# Patient Record
Sex: FEMALE | Race: WHITE | NOT HISPANIC OR LATINO | Employment: OTHER | URBAN - METROPOLITAN AREA
[De-identification: names, ages, dates, MRNs, and addresses within clinical notes are randomized per-mention and may not be internally consistent; named-entity substitution may affect disease eponyms.]

---

## 2019-12-17 ENCOUNTER — HOSPITAL ENCOUNTER (INPATIENT)
Facility: HOSPITAL | Age: 84
LOS: 22 days | Discharge: HOME HEALTH | End: 2020-01-08
Attending: PHYSICAL MEDICINE & REHABILITATION | Admitting: PHYSICAL MEDICINE & REHABILITATION

## 2019-12-17 DIAGNOSIS — R26.81 UNSTEADINESS ON FEET: Primary | ICD-10-CM

## 2019-12-17 DIAGNOSIS — I67.9 HEMIPARESIS OF LEFT NONDOMINANT SIDE DUE TO CEREBROVASCULAR DISEASE (HCC): ICD-10-CM

## 2019-12-17 DIAGNOSIS — R26.2 DIFFICULTY WALKING: ICD-10-CM

## 2019-12-17 DIAGNOSIS — G81.94 HEMIPARESIS OF LEFT NONDOMINANT SIDE DUE TO CEREBROVASCULAR DISEASE (HCC): ICD-10-CM

## 2019-12-17 RX ORDER — PANTOPRAZOLE SODIUM 40 MG/1
40 TABLET, DELAYED RELEASE ORAL
Status: DISCONTINUED | OUTPATIENT
Start: 2019-12-17 | End: 2020-01-08 | Stop reason: HOSPADM

## 2019-12-17 RX ORDER — CLOPIDOGREL BISULFATE 75 MG/1
75 TABLET ORAL DAILY
Status: DISCONTINUED | OUTPATIENT
Start: 2019-12-17 | End: 2020-01-08 | Stop reason: HOSPADM

## 2019-12-17 RX ORDER — METOPROLOL TARTRATE 50 MG/1
50 TABLET, FILM COATED ORAL 2 TIMES DAILY
Status: DISCONTINUED | OUTPATIENT
Start: 2019-12-17 | End: 2020-01-08 | Stop reason: HOSPADM

## 2019-12-17 RX ORDER — ASPIRIN AND DIPYRIDAMOLE 25; 200 MG/1; MG/1
1 CAPSULE, EXTENDED RELEASE ORAL EVERY 12 HOURS
Status: DISCONTINUED | OUTPATIENT
Start: 2019-12-17 | End: 2019-12-27

## 2019-12-17 RX ORDER — DOCUSATE SODIUM 100 MG/1
100 CAPSULE, LIQUID FILLED ORAL 2 TIMES DAILY
Status: DISCONTINUED | OUTPATIENT
Start: 2019-12-17 | End: 2020-01-08 | Stop reason: HOSPADM

## 2019-12-17 RX ORDER — LEVOTHYROXINE SODIUM 0.12 MG/1
125 TABLET ORAL
Status: DISCONTINUED | OUTPATIENT
Start: 2019-12-18 | End: 2020-01-08 | Stop reason: HOSPADM

## 2019-12-17 RX ORDER — TORSEMIDE 10 MG/1
10 TABLET ORAL DAILY
Status: DISCONTINUED | OUTPATIENT
Start: 2019-12-17 | End: 2020-01-08 | Stop reason: HOSPADM

## 2019-12-17 RX ORDER — FLUTICASONE PROPIONATE 50 MCG
1 SPRAY, SUSPENSION (ML) NASAL DAILY
Status: DISCONTINUED | OUTPATIENT
Start: 2019-12-17 | End: 2020-01-08 | Stop reason: HOSPADM

## 2019-12-17 RX ORDER — DILTIAZEM HYDROCHLORIDE 180 MG/1
180 CAPSULE, COATED, EXTENDED RELEASE ORAL 2 TIMES DAILY
Status: DISCONTINUED | OUTPATIENT
Start: 2019-12-17 | End: 2020-01-08 | Stop reason: HOSPADM

## 2019-12-17 RX ADMIN — ASPIRIN AND DIPYRIDAMOLE 1 CAPSULE: 25; 200 CAPSULE, EXTENDED RELEASE ORAL at 20:08

## 2019-12-17 RX ADMIN — FLUTICASONE PROPIONATE 1 SPRAY: 50 SPRAY, METERED NASAL at 20:07

## 2019-12-17 RX ADMIN — METOPROLOL TARTRATE 50 MG: 50 TABLET, FILM COATED ORAL at 20:09

## 2019-12-17 RX ADMIN — DOCUSATE SODIUM 100 MG: 100 CAPSULE, LIQUID FILLED ORAL at 20:09

## 2019-12-17 RX ADMIN — TORSEMIDE 10 MG: 10 TABLET ORAL at 20:07

## 2019-12-17 RX ADMIN — DILTIAZEM HYDROCHLORIDE 180 MG: 180 CAPSULE, COATED, EXTENDED RELEASE ORAL at 20:10

## 2019-12-17 RX ADMIN — CLOPIDOGREL 75 MG: 75 TABLET, FILM COATED ORAL at 20:09

## 2019-12-18 ENCOUNTER — APPOINTMENT (OUTPATIENT)
Dept: CARDIOLOGY | Facility: HOSPITAL | Age: 84
End: 2019-12-18

## 2019-12-18 PROBLEM — I63.9 STROKE (CEREBRUM) (HCC): Status: ACTIVE | Noted: 2019-12-18

## 2019-12-18 LAB
25(OH)D3 SERPL-MCNC: 18.7 NG/ML (ref 30–100)
ANION GAP SERPL CALCULATED.3IONS-SCNC: 18.1 MMOL/L (ref 5–15)
BUN BLD-MCNC: 23 MG/DL (ref 8–23)
BUN/CREAT SERPL: 22.5 (ref 7–25)
CALCIUM SPEC-SCNC: 8.9 MG/DL (ref 8.6–10.5)
CHLORIDE SERPL-SCNC: 101 MMOL/L (ref 98–107)
CO2 SERPL-SCNC: 24.9 MMOL/L (ref 22–29)
CREAT BLD-MCNC: 1.02 MG/DL (ref 0.57–1)
GFR SERPL CREATININE-BSD FRML MDRD: 52 ML/MIN/1.73
GLUCOSE BLD-MCNC: 208 MG/DL (ref 65–99)
POTASSIUM BLD-SCNC: 3.8 MMOL/L (ref 3.5–5.2)
SODIUM BLD-SCNC: 144 MMOL/L (ref 136–145)
TROPONIN T SERPL-MCNC: <0.01 NG/ML (ref 0–0.03)
TSH SERPL DL<=0.05 MIU/L-ACNC: 3.29 UIU/ML (ref 0.27–4.2)
VIT B12 BLD-MCNC: 339 PG/ML (ref 211–946)

## 2019-12-18 PROCEDURE — 93010 ELECTROCARDIOGRAM REPORT: CPT | Performed by: INTERNAL MEDICINE

## 2019-12-18 PROCEDURE — 84484 ASSAY OF TROPONIN QUANT: CPT | Performed by: INTERNAL MEDICINE

## 2019-12-18 PROCEDURE — 82306 VITAMIN D 25 HYDROXY: CPT | Performed by: PHYSICAL MEDICINE & REHABILITATION

## 2019-12-18 PROCEDURE — 96125 COGNITIVE TEST BY HC PRO: CPT

## 2019-12-18 PROCEDURE — 0296T HC EXT ECG > 48HR TO 21 DAY RCRD W/CONECT INTL RCRD: CPT

## 2019-12-18 PROCEDURE — 97535 SELF CARE MNGMENT TRAINING: CPT

## 2019-12-18 PROCEDURE — 97166 OT EVAL MOD COMPLEX 45 MIN: CPT

## 2019-12-18 PROCEDURE — 84443 ASSAY THYROID STIM HORMONE: CPT | Performed by: PHYSICAL MEDICINE & REHABILITATION

## 2019-12-18 PROCEDURE — 84484 ASSAY OF TROPONIN QUANT: CPT | Performed by: PHYSICAL MEDICINE & REHABILITATION

## 2019-12-18 PROCEDURE — 97530 THERAPEUTIC ACTIVITIES: CPT

## 2019-12-18 PROCEDURE — 97162 PT EVAL MOD COMPLEX 30 MIN: CPT

## 2019-12-18 PROCEDURE — 82607 VITAMIN B-12: CPT | Performed by: PHYSICAL MEDICINE & REHABILITATION

## 2019-12-18 PROCEDURE — 80048 BASIC METABOLIC PNL TOTAL CA: CPT | Performed by: PHYSICAL MEDICINE & REHABILITATION

## 2019-12-18 PROCEDURE — 99223 1ST HOSP IP/OBS HIGH 75: CPT | Performed by: INTERNAL MEDICINE

## 2019-12-18 PROCEDURE — 93005 ELECTROCARDIOGRAM TRACING: CPT | Performed by: PHYSICAL MEDICINE & REHABILITATION

## 2019-12-18 RX ORDER — SPIRONOLACTONE 25 MG/1
25 TABLET ORAL DAILY
COMMUNITY
End: 2020-01-08 | Stop reason: HOSPADM

## 2019-12-18 RX ORDER — TORSEMIDE 20 MG/1
20 TABLET ORAL DAILY
Status: ON HOLD | COMMUNITY
End: 2020-01-08 | Stop reason: SDUPTHER

## 2019-12-18 RX ORDER — OMEPRAZOLE 20 MG/1
20 CAPSULE, DELAYED RELEASE ORAL 2 TIMES DAILY
COMMUNITY
End: 2020-01-08 | Stop reason: HOSPADM

## 2019-12-18 RX ORDER — DILTIAZEM HYDROCHLORIDE 180 MG/1
180 CAPSULE, COATED, EXTENDED RELEASE ORAL 2 TIMES DAILY
COMMUNITY

## 2019-12-18 RX ORDER — ISOSORBIDE MONONITRATE 60 MG/1
60 TABLET, EXTENDED RELEASE ORAL DAILY
COMMUNITY
End: 2020-01-08 | Stop reason: HOSPADM

## 2019-12-18 RX ORDER — ACETAMINOPHEN 325 MG/1
650 TABLET ORAL EVERY 4 HOURS PRN
Status: DISCONTINUED | OUTPATIENT
Start: 2019-12-18 | End: 2020-01-08 | Stop reason: HOSPADM

## 2019-12-18 RX ORDER — VITAMIN E 268 MG
400 CAPSULE ORAL DAILY
COMMUNITY
End: 2020-02-24

## 2019-12-18 RX ORDER — ASPIRIN AND DIPYRIDAMOLE 25; 200 MG/1; MG/1
1 CAPSULE, EXTENDED RELEASE ORAL 2 TIMES DAILY
COMMUNITY
End: 2020-01-08 | Stop reason: HOSPADM

## 2019-12-18 RX ORDER — LEVOTHYROXINE SODIUM 0.12 MG/1
125 TABLET ORAL DAILY
COMMUNITY

## 2019-12-18 RX ORDER — CLOPIDOGREL BISULFATE 75 MG/1
75 TABLET ORAL DAILY
COMMUNITY
End: 2020-02-28 | Stop reason: HOSPADM

## 2019-12-18 RX ORDER — LISINOPRIL 20 MG/1
20 TABLET ORAL 2 TIMES DAILY
COMMUNITY
End: 2020-01-08 | Stop reason: HOSPADM

## 2019-12-18 RX ORDER — NITROGLYCERIN 0.4 MG/1
0.4 TABLET SUBLINGUAL
Status: DISCONTINUED | OUTPATIENT
Start: 2019-12-18 | End: 2020-01-08 | Stop reason: HOSPADM

## 2019-12-18 RX ORDER — ERGOCALCIFEROL 1.25 MG/1
50000 CAPSULE ORAL
Status: DISCONTINUED | OUTPATIENT
Start: 2019-12-19 | End: 2020-01-08 | Stop reason: HOSPADM

## 2019-12-18 RX ORDER — METOPROLOL TARTRATE 100 MG/1
100 TABLET ORAL 2 TIMES DAILY
Status: ON HOLD | COMMUNITY
End: 2020-01-08 | Stop reason: SDUPTHER

## 2019-12-18 RX ORDER — POLYETHYLENE GLYCOL 3350 17 G/17G
17 POWDER, FOR SOLUTION ORAL DAILY PRN
Status: DISCONTINUED | OUTPATIENT
Start: 2019-12-18 | End: 2020-01-08

## 2019-12-18 RX ADMIN — METOPROLOL TARTRATE 50 MG: 50 TABLET, FILM COATED ORAL at 09:04

## 2019-12-18 RX ADMIN — TORSEMIDE 10 MG: 10 TABLET ORAL at 09:04

## 2019-12-18 RX ADMIN — DILTIAZEM HYDROCHLORIDE 180 MG: 180 CAPSULE, COATED, EXTENDED RELEASE ORAL at 09:04

## 2019-12-18 RX ADMIN — DILTIAZEM HYDROCHLORIDE 180 MG: 180 CAPSULE, COATED, EXTENDED RELEASE ORAL at 20:25

## 2019-12-18 RX ADMIN — ACETAMINOPHEN 650 MG: 325 TABLET, FILM COATED ORAL at 16:43

## 2019-12-18 RX ADMIN — PANTOPRAZOLE SODIUM 40 MG: 40 TABLET, DELAYED RELEASE ORAL at 16:43

## 2019-12-18 RX ADMIN — POLYETHYLENE GLYCOL 3350 17 G: 17 POWDER, FOR SOLUTION ORAL at 14:28

## 2019-12-18 RX ADMIN — ASPIRIN AND DIPYRIDAMOLE 1 CAPSULE: 25; 200 CAPSULE, EXTENDED RELEASE ORAL at 18:12

## 2019-12-18 RX ADMIN — DOCUSATE SODIUM 100 MG: 100 CAPSULE, LIQUID FILLED ORAL at 20:25

## 2019-12-18 RX ADMIN — PANTOPRAZOLE SODIUM 40 MG: 40 TABLET, DELAYED RELEASE ORAL at 05:46

## 2019-12-18 RX ADMIN — CLOPIDOGREL 75 MG: 75 TABLET, FILM COATED ORAL at 09:04

## 2019-12-18 RX ADMIN — METOPROLOL TARTRATE 50 MG: 50 TABLET, FILM COATED ORAL at 20:25

## 2019-12-18 RX ADMIN — ACETAMINOPHEN 650 MG: 325 TABLET, FILM COATED ORAL at 09:03

## 2019-12-18 RX ADMIN — DOCUSATE SODIUM 100 MG: 100 CAPSULE, LIQUID FILLED ORAL at 09:04

## 2019-12-18 RX ADMIN — LEVOTHYROXINE SODIUM 125 MCG: 125 TABLET ORAL at 05:46

## 2019-12-18 RX ADMIN — NITROGLYCERIN 0.4 MG: 0.4 TABLET SUBLINGUAL at 08:36

## 2019-12-18 RX ADMIN — FLUTICASONE PROPIONATE 1 SPRAY: 50 SPRAY, METERED NASAL at 09:05

## 2019-12-18 RX ADMIN — ASPIRIN AND DIPYRIDAMOLE 1 CAPSULE: 25; 200 CAPSULE, EXTENDED RELEASE ORAL at 05:46

## 2019-12-19 PROCEDURE — 97110 THERAPEUTIC EXERCISES: CPT

## 2019-12-19 PROCEDURE — 97112 NEUROMUSCULAR REEDUCATION: CPT

## 2019-12-19 PROCEDURE — 92507 TX SP LANG VOICE COMM INDIV: CPT

## 2019-12-19 PROCEDURE — 99232 SBSQ HOSP IP/OBS MODERATE 35: CPT | Performed by: NURSE PRACTITIONER

## 2019-12-19 PROCEDURE — 97116 GAIT TRAINING THERAPY: CPT

## 2019-12-19 PROCEDURE — 97535 SELF CARE MNGMENT TRAINING: CPT

## 2019-12-19 RX ADMIN — DOCUSATE SODIUM 100 MG: 100 CAPSULE, LIQUID FILLED ORAL at 07:28

## 2019-12-19 RX ADMIN — DILTIAZEM HYDROCHLORIDE 180 MG: 180 CAPSULE, COATED, EXTENDED RELEASE ORAL at 20:15

## 2019-12-19 RX ADMIN — ASPIRIN AND DIPYRIDAMOLE 1 CAPSULE: 25; 200 CAPSULE, EXTENDED RELEASE ORAL at 17:49

## 2019-12-19 RX ADMIN — PANTOPRAZOLE SODIUM 40 MG: 40 TABLET, DELAYED RELEASE ORAL at 17:48

## 2019-12-19 RX ADMIN — CLOPIDOGREL 75 MG: 75 TABLET, FILM COATED ORAL at 07:28

## 2019-12-19 RX ADMIN — PANTOPRAZOLE SODIUM 40 MG: 40 TABLET, DELAYED RELEASE ORAL at 05:22

## 2019-12-19 RX ADMIN — HYDROCORTISONE 2.5%: 25 CREAM TOPICAL at 20:15

## 2019-12-19 RX ADMIN — ASPIRIN AND DIPYRIDAMOLE 1 CAPSULE: 25; 200 CAPSULE, EXTENDED RELEASE ORAL at 05:22

## 2019-12-19 RX ADMIN — FLUTICASONE PROPIONATE 1 SPRAY: 50 SPRAY, METERED NASAL at 07:29

## 2019-12-19 RX ADMIN — ERGOCALCIFEROL 50000 UNITS: 1.25 CAPSULE ORAL at 07:29

## 2019-12-19 RX ADMIN — TORSEMIDE 10 MG: 10 TABLET ORAL at 07:29

## 2019-12-19 RX ADMIN — METOPROLOL TARTRATE 50 MG: 50 TABLET, FILM COATED ORAL at 20:15

## 2019-12-19 RX ADMIN — LEVOTHYROXINE SODIUM 125 MCG: 125 TABLET ORAL at 05:22

## 2019-12-19 RX ADMIN — ACETAMINOPHEN 650 MG: 325 TABLET, FILM COATED ORAL at 07:28

## 2019-12-19 RX ADMIN — DILTIAZEM HYDROCHLORIDE 180 MG: 180 CAPSULE, COATED, EXTENDED RELEASE ORAL at 07:29

## 2019-12-19 RX ADMIN — DOCUSATE SODIUM 100 MG: 100 CAPSULE, LIQUID FILLED ORAL at 20:15

## 2019-12-19 RX ADMIN — METOPROLOL TARTRATE 50 MG: 50 TABLET, FILM COATED ORAL at 07:29

## 2019-12-20 LAB
BACTERIA UR QL AUTO: ABNORMAL /HPF
BILIRUB UR QL STRIP: NEGATIVE
CLARITY UR: CLEAR
COLOR UR: YELLOW
GLUCOSE UR STRIP-MCNC: NEGATIVE MG/DL
HGB UR QL STRIP.AUTO: NEGATIVE
HYALINE CASTS UR QL AUTO: ABNORMAL /LPF
KETONES UR QL STRIP: NEGATIVE
LEUKOCYTE ESTERASE UR QL STRIP.AUTO: ABNORMAL
NITRITE UR QL STRIP: NEGATIVE
PH UR STRIP.AUTO: <=5 [PH] (ref 5–8)
PROT UR QL STRIP: NEGATIVE
RBC # UR: ABNORMAL /HPF
REF LAB TEST METHOD: ABNORMAL
SP GR UR STRIP: 1.02 (ref 1–1.03)
SQUAMOUS #/AREA URNS HPF: ABNORMAL /HPF
UROBILINOGEN UR QL STRIP: ABNORMAL
WBC UR QL AUTO: ABNORMAL /HPF

## 2019-12-20 PROCEDURE — 97112 NEUROMUSCULAR REEDUCATION: CPT

## 2019-12-20 PROCEDURE — 97116 GAIT TRAINING THERAPY: CPT

## 2019-12-20 PROCEDURE — 97110 THERAPEUTIC EXERCISES: CPT

## 2019-12-20 PROCEDURE — 97535 SELF CARE MNGMENT TRAINING: CPT

## 2019-12-20 PROCEDURE — 81001 URINALYSIS AUTO W/SCOPE: CPT | Performed by: PHYSICAL MEDICINE & REHABILITATION

## 2019-12-20 PROCEDURE — G0515 COGNITIVE SKILLS DEVELOPMENT: HCPCS | Performed by: SPEECH-LANGUAGE PATHOLOGIST

## 2019-12-20 RX ADMIN — CLOPIDOGREL 75 MG: 75 TABLET, FILM COATED ORAL at 08:06

## 2019-12-20 RX ADMIN — DOCUSATE SODIUM 100 MG: 100 CAPSULE, LIQUID FILLED ORAL at 08:06

## 2019-12-20 RX ADMIN — ASPIRIN AND DIPYRIDAMOLE 1 CAPSULE: 25; 200 CAPSULE, EXTENDED RELEASE ORAL at 19:12

## 2019-12-20 RX ADMIN — DOCUSATE SODIUM 100 MG: 100 CAPSULE, LIQUID FILLED ORAL at 20:31

## 2019-12-20 RX ADMIN — FLUTICASONE PROPIONATE 1 SPRAY: 50 SPRAY, METERED NASAL at 08:07

## 2019-12-20 RX ADMIN — ACETAMINOPHEN 650 MG: 325 TABLET, FILM COATED ORAL at 06:21

## 2019-12-20 RX ADMIN — METOPROLOL TARTRATE 50 MG: 50 TABLET, FILM COATED ORAL at 08:06

## 2019-12-20 RX ADMIN — HYDROCORTISONE 2.5%: 25 CREAM TOPICAL at 08:07

## 2019-12-20 RX ADMIN — METOPROLOL TARTRATE 50 MG: 50 TABLET, FILM COATED ORAL at 20:31

## 2019-12-20 RX ADMIN — DILTIAZEM HYDROCHLORIDE 180 MG: 180 CAPSULE, COATED, EXTENDED RELEASE ORAL at 08:06

## 2019-12-20 RX ADMIN — DILTIAZEM HYDROCHLORIDE 180 MG: 180 CAPSULE, COATED, EXTENDED RELEASE ORAL at 20:31

## 2019-12-20 RX ADMIN — HYDROCORTISONE 2.5%: 25 CREAM TOPICAL at 20:31

## 2019-12-20 RX ADMIN — ASPIRIN AND DIPYRIDAMOLE 1 CAPSULE: 25; 200 CAPSULE, EXTENDED RELEASE ORAL at 06:16

## 2019-12-20 RX ADMIN — PANTOPRAZOLE SODIUM 40 MG: 40 TABLET, DELAYED RELEASE ORAL at 17:01

## 2019-12-20 RX ADMIN — TORSEMIDE 10 MG: 10 TABLET ORAL at 08:06

## 2019-12-20 RX ADMIN — PANTOPRAZOLE SODIUM 40 MG: 40 TABLET, DELAYED RELEASE ORAL at 06:17

## 2019-12-20 RX ADMIN — LEVOTHYROXINE SODIUM 125 MCG: 125 TABLET ORAL at 06:16

## 2019-12-21 PROCEDURE — 97112 NEUROMUSCULAR REEDUCATION: CPT

## 2019-12-21 PROCEDURE — 97535 SELF CARE MNGMENT TRAINING: CPT

## 2019-12-21 PROCEDURE — 97110 THERAPEUTIC EXERCISES: CPT | Performed by: PHYSICAL THERAPIST

## 2019-12-21 PROCEDURE — G0515 COGNITIVE SKILLS DEVELOPMENT: HCPCS

## 2019-12-21 RX ADMIN — LEVOTHYROXINE SODIUM 125 MCG: 125 TABLET ORAL at 05:49

## 2019-12-21 RX ADMIN — HYDROCORTISONE 2.5%: 25 CREAM TOPICAL at 09:27

## 2019-12-21 RX ADMIN — PANTOPRAZOLE SODIUM 40 MG: 40 TABLET, DELAYED RELEASE ORAL at 05:49

## 2019-12-21 RX ADMIN — DILTIAZEM HYDROCHLORIDE 180 MG: 180 CAPSULE, COATED, EXTENDED RELEASE ORAL at 20:55

## 2019-12-21 RX ADMIN — CLOPIDOGREL 75 MG: 75 TABLET, FILM COATED ORAL at 09:22

## 2019-12-21 RX ADMIN — METOPROLOL TARTRATE 50 MG: 50 TABLET, FILM COATED ORAL at 09:22

## 2019-12-21 RX ADMIN — TORSEMIDE 10 MG: 10 TABLET ORAL at 09:22

## 2019-12-21 RX ADMIN — ASPIRIN AND DIPYRIDAMOLE 1 CAPSULE: 25; 200 CAPSULE, EXTENDED RELEASE ORAL at 18:00

## 2019-12-21 RX ADMIN — PANTOPRAZOLE SODIUM 40 MG: 40 TABLET, DELAYED RELEASE ORAL at 16:56

## 2019-12-21 RX ADMIN — ACETAMINOPHEN 650 MG: 325 TABLET, FILM COATED ORAL at 05:49

## 2019-12-21 RX ADMIN — ACETAMINOPHEN 650 MG: 325 TABLET, FILM COATED ORAL at 13:51

## 2019-12-21 RX ADMIN — METOPROLOL TARTRATE 50 MG: 50 TABLET, FILM COATED ORAL at 20:55

## 2019-12-21 RX ADMIN — DOCUSATE SODIUM 100 MG: 100 CAPSULE, LIQUID FILLED ORAL at 20:55

## 2019-12-21 RX ADMIN — FLUTICASONE PROPIONATE 1 SPRAY: 50 SPRAY, METERED NASAL at 09:22

## 2019-12-21 RX ADMIN — POLYETHYLENE GLYCOL 3350 17 G: 17 POWDER, FOR SOLUTION ORAL at 16:55

## 2019-12-21 RX ADMIN — DOCUSATE SODIUM 100 MG: 100 CAPSULE, LIQUID FILLED ORAL at 09:22

## 2019-12-21 RX ADMIN — ASPIRIN AND DIPYRIDAMOLE 1 CAPSULE: 25; 200 CAPSULE, EXTENDED RELEASE ORAL at 05:49

## 2019-12-21 RX ADMIN — HYDROCORTISONE 2.5%: 25 CREAM TOPICAL at 20:55

## 2019-12-21 RX ADMIN — DILTIAZEM HYDROCHLORIDE 180 MG: 180 CAPSULE, COATED, EXTENDED RELEASE ORAL at 09:22

## 2019-12-22 RX ORDER — ZOLPIDEM TARTRATE 5 MG/1
5 TABLET ORAL NIGHTLY PRN
Status: DISPENSED | OUTPATIENT
Start: 2019-12-22 | End: 2020-01-01

## 2019-12-22 RX ADMIN — HYDROCORTISONE 2.5%: 25 CREAM TOPICAL at 20:45

## 2019-12-22 RX ADMIN — HYDROCORTISONE 2.5%: 25 CREAM TOPICAL at 08:20

## 2019-12-22 RX ADMIN — LEVOTHYROXINE SODIUM 125 MCG: 125 TABLET ORAL at 06:00

## 2019-12-22 RX ADMIN — METOPROLOL TARTRATE 50 MG: 50 TABLET, FILM COATED ORAL at 20:46

## 2019-12-22 RX ADMIN — PANTOPRAZOLE SODIUM 40 MG: 40 TABLET, DELAYED RELEASE ORAL at 06:01

## 2019-12-22 RX ADMIN — DILTIAZEM HYDROCHLORIDE 180 MG: 180 CAPSULE, COATED, EXTENDED RELEASE ORAL at 20:45

## 2019-12-22 RX ADMIN — DILTIAZEM HYDROCHLORIDE 180 MG: 180 CAPSULE, COATED, EXTENDED RELEASE ORAL at 08:19

## 2019-12-22 RX ADMIN — DOCUSATE SODIUM 100 MG: 100 CAPSULE, LIQUID FILLED ORAL at 20:45

## 2019-12-22 RX ADMIN — ASPIRIN AND DIPYRIDAMOLE 1 CAPSULE: 25; 200 CAPSULE, EXTENDED RELEASE ORAL at 06:01

## 2019-12-22 RX ADMIN — METOPROLOL TARTRATE 50 MG: 50 TABLET, FILM COATED ORAL at 08:19

## 2019-12-22 RX ADMIN — DOCUSATE SODIUM 100 MG: 100 CAPSULE, LIQUID FILLED ORAL at 08:20

## 2019-12-22 RX ADMIN — ZOLPIDEM TARTRATE 5 MG: 5 TABLET ORAL at 21:34

## 2019-12-22 RX ADMIN — PANTOPRAZOLE SODIUM 40 MG: 40 TABLET, DELAYED RELEASE ORAL at 17:59

## 2019-12-22 RX ADMIN — TORSEMIDE 10 MG: 10 TABLET ORAL at 08:23

## 2019-12-22 RX ADMIN — CLOPIDOGREL 75 MG: 75 TABLET, FILM COATED ORAL at 08:19

## 2019-12-22 RX ADMIN — ASPIRIN AND DIPYRIDAMOLE 1 CAPSULE: 25; 200 CAPSULE, EXTENDED RELEASE ORAL at 17:59

## 2019-12-22 RX ADMIN — ACETAMINOPHEN 650 MG: 325 TABLET, FILM COATED ORAL at 11:32

## 2019-12-22 RX ADMIN — ACETAMINOPHEN 650 MG: 325 TABLET, FILM COATED ORAL at 20:45

## 2019-12-23 PROCEDURE — 97110 THERAPEUTIC EXERCISES: CPT

## 2019-12-23 PROCEDURE — 97535 SELF CARE MNGMENT TRAINING: CPT

## 2019-12-23 PROCEDURE — 97116 GAIT TRAINING THERAPY: CPT

## 2019-12-23 PROCEDURE — 97112 NEUROMUSCULAR REEDUCATION: CPT

## 2019-12-23 PROCEDURE — G0515 COGNITIVE SKILLS DEVELOPMENT: HCPCS

## 2019-12-23 RX ORDER — BUTALBITAL, ACETAMINOPHEN AND CAFFEINE 50; 325; 40 MG/1; MG/1; MG/1
1 TABLET ORAL EVERY 8 HOURS PRN
Status: DISCONTINUED | OUTPATIENT
Start: 2019-12-23 | End: 2020-01-08

## 2019-12-23 RX ORDER — SENNA AND DOCUSATE SODIUM 50; 8.6 MG/1; MG/1
1 TABLET, FILM COATED ORAL NIGHTLY
Status: DISCONTINUED | OUTPATIENT
Start: 2019-12-23 | End: 2020-01-08 | Stop reason: HOSPADM

## 2019-12-23 RX ADMIN — TORSEMIDE 10 MG: 10 TABLET ORAL at 09:01

## 2019-12-23 RX ADMIN — DILTIAZEM HYDROCHLORIDE 180 MG: 180 CAPSULE, COATED, EXTENDED RELEASE ORAL at 21:18

## 2019-12-23 RX ADMIN — ACETAMINOPHEN 650 MG: 325 TABLET, FILM COATED ORAL at 19:28

## 2019-12-23 RX ADMIN — CLOPIDOGREL 75 MG: 75 TABLET, FILM COATED ORAL at 09:01

## 2019-12-23 RX ADMIN — SENNOSIDES AND DOCUSATE SODIUM 1 TABLET: 8.6; 5 TABLET ORAL at 21:19

## 2019-12-23 RX ADMIN — ASPIRIN AND DIPYRIDAMOLE 1 CAPSULE: 25; 200 CAPSULE, EXTENDED RELEASE ORAL at 19:28

## 2019-12-23 RX ADMIN — ACETAMINOPHEN 650 MG: 325 TABLET, FILM COATED ORAL at 02:35

## 2019-12-23 RX ADMIN — POLYETHYLENE GLYCOL 3350 17 G: 17 POWDER, FOR SOLUTION ORAL at 16:31

## 2019-12-23 RX ADMIN — ZOLPIDEM TARTRATE 5 MG: 5 TABLET ORAL at 21:18

## 2019-12-23 RX ADMIN — ACETAMINOPHEN 650 MG: 325 TABLET, FILM COATED ORAL at 09:01

## 2019-12-23 RX ADMIN — PANTOPRAZOLE SODIUM 40 MG: 40 TABLET, DELAYED RELEASE ORAL at 16:31

## 2019-12-23 RX ADMIN — METOPROLOL TARTRATE 50 MG: 50 TABLET, FILM COATED ORAL at 09:01

## 2019-12-23 RX ADMIN — HYDROCORTISONE 2.5%: 25 CREAM TOPICAL at 09:01

## 2019-12-23 RX ADMIN — ASPIRIN AND DIPYRIDAMOLE 1 CAPSULE: 25; 200 CAPSULE, EXTENDED RELEASE ORAL at 05:59

## 2019-12-23 RX ADMIN — DOCUSATE SODIUM 100 MG: 100 CAPSULE, LIQUID FILLED ORAL at 21:18

## 2019-12-23 RX ADMIN — DILTIAZEM HYDROCHLORIDE 180 MG: 180 CAPSULE, COATED, EXTENDED RELEASE ORAL at 09:01

## 2019-12-23 RX ADMIN — LEVOTHYROXINE SODIUM 125 MCG: 125 TABLET ORAL at 05:59

## 2019-12-23 RX ADMIN — PANTOPRAZOLE SODIUM 40 MG: 40 TABLET, DELAYED RELEASE ORAL at 06:00

## 2019-12-23 RX ADMIN — METOPROLOL TARTRATE 50 MG: 50 TABLET, FILM COATED ORAL at 21:19

## 2019-12-23 RX ADMIN — DOCUSATE SODIUM 100 MG: 100 CAPSULE, LIQUID FILLED ORAL at 09:01

## 2019-12-24 PROCEDURE — 97110 THERAPEUTIC EXERCISES: CPT

## 2019-12-24 PROCEDURE — 97112 NEUROMUSCULAR REEDUCATION: CPT

## 2019-12-24 PROCEDURE — G0515 COGNITIVE SKILLS DEVELOPMENT: HCPCS

## 2019-12-24 PROCEDURE — 97116 GAIT TRAINING THERAPY: CPT

## 2019-12-24 PROCEDURE — 97535 SELF CARE MNGMENT TRAINING: CPT

## 2019-12-24 RX ADMIN — DOCUSATE SODIUM 100 MG: 100 CAPSULE, LIQUID FILLED ORAL at 20:01

## 2019-12-24 RX ADMIN — PHENYLEPHRINE HYDROCHLORIDE AND FAT, HARD 1 SUPPOSITORY: 5; 1.77 SUPPOSITORY RECTAL at 20:10

## 2019-12-24 RX ADMIN — ASPIRIN AND DIPYRIDAMOLE 1 CAPSULE: 25; 200 CAPSULE, EXTENDED RELEASE ORAL at 06:04

## 2019-12-24 RX ADMIN — LEVOTHYROXINE SODIUM 125 MCG: 125 TABLET ORAL at 06:05

## 2019-12-24 RX ADMIN — DILTIAZEM HYDROCHLORIDE 180 MG: 180 CAPSULE, COATED, EXTENDED RELEASE ORAL at 07:52

## 2019-12-24 RX ADMIN — DILTIAZEM HYDROCHLORIDE 180 MG: 180 CAPSULE, COATED, EXTENDED RELEASE ORAL at 20:01

## 2019-12-24 RX ADMIN — ASPIRIN AND DIPYRIDAMOLE 1 CAPSULE: 25; 200 CAPSULE, EXTENDED RELEASE ORAL at 16:41

## 2019-12-24 RX ADMIN — SENNOSIDES AND DOCUSATE SODIUM 1 TABLET: 8.6; 5 TABLET ORAL at 20:01

## 2019-12-24 RX ADMIN — ZOLPIDEM TARTRATE 5 MG: 5 TABLET ORAL at 20:01

## 2019-12-24 RX ADMIN — DOCUSATE SODIUM 100 MG: 100 CAPSULE, LIQUID FILLED ORAL at 07:52

## 2019-12-24 RX ADMIN — METOPROLOL TARTRATE 50 MG: 50 TABLET, FILM COATED ORAL at 20:01

## 2019-12-24 RX ADMIN — ACETAMINOPHEN 650 MG: 325 TABLET, FILM COATED ORAL at 06:05

## 2019-12-24 RX ADMIN — FLUTICASONE PROPIONATE 1 SPRAY: 50 SPRAY, METERED NASAL at 07:52

## 2019-12-24 RX ADMIN — TORSEMIDE 10 MG: 10 TABLET ORAL at 07:52

## 2019-12-24 RX ADMIN — PANTOPRAZOLE SODIUM 40 MG: 40 TABLET, DELAYED RELEASE ORAL at 06:05

## 2019-12-24 RX ADMIN — PANTOPRAZOLE SODIUM 40 MG: 40 TABLET, DELAYED RELEASE ORAL at 16:41

## 2019-12-24 RX ADMIN — BUTALBITAL, ACETAMINOPHEN, AND CAFFEINE 1 TABLET: 50; 325; 40 TABLET ORAL at 00:10

## 2019-12-24 RX ADMIN — CLOPIDOGREL 75 MG: 75 TABLET, FILM COATED ORAL at 07:53

## 2019-12-24 RX ADMIN — METOPROLOL TARTRATE 50 MG: 50 TABLET, FILM COATED ORAL at 07:52

## 2019-12-25 RX ADMIN — PANTOPRAZOLE SODIUM 40 MG: 40 TABLET, DELAYED RELEASE ORAL at 06:24

## 2019-12-25 RX ADMIN — DOCUSATE SODIUM 100 MG: 100 CAPSULE, LIQUID FILLED ORAL at 09:54

## 2019-12-25 RX ADMIN — ACETAMINOPHEN 650 MG: 325 TABLET, FILM COATED ORAL at 22:13

## 2019-12-25 RX ADMIN — ACETAMINOPHEN 650 MG: 325 TABLET, FILM COATED ORAL at 13:09

## 2019-12-25 RX ADMIN — CLOPIDOGREL 75 MG: 75 TABLET, FILM COATED ORAL at 09:50

## 2019-12-25 RX ADMIN — METOPROLOL TARTRATE 50 MG: 50 TABLET, FILM COATED ORAL at 22:14

## 2019-12-25 RX ADMIN — ACETAMINOPHEN 650 MG: 325 TABLET, FILM COATED ORAL at 16:59

## 2019-12-25 RX ADMIN — TORSEMIDE 10 MG: 10 TABLET ORAL at 09:49

## 2019-12-25 RX ADMIN — LEVOTHYROXINE SODIUM 125 MCG: 125 TABLET ORAL at 06:23

## 2019-12-25 RX ADMIN — ACETAMINOPHEN 650 MG: 325 TABLET, FILM COATED ORAL at 06:30

## 2019-12-25 RX ADMIN — METOPROLOL TARTRATE 50 MG: 50 TABLET, FILM COATED ORAL at 09:50

## 2019-12-25 RX ADMIN — SENNOSIDES AND DOCUSATE SODIUM 1 TABLET: 8.6; 5 TABLET ORAL at 21:30

## 2019-12-25 RX ADMIN — DILTIAZEM HYDROCHLORIDE 180 MG: 180 CAPSULE, COATED, EXTENDED RELEASE ORAL at 21:30

## 2019-12-25 RX ADMIN — ASPIRIN AND DIPYRIDAMOLE 1 CAPSULE: 25; 200 CAPSULE, EXTENDED RELEASE ORAL at 17:00

## 2019-12-25 RX ADMIN — ZOLPIDEM TARTRATE 5 MG: 5 TABLET ORAL at 22:13

## 2019-12-25 RX ADMIN — PANTOPRAZOLE SODIUM 40 MG: 40 TABLET, DELAYED RELEASE ORAL at 16:59

## 2019-12-25 RX ADMIN — DOCUSATE SODIUM 100 MG: 100 CAPSULE, LIQUID FILLED ORAL at 21:29

## 2019-12-25 RX ADMIN — DILTIAZEM HYDROCHLORIDE 180 MG: 180 CAPSULE, COATED, EXTENDED RELEASE ORAL at 09:50

## 2019-12-25 RX ADMIN — ASPIRIN AND DIPYRIDAMOLE 1 CAPSULE: 25; 200 CAPSULE, EXTENDED RELEASE ORAL at 06:23

## 2019-12-26 ENCOUNTER — HOSPITAL ENCOUNTER (OUTPATIENT)
Facility: HOSPITAL | Age: 84
Discharge: NURSING, REHAB OR LONG-TERM CARE | End: 2019-12-27
Attending: INTERNAL MEDICINE | Admitting: INTERNAL MEDICINE

## 2019-12-26 ENCOUNTER — APPOINTMENT (OUTPATIENT)
Dept: GENERAL RADIOLOGY | Facility: HOSPITAL | Age: 84
End: 2019-12-26

## 2019-12-26 ENCOUNTER — APPOINTMENT (OUTPATIENT)
Dept: CT IMAGING | Facility: HOSPITAL | Age: 84
End: 2019-12-26

## 2019-12-26 PROBLEM — R07.9 CHEST PAIN: Status: ACTIVE | Noted: 2019-12-26

## 2019-12-26 LAB
ANION GAP SERPL CALCULATED.3IONS-SCNC: 14.2 MMOL/L (ref 5–15)
BUN BLD-MCNC: 27 MG/DL (ref 8–23)
BUN/CREAT SERPL: 19.6 (ref 7–25)
CALCIUM SPEC-SCNC: 9.2 MG/DL (ref 8.6–10.5)
CHLORIDE SERPL-SCNC: 100 MMOL/L (ref 98–107)
CO2 SERPL-SCNC: 25.8 MMOL/L (ref 22–29)
CREAT BLD-MCNC: 1.38 MG/DL (ref 0.57–1)
DEPRECATED RDW RBC AUTO: 45.2 FL (ref 37–54)
ERYTHROCYTE [DISTWIDTH] IN BLOOD BY AUTOMATED COUNT: 14.4 % (ref 12.3–15.4)
GFR SERPL CREATININE-BSD FRML MDRD: 37 ML/MIN/1.73
GLUCOSE BLD-MCNC: 233 MG/DL (ref 65–99)
GLUCOSE BLDC GLUCOMTR-MCNC: 202 MG/DL (ref 70–130)
GLUCOSE BLDC GLUCOMTR-MCNC: 386 MG/DL (ref 70–130)
HCT VFR BLD AUTO: 29.2 % (ref 34–46.6)
HGB BLD-MCNC: 9.2 G/DL (ref 12–15.9)
MCH RBC QN AUTO: 27.3 PG (ref 26.6–33)
MCHC RBC AUTO-ENTMCNC: 31.5 G/DL (ref 31.5–35.7)
MCV RBC AUTO: 86.6 FL (ref 79–97)
PLATELET # BLD AUTO: 296 10*3/MM3 (ref 140–450)
PMV BLD AUTO: 10.5 FL (ref 6–12)
POTASSIUM BLD-SCNC: 4.2 MMOL/L (ref 3.5–5.2)
RBC # BLD AUTO: 3.37 10*6/MM3 (ref 3.77–5.28)
SODIUM BLD-SCNC: 140 MMOL/L (ref 136–145)
TROPONIN T SERPL-MCNC: <0.01 NG/ML (ref 0–0.03)
WBC NRBC COR # BLD: 7.6 10*3/MM3 (ref 3.4–10.8)

## 2019-12-26 PROCEDURE — 85027 COMPLETE CBC AUTOMATED: CPT | Performed by: INTERNAL MEDICINE

## 2019-12-26 PROCEDURE — 99223 1ST HOSP IP/OBS HIGH 75: CPT | Performed by: PSYCHIATRY & NEUROLOGY

## 2019-12-26 PROCEDURE — 25010000002 MIDAZOLAM PER 1 MG: Performed by: INTERNAL MEDICINE

## 2019-12-26 PROCEDURE — 99153 MOD SED SAME PHYS/QHP EA: CPT | Performed by: INTERNAL MEDICINE

## 2019-12-26 PROCEDURE — C1894 INTRO/SHEATH, NON-LASER: HCPCS | Performed by: INTERNAL MEDICINE

## 2019-12-26 PROCEDURE — 25010000002 METHYLPREDNISOLONE PER 125 MG: Performed by: INTERNAL MEDICINE

## 2019-12-26 PROCEDURE — 80048 BASIC METABOLIC PNL TOTAL CA: CPT | Performed by: INTERNAL MEDICINE

## 2019-12-26 PROCEDURE — 70450 CT HEAD/BRAIN W/O DYE: CPT

## 2019-12-26 PROCEDURE — A9270 NON-COVERED ITEM OR SERVICE: HCPCS | Performed by: INTERNAL MEDICINE

## 2019-12-26 PROCEDURE — 93459 L HRT ART/GRFT ANGIO: CPT | Performed by: INTERNAL MEDICINE

## 2019-12-26 PROCEDURE — 82962 GLUCOSE BLOOD TEST: CPT

## 2019-12-26 PROCEDURE — 93010 ELECTROCARDIOGRAM REPORT: CPT | Performed by: INTERNAL MEDICINE

## 2019-12-26 PROCEDURE — G0378 HOSPITAL OBSERVATION PER HR: HCPCS

## 2019-12-26 PROCEDURE — 0 IOPAMIDOL PER 1 ML: Performed by: INTERNAL MEDICINE

## 2019-12-26 PROCEDURE — C1769 GUIDE WIRE: HCPCS | Performed by: INTERNAL MEDICINE

## 2019-12-26 PROCEDURE — 93005 ELECTROCARDIOGRAM TRACING: CPT | Performed by: PHYSICAL MEDICINE & REHABILITATION

## 2019-12-26 PROCEDURE — 99152 MOD SED SAME PHYS/QHP 5/>YRS: CPT | Performed by: INTERNAL MEDICINE

## 2019-12-26 PROCEDURE — 63710000001 METOPROLOL TARTRATE 50 MG TABLET: Performed by: INTERNAL MEDICINE

## 2019-12-26 PROCEDURE — 25010000002 DIPHENHYDRAMINE PER 50 MG: Performed by: INTERNAL MEDICINE

## 2019-12-26 PROCEDURE — 84484 ASSAY OF TROPONIN QUANT: CPT | Performed by: INTERNAL MEDICINE

## 2019-12-26 PROCEDURE — 25010000002 FENTANYL CITRATE (PF) 100 MCG/2ML SOLUTION: Performed by: INTERNAL MEDICINE

## 2019-12-26 PROCEDURE — 63710000001 DILTIAZEM CD 180 MG CAPSULE SUSTAINED-RELEASE 24 HR: Performed by: INTERNAL MEDICINE

## 2019-12-26 PROCEDURE — 99220 PR INITIAL OBSERVATION CARE/DAY 70 MINUTES: CPT | Performed by: INTERNAL MEDICINE

## 2019-12-26 PROCEDURE — 93005 ELECTROCARDIOGRAM TRACING: CPT | Performed by: INTERNAL MEDICINE

## 2019-12-26 PROCEDURE — 71045 X-RAY EXAM CHEST 1 VIEW: CPT

## 2019-12-26 RX ORDER — SODIUM CHLORIDE 450 MG/100ML
50 INJECTION, SOLUTION INTRAVENOUS CONTINUOUS
Status: DISCONTINUED | OUTPATIENT
Start: 2019-12-26 | End: 2019-12-27

## 2019-12-26 RX ORDER — ASPIRIN 81 MG/1
81 TABLET ORAL DAILY
Status: DISCONTINUED | OUTPATIENT
Start: 2019-12-26 | End: 2019-12-26

## 2019-12-26 RX ORDER — CLOPIDOGREL BISULFATE 75 MG/1
75 TABLET ORAL DAILY
Status: DISCONTINUED | OUTPATIENT
Start: 2019-12-26 | End: 2019-12-27

## 2019-12-26 RX ORDER — SODIUM CHLORIDE 9 MG/ML
250 INJECTION, SOLUTION INTRAVENOUS ONCE AS NEEDED
Status: DISCONTINUED | OUTPATIENT
Start: 2019-12-26 | End: 2019-12-27 | Stop reason: HOSPADM

## 2019-12-26 RX ORDER — MIDAZOLAM HYDROCHLORIDE 1 MG/ML
INJECTION INTRAMUSCULAR; INTRAVENOUS AS NEEDED
Status: DISCONTINUED | OUTPATIENT
Start: 2019-12-26 | End: 2019-12-26 | Stop reason: HOSPADM

## 2019-12-26 RX ORDER — SODIUM CHLORIDE 9 MG/ML
100 INJECTION, SOLUTION INTRAVENOUS CONTINUOUS
Status: DISCONTINUED | OUTPATIENT
Start: 2019-12-26 | End: 2019-12-26

## 2019-12-26 RX ORDER — DIPHENHYDRAMINE HYDROCHLORIDE 50 MG/ML
INJECTION INTRAMUSCULAR; INTRAVENOUS AS NEEDED
Status: DISCONTINUED | OUTPATIENT
Start: 2019-12-26 | End: 2019-12-26 | Stop reason: HOSPADM

## 2019-12-26 RX ORDER — DILTIAZEM HYDROCHLORIDE 180 MG/1
180 CAPSULE, COATED, EXTENDED RELEASE ORAL 2 TIMES DAILY
Status: DISCONTINUED | OUTPATIENT
Start: 2019-12-26 | End: 2019-12-27 | Stop reason: HOSPADM

## 2019-12-26 RX ORDER — ASPIRIN AND DIPYRIDAMOLE 25; 200 MG/1; MG/1
1 CAPSULE, EXTENDED RELEASE ORAL 2 TIMES DAILY
Status: ON HOLD | COMMUNITY
Start: 2017-10-21 | End: 2019-12-26 | Stop reason: DRUGHIGH

## 2019-12-26 RX ORDER — TORSEMIDE 10 MG/1
10 TABLET ORAL DAILY
Status: ON HOLD | COMMUNITY
Start: 2019-12-18 | End: 2019-12-26 | Stop reason: DRUGHIGH

## 2019-12-26 RX ORDER — METOPROLOL TARTRATE 50 MG/1
50 TABLET, FILM COATED ORAL
Status: ON HOLD | COMMUNITY
Start: 2019-12-17 | End: 2019-12-26 | Stop reason: DRUGHIGH

## 2019-12-26 RX ORDER — ISOSORBIDE MONONITRATE 30 MG/1
60 TABLET, EXTENDED RELEASE ORAL DAILY
Status: DISCONTINUED | OUTPATIENT
Start: 2019-12-26 | End: 2019-12-27 | Stop reason: HOSPADM

## 2019-12-26 RX ORDER — METOPROLOL TARTRATE 50 MG/1
100 TABLET, FILM COATED ORAL EVERY 12 HOURS SCHEDULED
Status: DISCONTINUED | OUTPATIENT
Start: 2019-12-26 | End: 2019-12-27 | Stop reason: HOSPADM

## 2019-12-26 RX ORDER — LEVOTHYROXINE SODIUM 0.12 MG/1
125 TABLET ORAL DAILY
Status: DISCONTINUED | OUTPATIENT
Start: 2019-12-26 | End: 2019-12-27 | Stop reason: HOSPADM

## 2019-12-26 RX ORDER — DILTIAZEM HYDROCHLORIDE 180 MG/1
180 CAPSULE, COATED, EXTENDED RELEASE ORAL 2 TIMES DAILY
COMMUNITY
Start: 2019-05-06 | End: 2019-12-27 | Stop reason: HOSPADM

## 2019-12-26 RX ORDER — FENTANYL CITRATE 50 UG/ML
INJECTION, SOLUTION INTRAMUSCULAR; INTRAVENOUS AS NEEDED
Status: DISCONTINUED | OUTPATIENT
Start: 2019-12-26 | End: 2019-12-26 | Stop reason: HOSPADM

## 2019-12-26 RX ORDER — LIDOCAINE HYDROCHLORIDE 20 MG/ML
INJECTION, SOLUTION INFILTRATION; PERINEURAL AS NEEDED
Status: DISCONTINUED | OUTPATIENT
Start: 2019-12-26 | End: 2019-12-26 | Stop reason: HOSPADM

## 2019-12-26 RX ORDER — ZOLPIDEM TARTRATE 5 MG/1
5 TABLET ORAL NIGHTLY PRN
Status: DISCONTINUED | OUTPATIENT
Start: 2019-12-26 | End: 2019-12-27 | Stop reason: HOSPADM

## 2019-12-26 RX ORDER — DEXTROSE MONOHYDRATE 25 G/50ML
25 INJECTION, SOLUTION INTRAVENOUS
Status: DISCONTINUED | OUTPATIENT
Start: 2019-12-26 | End: 2019-12-27 | Stop reason: HOSPADM

## 2019-12-26 RX ORDER — TORSEMIDE 20 MG/1
40 TABLET ORAL DAILY
Status: DISCONTINUED | OUTPATIENT
Start: 2019-12-26 | End: 2019-12-27 | Stop reason: HOSPADM

## 2019-12-26 RX ORDER — NICOTINE POLACRILEX 4 MG
15 LOZENGE BUCCAL
Status: DISCONTINUED | OUTPATIENT
Start: 2019-12-26 | End: 2019-12-27 | Stop reason: HOSPADM

## 2019-12-26 RX ORDER — PANTOPRAZOLE SODIUM 40 MG/1
40 TABLET, DELAYED RELEASE ORAL EVERY MORNING
Status: DISCONTINUED | OUTPATIENT
Start: 2019-12-27 | End: 2019-12-27 | Stop reason: HOSPADM

## 2019-12-26 RX ORDER — DIPHENHYDRAMINE HYDROCHLORIDE 50 MG/ML
50 INJECTION INTRAMUSCULAR; INTRAVENOUS ONCE
Status: DISCONTINUED | OUTPATIENT
Start: 2019-12-26 | End: 2019-12-27 | Stop reason: HOSPADM

## 2019-12-26 RX ORDER — ASPIRIN 81 MG/1
81 TABLET ORAL DAILY
Status: DISCONTINUED | OUTPATIENT
Start: 2019-12-27 | End: 2019-12-27

## 2019-12-26 RX ORDER — METHYLPREDNISOLONE SODIUM SUCCINATE 125 MG/2ML
INJECTION, POWDER, LYOPHILIZED, FOR SOLUTION INTRAMUSCULAR; INTRAVENOUS AS NEEDED
Status: DISCONTINUED | OUTPATIENT
Start: 2019-12-26 | End: 2019-12-26 | Stop reason: HOSPADM

## 2019-12-26 RX ORDER — METHYLPREDNISOLONE SODIUM SUCCINATE 125 MG/2ML
125 INJECTION, POWDER, LYOPHILIZED, FOR SOLUTION INTRAMUSCULAR; INTRAVENOUS ONCE
Status: DISCONTINUED | OUTPATIENT
Start: 2019-12-26 | End: 2019-12-27 | Stop reason: HOSPADM

## 2019-12-26 RX ORDER — DOCUSATE SODIUM 100 MG/1
100 CAPSULE, LIQUID FILLED ORAL 2 TIMES DAILY
COMMUNITY
End: 2020-02-24

## 2019-12-26 RX ORDER — OMEPRAZOLE 20 MG/1
20 CAPSULE, DELAYED RELEASE ORAL 2 TIMES DAILY
Status: ON HOLD | COMMUNITY
End: 2019-12-26 | Stop reason: DRUGHIGH

## 2019-12-26 RX ORDER — LEVOTHYROXINE SODIUM 0.12 MG/1
125 TABLET ORAL DAILY
COMMUNITY
End: 2019-12-27 | Stop reason: HOSPADM

## 2019-12-26 RX ORDER — CLOPIDOGREL BISULFATE 75 MG/1
75 TABLET ORAL DAILY
COMMUNITY
Start: 2019-11-22 | End: 2020-01-08 | Stop reason: HOSPADM

## 2019-12-26 RX ORDER — SODIUM CHLORIDE 9 MG/ML
INJECTION, SOLUTION INTRAVENOUS CONTINUOUS PRN
Status: COMPLETED | OUTPATIENT
Start: 2019-12-26 | End: 2019-12-26

## 2019-12-26 RX ADMIN — CLOPIDOGREL 75 MG: 75 TABLET, FILM COATED ORAL at 09:07

## 2019-12-26 RX ADMIN — ERGOCALCIFEROL 50000 UNITS: 1.25 CAPSULE ORAL at 09:08

## 2019-12-26 RX ADMIN — DILTIAZEM HYDROCHLORIDE 180 MG: 180 CAPSULE, COATED, EXTENDED RELEASE ORAL at 09:06

## 2019-12-26 RX ADMIN — NITROGLYCERIN 0.4 MG: 0.4 TABLET SUBLINGUAL at 08:34

## 2019-12-26 RX ADMIN — ASPIRIN AND DIPYRIDAMOLE 1 CAPSULE: 25; 200 CAPSULE, EXTENDED RELEASE ORAL at 05:32

## 2019-12-26 RX ADMIN — NITROGLYCERIN 0.4 MG: 0.4 TABLET SUBLINGUAL at 08:38

## 2019-12-26 RX ADMIN — TORSEMIDE 10 MG: 10 TABLET ORAL at 09:07

## 2019-12-26 RX ADMIN — PANTOPRAZOLE SODIUM 40 MG: 40 TABLET, DELAYED RELEASE ORAL at 05:32

## 2019-12-26 RX ADMIN — DILTIAZEM HYDROCHLORIDE 180 MG: 180 CAPSULE, COATED, EXTENDED RELEASE ORAL at 22:02

## 2019-12-26 RX ADMIN — METOPROLOL TARTRATE 50 MG: 50 TABLET, FILM COATED ORAL at 09:07

## 2019-12-26 RX ADMIN — LEVOTHYROXINE SODIUM 125 MCG: 125 TABLET ORAL at 05:32

## 2019-12-26 RX ADMIN — METOPROLOL TARTRATE 100 MG: 50 TABLET, FILM COATED ORAL at 22:02

## 2019-12-26 RX ADMIN — NITROGLYCERIN 0.4 MG: 0.4 TABLET SUBLINGUAL at 08:54

## 2019-12-26 RX ADMIN — SODIUM CHLORIDE 50 ML/HR: 4.5 INJECTION, SOLUTION INTRAVENOUS at 11:29

## 2019-12-26 NOTE — PROGRESS NOTES
LOS: 0 days   Patient Care Team:  Francisco Gallagher MD as PCP - General (Family Medicine)    Chief Complaint:   Status post right CVA with TPA/petechial hemorrhage  Stroke prophylaxis-Aggrenox/Plavix  Left hemiparesis/left visual field deficit  Coronary artery disease  Hyperlipidemia - alirocumab  Hypothyroidism   Hypertension  Chronic renal insufficiency  DVT prophylaxis-SCDs    Subjective     History of Present Illness    Subjective    Patient with episode of crushing chest pain this morning into her right neck.  Relieved with nitroglycerin x3.  Transferred to monitored bed on 5 S.  Seen by cardiology with plans for cardiac cath this afternoon pending input from nephrology regarding Contrast and neurology regarding antiplatelet options.  The patient reports that presently she is not having any chest pain.  Does not notice any change in her strength on the left side.  Continues with no vision to the left.  She has had some urinary hesitancy, required intermittent straight cath once on the weekend but has been voiding on her own the past couple days    History taken from: patient    Objective     Vital Signs  Temp:  [97.9 °F (36.6 °C)-98.4 °F (36.9 °C)] 97.9 °F (36.6 °C)  Heart Rate:  [70-97] 82  Resp:  [18-20] 20  BP: (110-147)/(58-80) 125/72    Objective:  Vital signs: (most recent): Blood pressure 125/72, pulse 82, resp. rate 20, SpO2 98 %, not currently breastfeeding.            Physical Exam  MENTAL STATUS -  AWAKE / ALERT  HEENT- NCAT,  , SCLERA NON-ICTERIC, CONJUNCTIVA PINK,   NO JVD   LUNGS - CTA, NO WHEEZES, RALES OR RHONCHI  Chest-ZIO Patch  HEART- RRR, NO RUB, MURMUR, OR GALLOP  ABD - NORMOACTIVE BOWEL SOUNDS, SOFT, NT.  Bladder not readily palpable.  EXT - NO EDEMA OR CYANOSIS  NEURO -oriented x4.  Good historian.  Left visual field deficit.  No dysarthria.  Speech fluent.     MOTOR EXAM - RUE/RLE 5/5. LUE/LLE 4+/5.    Improved motor control on the left side  Results Review:     I reviewed the patient's  new clinical results.  Lab Results   Lab Value Date/Time    TROPONINT <0.010 12/18/2019 1902    TROPONINT <0.010 12/18/2019 1102    TROPONINT <0.010 12/18/2019 0853    TROPONINI 0.012 12/12/2019 0258    TROPONINI 0.012 12/11/2019 1613    TROPONINI <0.012 04/19/2019 2330    TROPONINI <0.012 04/19/2019 1844    TROPONINI <0.012 04/19/2019 1421           Invalid input(s): LABALBU, PROT  Results from last 7 days   Lab Units 12/26/19  1000   WBC 10*3/mm3 7.60   HEMOGLOBIN g/dL 9.2*   HEMATOCRIT % 29.2*   PLATELETS 10*3/mm3 296      Ref. Range 12/18/2019 08:53 12/18/2019 11:02   TSH Baseline Latest Ref Range: 0.270 - 4.200 uIU/mL 3.290     Vitamin B-12 Latest Ref Range: 211 - 946 pg/mL   339   25 Hydroxy, Vitamin D Latest Ref Range: 30.0 - 100.0 ng/ml   18.7 (L)       Medication Review: DONE  Scheduled Meds:    Continuous Infusions:  No current facility-administered medications for this encounter.   PRN Meds:.      Assessment/Plan       * No active hospital problems. *      Assessment & Plan  Status post right CVA with TPA/petechial hemorrhage  Stroke prophylaxis-Aggrenox/Plavix  Zio patch in place.     Left hemiparesis/left visual field deficit  Impaired mobility/self-care     Adjunctive medications post stroke-on levothyroxine with TSH within normal limits.  B12 within normal limits.  Vitamin D deficiency-associated with stroke/stroke recovery-  will add ergocalciferol 50,000 units weekly.     Coronary artery disease -history of CABG/stent.    December 19-troponins negative yesterday.  No chest pain today.  Tolerating therapies.  December 26-Patient with episode of crushing chest pain this morning into her right neck.  Relieved with nitroglycerin x3.  Transferred to monitored bed on 5 S.  Seen by cardiology with plans for cardiac cath this afternoon pending input from nephrology regarding Contrast and neurology regarding antiplatelet options.      Hyperlipidemia -  alirocumab     Hypothyroidism-levothyroxine     Hypertension-on Cardizem and metoprolol.  Aldactone and lisinopril held after recent acute on chronic renal insufficiency.  On lower dose torsemide.     Chronic renal insufficiency     DVT prophylaxis-SCDs    Chronic low back pain-off nonsteroidal anti-inflammatory(Voltaren) .  Will try K pad.  May add Celebrex at later point, as has been shown not to raise risk of secondary coronary/cerebrovascular event.      Now admit for comprehensive acute inpatient rehabilitation .  This would be an interdisciplinary program with physical therapy 1 hour,  occupational therapy 1 hour, and speech therapy 1 hour, 5 days a week.  Rehabilitation nursing for carryover, monitoring of cardiac and neurologic   status, bowel and bladder, and skin  Ongoing physician follow-up.  Weekly team conferences.  Goals are to achieve a level of contact-guard with  mobility and self-care and improved visual-spatial.   Rehabilitation prognosis fair.  Medical prognosis indeterminate.  Estimated length of stay is approximately 2 weeks, but is only an estimation.   The patient's functional status and clinical status is unchanged from preadmission assessment and the patient continues appropriate for acute inpatient rehabilitation.  Goal is for home with outpatient   therapies.  Barrier to discharge: Impaired strength and mobility- work on transfers ambulation and self-care to overcome.         TEAM CONF- DEC 19 - TRANSFERS MIN. BED CTG. 40 FEET MIN RW. ADLS MIN ASSIST. LEFT VISUAL FIELD DEFICIT. LEFT INATTENTION  ELOS- 2 WEEKS    TEAM CONF - DEC 26 - BED SBA. TRANSFES MIN-CTG. GAIT 80 FEET MIN-CTG RW. ADLS CTG. LEFT INATTENTION. MODERATE DEFICITS FOR REASONING AND MATH.  ELOS - 1 WEEK from a rehab standpoint.  Family conference scheduled for tomorrow and family would like to still keep that time as members from out of town are here.  Azael Abdullahi MD  12/26/19  10:37 AM    Time:

## 2019-12-26 NOTE — CONSULTS
Neurology Consult Note    Consult Date: 12/26/2019    Referring MD: Jana Sesay MD    Reason for Consult I have been asked to see the patient in neurological consultation to render advice and opinion regarding clearance for heart cath in the setting of recent stroke    Liana Carrero is a 84 y.o. female with past medical history of CHF, CAD, diabetes, hyperlipidemia, hypertension, ARAMIS, recent stroke on 12/11 that caused left-sided vision loss and left-sided weakness.  She received TPA for that at [facility].  She somewhat improved and by the time she transferred to rehab bed mild to moderate left-sided weakness but persistent left hemianopia.  She had been progressing well however over the past few days seemed a little bit weaker on the left side to her family members.  Today she complained of chest pain and was brought back to the main hospital with plans for cardiology for cardiac cath if possible.  We are consulted for clearance given her recent stroke and risk of intracerebral hemorrhage with heparin during the procedure.    Past Medical/Surgical Hx:  Past Medical History:   Diagnosis Date   • Arthritis    • CHF (congestive heart failure) (CMS/HCC)    • Diabetes mellitus (CMS/HCC)     new DX related to stroke, started on insulin at hospital   • Disease of thyroid gland    • Elevated cholesterol    • GERD (gastroesophageal reflux disease)    • Hypertension    • Sleep apnea    • Stroke (CMS/HCC)      Past Surgical History:   Procedure Laterality Date   • APPENDECTOMY     • CARDIAC CATHETERIZATION       x2   • COLON SURGERY     • COLONOSCOPY     • EYE SURGERY      lens implants and cataract surgery   • HYSTERECTOMY     • VASCULAR SURGERY      varicous veins       Medications On Admission  Medications Prior to Admission   Medication Sig Dispense Refill Last Dose   • Alirocumab 75 MG/ML solution auto-injector Inject 75 mg under the skin into the appropriate area as directed See Admin Instructions. Give  every 14 days   12/19/2019   • clopidogrel (PLAVIX) 75 MG tablet Take 75 mg by mouth Daily.   12/25/2019   • dilTIAZem CD (CARDIZEM CD) 180 MG 24 hr capsule Take 180 mg by mouth 2 (Two) Times a Day.   12/25/2019   • Alirocumab (PRALUENT SC) Inject 75 mg under the skin into the appropriate area as directed Every 14 (Fourteen) Days.      • aspirin-dipyridamole (AGGRENOX)  MG per 12 hr capsule Take 1 capsule by mouth 2 (Two) Times a Day.      • clopidogrel (PLAVIX) 75 MG tablet Take 75 mg by mouth Daily.      • DICLOFENAC PO Take 75 mg by mouth 2 (Two) Times a Day.      • dilTIAZem CD (CARDIZEM CD) 180 MG 24 hr capsule Take 180 mg by mouth 2 (Two) Times a Day.      • docusate sodium (COLACE) 100 MG capsule Take 100 mg by mouth 2 (Two) Times a Day.   12/25/2019   • isosorbide mononitrate (IMDUR) 60 MG 24 hr tablet Take 60 mg by mouth Daily.      • levothyroxine (SYNTHROID, LEVOTHROID) 125 MCG tablet Take 125 mcg by mouth Daily.      • levothyroxine (SYNTHROID, LEVOTHROID) 125 MCG tablet Take 125 mcg by mouth Daily.   12/26/2019   • lisinopril (PRINIVIL,ZESTRIL) 20 MG tablet Take 20 mg by mouth 2 (Two) Times a Day.      • metoprolol tartrate (LOPRESSOR) 100 MG tablet Take 100 mg by mouth 2 (Two) Times a Day.      • omeprazole (priLOSEC) 20 MG capsule Take 20 mg by mouth 2 (Two) Times a Day.      • spironolactone (ALDACTONE) 25 MG tablet Take 25 mg by mouth Daily.      • torsemide (DEMADEX) 20 MG tablet Take 20 mg by mouth Daily. Take 2 tablets by mouth once daily.      • vitamin E 400 UNIT capsule Take 400 Units by mouth Daily.          Allergies:  Allergies   Allergen Reactions   • Statins Other (See Comments)   • Contrast Dye Palpitations   • Iodinated Diagnostic Agents Palpitations   • Iodine Palpitations       Social Hx:  Social History     Socioeconomic History   • Marital status:      Spouse name: Not on file   • Number of children: Not on file   • Years of education: Not on file   • Highest education  level: Not on file   Tobacco Use   • Smoking status: Never Smoker   • Smokeless tobacco: Never Used   Substance and Sexual Activity   • Alcohol use: Never     Frequency: Never   • Drug use: Never       Family Hx:  Family History   Problem Relation Age of Onset   • Heart disease Mother    • Stroke Mother    • Cancer Sister    • Alzheimer's disease Brother    • Cancer Sister        Review of systems  Constitutional: [No fevers, chills]  Eye: [No recent visual problems, eye discharge]  Respiratory: [No shortness of breath, cough]  Cardiovascular: [+ Chest pain, no palpitations]  Neurologic: [+ Weakness, numbness]  Psychiatric: [No anxiety, depression]    All other systems reviewed and are negative    Exam    /72 (BP Location: Right arm, Patient Position: Lying)   Pulse 82   Resp 20   LMP  (LMP Unknown)   SpO2 98%   gen: NAD, vitals reviewed  Eyes: fundus sharp with no papilledema or retinal hemorrhages  HEENT: no nuchal rigidity  CVS: RRR, S1, S2  MS: oriented x3, recent/remote memory intact, normal attention/concentration, language intact, no neglect, normal fund of knowledge  CN: visual acuity grossly normal, left homonymous hemianopia, PERRL, EOMI, facial sensation equal, no facial droop, hearing symmetric, palate elevates symmetrically, shoulder shrug equal, tongue midline  Motor: 5/5 throughout upper and lower extremities, normal tone  Sensation: intact to vibration and temperature throughout  Reflexes: 2+ throughout upper and lower extremities, downgoing plantars  Coordination: no dysmetria with finger to nose bilaterally  Gait: deferred due to concern for ACS    DATA:    Lab Results   Component Value Date    GLUCOSE 233 (H) 12/26/2019    CALCIUM 9.2 12/26/2019     12/26/2019    K 4.2 12/26/2019    CO2 25.8 12/26/2019     12/26/2019    BUN 27 (H) 12/26/2019    CREATININE 1.38 (H) 12/26/2019    EGFRIFNONA 37 (L) 12/26/2019    BCR 19.6 12/26/2019    ANIONGAP 14.2 12/26/2019     Lab Results    Component Value Date    WBC 7.60 12/26/2019    HGB 9.2 (L) 12/26/2019    HCT 29.2 (L) 12/26/2019    MCV 86.6 12/26/2019     12/26/2019     Lab Results   Component Value Date    LDL 66 12/12/2019    LDL 51 04/20/2019    LDL 94 02/05/2018     Lab Results   Component Value Date    HGBA1C 6.1 (H) 12/12/2019     Lab Results   Component Value Date    INR 1.1 12/11/2019    PROTIME 11.9 12/11/2019       Lab review: 437, hemoglobin 9.2, platelets 296    Imaging review: I personally reviewed her repeat head CT performed here today that shows a right PCA stroke with moderate gyriform petechial hemorrhagic conversion.  Discussed with the reading neuroradiologist Dr. Castro.    Records review: I reviewed her discharge summary from [facility].  She presented there with acute left homonymous hemianopia.  CTA showed a right P1 occlusion.  She received TPA.  The neurologist there thought this was an atheroembolic occlusion.  Holter and 2D echo were negative.  She was started on aspirin and Aggrenox and sent here for rehab.    Diagnoses:  Stroke, right posterior cerebral artery territory, due to large vessel disease  Chest pain  CAD  CHF  Essential hypertension    Comment: 84-year-old female with CAD, CHF, recent right PCA stroke.  She developed chest pain rehab today and we are asked for clearance for cardiac cath.  CT scan shows moderate petechial hemorrhagic conversion of a right PCA infarct.  There is certainly moderate risk of worsening intracerebral hemorrhage in this situation however cardiac cath is certainly not absolutely contraindicated based on her CT findings. Based on my discussion with Dr. Sesay about her cardiac symptoms I think proceeding with cardiac cath is reasonable and she is cleared for cardiac cath from a neurology standpoint.    PLAN:  Cleared for heart cath from neuro standpoint  ASA/plavix x 30 days, then single antiplatelet alone. Obviously may need longer term DAPT if she gets a cardiac  stent  No statin due to statin allergy    Recommendations discussed with Dr. Sesay. Will follow

## 2019-12-26 NOTE — PLAN OF CARE
Problem: Patient Care Overview  Goal: Plan of Care Review  Outcome: Ongoing (interventions implemented as appropriate)  Flowsheets (Taken 12/26/2019 6053)  Progress: improving  Plan of Care Reviewed With: patient  Outcome Summary: Pt. arrived to the floor from rehab. Rapid response at rehab for chest pain. Pt. was in rehab due to stroke 12/13/2019. Left side weakness. When patient arrive to the floor CP resolved. Consulted Nephrology, Neurology and cardiology. Pt. had a heart cath today-it was negative for the need for intervention. VSS. No c/o any CP. Family at bedside and supportive. Pt. wants to continue with rehab once discharged from here.

## 2019-12-26 NOTE — CONSULTS
Referring Provider: Dr. Jana Sesay  Reason for Consultation: OBED; need for cardiac catheterization    Subjective     Chief complaint No chief complaint on file.      History of present illness:  83 yo WF with normal renal fxn transferred from rehab to hospital today for further evaluation of right-sided chest pain early this morning that radiated to her right jaw and right arm and was relieved by nitroglycerin.  Cardiac catheterization is being planned, and renal consultation requested given SCR 1.4; previous SCR 0.9 on 12/15/2019.  Full PMH outlined below; pertinent is recent stroke earlier this month with later transferred to rehab just 1-2 weeks ago.  She reports regular use of Voltaren twice daily; one remote kidney stone; no problems with UTIs.  · She has eaten very poorly for the last 2 weeks; has lost 4 pounds of weight  · Though she had been constipated, finally had a bowel movement yesterday and again today and believes that she will start eating again soon  · Modest hypotension with systolics 100-110 over the last few days  · No urinary complaints; IVF just started a short while ago and she believes urine volumes are already increasing  · No shortness of breath; no orthopnea; chest pain has resolved    Past Medical History:   Diagnosis Date   • Arthritis    • CHF (congestive heart failure) (CMS/HCC)    • Diabetes mellitus (CMS/HCC)     new DX related to stroke, started on insulin at hospital   • Disease of thyroid gland    • Elevated cholesterol    • GERD (gastroesophageal reflux disease)    • Hypertension    • Sleep apnea    • Stroke (CMS/HCC)      Past Surgical History:   Procedure Laterality Date   • APPENDECTOMY     • CARDIAC CATHETERIZATION       x2   • COLON SURGERY     • COLONOSCOPY     • EYE SURGERY      lens implants and cataract surgery   • HYSTERECTOMY     • VASCULAR SURGERY      varicous veins     Family History   Problem Relation Age of Onset   • Heart disease Mother    • Stroke Mother    •  Cancer Sister    • Alzheimer's disease Brother    • Cancer Sister      Social History     Tobacco Use   • Smoking status: Never Smoker   • Smokeless tobacco: Never Used   Substance Use Topics   • Alcohol use: Never     Frequency: Never   • Drug use: Never     Medications Prior to Admission   Medication Sig Dispense Refill Last Dose   • Alirocumab 75 MG/ML solution auto-injector Inject 75 mg under the skin into the appropriate area as directed See Admin Instructions. Give every 14 days   12/19/2019   • clopidogrel (PLAVIX) 75 MG tablet Take 75 mg by mouth Daily.   12/25/2019   • dilTIAZem CD (CARDIZEM CD) 180 MG 24 hr capsule Take 180 mg by mouth 2 (Two) Times a Day.   12/25/2019   • Alirocumab (PRALUENT SC) Inject 75 mg under the skin into the appropriate area as directed Every 14 (Fourteen) Days.      • aspirin-dipyridamole (AGGRENOX)  MG per 12 hr capsule Take 1 capsule by mouth 2 (Two) Times a Day.      • clopidogrel (PLAVIX) 75 MG tablet Take 75 mg by mouth Daily.      • DICLOFENAC PO Take 75 mg by mouth 2 (Two) Times a Day.      • dilTIAZem CD (CARDIZEM CD) 180 MG 24 hr capsule Take 180 mg by mouth 2 (Two) Times a Day.      • docusate sodium (COLACE) 100 MG capsule Take 100 mg by mouth 2 (Two) Times a Day.   12/25/2019   • isosorbide mononitrate (IMDUR) 60 MG 24 hr tablet Take 60 mg by mouth Daily.      • levothyroxine (SYNTHROID, LEVOTHROID) 125 MCG tablet Take 125 mcg by mouth Daily.      • levothyroxine (SYNTHROID, LEVOTHROID) 125 MCG tablet Take 125 mcg by mouth Daily.   12/26/2019   • lisinopril (PRINIVIL,ZESTRIL) 20 MG tablet Take 20 mg by mouth 2 (Two) Times a Day.      • metoprolol tartrate (LOPRESSOR) 100 MG tablet Take 100 mg by mouth 2 (Two) Times a Day.      • omeprazole (priLOSEC) 20 MG capsule Take 20 mg by mouth 2 (Two) Times a Day.      • spironolactone (ALDACTONE) 25 MG tablet Take 25 mg by mouth Daily.      • torsemide (DEMADEX) 20 MG tablet Take 20 mg by mouth Daily. Take 2 tablets by  mouth once daily.      • vitamin E 400 UNIT capsule Take 400 Units by mouth Daily.        Allergies:  Statins; Contrast dye; Iodinated diagnostic agents; and Iodine    Review of Systems  14-point ROS performed and all negative except for pertinent +/-'s detailed in HPI.     Objective     Vital Signs  Temp:  [97.9 °F (36.6 °C)-98.4 °F (36.9 °C)] 97.9 °F (36.6 °C)  Heart Rate:  [70-97] 82  Resp:  [18-20] 20  BP: (110-147)/(58-80) 125/72         I/O this shift:  In: 1220 [P.O.:220; I.V.:1000]  Out: -   I/O last 3 completed shifts:  In: 380 [P.O.:380]  Out: 450 [Urine:450]    Intake/Output Summary (Last 24 hours) at 12/26/2019 1139  Last data filed at 12/26/2019 1129  Gross per 24 hour   Intake 1220 ml   Output 450 ml   Net 770 ml       Physical Exam:  NAD; pleasant; fully oriented; looks stated age  Overweight  Dry MM; AT/NC   No eye discharge; no scleral icterus  No JVD; bilateral carotid bruits, left greater than right  CTA bilat; not labored  RRR, no rub  Soft, NT, ND, BS+  No edema  No clubbing  No asterixis  Moves all extremities   Mood and affect are normal  Speech is fluent    Results Review:  Results from last 7 days   Lab Units 12/26/19  1000   SODIUM mmol/L 140   POTASSIUM mmol/L 4.2   CHLORIDE mmol/L 100   CO2 mmol/L 25.8   BUN mg/dL 27*   CREATININE mg/dL 1.38*   CALCIUM mg/dL 9.2   GLUCOSE mg/dL 233*       Estimated Creatinine Clearance: 30.2 mL/min (A) (by C-G formula based on SCr of 1.38 mg/dL (H)).          Results from last 7 days   Lab Units 12/26/19  1000   WBC 10*3/mm3 7.60   HEMOGLOBIN g/dL 9.2*   PLATELETS 10*3/mm3 296             Active Medications       sodium chloride 50 mL/hr Last Rate: 50 mL/hr (12/26/19 1129)       Assessment/Plan   Assessment  1.  OBED, nonoliguric, likely prerenal from poor intake and modest hypotension.  Central volume probably low; stable potassium and acid-base.  Recent urine bland  2.  Chest pain worrisome for angina  3.  CAD with prior CABG  4.  Anemia  5.  Recent  stroke with left hemiparesis and vision loss  6.  Hypertension: She presently is hypotensive  7.  DM2, with hyperglycemia      Chest pain      Plan  1.  No objection to cardiac catheterization from renal view.  I think risk of OBED/NELLY is low and manageable  2.  IVF antonio-catheterization  3.  Check iron stores and stool hemoccults  4.  Surveillance labs    I discussed the patient's findings and my recommendations with patient and multiple family members in the room    Albino Doss MD  12/26/19  11:39 AM

## 2019-12-26 NOTE — H&P
[facility]  History & Physical                                                                                  12/26/2019    Patient Identification:  Liana Carrero:   84 y.o.  female  1935     Date of Admission:12/26/2019    CC:  Chest pain    HPI:  Liana Carrero is a 84 year old pt of Dr. Mack ([facility]) with a history of diastolic congestive heart failure, CD s/p CABG in 2004. NSTEMI 12/2015 with KAMERON to LAD and cath with intervention to native LAD via a free radial graft in 2017, HLD, HTN, statin intolerance, lacunar stroke and obesity.  With complaints of progressive dyspnea A stress in 4/2019 showed a small sized mild severity partially fixed perfusion defect of the apex wall  consistent with apical thinning but also a possible mild area of ischemia in the apical septum. Her LV systolic function was hyperdynamic and the post Stress EF was 81%. She was  treated medically.  In October 2019 she was seen by Dr. Mack as an outpatient with ongoing dyspnea symptoms despite use of 3 antianginals.  There was a discussion about possible cardiac catheterization if symptoms did not improve.     She was then admitted at [facility] on 12/13/19 for stroke and an ECHO showed EF of 62%, normal LV size, wall thickness, and systolic function with no wall motion abnormalities. The Right ventricle was not adequately visualized was dilated with grossly normal systolic function. There were no significant valvular abnormalities. There was no clear evidenced of a intra atrial shunt. A Holter monitor that showed herpredominant rhythm was Normal Sinus with an average rate of 70 beats per minute. There were no sustained atrial or ventricular arrhythmias, a fib, or high grade AV block.  Of note her MRI showed multiple areas of hemorrhagic infarct as well as nonhemorrhagic infarct.  She came in on Aggrenox and aspirin and was dismissed home on Aggrenox and aspirin.  Her care was further complicated by acute renal insufficiency  following contrast exposure. Creatinine on 12/17/2019 was 1.1 with a GFR 50.  She was then transferred to [facility] for rehab.  While participating in rehab she was seen in consultation on 12/18/19 by Dr. Denny after a rapid response was called for chest pain. An EKG showed SR with a RBBB. There was some T wave inversion in several leads. Troponins were negative an pt got SL nitro and pain resolved. Pt had no further chest pain and a Holter monitor was ordered. It was felt pain was anginal in nature and if had any more might consider a cath.      Earlier this morning she again developed chest pain while laying in bed it was upper chest radiated to her jaw and neck.  It was quite severe.  She states it was similar to her pain noted few days ago but more intense.  Responded to 3 nitroglycerin tablets and she is currently resting comfortably without pain.  EKG is unremarkable.  A rapid response was called for chest pain. Pt had 9/10 CP and after 3 SL nitro pain was down to 2 and pt feeling eve and pain-free currently.  Chest x-ray relatively unremarkable.. An EKG was obtained and was unchanged from previous. Troponin is pending and another EKG and troponin was ordered.     Past Medical History:  Past Medical History:   Diagnosis Date   • Arthritis    • CHF (congestive heart failure) (CMS/HCC)    • Diabetes mellitus (CMS/HCC)     new DX related to stroke, started on insulin at hospital   • Disease of thyroid gland    • Elevated cholesterol    • GERD (gastroesophageal reflux disease)    • Hypertension    • Sleep apnea    • Stroke (CMS/HCC)        Past Surgical History:  Past Surgical History:   Procedure Laterality Date   • APPENDECTOMY     • CARDIAC CATHETERIZATION       x2   • COLON SURGERY     • COLONOSCOPY     • EYE SURGERY      lens implants and cataract surgery   • HYSTERECTOMY     • VASCULAR SURGERY      varicous veins       Allergies:  Allergies   Allergen Reactions   • Statins Other (See Comments)   •  Contrast Dye Palpitations   • Iodinated Diagnostic Agents Palpitations   • Iodine Palpitations       Home Meds:  Medications Prior to Admission   Medication Sig Dispense Refill Last Dose   • Alirocumab 75 MG/ML solution auto-injector Inject 75 mg under the skin into the appropriate area as directed See Admin Instructions. Give every 14 days   12/19/2019   • clopidogrel (PLAVIX) 75 MG tablet Take 75 mg by mouth Daily.   12/25/2019   • dilTIAZem CD (CARDIZEM CD) 180 MG 24 hr capsule Take 180 mg by mouth 2 (Two) Times a Day.   12/25/2019   • Alirocumab (PRALUENT SC) Inject 75 mg under the skin into the appropriate area as directed Every 14 (Fourteen) Days.      • aspirin-dipyridamole (AGGRENOX)  MG per 12 hr capsule Take 1 capsule by mouth 2 (Two) Times a Day.      • clopidogrel (PLAVIX) 75 MG tablet Take 75 mg by mouth Daily.      • DICLOFENAC PO Take 75 mg by mouth 2 (Two) Times a Day.      • dilTIAZem CD (CARDIZEM CD) 180 MG 24 hr capsule Take 180 mg by mouth 2 (Two) Times a Day.      • docusate sodium (COLACE) 100 MG capsule Take 100 mg by mouth 2 (Two) Times a Day.   12/25/2019   • isosorbide mononitrate (IMDUR) 60 MG 24 hr tablet Take 60 mg by mouth Daily.      • levothyroxine (SYNTHROID, LEVOTHROID) 125 MCG tablet Take 125 mcg by mouth Daily.      • levothyroxine (SYNTHROID, LEVOTHROID) 125 MCG tablet Take 125 mcg by mouth Daily.   12/26/2019   • lisinopril (PRINIVIL,ZESTRIL) 20 MG tablet Take 20 mg by mouth 2 (Two) Times a Day.      • metoprolol tartrate (LOPRESSOR) 100 MG tablet Take 100 mg by mouth 2 (Two) Times a Day.      • omeprazole (priLOSEC) 20 MG capsule Take 20 mg by mouth 2 (Two) Times a Day.      • spironolactone (ALDACTONE) 25 MG tablet Take 25 mg by mouth Daily.      • torsemide (DEMADEX) 20 MG tablet Take 20 mg by mouth Daily. Take 2 tablets by mouth once daily.      • vitamin E 400 UNIT capsule Take 400 Units by mouth Daily.          Social History:   Social History     Socioeconomic  History   • Marital status:      Spouse name: Not on file   • Number of children: Not on file   • Years of education: Not on file   • Highest education level: Not on file   Tobacco Use   • Smoking status: Never Smoker   • Smokeless tobacco: Never Used   Substance and Sexual Activity   • Alcohol use: Never     Frequency: Never   • Drug use: Never       Family History:  Family History   Problem Relation Age of Onset   • Heart disease Mother    • Stroke Mother    • Cancer Sister    • Alzheimer's disease Brother    • Cancer Sister        REVIEW OF SYSTEMS:   CONSTITUTIONAL: No weight loss, fever, chills, weakness or fatigue.   HEENT: Eyes: No visual loss, blurred vision, double vision or yellow sclerae. Ears, Nose, Throat: No hearing loss, sneezing, congestion, runny nose or sore throat.   SKIN: No rash or itching.     RESPIRATORY: No shortness of breath, hemoptysis, cough or sputum.   GASTROINTESTINAL: No anorexia, nausea, vomiting or diarrhea. No abdominal pain, bright red blood per rectum or melena.  GENITOURINARY: No burning on urination, hematuria  NEUROLOGICAL: No headache, dizziness, syncope, paralysis, ataxia, numbness or tingling in the extremities. No change in bowel or bladder control.   MUSCULOSKELETAL: No muscle, back pain, joint pain or stiffness.   HEMATOLOGIC: No anemia, bleeding or bruising.   LYMPHATICS: No enlarged nodes. No history of splenectomy.   PSYCHIATRIC: No history of depression, anxiety, hallucinations.   ENDOCRINOLOGIC: No reports of sweating, cold or heat intolerance. No polyuria or polydipsia.     Physical Exam    /72 (BP Location: Right arm, Patient Position: Lying)   Pulse 82   Resp 20   LMP  (LMP Unknown)   SpO2 98%     General Appearance Well developed, cooperative and well nourished and no acute distress   Head Normocephalic, without abnormality, atraumatic   Ears Ears appear intact with no abnormalities noted   Throat No oral lesions, no thrush, oral mucosa moist    Neck No adenopathy, supple, trachea midline, no thyromegaly, no carotid bruit, no JVD   Back No skin lesions, erythema or scars, no tenderness to percussion or palpation, range of motion is normal   Lungs Clear to auscultation, respirations regular, even and unlabored   Heart Regular rhythm and normal rate, normal S1 and S2, no murmur, no gallop, no rub, no click   Chest wall No abnormalities observed   Abdomen Normal bowel sounds, no masses, no hepatomegaly,    Extremities Moves all extremities well, no edema, no cyanosis, no redness   Pulses Pulses palpable and equal bilaterally. Normal radial, carotid, femoral, dorsalis pedis and posterior tibial pulses bilaterally. Normal abdominal aorta   Skin No bleeding, bruising or rash   Psychiatric Alert and oriented x 3, normal mood and affect      Results from last 7 days   Lab Units 12/26/19  1000   SODIUM mmol/L 140   POTASSIUM mmol/L 4.2   CHLORIDE mmol/L 100   CO2 mmol/L 25.8   BUN mg/dL 27*   CREATININE mg/dL 1.38*   CALCIUM mg/dL 9.2   GLUCOSE mg/dL 233*     Results from last 7 days   Lab Units 12/26/19  1000   TROPONIN T ng/mL <0.010     Results from last 7 days   Lab Units 12/26/19  1000   WBC 10*3/mm3 7.60   HEMOGLOBIN g/dL 9.2*   HEMATOCRIT % 29.2*   PLATELETS 10*3/mm3 296     I personally viewed and interpreted the patient's EKG/Telemetry data    Assessment and Plan  1.  Chest pain. Ideally should have cath but now with recent stroke with hemorrhagic components and recent contrast related renal failure.  Await labs from today.  Ask neuro and renal to see. Start IV fluids and consder cath later today or tomorrow depending on neuro and renal recs.  Risks and benefits of cardiac catheterization discussed with patient and family present including risks of myocardial infarction, death, renal failure, stroke, bleeding trauma or injury to artery nerve and vein of the hand.  2.  CAD with history of CABG and PCI. Previously maintained on aggrenox and asa  3.  Recent  stroke.multiple hemorrhagic infarcts and other noted on recent MRI  4.  Recent contrast related acute renal failure  5.  Contrast allergy.  Will need dye prep prior to procedure 6.  Urinary retention, mild and intermittent.  7.  Dyslipidemia with intolerance to statins  8.  Hypertension, controlled,    Mini Sesay  12/26/2019  11:15 AM    80min spent in reviewing records, discussion and examination of the patient and discussion with other members of the patient's medical team.     Dictated utilizing Dragon dictation

## 2019-12-26 NOTE — DISCHARGE INSTRUCTIONS
[facility]  [address] [place] [address]      Coronary Angiogram (Femoral Approach) After Care     Refer to this sheet in the next few weeks. These instructions provide you with information on caring for yourself after your procedure. Your health care provider may also give you more specific instructions. Your treatment has been planned according to current medical practices, but problems sometimes occur. Call your health care provider if you have any problems or questions after your procedure.      What to Expect After the Procedure:  After your procedure, it is typical to have the following sensations:  · Minor discomfort or tenderness and a small bump at the catheter insertion site. The bump should usually decrease in size and tenderness within 1 to 2 weeks.  · Any bruising will usually fade within 2 to 4 weeks.  Home Care Instructions:  · Do not apply powder or lotion to the site.  · Do not take baths, swim, or use a hot tub until your health care provider approves and the site is completely healed.  · Do not bend, squat, or lift anything over 20 lb (9 kg) or as directed by your health care provider. However, we recommend lifting nothing heavier than a gallon of milk.    · You may shower 24 hours after the procedure. Remove the bandage (dressing) and gently wash the site with plain soap and water. Gently pat the site dry. You may apply a band aid daily for 2 days if desired.    · Inspect the site at least twice daily.  · Increase your fluid intake for the next 2 days.    · Limit your activity for the first 48 hours. .    · Avoid strenuous activity for 1 week or as advised by your physician.    · Follow instructions about when you can drive or return to work as directed by your physician.    · Hold direct pressure over the site when you cough, sneeze, laugh or change positions.  Do this for the next 2 days.    · Do not operate machinery or power tools for 24 hours.  · A responsible adult  should be with you for the first 24 hours after you arrive home. Do not make any important legal decisions or sign legal papers for 24 hours.  Do not drink alcohol for 24 hours.  · Metformin or any medications containing Metformin should not be taken for 48 hours after your procedure.    Call Your Doctor If:  · You have drainage (other than a small amount of blood on the dressing).  · You have chills or a fever > 101.  · You have redness, warmth, swelling(larger than a walnut), or pain at the insertion site  · .You have heavy bleeding from the site. If this happens, hold pressure on the site and call 911.  · You develop chest pain or shortness of breath, feel faint, or pass out.  · You develop pain, discoloration, coldness, numbness, tingling, or severe bruising in the leg that held the catheter.  · You develop bleeding from any other place, such as the bowels.    Make Sure You:  · Understand these instructions.  · Will watch your condition.  · Will get help right away if you are not doing well or get worse.

## 2019-12-27 VITALS
BODY MASS INDEX: 33.23 KG/M2 | HEIGHT: 62 IN | HEART RATE: 66 BPM | RESPIRATION RATE: 16 BRPM | DIASTOLIC BLOOD PRESSURE: 59 MMHG | SYSTOLIC BLOOD PRESSURE: 126 MMHG | TEMPERATURE: 98 F | WEIGHT: 180.56 LBS | OXYGEN SATURATION: 97 %

## 2019-12-27 LAB
ALBUMIN SERPL-MCNC: 3.7 G/DL (ref 3.5–5.2)
ANION GAP SERPL CALCULATED.3IONS-SCNC: 13.5 MMOL/L (ref 5–15)
BUN BLD-MCNC: 32 MG/DL (ref 8–23)
BUN/CREAT SERPL: 26.4 (ref 7–25)
CALCIUM SPEC-SCNC: 8.7 MG/DL (ref 8.6–10.5)
CHLORIDE SERPL-SCNC: 99 MMOL/L (ref 98–107)
CO2 SERPL-SCNC: 24.5 MMOL/L (ref 22–29)
CREAT BLD-MCNC: 1.21 MG/DL (ref 0.57–1)
FERRITIN SERPL-MCNC: 24.1 NG/ML (ref 13–150)
GFR SERPL CREATININE-BSD FRML MDRD: 43 ML/MIN/1.73
GLUCOSE BLD-MCNC: 209 MG/DL (ref 65–99)
GLUCOSE BLDC GLUCOMTR-MCNC: 229 MG/DL (ref 70–130)
GLUCOSE BLDC GLUCOMTR-MCNC: 239 MG/DL (ref 70–130)
GLUCOSE BLDC GLUCOMTR-MCNC: 297 MG/DL (ref 70–130)
GLUCOSE BLDC GLUCOMTR-MCNC: 316 MG/DL (ref 70–130)
IRON 24H UR-MRATE: 25 MCG/DL (ref 37–145)
IRON SATN MFR SERPL: 6 % (ref 20–50)
MAGNESIUM SERPL-MCNC: 2.7 MG/DL (ref 1.6–2.4)
PHOSPHATE SERPL-MCNC: 3.6 MG/DL (ref 2.5–4.5)
POTASSIUM BLD-SCNC: 4.1 MMOL/L (ref 3.5–5.2)
SODIUM BLD-SCNC: 137 MMOL/L (ref 136–145)
TIBC SERPL-MCNC: 398 MCG/DL (ref 298–536)
TRANSFERRIN SERPL-MCNC: 267 MG/DL (ref 200–360)

## 2019-12-27 PROCEDURE — A9270 NON-COVERED ITEM OR SERVICE: HCPCS | Performed by: INTERNAL MEDICINE

## 2019-12-27 PROCEDURE — G0378 HOSPITAL OBSERVATION PER HR: HCPCS

## 2019-12-27 PROCEDURE — 80069 RENAL FUNCTION PANEL: CPT | Performed by: INTERNAL MEDICINE

## 2019-12-27 PROCEDURE — 63710000001 LEVOTHYROXINE 125 MCG TABLET: Performed by: INTERNAL MEDICINE

## 2019-12-27 PROCEDURE — 63710000001 TORSEMIDE 20 MG TABLET: Performed by: INTERNAL MEDICINE

## 2019-12-27 PROCEDURE — 63710000001 CLOPIDOGREL 75 MG TABLET: Performed by: INTERNAL MEDICINE

## 2019-12-27 PROCEDURE — 63710000001 PANTOPRAZOLE 40 MG TABLET DELAYED-RELEASE: Performed by: INTERNAL MEDICINE

## 2019-12-27 PROCEDURE — 82962 GLUCOSE BLOOD TEST: CPT

## 2019-12-27 PROCEDURE — 63710000001 ZOLPIDEM 5 MG TABLET: Performed by: INTERNAL MEDICINE

## 2019-12-27 PROCEDURE — 63710000001 INSULIN LISPRO (HUMAN) PER 5 UNITS: Performed by: INTERNAL MEDICINE

## 2019-12-27 PROCEDURE — 93010 ELECTROCARDIOGRAM REPORT: CPT | Performed by: INTERNAL MEDICINE

## 2019-12-27 PROCEDURE — 82728 ASSAY OF FERRITIN: CPT | Performed by: INTERNAL MEDICINE

## 2019-12-27 PROCEDURE — 25010000002 CYANOCOBALAMIN PER 1000 MCG: Performed by: NURSE PRACTITIONER

## 2019-12-27 PROCEDURE — 83540 ASSAY OF IRON: CPT | Performed by: INTERNAL MEDICINE

## 2019-12-27 PROCEDURE — 84466 ASSAY OF TRANSFERRIN: CPT | Performed by: INTERNAL MEDICINE

## 2019-12-27 PROCEDURE — 83735 ASSAY OF MAGNESIUM: CPT | Performed by: INTERNAL MEDICINE

## 2019-12-27 PROCEDURE — 63710000001 ISOSORBIDE MONONITRATE 30 MG TABLET SUSTAINED-RELEASE 24 HOUR: Performed by: INTERNAL MEDICINE

## 2019-12-27 PROCEDURE — 63710000001 DILTIAZEM CD 180 MG CAPSULE SUSTAINED-RELEASE 24 HR: Performed by: INTERNAL MEDICINE

## 2019-12-27 PROCEDURE — 93005 ELECTROCARDIOGRAM TRACING: CPT | Performed by: INTERNAL MEDICINE

## 2019-12-27 PROCEDURE — 99232 SBSQ HOSP IP/OBS MODERATE 35: CPT | Performed by: NURSE PRACTITIONER

## 2019-12-27 PROCEDURE — 99217 PR OBSERVATION CARE DISCHARGE MANAGEMENT: CPT | Performed by: NURSE PRACTITIONER

## 2019-12-27 PROCEDURE — 63710000001 METOPROLOL TARTRATE 50 MG TABLET: Performed by: INTERNAL MEDICINE

## 2019-12-27 PROCEDURE — 63710000001 ASPIRIN 81 MG TABLET DELAYED-RELEASE: Performed by: INTERNAL MEDICINE

## 2019-12-27 PROCEDURE — 63710000001 INSULIN LISPRO (HUMAN) PER 5 UNITS: Performed by: PHYSICAL MEDICINE & REHABILITATION

## 2019-12-27 RX ORDER — DEXTROSE MONOHYDRATE 25 G/50ML
25 INJECTION, SOLUTION INTRAVENOUS
Status: DISCONTINUED | OUTPATIENT
Start: 2019-12-27 | End: 2020-01-08

## 2019-12-27 RX ORDER — NICOTINE POLACRILEX 4 MG
15 LOZENGE BUCCAL
Status: DISCONTINUED | OUTPATIENT
Start: 2019-12-27 | End: 2020-01-08

## 2019-12-27 RX ORDER — CHOLECALCIFEROL (VITAMIN D3) 125 MCG
1000 CAPSULE ORAL DAILY
Status: DISCONTINUED | OUTPATIENT
Start: 2019-12-28 | End: 2019-12-27 | Stop reason: HOSPADM

## 2019-12-27 RX ORDER — CYANOCOBALAMIN 1000 UG/ML
1000 INJECTION, SOLUTION INTRAMUSCULAR; SUBCUTANEOUS ONCE
Status: COMPLETED | OUTPATIENT
Start: 2019-12-27 | End: 2019-12-27

## 2019-12-27 RX ORDER — CHOLECALCIFEROL (VITAMIN D3) 125 MCG
1000 CAPSULE ORAL DAILY
Status: DISCONTINUED | OUTPATIENT
Start: 2019-12-28 | End: 2019-12-28

## 2019-12-27 RX ORDER — SODIUM CHLORIDE 450 MG/100ML
50 INJECTION, SOLUTION INTRAVENOUS CONTINUOUS
Qty: 50 ML | Refills: 0 | Status: SHIPPED | OUTPATIENT
Start: 2019-12-27 | End: 2019-12-27 | Stop reason: HOSPADM

## 2019-12-27 RX ORDER — ASPIRIN 81 MG/1
81 TABLET ORAL DAILY
Status: DISCONTINUED | OUTPATIENT
Start: 2019-12-27 | End: 2019-12-28

## 2019-12-27 RX ADMIN — INSULIN LISPRO 6 UNITS: 100 INJECTION, SOLUTION INTRAVENOUS; SUBCUTANEOUS at 00:20

## 2019-12-27 RX ADMIN — ASPIRIN 81 MG: 81 TABLET, COATED ORAL at 08:16

## 2019-12-27 RX ADMIN — SENNOSIDES AND DOCUSATE SODIUM 1 TABLET: 8.6; 5 TABLET ORAL at 21:22

## 2019-12-27 RX ADMIN — INSULIN LISPRO 3 UNITS: 100 INJECTION, SOLUTION INTRAVENOUS; SUBCUTANEOUS at 08:17

## 2019-12-27 RX ADMIN — METOPROLOL TARTRATE 100 MG: 50 TABLET, FILM COATED ORAL at 08:16

## 2019-12-27 RX ADMIN — DILTIAZEM HYDROCHLORIDE 180 MG: 180 CAPSULE, COATED, EXTENDED RELEASE ORAL at 21:22

## 2019-12-27 RX ADMIN — ZOLPIDEM TARTRATE 5 MG: 5 TABLET ORAL at 22:03

## 2019-12-27 RX ADMIN — ISOSORBIDE MONONITRATE 60 MG: 30 TABLET ORAL at 08:16

## 2019-12-27 RX ADMIN — INSULIN LISPRO 4 UNITS: 100 INJECTION, SOLUTION INTRAVENOUS; SUBCUTANEOUS at 21:22

## 2019-12-27 RX ADMIN — INSULIN LISPRO 5 UNITS: 100 INJECTION, SOLUTION INTRAVENOUS; SUBCUTANEOUS at 12:42

## 2019-12-27 RX ADMIN — CLOPIDOGREL 75 MG: 75 TABLET, FILM COATED ORAL at 08:17

## 2019-12-27 RX ADMIN — DOCUSATE SODIUM 100 MG: 100 CAPSULE, LIQUID FILLED ORAL at 22:03

## 2019-12-27 RX ADMIN — DILTIAZEM HYDROCHLORIDE 180 MG: 180 CAPSULE, COATED, EXTENDED RELEASE ORAL at 08:15

## 2019-12-27 RX ADMIN — TORSEMIDE 40 MG: 20 TABLET ORAL at 08:17

## 2019-12-27 RX ADMIN — ZOLPIDEM TARTRATE 5 MG: 5 TABLET ORAL at 00:20

## 2019-12-27 RX ADMIN — METOPROLOL TARTRATE 50 MG: 50 TABLET, FILM COATED ORAL at 21:22

## 2019-12-27 RX ADMIN — CYANOCOBALAMIN 1000 MCG: 1000 INJECTION, SOLUTION INTRAMUSCULAR; SUBCUTANEOUS at 12:42

## 2019-12-27 RX ADMIN — PANTOPRAZOLE SODIUM 40 MG: 40 TABLET, DELAYED RELEASE ORAL at 05:45

## 2019-12-27 RX ADMIN — LEVOTHYROXINE SODIUM 125 MCG: 125 TABLET ORAL at 08:15

## 2019-12-27 NOTE — DISCHARGE SUMMARY
Date of Discharge:  12/27/2019  Date of Admit: 12/26/2019    Discharge Diagnosis:    Hospital Course:     She was admitted from acute rehab on 12/26/2019 due to recurrent chest pain despite negative troponin no significant change on ECG.  Clearance was obtained from neurology as well as nephrology.  She did have Solu-Medrol per renal.  She had left heart catheterization which showed patent grafts.  She had a radial arterial graft to the mid LAD which had 50-60% stenosis with 2 stents and DUY-3 flow.  She had normal left ventricular filling pressures and there was no intervention.  Medical management was recommended.  She had some urinary retention post procedure and Mello catheter was placed.  She was also placed on fluid.  On 12/27/2019 right groin site clean dry intact no hematoma pulses intact distally.  She had no further chest pain and was discharged back to rehabilitation to recover from her stroke.       Procedures Performed  Procedure(s):  Left Heart Cath  Coronary angiography  Native mammary injection  Saphenous Vein Graft       Consults     Date and Time Order Name Status Description    12/26/2019 1038 Inpatient Nephrology Consult Completed     12/26/2019 1038 Inpatient Neurology Consult General Completed     12/18/2019 0832 Inpatient Cardiology Consult              Discharge Medications     Discharge Medications      New Medications      Instructions Start Date   sodium chloride 0.45 % solution   50 mL/hr (50 mL/hr), Intravenous, Continuous         Changes to Medications      Instructions Start Date   dilTIAZem  MG 24 hr capsule  Commonly known as:  CARDIZEM CD  What changed:  Another medication with the same name was removed. Continue taking this medication, and follow the directions you see here.   180 mg, Oral, 2 Times Daily      levothyroxine 125 MCG tablet  Commonly known as:  SYNTHROID, LEVOTHROID  What changed:  Another medication with the same name was removed. Continue taking this  medication, and follow the directions you see here.   125 mcg, Oral, Daily         Continue These Medications      Instructions Start Date   aspirin-dipyridamole  MG per 12 hr capsule  Commonly known as:  AGGRENOX   1 capsule, Oral, 2 Times Daily      clopidogrel 75 MG tablet  Commonly known as:  PLAVIX   75 mg, Oral, Daily      clopidogrel 75 MG tablet  Commonly known as:  PLAVIX   75 mg, Oral, Daily      DICLOFENAC PO   75 mg, Oral, 2 Times Daily      docusate sodium 100 MG capsule  Commonly known as:  COLACE   100 mg, Oral, 2 Times Daily      isosorbide mononitrate 60 MG 24 hr tablet  Commonly known as:  IMDUR   60 mg, Oral, Daily      lisinopril 20 MG tablet  Commonly known as:  PRINIVIL,ZESTRIL   20 mg, Oral, 2 Times Daily      metoprolol tartrate 100 MG tablet  Commonly known as:  LOPRESSOR   100 mg, Oral, 2 Times Daily      omeprazole 20 MG capsule  Commonly known as:  priLOSEC   20 mg, Oral, 2 Times Daily      PRALUENT SC   75 mg, Subcutaneous, Every 14 Days      Alirocumab 75 MG/ML solution auto-injector   75 mg, Subcutaneous, See Admin Instructions, Give every 14 days      spironolactone 25 MG tablet  Commonly known as:  ALDACTONE   25 mg, Oral, Daily      torsemide 20 MG tablet  Commonly known as:  DEMADEX   20 mg, Oral, Daily, Take 2 tablets by mouth once daily.       vitamin E 400 UNIT capsule   400 Units, Oral, Daily               Activity at Discharge:    As tolerated    Follow-up Appointments  Follow-up Information     Bentley Chapin MD Follow up in 4 week(s).    Specialty:  Cardiology  Contact information:  [address]  [address]  [place] [address] [phone]             Francisco Gallagher MD .    Specialty:  Family Medicine  Contact information:  [address] [address] [phone]                     DISCHARGE DISPOSITION:  She will return to inpatient rehabilitation here [facility].  She will follow-up in our office 4 weeks after discharge     Radha  MARIANNE Thornton  12/27/19  9:49 AM

## 2019-12-27 NOTE — PROGRESS NOTES
Continued Stay Note  [facility]     Patient Name: Liana Carrero  MRN: [id number]  Today's Date: 12/27/2019    Admit Date: 12/26/2019    Discharge Plan     Row Name 12/27/19 1206       Plan    Plan  Plan is to return to [facility].    Plan Comments  Spoke with Hubert/ [facility] who states pt can return.         Discharge Codes    No documentation.       Expected Discharge Date and Time     Expected Discharge Date Expected Discharge Time    Dec 27, 2019             Katelyn Mata RN

## 2019-12-27 NOTE — PROGRESS NOTES
NEPHROLOGY PROGRESS NOTE    PATIENT IDENTIFICATION:   Name:  Liana Carrero      MRN:  [id number]     84 y.o.  female             SUBJECTIVE:  She is feeling better today; no chest pain; card cath yesterday w/o major pathology found. Breathing is comfortable. Appetite is good. She is having discomfort from aponte catheter and requests that it be removed.       OBJECTIVE:  Vitals:    12/26/19 2228 12/27/19 0502 12/27/19 0739 12/27/19 0815   BP:   168/64    BP Location:   Right arm    Patient Position:   Lying    Pulse: 72  53 74   Resp:   16    Temp:   97.5 °F (36.4 °C)    TempSrc:   Oral    SpO2: 98%  98%    Weight:  81.9 kg (180 lb 8.9 oz)     Height:               Body mass index is 33.02 kg/m².    Intake/Output Summary (Last 24 hours) at 12/27/2019 1214  Last data filed at 12/27/2019 1111  Gross per 24 hour   Intake 360 ml   Output 2500 ml   Net -2140 ml     Wt Readings from Last 1 Encounters:   12/27/19 0502 81.9 kg (180 lb 8.9 oz)   12/26/19 2158 76.7 kg (169 lb)     Wt Readings from Last 3 Encounters:   12/27/19 81.9 kg (180 lb 8.9 oz)   12/17/19 76.9 kg (169 lb 8.5 oz)         Exam:  NAD; pleasant; fully oriented; looks stated age  Overweight  Dry MM; AT/NC   No eye discharge; no scleral icterus  No JVD; bilateral carotid bruits, left greater than right  CTA bilat; not labored  RRR, no rub  Soft, NT, ND, BS+  No edema  F/c anchored  No clubbing  No asterixis  Moves all extremities   Mood and affect are normal  Speech is fluent      Scheduled meds:    aspirin 81 mg Oral Daily   clopidogrel 75 mg Oral Daily   cyanocobalamin 1,000 mcg Intramuscular Once   dilTIAZem  mg Oral BID   diphenhydrAMINE 50 mg Intravenous Once   insulin lispro 0-7 Units Subcutaneous 4x Daily With Meals & Nightly   [START ON 12/28/2019] iron sucrose 300 mg Intravenous Once   isosorbide mononitrate 60 mg Oral Daily   levothyroxine 125 mcg Oral Daily   methylPREDNISolone sodium succinate 125 mg Intravenous Once   metoprolol tartrate  100 mg Oral Q12H   pantoprazole 40 mg Oral QAM   torsemide 40 mg Oral Daily   [START ON 12/28/2019] vitamin B-12 1,000 mcg Oral Daily     IV meds:                           Data Review:    Results from last 7 days   Lab Units 12/27/19  0753 12/26/19  1000   SODIUM mmol/L 137 140   POTASSIUM mmol/L 4.1 4.2   CHLORIDE mmol/L 99 100   CO2 mmol/L 24.5 25.8   BUN mg/dL 32* 27*   CREATININE mg/dL 1.21* 1.38*   CALCIUM mg/dL 8.7 9.2   GLUCOSE mg/dL 209* 233*     Estimated Creatinine Clearance: 35.5 mL/min (A) (by C-G formula based on SCr of 1.21 mg/dL (H)).      Results from last 7 days   Lab Units 12/27/19  0753   MAGNESIUM mg/dL 2.7*   PHOSPHORUS mg/dL 3.6       Results from last 7 days   Lab Units 12/26/19  1000   WBC 10*3/mm3 7.60   HEMOGLOBIN g/dL 9.2*   PLATELETS 10*3/mm3 296                   ASSESSMENT:     Chest pain    1.  OBED, nonoliguric, better: likely prerenal from poor intake and modest hypotension.  Central volume fine; stable potassium and acid-base.  Recent urine bland.  2.  Chest pain worrisome for angina  3.  CAD with prior CABG  4.  Iron deficiency anemia: Iron 25, Iron sat 6%, and ferritin 24  5.  Recent stroke with left hemiparesis, vision loss, and occasional urinary retention.  6.  Hypertension: She presently is hypotensive  7.  DM2, with hyperglycemia      PLAN:    1.  Renal function stable to better after IVF following cardiac catheterization.  2.  DC IVF.  3.  DC aponte catheter. In and out catheter as needed for urinary retention.  4.  IV iron x 1 tomorrow.  5.  Surveillance labs     I discussed the patient's findings and my recommendations with patient and daughter at bedside.    MARIANNE Young  12/27/2019    12:14 PM           Addendum: I have reviewed the history and plan as obtained by MARIANNE Simpson. I have personally examined the patient. I have reviewed available clinical results, imaging results, and prior records. My exam confirms her physical findings and I  agree with the plan as listed above, which I have edited.      Albino Doss MD    12/27/19  3:33 PM    Dictated portions using Dragon dictation software.

## 2019-12-27 NOTE — PROGRESS NOTES
Discharge Planning Assessment  [facility]     Patient Name: Liana Carrero  MRN: [id number]  Today's Date: 12/27/2019    Admit Date: 12/26/2019    Discharge Needs Assessment     Row Name 12/27/19 0820       Living Environment    Lives With  grandchild(liz)    Current Living Arrangements  home/apartment/condo    Primary Care Provided by  self    Quality of Family Relationships  helpful;involved;supportive       Transition Planning    Patient/Family Anticipates Transition to  inpatient rehabilitation facility    Transportation Anticipated  family or friend will provide       Discharge Needs Assessment    Concerns to be Addressed  discharge planning    Outpatient/Agency/Support Group Needs  inpatient rehabilitation facility    Discharge Facility/Level of Care Needs  rehabilitation facility    Provided post acute provider list?  -- Pt to return to [facility]        Discharge Plan     Row Name 12/27/19 0823       Plan    Plan  Pt plans to return to [facility] pending their eval.  CCP will follow.     Plan Comments  Pt transferred from [facility] with CP.  CCP spoke with [place]/ GABINO who states they will eval today to determine if she can return.  CCP spoke with pt who states her plan is to return to [facility].                   Demographic Summary     Row Name 12/27/19 0815       General Information    Arrived From  other (see comments) [facility]    Referral Source  admission list    Reason for Consult  discharge planning    Preferred Language  English        Functional Status     Row Name 12/27/19 0820       Functional Status    Usual Activity Tolerance  moderate    Current Activity Tolerance  moderate       Functional Status, IADL    Medications  independent    Meal Preparation  independent    Housekeeping  assistive person    Laundry  assistive person    Shopping  independent;assistive person       Mental Status    General Appearance WDL  WDL       Employment/     Employment Status  retired                Katelyn Mata RN

## 2019-12-27 NOTE — PROGRESS NOTES
"DOS: 2019  NAME: Liana Carrero   : 1935  PCP: Francisco Gallagher MD  Chief complaint: Vision change, stroke, chest pain    Stroke    Subjective: States she feels good, left sided weakness at baseline.  No chest pain.  No headache.  Is at the bedside.  Pt seen in follow up today, however the problem is new to the examiner.      Objective:  Vital signs: /64 (BP Location: Right arm, Patient Position: Lying)   Pulse 74   Temp 97.5 °F (36.4 °C) (Oral)   Resp 16   Ht 157.5 cm (62.01\")   Wt 81.9 kg (180 lb 8.9 oz)   LMP  (LMP Unknown)   SpO2 98%   BMI 33.02 kg/m²       General appearance: Well developed, well nourished, well groomed, alert and cooperative.   HEENT: Normocephalic.   Neck and spine: Normal range of motion. Normal alignment. No mass or tenderness.    Cardiac: Regular rate and rhythm.   Peripheral Vasculature: Radial pulses are equal and symmetric.  Chest Exam: Clear to auscultation bilaterally, no wheezes, no rhonchi.  Extremities: Normal, no edema.   Skin: No rashes or birthmarks.     Higher integrative function: Oriented to time, place, person, intact recent and remote memory, attention span, concentration and language. Spontaneous speech, fund of vocabulary are normal.   CN II: L HH  CN III IV VI: Extraocular movements are full without nystagmus. Pupils are equal, round, and reactive to light.  CN V: Normal facial sensation.  CN VII: Facial movements are symmetric, no weakness.   CN VIII: Auditory acuity is normal.   CN IX & X: Symmetric palatal movement.   CN XI: Sternocleidomastoid and trapezius are normal. No weakness.   CN XII: The tongue is midline. No atrophy or fasciculations.   Motor: Normal muscle strength, bulk, and tone in upper and lower extremities. No fasciculations, rigidity, spasticity or abnormal movements.   Sensation: Normal light touch.  Station and gait: N/A  Muscle stretch reflexes: Plantar reflexes are flexor bilaterally.   Coordination: Finger to nose test " showed no dysmetria.    Scheduled Meds:  aspirin 81 mg Oral Daily   clopidogrel 75 mg Oral Daily   dilTIAZem  mg Oral BID   diphenhydrAMINE 50 mg Intravenous Once   insulin lispro 0-7 Units Subcutaneous 4x Daily With Meals & Nightly   isosorbide mononitrate 60 mg Oral Daily   levothyroxine 125 mcg Oral Daily   methylPREDNISolone sodium succinate 125 mg Intravenous Once   metoprolol tartrate 100 mg Oral Q12H   pantoprazole 40 mg Oral QAM   torsemide 40 mg Oral Daily     Continuous Infusions:  sodium chloride 50 mL/hr Last Rate: 50 mL/hr (12/26/19 1129)     PRN Meds:.atropine  •  dextrose  •  dextrose  •  glucagon (human recombinant)  •  sodium chloride  •  zolpidem    Laboratory results:  Lab Results   Component Value Date    GLUCOSE 209 (H) 12/27/2019    CALCIUM 8.7 12/27/2019     12/27/2019    K 4.1 12/27/2019    CO2 24.5 12/27/2019    CL 99 12/27/2019    BUN 32 (H) 12/27/2019    CREATININE 1.21 (H) 12/27/2019    EGFRIFNONA 43 (L) 12/27/2019    BCR 26.4 (H) 12/27/2019    ANIONGAP 13.5 12/27/2019     Lab Results   Component Value Date    WBC 7.60 12/26/2019    HGB 9.2 (L) 12/26/2019    HCT 29.2 (L) 12/26/2019    MCV 86.6 12/26/2019     12/26/2019     No results found for: CHOL  Lab Results   Component Value Date    HDL 38 (L) 12/12/2019    HDL 47 (L) 04/20/2019    HDL 46 (L) 02/05/2018     Lab Results   Component Value Date    LDL 66 12/12/2019    LDL 51 04/20/2019    LDL 94 02/05/2018     Lab Results   Component Value Date    TRIG 197 (H) 12/12/2019    TRIG 88 04/20/2019    TRIG 130 02/05/2018       Review and interpretation of imaging: He had images viewed by me, shows right PCA stroke with hemorrhagic transformation.  CT HEAD WITHOUT CONTRAST      HISTORY: Stroke.      COMPARISON: None.     A noncontrasted CT examination of brain was performed.     FINDINGS: There is an area of decreased attenuation involving the right  PCA vascular distribution consistent with an area of infarction  measuring  approximately 8 cm in AP dimension. Gyriform areas of  increased attenuation are present suggesting moderate petechial  hemorrhage. Small focal areas of higher attenuation are noted suggesting  areas of hemorrhagic transformation. There is no evidence of  subarachnoid hemorrhage. There is moderate small vessel ischemic  disease. Moderate vascular calcification is noted.     The above information was immediately called to and discussed with Dr. Ellison.           Radiation dose reduction techniques were utilized, including automated  exposure control and exposure modulation based on body size.     This report was finalized on 12/26/2019 3:40 PM by Dr. Marcos Castro M.D.       Impression:  Patient is a 84-year-old female with HTN, HLP with statin allergy, DM, CKD, hypothyroidism, ARAMIS, CAD, CHF, left thalamic stroke December 2014 without residual, and recent stroke on 12/11 that caused left-sided vision loss and left-sided weakness.  She received T[facility] for that at [facility].  Her stroke showed petechial hemorrhage.  Her stroke was felt atheroembolic per [facility] and she was continued on Plavix and Aggrenox.  She was somewhat improved by the time she was transferred to rehab.  She had been progressing well over the past few days but seemed a little bit weaker on the left side to her family members.  On 12/26 she complained of chest pain and was brought to the main hospital with plans for cardiac cath.  Neurology was asked to evaluate patient for clearance for cardiac cath given her recent stroke and risk of intracerebral hemorrhage with heparin during the procedure.  Left heart cath showed patent grafts with a radial arterial graft to the mid LAD showing 50 to 60% stenosis.  Medical management was recommended.    Diagnoses:  Right PCA stroke due to large vessel disease  CAD    Work-up:  CT head 12/26: Right PCA stroke with moderate petechial hemorrhage, moderate small vessel disease  noted.  Hemoglobin A1c 6.1%, TSH 3.2, LDL 66, B12 339, vitamin D 18.7 creatinine 1.21  Plan:  Continue ASA 81 mg and Plavix 75 mg daily  Borderline low b12, recommend replacement 1000 mcg daily, IM x 1, then PO.  No statin due to documented allergy.  Neurochecks  BP control- goal < 130/80  Stroke Education  OK for transfer back to rehab  D/W Dr Ellison today. Will sign off, please call if further questions/concerns.

## 2019-12-27 NOTE — PLAN OF CARE
Problem: Patient Care Overview  Goal: Plan of Care Review  Flowsheets (Taken 12/27/2019 2235)  Progress: improving  Plan of Care Reviewed With: patient  Outcome Summary: VSS, A&Ox4, no complaints of any chest pain through the night, aponte to BSD for urinary retention per nephrology, right groin site is cdi and soft, dc back to rehab soon, will continue to monitor.

## 2019-12-28 LAB
ALBUMIN SERPL-MCNC: 3.6 G/DL (ref 3.5–5.2)
ANION GAP SERPL CALCULATED.3IONS-SCNC: 16.5 MMOL/L (ref 5–15)
BUN BLD-MCNC: 44 MG/DL (ref 8–23)
BUN/CREAT SERPL: 34.4 (ref 7–25)
CALCIUM SPEC-SCNC: 8.7 MG/DL (ref 8.6–10.5)
CHLORIDE SERPL-SCNC: 100 MMOL/L (ref 98–107)
CO2 SERPL-SCNC: 25.5 MMOL/L (ref 22–29)
CREAT BLD-MCNC: 1.28 MG/DL (ref 0.57–1)
GFR SERPL CREATININE-BSD FRML MDRD: 40 ML/MIN/1.73
GLUCOSE BLD-MCNC: 160 MG/DL (ref 65–99)
GLUCOSE BLDC GLUCOMTR-MCNC: 141 MG/DL (ref 70–130)
GLUCOSE BLDC GLUCOMTR-MCNC: 157 MG/DL (ref 70–130)
GLUCOSE BLDC GLUCOMTR-MCNC: 187 MG/DL (ref 70–130)
GLUCOSE BLDC GLUCOMTR-MCNC: 227 MG/DL (ref 70–130)
PHOSPHATE SERPL-MCNC: 3.5 MG/DL (ref 2.5–4.5)
POTASSIUM BLD-SCNC: 3.5 MMOL/L (ref 3.5–5.2)
SODIUM BLD-SCNC: 142 MMOL/L (ref 136–145)

## 2019-12-28 PROCEDURE — 25010000002 IRON SUCROSE PER 1 MG: Performed by: PHYSICAL MEDICINE & REHABILITATION

## 2019-12-28 PROCEDURE — 97112 NEUROMUSCULAR REEDUCATION: CPT | Performed by: OCCUPATIONAL THERAPIST

## 2019-12-28 PROCEDURE — 97110 THERAPEUTIC EXERCISES: CPT

## 2019-12-28 PROCEDURE — 82962 GLUCOSE BLOOD TEST: CPT

## 2019-12-28 PROCEDURE — G0515 COGNITIVE SKILLS DEVELOPMENT: HCPCS

## 2019-12-28 PROCEDURE — 80069 RENAL FUNCTION PANEL: CPT | Performed by: PHYSICAL MEDICINE & REHABILITATION

## 2019-12-28 PROCEDURE — 63710000001 INSULIN LISPRO (HUMAN) PER 5 UNITS: Performed by: PHYSICAL MEDICINE & REHABILITATION

## 2019-12-28 PROCEDURE — 97535 SELF CARE MNGMENT TRAINING: CPT | Performed by: OCCUPATIONAL THERAPIST

## 2019-12-28 RX ORDER — CHOLECALCIFEROL (VITAMIN D3) 125 MCG
1000 CAPSULE ORAL DAILY
Status: DISCONTINUED | OUTPATIENT
Start: 2019-12-28 | End: 2020-01-08 | Stop reason: HOSPADM

## 2019-12-28 RX ORDER — ASPIRIN 81 MG/1
81 TABLET ORAL DAILY
Status: DISCONTINUED | OUTPATIENT
Start: 2019-12-28 | End: 2020-01-08 | Stop reason: HOSPADM

## 2019-12-28 RX ADMIN — TORSEMIDE 10 MG: 10 TABLET ORAL at 08:55

## 2019-12-28 RX ADMIN — INSULIN LISPRO 2 UNITS: 100 INJECTION, SOLUTION INTRAVENOUS; SUBCUTANEOUS at 08:56

## 2019-12-28 RX ADMIN — PANTOPRAZOLE SODIUM 40 MG: 40 TABLET, DELAYED RELEASE ORAL at 06:16

## 2019-12-28 RX ADMIN — SENNOSIDES AND DOCUSATE SODIUM 1 TABLET: 8.6; 5 TABLET ORAL at 21:35

## 2019-12-28 RX ADMIN — INSULIN LISPRO 2 UNITS: 100 INJECTION, SOLUTION INTRAVENOUS; SUBCUTANEOUS at 17:07

## 2019-12-28 RX ADMIN — DILTIAZEM HYDROCHLORIDE 180 MG: 180 CAPSULE, COATED, EXTENDED RELEASE ORAL at 21:35

## 2019-12-28 RX ADMIN — IRON SUCROSE 300 MG: 20 INJECTION, SOLUTION INTRAVENOUS at 13:03

## 2019-12-28 RX ADMIN — PANTOPRAZOLE SODIUM 40 MG: 40 TABLET, DELAYED RELEASE ORAL at 17:07

## 2019-12-28 RX ADMIN — CLOPIDOGREL 75 MG: 75 TABLET, FILM COATED ORAL at 08:55

## 2019-12-28 RX ADMIN — INSULIN LISPRO 3 UNITS: 100 INJECTION, SOLUTION INTRAVENOUS; SUBCUTANEOUS at 21:36

## 2019-12-28 RX ADMIN — DOCUSATE SODIUM 100 MG: 100 CAPSULE, LIQUID FILLED ORAL at 08:55

## 2019-12-28 RX ADMIN — DILTIAZEM HYDROCHLORIDE 180 MG: 180 CAPSULE, COATED, EXTENDED RELEASE ORAL at 08:55

## 2019-12-28 RX ADMIN — METOPROLOL TARTRATE 50 MG: 50 TABLET, FILM COATED ORAL at 21:35

## 2019-12-28 RX ADMIN — METOPROLOL TARTRATE 50 MG: 50 TABLET, FILM COATED ORAL at 09:02

## 2019-12-28 RX ADMIN — ZOLPIDEM TARTRATE 5 MG: 5 TABLET ORAL at 22:39

## 2019-12-28 RX ADMIN — DOCUSATE SODIUM 100 MG: 100 CAPSULE, LIQUID FILLED ORAL at 21:36

## 2019-12-28 RX ADMIN — ASPIRIN 81 MG: 81 TABLET, COATED ORAL at 08:55

## 2019-12-28 RX ADMIN — Medication 1000 MCG: at 08:55

## 2019-12-28 RX ADMIN — LEVOTHYROXINE SODIUM 125 MCG: 125 TABLET ORAL at 06:16

## 2019-12-28 RX ADMIN — ACETAMINOPHEN 650 MG: 325 TABLET, FILM COATED ORAL at 22:41

## 2019-12-29 LAB
ALBUMIN SERPL-MCNC: 3.6 G/DL (ref 3.5–5.2)
ANION GAP SERPL CALCULATED.3IONS-SCNC: 13.8 MMOL/L (ref 5–15)
BUN BLD-MCNC: 32 MG/DL (ref 8–23)
BUN/CREAT SERPL: 30.2 (ref 7–25)
CALCIUM SPEC-SCNC: 8.8 MG/DL (ref 8.6–10.5)
CHLORIDE SERPL-SCNC: 102 MMOL/L (ref 98–107)
CO2 SERPL-SCNC: 26.2 MMOL/L (ref 22–29)
CREAT BLD-MCNC: 1.06 MG/DL (ref 0.57–1)
GFR SERPL CREATININE-BSD FRML MDRD: 50 ML/MIN/1.73
GLUCOSE BLD-MCNC: 146 MG/DL (ref 65–99)
GLUCOSE BLDC GLUCOMTR-MCNC: 149 MG/DL (ref 70–130)
GLUCOSE BLDC GLUCOMTR-MCNC: 153 MG/DL (ref 70–130)
GLUCOSE BLDC GLUCOMTR-MCNC: 159 MG/DL (ref 70–130)
GLUCOSE BLDC GLUCOMTR-MCNC: 207 MG/DL (ref 70–130)
PHOSPHATE SERPL-MCNC: 2.8 MG/DL (ref 2.5–4.5)
POTASSIUM BLD-SCNC: 3 MMOL/L (ref 3.5–5.2)
POTASSIUM BLD-SCNC: 4.9 MMOL/L (ref 3.5–5.2)
SODIUM BLD-SCNC: 142 MMOL/L (ref 136–145)

## 2019-12-29 PROCEDURE — 84132 ASSAY OF SERUM POTASSIUM: CPT | Performed by: PHYSICAL MEDICINE & REHABILITATION

## 2019-12-29 PROCEDURE — 63710000001 INSULIN LISPRO (HUMAN) PER 5 UNITS: Performed by: PHYSICAL MEDICINE & REHABILITATION

## 2019-12-29 PROCEDURE — 80069 RENAL FUNCTION PANEL: CPT | Performed by: PHYSICAL MEDICINE & REHABILITATION

## 2019-12-29 PROCEDURE — 82962 GLUCOSE BLOOD TEST: CPT

## 2019-12-29 RX ORDER — POTASSIUM CHLORIDE 750 MG/1
40 CAPSULE, EXTENDED RELEASE ORAL AS NEEDED
Status: DISCONTINUED | OUTPATIENT
Start: 2019-12-29 | End: 2020-01-08

## 2019-12-29 RX ORDER — POTASSIUM CHLORIDE 1.5 G/1.77G
40 POWDER, FOR SOLUTION ORAL AS NEEDED
Status: DISCONTINUED | OUTPATIENT
Start: 2019-12-29 | End: 2020-01-08

## 2019-12-29 RX ADMIN — PANTOPRAZOLE SODIUM 40 MG: 40 TABLET, DELAYED RELEASE ORAL at 05:59

## 2019-12-29 RX ADMIN — CLOPIDOGREL 75 MG: 75 TABLET, FILM COATED ORAL at 09:03

## 2019-12-29 RX ADMIN — DILTIAZEM HYDROCHLORIDE 180 MG: 180 CAPSULE, COATED, EXTENDED RELEASE ORAL at 09:04

## 2019-12-29 RX ADMIN — PANTOPRAZOLE SODIUM 40 MG: 40 TABLET, DELAYED RELEASE ORAL at 16:45

## 2019-12-29 RX ADMIN — LEVOTHYROXINE SODIUM 125 MCG: 125 TABLET ORAL at 05:59

## 2019-12-29 RX ADMIN — ZOLPIDEM TARTRATE 5 MG: 5 TABLET ORAL at 21:05

## 2019-12-29 RX ADMIN — DILTIAZEM HYDROCHLORIDE 180 MG: 180 CAPSULE, COATED, EXTENDED RELEASE ORAL at 21:05

## 2019-12-29 RX ADMIN — METOPROLOL TARTRATE 50 MG: 50 TABLET, FILM COATED ORAL at 21:05

## 2019-12-29 RX ADMIN — ACETAMINOPHEN 650 MG: 325 TABLET, FILM COATED ORAL at 21:05

## 2019-12-29 RX ADMIN — ACETAMINOPHEN 650 MG: 325 TABLET, FILM COATED ORAL at 09:11

## 2019-12-29 RX ADMIN — METOPROLOL TARTRATE 50 MG: 50 TABLET, FILM COATED ORAL at 09:03

## 2019-12-29 RX ADMIN — TORSEMIDE 10 MG: 10 TABLET ORAL at 09:03

## 2019-12-29 RX ADMIN — INSULIN LISPRO 2 UNITS: 100 INJECTION, SOLUTION INTRAVENOUS; SUBCUTANEOUS at 21:07

## 2019-12-29 RX ADMIN — DOCUSATE SODIUM 100 MG: 100 CAPSULE, LIQUID FILLED ORAL at 09:12

## 2019-12-29 RX ADMIN — INSULIN LISPRO 2 UNITS: 100 INJECTION, SOLUTION INTRAVENOUS; SUBCUTANEOUS at 09:11

## 2019-12-29 RX ADMIN — POTASSIUM CHLORIDE 40 MEQ: 750 CAPSULE, EXTENDED RELEASE ORAL at 10:29

## 2019-12-29 RX ADMIN — INSULIN LISPRO 3 UNITS: 100 INJECTION, SOLUTION INTRAVENOUS; SUBCUTANEOUS at 12:01

## 2019-12-29 RX ADMIN — Medication 1000 MCG: at 09:03

## 2019-12-29 RX ADMIN — POTASSIUM CHLORIDE 40 MEQ: 750 CAPSULE, EXTENDED RELEASE ORAL at 14:16

## 2019-12-29 RX ADMIN — ASPIRIN 81 MG: 81 TABLET, COATED ORAL at 09:03

## 2019-12-29 RX ADMIN — POTASSIUM CHLORIDE 40 MEQ: 750 CAPSULE, EXTENDED RELEASE ORAL at 18:10

## 2019-12-30 LAB
ALBUMIN SERPL-MCNC: 3.9 G/DL (ref 3.5–5.2)
ANION GAP SERPL CALCULATED.3IONS-SCNC: 10.5 MMOL/L (ref 5–15)
BASOPHILS # BLD AUTO: 0.05 10*3/MM3 (ref 0–0.2)
BASOPHILS NFR BLD AUTO: 0.7 % (ref 0–1.5)
BUN BLD-MCNC: 25 MG/DL (ref 8–23)
BUN/CREAT SERPL: 25 (ref 7–25)
CALCIUM SPEC-SCNC: 8.8 MG/DL (ref 8.6–10.5)
CHLORIDE SERPL-SCNC: 104 MMOL/L (ref 98–107)
CO2 SERPL-SCNC: 25.5 MMOL/L (ref 22–29)
CREAT BLD-MCNC: 1 MG/DL (ref 0.57–1)
DEPRECATED RDW RBC AUTO: 42.9 FL (ref 37–54)
EOSINOPHIL # BLD AUTO: 0.14 10*3/MM3 (ref 0–0.4)
EOSINOPHIL NFR BLD AUTO: 2 % (ref 0.3–6.2)
ERYTHROCYTE [DISTWIDTH] IN BLOOD BY AUTOMATED COUNT: 14.4 % (ref 12.3–15.4)
GFR SERPL CREATININE-BSD FRML MDRD: 53 ML/MIN/1.73
GLUCOSE BLD-MCNC: 146 MG/DL (ref 65–99)
GLUCOSE BLDC GLUCOMTR-MCNC: 135 MG/DL (ref 70–130)
GLUCOSE BLDC GLUCOMTR-MCNC: 139 MG/DL (ref 70–130)
GLUCOSE BLDC GLUCOMTR-MCNC: 155 MG/DL (ref 70–130)
GLUCOSE BLDC GLUCOMTR-MCNC: 235 MG/DL (ref 70–130)
HCT VFR BLD AUTO: 29.7 % (ref 34–46.6)
HGB BLD-MCNC: 9.6 G/DL (ref 12–15.9)
IMM GRANULOCYTES # BLD AUTO: 0.03 10*3/MM3 (ref 0–0.05)
IMM GRANULOCYTES NFR BLD AUTO: 0.4 % (ref 0–0.5)
LYMPHOCYTES # BLD AUTO: 2.17 10*3/MM3 (ref 0.7–3.1)
LYMPHOCYTES NFR BLD AUTO: 30.4 % (ref 19.6–45.3)
MAGNESIUM SERPL-MCNC: 2.5 MG/DL (ref 1.6–2.4)
MCH RBC QN AUTO: 26.7 PG (ref 26.6–33)
MCHC RBC AUTO-ENTMCNC: 32.3 G/DL (ref 31.5–35.7)
MCV RBC AUTO: 82.5 FL (ref 79–97)
MONOCYTES # BLD AUTO: 0.66 10*3/MM3 (ref 0.1–0.9)
MONOCYTES NFR BLD AUTO: 9.2 % (ref 5–12)
NEUTROPHILS # BLD AUTO: 4.09 10*3/MM3 (ref 1.7–7)
NEUTROPHILS NFR BLD AUTO: 57.3 % (ref 42.7–76)
NRBC BLD AUTO-RTO: 0.3 /100 WBC (ref 0–0.2)
PHOSPHATE SERPL-MCNC: 2.1 MG/DL (ref 2.5–4.5)
PLATELET # BLD AUTO: 285 10*3/MM3 (ref 140–450)
PMV BLD AUTO: 10.1 FL (ref 6–12)
POTASSIUM BLD-SCNC: 4.5 MMOL/L (ref 3.5–5.2)
RBC # BLD AUTO: 3.6 10*6/MM3 (ref 3.77–5.28)
SODIUM BLD-SCNC: 140 MMOL/L (ref 136–145)
WBC NRBC COR # BLD: 7.14 10*3/MM3 (ref 3.4–10.8)

## 2019-12-30 PROCEDURE — 80069 RENAL FUNCTION PANEL: CPT | Performed by: PHYSICAL MEDICINE & REHABILITATION

## 2019-12-30 PROCEDURE — 83735 ASSAY OF MAGNESIUM: CPT | Performed by: INTERNAL MEDICINE

## 2019-12-30 PROCEDURE — 97112 NEUROMUSCULAR REEDUCATION: CPT

## 2019-12-30 PROCEDURE — 97112 NEUROMUSCULAR REEDUCATION: CPT | Performed by: OCCUPATIONAL THERAPIST

## 2019-12-30 PROCEDURE — 97110 THERAPEUTIC EXERCISES: CPT | Performed by: OCCUPATIONAL THERAPIST

## 2019-12-30 PROCEDURE — 82962 GLUCOSE BLOOD TEST: CPT

## 2019-12-30 PROCEDURE — 85025 COMPLETE CBC W/AUTO DIFF WBC: CPT | Performed by: PHYSICAL MEDICINE & REHABILITATION

## 2019-12-30 PROCEDURE — 97535 SELF CARE MNGMENT TRAINING: CPT | Performed by: OCCUPATIONAL THERAPIST

## 2019-12-30 PROCEDURE — 97116 GAIT TRAINING THERAPY: CPT

## 2019-12-30 PROCEDURE — G0515 COGNITIVE SKILLS DEVELOPMENT: HCPCS

## 2019-12-30 PROCEDURE — 97110 THERAPEUTIC EXERCISES: CPT

## 2019-12-30 PROCEDURE — 99232 SBSQ HOSP IP/OBS MODERATE 35: CPT | Performed by: INTERNAL MEDICINE

## 2019-12-30 PROCEDURE — 63710000001 INSULIN LISPRO (HUMAN) PER 5 UNITS: Performed by: PHYSICAL MEDICINE & REHABILITATION

## 2019-12-30 RX ADMIN — TORSEMIDE 10 MG: 10 TABLET ORAL at 07:57

## 2019-12-30 RX ADMIN — INSULIN LISPRO 3 UNITS: 100 INJECTION, SOLUTION INTRAVENOUS; SUBCUTANEOUS at 17:13

## 2019-12-30 RX ADMIN — ACETAMINOPHEN 650 MG: 325 TABLET, FILM COATED ORAL at 10:04

## 2019-12-30 RX ADMIN — DOCUSATE SODIUM 100 MG: 100 CAPSULE, LIQUID FILLED ORAL at 20:49

## 2019-12-30 RX ADMIN — DILTIAZEM HYDROCHLORIDE 180 MG: 180 CAPSULE, COATED, EXTENDED RELEASE ORAL at 20:49

## 2019-12-30 RX ADMIN — LEVOTHYROXINE SODIUM 125 MCG: 125 TABLET ORAL at 05:35

## 2019-12-30 RX ADMIN — INSULIN LISPRO 2 UNITS: 100 INJECTION, SOLUTION INTRAVENOUS; SUBCUTANEOUS at 11:39

## 2019-12-30 RX ADMIN — METOPROLOL TARTRATE 50 MG: 50 TABLET, FILM COATED ORAL at 07:58

## 2019-12-30 RX ADMIN — ZOLPIDEM TARTRATE 5 MG: 5 TABLET ORAL at 20:49

## 2019-12-30 RX ADMIN — PANTOPRAZOLE SODIUM 40 MG: 40 TABLET, DELAYED RELEASE ORAL at 17:13

## 2019-12-30 RX ADMIN — PANTOPRAZOLE SODIUM 40 MG: 40 TABLET, DELAYED RELEASE ORAL at 05:35

## 2019-12-30 RX ADMIN — METOPROLOL TARTRATE 50 MG: 50 TABLET, FILM COATED ORAL at 20:49

## 2019-12-30 RX ADMIN — SENNOSIDES AND DOCUSATE SODIUM 1 TABLET: 8.6; 5 TABLET ORAL at 07:57

## 2019-12-30 RX ADMIN — Medication 1000 MCG: at 07:58

## 2019-12-30 RX ADMIN — ACETAMINOPHEN 650 MG: 325 TABLET, FILM COATED ORAL at 20:49

## 2019-12-30 RX ADMIN — ASPIRIN 81 MG: 81 TABLET, COATED ORAL at 07:59

## 2019-12-30 RX ADMIN — CLOPIDOGREL 75 MG: 75 TABLET, FILM COATED ORAL at 07:58

## 2019-12-30 RX ADMIN — DILTIAZEM HYDROCHLORIDE 180 MG: 180 CAPSULE, COATED, EXTENDED RELEASE ORAL at 07:58

## 2019-12-30 RX ADMIN — SENNOSIDES AND DOCUSATE SODIUM 1 TABLET: 8.6; 5 TABLET ORAL at 20:49

## 2019-12-30 RX ADMIN — DOCUSATE SODIUM 100 MG: 100 CAPSULE, LIQUID FILLED ORAL at 09:57

## 2019-12-31 LAB
GLUCOSE BLDC GLUCOMTR-MCNC: 159 MG/DL (ref 70–130)
GLUCOSE BLDC GLUCOMTR-MCNC: 161 MG/DL (ref 70–130)
GLUCOSE BLDC GLUCOMTR-MCNC: 162 MG/DL (ref 70–130)
GLUCOSE BLDC GLUCOMTR-MCNC: 183 MG/DL (ref 70–130)

## 2019-12-31 PROCEDURE — 82962 GLUCOSE BLOOD TEST: CPT

## 2019-12-31 PROCEDURE — 97110 THERAPEUTIC EXERCISES: CPT

## 2019-12-31 PROCEDURE — 97112 NEUROMUSCULAR REEDUCATION: CPT

## 2019-12-31 PROCEDURE — 97535 SELF CARE MNGMENT TRAINING: CPT | Performed by: OCCUPATIONAL THERAPIST

## 2019-12-31 PROCEDURE — 97116 GAIT TRAINING THERAPY: CPT

## 2019-12-31 PROCEDURE — G0515 COGNITIVE SKILLS DEVELOPMENT: HCPCS

## 2019-12-31 PROCEDURE — 63710000001 INSULIN LISPRO (HUMAN) PER 5 UNITS: Performed by: PHYSICAL MEDICINE & REHABILITATION

## 2019-12-31 PROCEDURE — 97112 NEUROMUSCULAR REEDUCATION: CPT | Performed by: OCCUPATIONAL THERAPIST

## 2019-12-31 RX ADMIN — INSULIN LISPRO 2 UNITS: 100 INJECTION, SOLUTION INTRAVENOUS; SUBCUTANEOUS at 12:31

## 2019-12-31 RX ADMIN — PANTOPRAZOLE SODIUM 40 MG: 40 TABLET, DELAYED RELEASE ORAL at 05:48

## 2019-12-31 RX ADMIN — INSULIN LISPRO 2 UNITS: 100 INJECTION, SOLUTION INTRAVENOUS; SUBCUTANEOUS at 07:59

## 2019-12-31 RX ADMIN — CLOPIDOGREL 75 MG: 75 TABLET, FILM COATED ORAL at 09:33

## 2019-12-31 RX ADMIN — ZOLPIDEM TARTRATE 5 MG: 5 TABLET ORAL at 21:14

## 2019-12-31 RX ADMIN — DILTIAZEM HYDROCHLORIDE 180 MG: 180 CAPSULE, COATED, EXTENDED RELEASE ORAL at 09:33

## 2019-12-31 RX ADMIN — METOPROLOL TARTRATE 50 MG: 50 TABLET, FILM COATED ORAL at 09:34

## 2019-12-31 RX ADMIN — INSULIN LISPRO 2 UNITS: 100 INJECTION, SOLUTION INTRAVENOUS; SUBCUTANEOUS at 17:49

## 2019-12-31 RX ADMIN — ACETAMINOPHEN 650 MG: 325 TABLET, FILM COATED ORAL at 18:50

## 2019-12-31 RX ADMIN — INSULIN LISPRO 2 UNITS: 100 INJECTION, SOLUTION INTRAVENOUS; SUBCUTANEOUS at 21:14

## 2019-12-31 RX ADMIN — TORSEMIDE 10 MG: 10 TABLET ORAL at 09:32

## 2019-12-31 RX ADMIN — PANTOPRAZOLE SODIUM 40 MG: 40 TABLET, DELAYED RELEASE ORAL at 17:04

## 2019-12-31 RX ADMIN — Medication 1000 MCG: at 09:33

## 2019-12-31 RX ADMIN — ASPIRIN 81 MG: 81 TABLET, COATED ORAL at 09:33

## 2019-12-31 RX ADMIN — METOPROLOL TARTRATE 50 MG: 50 TABLET, FILM COATED ORAL at 21:14

## 2019-12-31 RX ADMIN — SENNOSIDES AND DOCUSATE SODIUM 1 TABLET: 8.6; 5 TABLET ORAL at 21:14

## 2019-12-31 RX ADMIN — DOCUSATE SODIUM 100 MG: 100 CAPSULE, LIQUID FILLED ORAL at 21:14

## 2019-12-31 RX ADMIN — LEVOTHYROXINE SODIUM 125 MCG: 125 TABLET ORAL at 05:48

## 2019-12-31 RX ADMIN — DOCUSATE SODIUM 100 MG: 100 CAPSULE, LIQUID FILLED ORAL at 09:37

## 2019-12-31 RX ADMIN — DILTIAZEM HYDROCHLORIDE 180 MG: 180 CAPSULE, COATED, EXTENDED RELEASE ORAL at 21:14

## 2020-01-01 LAB
GLUCOSE BLDC GLUCOMTR-MCNC: 130 MG/DL (ref 70–130)
GLUCOSE BLDC GLUCOMTR-MCNC: 148 MG/DL (ref 70–130)
GLUCOSE BLDC GLUCOMTR-MCNC: 154 MG/DL (ref 70–130)
GLUCOSE BLDC GLUCOMTR-MCNC: 221 MG/DL (ref 70–130)

## 2020-01-01 PROCEDURE — 97129 THER IVNTJ 1ST 15 MIN: CPT | Performed by: SPEECH-LANGUAGE PATHOLOGIST

## 2020-01-01 PROCEDURE — 82962 GLUCOSE BLOOD TEST: CPT

## 2020-01-01 PROCEDURE — 63710000001 INSULIN LISPRO (HUMAN) PER 5 UNITS: Performed by: PHYSICAL MEDICINE & REHABILITATION

## 2020-01-01 PROCEDURE — 97110 THERAPEUTIC EXERCISES: CPT

## 2020-01-01 PROCEDURE — 97130 THER IVNTJ EA ADDL 15 MIN: CPT | Performed by: SPEECH-LANGUAGE PATHOLOGIST

## 2020-01-01 PROCEDURE — 97535 SELF CARE MNGMENT TRAINING: CPT

## 2020-01-01 RX ADMIN — METOPROLOL TARTRATE 50 MG: 50 TABLET, FILM COATED ORAL at 08:14

## 2020-01-01 RX ADMIN — DOCUSATE SODIUM 100 MG: 100 CAPSULE, LIQUID FILLED ORAL at 08:14

## 2020-01-01 RX ADMIN — INSULIN LISPRO 2 UNITS: 100 INJECTION, SOLUTION INTRAVENOUS; SUBCUTANEOUS at 08:15

## 2020-01-01 RX ADMIN — PANTOPRAZOLE SODIUM 40 MG: 40 TABLET, DELAYED RELEASE ORAL at 17:06

## 2020-01-01 RX ADMIN — TORSEMIDE 10 MG: 10 TABLET ORAL at 08:14

## 2020-01-01 RX ADMIN — ZOLPIDEM TARTRATE 5 MG: 5 TABLET ORAL at 20:22

## 2020-01-01 RX ADMIN — CLOPIDOGREL 75 MG: 75 TABLET, FILM COATED ORAL at 08:15

## 2020-01-01 RX ADMIN — DILTIAZEM HYDROCHLORIDE 180 MG: 180 CAPSULE, COATED, EXTENDED RELEASE ORAL at 20:22

## 2020-01-01 RX ADMIN — DOCUSATE SODIUM 100 MG: 100 CAPSULE, LIQUID FILLED ORAL at 20:22

## 2020-01-01 RX ADMIN — ASPIRIN 81 MG: 81 TABLET, COATED ORAL at 08:15

## 2020-01-01 RX ADMIN — METOPROLOL TARTRATE 50 MG: 50 TABLET, FILM COATED ORAL at 20:23

## 2020-01-01 RX ADMIN — DILTIAZEM HYDROCHLORIDE 180 MG: 180 CAPSULE, COATED, EXTENDED RELEASE ORAL at 08:15

## 2020-01-01 RX ADMIN — LEVOTHYROXINE SODIUM 125 MCG: 125 TABLET ORAL at 05:44

## 2020-01-01 RX ADMIN — Medication 1000 MCG: at 08:14

## 2020-01-01 RX ADMIN — SENNOSIDES AND DOCUSATE SODIUM 1 TABLET: 8.6; 5 TABLET ORAL at 20:23

## 2020-01-01 RX ADMIN — INSULIN LISPRO 3 UNITS: 100 INJECTION, SOLUTION INTRAVENOUS; SUBCUTANEOUS at 17:06

## 2020-01-01 RX ADMIN — ACETAMINOPHEN 650 MG: 325 TABLET, FILM COATED ORAL at 20:22

## 2020-01-01 RX ADMIN — PANTOPRAZOLE SODIUM 40 MG: 40 TABLET, DELAYED RELEASE ORAL at 05:44

## 2020-01-01 NOTE — THERAPY TREATMENT NOTE
Inpatient Rehabilitation - Occupational Therapy Treatment Note    [facility]     Patient Name: Liana Carrero  : 1935  MRN: [id number]    Today's Date: 2020                 Admit Date: 2019      Visit Dx:    ICD-10-CM ICD-9-CM   1. Unsteadiness on feet R26.81 781.2   2. Difficulty walking R26.2 719.7   3. 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 (CMS/HCC) I67.9 438.22    G81.94        Patient Active Problem List   Diagnosis   • Stroke (cerebrum) (CMS/[facility])   • Chest pain         Therapy Treatment    IRF Treatment Summary     Row Name 20 1007             Evaluation/Treatment Time and Intent    Subjective Information  no complaints  -CC      Existing Precautions/Restrictions  fall  -CC      Document Type  therapy note (daily note)  -CC      Mode of Treatment  occupational therapy  -CC      Patient/Family Observations  in bed  -CC      Recorded by [CC] Keya Miranda OTR      Row Name 20 1007             Cognition/Psychosocial- PT/OT    Affect/Mental Status (Cognitive)  WFL  -CC      Follows Commands (Cognition)  follows one step commands;over 90% accuracy;increased processing time needed;repetition of directions required;verbal cues/prompting required  -CC      Personal Safety Interventions  fall prevention program maintained;gait belt;nonskid shoes/slippers when out of bed  -CC      Recorded by [CC] Keya Miranda OTR      Row Name 20 1007             Bed Mobility Assessment/Treatment    Rolling Right [place] (Bed Mobility)  supervision;verbal cues  -CC      Supine-Sit [place] (Bed Mobility)  supervision  -CC      Recorded by [CC] Keya Miranda OTR      Row Name 20 1007             Functional Mobility    Functional Mobility- Ind. Level  contact guard assist  -CC      Functional Mobility- Device  rolling walker  -CC      Functional Mobility-Distance (Feet)  -- to BR  -CC      Recorded by [CC] Keya Miranda OTR      Row Name  01/01/20 1007             Stand-Sit Transfer    Stand-Sit [place] (Transfers)  contact guard;verbal cues  -CC      Assistive Device (Stand-Sit Transfers)  walker, front-wheeled  -CC      Recorded by [CC] Keya Miranda OTR      Row Name 01/01/20 1007             Stand Pivot/Stand Step Transfer    Stand Pivot/Stand Step [place]  contact guard  -CC      Assistive Device (Stand Pivot Stand Step Transfer)  walker, front-wheeled;wheelchair  -CC      Recorded by [CC] Keya Miranda OTR      Row Name 01/01/20 1007             Toilet Transfer    Type (Toilet Transfer)  stand pivot/stand step  -CC      [place] Level (Toilet Transfer)  contact guard;minimum assist (75% patient effort)  -CC      Assistive Device (Toilet Transfer)  walker, front-wheeled  -CC      Recorded by [CC] Keya Miranda, VICKIR      Row Name 01/01/20 1007             Bathing Assessment/Treatment    Bathing [place] Level  bathing skills;contact guard assist;verbal cues  -CC      Bathing Position  supported sitting;supported standing  -CC      Bathing Setup Assistance  adjust water temperature;obtain supplies  -CC      Recorded by [CC] Keya Miranda OTR      Row Name 01/01/20 1007             Upper Body Dressing Assessment/Treatment    Upper Body Dressing Task  upper body dressing skills;doff;don;bra/undergarment;pull over garment;minimum assist (75% or more patient effort)  -CC      Upper Body Dressing Position  supported sitting  -CC      Set-up Assistance (Upper Body Dressing)  obtain clothing  -CC      Recorded by [CC] Keya Miranda OTR      Row Name 01/01/20 1007             Lower Body Dressing Assessment/Treatment    Lower Body Dressing [place] Level  doff;don;pants/bottoms;shoes/slippers;socks;clothes fastener management;verbal cues;minimum assist (75% patient effort)  -CC      Lower Body Dressing Position  supported sitting;supported standing  -CC      Lower Body Dressing Setup Assistance  obtain clothing  -CC       Comment (Lower Body Dressing)  assist w shoes  -CC      Recorded by [CC] Keya Miranda OTR      Row Name 01/01/20 1007             Grooming Assessment/Treatment    Grooming [place] Level  grooming skills;hair care, combing/brushing;oral care regimen;wash face, hands;supervision;verbal cues  -CC      Grooming Position  supported sitting  -CC      Grooming Setup Assistance  obtain supplies  -CC      Recorded by [CC] Keya Miranda OTR      Row Name 01/01/20 1007             Toileting Assessment/Treatment    Toileting [place] Level  toileting skills;adjust/manage clothing;perform perineal hygiene;verbal cues;contact guard assist  -CC      Assistive Device Use (Toileting)  grab bar/safety frame  -CC      Toileting Position  supported sitting;supported standing  -CC      Recorded by [CC] Keya Miranda OTR      Row Name 01/01/20 1007             Pain Scale: Numbers Pre/Post-Treatment    Pain Scale: Numbers, Pretreatment  0/10 - no pain  -CC      Pain Scale: Numbers, Post-Treatment  0/10 - no pain  -CC      Recorded by [CC] Keya Miranda OTR      Row Name 01/01/20 1007             Positioning and Restraints    Pre-Treatment Position  in bed  -CC      Post Treatment Position  wheelchair  -CC      In Wheelchair  sitting;with nsg  -CC      Recorded by [CC] Keya Miranda OTR        User Key  (r) = Recorded By, (t) = Taken By, (c) = Cosigned By    Initials Name Effective Dates    CC Keya Miranda OTR 06/08/18 -           Wound 12/26/19 1639 Right groin Incision (Active)   Dressing Appearance dry;intact 1/1/2020  8:14 AM   Closure ESTEFANIA 1/1/2020  8:14 AM   Base dressing in place, unable to visualize 1/1/2020  8:14 AM         OT Recommendation and Plan    Anticipated Discharge Disposition (OT): home with assist            OT IRF GOALS     Row Name 01/01/20 1000 12/23/19 1500          Bathing Goal 1 (OT-IRF)    Progress/Outcomes (Bathing Goal 1, OT-IRF)  --  goal met  -CC        LB Dressing Goal  1 (OT-IRF)    Activity/Device (LB Dressing Goal 1, OT-IRF)  lower body dressing  -CC  --     [place] (LB Dressing Goal 1, OT-IRF)  supervision required  -CC  --     Time Frame (LB Dressing Goal 1, OT-IRF)  long term goal (LTG);by discharge  -CC  --     Progress/Outcomes (LB Dressing Goal 1, OT-IRF)  continuing progress toward goal  -CC  continuing progress toward goal  -CC        LB Dressing Goal 2 (OT-IRF)    Progress/Outcomes (LB Dressing Goal 2, OT-IRF)  --  continuing progress toward goal  -CC        Toileting Goal 1 (OT-IRF)    Progress/Outcomes (Toileting Goal 1, OT-IRF)  --  goal met  -CC        Toileting Goal 2 (OT-IRF)    Activity/Device (Toileting Goal 2, OT-IRF)  toileting skills, all  -CC  --     [place] Level (Toileting Goal 2, OT-IRF)  supervision required  -CC  --     Time Frame (Toileting Goal 2, OT-IRF)  long term goal (LTG);by discharge  -CC  --     Progress/Outcomes (Toileting Goal 2, OT-IRF)  goal partially met  -CC  continuing progress toward goal  -CC        Strength Goal 1 (OT-IRF)    Strength Goal 1 (OT-IRF)  [place] LUE strength  -CC  --     Time Frame (Strength Goal 1, OT-IRF)  long term goal (LTG);by discharge  -CC  --     Progress/Outcomes (Strength Goal 1, OT-IRF)  continuing progress toward goal  -CC  continuing progress toward goal  -CC        Caregiver Training Goal 1 (OT-IRF)    Caregiver Training Goal 1 (OT-IRF)  pt and family I with HEP  -CC  --     Time Frame (Caregiver Training Goal 1, OT-IRF)  long term goal (LTG);by discharge  -CC  --     Progress/Outcomes (Caregiver Training Goal 1, OT-IRF)  continuing progress toward goal  -CC  continuing progress toward goal  -CC       User Key  (r) = Recorded By, (t) = Taken By, (c) = Cosigned By    Initials Name Provider Type    Keya Zuñiga OTR Occupational Therapist                     Time Calculation:     Time Calculation- OT     Row Name 01/01/20 0800             Time Calculation- OT    OT Start Time  0800  -CC       OT Stop Time  0830  -CC      OT Time Calculation (min)  30 min  -CC        User Key  (r) = Recorded By, (t) = Taken By, (c) = Cosigned By    Initials Name Provider Type    CC Keya Miranda OTR Occupational Therapist          Therapy Charges for Today     Code Description Service Date Service Provider Modifiers Qty    [id number] HC OT SELF CARE/MGMT/TRAIN EA 15 MIN 1/1/2020 Keya Miranda OTR GO 2                   GIAN Blackwell  1/1/2020

## 2020-01-01 NOTE — PLAN OF CARE
Problem: Patient Care Overview  Goal: Plan of Care Review  Outcome: Ongoing (interventions implemented as appropriate)  Flowsheets (Taken 1/1/2020 3362)  Progress: improving  Plan of Care Reviewed With: patient  Patient Agreement with Plan of Care: agrees  Outcome Summary: Pt rested well this shift.

## 2020-01-01 NOTE — PLAN OF CARE
Problem: Patient Care Overview  Goal: Plan of Care Review  1/1/2020 0436 by Afshin Vargas, RN  Flowsheets (Taken 1/1/2020 0435)  Outcome Summary: pt rested well this shift.  uses calllight for br assistance.  1/1/2020 0435 by Afshin Vargas, RN  Outcome: Ongoing (interventions implemented as appropriate)  Flowsheets (Taken 1/1/2020 0435)  Outcome Summary: pt rested well this shift.  uses calllight for br assistance.  Progress, Functional Goals: demonstrating adequate progress  Plan of Care Reviewed With: patient  IRF Plan of Care Review: progress ongoing, continue  1/1/2020 0435 by Afshin Vargas, RN  Reactivated

## 2020-01-01 NOTE — PROGRESS NOTES
Inpatient Rehabilitation Functional Measures Assessment and Plan of Care    Plan of Care  Updated Problems/Interventions  Field    Functional Measures  JALEEL Eating:  [facility] Grooming: [facility] Bathing:  [facility] Upper Body Dressing:  [facility] Lower Body Dressing:  [facility] Toileting:  [facility] Bladder Management  Level of Assistance:  [place]  Frequency/Number of Accidents this Shift:  [facility] Bowel Management  Level of Assistance: [place]  Frequency/Number of Accidents this Shift: [facility] Bed/Chair/Wheelchair Transfer:  [facility] Toilet Transfer:  [facility] Tub/Shower Transfer:  [place]    Previously Documented Mode of Locomotion at Discharge: Field  JALEEL Expected Mode of Locomotion at Discharge: [facility] Walk/Wheelchair:  [facility] Stairs:  [facility] Comprehension:  [facility] Expression:  [facility] Social Interaction:  [facility] Problem Solving:  [facility] Memory:  [place]    Therapy Mode Minutes  Occupational Therapy: Individual: 30 minutes.  Physical Therapy: [place]  Speech Language Pathology:  [place]    Signed by: GIAN Blackwell/TALIB

## 2020-01-01 NOTE — PROGRESS NOTES
LOS: 15 days   Patient Care Team:  Francisco Gallagher MD as PCP - General (Family Medicine)    Chief Complaint: same    Subjective  Pt ius awake and alert. Pt is generally pleased with her progress    History of Present Illness    Subjective    History taken from: patient    Objective     Vital Signs  Temp:  [97.3 °F (36.3 °C)-97.7 °F (36.5 °C)] 97.3 °F (36.3 °C)  Heart Rate:  [72-82] 72  Resp:  [16] 16  BP: (117-141)/(73-76) 141/76    Objective  Exam unchanged calves soft and NT resp unlabored and regular  Results Review:     I reviewed the patient's new clinical results.    Medication Review:     Assessment/Plan       Stroke (cerebrum) (CMS/[facility])      Assessment & Plan Continue to prepare for dc    Yaron Sanders MD  01/01/20  10:43 AM    Time:

## 2020-01-01 NOTE — PROGRESS NOTES
Occupational Therapy: [place]    Physical Therapy: [place]    Speech Language Pathology:  Individual: 30 minutes.    Signed by: Andra Davis MS, CCC-SLP

## 2020-01-01 NOTE — THERAPY TREATMENT NOTE
Inpatient Rehabilitation - Physical Therapy Treatment Note  [facility]     Patient Name: Liana Carrero  : 1935  MRN: [id number]    Today's Date: 2020                 Admit Date: 2019      Visit Dx:      ICD-10-CM ICD-9-CM   1. Unsteadiness on feet R26.81 781.2   2. Difficulty walking R26.2 719.7   3. 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 (CMS/HCC) I67.9 438.22    G81.94        Patient Active Problem List   Diagnosis   • Stroke (cerebrum) (CMS/[facility])   • Chest pain       Therapy Treatment    IRF Treatment Summary     Row Name 20 1155 20 1007          Evaluation/Treatment Time and Intent    Subjective Information  complains of;fatigue  -EF  no complaints  -CC     Existing Precautions/Restrictions  fall  -EF  fall  -CC     Document Type  therapy note (daily note)  -EF  therapy note (daily note)  -CC     Mode of Treatment  physical therapy  -EF  occupational therapy  -CC     Patient/Family Observations  supine in bed  -EF  in bed  -CC     Recorded by [EF] Andra Chin, PT [CC] Keya Miranda OTR     Row Name 20 1007             Cognition/Psychosocial- PT/OT    Affect/Mental Status (Cognitive)  WFL  -CC      Follows Commands (Cognition)  follows one step commands;over 90% accuracy;increased processing time needed;repetition of directions required;verbal cues/prompting required  -CC      Personal Safety Interventions  fall prevention program maintained;gait belt;nonskid shoes/slippers when out of bed  -CC      Recorded by [CC] Keya Miranda OTR      Row Name 20 1155 20 1007          Bed Mobility Assessment/Treatment    Rolling Right [place] (Bed Mobility)  --  supervision;verbal cues  -CC     Supine-Sit [place] (Bed Mobility)  supervision  -EF  supervision  -CC     Recorded by [EF] Andra Chin, PT [CC] Keya Miranda OTR     Row Name 20 1007             Functional Mobility    Functional Mobility- Ind. Level   contact guard assist  -CC      Functional Mobility- Device  rolling walker  -CC      Functional Mobility-Distance (Feet)  -- to BR  -CC      Recorded by [CC] Keya Miranda OTR      Row Name 01/01/20 1155             Sit-Stand Transfer    Sit-Stand [place] (Transfers)  contact guard;verbal cues  -EF      Assistive Device (Sit-Stand Transfers)  walker, front-wheeled  -EF      Recorded by [EF] Andra Chin, PT      Row Name 01/01/20 1155 01/01/20 1007          Stand-Sit Transfer    Stand-Sit [place] (Transfers)  contact guard;verbal cues  -EF  contact guard;verbal cues  -CC     Assistive Device (Stand-Sit Transfers)  walker, front-wheeled  -EF  walker, front-wheeled  -CC     Recorded by [EF] Andra Chin, PT [CC] Keya Miranda OTR     Row Name 01/01/20 1007             Stand Pivot/Stand Step Transfer    Stand Pivot/Stand Step [place]  contact guard  -CC      Assistive Device (Stand Pivot Stand Step Transfer)  walker, front-wheeled;wheelchair  -CC      Recorded by [CC] Keya Miranda OTR      Row Name 01/01/20 1155 01/01/20 1007          Toilet Transfer    Type (Toilet Transfer)  stand pivot/stand step  -EF  stand pivot/stand step  -CC     [place] Level (Toilet Transfer)  contact guard;minimum assist (75% patient effort)  -EF  contact guard;minimum assist (75% patient effort)  -CC     Assistive Device (Toilet Transfer)  walker, front-wheeled  -EF  walker, front-wheeled  -CC     Recorded by [EF] Andra Chin, PT [CC] Keya Miranda OTR     Row Name 01/01/20 1155             Gait/Stairs Assessment/Training    [place] Level (Gait)  minimum assist (75% patient effort);contact guard;verbal cues  -EF      Assistive Device (Gait)  walker, front-wheeled  -EF      Distance in Feet (Gait)  80 x 2  -EF      Deviations/Abnormal Patterns (Gait)  stride length decreased;radha decreased  -EF      Bilateral Gait Deviations  forward flexed posture;heel strike decreased  -EF       Left Sided Gait Deviations  leans left  -EF      Right Sided Gait Deviations  weight shift ability decreased  -EF      Recorded by [EF] Andra Chin PT      Row Name 01/01/20 1007             Bathing Assessment/Treatment    Bathing [place] Level  bathing skills;contact guard assist;verbal cues  -CC      Bathing Position  supported sitting;supported standing  -CC      Bathing Setup Assistance  adjust water temperature;obtain supplies  -CC      Recorded by [CC] Keya Miranda OTR      Row Name 01/01/20 1007             Upper Body Dressing Assessment/Treatment    Upper Body Dressing Task  upper body dressing skills;doff;don;bra/undergarment;pull over garment;minimum assist (75% or more patient effort)  -CC      Upper Body Dressing Position  supported sitting  -CC      Set-up Assistance (Upper Body Dressing)  obtain clothing  -CC      Recorded by [CC] Keya Miranda OTR      Row Name 01/01/20 1007             Lower Body Dressing Assessment/Treatment    Lower Body Dressing [place] Level  doff;don;pants/bottoms;shoes/slippers;socks;clothes fastener management;verbal cues;minimum assist (75% patient effort)  -CC      Lower Body Dressing Position  supported sitting;supported standing  -CC      Lower Body Dressing Setup Assistance  obtain clothing  -CC      Comment (Lower Body Dressing)  assist w shoes  -CC      Recorded by [CC] Keya Miranda OTR      Row Name 01/01/20 1007             Grooming Assessment/Treatment    Grooming [place] Level  grooming skills;hair care, combing/brushing;oral care regimen;wash face, hands;supervision;verbal cues  -CC      Grooming Position  supported sitting  -CC      Grooming Setup Assistance  obtain supplies  -CC      Recorded by [CC] Keya Miranda OTR      Row Name 01/01/20 1007             Toileting Assessment/Treatment    Toileting [place] Level  toileting skills;adjust/manage clothing;perform perineal hygiene;verbal cues;contact guard assist  -CC       Assistive Device Use (Toileting)  grab bar/safety frame  -CC      Toileting Position  supported sitting;supported standing  -CC      Recorded by [CC] Keya Miranda OTR      Row Name 01/01/20 1155 01/01/20 1007          Pain Scale: Numbers Pre/Post-Treatment    Pain Scale: Numbers, Pretreatment  0/10 - no pain  -EF  0/10 - no pain  -CC     Pain Scale: Numbers, Post-Treatment  0/10 - no pain  -EF  0/10 - no pain  -CC     Recorded by [EF] Andra Chin, PT [CC] Keya Miranda OTR     Row Name 01/01/20 1155             Lower Extremity Seated Therapeutic Exercise    Performed, Seated Lower Extremity (Therapeutic Exercise)  hip flexion/extension;LAQ (long arc quad), knee extension;ankle dorsiflexion/plantarflexion  -EF      Exercise Type, Seated Lower Extremity (Therapeutic Exercise)  AROM (active range of motion)  -EF      Sets/Reps Detail, Seated Lower Extremity (Therapeutic Exercise)  1/10  -EF      Recorded by [EF] Andra Chin PT      Row Name 01/01/20 1155 01/01/20 1007          Positioning and Restraints    Pre-Treatment Position  in bed  -EF  in bed  -CC     Post Treatment Position  wheelchair  -EF  wheelchair  -CC     In Wheelchair  call light within reach;encouraged to call for assist;exit alarm on set up with tray table for lunch  -EF  sitting;with nsg  -CC     Recorded by [EF] Andra Chin, PT [CC] Keya Miranda OTR       User Key  (r) = Recorded By, (t) = Taken By, (c) = Cosigned By    Initials Name Effective Dates    CC Keya Miranda OTR 06/08/18 -     EF Andra Chin, PT 06/08/18 -         Wound 12/26/19 1639 Right groin Incision (Active)   Dressing Appearance dry;intact 1/1/2020  8:14 AM   Closure ESTEFANIA 1/1/2020  8:14 AM   Base dressing in place, unable to visualize 1/1/2020  8:14 AM           PT Recommendation and Plan                        Time Calculation:     PT Charges     Row Name 01/01/20 1201             Time Calculation    Start Time  1030  -EF      Stop  Time  1100  -EF      Time Calculation (min)  30 min  -EF      PT Received On  01/01/20  -EF      PT - Next Appointment  01/02/20  -EF         Time Calculation- PT    Total Timed Code Minutes- PT  30 minute(s)  -EF        User Key  (r) = Recorded By, (t) = Taken By, (c) = Cosigned By    Initials Name Provider Type    EF Andra Chin, PT Physical Therapist          Therapy Charges for Today     Code Description Service Date Service Provider Modifiers Qty    [id number]  PT THER PROC EA 15 MIN 1/1/2020 Andra Chin PT GP 2                   Andra Chin, PT  1/1/2020

## 2020-01-01 NOTE — PLAN OF CARE
Problem: Patient Care Overview  Goal: Plan of Care Review  Outcome: Ongoing (interventions implemented as appropriate)  Flowsheets  Taken 1/1/2020 1634 by Concha Moyer, RN  Outcome Summary: Family at bedside with patient. family brought patient food, blood glucose elevated this evening, no complaints of pain or distress.  Progress, Functional Goals: demonstrating adequate progress  Taken 1/1/2020 0435 by Afshin Vargas, RN  Plan of Care Reviewed With: patient  IRF Plan of Care Review: progress ongoing, continue

## 2020-01-01 NOTE — PROGRESS NOTES
Inpatient Rehabilitation Functional Measures Assessment and Plan of Care    Plan of Care  Updated Problems/Interventions  Mobility    [OT] Toilet Transfers(Active)  Current Status(01/01/2020): SBA/CGA  Weekly Goal(01/09/2020): SBA  Discharge Goal: SBA    [OT] Tub/Shower Transfers(Active)  Current Status(01/01/2020): CGA  Weekly Goal(01/08/2020): SBA  Discharge Goal: SBA        Self Care    [OT] Bathing(Active)  Current Status(01/01/2020): SBA  Weekly Goal(01/01/2020): SBA  Discharge Goal: SBA    [OT] Dressing (Lower)(Active)  Current Status(01/01/2020): Min  Weekly Goal(01/09/2020): SBA  Discharge Goal: SBA    [OT] Dressing (Upper)(Active)  Current Status(01/01/2020): Min  Weekly Goal(01/09/2020): SBA  Discharge Goal: Set up    [OT] Grooming(Active)  Current Status(01/01/2020): set up  Weekly Goal(01/08/2020): set up  Discharge Goal: set up/Mod I    [OT] Toileting(Active)  Current Status(01/01/2020): CGA  Weekly Goal(01/09/2020): SBA  Discharge Goal: SBA    Functional Measures  JALEEL Eating:  Branch  JALEEL Grooming: Branch  JALEEL Bathing:  Branch  JALEEL Upper Body Dressing:  Branch  JALEEL Lower Body Dressing:  Branch  JALEEL Toileting:  Branch    JALEEL Bladder Management  Level of Assistance:  Branch  Frequency/Number of Accidents this Shift:  Branch    JALEEL Bowel Management  Level of Assistance: Branch  Frequency/Number of Accidents this Shift: Branch    JALEEL Bed/Chair/Wheelchair Transfer:  Branch  JALEEL Toilet Transfer:  Branch  JALEEL Tub/Shower Transfer:  Branch    Previously Documented Mode of Locomotion at Discharge: Field  JALEEL Expected Mode of Locomotion at Discharge: Branch  JALEEL Walk/Wheelchair:  Branch  JALEEL Stairs:  Branch    JALEEL Comprehension:  Branch  JALEEL Expression:  Branch  JALEEL Social Interaction:  Branch  JALEEL Problem Solving:  Branch  JALEEL Memory:  Branch    Therapy Mode Minutes  Occupational Therapy: Branch  Physical Therapy: Branch  Speech Language Pathology:  Branch    Signed by: Keya Miranda OTR/L

## 2020-01-01 NOTE — PLAN OF CARE
Pt rested well this shift.  Concern of possible UTI- stating burning when urination.  No other symptoms.  Will notify dr in am.  Meds whole with water.  Zio patch in place.  R groin incision clean, dry, intact.

## 2020-01-01 NOTE — THERAPY TREATMENT NOTE
Inpatient Rehabilitation - Speech Language Pathology Treatment Note    [facility]       Patient Name: Liana Carrero  : 1935  MRN: [id number]    Today's Date: 2020           Admit Date: 2019      Visit Dx:        ICD-10-CM ICD-9-CM   1. Unsteadiness on feet R26.81 781.2   2. Difficulty walking R26.2 719.7   3. 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 (CMS/[facility]) I67.9 438.22    G81.94        Patient Active Problem List   Diagnosis   • Stroke (cerebrum) (CMS/[facility])   • Chest pain          Therapy Treatment    Evaluation/Coping         Vitals/Pain/Safety         Cognition/Communication         Oral Motor/Eating         Mobility/Basic Activities/Instrumental Activities/Motor/Modality                   ROM/MMT                   Sensory/Myotome/Dermatome/Edema               Posture/Balance/Special Tests/Exercise/Transportation/Sexual Function                   Orthotics/Residual Limb/Prosthetic Management              Outcome Summary         EDUCATION    The patient has been educated in the following areas:     Cognitive Impairment.    SLP Recommendation and Plan                                                          SLP GOALS     Row Name 20 0900 19 1400 19 1415       Memory Skills Goal 1 (SLP)    Progress (Memory Skills Goal 1, SLP)  --  70%;80%;independently (over 90% accuracy) 4 and 5 item category exclusion task  -SL  --    Progress/Outcomes (Memory Skills Goal 1, SLP)  goal ongoing  -KA  goal ongoing  -SL  --    Comment (Memory Skills Goal 1, SLP)  word list retention task with recalling four words and answer question in immediate recall task 100% without repetition with four words and 60% with 5 words without repetition and 90% with repetition   -KA  --  --       Organizational Skills Goal 1 (SLP)    Progress (Thought Organization Skills Goal 1, SLP)  --  --  0%;other (comment) sequencing 5 steps  -OC    Progress/Outcomes (Thought Organization Skills Goal  1, SLP)  --  --  good progress toward goal  -OC    Comment (Thought Organization Skills Goal 1, SLP)  --  --  able to accurately fill out a check  -OC       Reasoning Goal 1 (SLP)    Progress (Reasoning Goal 1, SLP)  --  40%;independently (over 90% accuracy) making inferences from short sotires  -SL  80%;with moderate cues (50-74%);other (comment) following written directions  -OC    Progress/Outcomes (Reasoning Goal 1, SLP)  --  goal ongoing  -SL  good progress toward goal  -OC       Functional Problem Solving Skills Goal 1 (SLP)    Improve Problem Solving Through Goal 1 (SLP)  --  sequence steps in a task;complete organization/home management task;use organization aids;90%;independently (over 90% accuracy)  -SL  --    Time Frame (Problem Solving Goal 1, SLP)  --  by discharge  -SL  --    Barriers (Problem Solving Goal 1, SLP)  --  73% for following simple 3 step written directives and marking info in 1 of 3 boxes appropriately  -SL  --    Progress (Problem Solving Goal 1, SLP)  --  40%;50%;with moderate cues (50-74%);with minimal cues (75-90%) sequencing of senetes/ phrases  -SL  --    Progress/Outcomes (Problem Solving Goal 1, SLP)  --  goal ongoing  -SL  --    Comment (Problem Solving Goal 1, SLP)  --  decreased working memory, attn to left reduced accuracy- mod cues required  -SL  --       Functional Math Skills Goal 1 (SLP)    Progress/Outcomes (Functional Math Skills Goal 1, SLP)  goal ongoing  -KA  --  --    Comment (Functional Math Skills Goal 1, SLP)  Counting change, pt impulsive during task not taking the time to attend to the left or identify the correct coin frequently guessing 10% without cues and cueing to take time slow down and look at each individual coin 80%  -KA  --  --       Right Hemisphere Function Goal 1 (SLP)    Barriers (Right Hemisphere Function Goal 1, SLP)  --  L/R chart- placing information in either left or right columns based on directivves- 67%  -SL  --    Progress (Right Hemisphere  Function Goal 1, SLP)  --  90%;with minimal cues (75-90%) scanning for targets in 4 rows  -SL  90%;50%;other (comment) 90% connecting/tracking numbers 50% with scanning to read  -OC    Progress/Outcomes (Right Hemisphere Function Goal 1, SLP)  goal ongoing  -KA  goal ongoing  -SL  goal ongoing  -OC    Comment (Right Hemisphere Function Goal 1, SLP)  Reading comprehension of short paragraph with scanning to the left 72% without cues and 100% with min cues  -KA  70% for scanning to left for reading of sentences and phrases in pm- visual cues and mod verbal cues required  -SL  90% scanning for letters, 65% scanning for letters scanning through a maze, required cues  -OC    Row Name 12/30/19 0830             Organizational Skills Goal 1 (SLP)    Progress (Thought Organization Skills Goal 1, SLP)  0%;independently (over 90% accuracy);other (comment);20%;with moderate cues (50-74%) following written directions  -OC      Progress/Outcomes (Thought Organization Skills Goal 1, SLP)  good progress toward goal  -OC         Reasoning Goal 1 (SLP)    Progress (Reasoning Goal 1, SLP)  80%;independently (over 90% accuracy);90%;with minimal cues (75-90%);other (comment) odd man out tasks (4 objects) cues to scan L for completion  -OC      Progress/Outcomes (Reasoning Goal 1, SLP)  good progress toward goal  -OC         Right Hemisphere Function Goal 1 (SLP)    Progress (Right Hemisphere Function Goal 1, SLP)  90%;independently (over 90% accuracy);other (comment) trail making A-Z  -OC      Progress/Outcomes (Right Hemisphere Function Goal 1, SLP)  goal ongoing  -OC        User Key  (r) = Recorded By, (t) = Taken By, (c) = Cosigned By    Initials Name Provider Type    OC Ce, NACHO Pederson,[facility]SLP Speech and Language Pathologist    Katelyn Fan, MS CCC-SLP Speech and Language Pathologist    Monico Riggs MA,[facility]SLP Speech and Language Pathologist                  Time Calculation:       Time Calculation- SLP     Row Name 01/01/20  0942             Time Calculation- SLP    SLP Start Time  0830  -      SLP Stop Time  0900  -      SLP Time Calculation (min)  30 min  -KA        User Key  (r) = Recorded By, (t) = Taken By, (c) = Cosigned By    Initials Name Provider Type    Monico Riggs MA,CCC-SLP Speech and Language Pathologist            Therapy Charges for Today     Code Description Service Date Service Provider Modifiers Qty    [id number]  ST DEV OF COGN SKILLS EACH 15 MIN 1/1/2020 Monico Davis MA,CCC-SLP  2                           Monico Davis MA,CCC-SLP  1/1/2020

## 2020-01-01 NOTE — PROGRESS NOTES
Inpatient Rehabilitation Functional Measures Assessment    Functional Measures  JALEEL Eating:  Branch  [facility] Grooming: [facility] Bathing:  [facility] Upper Body Dressing:  [facility] Lower Body Dressing:  [facility] Toileting:  [facility] Bladder Management  Level of Assistance:  [place]  Frequency/Number of Accidents this Shift:  [facility] Bowel Management  Level of Assistance: [place]  Frequency/Number of Accidents this Shift: [facility] Bed/Chair/Wheelchair Transfer:  [facility] Toilet Transfer:  [facility] Tub/Shower Transfer:  [place]    Previously Documented Mode of Locomotion at Discharge: Field  JALEEL Expected Mode of Locomotion at Discharge: [facility] Walk/Wheelchair:  [facility] Stairs:  [facility] Comprehension:  [facility] Expression:  [facility] Social Interaction:  [facility] Problem Solving:  [facility] Memory:  [place]    Therapy Mode Minutes  Occupational Therapy: [place]  Physical Therapy: Individual: 30 minutes.  Speech Language Pathology:  [place]    Signed by: Aleida Chin PT

## 2020-01-01 NOTE — PROGRESS NOTES
Inpatient Rehabilitation Plan of Care Note    Plan of Care  Care Plan Reviewed - Updates as Follows    Pain    [RN] Pain Management(Active)  Current Status(01/01/2020): Patient c/o intermittent back pain, and has been c/o  hemorrhoid pain freq  Weekly Goal(01/08/2020): pain managed  Discharge Goal: Pain better managed or free of pain    Performed Intervention(s)  pain meds/creams as needed  assess pain during rounding  Distraction/Relaxation      Psychosocial    [RN] Coping/Adjustment(Active)  Current Status(01/01/2020): Alert, oriented pleasant. Good family support.  Weekly Goal(01/08/2020): Will verbalize concerns regarding status & identify  progress.  Discharge Goal: Adequate coping regarding her life changes with ongoing support.    Performed Intervention(s)  calm enviroment  verbalizes needs/concerns  Support/Peer groups      Safety    [RN] Potential for Injury(Active)  Current Status(01/01/2020): Alert, oriented on admission. Reported by prior  hospital pt leans to left with ambulation.  Weekly Goal(01/08/2020): No unsafe behavior.  Discharge Goal: Pt/family aware of fall/safety in the home setting.    Performed Intervention(s)  Falls precautions/hourly rounds, & Items within reach  Bed/chair alarms, call bell within reach.  Environmental set-up to reduce risk      Sphincter Control    [RN] Bladder and bowel(Active)  Current Status(01/01/2020): Continent of bladder/bowel. does have frequency with  voiding.  Weekly Goal(01/08/2020): Continent 100% with adequate output.  Discharge Goal: Same as weekly    Performed Intervention(s)  monitor intake and output  medication    Signed by: Ирина Rose RN

## 2020-01-02 LAB
GLUCOSE BLDC GLUCOMTR-MCNC: 134 MG/DL (ref 70–130)
GLUCOSE BLDC GLUCOMTR-MCNC: 151 MG/DL (ref 70–130)
GLUCOSE BLDC GLUCOMTR-MCNC: 152 MG/DL (ref 70–130)
GLUCOSE BLDC GLUCOMTR-MCNC: 162 MG/DL (ref 70–130)

## 2020-01-02 PROCEDURE — 97535 SELF CARE MNGMENT TRAINING: CPT

## 2020-01-02 PROCEDURE — 97129 THER IVNTJ 1ST 15 MIN: CPT

## 2020-01-02 PROCEDURE — 97116 GAIT TRAINING THERAPY: CPT

## 2020-01-02 PROCEDURE — 97110 THERAPEUTIC EXERCISES: CPT

## 2020-01-02 PROCEDURE — 82962 GLUCOSE BLOOD TEST: CPT

## 2020-01-02 PROCEDURE — 97530 THERAPEUTIC ACTIVITIES: CPT

## 2020-01-02 PROCEDURE — 97112 NEUROMUSCULAR REEDUCATION: CPT

## 2020-01-02 PROCEDURE — 97130 THER IVNTJ EA ADDL 15 MIN: CPT

## 2020-01-02 PROCEDURE — 63710000001 INSULIN LISPRO (HUMAN) PER 5 UNITS: Performed by: PHYSICAL MEDICINE & REHABILITATION

## 2020-01-02 RX ADMIN — INSULIN LISPRO 2 UNITS: 100 INJECTION, SOLUTION INTRAVENOUS; SUBCUTANEOUS at 08:35

## 2020-01-02 RX ADMIN — INSULIN LISPRO 2 UNITS: 100 INJECTION, SOLUTION INTRAVENOUS; SUBCUTANEOUS at 21:17

## 2020-01-02 RX ADMIN — CLOPIDOGREL 75 MG: 75 TABLET, FILM COATED ORAL at 08:28

## 2020-01-02 RX ADMIN — DOCUSATE SODIUM 100 MG: 100 CAPSULE, LIQUID FILLED ORAL at 08:28

## 2020-01-02 RX ADMIN — LEVOTHYROXINE SODIUM 125 MCG: 125 TABLET ORAL at 05:36

## 2020-01-02 RX ADMIN — ASPIRIN 81 MG: 81 TABLET, COATED ORAL at 08:28

## 2020-01-02 RX ADMIN — ACETAMINOPHEN 650 MG: 325 TABLET, FILM COATED ORAL at 16:14

## 2020-01-02 RX ADMIN — DILTIAZEM HYDROCHLORIDE 180 MG: 180 CAPSULE, COATED, EXTENDED RELEASE ORAL at 08:28

## 2020-01-02 RX ADMIN — METOPROLOL TARTRATE 50 MG: 50 TABLET, FILM COATED ORAL at 21:17

## 2020-01-02 RX ADMIN — METOPROLOL TARTRATE 50 MG: 50 TABLET, FILM COATED ORAL at 08:29

## 2020-01-02 RX ADMIN — PANTOPRAZOLE SODIUM 40 MG: 40 TABLET, DELAYED RELEASE ORAL at 16:14

## 2020-01-02 RX ADMIN — POLYETHYLENE GLYCOL 3350 17 G: 17 POWDER, FOR SOLUTION ORAL at 14:27

## 2020-01-02 RX ADMIN — ERGOCALCIFEROL 50000 UNITS: 1.25 CAPSULE ORAL at 08:28

## 2020-01-02 RX ADMIN — INSULIN LISPRO 2 UNITS: 100 INJECTION, SOLUTION INTRAVENOUS; SUBCUTANEOUS at 11:52

## 2020-01-02 RX ADMIN — Medication 1000 MCG: at 08:28

## 2020-01-02 RX ADMIN — PANTOPRAZOLE SODIUM 40 MG: 40 TABLET, DELAYED RELEASE ORAL at 05:36

## 2020-01-02 RX ADMIN — TORSEMIDE 10 MG: 10 TABLET ORAL at 08:28

## 2020-01-02 RX ADMIN — DILTIAZEM HYDROCHLORIDE 180 MG: 180 CAPSULE, COATED, EXTENDED RELEASE ORAL at 21:17

## 2020-01-02 RX ADMIN — DOCUSATE SODIUM 100 MG: 100 CAPSULE, LIQUID FILLED ORAL at 21:17

## 2020-01-02 RX ADMIN — SENNOSIDES AND DOCUSATE SODIUM 1 TABLET: 8.6; 5 TABLET ORAL at 21:17

## 2020-01-02 NOTE — THERAPY TREATMENT NOTE
Inpatient Rehabilitation - Speech Language Pathology Treatment Note    [facility]       Patient Name: Liana Carrero  : 1935  MRN: [id number]    Today's Date: 2020           Admit Date: 2019      Visit Dx:        ICD-10-CM ICD-9-CM   1. Unsteadiness on feet R26.81 781.2   2. Difficulty walking R26.2 719.7   3. 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 (CMS/[facility]) I67.9 438.22    G81.94        Patient Active Problem List   Diagnosis   • Stroke (cerebrum) (CMS/[facility])   • Chest pain          Therapy Treatment    Evaluation/Coping    Evaluation/Treatment Time and Intent  Subjective Information: complains of, fatigue (20 1403 : Katelyn James, MS CCC-SLP)  Existing Precautions/Restrictions: fall (20 1403 : Katelyn James, MS CCC-SLP)  Document Type: therapy note (daily note) (20 1403 : Katelyn James, MS CCC-SLP)  Mode of Treatment: individual therapy, speech-language pathology (20 1403 : Katelyn James, MS CCC-SLP)  Patient/Family Observations: cooperative (20 1403 : Katelyn James, MS CCC-SLP)    Vitals/Pain/Safety         Cognition/Communication         Oral Motor/Eating         Mobility/Basic Activities/Instrumental Activities/Motor/Modality                   ROM/MMT                   Sensory/Myotome/Dermatome/Edema               Posture/Balance/Special Tests/Exercise/Transportation/Sexual Function                   Orthotics/Residual Limb/Prosthetic Management              Outcome Summary         EDUCATION    The patient has been educated in the following areas:     Cognitive Impairment.    SLP Recommendation and Plan                                                          SLP GOALS     Row Name 20 1400 20 0900 20 0900       Memory Skills Goal 1 (SLP)    Progress (Memory Skills Goal 1, SLP)  60%;independently (over 90% accuracy) recall of shrot stories presented auditorilly  -SL  --  --    Progress/Outcomes (Memory Skills Goal 1, SLP)  goal ongoing   -SL  --  goal ongoing  -KA    Comment (Memory Skills Goal 1, SLP)  --  --  word list retention task with recalling four words and answer question in immediate recall task 100% without repetition with four words and 60% with 5 words without repetition and 90% with repetition   -KA       Reasoning Goal 1 (SLP)    Progress (Reasoning Goal 1, SLP)  60%;independently (over 90% accuracy);with minimal cues (75-90%) fill in blank given definition and length of word  -SL  --  --    Progress/Outcomes (Reasoning Goal 1, SLP)  goal ongoing  -SL  --  --       Functional Problem Solving Skills Goal 1 (SLP)    Progress (Problem Solving Goal 1, SLP)  20%;independently (over 90% accuracy) answering ?'s re: bank statements  -SL  50%;independently (over 90% accuracy) answering ?'s re: calendar  -SL  --    Progress/Outcomes (Problem Solving Goal 1, SLP)  goal ongoing  -SL  goal ongoing  -SL  --    Comment (Problem Solving Goal 1, SLP)  poor attn to left side despite visual cues- mod cues needed to locate necessary information  -SL  impaired visual spatial/perceptual skills, attn to left caused errors in responses  -SL  --       Functional Math Skills Goal 1 (SLP)    Progress (Functional Math Skills Goal 1, SLP)  --  60%;with moderate cues (50-74%);with minimal cues (75-90%)  -SL  --    Progress/Outcomes (Functional Math Skills Goal 1, SLP)  --  goal ongoing  -SL  goal ongoing  -KA    Comment (Functional Math Skills Goal 1, SLP)  --  --  Counting change, pt impulsive during task not taking the time to attend to the left or identify the correct coin frequently guessing 10% without cues and cueing to take time slow down and look at each individual coin 80%  -KA       Right Hemisphere Function Goal 1 (SLP)    Progress (Right Hemisphere Function Goal 1, SLP)  50%;independently (over 90% accuracy) reading of phrases w/visual cue on left side  -SL  70%;with moderate cues (50-74%) attn to left for reading of short sent w/visual /verbal cues   -SL  --    Progress/Outcomes (Right Hemisphere Function Goal 1, SLP)  goal ongoing  -SL  goal ongoing  -SL  goal ongoing  -KA    Comment (Right Hemisphere Function Goal 1, SLP)  max cues needed for scanning and attn to left and visual perceptual skills during more complex task of reading page of info ( bank stmt) and answering questions- missed info on left, despite visual cues, and intermittently misread numbers  -SL  attn to left for check writing task-33% with visual cues on left side  -SL  Reading comprehension of short paragraph with scanning to the left 72% without cues and 100% with min cues  -KA    Row Name 12/31/19 1400 12/30/19 1415          Memory Skills Goal 1 (SLP)    Progress (Memory Skills Goal 1, SLP)  70%;80%;independently (over 90% accuracy) 4 and 5 item category exclusion task  -SL  --     Progress/Outcomes (Memory Skills Goal 1, SLP)  goal ongoing  -SL  --        Organizational Skills Goal 1 (SLP)    Progress (Thought Organization Skills Goal 1, SLP)  --  0%;other (comment) sequencing 5 steps  -OC     Progress/Outcomes (Thought Organization Skills Goal 1, SLP)  --  good progress toward goal  -OC     Comment (Thought Organization Skills Goal 1, SLP)  --  able to accurately fill out a check  -OC        Reasoning Goal 1 (SLP)    Progress (Reasoning Goal 1, SLP)  40%;independently (over 90% accuracy) making inferences from short sotires  -SL  80%;with moderate cues (50-74%);other (comment) following written directions  -OC     Progress/Outcomes (Reasoning Goal 1, SLP)  goal ongoing  -SL  good progress toward goal  -OC        Functional Problem Solving Skills Goal 1 (SLP)    Improve Problem Solving Through Goal 1 (SLP)  sequence steps in a task;complete organization/home management task;use organization aids;90%;independently (over 90% accuracy)  -SL  --     Time Frame (Problem Solving Goal 1, SLP)  by discharge  -SL  --     Barriers (Problem Solving Goal 1, SLP)  73% for following simple 3 step  written directives and marking info in 1 of 3 boxes appropriately  -SL  --     Progress (Problem Solving Goal 1, SLP)  40%;50%;with moderate cues (50-74%);with minimal cues (75-90%) sequencing of senetes/ phrases  -SL  --     Progress/Outcomes (Problem Solving Goal 1, SLP)  goal ongoing  -SL  --     Comment (Problem Solving Goal 1, SLP)  decreased working memory, attn to left reduced accuracy- mod cues required  -SL  --        Right Hemisphere Function Goal 1 (SLP)    Barriers (Right Hemisphere Function Goal 1, SLP)  L/R chart- placing information in either left or right columns based on directivves- 67%  -SL  --     Progress (Right Hemisphere Function Goal 1, SLP)  90%;with minimal cues (75-90%) scanning for targets in 4 rows  -SL  90%;50%;other (comment) 90% connecting/tracking numbers 50% with scanning to read  -OC     Progress/Outcomes (Right Hemisphere Function Goal 1, SLP)  goal ongoing  -SL  goal ongoing  -OC     Comment (Right Hemisphere Function Goal 1, SLP)  70% for scanning to left for reading of sentences and phrases in pm- visual cues and mod verbal cues required  -SL  90% scanning for letters, 65% scanning for letters scanning through a maze, required cues  -OC       User Key  (r) = Recorded By, (t) = Taken By, (c) = Cosigned By    Initials Name Provider Type    Dacia Clark MA,[facility]SLP Speech and Language Pathologist    Katelyn Fan MS CCC-SLP Speech and Language Pathologist    Monico Riggs MA,[facility]SLP Speech and Language Pathologist                  Time Calculation:       Time Calculation- SLP     Row Name 01/02/20 1413 01/02/20 1001 01/02/20 0845       Time Calculation- SLP    SLP Start Time  1330  -SL  0930  -SL  --    SLP Stop Time  1400  -SL  1000  -SL  --    SLP Time Calculation (min)  30 min  -SL  30 min  -SL  --    SLP Non-Billable Time (min)  --  --  15 min rounds  -      User Key  (r) = Recorded By, (t) = Taken By, (c) = Cosigned By    Initials Name Provider Type    RL James  MS Katelyn CCC-SLP Speech and Language Pathologist            Therapy Charges for Today     Code Description Service Date Service Provider Modifiers Qty    72260012229 HC ST DEV OF COGN SKILLS EACH 15 MIN 1/2/2020 Katelyn James MS CCC-SLP  2    05626974436 HC ST DEV OF COGN SKILLS EACH 15 MIN 1/2/2020 Katelyn James MS CCC-SLP  2                           Katelyn James MS CCC-SLP  1/2/2020

## 2020-01-02 NOTE — PROGRESS NOTES
Case Management  Inpatient Rehabilitation Team Conference    Conference Date/Time: 1/2/2020 7:36:38 AM    Team Conference Attendees:  Nevaeh Zavala, Pharmacist  ALDO Tripp, PT  Keya Miranda, VICKI James, JOSH Solano, CTRS  Angela Gonzalez RD, LD  Loco Cortes, RN  Concha Moyer, MEHRDAD    Demographics            Age: 84Y            Gender: Female    Admission Date: 12/17/2019 5:01:00 PM  Rehabilitation Diagnosis:  CVA left jon  Past Medical History: Past Medical History:  DiagnosisDate  ?Arthritis?  ?CHF (congestive heart failure) (CMS/HCC)?  ?Diabetes mellitus (CMS/HCC)?  ?new DX related to stroke, started on insulin at hospital  ?Disease of thyroid gland?  ?Elevated cholesterol?  ?GERD (gastroesophageal reflux disease)?  ?Hypertension?  ?Sleep apnea?  ?Stroke (CMS/HCC)?    Coronary disease.  Myocardial infarction.  CABG 2000.  Stent 2015.  Stroke 2014  without residual deficit.  Thyroidectomy.  Parathyroid gland surgery.  Hypothyroidism.  Renal insufficiency.  Surgical?History  Past Surgical History:  ProcedureLateralityDate  ?APPENDECTOMY??  ?CARDIAC CATHETERIZATION??  ? x2  ?COLON SURGERY??  ?COLONOSCOPY??  ?EYE SURGERY??  ?lens implants and cataract surgery  ?HYSTERECTOMY??  ?VASCULAR SURGERY??  ?varicous veins    ?      Plan of Care  Anticipated Discharge Date/Estimated Length of Stay: ELOS: DC 1/2  Anticipated Discharge Destination: Community discharge with assistance  Discharge Plan : Patient lives in one story home with small concrete ramp to  enter. Grandson stays some with patient.  Family conference held with patient's son and daughter on 12/27. Patient was on  LISA from rehab.  D/C plan is home with daughterBeatriz. Patient will be on first floor of home.  Daughter plans to take FMLA. Adult grandchildren will assist as needed.  Medical Necessity Expected Level Rationale: CGA with outpatient therapies  Rehab prognosis: fair  Medical  prognosis: indeterminate  Intensity and Duration: an average of 3 hours/5 days per week  Medical Supervision and 24 Hour Rehab Nursing: x  Physical Therapy: x  PT Intensity/Duration: 60 minutes/day, 5 days/week for approximately 15 - 20  days  Occupational Therapy: x  OT Intensity/Duration: 60 minutes/day, 5 days/week for approximately 15 - 20  days  Speech and Language Therapy: x  SLP Intensity/Duration: 60 minutes/day, 5 days/week for approximately 15 - 20  days  Social Work: x  Therapeutic Recreation: x  Psychology: x  Updated (if changes indicated)    Anticipated Discharge Date/Estimated Length of Stay:   ELOS: DC 1/8    Based on the patient's medical and functional status, their prognosis and  expected level of functional improvement is: CGA/standby with outpatient  therapies  Rehab prognosis: fair  Medical prognosis: indeterminate      Interdisciplinary Problem/Goals/Status    All Rehab Problems:  Cognition    [ST] Executive Functions(Active)  Current Status(12/31/2019): moderate deficits with attention, L neglect, memory,  visual spatial skills, and executive functioning.  Weekly Goal(01/08/2020): attend to L during ADL tasks  Discharge Goal: Pt will exhibit functional cognition for  home with  assist.        Mobility    [PT] Stairs(Active)  Current Status(01/01/2020): 4, Min/CGA, handrails  Weekly Goal(01/08/2020): PT only  Discharge Goal: 4, CGA, handrails    [PT] Walk(Active)  Current Status(12/31/2019): 80'-160' Min A, RWX  Weekly Goal(01/08/2020): CGA to BR, RWX  Discharge Goal: 160' CGA, RWX    [PT] Bed/Chair/Wheelchair(Active)  Current Status(01/01/2020): CGA  Weekly Goal(01/08/2020): SBA  Discharge Goal: SBA    [PT] Bed Mobility(Active)  Current Status(01/01/2020): Supervision  Weekly Goal(01/08/2020): Indep.  Discharge Goal: Indep.    [OT] Toilet Transfers(Active)  Current Status(01/01/2020): SBA/CGA  Weekly Goal(01/09/2020): SBA  Discharge Goal: SBA    [OT] Tub/Shower Transfers(Active)  Current  Status(01/01/2020): CGA  Weekly Goal(01/08/2020): SBA  Discharge Goal: SBA        Pain    [RN] Pain Management(Active)  Current Status(01/01/2020): Patient c/o intermittent back pain, and has been c/o  hemorrhoid pain freq  Weekly Goal(01/08/2020): pain managed  Discharge Goal: Pain better managed or free of pain        Psychosocial    [RN] Coping/Adjustment(Active)  Current Status(01/01/2020): Alert, oriented pleasant. Good family support.  Weekly Goal(01/08/2020): Will verbalize concerns regarding status & identify  progress.  Discharge Goal: Adequate coping regarding her life changes with ongoing support.        Safety    [RN] Potential for Injury(Active)  Current Status(01/01/2020): Alert, oriented on admission. Reported by prior  hospital pt leans to left with ambulation.  Weekly Goal(01/08/2020): No unsafe behavior.  Discharge Goal: Pt/family aware of fall/safety in the home setting.        Self Care    [OT] Bathing(Active)  Current Status(01/01/2020): SBA  Weekly Goal(01/01/2020): SBA  Discharge Goal: SBA    [OT] Dressing (Lower)(Active)  Current Status(01/01/2020): Min  Weekly Goal(01/09/2020): SBA  Discharge Goal: SBA    [OT] Dressing (Upper)(Active)  Current Status(01/01/2020): Min  Weekly Goal(01/09/2020): SBA  Discharge Goal: Set up    [OT] Grooming(Active)  Current Status(01/01/2020): set up  Weekly Goal(01/08/2020): set up  Discharge Goal: set up/Mod I    [OT] Toileting(Active)  Current Status(01/01/2020): CGA  Weekly Goal(01/09/2020): SBA  Discharge Goal: SBA        Sphincter Control    [RN] Bladder and bowel(Active)  Current Status(01/01/2020): Continent of bladder/bowel. does have frequency with  voiding.  Weekly Goal(01/08/2020): Continent 100% with adequate output.  Discharge Goal: Same as weekly        Comments: 12/19: rapid response yesterday for angina, troponin SNL, cardiology  assessed - likely to need cath eventually; good movement LLE; significant left  visual field cut/neglect noted by  staff;    1/2: transferred back to acute care for  cardiac issues on 12/26, returned to  rehab 12/27; left inattention/neglect;    Signed by: Loco Cortes RN    Physician CoSigned By: Azael Abdullahi 01/02/2020 08:50:40

## 2020-01-02 NOTE — PROGRESS NOTES
Inpatient Rehabilitation Plan of Care Note    Plan of Care  Care Plan Reviewed - No updates at this time.    Psychosocial    Performed Intervention(s)  calm enviroment  verbalizes needs/concerns  Support/Peer groups  Therapeutic environmental set-up      Safety    Performed Intervention(s)  Falls precautions/hourly rounds, & Items within reach  Bed/chair alarms, call bell within reach.  Environmental set-up to reduce risk      Sphincter Control    Performed Intervention(s)  monitor intake and output  medication      Pain    Performed Intervention(s)  pain meds/creams as needed  assess pain during rounding  Distraction/Relaxation    Signed by: Concha Moyer RN

## 2020-01-02 NOTE — PROGRESS NOTES
Occupational Therapy: [place]    Physical Therapy: Individual: 60 minutes.    Speech Language Pathology:  [place]    Signed by: Kayla Uribe DPT

## 2020-01-02 NOTE — THERAPY TREATMENT NOTE
Inpatient Rehabilitation - Occupational Therapy Treatment Note    Psychiatric     Patient Name: Liana Carrero  : 1935  MRN: [id number]    Today's Date: 2020                 Admit Date: 2019      Visit Dx:    ICD-10-CM ICD-9-CM   1. Unsteadiness on feet R26.81 781.2   2. Difficulty walking R26.2 719.7   3. 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 (CMS/HCC) I67.9 438.22    G81.94        Patient Active Problem List   Diagnosis   • Stroke (cerebrum) (CMS/[facility])   • Chest pain         Therapy Treatment    IRF Treatment Summary     Row Name 20 1500 20 1403 20 1015       Evaluation/Treatment Time and Intent    Subjective Information  complains of;fatigue  -CC  complains of;fatigue  -SL  no complaints  -EE    Existing Precautions/Restrictions  fall  -CC  fall  -SL  fall  -EE    Document Type  therapy note (daily note)  -CC  therapy note (daily note)  -SL  therapy note (daily note)  -EE    Mode of Treatment  occupational therapy  -CC  individual therapy;speech-language pathology  -SL  physical therapy  -EE    Patient/Family Observations  --  cooperative  -SL  Pt sitting up in WC in am, supine in bed in pm in no acute distress.   -EE    Recorded by [CC] Keya Miranda, VICKIR [SL] Katelyn James MS CCC-SLP [EE] Kayla Uribe, PT    Row Name 20 0941             Evaluation/Treatment Time and Intent    Subjective Information  no complaints  -SL      Existing Precautions/Restrictions  fall  -SL      Document Type  therapy note (daily note)  -SL      Mode of Treatment  individual therapy;speech-language pathology  -SL      Patient/Family Observations  pt was in bed when slp arrived- encouraged to get up and participate in tx; upset re: extension of rehab stay  -SL      Recorded by [SL] Katelyn James MS CCC-SLP      Row Name 20 1500 20 1015          Cognition/Psychosocial- PT/OT    Affect/Mental Status (Cognitive)  WFL  -CC  --     Orientation Status (Cognition)   oriented x 4  -CC  oriented x 4  -EE     Follows Commands (Cognition)  --  follows one step commands;over 90% accuracy;repetition of directions required;verbal cues/prompting required;increased processing time needed  -EE     Personal Safety Interventions  fall prevention program maintained;gait belt;nonskid shoes/slippers when out of bed  -CC  fall prevention program maintained;gait belt;muscle strengthening facilitated;nonskid shoes/slippers when out of bed;supervised activity  -EE     Cognitive Function (Cognitive)  --  safety deficit  -EE     Safety Deficit (Cognitive)  --  awareness of need for assistance;insight into deficits/self awareness  -EE     Recorded by [CC] Keya Miranda OTR [EE] Kayla Uribe, PT     Row Name 01/02/20 1500 01/02/20 1015          Bed Mobility Assessment/Treatment    Supine-Sit [place] (Bed Mobility)  conditional independence  -CC  conditional independence  -EE     Assistive Device (Bed Mobility)  --  bed rails  -EE     Recorded by [CC] Keya Miranda OTR [EE] Kayla Uribe, PT     Row Name 01/02/20 1500             Functional Mobility    Functional Mobility- Ind. Level  contact guard assist  -CC      Functional Mobility- Device  rolling walker  -CC      Functional Mobility-Distance (Feet)  -- to BR  -CC      Recorded by [CC] Keya Miranda OTR      Row Name 01/02/20 1015             Bed-Chair Transfer    Bed-Chair [place] (Transfers)  contact guard;stand by assist;verbal cues  -EE      Assistive Device (Bed-Chair Transfers)  wheelchair  -EE      Recorded by [EE] Kayla Uribe, PT      Row Name 01/02/20 1500 01/02/20 1015          Sit-Stand Transfer    Sit-Stand [place] (Transfers)  contact guard;stand by assist;verbal cues  -CC  contact guard;stand by assist;verbal cues  -EE     Assistive Device (Sit-Stand Transfers)  walker, front-wheeled  -CC  walker, front-wheeled  -EE     Recorded by [CC] Keya Miranda OTR [EE] Kayla Uribe, PT     Row Name 01/02/20 1500  01/02/20 1015          Stand-Sit Transfer    Stand-Sit [place] (Transfers)  contact guard;verbal cues  -CC  contact guard;verbal cues  -EE     Assistive Device (Stand-Sit Transfers)  walker, front-wheeled  -CC  walker, front-wheeled  -EE     Recorded by [CC] Keya Miranda OTR [EE] Kayla Uribe, PT     Row Name 01/02/20 1500             Toilet Transfer    Type (Toilet Transfer)  stand pivot/stand step  -CC      [place] Level (Toilet Transfer)  contact guard;minimum assist (75% patient effort)  -CC      Assistive Device (Toilet Transfer)  walker, front-wheeled  -CC      Recorded by [CC] Keya Miranda OTR      Row Name 01/02/20 1015             Gait/Stairs Assessment/Training    [place] Level (Gait)  contact guard;minimum assist (75% patient effort);verbal cues  -EE      Assistive Device (Gait)  walker, front-wheeled  -EE      Distance in Feet (Gait)  160', 80' x 3  -EE      Pattern (Gait)  step-through  -EE      Deviations/Abnormal Patterns (Gait)  stride length decreased;radha decreased  -EE      Bilateral Gait Deviations  forward flexed posture;heel strike decreased  -EE      Left Sided Gait Deviations  leans left  -EE      Right Sided Gait Deviations  weight shift ability decreased  -EE      [place] Level (Stairs)  contact guard;minimum assist (75% patient effort);verbal cues  -EE      Handrail Location (Stairs)  both sides  -EE      Number of Steps (Stairs)  8  -EE      Ascending Technique (Stairs)  step-over-step  -EE      Descending Technique (Stairs)  step-to-step  -EE      Stairs, Safety Issues  balance decreased during turns;weight-shifting ability decreased  -EE      Comment (Gait/Stairs)  L lean worsened with fatigue. Verbal cues to stay close to walker for safety. Performed visual scanning activity to locate objects while ambulating through gym.   -EE      Recorded by [EE] Kayla Uribe, PT      Row Name 01/02/20 1015             Curb Negotiation (Mobility)    [place],  Curb Negotiation  minimal assist, 75% or more patient effort;contact guard;verbal cues  -EE      Comment, [place] Negotiation (Mobility)  with rwx; verbal cues for sequencing  -EE      Recorded by [EE] Kayla Uribe PT      Row Name 01/02/20 1500             Bathing Assessment/Treatment    Bathing [place] Level  bathing skills;supervision;verbal cues  -CC      Bathing Position  supported sitting;supported standing  -CC      Bathing Setup Assistance  adjust water temperature;obtain supplies  -CC      Recorded by [CC] Keya Miranda OTR      Row Name 01/02/20 1500             Upper Body Dressing Assessment/Treatment    Upper Body Dressing Task  upper body dressing skills;doff;don;bra/undergarment;pull over garment;clothes fastener management;minimum assist (75% or more patient effort)  -CC      Upper Body Dressing Position  supported sitting  -CC      Set-up Assistance (Upper Body Dressing)  obtain clothing  -CC      Comment (Upper Body Dressing)  asssit w hooking bra  -CC      Recorded by [CC] Keya Miranda OTR      Row Name 01/02/20 1500             Lower Body Dressing Assessment/Treatment    Lower Body Dressing [place] Level  doff;don;pants/bottoms;socks;clothes fastener management;supervision;verbal cues  -CC      Lower Body Dressing Position  supported sitting;supported standing  -CC      Lower Body Dressing Setup Assistance  obtain clothing  -CC      Recorded by [CC] Keya Miranda OTR      Row Name 01/02/20 1500             Grooming Assessment/Treatment    Grooming [place] Level  grooming skills;hair care, combing/brushing;oral care regimen;wash face, hands;supervision;verbal cues  -CC      Grooming Position  supported sitting  -CC      Grooming Setup Assistance  obtain supplies  -CC      Recorded by [CC] Keya Miranda OTR      Row Name 01/02/20 1500             Toileting Assessment/Treatment    Toileting [place] Level  toileting skills;adjust/manage clothing;perform perineal  hygiene;supervision;verbal cues  -CC      Toileting Position  supported sitting;supported standing  -CC      Recorded by [CC] Keya Miranda OTR      Row Name 01/02/20 1500 01/02/20 1015          Pain Scale: Numbers Pre/Post-Treatment    Pain Scale: Numbers, Pretreatment  3/10  -CC  3/10  -EE     Pain Scale: Numbers, Post-Treatment  3/10  -CC  3/10  -EE     Pain Location - Side  Bilateral  -CC  Bilateral  -EE     Pain Location - Orientation  lower  -CC  lower  -EE     Pain Location  back  -CC  back  -EE     Pain Intervention(s)  Heat applied;Repositioned  -CC  Repositioned;Medication (See MAR);Ambulation/increased activity  -EE     Recorded by [CC] Keya Miranda OTR [EE] Kayla Uribe PT     Row Name 01/02/20 1500             Static Standing Balance    Level of [place] (Supported Standing, Static Balance)  supervision;contact guard assist  -CC      Time Able to Maintain Position (Supported Standing, Static Balance)  more than 5 minutes  -CC      Assistive Device Utilized (Supported Standing, Static Balance)  -- at counter while engaging in visual and FMC task  -CC      Recorded by [CC] Keya Miranda OTR      Row Name 01/02/20 1015             Lower Extremity Seated Therapeutic Exercise    Performed, Seated Lower Extremity (Therapeutic Exercise)  hip abduction/adduction;knee flexion/extension;LAQ (long arc quad), knee extension;ankle dorsiflexion/plantarflexion  -EE      Device, Seated Lower Extremity (Therapeutic Exercise)  elastic bands/tubing;free weights, cuff 1# weight, yellow theraband  -EE      Exercise Type, Seated Lower Extremity (Therapeutic Exercise)  AROM (active range of motion);resistive exercise  -EE      Expected Outcomes, Seated Lower Extremity (Therapeutic Exercise)  improve functional stability;improve performance, gait skills;improve performance, transfer skills  -EE      Sets/Reps Detail, Seated Lower Extremity (Therapeutic Exercise)  1/15  -EE      Recorded by [EE] Kayla Uribe,  PT      Row Name 01/02/20 1500 01/02/20 1015          Positioning and Restraints    Pre-Treatment Position  sitting in chair/recliner  -CC  sitting in chair/recliner  -EE     Post Treatment Position  wheelchair  -CC  wheelchair  -EE     In Wheelchair  sitting;call light within reach;with SLP  -CC  sitting;exit alarm on;with OT;with family/caregiver  -EE     Recorded by [CC] Keya Miranda OTR [EE] Kayla Uribe, RADHA     Row Name 01/02/20 1500             Daily Summary of Progress (OT)    Functional Goal Overall Progress: Occupational Therapy  progressing toward functional goals as expected  -CC      Recorded by [CC] Keya Miranda OTR        User Key  (r) = Recorded By, (t) = Taken By, (c) = Cosigned By    Initials Name Effective Dates    CC Keya Miranda OTR 06/08/18 -     SL Katelyn James, MS CCC-SLP 06/08/18 -     EE Kayla Uribe, PT 04/03/18 -                  OT Recommendation and Plan    Anticipated Discharge Disposition (OT): home with assist       Daily Summary of Progress (OT)  Functional Goal Overall Progress: Occupational Therapy: progressing toward functional goals as expected    OT IRF GOALS     Row Name 01/01/20 1000 12/23/19 1500          Bathing Goal 1 (OT-IRF)    Progress/Outcomes (Bathing Goal 1, OT-IRF)  --  goal met  -CC        LB Dressing Goal 1 (OT-IRF)    Activity/Device (LB Dressing Goal 1, OT-IRF)  lower body dressing  -CC  --     [place] (LB Dressing Goal 1, OT-IRF)  supervision required  -CC  --     Time Frame (LB Dressing Goal 1, OT-IRF)  long term goal (LTG);by discharge  -CC  --     Progress/Outcomes (LB Dressing Goal 1, OT-IRF)  continuing progress toward goal  -CC  continuing progress toward goal  -CC        LB Dressing Goal 2 (OT-IRF)    Progress/Outcomes (LB Dressing Goal 2, OT-IRF)  --  continuing progress toward goal  -CC        Toileting Goal 1 (OT-IRF)    Progress/Outcomes (Toileting Goal 1, OT-IRF)  --  goal met  -CC        Toileting Goal 2 (OT-IRF)    Activity/Device  (Toileting Goal 2, OT-IRF)  toileting skills, all  -CC  --     [place] Level (Toileting Goal 2, OT-IRF)  supervision required  -CC  --     Time Frame (Toileting Goal 2, OT-IRF)  long term goal (LTG);by discharge  -CC  --     Progress/Outcomes (Toileting Goal 2, OT-IRF)  goal partially met  -CC  continuing progress toward goal  -CC        Strength Goal 1 (OT-IRF)    Strength Goal 1 (OT-IRF)  incare[place] LUE strength  -CC  --     Time Frame (Strength Goal 1, OT-IRF)  long term goal (LTG);by discharge  -CC  --     Progress/Outcomes (Strength Goal 1, OT-IRF)  continuing progress toward goal  -CC  continuing progress toward goal  -CC        Caregiver Training Goal 1 (OT-IRF)    Caregiver Training Goal 1 (OT-IRF)  pt and family I with HEP  -CC  --     Time Frame (Caregiver Training Goal 1, OT-IRF)  long term goal (LTG);by discharge  -CC  --     Progress/Outcomes (Caregiver Training Goal 1, OT-IRF)  continuing progress toward goal  -CC  continuing progress toward goal  -CC       User Key  (r) = Recorded By, (t) = Taken By, (c) = Cosigned By    Initials Name Provider Type    Keya Zuñiga OTR Occupational Therapist                     Time Calculation:     Time Calculation- OT     Row Name 01/02/20 1300 01/02/20 1100          Time Calculation- OT    OT Start Time  1300  -CC  1100  -CC     OT Stop Time  1330  -CC  1145  -CC     OT Time Calculation (min)  30 min  -CC  45 min  -CC       User Key  (r) = Recorded By, (t) = Taken By, (c) = Cosigned By    Initials Name Provider Type    Keya Zuñiga OTR Occupational Therapist          Therapy Charges for Today     Code Description Service Date Service Provider Modifiers Qty    10288402435 HC OT SELF CARE/MGMT/TRAIN EA 15 MIN 1/1/2020 Keya Miranda OTR GO 2    94197110993 HC OT SELF CARE/MGMT/TRAIN EA 15 MIN 1/2/2020 Keya Miranda OTR GO 3    46175287020 HC OT NEUROMUSC RE EDUCATION EA 15 MIN 1/2/2020 Keya Miranda OTR GO 1    07815077048 HC OT  THERAPEUTIC ACT EA 15 MIN 1/2/2020 Keya Miranda, OTR GO 1                   GIAN Blackwell  1/2/2020

## 2020-01-02 NOTE — PROGRESS NOTES
Inpatient Rehabilitation Functional Measures Assessment and Plan of Care    Plan of Care  Updated Problems/Interventions  Field    Functional Measures  JALEEL Eating:  [facility] Grooming: [facility] Bathing:  [facility] Upper Body Dressing:  [facility] Lower Body Dressing:  [facility] Toileting:  [facility] Bladder Management  Level of Assistance:  [place]  Frequency/Number of Accidents this Shift:  [facility] Bowel Management  Level of Assistance: [place]  Frequency/Number of Accidents this Shift: [facility] Bed/Chair/Wheelchair Transfer:  [facility] Toilet Transfer:  [facility] Tub/Shower Transfer:  [place]    Previously Documented Mode of Locomotion at Discharge: Field  JALEEL Expected Mode of Locomotion at Discharge: [facility] Walk/Wheelchair:  [facility] Stairs:  [facility] Comprehension:  [facility] Expression:  [facility] Social Interaction:  [facility] Problem Solving:  [facility] Memory:  [place]    Therapy Mode Minutes  Occupational Therapy: Individual: 75 minutes.  Physical Therapy: [place]  Speech Language Pathology:  [place]    Signed by: GIAN Blackwell/TALIB

## 2020-01-02 NOTE — PROGRESS NOTES
"   LOS: 16 days   Patient Care Team:  Fracnisco Gallagher MD as PCP - General (Family Medicine)    Chief Complaint:   Status post right CVA with TPA/petechial hemorrhage  Stroke prophylaxis-Aggrenox/Plavix  Left hemiparesis/left visual field deficit  Coronary artery disease  Hyperlipidemia - alirocumab  Hypothyroidism   Hypertension  Chronic renal insufficiency  DVT prophylaxis-SCDs    Subjective     History of Present Illness    Subjective  Patient continues with left visual field cut.  Strength and coordination improved on the left side.  Still needs cues to attend to the left side.  No chest pain or shortness of air.  Tolerating therapies.    History taken from: patient    Objective     Vital Signs  Temp:  [97.3 °F (36.3 °C)-98.6 °F (37 °C)] 97.3 °F (36.3 °C)  Heart Rate:  [70-81] 77  Resp:  [18] 18  BP: (112-130)/(68-81) 115/70    Objective:  Vital signs: (most recent): Blood pressure 115/70, pulse 77, temperature 97.3 °F (36.3 °C), temperature source Oral, resp. rate 18, height 157.5 cm (62\"), weight 76.9 kg (169 lb 8.5 oz), SpO2 98 %, not currently breastfeeding.            Physical Exam  MENTAL STATUS -  AWAKE / ALERT  HEENT- NCAT,  , SCLERA NON-ICTERIC, CONJUNCTIVA PINK,   NO JVD   LUNGS - CTA, NO WHEEZES, RALES OR RHONCHI  Chest-ZIO Patch  HEART- RRR, NO RUB, MURMUR, OR GALLOP  ABD - NORMOACTIVE BOWEL SOUNDS, SOFT, NT.  Bladder not readily palpable.  EXT - NO EDEMA OR CYANOSIS  NEURO -oriented x4.  Good historian.  Left visual field deficit.  No dysarthria.  Speech fluent.     MOTOR EXAM - RUE/RLE 5/5. LUE/LLE 5-/5.    Improved motor control on the left side  Results Review:     I reviewed the patient's new clinical results.  Lab Results   Lab Value Date/Time    TROPONINT <0.010 12/26/2019 1000    TROPONINT <0.010 12/18/2019 1902    TROPONINT <0.010 12/18/2019 1102    TROPONINT <0.010 12/18/2019 0853    TROPONINI 0.012 12/12/2019 0258    TROPONINI 0.012 12/11/2019 1613    TROPONINI <0.012 04/19/2019 2330    " TROPONINI <0.012 04/19/2019 1844    TROPONINI <0.012 04/19/2019 1421     Results from last 7 days   Lab Units 12/30/19  0527 12/29/19  2230 12/29/19  0652 12/28/19  0602   SODIUM mmol/L 140  --  142 142   POTASSIUM mmol/L 4.5 4.9 3.0* 3.5   CHLORIDE mmol/L 104  --  102 100   CO2 mmol/L 25.5  --  26.2 25.5   BUN mg/dL 25*  --  32* 44*   CREATININE mg/dL 1.00  --  1.06* 1.28*   CALCIUM mg/dL 8.8  --  8.8 8.7   GLUCOSE mg/dL 146*  --  146* 160*     Results from last 7 days   Lab Units 12/30/19 0527 12/26/19  1000   WBC 10*3/mm3 7.14 7.60   HEMOGLOBIN g/dL 9.6* 9.2*   HEMATOCRIT % 29.7* 29.2*   PLATELETS 10*3/mm3 285 296      Ref. Range 12/18/2019 08:53 12/18/2019 11:02   TSH Baseline Latest Ref Range: 0.270 - 4.200 uIU/mL 3.290     Vitamin B-12 Latest Ref Range: 211 - 946 pg/mL   339   25 Hydroxy, Vitamin D Latest Ref Range: 30.0 - 100.0 ng/ml   18.7 (L)       Medication Review: DONE  Scheduled Meds:    Alirocumab 75 mg Subcutaneous Q14 Days   aspirin 81 mg Oral Daily   clopidogrel 75 mg Oral Daily   dilTIAZem  mg Oral BID   docusate sodium 100 mg Oral BID   fluticasone 1 spray Each Nare Daily   insulin lispro 0-7 Units Subcutaneous 4x Daily With Meals & Nightly   levothyroxine 125 mcg Oral Q AM   metoprolol tartrate 50 mg Oral BID   pantoprazole 40 mg Oral BID AC   senna-docusate sodium 1 tablet Oral Nightly   torsemide 10 mg Oral Daily   vitamin B-12 1,000 mcg Oral Daily   vitamin D 50,000 Units Oral Q7 Days     Continuous Infusions:   PRN Meds:.•  acetaminophen  •  butalbital-acetaminophen-caffeine  •  dextrose  •  dextrose  •  glucagon (human recombinant)  •  nitroglycerin  •  phenylephrine  •  polyethylene glycol  •  potassium chloride  •  potassium chloride        Assessment/Plan       Stroke (cerebrum) (CMS/HCC)      Assessment & Plan  Status post right CVA with TPA/petechial hemorrhage  Stroke prophylaxis-Aggrenox/Plavix changed to aspirin/Plavix after the cardiac cath on December 26  [place] diallo  place.     Left hemiparesis/left visual field deficit  Impaired mobility/self-care     Adjunctive medications post stroke-on levothyroxine with TSH within normal limits.  B12 within normal limits.  Vitamin D deficiency-associated with stroke/stroke recovery-  will add ergocalciferol 50,000 units weekly.     Coronary artery disease -history of CABG/stent.    December 19-troponins negative yesterday.  No chest pain today.  Tolerating therapies.  December 26-Patient with episode of crushing chest pain this morning into her right neck.  Relieved with nitroglycerin x3.  Transferred to monitored bed on 5 S.  Seen by cardiology with plans for cardiac cath this afternoon pending input from nephrology regarding Contrast and neurology regarding antiplatelet options.   December 27-She did have Solu-Medrol per renal with hyperglycemia.  She had left heart catheterization which showed patent grafts.  She had a radial arterial graft to the mid LAD which had 50-60% stenosis with 2 stents and DUY-3 flow.  She had normal left ventricular filling pressures and there was no intervention.  Medical management was recommended.  Change to aspirin/Plavix    Hyperlipidemia - alirocumab     Hypothyroidism-levothyroxine     Hypertension-on Cardizem and metoprolol.  Aldactone and lisinopril held after recent acute on chronic renal insufficiency.  On lower dose torsemide.     Chronic renal insufficiency     DVT prophylaxis-SCDs    Chronic low back pain-off nonsteroidal anti-inflammatory(Voltaren) .  Will try K pad.  May add Celebrex at later point, as has been shown not to raise risk of secondary coronary/cerebrovascular event.     Glucose intolerance-December 31-her blood sugar still tend to run higher after pre-IV steroids for contrast December 26.  On December 12 hemoglobin A1c equals 6.1.  She received 5 units of insulin on sliding scale yesterday.  Will add consistent carbohydrate to diet restriction.        Now admit for comprehensive  acute inpatient rehabilitation .  This would be an interdisciplinary program with physical therapy 1 hour,  occupational therapy 1 hour, and speech therapy 1 hour, 5 days a week.  Rehabilitation nursing for carryover, monitoring of cardiac and neurologic   status, bowel and bladder, and skin  Ongoing physician follow-up.  Weekly team conferences.  Goals are to achieve a level of contact-guard with  mobility and self-care and improved visual-spatial.   Rehabilitation prognosis fair.  Medical prognosis indeterminate.  Estimated length of stay is approximately 2 weeks, but is only an estimation.   The patient's functional status and clinical status is unchanged from preadmission assessment and the patient continues appropriate for acute inpatient rehabilitation.  Goal is for home with outpatient   therapies.  Barrier to discharge: Impaired strength and mobility- work on transfers ambulation and self-care to overcome.         TEAM CONF- DEC 19 - TRANSFERS MIN. BED CTG. 40 FEET MIN RW. ADLS MIN ASSIST. LEFT VISUAL FIELD DEFICIT. LEFT INATTENTION  ELOS- 2 WEEKS    TEAM CONF - DEC 26 - BED SBA. TRANSFES MIN-CTG. GAIT 80 FEET MIN-CTG RW. ADLS CTG. LEFT INATTENTION. MODERATE DEFICITS FOR REASONING AND MATH.  ELOS - 1 WEEK from a rehab standpoint.  Family conference scheduled for tomorrow and family would like to still keep that time as members from out of town are here.    December 27-transfer back to the rehab unit after cardiac cath yesterday.    TEAM CONF - JAN 2 - BED SUP. 4 STAIRS CTG. GAIT 160 FEET MIN RW , IMPACTED BY VF CUT. BATH SBA. UBD MIN. LBD MIN. CUES TO ATTEND TO LEFT. COORDINAITON IMPROVING. MOD CUES WITH SLP TO ATTEND TO LEFT SIDE. MOD CUES FOR WORKING MEMORY, REASONING, MATH. BNE PENDING. CONTINENT BOWEL AND BLADDER. LEFT GROIN SITE HEALING WITH DRESSING. ZIO PATCH.  HYPERGLYCEMIA - ON CONSISTENT CARB DIET.  ELOS - WED JAN 8.    Azael Abdullahi MD  01/02/20  7:37 AM    Time:

## 2020-01-02 NOTE — PROGRESS NOTES
Discussed progress/current status, d/c goals, and d/c date for Wednesday, 1/8 with patient and granddaughter. Patient was hoping to be d/c sooner but is agreeable to plan. Discussed possible home pass on Sunday as did OT. Patient would like to do pass and see how she does at home. Discussed family teaching with family prior to pass. Scheduled family conference for Monday, 1/6 at 1:00 p.m. Will assist with d/c plans.

## 2020-01-02 NOTE — PLAN OF CARE
Problem: Patient Care Overview  Goal: Individualization and Mutuality  Flowsheets (Taken 1/2/2020 5992)  Patient Specific Preferences: Pt. is ready to go home ASAP. Is looking forward to future D/C. Thought she was going home today. Complained of back and rectal pain. Tylenol given with little effect. Continent of B&B. A&OX4. Pt. participated in therapy. BS was better controlled today. Meds whole with water. Did not eat much at meals today. Groin dressing D/C due to being healed. Last BM 2 days ago even with PRN miralax given.

## 2020-01-02 NOTE — PROGRESS NOTES
Inpatient Rehabilitation Plan of Care Note    Plan of Care  Care Plan Reviewed - Updates as Follows    Psychosocial    Performed Intervention(s)  calm enviroment  verbalizes needs/concerns  Support/Peer groups  Therapeutic environmental set-up      Safety    Performed Intervention(s)  Falls precautions/hourly rounds, & Items within reach  Bed/chair alarms, call bell within reach.  Environmental set-up to reduce risk      Sphincter Control    Performed Intervention(s)  monitor intake and output  medication      Pain    Performed Intervention(s)  pain meds/creams as needed  assess pain during rounding  Distraction/Relaxation    Signed by: Favian Arvizu, RN

## 2020-01-02 NOTE — PLAN OF CARE
Problem: Patient Care Overview  Goal: Plan of Care Review  Outcome: Ongoing (interventions implemented as appropriate)  Flowsheets (Taken 1/2/2020 0117)  Outcome Summary: Pt. is calm, pleasant, and cooperative. A&OX4. Takes Meds whole with water. Complained of any pain to back. Tylenol 650 mg PO PRN given at 2022, and then relieved well. Ambien 5 mg PO PRN given for sleeping per pt. required. Continent of B&B. ZIO patch to left chest and tolerated well. AC&HS. BG is 130. HumaLOG no given at night. Red armband on. Left side vision field cut to bilateral eyes. Discharge scheduled in the morning. No unsafe behavior to note at this time.  Progress, Functional Goals: demonstrating adequate progress  Plan of Care Reviewed With: patient  IRF Plan of Care Review: progress ongoing, continue

## 2020-01-02 NOTE — PROGRESS NOTES
Occupational Therapy: Branch    Physical Therapy: Branch    Speech Language Pathology:  Individual: 60 minutes.    Signed by: Katelyn James SLP

## 2020-01-03 LAB
GLUCOSE BLDC GLUCOMTR-MCNC: 138 MG/DL (ref 70–130)
GLUCOSE BLDC GLUCOMTR-MCNC: 154 MG/DL (ref 70–130)
GLUCOSE BLDC GLUCOMTR-MCNC: 178 MG/DL (ref 70–130)
GLUCOSE BLDC GLUCOMTR-MCNC: 206 MG/DL (ref 70–130)

## 2020-01-03 PROCEDURE — 97535 SELF CARE MNGMENT TRAINING: CPT

## 2020-01-03 PROCEDURE — 97130 THER IVNTJ EA ADDL 15 MIN: CPT

## 2020-01-03 PROCEDURE — 97112 NEUROMUSCULAR REEDUCATION: CPT

## 2020-01-03 PROCEDURE — 63710000001 INSULIN LISPRO (HUMAN) PER 5 UNITS: Performed by: PHYSICAL MEDICINE & REHABILITATION

## 2020-01-03 PROCEDURE — 97129 THER IVNTJ 1ST 15 MIN: CPT

## 2020-01-03 PROCEDURE — 82962 GLUCOSE BLOOD TEST: CPT

## 2020-01-03 PROCEDURE — 97116 GAIT TRAINING THERAPY: CPT

## 2020-01-03 PROCEDURE — 97110 THERAPEUTIC EXERCISES: CPT

## 2020-01-03 RX ORDER — ZOLPIDEM TARTRATE 5 MG/1
5 TABLET ORAL NIGHTLY PRN
Status: DISCONTINUED | OUTPATIENT
Start: 2020-01-03 | End: 2020-01-08 | Stop reason: HOSPADM

## 2020-01-03 RX ADMIN — INSULIN LISPRO 2 UNITS: 100 INJECTION, SOLUTION INTRAVENOUS; SUBCUTANEOUS at 08:00

## 2020-01-03 RX ADMIN — CLOPIDOGREL 75 MG: 75 TABLET, FILM COATED ORAL at 08:00

## 2020-01-03 RX ADMIN — SENNOSIDES AND DOCUSATE SODIUM 1 TABLET: 8.6; 5 TABLET ORAL at 21:31

## 2020-01-03 RX ADMIN — ACETAMINOPHEN 650 MG: 325 TABLET, FILM COATED ORAL at 21:31

## 2020-01-03 RX ADMIN — METOPROLOL TARTRATE 50 MG: 50 TABLET, FILM COATED ORAL at 21:31

## 2020-01-03 RX ADMIN — INSULIN LISPRO 2 UNITS: 100 INJECTION, SOLUTION INTRAVENOUS; SUBCUTANEOUS at 12:02

## 2020-01-03 RX ADMIN — PANTOPRAZOLE SODIUM 40 MG: 40 TABLET, DELAYED RELEASE ORAL at 05:36

## 2020-01-03 RX ADMIN — DOCUSATE SODIUM 100 MG: 100 CAPSULE, LIQUID FILLED ORAL at 21:31

## 2020-01-03 RX ADMIN — DILTIAZEM HYDROCHLORIDE 180 MG: 180 CAPSULE, COATED, EXTENDED RELEASE ORAL at 08:00

## 2020-01-03 RX ADMIN — PANTOPRAZOLE SODIUM 40 MG: 40 TABLET, DELAYED RELEASE ORAL at 17:24

## 2020-01-03 RX ADMIN — DILTIAZEM HYDROCHLORIDE 180 MG: 180 CAPSULE, COATED, EXTENDED RELEASE ORAL at 21:31

## 2020-01-03 RX ADMIN — LEVOTHYROXINE SODIUM 125 MCG: 125 TABLET ORAL at 05:36

## 2020-01-03 RX ADMIN — ZOLPIDEM TARTRATE 5 MG: 5 TABLET ORAL at 22:18

## 2020-01-03 RX ADMIN — METOPROLOL TARTRATE 50 MG: 50 TABLET, FILM COATED ORAL at 08:01

## 2020-01-03 RX ADMIN — INSULIN LISPRO 3 UNITS: 100 INJECTION, SOLUTION INTRAVENOUS; SUBCUTANEOUS at 21:31

## 2020-01-03 RX ADMIN — Medication 1000 MCG: at 08:01

## 2020-01-03 RX ADMIN — POLYETHYLENE GLYCOL 3350 17 G: 17 POWDER, FOR SOLUTION ORAL at 08:10

## 2020-01-03 RX ADMIN — ASPIRIN 81 MG: 81 TABLET, COATED ORAL at 08:00

## 2020-01-03 RX ADMIN — DOCUSATE SODIUM 100 MG: 100 CAPSULE, LIQUID FILLED ORAL at 08:01

## 2020-01-03 RX ADMIN — TORSEMIDE 10 MG: 10 TABLET ORAL at 08:01

## 2020-01-03 NOTE — THERAPY TREATMENT NOTE
Inpatient Rehabilitation - Occupational Therapy Treatment Note    [facility]     Patient Name: Liana Carrero  : 1935  MRN: [id number]    Today's Date: 1/3/2020                 Admit Date: 2019      Visit Dx:    ICD-10-CM ICD-9-CM   1. Unsteadiness on feet R26.81 781.2   2. Difficulty walking R26.2 719.7   3. 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 (CMS/HCC) I67.9 438.22    G81.94        Patient Active Problem List   Diagnosis   • Stroke (cerebrum) (CMS/[facility])   • Chest pain         Therapy Treatment    IRF Treatment Summary     Row Name 20 1202 20 1000          Evaluation/Treatment Time and Intent    Subjective Information  no complaints  -CC  no complaints  -AW     Existing Precautions/Restrictions  fall  -CC  fall  -AW     Document Type  therapy note (daily note)  -CC  therapy note (daily note)  -AW     Mode of Treatment  occupational therapy  -CC  speech-language pathology  -AW     Patient/Family Observations  supine in bed  -CC  Pt resting, got up in w/c for therapy.  -AW     Recorded by [CC] Keya Miranda OTR [AW] Brynn Griffith, MS CCC-SLP     Row Name 20 1202             Cognition/Psychosocial- PT/OT    Affect/Mental Status (Cognitive)  WFL  -CC      Orientation Status (Cognition)  oriented x 4  -CC      Personal Safety Interventions  fall prevention program maintained;gait belt;nonskid shoes/slippers when out of bed  -CC      Recorded by [CC] Keya Miranda OTR      Row Name 20 1202             Sit-Stand Transfer    Sit-Stand [place] (Transfers)  contact guard;stand by assist;verbal cues  -CC      Assistive Device (Sit-Stand Transfers)  walker, front-wheeled  -CC      Recorded by [CC] Keya Miranda OTR      Row Name 20 1202             Stand-Sit Transfer    Stand-Sit [place] (Transfers)  contact guard;verbal cues  -CC      Assistive Device (Stand-Sit Transfers)  walker, front-wheeled  -CC      Recorded by [CC] Ruben  GIAN Laura      Row Name 01/03/20 1202             Shower Transfer    Type (Shower Transfer)  stand pivot/stand step  -CC      [place] Level (Shower Transfer)  contact guard  -CC      Assistive Device (Shower Transfer)  shower chair;grab bars/tub rail  -CC      Recorded by [CC] Keya Miranda OTR      Row Name 01/03/20 1202             Bathing Assessment/Treatment    Bathing [place] Level  bathing skills;supervision  -CC      Assistive Device (Bathing)  grab bar/tub rail;hand held shower spray hose;shower chair  -CC      Bathing Position  supported sitting;supported standing  -CC      Bathing Setup Assistance  adjust water temperature;obtain supplies  -CC      Recorded by [CC] Keya Miranda OTR      Row Name 01/03/20 1202             Upper Body Dressing Assessment/Treatment    Upper Body Dressing Task  upper body dressing skills;doff;don;bra/undergarment;pull over garment;supervision;minimum assist (75% or more patient effort)  -CC      Upper Body Dressing Position  supported sitting  -CC      Set-up Assistance (Upper Body Dressing)  obtain clothing  -CC      Recorded by [CC] Keya Miranda OTR      Row Name 01/03/20 1202             Lower Body Dressing Assessment/Treatment    Lower Body Dressing [place]  doff;don;pants/bottoms;socks;clothes fastener management;supervision;verbal cues  -CC      Lower Body Dressing Position  supported sitting;supported standing  -CC      Lower Body Dressing Setup Assistance  obtain clothing  -CC      Recorded by [CC] Keya Miranda OTR      Row Name 01/03/20 1202             Grooming Assessment/Treatment    Grooming [place]  grooming skills;hair care, combing/brushing;oral care regimen;wash face, hands;supervision;verbal cues  -CC      Grooming Position  supported sitting  -CC      Grooming Setup Assistance  obtain supplies  -CC      Recorded by [CC] Keya Miranda OTR      Row Name 01/03/20 1202             Fine Motor Testing &  Training    Comment, Fine Motor Coordination  manipulation of small pegs w/o difficulty. Now able to do in hand manipulation w/o compensation  -CC      Recorded by [CC] Keya Miranda OTR      Row Name 01/03/20 1202             Vision Assessment/Intervention    Vision Assessment Comment  mod vc to attend to left with visual puzzle task. Attempted following peg design card for small peg design but unable  -CC      Recorded by [CC] Keya Miranda OTR      Row Name 01/03/20 1202             Pain Scale: Numbers Pre/Post-Treatment    Pain Scale: Numbers, Pretreatment  0/10 - no pain  -CC      Pain Scale: Numbers, Post-Treatment  0/10 - no pain  -CC      Recorded by [CC] Keya Miranda OTR      Row Name 01/03/20 1202             Positioning and Restraints    Pre-Treatment Position  sitting in chair/recliner  -CC      Post Treatment Position  wheelchair  -CC      In Wheelchair  sitting;with SLP  -CC      Recorded by [CC] Keya Miranda OTR      Row Name 01/03/20 1202             Weekly Summary of Progress (OT)    Weekly Outcome Summary: Occupational Therapy  -- progressing toward all goals  -CC      Recorded by [CC] Keya Miranda OTR        User Key  (r) = Recorded By, (t) = Taken By, (c) = Cosigned By    Initials Name Effective Dates    CC Keya Miranda OTR 06/08/18 -     Brynn Reynaga MS CCC-SLP 06/08/18 -                  OT Recommendation and Plan    Anticipated Discharge Disposition (OT): home with assist       Daily Summary of Progress (OT)  Functional Goal Overall Progress: Occupational Therapy: progressing toward functional goals as expected    OT IRF GOALS     Row Name 01/03/20 1200 01/01/20 1000 12/23/19 1500       Bathing Goal 1 (OT-IRF)    Progress/Outcomes (Bathing Goal 1, OT-IRF)  --  --  goal met  -CC       LB Dressing Goal 1 (OT-IRF)    Activity/Device (LB Dressing Goal 1, OT-IRF)  --  lower body dressing  -CC  --    [place] (LB Dressing Goal 1, OT-IRF)  --  supervision required   -CC  --    Time Frame (LB Dressing Goal 1, OT-IRF)  --  long term goal (LTG);by discharge  -CC  --    Progress/Outcomes (LB Dressing Goal 1, OT-IRF)  continuing progress toward goal  -CC  continuing progress toward goal  -CC  continuing progress toward goal  -CC       LB Dressing Goal 2 (OT-IRF)    Progress/Outcomes (LB Dressing Goal 2, OT-IRF)  continuing progress toward goal  -CC  --  continuing progress toward goal  -CC       Toileting Goal 1 (OT-IRF)    Progress/Outcomes (Toileting Goal 1, OT-IRF)  --  --  goal met  -CC       Toileting Goal 2 (OT-IRF)    Activity/Device (Toileting Goal 2, OT-IRF)  --  toileting skills, all  -CC  --    [place] Level (Toileting Goal 2, OT-IRF)  --  supervision required  -CC  --    Time Frame (Toileting Goal 2, OT-IRF)  --  long term goal (LTG);by discharge  -CC  --    Progress/Outcomes (Toileting Goal 2, OT-IRF)  goal partially met  -CC  goal partially met  -CC  continuing progress toward goal  -CC       Strength Goal 1 (OT-IRF)    Strength Goal 1 (OT-IRF)  --  [place] LUE strength  -CC  --    Time Frame (Strength Goal 1, OT-IRF)  --  long term goal (LTG);by discharge  -CC  --    Progress/Outcomes (Strength Goal 1, OT-IRF)  continuing progress toward goal  -CC  continuing progress toward goal  -CC  continuing progress toward goal  -CC       Caregiver Training Goal 1 (OT-IRF)    Caregiver Training Goal 1 (OT-IRF)  --  pt and family I with HEP  -CC  --    Time Frame (Caregiver Training Goal 1, OT-IRF)  --  long term goal (LTG);by discharge  -CC  --    Progress/Outcomes (Caregiver Training Goal 1, OT-IRF)  continuing progress toward goal  -CC  continuing progress toward goal  -CC  continuing progress toward goal  -CC      User Key  (r) = Recorded By, (t) = Taken By, (c) = Cosigned By    Initials Name Provider Type    CC Keya Miranda OTR Occupational Therapist                     Time Calculation:     Time Calculation- OT     Row Name 01/03/20 1000 01/03/20 0800           Time Calculation- OT    OT Start Time  1000  -CC  0800  -CC     OT Stop Time  1030  -CC  0830  -CC     OT Time Calculation (min)  30 min  -CC  30 min  -CC       User Key  (r) = Recorded By, (t) = Taken By, (c) = Cosigned By    Initials Name Provider Type    CC Keya Miranda OTR Occupational Therapist          Therapy Charges for Today     Code Description Service Date Service Provider Modifiers Qty    77430880429 HC OT SELF CARE/MGMT/TRAIN EA 15 MIN 1/2/2020 Keya Miranda OTR GO 3    96449937618 HC OT NEUROMUSC RE EDUCATION EA 15 MIN 1/2/2020 Keya Miranda OTR GO 1    78402460761 HC OT THERAPEUTIC ACT EA 15 MIN 1/2/2020 Keya Miranda OTR GO 1    05009750572 HC OT SELF CARE/MGMT/TRAIN EA 15 MIN 1/3/2020 Keya Miranda OTR GO 2    25570330508 HC OT NEUROMUSC RE EDUCATION EA 15 MIN 1/3/2020 Keya Miranda OTR GO 2                   GIAN Blackwell  1/3/2020

## 2020-01-03 NOTE — PLAN OF CARE
Problem: Patient Care Overview  Goal: Plan of Care Review  Outcome: Ongoing (interventions implemented as appropriate)  Flowsheets (Taken 1/3/2020 6781)  Outcome Summary: Patient very pleasant and cooperative. Zio patch to L chest. C/O back pain so far hasn't asked for any pain medicine. C/O burning in urination as well, she thought they did a UA yesterday but did not see any result pending. Left note for Dr. Abdullahi. Ambulatory with RW. Denies any SOA or chest pain.  Progress, Functional Goals: demonstrating adequate progress  Plan of Care Reviewed With: patient  IRF Plan of Care Review: progress ongoing, continue

## 2020-01-03 NOTE — PROGRESS NOTES
Inpatient Rehabilitation Functional Measures Assessment and Plan of Care    Plan of Care  Updated Problems/Interventions  Field    Functional Measures  JALEEL Eating:  [facility] Grooming: [facility] Bathing:  [facility] Upper Body Dressing:  [facility] Lower Body Dressing:  [facility] Toileting:  [facility] Bladder Management  Level of Assistance:  [place]  Frequency/Number of Accidents this Shift:  [facility] Bowel Management  Level of Assistance: [place]  Frequency/Number of Accidents this Shift: [facility] Bed/Chair/Wheelchair Transfer:  [facility] Toilet Transfer:  [facility] Tub/Shower Transfer:  [place]    Previously Documented Mode of Locomotion at Discharge: Field  JALEEL Expected Mode of Locomotion at Discharge: [facility] Walk/Wheelchair:  [facility] Stairs:  [facility] Comprehension:  [facility] Expression:  [facility] Social Interaction:  [facility] Problem Solving:  [facility] Memory:  [place]    Therapy Mode Minutes  Occupational Therapy: Individual: 60 minutes.  Physical Therapy: [place]  Speech Language Pathology:  [place]    Signed by: GIAN Blackwell/TALIB

## 2020-01-03 NOTE — PROGRESS NOTES
Patient's daughter and granddaughter here today for family teaching for home pass on Sunday. Family Conference on Monday to discuss how pass went and d/c recommendations.

## 2020-01-03 NOTE — THERAPY TREATMENT NOTE
Inpatient Rehabilitation - Physical Therapy Progress Note  [facility]     Patient Name: Liana Carrero  : 1935  MRN: [id number]    Today's Date: 1/3/2020                 Admit Date: 2019      Visit Dx:      ICD-10-CM ICD-9-CM   1. Unsteadiness on feet R26.81 781.2   2. Difficulty walking R26.2 719.7   3. 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 (CMS/HCC) I67.9 438.22    G81.94        Patient Active Problem List   Diagnosis   • Stroke (cerebrum) (CMS/[facility])   • Chest pain       Therapy Treatment    IRF Treatment Summary     Row Name 20 1202 20 1115 20 1000       Evaluation/Treatment Time and Intent    Subjective Information  no complaints  -CC  complains of;fatigue  -EE  no complaints  -AW    Existing Precautions/Restrictions  fall  -CC  fall  -EE  fall  -AW    Document Type  therapy note (daily note)  -CC  therapy note (daily note)  -EE  therapy note (daily note)  -AW    Mode of Treatment  occupational therapy  -CC  physical therapy  -EE  speech-language pathology  -AW    Patient/Family Observations  supine in bed  -CC  Pt sitting up in WC in am, supine in bed in pm, no acute distress. Daughter and granddaughter present in pm for teaching prior to home pass.   -EE  Pt resting, got up in w/c for therapy.  -AW    Recorded by [CC] Keya Miranda, OTR [EE] Kayla Uribe, PT [AW] Brynn Grfifith, MS CCC-SLP    Row Name 20 1202 20 1115          Cognition/Psychosocial- PT/OT    Affect/Mental Status (Cognitive)  WFL  -  --     Orientation Status (Cognition)  oriented x 4  -CC  oriented x 4  -EE     Follows Commands (Cognition)  --  follows one step commands;over 90% accuracy;repetition of directions required;verbal cues/prompting required  -EE     Personal Safety Interventions  fall prevention program maintained;gait belt;nonskid shoes/slippers when out of bed  -CC  fall prevention program maintained;gait belt;muscle strengthening facilitated;nonskid  shoes/slippers when out of bed;supervised activity  -EE     Cognitive Function (Cognitive)  --  safety deficit  -EE     Safety Deficit (Cognitive)  --  awareness of need for assistance;insight into deficits/self awareness  -EE     Recorded by [CC] Keya Miranda, VICKIR [EE] Kayla Uribe, PT     Row Name 01/03/20 1115             Bed Mobility Assessment/Treatment    Supine-Sit [place] (Bed Mobility)  conditional independence  -EE      Sit-Supine [place] (Bed Mobility)  conditional independence  -EE      Recorded by [EE] Kayla Uribe, PT      Row Name 01/03/20 1115             Transfer Assessment/Treatment    Comment (Transfers)  Daughter present for teaching with WC transfers, car transfer, ambulation, and curb negotiation. Daughter demonstrates good understanding of use of gait belt and safety precautions. Daughter able to provide appropriate cues during mobility to help pt compensate for L visual field cut. She assisted pt with multiple WC transfers as well as car transfer.   -EE      Recorded by [EE] Kayla Uribe, PT      Row Name 01/03/20 1115             Bed-Chair Transfer    Bed-Chair [place] (Transfers)  contact guard;stand by assist;verbal cues  -EE      Assistive Device (Bed-Chair Transfers)  wheelchair  -EE      Recorded by [EE] Kayla Uribe, PT      Row Name 01/03/20 1115             Chair-Bed Transfer    Chair-Bed [place] (Transfers)  contact guard;stand by assist;verbal cues  -EE      Assistive Device (Chair-Bed Transfers)  wheelchair  -EE      Recorded by [EE] Kayla Uribe, PT      Row Name 01/03/20 1202 01/03/20 1115          Sit-Stand Transfer    Sit-Stand [place] (Transfers)  contact guard;stand by assist;verbal cues  -CC  contact guard;stand by assist;verbal cues  -EE     Assistive Device (Sit-Stand Transfers)  walker, front-wheeled  -CC  walker, front-wheeled  -EE     Recorded by [CC] Keya Miranda, VICKIR [EE] Kayla Uribe, PT     Row Name 01/03/20 1202 01/03/20 1115           Stand-Sit Transfer    Stand-Sit [place] (Transfers)  contact guard;verbal cues  -CC  contact guard;verbal cues  -EE     Assistive Device (Stand-Sit Transfers)  walker, front-wheeled  -CC  walker, front-wheeled;wheelchair  -EE     Recorded by [CC] Keya Miranda OTR [EE] Kayla Uribe, PT     Row Name 01/03/20 1115             Toilet Transfer    Type (Toilet Transfer)  stand pivot/stand step  -EE      [place] Level (Toilet Transfer)  contact guard;verbal cues  -EE      Assistive Device (Toilet Transfer)  wheelchair  -EE      Recorded by [EE] Kayla Uribe, PT      Row Name 01/03/20 1202             Shower Transfer    Type (Shower Transfer)  stand pivot/stand step  -CC      [place] Level (Shower Transfer)  contact guard  -CC      Assistive Device (Shower Transfer)  shower chair;grab bars/tub rail  -CC      Recorded by [CC] Keya Miranda OTR      Row Name 01/03/20 1115             Car Transfer    Type (Car Transfer)  stand pivot/stand step  -EE      [place] Level (Car Transfer)  contact guard;minimum assist (75% patient effort);verbal cues  -EE      Assistive Device (Car Transfer)  wheelchair  -EE      Recorded by [EE] Kayla Uribe, PT      Row Name 01/03/20 1115             Gait/Stairs Assessment/Training    [place] Level (Gait)  contact guard;minimum assist (75% patient effort);verbal cues  -EE      Assistive Device (Gait)  walker, front-wheeled  -EE      Distance in Feet (Gait)  160', 80' x 3  -EE      Pattern (Gait)  step-through  -EE      Deviations/Abnormal Patterns (Gait)  stride length decreased;radha decreased  -EE      Bilateral Gait Deviations  forward flexed posture;heel strike decreased  -EE      Left Sided Gait Deviations  leans left  -EE      Right Sided Gait Deviations  weight shift ability decreased  -EE      Comment (Gait/Stairs)  Daughter assisted pt in ambulating 80' x 2 with rwx. Demonstrates appropriate use of gait belt and provided cues to aid pt in negotiating  obstacles/turns in therapy gym.   -EE      Recorded by [EE] Kayla Uribe, PT      Row Name 01/03/20 1115             Curb Negotiation (Mobility)    [place], Curb Negotiation  minimal assist, 75% or more patient effort;contact guard;verbal cues performed x1 with therapist, x1 with daughter  -EE      Comment, Curb Negotiation (Mobility)  with rwx  -EE      Recorded by [EE] Kayla Uribe, PT      Row Name 01/03/20 1202             Bathing Assessment/Treatment    Bathing [place] Level  bathing skills;supervision  -CC      Assistive Device (Bathing)  grab bar/tub rail;hand held shower spray hose;shower chair  -CC      Bathing Position  supported sitting;supported standing  -CC      Bathing Setup Assistance  adjust water temperature;obtain supplies  -CC      Recorded by [CC] Keya Miranda OTR      Row Name 01/03/20 1202             Upper Body Dressing Assessment/Treatment    Upper Body Dressing Task  upper body dressing skills;doff;don;bra/undergarment;pull over garment;supervision;minimum assist (75% or more patient effort)  -CC      Upper Body Dressing Position  supported sitting  -CC      Set-up Assistance (Upper Body Dressing)  obtain clothing  -CC      Recorded by [CC] Keya Miranda OTR      Row Name 01/03/20 1202             Lower Body Dressing Assessment/Treatment    Lower Body Dressing [place] Level  doff;don;pants/bottoms;socks;clothes fastener management;supervision;verbal cues  -CC      Lower Body Dressing Position  supported sitting;supported standing  -CC      Lower Body Dressing Setup Assistance  obtain clothing  -CC      Recorded by [CC] Keya Miranda OTR      Row Name 01/03/20 1202             Grooming Assessment/Treatment    Grooming [place] Level  grooming skills;hair care, combing/brushing;oral care regimen;wash face, hands;supervision;verbal cues  -CC      Grooming Position  supported sitting  -CC      Grooming Setup Assistance  obtain supplies  -CC      Recorded by [CC]  Keya Miranda OTR      Row Name 01/03/20 1202             Fine Motor Testing & Training    Comment, Fine Motor Coordination  manipulation of small pegs w/o difficulty. Now able to do in hand manipulation w/o compensation  -CC      Recorded by [CC] Keya Miranda OTR      Row Name 01/03/20 1202             Vision Assessment/Intervention    Vision Assessment Comment  mod vc to attend to left with visual puzzle task. Attempted following peg design card for small peg design but unable  -CC      Recorded by [CC] Keya Miranda OTR      Row Name 01/03/20 1202 01/03/20 1115          Pain Scale: Numbers Pre/Post-Treatment    Pain Scale: Numbers, Pretreatment  0/10 - no pain  -CC  0/10 - no pain  -EE     Pain Scale: Numbers, Post-Treatment  0/10 - no pain  -CC  0/10 - no pain  -EE     Recorded by [CC] Keya Miranda OTR [EE] Kayla Uribe, PT     Row Name 01/03/20 1115             Lower Extremity Seated Therapeutic Exercise    Performed, Seated Lower Extremity (Therapeutic Exercise)  hip abduction/adduction;LAQ (long arc quad), knee extension;knee flexion/extension;ankle dorsiflexion/plantarflexion  -EE      Device, Seated Lower Extremity (Therapeutic Exercise)  elastic bands/tubing;small ball yellow theraband  -EE      Exercise Type, Seated Lower Extremity (Therapeutic Exercise)  AROM (active range of motion)  -EE      Sets/Reps Detail, Seated Lower Extremity (Therapeutic Exercise)  1/15  -EE      Recorded by [EE] Kayla Uribe, PT      Row Name 01/03/20 1202 01/03/20 1115          Positioning and Restraints    Pre-Treatment Position  sitting in chair/recliner  -CC  sitting in chair/recliner  -EE     Post Treatment Position  wheelchair  -CC  bed  -EE     In Bed  --  fowlers;call light within reach;encouraged to call for assist;exit alarm on;with family/caregiver  -EE     In Wheelchair  sitting;with SLP  -CC  --     Recorded by [CC] Keya Miranda OTR [EE] Kayla Uribe, PT     Row Name 01/03/20 1115              Weekly Summary of Progress (PT)    Weekly Outcome Summary: Physical Therapy  Pt demonstrates good progress with functional mobility this week. Demonstrates improved functional strength and improved balance, as evidenced by increased independence with bed mobility, transfers, and ambulation. Continues to demonstrate weakness on L side as well as L visual field cut; however, demonstrates less leaning to the L during functional mobility. Daughter participated in mobility training in anticipation of pt going home on day pass this weekend. Pt still has all long term goals ongoing at this time and would continue to benefit from PT to address strength, balance, and functional endurance.   -EE      Recorded by [EE] Kayla Uribe, PT      Row Name 01/03/20 1202             Weekly Summary of Progress (OT)    Weekly Outcome Summary: Occupational Therapy  -- progressing toward all goals  -CC      Recorded by [CC] Keya Miranda OTR        User Key  (r) = Recorded By, (t) = Taken By, (c) = Cosigned By    Initials Name Effective Dates    CC Keya Miranda, OTR 06/08/18 -     Brynn Reynaga, MS CCC-SLP 06/08/18 -     Kayla Carballo, PT 04/03/18 -                  PT Recommendation and Plan  Anticipated Equipment Needs at Discharge (PT Eval): (TBD pending progress)  Planned Therapy Interventions (PT Eval): balance training, home exercise program, bed mobility training, gait training, motor coordination training, neuromuscular re-education, patient/family education, postural re-education, ROM (range of motion), stair training, strengthening, stretching, transfer training  Frequency of Treatment (PT Eval): 60 minutes per session     Daily Summary of Progress (PT)  Functional Goal Overall Progress: Physical Therapy: progressing toward functional goals as expected  Impairments Continuing to Limit Function: Physical Therapy: impaired balance, impaired vision, pain, postural control impaired, strength decreased      PT IRF GOALS      Row Name 01/03/20 1400             Bed Mobility Goal 2 (PT-IRF)    Time Frame (Bed Mobility Goal 2, PT-IRF)  1 week  -EE      Progress/Outcomes (Bed Mobility Goal 2, PT-IRF)  goal ongoing  -EE         Transfer Goal 2 (PT-IRF)    Time Frame (Transfer Goal 2, PT-IRF)  1 week  -EE      Progress/Outcomes (Transfer Goal 2, PT-IRF)  goal ongoing  -EE         Transfer Goal 3 (PT-IRF)    Time Frame (Transfer Goal 3, PT-IRF)  1 week  -EE      Progress/Outcomes (Transfer Goal 3, PT-IRF)  goal ongoing  -EE         Gait/Walking Locomotion Goal 2 (PT-IRF)    Time Frame (Gait/Walking Locomotion Goal 2, PT-IRF)  1 week  -EE      Progress/Outcomes (Gait/Walking Locomotion Goal 2, PT-IRF)  goal ongoing  -EE         Stairs Goal 1 (PT-IRF)    Time Frame (Stairs Goal 1, PT-IRF)  1 week  -EE      Progress/Outcomes (Stairs Goal 1, PT-IRF)  goal ongoing  -EE         Stairs Goal 2 (PT-IRF)    Time Frame (Stairs Goal 2, PT-IRF)  1 week  -EE      Progress/Outcomes (Stairs Goal 2, PT-IRF)  goal ongoing  -EE        User Key  (r) = Recorded By, (t) = Taken By, (c) = Cosigned By    Initials Name Provider Type    Kayla Carballo PT Physical Therapist               Time Calculation:     PT Charges     Row Name 01/03/20 1420 01/03/20 1249          Time Calculation    Start Time  1300  -EE  1100  -EE     Stop Time  1330  -EE  1130  -EE     Time Calculation (min)  30 min  -EE  30 min  -EE     PT Received On  01/03/20  -EE  01/03/20  -EE     PT - Next Appointment  01/04/20  -EE  01/03/20  -EE     PT Goal Re-Cert Due Date  01/10/20  -EE  --        Time Calculation- PT    Total Timed Code Minutes- PT  30 minute(s)  -EE  30 minute(s)  -EE       User Key  (r) = Recorded By, (t) = Taken By, (c) = Cosigned By    Initials Name Provider Type    Kayla Carballo PT Physical Therapist          Therapy Charges for Today     Code Description Service Date Service Provider Modifiers Qty    44346445573 HC GAIT TRAINING EA 15 MIN 1/2/2020 Kayla Uribe, PT GP 2     60011819191 HC PT THER PROC EA 15 MIN 1/2/2020 Kayla Uribe, PT GP 1    93635340750 HC PT NEUROMUSC RE EDUCATION EA 15 MIN 1/2/2020 Kayla Uribe, PT GP 1    42167421101 HC GAIT TRAINING EA 15 MIN 1/3/2020 Kayla Uribe, PT GP 2    40569119822 HC PT THER PROC EA 15 MIN 1/3/2020 Kayla Uribe, PT GP 1    09348164295 HC PT NEUROMUSC RE EDUCATION EA 15 MIN 1/3/2020 Kayla Uribe, PT GP 1                   Kayla Uribe, PT  1/3/2020

## 2020-01-03 NOTE — PLAN OF CARE
Problem: Patient Care Overview  Goal: Plan of Care Review  1/3/2020 1444 by Ирина Rose, RN  Outcome: Ongoing (interventions implemented as appropriate)  Flowsheets  Taken 1/3/2020 1439  Outcome Summary: Attended therapies and cooperative with staff.  Ambulates with walker. Takes medications whole with water.  Cont B&B.  Pt. A&Ox4.  Uses call light for assistance.  No safety issues noted.  Taken 1/3/2020 1444  Progress, Functional Goals: demonstrating adequate progress  Plan of Care Reviewed With: patient  IRF Plan of Care Review: progress ongoing, continue     Problem: Patient Care Overview  Goal: Individualization and Mutuality  1/3/2020 1444 by Ирина Rose, RN  Outcome: Ongoing (interventions implemented as appropriate)  Flowsheets (Taken 1/3/2020 1444)  Patient Specific Goals (Include Timeframe): To go home soon

## 2020-01-03 NOTE — PROGRESS NOTES
Inpatient Rehabilitation Plan of Care Note    Plan of Care  Care Plan Reviewed - No updates at this time.    Psychosocial    Performed Intervention(s)  calm enviroment  verbalizes needs/concerns  Support/Peer groups  Therapeutic environmental set-up      Safety    Performed Intervention(s)  Falls precautions/hourly rounds, & Items within reach  Bed/chair alarms, call bell within reach.  Environmental set-up to reduce risk      Sphincter Control    Performed Intervention(s)  monitor intake and output  medication      Pain    Performed Intervention(s)  pain meds/creams as needed  assess pain during rounding  Distraction/Relaxation    Signed by: Catherine Berg RN

## 2020-01-03 NOTE — PROGRESS NOTES
Inpatient Rehabilitation Plan of Care Note    Plan of Care  Care Plan Reviewed - No updates at this time.    Psychosocial    Performed Intervention(s)  calm enviroment  verbalizes needs/concerns  Support/Peer groups  Therapeutic environmental set-up      Safety    Performed Intervention(s)  Falls precautions/hourly rounds, & Items within reach  Bed/chair alarms, call bell within reach.  Environmental set-up to reduce risk      Sphincter Control    Performed Intervention(s)  monitor intake and output  medication      Pain    Performed Intervention(s)  pain meds/creams as needed  assess pain during rounding  Distraction/Relaxation    Signed by: Ирина Rose RN

## 2020-01-03 NOTE — THERAPY TREATMENT NOTE
Inpatient Rehabilitation - Speech Language Pathology Treatment Note    [facility]       Patient Name: Liana Carrero  : 1935  MRN: [id number]    Today's Date: 1/3/2020           Admit Date: 2019      Visit Dx:        ICD-10-CM ICD-9-CM   1. Unsteadiness on feet R26.81 781.2   2. Difficulty walking R26.2 719.7   3. 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 (CMS/[facility]) I67.9 438.22    G81.94        Patient Active Problem List   Diagnosis   • Stroke (cerebrum) (CMS/[facility])   • Chest pain          Therapy Treatment    Evaluation/Coping    Evaluation/Treatment Time and Intent  Subjective Information: no complaints (20 1000 : Brynn Griffith, MS CCC-SLP)  Existing Precautions/Restrictions: fall (20 1000 : Brynn Griffith, MS CCC-SLP)  Document Type: therapy note (daily note) (20 1000 : Brynn Griffith, MS CCC-SLP)  Mode of Treatment: speech-language pathology (20 1000 : Brynn Griffith, MS CCC-SLP)  Patient/Family Observations: Pt resting, got up in w/c for therapy. (20 1000 : Brynn Griffith, MS CCC-SLP)    Vitals/Pain/Safety         Cognition/Communication         Oral Motor/Eating         Mobility/Basic Activities/Instrumental Activities/Motor/Modality                   ROM/MMT                   Sensory/Myotome/Dermatome/Edema               Posture/Balance/Special Tests/Exercise/Transportation/Sexual Function                   Orthotics/Residual Limb/Prosthetic Management              Outcome Summary         EDUCATION    The patient has been educated in the following areas:     Cognitive Impairment Communication Impairment.    SLP Recommendation and Plan    SLP Diagnosis: Mild cognitive impairment    SLP Diagnosis: Mild cognitive impairment    Rehab Potential/Prognosis: good         Anticipated Dischage Disposition: home with assist              Predicted Duration Therapy Intervention (Days): until discharge           Plan of Care Reviewed With: patient      SLP GOALS     Row  Name 01/03/20 1100 01/03/20 1000 01/02/20 1400       Memory Skills Goal 1 (SLP)    Improve Memory Skills Through Goal 1 (SLP)  recall details from a word list  -AW  recalling unrelated word lists with an imposed delay  -AW  --    Time Frame (Memory Skills Goal 1, SLP)  by discharge  -AW  by discharge  -AW  --    Progress (Memory Skills Goal 1, SLP)  90%;independently (over 90% accuracy)  -AW  50%;independently (over 90% accuracy);70%;with moderate cues (50-74%)  -AW  60%;independently (over 90% accuracy) recall of shrot stories presented auditorilly  -SL    Progress/Outcomes (Memory Skills Goal 1, SLP)  goal ongoing  -AW  goal ongoing  -AW  goal ongoing  -SL    Comment (Memory Skills Goal 1, SLP)  recalling words by attribute from group of 4-5 words  -AW  Pt recalled 2/4 words after a 5 min delay using repetition and association as strategies; 3/4 with cues.  -AW  --       Organizational Skills Goal 1 (SLP)    Improve Thought Organization Through Goal 1 (SLP)  completing a verbal sequencing task  -AW  completing a verbal sequencing task  -AW  --    Time Frame (Thought Organization Skills Goal 1, SLP)  by discharge  -AW  by discharge  -AW  --    Progress (Thought Organization Skills Goal 1, SLP)  80%;independently (over 90% accuracy);100%;with minimal cues (75-90%)  -AW  70%;independently (over 90% accuracy);100%;with moderate cues (50-74%)  -AW  --    Progress/Outcomes (Thought Organization Skills Goal 1, SLP)  goal ongoing  -AW  goal ongoing  -AW  --    Comment (Thought Organization Skills Goal 1, SLP)  6 step sequencing  -AW  sequencing 6 step tasks; cues mainly for sequencing  -AW  --       Reasoning Goal 1 (SLP)    Progress (Reasoning Goal 1, SLP)  --  --  60%;independently (over 90% accuracy);with minimal cues (75-90%) fill in blank given definition and length of word  -SL    Progress/Outcomes (Reasoning Goal 1, SLP)  --  --  goal ongoing  -SL       Functional Problem Solving Skills Goal 1 (SLP)    Progress  (Problem Solving Goal 1, SLP)  --  --  20%;independently (over 90% accuracy) answering ?'s re: bank statements  -    Progress/Outcomes (Problem Solving Goal 1, SLP)  --  --  goal ongoing  -SL    Comment (Problem Solving Goal 1, SLP)  --  --  poor attn to left side despite visual cues- mod cues needed to locate necessary information  -SL       Right Hemisphere Function Goal 1 (SLP)    Improve Right Hemisphere Function Through Goal 1 (SLP)  complete visuo-perceptual activities (L/R discrimination, spatial concepts)  -AW  complete visuo-spatial activities (visual closure, trail making, mazes  -AW  --    Time Frame (Right Hemisphere Function Goal 1, SLP)  by discharge  -AW  by discharge  -AW  --    Progress (Right Hemisphere Function Goal 1, SLP)  80%;independently (over 90% accuracy);100%;with minimal cues (75-90%)  -AW  60%;independently (over 90% accuracy);100%;with moderate cues (50-74%)  -AW  50%;independently (over 90% accuracy) reading of phrases w/visual cue on left side  -SL    Progress/Outcomes (Right Hemisphere Function Goal 1, SLP)  goal ongoing  -AW  goal ongoing  -AW  goal ongoing  -SL    Comment (Right Hemisphere Function Goal 1, SLP)  L-R scanning task for 2 target letters with yellow line on Left; min cues for reading multiparagraph using a paper as a guide with yellow line on left of text  -AW  answering questions related to letters in a chart  -AW  max cues needed for scanning and attn to left and visual perceptual skills during more complex task of reading page of info ( bank stmt) and answering questions- missed info on left, despite visual cues, and intermittently misread numbers  -    Row Name 01/02/20 0900 01/01/20 0900 12/31/19 1400       Memory Skills Goal 1 (SLP)    Progress (Memory Skills Goal 1, SLP)  --  --  70%;80%;independently (over 90% accuracy) 4 and 5 item category exclusion task  -    Progress/Outcomes (Memory Skills Goal 1, SLP)  --  goal ongoing  -KA  goal ongoing  -SL     Comment (Memory Skills Goal 1, SLP)  --  word list retention task with recalling four words and answer question in immediate recall task 100% without repetition with four words and 60% with 5 words without repetition and 90% with repetition   -KA  --       Reasoning Goal 1 (SLP)    Progress (Reasoning Goal 1, SLP)  --  --  40%;independently (over 90% accuracy) making inferences from short sotires  -    Progress/Outcomes (Reasoning Goal 1, SLP)  --  --  goal ongoing  -       Functional Problem Solving Skills Goal 1 (SLP)    Improve Problem Solving Through Goal 1 (SLP)  --  --  sequence steps in a task;complete organization/home management task;use organization aids;90%;independently (over 90% accuracy)  -    Time Frame (Problem Solving Goal 1, SLP)  --  --  by discharge  -    Barriers (Problem Solving Goal 1, SLP)  --  --  73% for following simple 3 step written directives and marking info in 1 of 3 boxes appropriately  -    Progress (Problem Solving Goal 1, SLP)  50%;independently (over 90% accuracy) answering ?'s re: calendar  -SL  --  40%;50%;with moderate cues (50-74%);with minimal cues (75-90%) sequencing of senetes/ phrases  -    Progress/Outcomes (Problem Solving Goal 1, SLP)  goal ongoing  -SL  --  goal ongoing  -    Comment (Problem Solving Goal 1, SLP)  impaired visual spatial/perceptual skills, attn to left caused errors in responses  -SL  --  decreased working memory, attn to left reduced accuracy- mod cues required  -       Functional Math Skills Goal 1 (SLP)    Progress (Functional Math Skills Goal 1, SLP)  60%;with moderate cues (50-74%);with minimal cues (75-90%)  -SL  --  --    Progress/Outcomes (Functional Math Skills Goal 1, SLP)  goal ongoing  -SL  goal ongoing  -KA  --    Comment (Functional Math Skills Goal 1, SLP)  --  Counting change, pt impulsive during task not taking the time to attend to the left or identify the correct coin frequently guessing 10% without cues and cueing  to take time slow down and look at each individual coin 80%  -KA  --       Right Hemisphere Function Goal 1 (SLP)    Barriers (Right Hemisphere Function Goal 1, SLP)  --  --  L/R chart- placing information in either left or right columns based on directivves- 67%  -SL    Progress (Right Hemisphere Function Goal 1, SLP)  70%;with moderate cues (50-74%) attn to left for reading of short sent w/visual /verbal cues  -SL  --  90%;with minimal cues (75-90%) scanning for targets in 4 rows  -SL    Progress/Outcomes (Right Hemisphere Function Goal 1, SLP)  goal ongoing  -SL  goal ongoing  -KA  goal ongoing  -SL    Comment (Right Hemisphere Function Goal 1, SLP)  attn to left for check writing task-33% with visual cues on left side  -SL  Reading comprehension of short paragraph with scanning to the left 72% without cues and 100% with min cues  -KA  70% for scanning to left for reading of sentences and phrases in pm- visual cues and mod verbal cues required  -SL      User Key  (r) = Recorded By, (t) = Taken By, (c) = Cosigned By    Initials Name Provider Type     Katelyn James MS CCC-SLP Speech and Language Pathologist    Monico Riggs MA,CCC-SLP Speech and Language Pathologist    Brynn Reynaga MS CCC-SLP Speech and Language Pathologist                  Time Calculation:       Time Calculation- SLP     Row Name 01/03/20 1203 01/03/20 1028          Time Calculation- SLP    SLP Start Time  1030  -AW  1000  -AW     SLP Stop Time  1100  -AW  1030  -AW     SLP Time Calculation (min)  30 min  -AW  30 min  -AW       User Key  (r) = Recorded By, (t) = Taken By, (c) = Cosigned By    Initials Name Provider Type    Brynn Reynaga, MS CCC-SLP Speech and Language Pathologist            Therapy Charges for Today     Code Description Service Date Service Provider Modifiers Qty    [id number]  ST DEV OF COGN SKILLS EACH 15 MIN 1/3/2020 Brynn Griffith MS CCC-SLP  4                           Brynn Griffith MS CCC-JOSH  1/3/2020

## 2020-01-03 NOTE — PROGRESS NOTES
"   LOS: 17 days   Patient Care Team:  Francisco Gallagher MD as PCP - General (Family Medicine)    Chief Complaint:   Status post right CVA with TPA/petechial hemorrhage  Stroke prophylaxis-Aggrenox/Plavix  Left hemiparesis/left visual field deficit  Coronary artery disease  Hyperlipidemia - alirocumab  Hypothyroidism   Hypertension  Chronic renal insufficiency  DVT prophylaxis-SCDs    Subjective     History of Present Illness    Subjective  Patient without any chest pain or shortness of air.  Tolerating therapies.  Continues with improved strength on the left side.  Continues with a left visual field cut.  History taken from: patient    Objective     Vital Signs  Temp:  [97.4 °F (36.3 °C)-98.6 °F (37 °C)] 98.1 °F (36.7 °C)  Heart Rate:  [64-73] 72  Resp:  [18-20] 18  BP: (104-119)/(68-70) 104/68    Objective:  Vital signs: (most recent): Blood pressure 104/68, pulse 72, temperature 98.1 °F (36.7 °C), temperature source Oral, resp. rate 18, height 157.5 cm (62\"), weight 76.9 kg (169 lb 8.5 oz), SpO2 96 %, not currently breastfeeding.            Physical Exam  MENTAL STATUS -  AWAKE / ALERT  HEENT- NCAT,  , SCLERA NON-ICTERIC, CONJUNCTIVA PINK,   NO JVD   LUNGS - CTA, NO WHEEZES, RALES OR RHONCHI  Chest-ZIO Patch  HEART- RRR, NO RUB, MURMUR, OR GALLOP  ABD - NORMOACTIVE BOWEL SOUNDS, SOFT, NT.     EXT - NO EDEMA OR CYANOSIS  NEURO -oriented x4.  Good historian.  Left visual field deficit.  No dysarthria.  Speech fluent.     MOTOR EXAM - RUE/RLE 5/5. LUE/LLE 5-/5.    Improved motor control on the left side  Results Review:     I reviewed the patient's new clinical results.    Results from last 7 days   Lab Units 12/30/19  0527 12/29/19  2230 12/29/19  0652 12/28/19  0602   SODIUM mmol/L 140  --  142 142   POTASSIUM mmol/L 4.5 4.9 3.0* 3.5   CHLORIDE mmol/L 104  --  102 100   CO2 mmol/L 25.5  --  26.2 25.5   BUN mg/dL 25*  --  32* 44*   CREATININE mg/dL 1.00  --  1.06* 1.28*   CALCIUM mg/dL 8.8  --  8.8 8.7   GLUCOSE mg/dL " 146*  --  146* 160*     Results from last 7 days   Lab Units 12/30/19  0527   WBC 10*3/mm3 7.14   HEMOGLOBIN g/dL 9.6*   HEMATOCRIT % 29.7*   PLATELETS 10*3/mm3 285      Ref. Range 12/18/2019 08:53 12/18/2019 11:02   TSH Baseline Latest Ref Range: 0.270 - 4.200 uIU/mL 3.290     Vitamin B-12 Latest Ref Range: 211 - 946 pg/mL   339   25 Hydroxy, Vitamin D Latest Ref Range: 30.0 - 100.0 ng/ml   18.7 (L)       Medication Review: DONE  Scheduled Meds:    Alirocumab 75 mg Subcutaneous Q14 Days   aspirin 81 mg Oral Daily   clopidogrel 75 mg Oral Daily   dilTIAZem  mg Oral BID   docusate sodium 100 mg Oral BID   fluticasone 1 spray Each Nare Daily   insulin lispro 0-7 Units Subcutaneous 4x Daily With Meals & Nightly   levothyroxine 125 mcg Oral Q AM   metoprolol tartrate 50 mg Oral BID   pantoprazole 40 mg Oral BID AC   senna-docusate sodium 1 tablet Oral Nightly   torsemide 10 mg Oral Daily   vitamin B-12 1,000 mcg Oral Daily   vitamin D 50,000 Units Oral Q7 Days     Continuous Infusions:   PRN Meds:.•  acetaminophen  •  butalbital-acetaminophen-caffeine  •  dextrose  •  dextrose  •  glucagon (human recombinant)  •  nitroglycerin  •  phenylephrine  •  polyethylene glycol  •  potassium chloride  •  potassium chloride        Assessment/Plan       Stroke (cerebrum) (CMS/[facility])      Assessment & Plan  Status post right CVA with TPA/petechial hemorrhage  Stroke prophylaxis-Aggrenox/Plavix changed to aspirin/Plavix after the cardiac cath on December 26  Zio patch in place.     Left hemiparesis/left visual field deficit  Impaired mobility/self-care     Adjunctive medications post stroke-on levothyroxine with TSH within normal limits.  B12 within normal limits.  Vitamin D deficiency-associated with stroke/stroke recovery-  will add ergocalciferol 50,000 units weekly.     Coronary artery disease -history of CABG/stent.    December 19-troponins negative yesterday.  No chest pain today.  Tolerating therapies.  December 26-Patient  with episode of crushing chest pain this morning into her right neck.  Relieved with nitroglycerin x3.  Transferred to monitored bed on 5 S.  Seen by cardiology with plans for cardiac cath this afternoon pending input from nephrology regarding Contrast and neurology regarding antiplatelet options.   December 27-She did have Solu-Medrol per renal with hyperglycemia.  She had left heart catheterization which showed patent grafts.  She had a radial arterial graft to the mid LAD which had 50-60% stenosis with 2 stents and DUY-3 flow.  She had normal left ventricular filling pressures and there was no intervention.  Medical management was recommended.  Change to aspirin/Plavix    Hyperlipidemia - alirocumab     Hypothyroidism-levothyroxine     Hypertension-on Cardizem and metoprolol.  Aldactone and lisinopril held after recent acute on chronic renal insufficiency.  On lower dose torsemide.     Chronic renal insufficiency     DVT prophylaxis-SCDs    Chronic low back pain-off nonsteroidal anti-inflammatory(Voltaren) .  Will try K pad.  May add Celebrex at later point, as has been shown not to raise risk of secondary coronary/cerebrovascular event.     Glucose intolerance-December 31-her blood sugar still tend to run higher after pre-IV steroids for contrast December 26.  On December 12 hemoglobin A1c equals 6.1.  She received 5 units of insulin on sliding scale yesterday.  Will add consistent carbohydrate to diet restriction.   January 2-still has hyperglycemia.  In the past she was told she was borderline diabetic. Presently sliding scale insulin. If hyperglycemia persists, will ask endocrinology to see regarding initiation of oral hypoglycemic agent.      Now admit for comprehensive acute inpatient rehabilitation .  This would be an interdisciplinary program with physical therapy 1 hour,  occupational therapy 1 hour, and speech therapy 1 hour, 5 days a week.  Rehabilitation nursing for carryover, monitoring of cardiac  and neurologic   status, bowel and bladder, and skin  Ongoing physician follow-up.  Weekly team conferences.  Goals are to achieve a level of contact-guard with  mobility and self-care and improved visual-spatial.   Rehabilitation prognosis fair.  Medical prognosis indeterminate.  Estimated length of stay is approximately 2 weeks, but is only an estimation.   The patient's functional status and clinical status is unchanged from preadmission assessment and the patient continues appropriate for acute inpatient rehabilitation.  Goal is for home with outpatient   therapies.  Barrier to discharge: Impaired strength and mobility- work on transfers ambulation and self-care to overcome.         TEAM CONF- DEC 19 - TRANSFERS MIN. BED CTG. 40 FEET MIN RW. ADLS MIN ASSIST. LEFT VISUAL FIELD DEFICIT. LEFT INATTENTION  [facility]OS- 2 WEEKS    TEAM CONF - DEC 26 - BED SBA. TRANSFES MIN-CTG. GAIT 80 FEET MIN-CTG RW. ADLS CTG. LEFT INATTENTION. MODERATE DEFICITS FOR REASONING AND MATH.  ELOS - 1 WEEK from a rehab standpoint.  Family conference scheduled for tomorrow and family would like to still keep that time as members from out of town are here.    December 27-transfer back to the rehab unit after cardiac cath yesterday.    TEAM CONF - JAN 2 - BED SUP. 4 STAIRS CTG. GAIT 160 FEET MIN RW , IMPACTED BY VF CUT. BATH SBA. UBD MIN. LBD MIN. CUES TO ATTEND TO LEFT. COORDINAITON IMPROVING. MOD CUES WITH SLP TO ATTEND TO LEFT SIDE. MOD CUES FOR WORKING MEMORY, REASONING, MATH. BNE PENDING. CONTINENT BOWEL AND BLADDER. LEFT GROIN SITE HEALING WITH DRESSING. ZIO PATCH.  HYPERGLYCEMIA - ON CONSISTENT CARB DIET.  [facility] - WED JAN 8.    Azael Abdullahi MD  01/03/20  11:27 AM    Time:

## 2020-01-04 LAB
BACTERIA UR QL AUTO: ABNORMAL /HPF
BILIRUB UR QL STRIP: NEGATIVE
CLARITY UR: CLEAR
COLOR UR: YELLOW
GLUCOSE BLDC GLUCOMTR-MCNC: 152 MG/DL (ref 70–130)
GLUCOSE BLDC GLUCOMTR-MCNC: 154 MG/DL (ref 70–130)
GLUCOSE BLDC GLUCOMTR-MCNC: 173 MG/DL (ref 70–130)
GLUCOSE BLDC GLUCOMTR-MCNC: 185 MG/DL (ref 70–130)
GLUCOSE UR STRIP-MCNC: NEGATIVE MG/DL
HGB UR QL STRIP.AUTO: NEGATIVE
HYALINE CASTS UR QL AUTO: ABNORMAL /LPF
KETONES UR QL STRIP: NEGATIVE
LEUKOCYTE ESTERASE UR QL STRIP.AUTO: ABNORMAL
NITRITE UR QL STRIP: NEGATIVE
PH UR STRIP.AUTO: <=5 [PH] (ref 5–8)
PROT UR QL STRIP: NEGATIVE
RBC # UR: ABNORMAL /HPF
REF LAB TEST METHOD: ABNORMAL
SP GR UR STRIP: 1.01 (ref 1–1.03)
SQUAMOUS #/AREA URNS HPF: ABNORMAL /HPF
UROBILINOGEN UR QL STRIP: ABNORMAL
WBC UR QL AUTO: ABNORMAL /HPF

## 2020-01-04 PROCEDURE — 63710000001 INSULIN LISPRO (HUMAN) PER 5 UNITS: Performed by: PHYSICAL MEDICINE & REHABILITATION

## 2020-01-04 PROCEDURE — 87088 URINE BACTERIA CULTURE: CPT | Performed by: PHYSICAL MEDICINE & REHABILITATION

## 2020-01-04 PROCEDURE — 97110 THERAPEUTIC EXERCISES: CPT

## 2020-01-04 PROCEDURE — 87086 URINE CULTURE/COLONY COUNT: CPT | Performed by: PHYSICAL MEDICINE & REHABILITATION

## 2020-01-04 PROCEDURE — 87186 SC STD MICRODIL/AGAR DIL: CPT | Performed by: PHYSICAL MEDICINE & REHABILITATION

## 2020-01-04 PROCEDURE — 82962 GLUCOSE BLOOD TEST: CPT

## 2020-01-04 PROCEDURE — 81001 URINALYSIS AUTO W/SCOPE: CPT | Performed by: PHYSICAL MEDICINE & REHABILITATION

## 2020-01-04 RX ORDER — CEFDINIR 300 MG/1
300 CAPSULE ORAL EVERY 12 HOURS SCHEDULED
Status: DISCONTINUED | OUTPATIENT
Start: 2020-01-04 | End: 2020-01-08 | Stop reason: HOSPADM

## 2020-01-04 RX ADMIN — DOCUSATE SODIUM 100 MG: 100 CAPSULE, LIQUID FILLED ORAL at 09:47

## 2020-01-04 RX ADMIN — INSULIN LISPRO 2 UNITS: 100 INJECTION, SOLUTION INTRAVENOUS; SUBCUTANEOUS at 16:29

## 2020-01-04 RX ADMIN — PANTOPRAZOLE SODIUM 40 MG: 40 TABLET, DELAYED RELEASE ORAL at 06:17

## 2020-01-04 RX ADMIN — CEFDINIR 300 MG: 300 CAPSULE ORAL at 16:13

## 2020-01-04 RX ADMIN — INSULIN LISPRO 2 UNITS: 100 INJECTION, SOLUTION INTRAVENOUS; SUBCUTANEOUS at 20:36

## 2020-01-04 RX ADMIN — ZOLPIDEM TARTRATE 5 MG: 5 TABLET ORAL at 20:09

## 2020-01-04 RX ADMIN — ACETAMINOPHEN 650 MG: 325 TABLET, FILM COATED ORAL at 20:09

## 2020-01-04 RX ADMIN — Medication 1000 MCG: at 09:43

## 2020-01-04 RX ADMIN — DILTIAZEM HYDROCHLORIDE 180 MG: 180 CAPSULE, COATED, EXTENDED RELEASE ORAL at 09:43

## 2020-01-04 RX ADMIN — SENNOSIDES AND DOCUSATE SODIUM 1 TABLET: 8.6; 5 TABLET ORAL at 20:09

## 2020-01-04 RX ADMIN — INSULIN LISPRO 2 UNITS: 100 INJECTION, SOLUTION INTRAVENOUS; SUBCUTANEOUS at 09:42

## 2020-01-04 RX ADMIN — ASPIRIN 81 MG: 81 TABLET, COATED ORAL at 09:43

## 2020-01-04 RX ADMIN — TORSEMIDE 10 MG: 10 TABLET ORAL at 09:44

## 2020-01-04 RX ADMIN — PANTOPRAZOLE SODIUM 40 MG: 40 TABLET, DELAYED RELEASE ORAL at 16:30

## 2020-01-04 RX ADMIN — CEFDINIR 300 MG: 300 CAPSULE ORAL at 20:09

## 2020-01-04 RX ADMIN — LEVOTHYROXINE SODIUM 125 MCG: 125 TABLET ORAL at 06:17

## 2020-01-04 RX ADMIN — CLOPIDOGREL 75 MG: 75 TABLET, FILM COATED ORAL at 09:44

## 2020-01-04 RX ADMIN — DOCUSATE SODIUM 100 MG: 100 CAPSULE, LIQUID FILLED ORAL at 20:09

## 2020-01-04 RX ADMIN — METOPROLOL TARTRATE 50 MG: 50 TABLET, FILM COATED ORAL at 20:09

## 2020-01-04 RX ADMIN — INSULIN LISPRO 2 UNITS: 100 INJECTION, SOLUTION INTRAVENOUS; SUBCUTANEOUS at 11:53

## 2020-01-04 RX ADMIN — DILTIAZEM HYDROCHLORIDE 180 MG: 180 CAPSULE, COATED, EXTENDED RELEASE ORAL at 20:09

## 2020-01-04 RX ADMIN — METOPROLOL TARTRATE 50 MG: 50 TABLET, FILM COATED ORAL at 09:44

## 2020-01-04 NOTE — PROGRESS NOTES
Inpatient Rehabilitation Plan of Care Note    Plan of Care  Care Plan Reviewed - Updates as Follows    Psychosocial    Performed Intervention(s)  calm enviroment  verbalizes needs/concerns  Support/Peer groups  Therapeutic environmental set-up      Safety    Performed Intervention(s)  Falls precautions/hourly rounds, & Items within reach  Bed/chair alarms, call bell within reach.  Environmental set-up to reduce risk      Sphincter Control    Performed Intervention(s)  monitor intake and output  medication      Pain    Performed Intervention(s)  pain meds/creams as needed  assess pain during rounding  Distraction/Relaxation    Signed by: Oleg Vega RN

## 2020-01-04 NOTE — PLAN OF CARE
Problem: Patient Care Overview  Goal: Plan of Care Review  Outcome: Ongoing (interventions implemented as appropriate)  Flowsheets (Taken 1/4/2020 2727)  Outcome Summary: Pt rested well this shift.  C/o pain in back.  received tylenol.  Meds whole with water.  Cont b&b.  No c/o burning w/urination.  Anticipates pass home on Sunday.  Progress, Functional Goals: demonstrating adequate progress  Plan of Care Reviewed With: patient  IRF Plan of Care Review: progress ongoing, continue

## 2020-01-04 NOTE — PROGRESS NOTES
"   LOS: 18 days   Patient Care Team:  Francisco Gallagher MD as PCP - General (Family Medicine)    Chief Complaint:   Status post right CVA with TPA/petechial hemorrhage  Stroke prophylaxis-Aggrenox/Plavix  Left hemiparesis/left visual field deficit  Coronary artery disease  Hyperlipidemia - alirocumab  Hypothyroidism   Hypertension  Chronic renal insufficiency  DVT prophylaxis-SCDs    Subjective     History of Present Illness    Subjective  Seen and examined, no acute events overnight. Feels OK, denies chest pain, shortness of breath, f/c. Slept well. Continue with left sided weakness. Feels strength is improving. Reporting some back pain. Continues with dysuria.     History taken from: patient    Objective     Vital Signs  Temp:  [97.1 °F (36.2 °C)-97.9 °F (36.6 °C)] 97.9 °F (36.6 °C)  Heart Rate:  [66-70] 67  Resp:  [18] 18  BP: (117-136)/(57-70) 118/57    Objective:  Vital signs: (most recent): Blood pressure 118/57, pulse 67, temperature 97.9 °F (36.6 °C), temperature source Oral, resp. rate 18, height 157.5 cm (62\"), weight 76.9 kg (169 lb 8.5 oz), SpO2 98 %, not currently breastfeeding.            Physical Exam  MENTAL STATUS -  AWAKE / ALERT  HEENT- NCAT,  , SCLERA NON-ICTERIC, CONJUNCTIVA PINK,   NO JVD   LUNGS - CTA, NO WHEEZES, RALES OR RHONCHI  Chest-ZIO Patch  HEART- RRR, NO RUB, MURMUR, OR GALLOP  ABD - NORMOACTIVE BOWEL SOUNDS, SOFT, NT.     EXT - NO EDEMA OR CYANOSIS  NEURO -oriented x4.  Good historian.  Left visual field deficit.  No dysarthria.  Speech fluent.     MOTOR EXAM - RUE/RLE 5/5. LUE/LLE 5-/5.    Improved motor control on the left side  Results Review:     I reviewed the patient's new clinical results.    Results from last 7 days   Lab Units 12/30/19  0527 12/29/19  2230 12/29/19  0652   SODIUM mmol/L 140  --  142   POTASSIUM mmol/L 4.5 4.9 3.0*   CHLORIDE mmol/L 104  --  102   CO2 mmol/L 25.5  --  26.2   BUN mg/dL 25*  --  32*   CREATININE mg/dL 1.00  --  1.06*   CALCIUM mg/dL 8.8  --  8.8 "   GLUCOSE mg/dL 146*  --  146*     Results from last 7 days   Lab Units 12/30/19  0527   WBC 10*3/mm3 7.14   HEMOGLOBIN g/dL 9.6*   HEMATOCRIT % 29.7*   PLATELETS 10*3/mm3 285      Ref. Range 12/18/2019 08:53 12/18/2019 11:02   TSH Baseline Latest Ref Range: 0.270 - 4.200 uIU/mL 3.290     Vitamin B-12 Latest Ref Range: 211 - 946 pg/mL   339   25 Hydroxy, Vitamin D Latest Ref Range: 30.0 - 100.0 ng/ml   18.7 (L)       Medication Review: DONE  Scheduled Meds:    Alirocumab 75 mg Subcutaneous Q14 Days   aspirin 81 mg Oral Daily   clopidogrel 75 mg Oral Daily   dilTIAZem  mg Oral BID   docusate sodium 100 mg Oral BID   fluticasone 1 spray Each Nare Daily   insulin lispro 0-7 Units Subcutaneous 4x Daily With Meals & Nightly   levothyroxine 125 mcg Oral Q AM   metoprolol tartrate 50 mg Oral BID   pantoprazole 40 mg Oral BID AC   senna-docusate sodium 1 tablet Oral Nightly   torsemide 10 mg Oral Daily   vitamin B-12 1,000 mcg Oral Daily   vitamin D 50,000 Units Oral Q7 Days     Continuous Infusions:   PRN Meds:.•  acetaminophen  •  butalbital-acetaminophen-caffeine  •  dextrose  •  dextrose  •  glucagon (human recombinant)  •  nitroglycerin  •  phenylephrine  •  polyethylene glycol  •  potassium chloride  •  potassium chloride  •  zolpidem        Assessment/Plan       Stroke (cerebrum) (CMS/[facility])      Assessment & Plan  Status post right CVA with TPA/petechial hemorrhage  Stroke prophylaxis-Aggrenox/Plavix changed to aspirin/Plavix after the cardiac cath on December 26  Zio patch in place.     Left hemiparesis/left visual field deficit  Impaired mobility/self-care     Adjunctive medications post stroke-on levothyroxine with TSH within normal limits.  B12 within normal limits.  Vitamin D deficiency-associated with stroke/stroke recovery-  will add ergocalciferol 50,000 units weekly.     Coronary artery disease -history of CABG/stent.    December 19-troponins negative yesterday.  No chest pain today.  Tolerating  therapies.  December 26-Patient with episode of crushing chest pain this morning into her right neck.  Relieved with nitroglycerin x3.  Transferred to monitored bed on 5 S.  Seen by cardiology with plans for cardiac cath this afternoon pending input from nephrology regarding Contrast and neurology regarding antiplatelet options.   December 27-She did have Solu-Medrol per renal with hyperglycemia.  She had left heart catheterization which showed patent grafts.  She had a radial arterial graft to the mid LAD which had 50-60% stenosis with 2 stents and DUY-3 flow.  She had normal left ventricular filling pressures and there was no intervention.  Medical management was recommended.  Change to aspirin/Plavix    Hyperlipidemia - alirocumab     Hypothyroidism-levothyroxine     Hypertension-on Cardizem and metoprolol.  Aldactone and lisinopril held after recent acute on chronic renal insufficiency.  On lower dose torsemide.     Chronic renal insufficiency     DVT prophylaxis-SCDs    Chronic low back pain-off nonsteroidal anti-inflammatory(Voltaren) .  Will try K pad.  May add Celebrex at later point, as has been shown not to raise risk of secondary coronary/cerebrovascular event.     Glucose intolerance-December 31-her blood sugar still tend to run higher after pre-IV steroids for contrast December 26.  On December 12 hemoglobin A1c equals 6.1.  She received 5 units of insulin on sliding scale yesterday.  Will add consistent carbohydrate to diet restriction.   January 2-still has hyperglycemia.  In the past she was told she was borderline diabetic. Presently sliding scale insulin. If hyperglycemia persists, will ask endocrinology to see regarding initiation of oral hypoglycemic agent.      Now admit for comprehensive acute inpatient rehabilitation .  This would be an interdisciplinary program with physical therapy 1 hour,  occupational therapy 1 hour, and speech therapy 1 hour, 5 days a week.  Rehabilitation nursing for  carryover, monitoring of cardiac and neurologic   status, bowel and bladder, and skin  Ongoing physician follow-up.  Weekly team conferences.  Goals are to achieve a level of contact-guard with  mobility and self-care and improved visual-spatial.   Rehabilitation prognosis fair.  Medical prognosis indeterminate.  Estimated length of stay is approximately 2 weeks, but is only an estimation.   The patient's functional status and clinical status is unchanged from preadmission assessment and the patient continues appropriate for acute inpatient rehabilitation.  Goal is for home with outpatient   therapies.  Barrier to discharge: Impaired strength and mobility- work on transfers ambulation and self-care to overcome.         TEAM CONF- DEC 19 - TRANSFERS MIN. BED CTG. 40 FEET MIN RW. ADLS MIN ASSIST. LEFT VISUAL FIELD DEFICIT. LEFT INATTENTION  ELOS- 2 WEEKS    TEAM CONF - DEC 26 - BED SBA. TRANSFES MIN-CTG. GAIT 80 FEET MIN-CTG RW. ADLS CTG. LEFT INATTENTION. MODERATE DEFICITS FOR REASONING AND MATH.  ELOS - 1 WEEK from a rehab standpoint.  Family conference scheduled for tomorrow and family would like to still keep that time as members from out of town are here.    December 27-transfer back to the rehab unit after cardiac cath yesterday.    TEAM CONF - JAN 2 - BED SUP. 4 STAIRS CTG. GAIT 160 FEET MIN RW , IMPACTED BY VF CUT. BATH SBA. UBD MIN. LBD MIN. CUES TO ATTEND TO LEFT. COORDINAITON IMPROVING. MOD CUES WITH SLP TO ATTEND TO LEFT SIDE. MOD CUES FOR WORKING MEMORY, REASONING, MATH. BNE PENDING. CONTINENT BOWEL AND BLADDER. LEFT GROIN SITE HEALING WITH DRESSING. ZIO PATCH.  HYPERGLYCEMIA - ON CONSISTENT CARB DIET.  ELOS - WED JAN 8.    1/4  -Chart reviewed, continue with current rehabilitation plan  - U/A checked for dysuria, appears consistent with UTI, will start cefdinir and follow for culture result    Mark Barahona MD  01/04/20  1:46 PM    Time:

## 2020-01-04 NOTE — THERAPY TREATMENT NOTE
Inpatient Rehabilitation - Physical Therapy Treatment Note  [facility]     Patient Name: Liana Carrero  : 1935  MRN: [id number]    Today's Date: 2020                 Admit Date: 2019      Visit Dx:      ICD-10-CM ICD-9-CM   1. Unsteadiness on feet R26.81 781.2   2. Difficulty walking R26.2 719.7   3. 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 (CMS/[facility]) I67.9 438.22    G81.94        Patient Active Problem List   Diagnosis   • Stroke (cerebrum) (CMS/[facility])   • Chest pain       Therapy Treatment    IRF Treatment Summary     Row Name 20 0959             Evaluation/Treatment Time and Intent    Subjective Information  complains of;pain  -LB      Existing Precautions/Restrictions  fall  -LB      Document Type  therapy note (daily note)  -LB      Mode of Treatment  physical therapy  -LB      Patient/Family Observations  Pleasant and cooperative  -LB      Recorded by [LB] Elba Grigsby PTA      Row Name 20 0959             Sit-Stand Transfer    Sit-Stand Gila (Transfers)  contact guard  -LB      Assistive Device (Sit-Stand Transfers)  walker, front-wheeled  -LB      Recorded by [LB] Elba Grigsby PTA      Row Name 20 0959             Stand-Sit Transfer    Stand-Sit Gila (Transfers)  contact guard  -LB      Assistive Device (Stand-Sit Transfers)  walker, front-wheeled  -LB      Recorded by [LB] Elba Grigsby PTA      Row Name 20 0959             Gait/Stairs Assessment/Training    Gila Level (Gait)  contact guard;verbal cues vc's for upright posture, and to amb closer to rwx  -LB      Assistive Device (Gait)  walker, front-wheeled  -LB      Distance in Feet (Gait)  160 x 3, 80 x 1  -LB      Pattern (Gait)  step-through  -LB      Deviations/Abnormal Patterns (Gait)  radha decreased;stride length decreased  -LB      Bilateral Gait Deviations  forward flexed posture;heel strike decreased  -LB      Left Sided Gait  Deviations  leans left  -LB      Right Sided Gait Deviations  weight shift ability decreased  -LB      [place] Level (Stairs)  contact guard  -LB      Handrail Location (Stairs)  both sides  -LB      Number of Steps (Stairs)  4  -LB      Ascending Technique (Stairs)  step-to-step  -LB      Descending Technique (Stairs)  step-to-step  -LB      Recorded by [GISELLA] Elba Grigsby PTA      Row Name 01/04/20 0959             Pain Scale: Numbers Pre/Post-Treatment    Pain Scale: Numbers, Pretreatment  6/10  -LB      Pain Location  back  -LB      Pain Intervention(s)  Medication (See MAR);Ambulation/increased activity  -LB      Recorded by [GISELLA] Elba Grigsby PTA      Row Name 01/04/20 0959             Positioning and Restraints    Post Treatment Position  bed  -LB      In Bed  supine;call light within reach;exit alarm on  -LB      Recorded by [GISELLA] Elba Grigsby PTA        User Key  (r) = Recorded By, (t) = Taken By, (c) = Cosigned By    Initials Name Effective Dates    Elba Stauffer PTA 03/07/18 -                  PT Recommendation and Plan                        Time Calculation:     PT Charges     Row Name 01/04/20 1412             Time Calculation    Start Time  0900  -LB      Stop Time  0930  -LB      Time Calculation (min)  30 min  -LB        User Key  (r) = Recorded By, (t) = Taken By, (c) = Cosigned By    Initials Name Provider Type    Elba Stauffer PTA Physical Therapy Assistant          Therapy Charges for Today     Code Description Service Date Service Provider Modifiers Qty    [id number]  PT THER PROC EA 15 MIN 1/4/2020 Elba Grigsby PTA GP 2                   Elba Grigsby PTA  1/4/2020

## 2020-01-04 NOTE — PROGRESS NOTES
Inpatient Rehabilitation Plan of Care Note    Plan of Care  Care Plan Reviewed - No updates at this time.    Psychosocial    Performed Intervention(s)  calm enviroment  verbalizes needs/concerns  Support/Peer groups  Therapeutic environmental set-up      Safety    Performed Intervention(s)  Falls precautions/hourly rounds, & Items within reach  Bed/chair alarms, call bell within reach.  Environmental set-up to reduce risk      Sphincter Control    Performed Intervention(s)  monitor intake and output  medication      Pain    Performed Intervention(s)  pain meds/creams as needed  assess pain during rounding  Distraction/Relaxation    Signed by: Afshin Vargas RN

## 2020-01-04 NOTE — PROGRESS NOTES
Occupational Therapy: Branch    Physical Therapy: Individual: 30 minutes.    Speech Language Pathology:  Branch    Signed by: Elba Grigsby PTA

## 2020-01-05 LAB
GLUCOSE BLDC GLUCOMTR-MCNC: 144 MG/DL (ref 70–130)
GLUCOSE BLDC GLUCOMTR-MCNC: 256 MG/DL (ref 70–130)

## 2020-01-05 PROCEDURE — 82962 GLUCOSE BLOOD TEST: CPT

## 2020-01-05 PROCEDURE — 63710000001 INSULIN LISPRO (HUMAN) PER 5 UNITS: Performed by: PHYSICAL MEDICINE & REHABILITATION

## 2020-01-05 RX ADMIN — CLOPIDOGREL 75 MG: 75 TABLET, FILM COATED ORAL at 08:49

## 2020-01-05 RX ADMIN — CEFDINIR 300 MG: 300 CAPSULE ORAL at 08:49

## 2020-01-05 RX ADMIN — DOCUSATE SODIUM 100 MG: 100 CAPSULE, LIQUID FILLED ORAL at 20:08

## 2020-01-05 RX ADMIN — DILTIAZEM HYDROCHLORIDE 180 MG: 180 CAPSULE, COATED, EXTENDED RELEASE ORAL at 08:49

## 2020-01-05 RX ADMIN — LEVOTHYROXINE SODIUM 125 MCG: 125 TABLET ORAL at 05:43

## 2020-01-05 RX ADMIN — METOPROLOL TARTRATE 50 MG: 50 TABLET, FILM COATED ORAL at 20:08

## 2020-01-05 RX ADMIN — PANTOPRAZOLE SODIUM 40 MG: 40 TABLET, DELAYED RELEASE ORAL at 05:43

## 2020-01-05 RX ADMIN — Medication 1000 MCG: at 08:49

## 2020-01-05 RX ADMIN — TORSEMIDE 10 MG: 10 TABLET ORAL at 08:50

## 2020-01-05 RX ADMIN — METOPROLOL TARTRATE 50 MG: 50 TABLET, FILM COATED ORAL at 08:49

## 2020-01-05 RX ADMIN — CEFDINIR 300 MG: 300 CAPSULE ORAL at 20:09

## 2020-01-05 RX ADMIN — DOCUSATE SODIUM 100 MG: 100 CAPSULE, LIQUID FILLED ORAL at 08:50

## 2020-01-05 RX ADMIN — DILTIAZEM HYDROCHLORIDE 180 MG: 180 CAPSULE, COATED, EXTENDED RELEASE ORAL at 20:09

## 2020-01-05 RX ADMIN — PANTOPRAZOLE SODIUM 40 MG: 40 TABLET, DELAYED RELEASE ORAL at 16:53

## 2020-01-05 RX ADMIN — ZOLPIDEM TARTRATE 5 MG: 5 TABLET ORAL at 20:09

## 2020-01-05 RX ADMIN — INSULIN LISPRO 4 UNITS: 100 INJECTION, SOLUTION INTRAVENOUS; SUBCUTANEOUS at 20:13

## 2020-01-05 RX ADMIN — ACETAMINOPHEN 650 MG: 325 TABLET, FILM COATED ORAL at 19:14

## 2020-01-05 RX ADMIN — SENNOSIDES AND DOCUSATE SODIUM 1 TABLET: 8.6; 5 TABLET ORAL at 20:09

## 2020-01-05 RX ADMIN — ASPIRIN 81 MG: 81 TABLET, COATED ORAL at 08:49

## 2020-01-05 NOTE — PLAN OF CARE
Problem: Patient Care Overview  Goal: Plan of Care Review  Outcome: Ongoing (interventions implemented as appropriate)  Flowsheets (Taken 1/5/2020 5888)  Outcome Summary: Pt rested well this shift.  C/o back pain, with tylenol.  Pt looking forward to home pass today.  Meds whole with water.  Uses ambien at hs.  Pleasant and cooperative.  Left neglect.  Uses call light appropriately.  Progress, Functional Goals: demonstrating adequate progress  Plan of Care Reviewed With: patient  IRF Plan of Care Review: progress ongoing, continue

## 2020-01-05 NOTE — PLAN OF CARE
Problem: Patient Care Overview  Goal: Plan of Care Review  Outcome: Ongoing (interventions implemented as appropriate)  Flowsheets  Taken 1/5/2020 1824 by Ирина Hair, RN  Outcome Summary: Pt. looking forward to pass and going home to spend time with family and friends. A&Ox4. Continent of B&B. Meds whole with water. Calm, cooperative and pleasant. Went on day pass shortly after 9:00 AM. Will return at 8PM.  Taken 1/5/2020 0522 by Afshin Vargas RN  Progress, Functional Goals: demonstrating adequate progress  IRF Plan of Care Review: progress ongoing, continue  Taken 1/5/2020 0800 by Ирина Hair, RN  Plan of Care Reviewed With: patient;daughter

## 2020-01-05 NOTE — PROGRESS NOTES
"   LOS: 19 days   Patient Care Team:  Francisco Gallagher MD as PCP - General (Family Medicine)    Chief Complaint:   Status post right CVA with TPA/petechial hemorrhage  Stroke prophylaxis-Aggrenox/Plavix  Left hemiparesis/left visual field deficit  Coronary artery disease  Hyperlipidemia - alirocumab  Hypothyroidism   Hypertension  Chronic renal insufficiency  DVT prophylaxis-SCDs    Subjective     History of Present Illness    Subjective  Not seen today, on pass    Objective     Vital Signs  Temp:  [97.6 °F (36.4 °C)-98.2 °F (36.8 °C)] 97.6 °F (36.4 °C)  Heart Rate:  [67-83] 72  Resp:  [16-18] 16  BP: (118-131)/(57-62) 131/62    Objective:  Vital signs: (most recent): Blood pressure 131/62, pulse 72, temperature 97.6 °F (36.4 °C), temperature source Oral, resp. rate 16, height 157.5 cm (62\"), weight 76.9 kg (169 lb 8.5 oz), SpO2 97 %, not currently breastfeeding.            Physical Exam  MENTAL STATUS -  AWAKE / ALERT  HEENT- NCAT,  , SCLERA NON-ICTERIC, CONJUNCTIVA PINK,   NO JVD   LUNGS - CTA, NO WHEEZES, RALES OR RHONCHI  Chest-ZIO Patch  HEART- RRR, NO RUB, MURMUR, OR GALLOP  ABD - NORMOACTIVE BOWEL SOUNDS, SOFT, NT.     EXT - NO EDEMA OR CYANOSIS  NEURO -oriented x4.  Good historian.  Left visual field deficit.  No dysarthria.  Speech fluent.     MOTOR EXAM - RUE/RLE 5/5. LUE/LLE 5-/5.    Improved motor control on the left side  Results Review:     I reviewed the patient's new clinical results.    Results from last 7 days   Lab Units 12/30/19 0527 12/29/19  2230   SODIUM mmol/L 140  --    POTASSIUM mmol/L 4.5 4.9   CHLORIDE mmol/L 104  --    CO2 mmol/L 25.5  --    BUN mg/dL 25*  --    CREATININE mg/dL 1.00  --    CALCIUM mg/dL 8.8  --    GLUCOSE mg/dL 146*  --      Results from last 7 days   Lab Units 12/30/19 0527   WBC 10*3/mm3 7.14   HEMOGLOBIN g/dL 9.6*   HEMATOCRIT % 29.7*   PLATELETS 10*3/mm3 285      Ref. Range 12/18/2019 08:53 12/18/2019 11:02   TSH Baseline Latest Ref Range: 0.270 - 4.200 uIU/mL " 3.290     Vitamin B-12 Latest Ref Range: 211 - 946 pg/mL   339   25 Hydroxy, Vitamin D Latest Ref Range: 30.0 - 100.0 ng/ml   18.7 (L)       Medication Review: DONE  Scheduled Meds:    Alirocumab 75 mg Subcutaneous Q14 Days   aspirin 81 mg Oral Daily   cefdinir 300 mg Oral Q12H   clopidogrel 75 mg Oral Daily   dilTIAZem  mg Oral BID   docusate sodium 100 mg Oral BID   fluticasone 1 spray Each Nare Daily   insulin lispro 0-7 Units Subcutaneous 4x Daily With Meals & Nightly   levothyroxine 125 mcg Oral Q AM   metoprolol tartrate 50 mg Oral BID   pantoprazole 40 mg Oral BID AC   senna-docusate sodium 1 tablet Oral Nightly   torsemide 10 mg Oral Daily   vitamin B-12 1,000 mcg Oral Daily   vitamin D 50,000 Units Oral Q7 Days     Continuous Infusions:   PRN Meds:.•  acetaminophen  •  butalbital-acetaminophen-caffeine  •  dextrose  •  dextrose  •  glucagon (human recombinant)  •  nitroglycerin  •  phenylephrine  •  polyethylene glycol  •  potassium chloride  •  potassium chloride  •  zolpidem        Assessment/Plan       Stroke (cerebrum) (CMS/[facility])      Assessment & Plan  Status post right CVA with TPA/petechial hemorrhage  Stroke prophylaxis-Aggrenox/Plavix changed to aspirin/Plavix after the cardiac cath on December 26  Zio patch in place.     Left hemiparesis/left visual field deficit  Impaired mobility/self-care     Adjunctive medications post stroke-on levothyroxine with TSH within normal limits.  B12 within normal limits.  Vitamin D deficiency-associated with stroke/stroke recovery-  will add ergocalciferol 50,000 units weekly.     Coronary artery disease -history of CABG/stent.    December 19-troponins negative yesterday.  No chest pain today.  Tolerating therapies.  December 26-Patient with episode of crushing chest pain this morning into her right neck.  Relieved with nitroglycerin x3.  Transferred to monitored bed on 5 S.  Seen by cardiology with plans for cardiac cath this afternoon pending input from  nephrology regarding Contrast and neurology regarding antiplatelet options.   December 27-She did have Solu-Medrol per renal with hyperglycemia.  She had left heart catheterization which showed patent grafts.  She had a radial arterial graft to the mid LAD which had 50-60% stenosis with 2 stents and DUY-3 flow.  She had normal left ventricular filling pressures and there was no intervention.  Medical management was recommended.  Change to aspirin/Plavix    Hyperlipidemia - alirocumab     Hypothyroidism-levothyroxine     Hypertension-on Cardizem and metoprolol.  Aldactone and lisinopril held after recent acute on chronic renal insufficiency.  On lower dose torsemide.     Chronic renal insufficiency     DVT prophylaxis-SCDs    Chronic low back pain-off nonsteroidal anti-inflammatory(Voltaren) .  Will try K pad.  May add Celebrex at later point, as has been shown not to raise risk of secondary coronary/cerebrovascular event.     Glucose intolerance-December 31-her blood sugar still tend to run higher after pre-IV steroids for contrast December 26.  On December 12 hemoglobin A1c equals 6.1.  She received 5 units of insulin on sliding scale yesterday.  Will add consistent carbohydrate to diet restriction.   January 2-still has hyperglycemia.  In the past she was told she was borderline diabetic. Presently sliding scale insulin. If hyperglycemia persists, will ask endocrinology to see regarding initiation of oral hypoglycemic agent.      Now admit for comprehensive acute inpatient rehabilitation .  This would be an interdisciplinary program with physical therapy 1 hour,  occupational therapy 1 hour, and speech therapy 1 hour, 5 days a week.  Rehabilitation nursing for carryover, monitoring of cardiac and neurologic   status, bowel and bladder, and skin  Ongoing physician follow-up.  Weekly team conferences.  Goals are to achieve a level of contact-guard with  mobility and self-care and improved visual-spatial.    Rehabilitation prognosis fair.  Medical prognosis indeterminate.  Estimated length of stay is approximately 2 weeks, but is only an estimation.   The patient's functional status and clinical status is unchanged from preadmission assessment and the patient continues appropriate for acute inpatient rehabilitation.  Goal is for home with outpatient   therapies.  Barrier to discharge: Impaired strength and mobility- work on transfers ambulation and self-care to overcome.         TEAM CONF- DEC 19 - TRANSFERS MIN. BED CTG. 40 FEET MIN RW. ADLS MIN ASSIST. LEFT VISUAL FIELD DEFICIT. LEFT INATTENTION  ELOS- 2 WEEKS    TEAM [facility] - DEC 26 - BED SBA. TRANSFES MIN-CTG. GAIT 80 FEET MIN-CTG RW. ADLS CTG. LEFT INATTENTION. MODERATE DEFICITS FOR REASONING AND MATH.  ELOS - 1 WEEK from a rehab standpoint.  Family conference scheduled for tomorrow and family would like to still keep that time as members from out of town are here.    December 27-transfer back to the rehab unit after cardiac cath yesterday.    TEAM [facility] - JAN 2 - BED SUP. 4 STAIRS CTG. GAIT 160 FEET MIN RW , IMPACTED BY VF CUT. BATH SBA. UBD MIN. LBD MIN. CUES TO ATTEND TO LEFT. COORDINAITON IMPROVING. MOD CUES WITH SLP TO ATTEND TO LEFT SIDE. MOD CUES FOR WORKING MEMORY, REASONING, MATH. BNE PENDING. CONTINENT BOWEL AND BLADDER. LEFT GROIN SITE HEALING WITH DRESSING. ZIO PATCH.  HYPERGLYCEMIA - ON CONSISTENT CARB DIET.  ELOS - WED JAN 8.    1/4  -Chart reviewed, continue with current rehabilitation plan  - U/A checked for dysuria, appears consistent with UTI, will start cefdinir and follow for culture result    1/5  -Chart reviewed, continue with current rehabilitation plan  - Pass today  - Urine culture with E coli, sensitivities pending, continue cefdinir    Mark Barahona MD  01/05/20  12:03 PM    Time:

## 2020-01-05 NOTE — PROGRESS NOTES
Inpatient Rehabilitation Plan of Care Note    Plan of Care  Care Plan Reviewed - No updates at this time.    Signed by: Concha Moyer RN

## 2020-01-06 LAB
BACTERIA SPEC AEROBE CULT: ABNORMAL
BASOPHILS # BLD AUTO: 0.05 10*3/MM3 (ref 0–0.2)
BASOPHILS NFR BLD AUTO: 0.7 % (ref 0–1.5)
DEPRECATED RDW RBC AUTO: 51.2 FL (ref 37–54)
EOSINOPHIL # BLD AUTO: 0.11 10*3/MM3 (ref 0–0.4)
EOSINOPHIL NFR BLD AUTO: 1.6 % (ref 0.3–6.2)
ERYTHROCYTE [DISTWIDTH] IN BLOOD BY AUTOMATED COUNT: 17.2 % (ref 12.3–15.4)
GLUCOSE BLDC GLUCOMTR-MCNC: 140 MG/DL (ref 70–130)
GLUCOSE BLDC GLUCOMTR-MCNC: 147 MG/DL (ref 70–130)
GLUCOSE BLDC GLUCOMTR-MCNC: 154 MG/DL (ref 70–130)
GLUCOSE BLDC GLUCOMTR-MCNC: 216 MG/DL (ref 70–130)
HCT VFR BLD AUTO: 31.4 % (ref 34–46.6)
HGB BLD-MCNC: 10.5 G/DL (ref 12–15.9)
IMM GRANULOCYTES # BLD AUTO: 0.03 10*3/MM3 (ref 0–0.05)
IMM GRANULOCYTES NFR BLD AUTO: 0.4 % (ref 0–0.5)
LYMPHOCYTES # BLD AUTO: 2.2 10*3/MM3 (ref 0.7–3.1)
LYMPHOCYTES NFR BLD AUTO: 32.5 % (ref 19.6–45.3)
MCH RBC QN AUTO: 28.7 PG (ref 26.6–33)
MCHC RBC AUTO-ENTMCNC: 33.4 G/DL (ref 31.5–35.7)
MCV RBC AUTO: 85.8 FL (ref 79–97)
MONOCYTES # BLD AUTO: 0.53 10*3/MM3 (ref 0.1–0.9)
MONOCYTES NFR BLD AUTO: 7.8 % (ref 5–12)
NEUTROPHILS # BLD AUTO: 3.85 10*3/MM3 (ref 1.7–7)
NEUTROPHILS NFR BLD AUTO: 57 % (ref 42.7–76)
NRBC BLD AUTO-RTO: 0 /100 WBC (ref 0–0.2)
PLATELET # BLD AUTO: 214 10*3/MM3 (ref 140–450)
PMV BLD AUTO: 10.9 FL (ref 6–12)
RBC # BLD AUTO: 3.66 10*6/MM3 (ref 3.77–5.28)
WBC NRBC COR # BLD: 6.77 10*3/MM3 (ref 3.4–10.8)

## 2020-01-06 PROCEDURE — 63710000001 INSULIN LISPRO (HUMAN) PER 5 UNITS: Performed by: PHYSICAL MEDICINE & REHABILITATION

## 2020-01-06 PROCEDURE — 97129 THER IVNTJ 1ST 15 MIN: CPT

## 2020-01-06 PROCEDURE — 99222 1ST HOSP IP/OBS MODERATE 55: CPT | Performed by: INTERNAL MEDICINE

## 2020-01-06 PROCEDURE — 82962 GLUCOSE BLOOD TEST: CPT

## 2020-01-06 PROCEDURE — 97110 THERAPEUTIC EXERCISES: CPT

## 2020-01-06 PROCEDURE — 97535 SELF CARE MNGMENT TRAINING: CPT

## 2020-01-06 PROCEDURE — 97112 NEUROMUSCULAR REEDUCATION: CPT

## 2020-01-06 PROCEDURE — 85025 COMPLETE CBC W/AUTO DIFF WBC: CPT | Performed by: PHYSICAL MEDICINE & REHABILITATION

## 2020-01-06 PROCEDURE — 97116 GAIT TRAINING THERAPY: CPT

## 2020-01-06 PROCEDURE — 97130 THER IVNTJ EA ADDL 15 MIN: CPT

## 2020-01-06 RX ORDER — INSULIN GLARGINE 100 [IU]/ML
10 INJECTION, SOLUTION SUBCUTANEOUS EVERY MORNING
Status: DISCONTINUED | OUTPATIENT
Start: 2020-01-07 | End: 2020-01-08

## 2020-01-06 RX ADMIN — DILTIAZEM HYDROCHLORIDE 180 MG: 180 CAPSULE, COATED, EXTENDED RELEASE ORAL at 20:13

## 2020-01-06 RX ADMIN — PANTOPRAZOLE SODIUM 40 MG: 40 TABLET, DELAYED RELEASE ORAL at 05:28

## 2020-01-06 RX ADMIN — CEFDINIR 300 MG: 300 CAPSULE ORAL at 08:01

## 2020-01-06 RX ADMIN — LEVOTHYROXINE SODIUM 125 MCG: 125 TABLET ORAL at 05:28

## 2020-01-06 RX ADMIN — METOPROLOL TARTRATE 50 MG: 50 TABLET, FILM COATED ORAL at 08:01

## 2020-01-06 RX ADMIN — Medication 1000 MCG: at 08:01

## 2020-01-06 RX ADMIN — INSULIN LISPRO 2 UNITS: 100 INJECTION, SOLUTION INTRAVENOUS; SUBCUTANEOUS at 08:01

## 2020-01-06 RX ADMIN — TORSEMIDE 10 MG: 10 TABLET ORAL at 08:01

## 2020-01-06 RX ADMIN — INSULIN LISPRO 3 UNITS: 100 INJECTION, SOLUTION INTRAVENOUS; SUBCUTANEOUS at 20:13

## 2020-01-06 RX ADMIN — PANTOPRAZOLE SODIUM 40 MG: 40 TABLET, DELAYED RELEASE ORAL at 17:03

## 2020-01-06 RX ADMIN — SENNOSIDES AND DOCUSATE SODIUM 1 TABLET: 8.6; 5 TABLET ORAL at 20:13

## 2020-01-06 RX ADMIN — ASPIRIN 81 MG: 81 TABLET, COATED ORAL at 08:00

## 2020-01-06 RX ADMIN — CLOPIDOGREL 75 MG: 75 TABLET, FILM COATED ORAL at 08:01

## 2020-01-06 RX ADMIN — CEFDINIR 300 MG: 300 CAPSULE ORAL at 20:13

## 2020-01-06 RX ADMIN — ACETAMINOPHEN 650 MG: 325 TABLET, FILM COATED ORAL at 20:13

## 2020-01-06 RX ADMIN — DOCUSATE SODIUM 100 MG: 100 CAPSULE, LIQUID FILLED ORAL at 20:13

## 2020-01-06 RX ADMIN — DILTIAZEM HYDROCHLORIDE 180 MG: 180 CAPSULE, COATED, EXTENDED RELEASE ORAL at 08:01

## 2020-01-06 RX ADMIN — METOPROLOL TARTRATE 50 MG: 50 TABLET, FILM COATED ORAL at 20:13

## 2020-01-06 NOTE — PROGRESS NOTES
Family conference held today with patient, 2 daughters, daughter-in-law, and son, by phone. PT, OT, NSG, and SW present. ST sent written report. Discussed progress and d/c recommendations.  PT reported patient has improved with endurance and balance, but still gets fatigued. PT reported patient transfers sit-stand with CG/SBA. Patient walking with rolling walker CGA. Patient still needs cues to scan for items on left side of environment. PT also stated patient still occasionally leans to left, especially when fatigued. Car transfers require CGA. Up/down 8 steps with rail and CGA or up/down curb with CG/Min assist. PT recommended patient use rolling walker and have someone up with her when walking due to left neglect.   OT reported patient requires CG/SBA for commode transfers but can do clothing management and hygiene with SB/CGA. Patient requires CGA for shower transfers. Patient able to do bathing seated with SBA. Patient requires assistance fastening bra for UE dressing and some assistance with tennis shoes with LE dressing. OT has also been working on visual perceptual puzzles and reported patient is attending better to left side. However still has some difficulty with motor planning of puzzle pieces. OT discussed using puzzles, coloring sheets, etc to work on visual scanning tasks at home. Will also give daughter some apps for IPad. Recommended patient have someone with her at home during ADL's due to left neglect.   ST report given by MARIA. ST focusing on visual spatial skills, looking to left, and higher level cognitive skills such as organization, planning, reasoning, and math. Visual skills impact ability to complete home management tasks. Patient requires Min-Mod cues for cognitive tasks. ST reported patient is more aware of deficits. Memory is mildly impaired for recall of new information. ST targeting strategies for memory such as repetition, association, etc). ST recommended patient have 24 hour  supervision at home and assistance with medicines/pill organizer and finances.   NSG reviewed medications and gave list to daughter. Patient has been getting sliding scale insulin as needed but endocrinologist saw patient today to discuss medication for home to manage diabetes. NSG checking blood sugars. Will consult diabetic nurse and dietitian to see patient and daughter before d/c for teaching. Patient on antibiotic for UTI. Discussed follow ups with PCP, cardiology, neurology, and Dr. Abdullahi. SW discussed possible follow up with neuro optometrist.   Patient went on home pass yesterday to daughter's home. They report pass went well, but patient did get very fatigued by the time she left to come back to hospital.   Discussed recommendation for continued PT, OT, ST after d/c. Patient would like to start out wit home therapy and then progress to outpatient due to her fatigue. Team in agreement with this plan. They have family member that works for [employer] so prefer to use this agency. Referral made to [facility] for PT, OT, ST and possibly NSG since new diabetic.  Discussed equipment for home. Will order rolling walker and BSC. Daughter has shower chair.   Patient will d/c to daughter's home on Wednesday, 1/8. Daughter and family to provide 24 hour care. Will assist with plans.

## 2020-01-06 NOTE — THERAPY TREATMENT NOTE
Inpatient Rehabilitation - Physical Therapy Treatment Note  [facility]     Patient Name: Liana Carrero  : 1935  MRN: [id number]    Today's Date: 2020                 Admit Date: 2019      Visit Dx:      ICD-10-CM ICD-9-CM   1. Unsteadiness on feet R26.81 781.2   2. Difficulty walking R26.2 719.7   3. 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 (CMS/HCC) I67.9 438.22    G81.94        Patient Active Problem List   Diagnosis   • Stroke (cerebrum) (CMS/[facility])   • Chest pain       Therapy Treatment    IRF Treatment Summary     Row Name 20 1000 20          Evaluation/Treatment Time and Intent    Subjective Information  no complaints  -AW  complains of;fatigue tired from home pass yesterday  -EE     Existing Precautions/Restrictions  fall  -AW  fall  -EE     Document Type  therapy note (daily note)  -AW  therapy note (daily note)  -EE     Mode of Treatment  speech-language pathology  -AW  physical therapy  -EE     Patient/Family Observations  Pt up in w/c, ready for therapy. Pt reported no problems with home pass yesterday.  -AW  Pt sitting up in WC in am, supine in bed in pm, in no acute distress. Pt and daughter both report home pass yesterday went well.   -EE     Recorded by [AW] Brynn Griffith, MS CCC-SLP [EE] Kayla Uribe, PT     Row Name 20             Cognition/Psychosocial- PT/OT    Orientation Status (Cognition)  oriented x 4  -EE      Follows Commands (Cognition)  follows one step commands  -EE      Personal Safety Interventions  fall prevention program maintained;gait belt;muscle strengthening facilitated;nonskid shoes/slippers when out of bed;supervised activity  -EE      Cognitive Function (Cognitive)  safety deficit  -EE      Safety Deficit (Cognitive)  mild deficit  -EE      Recorded by [EE] Kayla Uribe, PT      Row Name 20             Bed Mobility Assessment/Treatment    Supine-Sit [place] (Bed Mobility)  conditional  independence  -EE      Assistive Device (Bed Mobility)  bed rails  -EE      Recorded by [EE] Kayla Uribe, PT      Row Name 01/06/20 0914             Bed-Chair Transfer    Bed-Chair [place] (Transfers)  contact guard;stand by assist;verbal cues  -EE      Assistive Device (Bed-Chair Transfers)  wheelchair  -EE      Recorded by [EE] Kayla Uribe, PT      Row Name 01/06/20 0914             Sit-Stand Transfer    Sit-Stand [place] (Transfers)  contact guard;stand by assist;verbal cues  -EE      Assistive Device (Sit-Stand Transfers)  walker, front-wheeled  -EE      Recorded by [EE] Kayla Uribe, PT      Row Name 01/06/20 0914             Stand-Sit Transfer    Stand-Sit [place] (Transfers)  contact guard;verbal cues  -EE      Assistive Device (Stand-Sit Transfers)  walker, front-wheeled  -EE      Recorded by [EE] Kayla Uribe, PT      Row Name 01/06/20 0914             Gait/Stairs Assessment/Training    [place] Level (Gait)  contact guard;verbal cues  -EE      Assistive Device (Gait)  walker, front-wheeled  -EE      Distance in Feet (Gait)  80' x 3  -EE      Pattern (Gait)  step-through  -EE      Deviations/Abnormal Patterns (Gait)  radha decreased;stride length decreased  -EE      Bilateral Gait Deviations  forward flexed posture;heel strike decreased  -EE      Left Sided Gait Deviations  leans left  -EE      Right Sided Gait Deviations  weight shift ability decreased  -EE      [place] Level (Stairs)  contact guard;verbal cues  -EE      Handrail Location (Stairs)  both sides  -EE      Number of Steps (Stairs)  4  -EE      Ascending Technique (Stairs)  step-to-step  -EE      Descending Technique (Stairs)  step-to-step  -EE      Stairs, Safety Issues  balance decreased during turns  -EE      Comment (Gait/Stairs)  Verbal cues for upright posture and to stay close to walker during ambulation. Occasional cues to compensate for L visual field cut and avoid obstacles in gym/hallways.   -EE       Recorded by [EE] Kayla Uribe, RADHA      Row Name 01/06/20 0914             Safety Issues, Functional Mobility    Impairments Affecting Function (Mobility)  balance;endurance/activity tolerance;visual/perceptual;strength  -EE      Recorded by [EE] Kayla Uribe, RADHA      Row Name 01/06/20 0914             Pain Scale: Numbers Pre/Post-Treatment    Pain Scale: Numbers, Pretreatment  3/10  -EE      Pain Scale: Numbers, Post-Treatment  3/10  -EE      Pain Location - Side  Bilateral  -EE      Pain Location - Orientation  lower  -EE      Pain Location  back  -EE      Pain Intervention(s)  Repositioned;Rest  -EE      Recorded by [EE] Kayla Uribe, RADHA      Row Name 01/06/20 0914             Lower Extremity Standing Therapeutic Exercise    Performed, Standing Lower Extremity (Therapeutic Exercise)  hip flexion/extension;hip abduction/adduction;knee flexion/extension;heel raises  -EE      Device, Standing Lower Extremity (Therapeutic Exercise)  jon bars  -EE      Exercise Type, Standing Lower Extremity (Therapeutic Exercise)  AROM (active range of motion)  -EE      Sets/Reps Detail, Standing Lower Extremity (Therapeutic Exercise)  1/10  -EE      Comment, Standing Lower Extremity (Therapeutic Exercise)  provided handout for HEP; pt verbalizes understanding and demonstrates all exercises correctly  -EE      Recorded by [EE] Kayla Uribe, RADHA      Row Name 01/06/20 0914             Lower Extremity Seated Therapeutic Exercise    Performed, Seated Lower Extremity (Therapeutic Exercise)  hip flexion/extension;hip abduction/adduction;ankle dorsiflexion/plantarflexion;LAQ (long arc quad), knee extension  -EE      Device, Seated Lower Extremity (Therapeutic Exercise)  elastic bands/tubing yellow tband  -EE      Exercise Type, Seated Lower Extremity (Therapeutic Exercise)  AROM (active range of motion);isometric contraction, static;resistive exercise  -EE      Expected Outcomes, Seated Lower Extremity (Therapeutic Exercise)  improve  performance, gait skills;improve performance, transfer skills  -EE      Sets/Reps Detail, Seated Lower Extremity (Therapeutic Exercise)  1/10  -EE      Comment, Seated Lower Extremity (Therapeutic Exercise)  provided handout for HEP; verbalizes understanding and demonstrates all exercises correctly  -EE      Recorded by [EE] Kayla Uribe, PT      Row Name 01/06/20 0914             Positioning and Restraints    Pre-Treatment Position  sitting in chair/recliner  -EE      Post Treatment Position  wheelchair  -EE      In Wheelchair  sitting;exit alarm on;with family/caregiver return to room with family @ WC level  -EE      Recorded by [EE] Kayla Uribe, PT        User Key  (r) = Recorded By, (t) = Taken By, (c) = Cosigned By    Initials Name Effective Dates    AW Brynn Griffith, MS CCC-SLP 06/08/18 -     EE Kayla Uribe, PT 04/03/18 -                  PT Recommendation and Plan  Anticipated Equipment Needs at Discharge (PT Eval): (TBD pending progress)  Planned Therapy Interventions (PT Eval): balance training, home exercise program, bed mobility training, gait training, motor coordination training, neuromuscular re-education, patient/family education, postural re-education, ROM (range of motion), stair training, strengthening, stretching, transfer training  Frequency of Treatment (PT Eval): 60 minutes per session     Daily Summary of Progress (PT)  Functional Goal Overall Progress: Physical Therapy: progressing toward functional goals as expected  Impairments Continuing to Limit Function: Physical Therapy: impaired balance, impaired vision, pain, postural control impaired, strength decreased               Time Calculation:     PT Charges     Row Name 01/06/20 1516 01/06/20 0930          Time Calculation    Start Time  1400  -EE  0900  -EE     Stop Time  1430  -EE  0930  -EE     Time Calculation (min)  30 min  -EE  30 min  -EE     PT Received On  01/06/20  -EE  01/06/20  -EE     PT - Next Appointment  01/07/20  -EE   01/06/20  -EE        Time Calculation- PT    Total Timed Code Minutes- PT  30 minute(s)  -EE  30 minute(s)  -EE       User Key  (r) = Recorded By, (t) = Taken By, (c) = Cosigned By    Initials Name Provider Type    EE Kayla Uribe, PT Physical Therapist          Therapy Charges for Today     Code Description Service Date Service Provider Modifiers Qty    08470473980 HC GAIT TRAINING EA 15 MIN 1/6/2020 Kayla Uribe, PT GP 2    [id number] HC PT THER PROC EA 15 MIN 1/6/2020 Kayla Uribe, PT GP 1    [id number]  PT NEUROMUSC RE EDUCATION EA 15 MIN 1/6/2020 Kayla Uribe, PT GP 1                   Kayla Uribe, PT  1/6/2020

## 2020-01-06 NOTE — PROGRESS NOTES
Inpatient Rehabilitation Functional Measures Assessment and Plan of Care    Plan of Care  Updated Problems/Interventions  Field    Functional Measures  JALEEL Eating:  [facility] Grooming: [facility] Bathing:  [facility] Upper Body Dressing:  [facility] Lower Body Dressing:  [facility] Toileting:  [facility] Bladder Management  Level of Assistance:  [place]  Frequency/Number of Accidents this Shift:  [facility] Bowel Management  Level of Assistance: [place]  Frequency/Number of Accidents this Shift: [facility] Bed/Chair/Wheelchair Transfer:  [facility] Toilet Transfer:  [facility] Tub/Shower Transfer:  [place]    Previously Documented Mode of Locomotion at Discharge: Field  JALEEL Expected Mode of Locomotion at Discharge: [facility] Walk/Wheelchair:  [facility] Stairs:  [facility] Comprehension:  [facility] Expression:  [facility] Social Interaction:  [facility] Problem Solving:  [facility] Memory:  [place]    Therapy Mode Minutes  Occupational Therapy: Individual: 60 minutes.  Physical Therapy: [place]  Speech Language Pathology:  [place]    Signed by: GIAN Blackwell/TALIB

## 2020-01-06 NOTE — CONSULTS
84 y.o.  Patient Care Team:  Francisco Gallagher MD as PCP - General (Family Medicine)    Chief complaint-CVA    Reason for consultation-elevated blood sugars    HPI  84-year-old with past medical history as mentioned below presents to the hospital on December 11, 2019 with left-sided weakness and visual changes.  CT angiogram showed right PCA stenosis.  She was treated with IV TPA.  Currently hospitalized at [facility] participating in physical therapy and Occupational Therapy.  She continues to have left visual field deficit and left-sided weakness.    Endocrinology has been consulted for elevated blood sugars.    Diabetes mellitus-patient has been borderline diabetic for the last 5-6 years.  At home she is not on any oral hypoglycemic agents.  She does have a glucometer in which she was supposed to check her blood sugars at least 1-2 times a month.  But however since her blood sugars have been always in great control she never started checking her blood sugars.  This is her second stroke in the last 2 years.  History of coronary artery disease.  No known history of CKD.      Past Medical History:   Diagnosis Date   • Arthritis    • CHF (congestive heart failure) (CMS/HCC)    • Diabetes mellitus (CMS/HCC)     new DX related to stroke, started on insulin at hospital   • Disease of thyroid gland    • Elevated cholesterol    • GERD (gastroesophageal reflux disease)    • Hypertension    • Sleep apnea    • Stroke (CMS/HCC)        Family History   Problem Relation Age of Onset   • Heart disease Mother    • Stroke Mother    • Cancer Sister    • Alzheimer's disease Brother    • Cancer Sister        Social History     Socioeconomic History   • Marital status:      Spouse name: Not on file   • Number of children: Not on file   • Years of education: Not on file   • Highest education level: Not on file   Tobacco Use   • Smoking status: Never Smoker   • Smokeless tobacco: Never Used   Substance and Sexual Activity   •  Alcohol use: Never     Frequency: Never   • Drug use: Never       Allergies   Allergen Reactions   • Statins Other (See Comments)   • Contrast Dye Palpitations   • Iodinated Diagnostic Agents Palpitations   • Iodine Palpitations         Current Facility-Administered Medications:   •  acetaminophen (TYLENOL) tablet 650 mg, 650 mg, Oral, Q4H PRN, Yaron Sanders MD, 650 mg at 01/05/20 1914  •  Alirocumab solution auto-injector 75 mg, 75 mg, Subcutaneous, Q14 Days, Yaron Sanders MD, 75 mg at 12/19/19 1618  •  aspirin EC tablet 81 mg, 81 mg, Oral, Daily, Azael Abdullahi MD, 81 mg at 01/06/20 0800  •  butalbital-acetaminophen-caffeine (FIORICET, ESGIC) -40 MG per tablet 1 tablet, 1 tablet, Oral, Q8H PRN, Yaron Sanders MD, 1 tablet at 12/24/19 0010  •  cefdinir (OMNICEF) capsule 300 mg, 300 mg, Oral, Q12H, Mark Vásquez MD, 300 mg at 01/06/20 0801  •  clopidogrel (PLAVIX) tablet 75 mg, 75 mg, Oral, Daily, Yaron Sanders MD, 75 mg at 01/06/20 0801  •  dextrose (D50W) 25 g/ 50mL Intravenous Solution 25 g, 25 g, Intravenous, Q15 Min PRN, Azael Abdullahi MD  •  dextrose (GLUTOSE) oral gel 15 g, 15 g, Oral, Q15 Min PRN, Azael Abdullahi MD  •  dilTIAZem CD (CARDIZEM CD) 24 hr capsule 180 mg, 180 mg, Oral, BID, Yaron Sanders MD, 180 mg at 01/06/20 0801  •  docusate sodium (COLACE) capsule 100 mg, 100 mg, Oral, BID, Yaron Sanders MD, 100 mg at 01/05/20 2008  •  fluticasone (FLONASE) 50 MCG/ACT nasal spray 1 spray, 1 spray, Each Nare, Daily, Yaron Sanders MD, 1 spray at 12/24/19 0752  •  glucagon (human recombinant) (GLUCAGEN DIAGNOSTIC) injection 1 mg, 1 mg, Subcutaneous, Q15 Min PRN, Azael Abdullahi MD  •  insulin lispro (humaLOG) injection 0-7 Units, 0-7 Units, Subcutaneous, 4x Daily With Meals & Nightly, Azael Abdullahi MD, 2 Units at 01/06/20 0801  •  levothyroxine (SYNTHROID, LEVOTHROID) tablet 125 mcg, 125 mcg, Oral, Q  AM, Yaron Sanders MD, 125 mcg at 01/06/20 0528  •  metoprolol tartrate (LOPRESSOR) tablet 50 mg, 50 mg, Oral, BID, Yaron Sanders MD, 50 mg at 01/06/20 0801  •  nitroglycerin (NITROSTAT) SL tablet 0.4 mg, 0.4 mg, Sublingual, Q5 Min PRN, Yaron Sanders MD, 0.4 mg at 12/26/19 0854  •  pantoprazole (PROTONIX) EC tablet 40 mg, 40 mg, Oral, BID AC, Yaron Sanders MD, 40 mg at 01/06/20 0528  •  phenylephrine 0.25 % suppository 1 suppository, 1 suppository, Rectal, BID PRN, Yaron Sanders MD, 1 suppository at 12/24/19 2010  •  polyethylene glycol (MIRALAX) powder 17 g, 17 g, Oral, Daily PRN, Yaron Sanders MD, 17 g at 01/03/20 0810  •  potassium chloride (KLOR-CON) packet 40 mEq, 40 mEq, Oral, PRN, Albino Doss MD  •  potassium chloride (MICRO-K) CR capsule 40 mEq, 40 mEq, Oral, PRN, Albino Doss MD, 40 mEq at 12/29/19 1810  •  senna-docusate sodium (SENOKOT-S) 8.6-50 MG tablet 1 tablet, 1 tablet, Oral, Nightly, Yaron Sanders MD, 1 tablet at 01/05/20 2009  •  torsemide (DEMADEX) tablet 10 mg, 10 mg, Oral, Daily, Yaron Sanders MD, 10 mg at 01/06/20 0801  •  vitamin B-12 (CYANOCOBALAMIN) tablet 1,000 mcg, 1,000 mcg, Oral, Daily, Azael Abdullahi MD, 1,000 mcg at 01/06/20 0801  •  vitamin D (ERGOCALCIFEROL) capsule 50,000 Units, 50,000 Units, Oral, Q7 Days, Yaron Sanders MD, 50,000 Units at 01/02/20 0828  •  zolpidem (AMBIEN) tablet 5 mg, 5 mg, Oral, Nightly PRN, Mark Vásquez MD, 5 mg at 01/05/20 2009         Review of Systems   Constitutional: Positive for appetite change and fatigue.   Eyes: Negative for visual disturbance.   Respiratory: no shortness of breath.    Cardiovascular: no leg swelling.   Gastrointestinal: Negative for abdominal pain.   Endocrine: Negative for polydipsia and polyuria.   Musculoskeletal: Positive for arthralgias and myalgias.   Skin: Negative for pallor.   Neurological: Positive for  weakness and numbness.   Psychiatric/Behavioral: Positive for sleep disturbance.  Objective     Vital Signs  Temp:  [97.6 °F (36.4 °C)-98.2 °F (36.8 °C)] 98.2 °F (36.8 °C)  Heart Rate:  [69-77] 69  Resp:  [18] 18  BP: (120-125)/(58-60) 120/58    Physical Exam  General exam-no distress, appears stated age.  Eyes-PERRL, anicteric sclera  ENT-external ears/nose normal.  Neck-no adenopathy, midline trachea.  Lungs-normal chest exam on inspection, diminished bilateral breath sounds on auscultation.  Cardiovascular -normal S1-S2, positive murmur.  ABD-nontender, nondistended, bowel sounds heard.  Extremities-no edema, cyanosis.  Hammertoes noted.  Left-sided weakness          Physical Exam    Results Review:    I reviewed the patient's new clinical results and summarized them in the HPI and in plan.  Glucose   Date/Time Value Ref Range Status   01/06/2020 0730 154 (H) 70 - 130 mg/dL Final   01/05/2020 2007 256 (H) 70 - 130 mg/dL Final   01/05/2020 0726 144 (H) 70 - 130 mg/dL Final   01/04/2020 2014 173 (H) 70 - 130 mg/dL Final   01/04/2020 1621 185 (H) 70 - 130 mg/dL Final   01/04/2020 1103 152 (H) 70 - 130 mg/dL Final   01/04/2020 0725 154 (H) 70 - 130 mg/dL Final   01/03/2020 1957 206 (H) 70 - 130 mg/dL Final   01/03/2020 1632 138 (H) 70 - 130 mg/dL Final   01/03/2020 1142 178 (H) 70 - 130 mg/dL Final     Lab Results (last 24 hours)     Procedure Component Value Units Date/Time    POC Glucose Once [[id number]]  (Abnormal) Collected:  01/06/20 0730    Specimen:  Blood Updated:  01/06/20 0731     Glucose 154 mg/dL     CBC & Differential [[id number]] Collected:  01/06/20 0516    Specimen:  Blood Updated:  01/06/20 0730    Narrative:       The following orders were created for panel order CBC & Differential.  Procedure                               Abnormality         Status                     ---------                               -----------         ------                     CBC Auto Differential[[id number]]         Abnormal            Final result                 Please view results for these tests on the individual orders.    CBC Auto Differential [[id number]]  (Abnormal) Collected:  01/06/20 0516    Specimen:  Blood Updated:  01/06/20 0730     WBC 6.77 10*3/mm3      RBC 3.66 10*6/mm3      Hemoglobin 10.5 g/dL      Hematocrit 31.4 %      MCV 85.8 fL      MCH 28.7 pg      MCHC 33.4 g/dL      RDW 17.2 %      RDW-SD 51.2 fl      MPV 10.9 fL      Platelets 214 10*3/mm3      Neutrophil % 57.0 %      Lymphocyte % 32.5 %      Monocyte % 7.8 %      Eosinophil % 1.6 %      Basophil % 0.7 %      Immature Grans % 0.4 %      Neutrophils, Absolute 3.85 10*3/mm3      Lymphocytes, Absolute 2.20 10*3/mm3      Monocytes, Absolute 0.53 10*3/mm3      Eosinophils, Absolute 0.11 10*3/mm3      Basophils, Absolute 0.05 10*3/mm3      Immature Grans, Absolute 0.03 10*3/mm3      nRBC 0.0 /100 WBC     Urine Culture - Urine, Urine, Random Void [[id number]]  (Abnormal)  (Susceptibility) Collected:  01/04/20 1059    Specimen:  Urine, Random Void Updated:  01/06/20 0259     Urine Culture >100,000 CFU/mL Escherichia coli    Susceptibility      Escherichia coli     DONA     Ampicillin Resistant     Ampicillin + Sulbactam Susceptible     Cefazolin Susceptible     Cefepime Susceptible     Ceftazidime Susceptible     Ceftriaxone Susceptible     Gentamicin Susceptible     Levofloxacin Susceptible     Nitrofurantoin Susceptible     Piperacillin + Tazobactam Susceptible     Tetracycline Susceptible     Trimethoprim + Sulfamethoxazole Resistant                    POC Glucose Once [[id number]]  (Abnormal) Collected:  01/05/20 2007    Specimen:  Blood Updated:  01/05/20 2009     Glucose 256 mg/dL           Imaging Results (Last 24 Hours)     ** No results found for the last 24 hours. **          Assessment/Plan     Active Hospital Problems    Diagnosis  POA   • Stroke (cerebrum) (CMS/[facility]) [I63.9]  Yes      Resolved Hospital Problems   No resolved problems to display.  "    Type 2 diabetes mellitus-uncontrolled  Noted that patient has been having elevated blood sugars during the hospitalization.  Start Lantus 10 units in the morning    Hypothyroidism  Continue levothyroxine 125 mcg oral daily.    Hyperlipidemia  Check lipid panel.  On Praluent 75 mg subcutaneous every 2 weeks.  She is statin intolerant    Discussed plan with nurse.     Thank you for your consultation.  Will follow the pt while in hospital.     Eveline Toledo MD.  01/06/20  10:46 AM      EMR Dragon / transcription disclaimer:    \"Dictated utilizing Dragon dictation\".   "

## 2020-01-06 NOTE — PROGRESS NOTES
Inpatient Rehabilitation Plan of Care Note    Plan of Care  Care Plan Reviewed - No updates at this time.    Psychosocial    Performed Intervention(s)  calm enviroment  verbalizes needs/concerns  Support/Peer groups  Therapeutic environmental set-up      Safety    Performed Intervention(s)  Falls precautions/hourly rounds, & Items within reach  Bed/chair alarms, call bell within reach.  Environmental set-up to reduce risk      Sphincter Control    Performed Intervention(s)  monitor intake and output  medication      Pain    Performed Intervention(s)  pain meds/creams as needed  assess pain during rounding  Distraction/Relaxation    Signed by: Amalia Fuller RN

## 2020-01-06 NOTE — PROGRESS NOTES
Inpatient Rehabilitation Functional Measures Assessment and Plan of Care    Plan of Care  Updated Problems/Interventions  Mobility    [OT] Toilet Transfers(Active)  Current Status(01/06/2020): SBA  Weekly Goal(01/08/2020): SBA  Discharge Goal: SBA    [OT] Tub/Shower Transfers(Active)  Current Status(01/06/2020): CGA/SBA  Weekly Goal(01/08/2020): SBA/CGA  Discharge Goal: SBA/CGA        Self Care    [OT] Bathing(Active)  Current Status(01/06/2020): SBA  Weekly Goal(01/06/2020): SBA  Discharge Goal: SBA    [OT] Dressing (Lower)(Active)  Current Status(01/06/2020): SBA  Weekly Goal(01/08/2020): SBA  Discharge Goal: SBA    [OT] Dressing (Upper)(Active)  Current Status(01/06/2020): SBA  Weekly Goal(01/07/2020): SBA  Discharge Goal: Set up    [OT] Grooming(Active)  Current Status(01/06/2020): set up/Mod I  Weekly Goal(01/08/2020): set up/Mod I  Discharge Goal: set up/Mod I    [OT] Toileting(Active)  Current Status(01/06/2020): SBA  Weekly Goal(01/07/2020): SBA  Discharge Goal: SBA    Functional Measures  JALEEL Eating:  Branch  JALEEL Grooming: Branch  JALEEL Bathing:  Branch  JALEEL Upper Body Dressing:  Branch  JALEEL Lower Body Dressing:  Branch  JALEEL Toileting:  Branch    JALEEL Bladder Management  Level of Assistance:  Branch  Frequency/Number of Accidents this Shift:  Branch    JALEEL Bowel Management  Level of Assistance: Branch  Frequency/Number of Accidents this Shift: Branch    JALEEL Bed/Chair/Wheelchair Transfer:  Branch  JALEEL Toilet Transfer:  Branch  JALEEL Tub/Shower Transfer:  Branch    Previously Documented Mode of Locomotion at Discharge: Field  JALEEL Expected Mode of Locomotion at Discharge: Branch  JALEEL Walk/Wheelchair:  Branch  JALEEL Stairs:  Branch    [facility] Comprehension:  Branch  JALEEL Expression:  Branch  [facility] Social Interaction:  Branch  [facility] Problem Solving:  Branch  JALEEL Memory:  Branch    Therapy Mode Minutes  Occupational Therapy: Branch  Physical Therapy: Branch  Speech Language Pathology:  Branch    Signed by: Keya Miranda  OTR/L

## 2020-01-06 NOTE — PROGRESS NOTES
Inpatient Rehabilitation Plan of Care Note    Plan of Care  Care Plan Reviewed - Updates as Follows    Pain    [RN] Pain Management(Active)  Current Status(01/06/2020): Patient c/o intermittent back pain, and has been c/o  hemorrhoid pain freq  Weekly Goal(01/13/2020): pain managed  Discharge Goal: Pain better managed or free of pain    Performed Intervention(s)  pain meds/creams as needed  assess pain during rounding  Distraction/Relaxation      Psychosocial    [RN] Coping/Adjustment(Active)  Current Status(01/06/2020): Alert, oriented pleasant. Good family support.  Weekly Goal(01/13/2020): Will verbalize concerns regarding status & identify  progress.  Discharge Goal: Adequate coping regarding her life changes with ongoing support.    Performed Intervention(s)  calm enviroment  verbalizes needs/concerns  Support/Peer groups  Therapeutic environmental set-up      Safety    [RN] Potential for Injury(Active)  Current Status(01/06/2020): Alert, oriented on admission. Reported by prior  hospital pt leans to left with ambulation.  Weekly Goal(01/13/2020): No unsafe behavior.  Discharge Goal: Pt/family aware of fall/safety in the home setting.    Performed Intervention(s)  Falls precautions/hourly rounds, & Items within reach  Bed/chair alarms, call bell within reach.  Environmental set-up to reduce risk      Sphincter Control    [RN] Bladder and bowel(Active)  Current Status(01/06/2020): Continent of bladder/bowel. does have frequency with  voiding.  Weekly Goal(01/13/2020): Continent 100% with adequate output.  Discharge Goal: Same as weekly    Performed Intervention(s)  monitor intake and output  medication    Signed by: Ирина Rose RN

## 2020-01-06 NOTE — PLAN OF CARE
Patient alert, oriented. Cooperative. Daughter at bedside. No complaints of pain this shift. Continues to be continent of bowel and bladder.

## 2020-01-06 NOTE — DISCHARGE PLACEMENT REQUEST
"ShitalKathe wilkes (84 y.o. Female)     Date of Birth Social Security Number Address Home Phone MRN    [id number]  [id number]  [place] [address] [phone] [id number]    Scientologist Marital Status          Unknown        Admission Date Admission Type Admitting Provider Attending Provider Department, Room/Bed    12/17/19 Elective Azael Abdullahi MD Gormley, John Michael, MD [facility], 4402/1    Discharge Date Discharge Disposition Discharge Destination                       Attending Provider:  Azael Abdullahi MD    Allergies:  Statins, Contrast Dye, Iodinated Diagnostic Agents, Iodine    Isolation:  None   Infection:  None   Code Status:  Prior    Ht:  157.5 cm (62\")   Wt:  76.9 kg (169 lb 8.5 oz)    Admission Cmt:  None   Principal Problem:  None                Active Insurance as of 12/17/2019     Primary Coverage     Payor Plan Insurance Group Employer/Plan Group    MEDICARE MEDICARE A & B      Payor Plan Address Payor Plan Phone Number Payor Plan Fax Number Effective Dates    [address] [phone]  1/1/2002 - None Entered    [facility] [id number]       Subscriber Name Subscriber Birth Date Member ID       KATHE JETT 1935 [id number]           Secondary Coverage     Payor Plan Insurance Group Employer/Plan Group    [employer] SUP AAR HEALTH CARE OPTIONS      Payor Plan Address Payor Plan Phone Number Payor Plan Fax Number Effective Dates    [facility] [phone]  1/1/2019 - None Entered    [address]       [facility] [address]       Subscriber Name Subscriber Birth Date Member ID       KATHE JETT 1935 [id number]                 Emergency Contacts      (Rel.) Home Phone Work Phone Mobile Phone    RALPH JACOBSEN (Daughter) [phone] -- --    MARCELA JETT (Daughter) -- -- [phone]    BOSTON ADAIR (Other) [phone] -- --              "

## 2020-01-06 NOTE — PLAN OF CARE
Problem: Patient Care Overview  Goal: Plan of Care Review  Outcome: Ongoing (interventions implemented as appropriate)  Flowsheets (Taken 1/6/2020 1521)  Outcome Summary: Attended therapies and cooperative with staff.  Had family conference today.  Plan is to discharge to daughter's home on Wed. 1/8. Dr. [place] consulted today.  Diabetes RN consulted today.  Daughter at bedside.  Ambulates with walker.  No safety issues noted.  Progress, Functional Goals: demonstrating adequate progress  Plan of Care Reviewed With: patient; daughter  IRF Plan of Care Review: progress ongoing, continue     Problem: Patient Care Overview  Goal: Individualization and Mutuality  Outcome: Ongoing (interventions implemented as appropriate)  Flowsheets (Taken 1/6/2020 1521)  Patient Specific Goals (Include Timeframe): To discharge to daughter's home on Wed. 1/8.

## 2020-01-06 NOTE — THERAPY TREATMENT NOTE
Inpatient Rehabilitation - Occupational Therapy Treatment Note    [facility]     Patient Name: Liana Carrero  : 1935  MRN: [id number]    Today's Date: 2020                 Admit Date: 2019      Visit Dx:    ICD-10-CM ICD-9-CM   1. Unsteadiness on feet R26.81 781.2   2. Difficulty walking R26.2 719.7   3. 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 (CMS/[facility]) I67.9 438.22    G81.94        Patient Active Problem List   Diagnosis   • Stroke (cerebrum) (CMS/[facility])   • Chest pain         Therapy Treatment    IRF Treatment Summary     Row Name 20 1500 20 1000 20 0914       Evaluation/Treatment Time and Intent    Subjective Information  no complaints  -CC  no complaints  -AW  complains of;fatigue tired from home pass yesterday  -EE    Existing Precautions/Restrictions  fall  -CC  fall  -AW  fall  -EE    Document Type  therapy note (daily note)  -CC  therapy note (daily note)  -AW  therapy note (daily note)  -EE    Mode of Treatment  occupational therapy  -CC  speech-language pathology  -AW  physical therapy  -EE    Patient/Family Observations  pt supine in am; w/c pm  -CC  Pt up in w/c, ready for therapy. Pt reported no problems with home pass yesterday.  -AW  Pt sitting up in WC in am, supine in bed in pm, in no acute distress. Pt and daughter both report home pass yesterday went well.   -EE    Recorded by [CC] Keya Miranda, OTR [AW] Brynn Griffith, MS CCC-SLP [EE] Kayla Uribe, PT    Row Name 20 1500 20 0914          Cognition/Psychosocial- PT/OT    Affect/Mental Status (Cognitive)  WFL  -CC  --     Orientation Status (Cognition)  oriented x 4  -CC  oriented x 4  -EE     Follows Commands (Cognition)  --  follows one step commands  -EE     Personal Safety Interventions  fall prevention program maintained;gait belt;nonskid shoes/slippers when out of bed  -CC  fall prevention program maintained;gait belt;muscle strengthening facilitated;nonskid  shoes/slippers when out of bed;supervised activity  -EE     Cognitive Function (Cognitive)  --  safety deficit  -EE     Safety Deficit (Cognitive)  --  mild deficit  -EE     Recorded by [CC] Keya Miranda OTR [EE] Kayla Uribe, PT     Row Name 01/06/20 1500 01/06/20 0914          Bed Mobility Assessment/Treatment    Rolling Right [place] (Bed Mobility)  supervision;verbal cues  -CC  --     Supine-Sit [place] (Bed Mobility)  conditional independence  -CC  conditional independence  -EE     Assistive Device (Bed Mobility)  --  bed rails  -EE     Recorded by [CC] Keya Miranda OTR [EE] Kayla Uribe, PT     Row Name 01/06/20 1500             Functional Mobility    Functional Mobility- Ind. Level  supervision required;contact guard assist  -CC      Functional Mobility- Device  rolling walker  -CC      Functional Mobility-Distance (Feet)  -- to BR  -CC      Recorded by [CC] Keya Miranda OTR      Row Name 01/06/20 0914             Bed-Chair Transfer    Bed-Chair [place] (Transfers)  contact guard;stand by assist;verbal cues  -EE      Assistive Device (Bed-Chair Transfers)  wheelchair  -EE      Recorded by [EE] Kayla Uribe, PT      Row Name 01/06/20 1500 01/06/20 0914          Sit-Stand Transfer    Sit-Stand [place] (Transfers)  supervision;verbal cues;contact guard  -CC  contact guard;stand by assist;verbal cues  -EE     Assistive Device (Sit-Stand Transfers)  walker, front-wheeled  -CC  walker, front-wheeled  -EE     Recorded by [CC] Keya Miranda OTR [EE] Kayla Uribe, PT     Row Name 01/06/20 1500 01/06/20 0914          Stand-Sit Transfer    Stand-Sit [place] (Transfers)  supervision;verbal cues;contact guard  -CC  contact guard;verbal cues  -EE     Assistive Device (Stand-Sit Transfers)  walker, front-wheeled  -CC  walker, front-wheeled  -EE     Recorded by [CC] Keya Miranda OTR [EE] Kayla Uribe, PT     Row Name 01/06/20 1500             Toilet Transfer    Type (Toilet  Transfer)  stand pivot/stand step  -CC      [place] Level (Toilet Transfer)  contact guard;verbal cues  -CC      Assistive Device (Toilet Transfer)  commode, 3-in-1;walker, front-wheeled  -CC      Recorded by [CC] Keya Miranda OTR      Row Name 01/06/20 1500             Shower Transfer    Type (Shower Transfer)  stand pivot/stand step  -CC      [place] Level (Shower Transfer)  contact guard  -CC      Assistive Device (Shower Transfer)  shower chair;grab bars/tub rail  -CC      Recorded by [CC] Keya Miranda OTR      Row Name 01/06/20 0914             Gait/Stairs Assessment/Training    [place] Level (Gait)  contact guard;verbal cues  -EE      Assistive Device (Gait)  walker, front-wheeled  -EE      Distance in Feet (Gait)  80' x 3  -EE      Pattern (Gait)  step-through  -EE      Deviations/Abnormal Patterns (Gait)  radha decreased;stride length decreased  -EE      Bilateral Gait Deviations  forward flexed posture;heel strike decreased  -EE      Left Sided Gait Deviations  leans left  -EE      Right Sided Gait Deviations  weight shift ability decreased  -EE      [place] Level (Stairs)  contact guard;verbal cues  -EE      Handrail Location (Stairs)  both sides  -EE      Number of Steps (Stairs)  4  -EE      Ascending Technique (Stairs)  step-to-step  -EE      Descending Technique (Stairs)  step-to-step  -EE      Stairs, Safety Issues  balance decreased during turns  -EE      Comment (Gait/Stairs)  Verbal cues for upright posture and to stay close to walker during ambulation. Occasional cues to compensate for L visual field cut and avoid obstacles in gym/hallways.   -EE      Recorded by [EE] Kayla Uribe PT      Row Name 01/06/20 0914             Safety Issues, Functional Mobility    Impairments Affecting Function (Mobility)  balance;endurance/activity tolerance;visual/perceptual;strength  -EE      Recorded by [EE] Kayla Uribe PT      Row Name 01/06/20 1500             Bathing  Assessment/Treatment    Bathing [place] Level  bathing skills;supervision  -CC      Assistive Device (Bathing)  grab bar/tub rail;hand held shower spray hose;shower chair  -CC      Bathing Position  supported sitting;supported standing  -CC      Bathing Setup Assistance  adjust water temperature;obtain supplies  -CC      Recorded by [CC] Keya Miranda OTR      Row Name 01/06/20 1500             Upper Body Dressing Assessment/Treatment    Upper Body Dressing Task  upper body dressing skills;doff;don;bra/undergarment;front opening garment;minimum assist (75% or more patient effort)  -CC      Upper Body Dressing Position  supported sitting  -CC      Set-up Assistance (Upper Body Dressing)  obtain clothing  -CC      Recorded by [CC] Keya Miranda OTR      Row Name 01/06/20 1500             Lower Body Dressing Assessment/Treatment    Lower Body Dressing [place] Level  doff;don;pants/bottoms;socks;clothes fastener management;supervision;verbal cues  -CC      Lower Body Dressing Position  supported sitting;supported standing  -CC      Lower Body Dressing Setup Assistance  obtain clothing  -CC      Recorded by [CC] Keya Miranda OTR      Row Name 01/06/20 1500             Grooming Assessment/Treatment    Grooming [place] Level  grooming skills;hair care, combing/brushing;oral care regimen;wash face, hands;supervision;verbal cues  -CC      Grooming Position  supported sitting  -CC      Grooming Setup Assistance  adjust water temperature/flow;obtain supplies  -CC      Recorded by [CC] Keya Miranda OTR      Row Name 01/06/20 1500             Toileting Assessment/Treatment    Toileting [place] Level  toileting skills;adjust/manage clothing;perform perineal hygiene;supervision;verbal cues  -CC      Assistive Device Use (Toileting)  grab bar/safety frame  -CC      Toileting Position  supported sitting;supported standing  -CC      Toileting Setup Assistance  obtain supplies  -CC      Recorded  by [CC] Keya Miranda OTR      Row Name 01/06/20 1500             Vision Assessment/Intervention    Vision Assessment Comment  mod vc to scan left for letter search on worksheet w line border. Min vc to duplicate wooden pegs design from card. Missed far column on left  -CC      Recorded by [CC] Keya Miranda OTR      Row Name 01/06/20 1500 01/06/20 0914          Pain Scale: Numbers Pre/Post-Treatment    Pain Scale: Numbers, Pretreatment  0/10 - no pain  -CC  3/10  -EE     Pain Scale: Numbers, Post-Treatment  0/10 - no pain  -CC  3/10  -EE     Pain Location - Side  --  Bilateral  -EE     Pain Location - Orientation  --  lower  -EE     Pain Location  --  back  -EE     Pain Intervention(s)  --  Repositioned;Rest  -EE     Recorded by [CC] Keya Miranda OTR [EE] Kayla Uribe, PT     Row Name 01/06/20 0914             Lower Extremity Standing Therapeutic Exercise    Performed, Standing Lower Extremity (Therapeutic Exercise)  hip flexion/extension;hip abduction/adduction;knee flexion/extension;heel raises  -EE      Device, Standing Lower Extremity (Therapeutic Exercise)  jon bars  -EE      Exercise Type, Standing Lower Extremity (Therapeutic Exercise)  AROM (active range of motion)  -EE      Sets/Reps Detail, Standing Lower Extremity (Therapeutic Exercise)  1/10  -EE      Comment, Standing Lower Extremity (Therapeutic Exercise)  provided handout for HEP; pt verbalizes understanding and demonstrates all exercises correctly  -EE      Recorded by [EE] Kayla Uribe, PT      Row Name 01/06/20 0914             Lower Extremity Seated Therapeutic Exercise    Performed, Seated Lower Extremity (Therapeutic Exercise)  hip flexion/extension;hip abduction/adduction;ankle dorsiflexion/plantarflexion;LAQ (long arc quad), knee extension  -EE      Device, Seated Lower Extremity (Therapeutic Exercise)  elastic bands/tubing yellow tband  -EE      Exercise Type, Seated Lower Extremity (Therapeutic Exercise)  AROM (active range of  motion);isometric contraction, static;resistive exercise  -EE      Expected Outcomes, Seated Lower Extremity (Therapeutic Exercise)  improve performance, gait skills;improve performance, transfer skills  -EE      Sets/Reps Detail, Seated Lower Extremity (Therapeutic Exercise)  1/10  -EE      Comment, Seated Lower Extremity (Therapeutic Exercise)  provided handout for HEP; verbalizes understanding and demonstrates all exercises correctly  -EE      Recorded by [EE] Kayla Uribe, PT      Row Name 01/06/20 1500 01/06/20 0914          Positioning and Restraints    Pre-Treatment Position  in bed  -CC  sitting in chair/recliner  -EE     Post Treatment Position  wheelchair  -CC  wheelchair  -EE     In Wheelchair  sitting;with family/caregiver  -CC  sitting;exit alarm on;with family/caregiver return to room with family @ WC level  -EE     Recorded by [CC] Keya Miranda OTR [EE] Kayla Uribe, RADHA       User Key  (r) = Recorded By, (t) = Taken By, (c) = Cosigned By    Initials Name Effective Dates    CC Keya Miranda OTR 06/08/18 -     Brynn Reynaga MS CCC-SLP 06/08/18 -     EE Kayla Uribe, PT 04/03/18 -                  OT Recommendation and Plan    Anticipated Discharge Disposition (OT): home with assist       Daily Summary of Progress (OT)  Functional Goal Overall Progress: Occupational Therapy: progressing toward functional goals as expected    OT IRF GOALS     Row Name 01/03/20 1200 01/01/20 1000          LB Dressing Goal 1 (OT-IRF)    Activity/Device (LB Dressing Goal 1, OT-IRF)  --  lower body dressing  -CC     [place] (LB Dressing Goal 1, OT-IRF)  --  supervision required  -CC     Time Frame (LB Dressing Goal 1, OT-IRF)  --  long term goal (LTG);by discharge  -CC     Progress/Outcomes (LB Dressing Goal 1, OT-IRF)  continuing progress toward goal  -CC  continuing progress toward goal  -CC        LB Dressing Goal 2 (OT-IRF)    Progress/Outcomes (LB Dressing Goal 2, OT-IRF)  continuing progress toward goal   -CC  --        Toileting Goal 2 (OT-IRF)    Activity/Device (Toileting Goal 2, OT-IRF)  --  toileting skills, all  -CC     [place] Level (Toileting Goal 2, OT-IRF)  --  supervision required  -CC     Time Frame (Toileting Goal 2, OT-IRF)  --  long term goal (LTG);by discharge  -CC     Progress/Outcomes (Toileting Goal 2, OT-IRF)  goal partially met  -CC  goal partially met  -CC        Strength Goal 1 (OT-IRF)    Strength Goal 1 (OT-IRF)  --  [place] LUE strength  -CC     Time Frame (Strength Goal 1, OT-IRF)  --  long term goal (LTG);by discharge  -CC     Progress/Outcomes (Strength Goal 1, OT-IRF)  continuing progress toward goal  -CC  continuing progress toward goal  -CC        Caregiver Training Goal 1 (OT-IRF)    Caregiver Training Goal 1 (OT-IRF)  --  pt and family I with HEP  -CC     Time Frame (Caregiver Training Goal 1, OT-IRF)  --  long term goal (LTG);by discharge  -CC     Progress/Outcomes (Caregiver Training Goal 1, OT-IRF)  continuing progress toward goal  -CC  continuing progress toward goal  -CC       User Key  (r) = Recorded By, (t) = Taken By, (c) = Cosigned By    Initials Name Provider Type    Keya Zuñiga OTR Occupational Therapist                     Time Calculation:     Time Calculation- OT     Row Name 01/06/20 1230 01/06/20 0800          Time Calculation- OT    OT Start Time  1230  -CC  0800  -CC     OT Stop Time  1300  -CC  0830  -CC     OT Time Calculation (min)  30 min  -CC  30 min  -CC       User Key  (r) = Recorded By, (t) = Taken By, (c) = Cosigned By    Initials Name Provider Type    Keya Zuñiga OTR Occupational Therapist          Therapy Charges for Today     Code Description Service Date Service Provider Modifiers Qty    69509142866 HC OT SELF CARE/MGMT/TRAIN EA 15 MIN 1/6/2020 Keya Miranda OTR GO 2    79647642958 HC OT NEUROMUSC RE EDUCATION EA 15 MIN 1/6/2020 Keya Miranda OTR GO 2                   GIAN Blackwell  1/6/2020

## 2020-01-06 NOTE — THERAPY TREATMENT NOTE
Inpatient Rehabilitation - Speech Language Pathology Treatment Note    [facility]       Patient Name: Liana Carrero  : 1935  MRN: [id number]    Today's Date: 2020           Admit Date: 2019      Visit Dx:        ICD-10-CM ICD-9-CM   1. Unsteadiness on feet R26.81 781.2   2. Difficulty walking R26.2 719.7   3. 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 (CMS/[facility]) I67.9 438.22    G81.94        Patient Active Problem List   Diagnosis   • Stroke (cerebrum) (CMS/[facility])   • Chest pain          Therapy Treatment    Evaluation/Coping    Evaluation/Treatment Time and Intent  Subjective Information: no complaints (20 1000 : Brynn Griffith, MS CCC-SLP)  Existing Precautions/Restrictions: fall (20 1000 : Brynn Griffith, MS CCC-SLP)  Document Type: therapy note (daily note) (20 1000 : Brynn Griffith, MS CCC-SLP)  Mode of Treatment: speech-language pathology (20 1000 : Brynn Griffith, MS CCC-SLP)  Patient/Family Observations: Pt up in w/c, ready for therapy. Pt reported no problems with home pass yesterday. (20 1000 : Brynn Griffith, MS CCC-SLP)    Vitals/Pain/Safety         Cognition/Communication         Oral Motor/Eating         Mobility/Basic Activities/Instrumental Activities/Motor/Modality                   ROM/MMT                   Sensory/Myotome/Dermatome/Edema               Posture/Balance/Special Tests/Exercise/Transportation/Sexual Function                   Orthotics/Residual Limb/Prosthetic Management              Outcome Summary         EDUCATION    The patient has been educated in the following areas:     Cognitive Impairment Communication Impairment.  Notes sent for family conference. Discussed with pt and daughter after am session. Recommended supervision at home as well as with meds and finances. Additional ST at discharge was also recommended.    SLP Recommendation and Plan    SLP Diagnosis: Mild cognitive impairment    SLP Diagnosis: Mild cognitive  impairment    Rehab Potential/Prognosis: good         Anticipated Dischage Disposition: home with assist              Predicted Duration Therapy Intervention (Days): until discharge           Plan of Care Reviewed With: patient      SLP GOALS     Row Name 01/06/20 1100 01/06/20 1000          Memory Skills Goal 1 (SLP)    Improve Memory Skills Through Goal 1 (SLP)  visual memory task  -AW  visual memory task  -AW     Time Frame (Memory Skills Goal 1, SLP)  by discharge  -AW  by discharge  -AW     Progress (Memory Skills Goal 1, SLP)  100%;independently (over 90% accuracy)  -AW  100%;independently (over 90% accuracy)  -AW     Progress/Outcomes (Memory Skills Goal 1, SLP)  goal ongoing  -AW  goal ongoing  -AW     Comment (Memory Skills Goal 1, SLP)  Pt recalled the 4 pictures from earlier session (45 minutes)  -AW  Pt recalled 4/4 pictures after a 10 minute delay using association as a strategy.  -AW        Functional Problem Solving Skills Goal 1 (SLP)    Improve Problem Solving Through Goal 1 (SLP)  complete organization/home management task  -AW  complete organization/home management task  -AW     Time Frame (Problem Solving Goal 1, SLP)  by discharge  -AW  by discharge  -AW     Progress (Problem Solving Goal 1, SLP)  80%;independently (over 90% accuracy);100%;with minimal cues (75-90%)  -AW  70%;independently (over 90% accuracy);100%;with minimal cues (75-90%)  -AW     Progress/Outcomes (Problem Solving Goal 1, SLP)  goal ongoing  -AW  goal ongoing  -AW     Comment (Problem Solving Goal 1, SLP)  kitchen shelf organization task  -AW  min cues with obtaining information from a catalog for an order  -AW        Right Hemisphere Function Goal 1 (SLP)    Improve Right Hemisphere Function Through Goal 1 (SLP)  complete visuo-perceptual activities (L/R discrimination, spatial concepts)  -AW  complete visuo-perceptual activities (L/R discrimination, spatial concepts)  -AW     Time Frame (Right Hemisphere Function Goal 1,  SLP)  by discharge  -AW  by discharge  -AW     Progress (Right Hemisphere Function Goal 1, SLP)  90%;independently (over 90% accuracy);100%;with minimal cues (75-90%)  -AW  90%;independently (over 90% accuracy)  -AW     Progress/Outcomes (Right Hemisphere Function Goal 1, SLP)  goal ongoing  -AW  goal ongoing  -AW     Comment (Right Hemisphere Function Goal 1, SLP)   rows of letters into specific items in a category; min cues for L side.  -AW  finding target word in row of letters; line used on L   -AW       User Key  (r) = Recorded By, (t) = Taken By, (c) = Cosigned By    Initials Name Provider Type    Brynn Reynaga MS CCC-SLP Speech and Language Pathologist                  Time Calculation:       Time Calculation- SLP     Row Name 01/06/20 1418 01/06/20 1414          Time Calculation- SLP    SLP Start Time  1030  -AW  0930  -AW     SLP Stop Time  1100  -AW  1000  -AW     SLP Time Calculation (min)  30 min  -AW  30 min  -AW       User Key  (r) = Recorded By, (t) = Taken By, (c) = Cosigned By    Initials Name Provider Type    Brynn Reynaga MS CCC-SLP Speech and Language Pathologist            Therapy Charges for Today     Code Description Service Date Service Provider Modifiers Qty    45107739016 HC ST DEV OF COGN SKILLS INITIAL 15 MIN 1/6/2020 Brynn Griffith MS CCC-SLP  1    99187576111 HC ST DEV OF COGN SKILLS EACH ADDT'L 15 MIN 1/6/2020 Brynn Griffith MS CCC-SLP  3                           Brynn Griffith MS CCC-JOSH  1/6/2020

## 2020-01-06 NOTE — PROGRESS NOTES
Occupational Therapy: Branch    Physical Therapy: Branch    Speech Language Pathology:  Individual: 60 minutes.    Signed by: Brynn Griffith, SLP

## 2020-01-07 LAB
GLUCOSE BLDC GLUCOMTR-MCNC: 150 MG/DL (ref 70–130)
GLUCOSE BLDC GLUCOMTR-MCNC: 152 MG/DL (ref 70–130)
GLUCOSE BLDC GLUCOMTR-MCNC: 164 MG/DL (ref 70–130)
GLUCOSE BLDC GLUCOMTR-MCNC: 178 MG/DL (ref 70–130)

## 2020-01-07 PROCEDURE — 97530 THERAPEUTIC ACTIVITIES: CPT

## 2020-01-07 PROCEDURE — 82962 GLUCOSE BLOOD TEST: CPT

## 2020-01-07 PROCEDURE — 99232 SBSQ HOSP IP/OBS MODERATE 35: CPT | Performed by: INTERNAL MEDICINE

## 2020-01-07 PROCEDURE — 97112 NEUROMUSCULAR REEDUCATION: CPT

## 2020-01-07 PROCEDURE — 97130 THER IVNTJ EA ADDL 15 MIN: CPT

## 2020-01-07 PROCEDURE — 63710000001 INSULIN GLARGINE PER 5 UNITS: Performed by: INTERNAL MEDICINE

## 2020-01-07 PROCEDURE — 63710000001 INSULIN LISPRO (HUMAN) PER 5 UNITS: Performed by: PHYSICAL MEDICINE & REHABILITATION

## 2020-01-07 PROCEDURE — 97129 THER IVNTJ 1ST 15 MIN: CPT

## 2020-01-07 PROCEDURE — 97116 GAIT TRAINING THERAPY: CPT

## 2020-01-07 PROCEDURE — 97535 SELF CARE MNGMENT TRAINING: CPT

## 2020-01-07 RX ORDER — CELECOXIB 100 MG/1
100 CAPSULE ORAL EVERY 12 HOURS SCHEDULED
Status: DISCONTINUED | OUTPATIENT
Start: 2020-01-07 | End: 2020-01-08 | Stop reason: HOSPADM

## 2020-01-07 RX ADMIN — PANTOPRAZOLE SODIUM 40 MG: 40 TABLET, DELAYED RELEASE ORAL at 05:26

## 2020-01-07 RX ADMIN — CEFDINIR 300 MG: 300 CAPSULE ORAL at 20:21

## 2020-01-07 RX ADMIN — INSULIN LISPRO 2 UNITS: 100 INJECTION, SOLUTION INTRAVENOUS; SUBCUTANEOUS at 16:32

## 2020-01-07 RX ADMIN — Medication 1000 MCG: at 07:37

## 2020-01-07 RX ADMIN — SENNOSIDES AND DOCUSATE SODIUM 1 TABLET: 8.6; 5 TABLET ORAL at 20:21

## 2020-01-07 RX ADMIN — CEFDINIR 300 MG: 300 CAPSULE ORAL at 07:36

## 2020-01-07 RX ADMIN — DOCUSATE SODIUM 100 MG: 100 CAPSULE, LIQUID FILLED ORAL at 07:37

## 2020-01-07 RX ADMIN — ZOLPIDEM TARTRATE 5 MG: 5 TABLET ORAL at 20:21

## 2020-01-07 RX ADMIN — DOCUSATE SODIUM 100 MG: 100 CAPSULE, LIQUID FILLED ORAL at 20:21

## 2020-01-07 RX ADMIN — METOPROLOL TARTRATE 50 MG: 50 TABLET, FILM COATED ORAL at 07:35

## 2020-01-07 RX ADMIN — TORSEMIDE 10 MG: 10 TABLET ORAL at 07:36

## 2020-01-07 RX ADMIN — INSULIN LISPRO 2 UNITS: 100 INJECTION, SOLUTION INTRAVENOUS; SUBCUTANEOUS at 08:02

## 2020-01-07 RX ADMIN — METOPROLOL TARTRATE 50 MG: 50 TABLET, FILM COATED ORAL at 20:21

## 2020-01-07 RX ADMIN — LEVOTHYROXINE SODIUM 125 MCG: 125 TABLET ORAL at 05:26

## 2020-01-07 RX ADMIN — INSULIN LISPRO 2 UNITS: 100 INJECTION, SOLUTION INTRAVENOUS; SUBCUTANEOUS at 11:40

## 2020-01-07 RX ADMIN — CLOPIDOGREL 75 MG: 75 TABLET, FILM COATED ORAL at 07:36

## 2020-01-07 RX ADMIN — DILTIAZEM HYDROCHLORIDE 180 MG: 180 CAPSULE, COATED, EXTENDED RELEASE ORAL at 07:36

## 2020-01-07 RX ADMIN — INSULIN LISPRO 2 UNITS: 100 INJECTION, SOLUTION INTRAVENOUS; SUBCUTANEOUS at 22:10

## 2020-01-07 RX ADMIN — PANTOPRAZOLE SODIUM 40 MG: 40 TABLET, DELAYED RELEASE ORAL at 16:32

## 2020-01-07 RX ADMIN — ASPIRIN 81 MG: 81 TABLET, COATED ORAL at 07:37

## 2020-01-07 RX ADMIN — INSULIN GLARGINE 10 UNITS: 100 INJECTION, SOLUTION SUBCUTANEOUS at 07:38

## 2020-01-07 RX ADMIN — ACETAMINOPHEN 650 MG: 325 TABLET, FILM COATED ORAL at 07:37

## 2020-01-07 RX ADMIN — DILTIAZEM HYDROCHLORIDE 180 MG: 180 CAPSULE, COATED, EXTENDED RELEASE ORAL at 20:21

## 2020-01-07 RX ADMIN — CELECOXIB 100 MG: 100 CAPSULE ORAL at 20:21

## 2020-01-07 NOTE — PROGRESS NOTES
SECTION GG      Mobility Performance Discharge:     Roll Left and Right: Patient completed the activities by him/herself with no  assistance from a helper.   Sit to Lying: Patient completed the activities by him/herself with no  assistance from a helper.   Lying to Sitting on Side of Bed: Patient completed the activities by  him/herself with no assistance from a helper.   Sit to Stand: [place] provides verbal cues and/or touching/steadying and/or  contact guard assistance as patient completes activity. Assistance may be  provided throughout the activity or intermittently.   Chair/Bed to Chair Transfer: [place] provides verbal cues and/or  touching/steadying and/or contact guard assistance as patient completes  activity. Assistance may be provided throughout the activity or intermittently.   Car Transfer: [place] provides verbal cues and/or touching/steadying and/or  contact guard assistance as patient completes activity. Assistance may be  provided throughout the activity or intermittently.   Walk 10 Feet:   [place] provides verbal cues and/or touching/steadying and/or  contact guard assistance as patient completes activity. Assistance may be  provided throughout the activity or intermittently.  Walk 50 Feet with 2 Turns:   [place] provides verbal cues and/or  touching/steadying and/or contact guard assistance as patient completes  activity. Assistance may be provided throughout the activity or intermittently.  Walk 150 Feet:   [place] provides verbal cues and/or touching/steadying and/or  contact guard assistance as patient completes activity. Assistance may be  provided throughout the activity or intermittently.  Walking 10 Feet on Uneven Surfaces:   [place] provides verbal cues and/or  touching/steadying and/or contact guard assistance as patient completes  activity. Assistance may be provided throughout the activity or intermittently.  1 Step Over Curb or Up/Down Stair:   [place] provides verbal cues  and/or  touching/steadying and/or contact guard assistance as patient completes  activity. Assistance may be provided throughout the activity or intermittently.  4 Steps Up and Down, With/Without Rail:   [place] provides verbal cues and/or  touching/steadying and/or contact guard assistance as patient completes  activity. Assistance may be provided throughout the activity or intermittently.  12 Steps Up and Down, With/Without Rail:   Not attempted due to medical or  safety concerns.  Picking up an Object:   Not attempted due to medical or safety concerns. Uses  Wheelchair and/or Scooter: No    Signed by: Kayla Uribe DPT

## 2020-01-07 NOTE — PROGRESS NOTES
Inpatient Rehabilitation Plan of Care Note    Plan of Care  Care Plan Reviewed - No updates at this time.    Psychosocial    Performed Intervention(s)  calm enviroment  verbalizes needs/concerns  Support/Peer groups  Therapeutic environmental set-up      Sphincter Control    Performed Intervention(s)  monitor intake and output  medication      Pain    Performed Intervention(s)  pain meds/creams as needed  assess pain during rounding  Distraction/Relaxation    Signed by: Concha Moyer RN

## 2020-01-07 NOTE — PLAN OF CARE
Problem: Patient Care Overview  Goal: Plan of Care Review  Flowsheets (Taken 1/7/2020 0142)  Outcome Summary: Patient sleeping well tonight. Took tylenol at HS for shoulder pain. Pleasant and cooperative. Monitoring Blood glucose. No unsafe behavior.  Progress, Functional Goals: demonstrating adequate progress  Plan of Care Reviewed With: patient  IRF Plan of Care Review: progress ongoing, continue

## 2020-01-07 NOTE — PROGRESS NOTES
Inpatient Rehabilitation Plan of Care Note    Plan of Care  Care Plan Reviewed - No updates at this time.    Pain    [RN] Pain Management(Active)  Current Status(01/07/2020): Patient c/o intermittent back pain, and has been c/o  hemorrhoid pain freq  Weekly Goal(01/13/2020): pain managed  Discharge Goal: Pain better managed or free of pain    Performed Intervention(s)  pain meds/creams as needed  assess pain during rounding  Distraction/Relaxation      Psychosocial    [RN] Coping/Adjustment(Active)  Current Status(01/07/2020): Alert, oriented pleasant. Good family support.  Weekly Goal(01/13/2020): Will verbalize concerns regarding status & identify  progress.  Discharge Goal: Adequate coping regarding her life changes with ongoing support.    Performed Intervention(s)  calm enviroment  verbalizes needs/concerns  Support/Peer groups  Therapeutic environmental set-up      Safety    [RN] Potential for Injury(Active)  Current Status(01/07/2020): Alert, oriented on admission. Reported by prior  hospital pt leans to left with ambulation.  Weekly Goal(01/13/2020): No unsafe behavior.  Discharge Goal: Pt/family aware of fall/safety in the home setting.    Performed Intervention(s)  Falls precautions/hourly rounds, & Items within reach  Bed/chair alarms, call bell within reach.  Environmental set-up to reduce risk      Sphincter Control    [RN] Bladder and bowel(Active)  Current Status(01/07/2020): Continent of bladder/bowel. does have frequency with  voiding.  Weekly Goal(01/13/2020): Continent 100% with adequate output.  Discharge Goal: Same as weekly    Performed Intervention(s)  monitor intake and output  medication    Signed by: Catherine Berg RN

## 2020-01-07 NOTE — PROGRESS NOTES
84 y.o.   LOS: 21 days   Patient Care Team:  Francisco Gallagher MD as PCP - General (Family Medicine)    Chief Complaint: Elevated blood sugars    No chief complaint on file.      Subjective   Patient is doing well.  No other major overnight events.  Most probably might be discharged tomorrow    Interval History:    Review of Systems:   Constitutional: Positive for appetite change and fatigue.   Eyes: Negative for visual disturbance.   Respiratory: no shortness of breath.    Cardiovascular: no leg swelling.   Gastrointestinal: Negative for abdominal pain.   Endocrine: Negative for polydipsia and polyuria.   Musculoskeletal: Positive for arthralgias and myalgias.   Skin: Negative for pallor.   Neurological: Positive for weakness and numbness.   Psychiatric/Behavioral: Positive for sleep disturbance.  Review of Systems  Objective     Vital Signs   Temp:  [97.6 °F (36.4 °C)-98.3 °F (36.8 °C)] 98 °F (36.7 °C)  Heart Rate:  [63-71] 65  Resp:  [18-20] 20  BP: (119-144)/(57-68) 121/57    Physical Exam:  General exam-no distress, appears stated age.  Eyes-PERRL, anicteric sclera  ENT-external ears/nose normal.  Neck-no adenopathy, midline trachea.  Lungs-normal chest exam on inspection, diminished bilateral breath sounds on auscultation.  Cardiovascular -normal S1-S2, positive murmur.  ABD-nontender, nondistended, bowel sounds heard.  Extremities-no edema, cyanosis.  Hammertoes noted.  Left-sided weakness    Physical ExamResults Review:     I reviewed the patient's new clinical results and summarized them in subjective and in plan.      No results found for: GLUCOSE  Lab Results (last 24 hours)     Procedure Component Value Units Date/Time    POC Glucose Once [[id number]]  (Abnormal) Collected:  01/07/20 1115    Specimen:  Blood Updated:  01/07/20 1120     Glucose 178 mg/dL     POC Glucose Once [[id number]]  (Abnormal) Collected:  01/07/20 0715    Specimen:  Blood Updated:  01/07/20 0716     Glucose 152 mg/dL     POC Glucose  Once [[id number]]  (Abnormal) Collected:  01/06/20 2008    Specimen:  Blood Updated:  01/06/20 2009     Glucose 216 mg/dL     POC Glucose Once [[id number]]  (Abnormal) Collected:  01/06/20 1645    Specimen:  Blood Updated:  01/06/20 1653     Glucose 147 mg/dL         Imaging Results (Last 24 Hours)     ** No results found for the last 24 hours. **          Medication Review: done      Current Facility-Administered Medications:   •  acetaminophen (TYLENOL) tablet 650 mg, 650 mg, Oral, Q4H PRN, Yaron Sanders MD, 650 mg at 01/07/20 0737  •  Alirocumab solution auto-injector 75 mg, 75 mg, Subcutaneous, Q14 Days, Yaron Sanders MD, 75 mg at 12/19/19 1618  •  aspirin EC tablet 81 mg, 81 mg, Oral, Daily, Azael Abdullahi MD, 81 mg at 01/07/20 0737  •  butalbital-acetaminophen-caffeine (FIORICET, ESGIC) -40 MG per tablet 1 tablet, 1 tablet, Oral, Q8H PRN, Yaron Sanders MD, 1 tablet at 12/24/19 0010  •  cefdinir (OMNICEF) capsule 300 mg, 300 mg, Oral, Q12H, Mark Vásquez MD, 300 mg at 01/07/20 0736  •  clopidogrel (PLAVIX) tablet 75 mg, 75 mg, Oral, Daily, Yaron Sanders MD, 75 mg at 01/07/20 0736  •  dextrose (D50W) 25 g/ 50mL Intravenous Solution 25 g, 25 g, Intravenous, Q15 Min PRN, Azael Abdullahi MD  •  dextrose (GLUTOSE) oral gel 15 g, 15 g, Oral, Q15 Min PRN, Azael Abdullahi MD  •  dilTIAZem CD (CARDIZEM CD) 24 hr capsule 180 mg, 180 mg, Oral, BID, Yaron Sanders MD, 180 mg at 01/07/20 0736  •  docusate sodium (COLACE) capsule 100 mg, 100 mg, Oral, BID, Yaron Sanders MD, 100 mg at 01/07/20 0737  •  fluticasone (FLONASE) 50 MCG/ACT nasal spray 1 spray, 1 spray, Each Nare, Daily, Yaron Sanders MD, 1 spray at 12/24/19 0752  •  glucagon (human recombinant) (GLUCAGEN DIAGNOSTIC) injection 1 mg, 1 mg, Subcutaneous, Q15 Min PRN, Azael Abdullahi MD  •  insulin glargine (LANTUS) injection 10 Units, 10 Units, Subcutaneous, QAM,  Eveline Toledo MD, 10 Units at 01/07/20 0738  •  insulin lispro (humaLOG) injection 0-7 Units, 0-7 Units, Subcutaneous, 4x Daily With Meals & Nightly, Azael Abdullahi MD, 2 Units at 01/07/20 1140  •  levothyroxine (SYNTHROID, LEVOTHROID) tablet 125 mcg, 125 mcg, Oral, Q AM, Yaron Sanders MD, 125 mcg at 01/07/20 0526  •  metoprolol tartrate (LOPRESSOR) tablet 50 mg, 50 mg, Oral, BID, Yaron Sanders MD, 50 mg at 01/07/20 0735  •  nitroglycerin (NITROSTAT) SL tablet 0.4 mg, 0.4 mg, Sublingual, Q5 Min PRN, Yaron Sanders MD, 0.4 mg at 12/26/19 0854  •  pantoprazole (PROTONIX) EC tablet 40 mg, 40 mg, Oral, BID AC, Yaron Sanders MD, 40 mg at 01/07/20 0526  •  phenylephrine 0.25 % suppository 1 suppository, 1 suppository, Rectal, BID PRN, Yaron Sanders MD, 1 suppository at 12/24/19 2010  •  polyethylene glycol (MIRALAX) powder 17 g, 17 g, Oral, Daily PRN, Yaron Sanders MD, 17 g at 01/03/20 0810  •  potassium chloride (KLOR-CON) packet 40 mEq, 40 mEq, Oral, PRN, Albino Doss MD  •  potassium chloride (MICRO-K) CR capsule 40 mEq, 40 mEq, Oral, PRN, Albino Doss MD, 40 mEq at 12/29/19 1810  •  senna-docusate sodium (SENOKOT-S) 8.6-50 MG tablet 1 tablet, 1 tablet, Oral, Nightly, Yaron Sanders MD, 1 tablet at 01/06/20 2013  •  torsemide (DEMADEX) tablet 10 mg, 10 mg, Oral, Daily, Yaron Sanders MD, 10 mg at 01/07/20 0736  •  vitamin B-12 (CYANOCOBALAMIN) tablet 1,000 mcg, 1,000 mcg, Oral, Daily, Azael Abdullahi MD, 1,000 mcg at 01/07/20 0737  •  vitamin D (ERGOCALCIFEROL) capsule 50,000 Units, 50,000 Units, Oral, Q7 Days, Yaron Sanders MD, 50,000 Units at 01/02/20 0828  •  zolpidem (AMBIEN) tablet 5 mg, 5 mg, Oral, Nightly PRN, Mark Vásquez MD, 5 mg at 01/05/20 2009    Assessment/Plan     Active Hospital Problems    Diagnosis  POA   • Stroke (cerebrum) (CMS/[facility]) [I63.9]  Yes      Resolved Hospital Problems  "  No resolved problems to display.     Type 2 diabetes mellitus-uncontrolled  Continue Lantus 10 units in the morning  Continue Humalog sliding scale 3 times daily before meals and at bedtime.    Discharge instructions from endocrine  Given patient's elevated creatinine levels holding off on metformin at this time.  Consider Tradjenta 5 mg oral daily at the time of the discharge.    Follow-up with primary care physician with blood sugar trends to see if other oral hypoglycemic agents needs to be added.    Follow-up with primary in 2 to 4 weeks from discharge.    Discussed plan with Nurse.     Eveline Toledo MD.  01/07/20  12:36 PM      EMR Dragon / transcription disclaimer:    \"Dictated utilizing Dragon dictation\".   "

## 2020-01-07 NOTE — PLAN OF CARE
Problem: Patient Care Overview  Goal: Individualization and Mutuality  Flowsheets (Taken 1/7/2020 1700)  Patient Specific Preferences: Pt. has a large social [place] of family and friends and is looking forward to discharge tomorrow.

## 2020-01-07 NOTE — PLAN OF CARE
Problem: Patient Care Overview  Goal: Individualization and Mutuality  Flowsheets (Taken 1/7/2020 1700)  Patient Specific Preferences: Pt. has a large social [place] of family and friends and is looking forward to discharge tomorrow.     Problem: Patient Care Overview  Goal: Plan of Care Review  Outcome: Ongoing (interventions implemented as appropriate)  Flowsheets  Taken 1/7/2020 1703 by Ирина Hair, RN  Outcome Summary: Pt. A&OX4. Continent of B&B. Had some back pain today, given tylenol with little relief. Still needed sliding scale insulin with lantus today. Decreased urinary frequency and urgency with antibiotics. Takes meds whole with water. Calm, cooperative, and pleasant. Visited with family.  Taken 1/7/2020 0142 by Catherine Berg RN  Progress, Functional Goals: demonstrating adequate progress  IRF Plan of Care Review: progress ongoing, continue  Taken 1/7/2020 0736 by Ирина Hair, RN  Plan of Care Reviewed With: patient

## 2020-01-07 NOTE — THERAPY DISCHARGE NOTE
Inpatient Rehabilitation - Speech Language Pathology Discharge Summary  [facility]       Patient Name: Liana Carrero  : 1935  MRN: [id number]    Today's Date: 2020                   Admit Date: 2019      SLP Recommendation and Plan    Visit Dx:    ICD-10-CM ICD-9-CM   1. Unsteadiness on feet R26.81 781.2   2. Difficulty walking R26.2 719.7   3. 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 (CMS/HCC) I67.9 438.22    G81.94          Time Calculation- SLP     Row Name 20 1205 20 1000          Time Calculation- SLP    SLP Start Time  1030  -AW  0930  -AW     SLP Stop Time  1100  -AW  1000  -AW     SLP Time Calculation (min)  30 min  -AW  30 min  -AW       User Key  (r) = Recorded By, (t) = Taken By, (c) = Cosigned By    Initials Name Provider Type    Brynn Reynaga MS CCC-SLP Speech and Language Pathologist            SLP GOALS     Row Name 20 1100 20 1000 20 1100       Attention Goal 1 (SLP)    Improve Attention by Goal 1 (SLP)  --  complete divided attention task  -AW  --    Time Frame (Attention Goal 1, SLP)  --  by discharge  -AW  --    Progress (Attention Goal 1, SLP)  --  80%;independently (over 90% accuracy);90%;with minimal cues (75-90%)  -AW  --    Progress/Outcomes (Attention Goal 1, SLP)  --  goal ongoing  -AW  --    Comment (Attention Goal 1, SLP)  --  following directions task; extra time needed  -AW  --       Memory Skills Goal 1 (SLP)    Improve Memory Skills Through Goal 1 (SLP)  recall details from a word list  -AW  --  visual memory task  -AW    Time Frame (Memory Skills Goal 1, SLP)  by discharge  -AW  --  by discharge  -AW    Progress (Memory Skills Goal 1, SLP)  90%;independently (over 90% accuracy)  -AW  --  100%;independently (over 90% accuracy)  -AW    Progress/Outcomes (Memory Skills Goal 1, SLP)  goal ongoing  -AW  --  goal ongoing  -AW    Comment (Memory Skills Goal 1, SLP)  listening to 4 words x2 and recalling by  placement  -AW  --  Pt recalled the 4 pictures from earlier session (45 minutes)  -AW       Organizational Skills Goal 1 (SLP)    Improve Thought Organization Through Goal 1 (SLP)  naming by category exclusion  -AW  --  --    Time Frame (Thought Organization Skills Goal 1, SLP)  by discharge  -AW  --  --    Progress (Thought Organization Skills Goal 1, SLP)  90%;independently (over 90% accuracy)  -AW  --  --    Progress/Outcomes (Thought Organization Skills Goal 1, SLP)  goal ongoing  -AW  --  --    Comment (Thought Organization Skills Goal 1, SLP)  5 items  -AW  --  --       Functional Problem Solving Skills Goal 1 (SLP)    Improve Problem Solving Through Goal 1 (SLP)  --  --  complete organization/home management task  -AW    Time Frame (Problem Solving Goal 1, SLP)  --  --  by discharge  -AW    Progress (Problem Solving Goal 1, SLP)  --  --  80%;independently (over 90% accuracy);100%;with minimal cues (75-90%)  -AW    Progress/Outcomes (Problem Solving Goal 1, SLP)  --  --  goal ongoing  -AW    Comment (Problem Solving Goal 1, SLP)  --  --  kitchen shelf organization task  -AW       Functional Math Skills Goal 1 (SLP)    Improve Functional Math Skills Through Goal 1 (SLP)  --  complete word problems involving time;complete word problems involving money  -AW  --    Time Frame (Functional Math Skills Goal 1, SLP)  --  by discharge  -AW  --    Progress (Functional Math Skills Goal 1, SLP)  --  60%;independently (over 90% accuracy);100%;with moderate cues (50-74%)  -AW  --    Progress/Outcomes (Functional Math Skills Goal 1, SLP)  --  goal ongoing  -AW  --    Comment (Functional Math Skills Goal 1, SLP)  --  word problems  -AW  --       Right Hemisphere Function Goal 1 (SLP)    Improve Right Hemisphere Function Through Goal 1 (SLP)  complete visuo-spatial activities (visual closure, trail making, mazes  -AW  complete visuo-perceptual activities (L/R discrimination, spatial concepts)  -AW  complete visuo-perceptual  activities (L/R discrimination, spatial concepts)  -AW    Time Frame (Right Hemisphere Function Goal 1, SLP)  by discharge  -AW  by discharge  -AW  by discharge  -AW    Progress (Right Hemisphere Function Goal 1, SLP)  90%;independently (over 90% accuracy)  -AW  80%;independently (over 90% accuracy);100%;with minimal cues (75-90%)  -AW  90%;independently (over 90% accuracy);100%;with minimal cues (75-90%)  -AW    Progress/Outcomes (Right Hemisphere Function Goal 1, SLP)  goal ongoing  -AW  goal ongoing  -AW  goal ongoing  -AW    Comment (Right Hemisphere Function Goal 1, SLP)  block design task on iPad  -AW  extra time needed for word wheel with one missing letter  -AW   rows of letters into specific items in a category; min cues for L side.  -AW    Row Name 01/06/20 1000             Memory Skills Goal 1 (SLP)    Improve Memory Skills Through Goal 1 (SLP)  visual memory task  -AW      Time Frame (Memory Skills Goal 1, SLP)  by discharge  -AW      Progress (Memory Skills Goal 1, SLP)  100%;independently (over 90% accuracy)  -AW      Progress/Outcomes (Memory Skills Goal 1, SLP)  goal ongoing  -AW      Comment (Memory Skills Goal 1, SLP)  Pt recalled 4/4 pictures after a 10 minute delay using association as a strategy.  -AW         Functional Problem Solving Skills Goal 1 (SLP)    Improve Problem Solving Through Goal 1 (SLP)  complete organization/home management task  -AW      Time Frame (Problem Solving Goal 1, SLP)  by discharge  -AW      Progress (Problem Solving Goal 1, SLP)  70%;independently (over 90% accuracy);100%;with minimal cues (75-90%)  -AW      Progress/Outcomes (Problem Solving Goal 1, SLP)  goal ongoing  -AW      Comment (Problem Solving Goal 1, SLP)  min cues with obtaining information from a catalog for an order  -AW         Right Hemisphere Function Goal 1 (SLP)    Improve Right Hemisphere Function Through Goal 1 (SLP)  complete visuo-perceptual activities (L/R discrimination, spatial  concepts)  -AW      Time Frame (Right Hemisphere Function Goal 1, SLP)  by discharge  -AW      Progress (Right Hemisphere Function Goal 1, SLP)  90%;independently (over 90% accuracy)  -AW      Progress/Outcomes (Right Hemisphere Function Goal 1, SLP)  goal ongoing  -AW      Comment (Right Hemisphere Function Goal 1, SLP)  finding target word in row of letters; line used on L   -AW        User Key  (r) = Recorded By, (t) = Taken By, (c) = Cosigned By    Initials Name Provider Type    Brynn Reynaga MS CCC-SLP Speech and Language Pathologist            Therapy Charges for Today     Code Description Service Date Service Provider Modifiers Qty    63231723307 HC ST DEV OF COGN SKILLS INITIAL 15 MIN 1/6/2020 Brynn Griffith MS CCC-SLP  1    93656194213 HC ST DEV OF COGN SKILLS EACH ADDT'L 15 MIN 1/6/2020 Brynn Griffith MS CCC-SLP  3    14712492410 HC ST DEV OF COGN SKILLS INITIAL 15 MIN 1/7/2020 Brynn Griffith MS CCC-SLP  1    80860333905 HC ST DEV OF COGN SKILLS EACH ADDT'L 15 MIN 1/7/2020 Brynn Griffith MS CCC-SLP  3            SLP Discharge Summary  Anticipated Dischage Disposition: home with home health, home with 24/7 care  Reason for Discharge: identified goals partially met  Progress Toward Achieving Short/long Term Goals: goals partially met within established timelines  Discharge Destination: home w/ 24/7 care, home w/ home health      Brynn Griffith MS CCC-SLP  1/7/2020

## 2020-01-07 NOTE — THERAPY TREATMENT NOTE
Inpatient Rehabilitation - Speech Language Pathology Treatment Note    [facility]       Patient Name: Liana Carrero  : 1935  MRN: [id number]    Today's Date: 2020           Admit Date: 2019      Visit Dx:        ICD-10-CM ICD-9-CM   1. Unsteadiness on feet R26.81 781.2   2. Difficulty walking R26.2 719.7   3. 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 (CMS/[facility]) I67.9 438.22    G81.94        Patient Active Problem List   Diagnosis   • Stroke (cerebrum) (CMS/[facility])   • Chest pain          Therapy Treatment    Evaluation/Coping    Evaluation/Treatment Time and Intent  Subjective Information: no complaints (20 1000 : Brynn Griffith, MS CCC-SLP)  Existing Precautions/Restrictions: fall (20 1000 : Brynn Griffith, MS CCC-SLP)  Document Type: therapy note (daily note) (20 1000 : Brynn Griffith, MS CCC-SLP)  Mode of Treatment: speech-language pathology (20 1000 : Brynn Griffith, MS CCC-SLP)  Patient/Family Observations: Pt up in w/c for therapy. (20 1000 : Brynn Griffith, MS CCC-SLP)    Vitals/Pain/Safety         Cognition/Communication         Oral Motor/Eating         Mobility/Basic Activities/Instrumental Activities/Motor/Modality                   ROM/MMT                   Sensory/Myotome/Dermatome/Edema               Posture/Balance/Special Tests/Exercise/Transportation/Sexual Function                   Orthotics/Residual Limb/Prosthetic Management              Outcome Summary         EDUCATION    The patient has been educated in the following areas:     Cognitive Impairment Communication Impairment.    SLP Recommendation and Plan    SLP Diagnosis: Mild cognitive impairment    SLP Diagnosis: Mild cognitive impairment    Rehab Potential/Prognosis: good         Anticipated Dischage Disposition: home with assist              Predicted Duration Therapy Intervention (Days): until discharge           Plan of Care Reviewed With: patient      SLP GOALS     Row Name  01/07/20 1100 01/07/20 1000 01/06/20 1100       Attention Goal 1 (SLP)    Improve Attention by Goal 1 (SLP)  --  complete divided attention task  -AW  --    Time Frame (Attention Goal 1, SLP)  --  by discharge  -AW  --    Progress (Attention Goal 1, SLP)  --  80%;independently (over 90% accuracy);90%;with minimal cues (75-90%)  -AW  --    Progress/Outcomes (Attention Goal 1, SLP)  --  goal ongoing  -AW  --    Comment (Attention Goal 1, SLP)  --  following directions task; extra time needed  -AW  --       Memory Skills Goal 1 (SLP)    Improve Memory Skills Through Goal 1 (SLP)  recall details from a word list  -AW  --  visual memory task  -AW    Time Frame (Memory Skills Goal 1, SLP)  by discharge  -AW  --  by discharge  -AW    Progress (Memory Skills Goal 1, SLP)  90%;independently (over 90% accuracy)  -AW  --  100%;independently (over 90% accuracy)  -AW    Progress/Outcomes (Memory Skills Goal 1, SLP)  goal ongoing  -AW  --  goal ongoing  -AW    Comment (Memory Skills Goal 1, SLP)  listening to 4 words x2 and recalling by placement  -AW  --  Pt recalled the 4 pictures from earlier session (45 minutes)  -AW       Organizational Skills Goal 1 (SLP)    Improve Thought Organization Through Goal 1 (SLP)  naming by category exclusion  -AW  --  --    Time Frame (Thought Organization Skills Goal 1, SLP)  by discharge  -AW  --  --    Progress (Thought Organization Skills Goal 1, SLP)  90%;independently (over 90% accuracy)  -AW  --  --    Progress/Outcomes (Thought Organization Skills Goal 1, SLP)  goal ongoing  -AW  --  --    Comment (Thought Organization Skills Goal 1, SLP)  5 items  -AW  --  --       Functional Problem Solving Skills Goal 1 (SLP)    Improve Problem Solving Through Goal 1 (SLP)  --  --  complete organization/home management task  -AW    Time Frame (Problem Solving Goal 1, SLP)  --  --  by discharge  -AW    Progress (Problem Solving Goal 1, SLP)  --  --  80%;independently (over 90% accuracy);100%;with  minimal cues (75-90%)  -AW    Progress/Outcomes (Problem Solving Goal 1, SLP)  --  --  goal ongoing  -AW    Comment (Problem Solving Goal 1, SLP)  --  --  kitchen shelf organization task  -AW       Functional Math Skills Goal 1 (SLP)    Improve Functional Math Skills Through Goal 1 (SLP)  --  complete word problems involving time;complete word problems involving money  -AW  --    Time Frame (Functional Math Skills Goal 1, SLP)  --  by discharge  -AW  --    Progress (Functional Math Skills Goal 1, SLP)  --  60%;independently (over 90% accuracy);100%;with moderate cues (50-74%)  -AW  --    Progress/Outcomes (Functional Math Skills Goal 1, SLP)  --  goal ongoing  -AW  --    Comment (Functional Math Skills Goal 1, SLP)  --  word problems  -AW  --       Right Hemisphere Function Goal 1 (SLP)    Improve Right Hemisphere Function Through Goal 1 (SLP)  complete visuo-spatial activities (visual closure, trail making, mazes  -AW  complete visuo-perceptual activities (L/R discrimination, spatial concepts)  -AW  complete visuo-perceptual activities (L/R discrimination, spatial concepts)  -AW    Time Frame (Right Hemisphere Function Goal 1, SLP)  by discharge  -AW  by discharge  -AW  by discharge  -AW    Progress (Right Hemisphere Function Goal 1, SLP)  90%;independently (over 90% accuracy)  -AW  80%;independently (over 90% accuracy);100%;with minimal cues (75-90%)  -AW  90%;independently (over 90% accuracy);100%;with minimal cues (75-90%)  -AW    Progress/Outcomes (Right Hemisphere Function Goal 1, SLP)  goal ongoing  -AW  goal ongoing  -AW  goal ongoing  -AW    Comment (Right Hemisphere Function Goal 1, SLP)  block design task on iPad  -AW  extra time needed for word wheel with one missing letter  -AW   rows of letters into specific items in a category; min cues for L side.  -AW    Row Name 01/06/20 1000             Memory Skills Goal 1 (SLP)    Improve Memory Skills Through Goal 1 (SLP)  visual memory task  -AW       Time Frame (Memory Skills Goal 1, SLP)  by discharge  -AW      Progress (Memory Skills Goal 1, SLP)  100%;independently (over 90% accuracy)  -AW      Progress/Outcomes (Memory Skills Goal 1, SLP)  goal ongoing  -AW      Comment (Memory Skills Goal 1, SLP)  Pt recalled 4/4 pictures after a 10 minute delay using association as a strategy.  -AW         Functional Problem Solving Skills Goal 1 (SLP)    Improve Problem Solving Through Goal 1 (SLP)  complete organization/home management task  -AW      Time Frame (Problem Solving Goal 1, SLP)  by discharge  -AW      Progress (Problem Solving Goal 1, SLP)  70%;independently (over 90% accuracy);100%;with minimal cues (75-90%)  -AW      Progress/Outcomes (Problem Solving Goal 1, SLP)  goal ongoing  -AW      Comment (Problem Solving Goal 1, SLP)  min cues with obtaining information from a catalog for an order  -AW         Right Hemisphere Function Goal 1 (SLP)    Improve Right Hemisphere Function Through Goal 1 (SLP)  complete visuo-perceptual activities (L/R discrimination, spatial concepts)  -AW      Time Frame (Right Hemisphere Function Goal 1, SLP)  by discharge  -AW      Progress (Right Hemisphere Function Goal 1, SLP)  90%;independently (over 90% accuracy)  -AW      Progress/Outcomes (Right Hemisphere Function Goal 1, SLP)  goal ongoing  -AW      Comment (Right Hemisphere Function Goal 1, SLP)  finding target word in row of letters; line used on L   -AW        User Key  (r) = Recorded By, (t) = Taken By, (c) = Cosigned By    Initials Name Provider Type    Brynn Reynaga MS CCC-SLP Speech and Language Pathologist                  Time Calculation:       Time Calculation- SLP     Row Name 01/07/20 1205 01/07/20 1000          Time Calculation- SLP    SLP Start Time  1030  -AW  0930  -AW     SLP Stop Time  1100  -AW  1000  -AW     SLP Time Calculation (min)  30 min  -AW  30 min  -AW       User Key  (r) = Recorded By, (t) = Taken By, (c) = Cosigned By    Initials Name  Provider Type    AW Brynn Griffith, MS CCC-SLP Speech and Language Pathologist            Therapy Charges for Today     Code Description Service Date Service Provider Modifiers Qty    11545971976 HC ST DEV OF COGN SKILLS INITIAL 15 MIN 1/6/2020 Brynn Griffith, MS CCC-SLP  1    73486952638 HC ST DEV OF COGN SKILLS EACH ADDT'L 15 MIN 1/6/2020 Brynn Griffith, MS CCC-SLP  3    46164146500 HC ST DEV OF COGN SKILLS INITIAL 15 MIN 1/7/2020 Brynn Griffith, MS CCC-SLP  1    45950650347 HC ST DEV OF COGN SKILLS EACH ADDT'L 15 MIN 1/7/2020 Brynn Griffith MS CCC-SLP  3                           Brynn Griffith MS CCC-SLP  1/7/2020

## 2020-01-07 NOTE — THERAPY DISCHARGE NOTE
Inpatient Rehabilitation - Occupational Therapy Discharge Summary  [facility]     Patient Name: Liana Carrero  : 1935  MRN: [id number]    Today's Date: 2020                 Admit Date: 2019        OT Recommendation and Plan    Visit Dx:    ICD-10-CM ICD-9-CM   1. Unsteadiness on feet R26.81 781.2   2. Difficulty walking R26.2 719.7   3. 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 (CMS/[facility]) I67.9 438.22    G81.94          Time Calculation- OT     Row Name 20 1500 20 0800          Time Calculation- OT    OT Start Time  1500  -CC  0800  -CC     OT Stop Time  1530  -CC  0830  -CC     OT Time Calculation (min)  30 min  -CC  30 min  -CC       User Key  (r) = Recorded By, (t) = Taken By, (c) = Cosigned By    Initials Name Provider Type    Keya Zuñiga OTR Occupational Therapist            OT IRF GOALS     Row Name 20 1600 20 1200 20 1000       LB Dressing Goal 1 (OT-IRF)    Activity/Device (LB Dressing Goal 1, OT-IRF)  --  --  lower body dressing  -CC    Drew (LB Dressing Goal 1, OT-IRF)  --  --  supervision required  -CC    Time Frame (LB Dressing Goal 1, OT-IRF)  --  --  long term goal (LTG);by discharge  -CC    Progress/Outcomes (LB Dressing Goal 1, OT-IRF)  goal met  -CC  continuing progress toward goal  -CC  continuing progress toward goal  -CC       LB Dressing Goal 2 (OT-IRF)    Progress/Outcomes (LB Dressing Goal 2, OT-IRF)  goal met  -CC  continuing progress toward goal  -CC  --       Toileting Goal 2 (OT-IRF)    Activity/Device (Toileting Goal 2, OT-IRF)  --  --  toileting skills, all  -CC    Drew Level (Toileting Goal 2, OT-IRF)  --  --  supervision required  -CC    Time Frame (Toileting Goal 2, OT-IRF)  --  --  long term goal (LTG);by discharge  -CC    Progress/Outcomes (Toileting Goal 2, OT-IRF)  goal met  -CC  goal partially met  -CC  goal partially met  -CC       Strength Goal 1 (OT-IRF)    Strength Goal 1  (OT-IRF)  --  --  danelle TORRES strength  -CC    Time Frame (Strength Goal 1, OT-IRF)  --  --  long term goal (LTG);by discharge  -CC    Progress/Outcomes (Strength Goal 1, OT-IRF)  goal met  -CC  continuing progress toward goal  -CC  continuing progress toward goal  -CC       Caregiver Training Goal 1 (OT-IRF)    Caregiver Training Goal 1 (OT-IRF)  --  --  pt and family I with HEP  -CC    Time Frame (Caregiver Training Goal 1, OT-IRF)  --  --  long term goal (LTG);by discharge  -CC    Progress/Outcomes (Caregiver Training Goal 1, OT-IRF)  goal met  -CC  continuing progress toward goal  -CC  continuing progress toward goal  -CC      User Key  (r) = Recorded By, (t) = Taken By, (c) = Cosigned By    Initials Name Provider Type    Keya Zuñiga OTR Occupational Therapist              Therapy Charges for Today     Code Description Service Date Service Provider Modifiers Qty    58877851439 HC OT SELF CARE/MGMT/TRAIN EA 15 MIN 1/6/2020 Keya Miranda OTR GO 2    81930928296 HC OT NEUROMUSC RE EDUCATION EA 15 MIN 1/6/2020 Keya Miranda OTR GO 2    61800110971 HC OT SELF CARE/MGMT/TRAIN EA 15 MIN 1/7/2020 Keya Miranda OTR GO 2    45508429767 HC OT NEUROMUSC RE EDUCATION EA 15 MIN 1/7/2020 Keya Miranda OTR GO 2          OT Discharge Summary  Anticipated Discharge Disposition (OT): home with assist  Discharge Destination: Home with assist, Home with home health      GIAN Blackwell  1/7/2020

## 2020-01-07 NOTE — PROGRESS NOTES
SECTION GG      Self Care Performance Discharge:   Oral Hygiene: Hardwick sets up or cleans up; patient completes activity. Hardwick  assists only prior to or following the activity.   Toileting Hygiene: : Hardwick provides verbal cues and/or touching/steadying  and/or contact guard assistance as patient completes activity.   Shower/Bathe Self: Hardwick provides verbal cues and/or touching/steadying and/or  contact guard assistance as patient completes activity.   Upper Body Dressing: Hardwick provides verbal cues and/or touching/steadying  and/or contact guard assistance as patient completes activity.   Lower Body Dressing: Hardwick provides verbal cues and/or touching/steadying  and/or contact guard assistance as patient completes activity.   Putting On/Taking Off Footwear: Hardwick sets up or cleans up; patient completes  activity. Hardwick assists only prior to or following the activity.    Mobility Toilet Transfer Discharge: Hardwick provides verbal cues or  touching/steadying assistance as patient completes activity.    Signed by: Keya Miranda OTR/TALIB

## 2020-01-07 NOTE — PROGRESS NOTES
LOS: 21 days   Patient Care Team:  Francisco Gallagher MD as PCP - General (Family Medicine)    Chief Complaint:   Status post right CVA with TPA/petechial hemorrhage  Stroke prophylaxis-Aggrenox/Plavix  Left hemiparesis/left visual field deficit  Coronary artery disease  Hyperlipidemia - alirocumab  Hypothyroidism   Hypertension  Chronic renal insufficiency  DVT prophylaxis-SCDs    Subjective   Patient seen and examined this morning with family at bedside. Patient reports that she is feeling well. Reports some low back pain which is chronic and some diffuse joint pain. She previously was taking voltaren for this at home which was discontinued following CVA. Patient is also reporting increased head tremors which are also chronic. Continues to have left visual field cut. No additional concerns per patient or family. Looking forward to anticipated discharge home tomorrow.     Objective     Vital Signs  Temp:  [97.6 °F (36.4 °C)-98.3 °F (36.8 °C)] 98 °F (36.7 °C)  Heart Rate:  [63-71] 65  Resp:  [18-20] 20  BP: (119-144)/(57-68) 121/57    Physical Exam  Mental status: Awake and alert -  AWAKE / ALERT  HEENT: normocephalic, atraumatic  Pulm - CTAB, no wheezing, rales, or rhonchi on exam.   Chest- ZIO Patch  Heart- RRR, no MRG  ABD - Soft, non-tender, non-distended. +BS  Extremities: No edema  Neuro: Left visual field deficit.  No dysarthria.  Speech fluent.     Strength: 5/5 throughout bilateral upper and lower extremities.        Results Review:    I reviewed the patient's new clinical results.          Invalid input(s): LABALBU, PROT  Results from last 7 days   Lab Units 01/06/20  0516   WBC 10*3/mm3 6.77   HEMOGLOBIN g/dL 10.5*   HEMATOCRIT % 31.4*   PLATELETS 10*3/mm3 214      Ref. Range 12/18/2019 08:53 12/18/2019 11:02   TSH Baseline Latest Ref Range: 0.270 - 4.200 uIU/mL 3.290     Vitamin B-12 Latest Ref Range: 211 - 946 pg/mL   339   25 Hydroxy, Vitamin D Latest Ref Range: 30.0 - 100.0 ng/ml   18.7 (L)        Medication Review: DONE  Scheduled Meds:    Alirocumab 75 mg Subcutaneous Q14 Days   aspirin 81 mg Oral Daily   cefdinir 300 mg Oral Q12H   clopidogrel 75 mg Oral Daily   dilTIAZem  mg Oral BID   docusate sodium 100 mg Oral BID   fluticasone 1 spray Each Nare Daily   insulin glargine 10 Units Subcutaneous QAM   insulin lispro 0-7 Units Subcutaneous 4x Daily With Meals & Nightly   levothyroxine 125 mcg Oral Q AM   metoprolol tartrate 50 mg Oral BID   pantoprazole 40 mg Oral BID AC   senna-docusate sodium 1 tablet Oral Nightly   torsemide 10 mg Oral Daily   vitamin B-12 1,000 mcg Oral Daily   vitamin D 50,000 Units Oral Q7 Days     Continuous Infusions:   PRN Meds:.•  acetaminophen  •  butalbital-acetaminophen-caffeine  •  dextrose  •  dextrose  •  glucagon (human recombinant)  •  nitroglycerin  •  phenylephrine  •  polyethylene glycol  •  potassium chloride  •  potassium chloride  •  zolpidem        Assessment/Plan       Stroke (cerebrum) (CMS/[facility])      Assessment & Plan    Status post right CVA with TPA/petechial hemorrhage  Stroke prophylaxis-Aggrenox/Plavix changed to aspirin/Plavix after the cardiac cath on December 26  Zio patch in place.     Left hemiparesis/left visual field deficit  Impaired mobility/self-care     Adjunctive medications post stroke-on levothyroxine with TSH within normal limits.  B12 within normal limits.  Vitamin D deficiency-associated with stroke/stroke recovery-  will add ergocalciferol 50,000 units weekly.     Coronary artery disease -history of CABG/stent.    December 19-troponins negative yesterday.  No chest pain today.  Tolerating therapies.  December 26-Patient with episode of crushing chest pain this morning into her right neck.  Relieved with nitroglycerin x3.  Transferred to monitored bed on 5 S.  Seen by cardiology with plans for cardiac cath this afternoon pending input from nephrology regarding Contrast and neurology regarding antiplatelet options.   December  27-She did have Solu-Medrol per renal with hyperglycemia.  She had left heart catheterization which showed patent grafts.  She had a radial arterial graft to the mid LAD which had 50-60% stenosis with 2 stents and DUY-3 flow.  She had normal left ventricular filling pressures and there was no intervention.  Medical management was recommended.  Change to aspirin/Plavix    Hyperlipidemia - alirocumab     Hypothyroidism-levothyroxine     Hypertension-on Cardizem and metoprolol.  Aldactone and lisinopril held after recent acute on chronic renal insufficiency.  On lower dose torsemide.     Chronic renal insufficiency    -January 6-urinary tract infection-E. coli-sensitive to cephalosporins-on cefdinir     DVT prophylaxis-SCDs    Chronic low back pain-off nonsteroidal anti-inflammatory(Voltaren) .  Will try K pad.  May add Celebrex at later point, as has been shown not to raise risk of secondary coronary/cerebrovascular event.   -January 7- Will start celebrex for ongoing pain and will send patient with prescription at discharge    Glucose intolerance-December 31-her blood sugar still tend to run higher after pre-IV steroids for contrast December 26.  On December 12 hemoglobin A1c equals 6.1.  She received 5 units of insulin on sliding scale yesterday.  Will add consistent carbohydrate to diet restriction.   January 2-still has hyperglycemia.  In the past she was told she was borderline diabetic. Presently sliding scale insulin. If hyperglycemia persists, will ask endocrinology to see regarding initiation of oral hypoglycemic agent.   January 6-consult endocrinology as her sugars continue to run high in the hospital.  Started on Lantus 10 units every morning     Now admit for comprehensive acute inpatient rehabilitation .  This would be an interdisciplinary program with physical therapy 1 hour,  occupational therapy 1 hour, and speech therapy 1 hour, 5 days a week.  Rehabilitation nursing for carryover, monitoring of  cardiac and neurologic   status, bowel and bladder, and skin  Ongoing physician follow-up.  Weekly team conferences.  Goals are to achieve a level of contact-guard with  mobility and self-care and improved visual-spatial.   Rehabilitation prognosis fair.  Medical prognosis indeterminate.  Estimated length of stay is approximately 2 weeks, but is only an estimation.   The patient's functional status and clinical status is unchanged from preadmission assessment and the patient continues appropriate for acute inpatient rehabilitation.  Goal is for home with outpatient   therapies.  Barrier to discharge: Impaired strength and mobility- work on transfers ambulation and self-care to overcome.         TEAM CONF- DEC 19 - TRANSFERS MIN. BED CTG. 40 FEET MIN RW. ADLS MIN ASSIST. LEFT VISUAL FIELD DEFICIT. LEFT INATTENTION  ELOS- 2 WEEKS    TEAM CONF - DEC 26 - BED SBA. TRANSFES MIN-CTG. GAIT 80 FEET MIN-CTG RW. ADLS CTG. LEFT INATTENTION. MODERATE DEFICITS FOR REASONING AND MATH.  ELOS - 1 WEEK from a rehab standpoint.  Family conference scheduled for tomorrow and family would like to still keep that time as members from out of town are here.    December 27-transfer back to the rehab unit after cardiac cath yesterday.    TEAM CONF - JAN 2 - BED SUP. 4 STAIRS CTG. GAIT 160 FEET MIN RW , IMPACTED BY VF CUT. BATH SBA. UBD MIN. LBD MIN. CUES TO ATTEND TO LEFT. COORDINAITON IMPROVING. MOD CUES WITH SLP TO ATTEND TO LEFT SIDE. MOD CUES FOR WORKING MEMORY, REASONING, MATH. BNE PENDING. CONTINENT BOWEL AND BLADDER. LEFT GROIN SITE HEALING WITH DRESSING. ZIO PATCH.  HYPERGLYCEMIA - ON CONSISTENT CARB DIET.  ELOS - WED JAN 8.    Josie Jack DO  01/07/20  2:25 PM

## 2020-01-07 NOTE — PROGRESS NOTES
"   LOS: 20 days   Patient Care Team:  Francisco Gallagher MD as PCP - General (Family Medicine)    Chief Complaint:   Status post right CVA with TPA/petechial hemorrhage  Stroke prophylaxis-Aggrenox/Plavix  Left hemiparesis/left visual field deficit  Coronary artery disease  Hyperlipidemia - alirocumab  Hypothyroidism   Hypertension  Chronic renal insufficiency  DVT prophylaxis-SCDs    Subjective     History of Present Illness    Subjective  Patient seen earlier today on rounds.     Patient without any chest pain or shortness of air.  Tolerating therapies.  Continues with improved strength on the left side.  Continues with a left visual field cut.  Visual field impacted her on her therapeutic day pass yesterday, not able to see the front door.  History taken from: patient    Objective     Vital Signs  Temp:  [98.2 °F (36.8 °C)-98.3 °F (36.8 °C)] 98.3 °F (36.8 °C)  Heart Rate:  [63-71] 71  Resp:  [18] 18  BP: (119-122)/(58-63) 122/63    Objective:  Vital signs: (most recent): Blood pressure 122/63, pulse 71, temperature 98.3 °F (36.8 °C), temperature source Oral, resp. rate 18, height 157.5 cm (62\"), weight 76.9 kg (169 lb 8.5 oz), SpO2 97 %, not currently breastfeeding.            Physical Exam  MENTAL STATUS -  AWAKE / ALERT  HEENT- NCAT,  , SCLERA NON-ICTERIC, CONJUNCTIVA PINK,   NO JVD   LUNGS - CTA, NO WHEEZES, RALES OR RHONCHI  Chest-ZIO Patch  HEART- RRR, NO RUB, MURMUR, OR GALLOP  ABD - NORMOACTIVE BOWEL SOUNDS, SOFT, NT.     EXT - NO EDEMA OR CYANOSIS  NEURO -oriented x4.  Good historian.  Left visual field deficit.  No dysarthria.  Speech fluent.     MOTOR EXAM - RUE/RLE 5/5. LUE/LLE 5/5.    Improved motor control on the left side  Results Review:     I reviewed the patient's new clinical results.          Invalid input(s): LABALBU, PROT  Results from last 7 days   Lab Units 01/06/20  0516   WBC 10*3/mm3 6.77   HEMOGLOBIN g/dL 10.5*   HEMATOCRIT % 31.4*   PLATELETS 10*3/mm3 214      Ref. Range 12/18/2019 08:53 " 12/18/2019 11:02   TSH Baseline Latest Ref Range: 0.270 - 4.200 uIU/mL 3.290     Vitamin B-12 Latest Ref Range: 211 - 946 pg/mL   339   25 Hydroxy, Vitamin D Latest Ref Range: 30.0 - 100.0 ng/ml   18.7 (L)       Medication Review: DONE  Scheduled Meds:    Alirocumab 75 mg Subcutaneous Q14 Days   aspirin 81 mg Oral Daily   cefdinir 300 mg Oral Q12H   clopidogrel 75 mg Oral Daily   dilTIAZem  mg Oral BID   docusate sodium 100 mg Oral BID   fluticasone 1 spray Each Nare Daily   [START ON 1/7/2020] insulin glargine 10 Units Subcutaneous QAM   insulin lispro 0-7 Units Subcutaneous 4x Daily With Meals & Nightly   levothyroxine 125 mcg Oral Q AM   metoprolol tartrate 50 mg Oral BID   pantoprazole 40 mg Oral BID AC   senna-docusate sodium 1 tablet Oral Nightly   torsemide 10 mg Oral Daily   vitamin B-12 1,000 mcg Oral Daily   vitamin D 50,000 Units Oral Q7 Days     Continuous Infusions:   PRN Meds:.•  acetaminophen  •  butalbital-acetaminophen-caffeine  •  dextrose  •  dextrose  •  glucagon (human recombinant)  •  nitroglycerin  •  phenylephrine  •  polyethylene glycol  •  potassium chloride  •  potassium chloride  •  zolpidem        Assessment/Plan       Stroke (cerebrum) (CMS/[facility])      Assessment & Plan  Status post right CVA with TPA/petechial hemorrhage  Stroke prophylaxis-Aggrenox/Plavix changed to aspirin/Plavix after the cardiac cath on December 26  Zio patch in place.     Left hemiparesis/left visual field deficit  Impaired mobility/self-care     Adjunctive medications post stroke-on levothyroxine with TSH within normal limits.  B12 within normal limits.  Vitamin D deficiency-associated with stroke/stroke recovery-  will add ergocalciferol 50,000 units weekly.     Coronary artery disease -history of CABG/stent.    December 19-troponins negative yesterday.  No chest pain today.  Tolerating therapies.  December 26-Patient with episode of crushing chest pain this morning into her right neck.  Relieved with  nitroglycerin x3.  Transferred to monitored bed on 5 S.  Seen by cardiology with plans for cardiac cath this afternoon pending input from nephrology regarding Contrast and neurology regarding antiplatelet options.   December 27-She did have Solu-Medrol per renal with hyperglycemia.  She had left heart catheterization which showed patent grafts.  She had a radial arterial graft to the mid LAD which had 50-60% stenosis with 2 stents and DUY-3 flow.  She had normal left ventricular filling pressures and there was no intervention.  Medical management was recommended.  Change to aspirin/Plavix    Hyperlipidemia - alirocumab     Hypothyroidism-levothyroxine     Hypertension-on Cardizem and metoprolol.  Aldactone and lisinopril held after recent acute on chronic renal insufficiency.  On lower dose torsemide.     Chronic renal insufficiency    -January 6-urinary tract infection-E. coli-sensitive to cephalosporins-on cefdinir     DVT prophylaxis-SCDs    Chronic low back pain-off nonsteroidal anti-inflammatory(Voltaren) .  Will try K pad.  May add Celebrex at later point, as has been shown not to raise risk of secondary coronary/cerebrovascular event.     Glucose intolerance-December 31-her blood sugar still tend to run higher after pre-IV steroids for contrast December 26.  On December 12 hemoglobin A1c equals 6.1.  She received 5 units of insulin on sliding scale yesterday.  Will add consistent carbohydrate to diet restriction.   January 2-still has hyperglycemia.  In the past she was told she was borderline diabetic. Presently sliding scale insulin. If hyperglycemia persists, will ask endocrinology to see regarding initiation of oral hypoglycemic agent.   January 6-consult endocrinology as her sugars continue to run high in the hospital.  Started on Lantus 10 units every morning     Now admit for comprehensive acute inpatient rehabilitation .  This would be an interdisciplinary program with physical therapy 1 hour,   occupational therapy 1 hour, and speech therapy 1 hour, 5 days a week.  Rehabilitation nursing for carryover, monitoring of cardiac and neurologic   status, bowel and bladder, and skin  Ongoing physician follow-up.  Weekly team conferences.  Goals are to achieve a level of contact-guard with  mobility and self-care and improved visual-spatial.   Rehabilitation prognosis fair.  Medical prognosis indeterminate.  Estimated length of stay is approximately 2 weeks, but is only an estimation.   The patient's functional status and clinical status is unchanged from preadmission assessment and the patient continues appropriate for acute inpatient rehabilitation.  Goal is for home with outpatient   therapies.  Barrier to discharge: Impaired strength and mobility- work on transfers ambulation and self-care to overcome.         TEAM CONF- DEC 19 - TRANSFERS MIN. BED CTG. 40 FEET MIN RW. ADLS MIN ASSIST. LEFT VISUAL FIELD DEFICIT. LEFT INATTENTION  ELOS- 2 WEEKS    TEAM CONF - DEC 26 - BED SBA. TRANSFES MIN-CTG. GAIT 80 FEET MIN-CTG RW. ADLS CTG. LEFT INATTENTION. MODERATE DEFICITS FOR REASONING AND MATH.  ELOS - 1 WEEK from a rehab standpoint.  Family conference scheduled for tomorrow and family would like to still keep that time as members from out of town are here.    December 27-transfer back to the rehab unit after cardiac cath yesterday.    TEAM CONF - JAN 2 - BED SUP. 4 STAIRS CTG. GAIT 160 FEET MIN RW , IMPACTED BY VF CUT. BATH SBA. UBD MIN. LBD MIN. CUES TO ATTEND TO LEFT. COORDINAITON IMPROVING. MOD CUES WITH SLP TO ATTEND TO LEFT SIDE. MOD CUES FOR WORKING MEMORY, REASONING, MATH. BNE PENDING. CONTINENT BOWEL AND BLADDER. LEFT GROIN SITE HEALING WITH DRESSING. ZIO PATCH.  HYPERGLYCEMIA - ON CONSISTENT CARB DIET.  ELOS - WED JAN 8.    Azael Abdullahi MD  01/06/20  7:34 PM    Time:

## 2020-01-07 NOTE — PROGRESS NOTES
Patient previous took Diclofenac 75 mg bid for pain chronically. H/o lumbar pain.   Reviewed Celebrex has been shown not to raise risk of secondary cardiovascular/cerebrovascular event. Renal function- Cr normal.   Celebrex 100 mg bid ordered.

## 2020-01-07 NOTE — PROGRESS NOTES
Inpatient Rehabilitation Functional Measures Assessment and Plan of Care    Plan of Care  Updated Problems/Interventions  Field    Functional Measures  JALEEL Eating:  [facility] Grooming: [facility] Bathing:  [facility] Upper Body Dressing:  [facility] Lower Body Dressing:  [facility] Toileting:  [facility] Bladder Management  Level of Assistance:  [place]  Frequency/Number of Accidents this Shift:  [facility] Bowel Management  Level of Assistance: [place]  Frequency/Number of Accidents this Shift: [facility] Bed/Chair/Wheelchair Transfer:  [facility] Toilet Transfer:  [facility] Tub/Shower Transfer:  [place]    Previously Documented Mode of Locomotion at Discharge: Field  JALEEL Expected Mode of Locomotion at Discharge: [facility] Walk/Wheelchair:  [facility] Stairs:  [facility] Comprehension:  [facility] Expression:  [facility] Social Interaction:  [facility] Problem Solving:  [facility] Memory:  [place]    Therapy Mode Minutes  Occupational Therapy: Individual: 60 minutes.  Physical Therapy: [place]  Speech Language Pathology:  [place]    Signed by: GIAN Blackwell/TALIB

## 2020-01-07 NOTE — THERAPY TREATMENT NOTE
Inpatient Rehabilitation - Physical Therapy Treatment Note  [facility]     Patient Name: Liana Carrero  : 1935  MRN: [id number]    Today's Date: 2020                 Admit Date: 2019      Visit Dx:      ICD-10-CM ICD-9-CM   1. Unsteadiness on feet R26.81 781.2   2. Difficulty walking R26.2 719.7   3. 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 (CMS/HCC) I67.9 438.22    G81.94        Patient Active Problem List   Diagnosis   • Stroke (cerebrum) (CMS/[facility])   • Chest pain       Therapy Treatment    IRF Treatment Summary     Row Name 20 1000 20          Evaluation/Treatment Time and Intent    Subjective Information  no complaints  -AW  no complaints  -EE     Existing Precautions/Restrictions  fall  -AW  fall  -EE     Document Type  therapy note (daily note)  -AW  therapy note (daily note)  -EE     Mode of Treatment  speech-language pathology  -AW  physical therapy  -EE     Patient/Family Observations  Pt up in w/c for therapy.  -AW  Pt supine in bed in am in no acute distress.   -EE     Recorded by [AW] Brynn Griffith, MS CCC-SLP [EE] Kayla Uribe, PT     Row Name 20             Cognition/Psychosocial- PT/OT    Orientation Status (Cognition)  oriented x 4  -EE      Follows Commands (Cognition)  follows one step commands;verbal cues/prompting required  -EE      Personal Safety Interventions  fall prevention program maintained;gait belt;muscle strengthening facilitated;nonskid shoes/slippers when out of bed;supervised activity  -EE      Cognitive Function (Cognitive)  safety deficit  -EE      Safety Deficit (Cognitive)  mild deficit  -EE      Recorded by [EE] Kayla Uribe, PT      Row Name 20             Mobility    Advanced Gait Activity  rough/uneven surfaces  -EE      Additional Documentation  Advanced Gait Activity (Row)  -EE      Recorded by [EE] Kayla Uribe PT      Row Name 20             Bed Mobility Assessment/Treatment     Rolling Left [place] (Bed Mobility)  independent  -EE      Rolling Right [place] (Bed Mobility)  independent  -EE      Supine-Sit [place] (Bed Mobility)  independent  -EE      Sit-Supine [place] (Bed Mobility)  independent  -EE      Comment (Bed Mobility)  performed on mat table; no bedrails  -EE      Recorded by [EE] Kayla Uribe, PT      Row Name 01/07/20 0919             Bed-Chair Transfer    Bed-Chair [place] (Transfers)  contact guard;stand by assist  -EE      Assistive Device (Bed-Chair Transfers)  wheelchair  -EE      Recorded by [EE] Kayla Uribe, PT      Row Name 01/07/20 0919             Chair-Bed Transfer    Chair-Bed [place] (Transfers)  contact guard;stand by assist;verbal cues  -EE      Assistive Device (Chair-Bed Transfers)  wheelchair;walker, front-wheeled  -EE      Recorded by [EE] Kayla Uribe, PT      Row Name 01/07/20 0919             Sit-Stand Transfer    Sit-Stand [place] (Transfers)  stand by assist;verbal cues  -EE      Assistive Device (Sit-Stand Transfers)  walker, front-wheeled  -EE      Recorded by [EE] Kayla Uribe, PT      Row Name 01/07/20 0919             Stand-Sit Transfer    Stand-Sit [place] (Transfers)  stand by assist;verbal cues  -EE      Assistive Device (Stand-Sit Transfers)  walker, front-wheeled  -EE      Recorded by [EE] Kayla Uribe, PT      Row Name 01/07/20 0919             Car Transfer    Type (Car Transfer)  stand pivot/stand step  -EE      [place] Level (Car Transfer)  contact guard;verbal cues  -EE      Assistive Device (Car Transfer)  walker, front-wheeled  -EE      Recorded by [EE] Kayla Uribe, PT      Row Name 01/07/20 0919             Gait/Stairs Assessment/Training    [place] Level (Gait)  contact guard;verbal cues  -EE      Assistive Device (Gait)  walker, front-wheeled  -EE      Distance in Feet (Gait)  160' x3, 80' x2  -EE      Pattern (Gait)  step-through  -EE      Deviations/Abnormal Patterns (Gait)   radha decreased;stride length decreased  -EE      Bilateral Gait Deviations  forward flexed posture;heel strike decreased  -EE      Left Sided Gait Deviations  leans left when fatigued  -EE      [place] Level (Stairs)  contact guard;verbal cues  -EE      Handrail Location (Stairs)  both sides  -EE      Number of Steps (Stairs)  4  -EE      Ascending Technique (Stairs)  step-to-step  -EE      Descending Technique (Stairs)  step-to-step  -EE      Stairs, Safety Issues  balance decreased during turns  -EE      Comment (Gait/Stairs)  Visual scanning to locate objects while ambulating in gym; min verbal cues to scan L.   -EE      Recorded by [EE] Kayla Uribe, PT      Row Name 01/07/20 0919             Safety Issues, Functional Mobility    Impairments Affecting Function (Mobility)  balance;endurance/activity tolerance;visual/perceptual;strength  -EE      Recorded by [EE] Kayla Uribe, PT      Row Name 01/07/20 0919             Curb Negotiation (Mobility)    [place], Curb Negotiation  contact guard;verbal cues  -EE      Comment, Curb Negotiation (Mobility)  with rwx  -EE      Recorded by [EE] Kayla Uribe, PT      Row Name 01/07/20 0919             Rough/Uneven Surface Gait Skills (Mobility)    [place], Gait on Rough/Uneven Surface (Mobility)  contact guard;verbal cues  -EE      Comment, Gait Rough/Uneven Surface (Mobility)  amb 2 x 12' across red foam mat with rwx  -EE      Recorded by [EE] Kayla Uribe, PT      Row Name 01/07/20 0919             Pain Scale: Numbers Pre/Post-Treatment    Pain Scale: Numbers, Pretreatment  0/10 - no pain  -EE      Pain Scale: Numbers, Post-Treatment  0/10 - no pain  -EE      Recorded by [EE] Kayla Uribe, PT      Row Name 01/07/20 0919             Positioning and Restraints    Pre-Treatment Position  in bed  -EE      Post Treatment Position  bed  -EE      In Bed  fowlers;call light within reach;encouraged to call for assist;with family/caregiver  -EE      Recorded by [EE]  Kayla Uribe PT        User Key  (r) = Recorded By, (t) = Taken By, (c) = Cosigned By    Initials Name Effective Dates    Brynn Reynaga, MS CCC-SLP 06/08/18 -     EE Kayla Uribe, PT 04/03/18 -                  PT Recommendation and Plan  Anticipated Equipment Needs at Discharge (PT Eval): (TBD pending progress)  Planned Therapy Interventions (PT Eval): balance training, home exercise program, bed mobility training, gait training, motor coordination training, neuromuscular re-education, patient/family education, postural re-education, ROM (range of motion), stair training, strengthening, stretching, transfer training  Frequency of Treatment (PT Eval): 60 minutes per session     Daily Summary of Progress (PT)  Functional Goal Overall Progress: Physical Therapy: progressing toward functional goals as expected  Impairments Continuing to Limit Function: Physical Therapy: impaired balance, impaired vision, pain, postural control impaired, strength decreased               Time Calculation:     PT Charges     Row Name 01/07/20 1446 01/07/20 0948          Time Calculation    Start Time  1400  -EE  0900  -EE     Stop Time  1430  -EE  0930  -EE     Time Calculation (min)  30 min  -EE  30 min  -EE     PT Received On  01/07/20  -EE  01/07/20  -EE     PT - Next Appointment  01/08/20  -EE  01/07/20  -EE        Time Calculation- PT    Total Timed Code Minutes- PT  30 minute(s)  -EE  30 minute(s)  -EE       User Key  (r) = Recorded By, (t) = Taken By, (c) = Cosigned By    Initials Name Provider Type    EE Kayla Uribe PT Physical Therapist          Therapy Charges for Today     Code Description Service Date Service Provider Modifiers Qty    95463712984 HC GAIT TRAINING EA 15 MIN 1/6/2020 Kayla Uribe, PT GP 2    95566803865 HC PT THER PROC EA 15 MIN 1/6/2020 Kayla Uribe, PT GP 1    11069781154 HC PT NEUROMUSC RE EDUCATION EA 15 MIN 1/6/2020 Kayla Uribe, PT GP 1    38143741345 HC GAIT TRAINING EA 15 MIN 1/7/2020 Kayla Uribe  PT GP 2    44583732866 HC PT NEUROMUSC RE EDUCATION EA 15 MIN 1/7/2020 Kayla Uribe, PT GP 1    38843691934 HC PT THERAPEUTIC ACT EA 15 MIN 1/7/2020 Kayla Uribe, PT GP 1                   Kayla Uribe, PT  1/7/2020

## 2020-01-07 NOTE — PROGRESS NOTES
Adult Nutrition  Assessment/PES    Patient Name:  Liana Carrero  YOB: 1935  MRN: [id number]  Admit Date:  12/17/2019    Assessment Date:  1/7/2020    Comments:  Met with pt and daughter re: diabetic, heart healthy diet. Meal plan, sample menus given along with RD contact info.     Reason for Assessment     Row Name 01/07/20 1427          Reason for Assessment    Reason For Assessment  physician consult     Diagnosis  diabetes diagnosis/complications     Identified At Risk by Screening Criteria  need for education         Nutrition/Diet History     Row Name 01/07/20 1427          Nutrition/Diet History    Typical Food/Fluid Intake  Pt being DC tomorrow. MD requested diet edu for new dx hyperglycemia/diabetes.           Problem/Interventions:    Problem 2     Row Name 01/07/20 1430          Nutrition Diagnoses Problem 2    Problem 2  Knowledge Deficit     Etiology (related to)  Medical Diagnosis     Endocrine  DM Type 2;Hyperglycemia     Signs/Symptoms (evidenced by)  Reported Information Deficit             Intervention Goal     Row Name 01/07/20 1432          Intervention Goal    General  Improved nutrition related lab(s);Provide information regarding MNT for treatment/condition;Disease management/therapy             Education/Evaluation     Row Name 01/07/20 1432          Education    Education  Provided education regarding;Advised regarding habits/behavior     Provided education regarding  Diet rationale;Healthy eating for diabetes     Advised Regarding Habits/Behavior  Food choices;Label reading;Appropriate beverage;Meal planning;Appropriate portions;Eating pattern;Snacks        Monitor/Evaluation    Monitor  Per protocol           Electronically signed by:  Angela Gonzalez RD  01/07/20 2:49 PM

## 2020-01-08 VITALS
HEIGHT: 62 IN | WEIGHT: 169.53 LBS | SYSTOLIC BLOOD PRESSURE: 116 MMHG | BODY MASS INDEX: 31.2 KG/M2 | DIASTOLIC BLOOD PRESSURE: 63 MMHG | OXYGEN SATURATION: 96 % | RESPIRATION RATE: 16 BRPM | TEMPERATURE: 97.7 F | HEART RATE: 65 BPM

## 2020-01-08 LAB
GLUCOSE BLDC GLUCOMTR-MCNC: 143 MG/DL (ref 70–130)
GLUCOSE BLDC GLUCOMTR-MCNC: 170 MG/DL (ref 70–130)

## 2020-01-08 PROCEDURE — 97116 GAIT TRAINING THERAPY: CPT

## 2020-01-08 PROCEDURE — 63710000001 INSULIN GLARGINE PER 5 UNITS: Performed by: INTERNAL MEDICINE

## 2020-01-08 PROCEDURE — 63710000001 INSULIN LISPRO (HUMAN) PER 5 UNITS: Performed by: PHYSICAL MEDICINE & REHABILITATION

## 2020-01-08 PROCEDURE — 97110 THERAPEUTIC EXERCISES: CPT

## 2020-01-08 PROCEDURE — 82962 GLUCOSE BLOOD TEST: CPT

## 2020-01-08 RX ORDER — CELECOXIB 100 MG/1
100 CAPSULE ORAL EVERY 12 HOURS SCHEDULED
Qty: 30 CAPSULE | Refills: 0 | Status: SHIPPED | OUTPATIENT
Start: 2020-01-08 | End: 2020-02-24

## 2020-01-08 RX ORDER — METOPROLOL TARTRATE 50 MG/1
50 TABLET, FILM COATED ORAL 2 TIMES DAILY
Qty: 60 TABLET | Refills: 0 | Status: SHIPPED | OUTPATIENT
Start: 2020-01-08 | End: 2020-02-28 | Stop reason: HOSPADM

## 2020-01-08 RX ORDER — TORSEMIDE 10 MG/1
10 TABLET ORAL DAILY
Qty: 30 TABLET | Refills: 0 | Status: SHIPPED | OUTPATIENT
Start: 2020-01-08 | End: 2020-02-24

## 2020-01-08 RX ORDER — ASPIRIN 81 MG/1
81 TABLET ORAL DAILY
Qty: 30 TABLET | Refills: 5 | Status: SHIPPED | OUTPATIENT
Start: 2020-01-09 | End: 2020-02-24

## 2020-01-08 RX ORDER — PANTOPRAZOLE SODIUM 40 MG/1
40 TABLET, DELAYED RELEASE ORAL
Qty: 60 TABLET | Refills: 0 | Status: SHIPPED | OUTPATIENT
Start: 2020-01-08 | End: 2020-02-24

## 2020-01-08 RX ORDER — NITROGLYCERIN 0.4 MG/1
0.4 TABLET SUBLINGUAL
Qty: 12 TABLET | Refills: 0 | Status: SHIPPED | OUTPATIENT
Start: 2020-01-08 | End: 2020-03-30 | Stop reason: HOSPADM

## 2020-01-08 RX ORDER — ERGOCALCIFEROL 1.25 MG/1
50000 CAPSULE ORAL
Qty: 5 CAPSULE | Refills: 0 | Status: SHIPPED | OUTPATIENT
Start: 2020-01-09 | End: 2020-02-07

## 2020-01-08 RX ORDER — CEFDINIR 300 MG/1
300 CAPSULE ORAL EVERY 12 HOURS SCHEDULED
Qty: 1 CAPSULE | Refills: 0 | Status: SHIPPED | OUTPATIENT
Start: 2020-01-08 | End: 2020-01-09

## 2020-01-08 RX ORDER — ACETAMINOPHEN 325 MG/1
650 TABLET ORAL EVERY 4 HOURS PRN
Start: 2020-01-08 | End: 2020-02-24

## 2020-01-08 RX ORDER — TORSEMIDE 10 MG/1
10 TABLET ORAL DAILY
Qty: 30 TABLET | Refills: 0 | Status: SHIPPED | OUTPATIENT
Start: 2020-01-08 | End: 2020-01-08 | Stop reason: SDUPTHER

## 2020-01-08 RX ADMIN — Medication 1000 MCG: at 07:58

## 2020-01-08 RX ADMIN — DILTIAZEM HYDROCHLORIDE 180 MG: 180 CAPSULE, COATED, EXTENDED RELEASE ORAL at 08:01

## 2020-01-08 RX ADMIN — INSULIN LISPRO 2 UNITS: 100 INJECTION, SOLUTION INTRAVENOUS; SUBCUTANEOUS at 12:06

## 2020-01-08 RX ADMIN — INSULIN GLARGINE 10 UNITS: 100 INJECTION, SOLUTION SUBCUTANEOUS at 08:01

## 2020-01-08 RX ADMIN — TORSEMIDE 10 MG: 10 TABLET ORAL at 08:01

## 2020-01-08 RX ADMIN — DOCUSATE SODIUM 100 MG: 100 CAPSULE, LIQUID FILLED ORAL at 08:02

## 2020-01-08 RX ADMIN — CLOPIDOGREL 75 MG: 75 TABLET, FILM COATED ORAL at 08:01

## 2020-01-08 RX ADMIN — ASPIRIN 81 MG: 81 TABLET, COATED ORAL at 08:01

## 2020-01-08 RX ADMIN — PANTOPRAZOLE SODIUM 40 MG: 40 TABLET, DELAYED RELEASE ORAL at 06:20

## 2020-01-08 RX ADMIN — METOPROLOL TARTRATE 50 MG: 50 TABLET, FILM COATED ORAL at 08:03

## 2020-01-08 RX ADMIN — CELECOXIB 100 MG: 100 CAPSULE ORAL at 08:01

## 2020-01-08 RX ADMIN — CEFDINIR 300 MG: 300 CAPSULE ORAL at 08:01

## 2020-01-08 RX ADMIN — LEVOTHYROXINE SODIUM 125 MCG: 125 TABLET ORAL at 06:20

## 2020-01-08 NOTE — PROGRESS NOTES
Inpatient Rehabilitation Plan of Care Note    Plan of Care  Care Plan Reviewed - No updates at this time.    Psychosocial    Performed Intervention(s)  calm enviroment  verbalizes needs/concerns  Support/Peer groups  Therapeutic environmental set-up      Safety    Performed Intervention(s)  Falls precautions/hourly rounds, & Items within reach  Bed/chair alarms, call bell within reach.  Environmental set-up to reduce risk      Sphincter Control    Performed Intervention(s)  monitor intake and output  medication      Pain    Performed Intervention(s)  pain meds/creams as needed  assess pain during rounding  Distraction/Relaxation    Signed by: Sloane Arredondo RN

## 2020-01-08 NOTE — PLAN OF CARE
Pt. Needed no insulin w/ BF but 2 units W/ lunch. A&Ox4. Continent of B&B. Calm, cooperative, and pleasant. Participated in therapy. Ate fair. Took meds whole w/ water. Minor pain in back, celebrex improving pain level. Pt. Ready for discharge. BM today. Dry skin, slow to return. Slight bruising on hands from old IV sites.

## 2020-01-08 NOTE — DISCHARGE INSTRUCTIONS
Check blood glucose 4 times a day.  Follow-up with primary care physician with blood sugar trends to see if other oral hypoglycemic agents needs to be added.   Follow-up with primary in 2 to 4 weeks from discharge.    Follow up with cardiology on [employer]Patch reading report should be back around 1-16-20      Vitamin D replacement - ergocalciferol 50,000 units weekly on Thursdays for 5 more weeks, then change to cholecalciferol 1,000 units po bid.    Medication refills per Primary Care or Cardiology.     No driving.

## 2020-01-08 NOTE — NURSING NOTE
"Diabetes Education  Assessment/Teaching    Patient Name:  Liana Carrero  YOB: 1935  MRN: [id number]  Admit Date:  12/17/2019      Assessment Date:  1/8/2020    Most Recent Value   General Information    Referral From:  MD order. Meet with 85 y/o at bedside while dtr here for dc instructions.    Height  157.5 cm (62\")   Height Method  Stated   Weight  76.9 kg (169 lb 8.5 oz)   Weight Method  Bed scale   Diabetes History   What type of diabetes do you have?  Type 2   Length of Diabetes Diagnosis  Newly diagnosed    Do you test your blood sugar at home?  no   Education Preferences   Barriers to Learning  --no barriers evident on part of pt's dtr here at bedside.    Assessment Topics   Taking Medication - Assessment  Needs education. anticipate home new to oral agent Tradjenta.   Healthy Coping - Assessment  Competent   Monitoring - Assessment  -- pt and dtr report familiarity w/monitoring; pt's spouse had DM.    DM Goals            Most Recent Value   DM Education Needs   Meter  Free sample BG Meter kit provided to pt.    Meter Type  One Touch -give pt free sample One Touch Verio BG meter. family declines review of meter instructions.    Medication  Oral   Problem Solving  Hypoglycemia, Symptoms, Treatment. Review.   Healthy Coping  Appropriate [supportive family here at bedside. ]   Discharge Plan  Home, Follow-up with PCP   Teaching Method  Explanation, Discussion, Handouts   Patient Response  Verbalized understanding                  Electronically signed by:  Debra Marley RN, BSN, CDE   01/08/20 1:45 PM  "

## 2020-01-08 NOTE — NURSING NOTE
Spoke with arthur in Cardiology. [facility] was mailed 1-6-19 to [place] and will be scanned, read and report sent back to [facility] around 1-16-20. Informed Dr Abdullahi of this information. No new orders. Liliya Michaels RN

## 2020-01-08 NOTE — PROGRESS NOTES
SECTION GG    Eating Performance Discharge: Patient completed the activities by him/herself  with no assistance from a helper.    Signed by: Ирина Hair RN

## 2020-01-08 NOTE — PROGRESS NOTES
Patient d/c home today, 1/8 with daughter to provide 24/7 care. Contacted Dr. Stahl, optometrist, and scheduled patient to see him for vision evaluation on Monday, 1/27. Discussed with patient. [facility] to provide home PT, OT, ST, NSG and liaison notified of d/c.

## 2020-01-08 NOTE — PLAN OF CARE
Problem: Patient Care Overview  Goal: Plan of Care Review  1/8/2020 0353 by Sloane Arredondo RN  Outcome: Ongoing (interventions implemented as appropriate)  Flowsheets (Taken 1/8/2020 0353)  Outcome Summary: Slept well, had Ambien @ [facility] Celebrex given for back pain with some relief. No unsafe behaviors.

## 2020-01-08 NOTE — PROGRESS NOTES
Occupational Therapy: Branch    Physical Therapy: Individual: 30 minutes.    Speech Language Pathology:  Branch    Signed by: Kayla Uribe DPT

## 2020-01-08 NOTE — PROGRESS NOTES
LOS: 22 days   Patient Care Team:  Francisco Gallagher MD as PCP - General (Family Medicine)    Chief Complaint:   Status post right CVA with TPA/petechial hemorrhage  Stroke prophylaxis-  ASA/Plavix  Left hemiparesis/left visual field deficit  Coronary artery disease  Hyperlipidemia - alirocumab  Hypothyroidism   Hypertension  Chronic renal insufficiency  DM type 2 - newly diagnosed    Subjective     Patient doing well.  Ready for home.  Strength improved in the left side.  Still left visual field cut.  No chest pain.  No shortness of air.    Objective     Vital Signs  Temp:  [97.5 °F (36.4 °C)-98 °F (36.7 °C)] 97.7 °F (36.5 °C)  Heart Rate:  [65-76] 65  Resp:  [16-20] 16  BP: (109-121)/(52-63) 116/63    Physical Exam  Mental status: Awake and alert -  AWAKE / ALERT  HEENT: normocephalic, atraumatic  Pulm - CTAB, no wheezing, rales, or rhonchi on exam.   Heart- RRR, no MRG  ABD - Soft, non-tender, non-distended. +BS  Extremities: No edema  Neuro: Left visual field deficit.  No dysarthria.  Speech fluent.     Strength: 5/5 throughout bilateral upper and lower extremities.        Results Review:    I reviewed the patient's new clinical results.          Invalid input(s): LABALBU, PROT  Results from last 7 days   Lab Units 01/06/20  0516   WBC 10*3/mm3 6.77   HEMOGLOBIN g/dL 10.5*   HEMATOCRIT % 31.4*   PLATELETS 10*3/mm3 214      Ref. Range 12/18/2019 08:53 12/18/2019 11:02   TSH Baseline Latest Ref Range: 0.270 - 4.200 uIU/mL 3.290     Vitamin B-12 Latest Ref Range: 211 - 946 pg/mL   339   25 Hydroxy, Vitamin D Latest Ref Range: 30.0 - 100.0 ng/ml   18.7 (L)       Medication Review: DONE  Scheduled Meds:    Alirocumab 75 mg Subcutaneous Q14 Days   aspirin 81 mg Oral Daily   cefdinir 300 mg Oral Q12H   celecoxib 100 mg Oral Q12H   clopidogrel 75 mg Oral Daily   dilTIAZem  mg Oral BID   docusate sodium 100 mg Oral BID   fluticasone 1 spray Each Nare Daily   insulin lispro 0-7 Units Subcutaneous 4x Daily With  Meals & Nightly   levothyroxine 125 mcg Oral Q AM   [START ON 1/9/2020] linagliptin 5 mg Oral Daily   metoprolol tartrate 50 mg Oral BID   pantoprazole 40 mg Oral BID AC   senna-docusate sodium 1 tablet Oral Nightly   torsemide 10 mg Oral Daily   vitamin B-12 1,000 mcg Oral Daily   vitamin D 50,000 Units Oral Q7 Days     Continuous Infusions:   PRN Meds:.•  acetaminophen  •  dextrose  •  dextrose  •  glucagon (human recombinant)  •  nitroglycerin  •  zolpidem        Assessment/Plan       Stroke (cerebrum) (CMS/[facility])      Assessment & Plan  Status post right CVA with TPA/petechial hemorrhage  Stroke prophylaxis-Aggrenox/Plavix changed to aspirin/Plavix after the cardiac cath on December 26  Zio patch placed  Jan 8 - Get ZIO patch read- was placed Dec 18 ( 21 days ago)      Left hemiparesis/left visual field deficit  Impaired mobility/self-care     Adjunctive medications post stroke-on levothyroxine with TSH within normal limits.  B12 within normal limits.  Vitamin D deficiency-associated with stroke/stroke recovery-  will add ergocalciferol 50,000 units weekly.     Coronary artery disease -history of CABG/stent.    December 19-troponins negative yesterday.  No chest pain today.  Tolerating therapies.  December 26-Patient with episode of crushing chest pain this morning into her right neck.  Relieved with nitroglycerin x3.  Transferred to monitored bed on 5 S.  Seen by cardiology with plans for cardiac cath this afternoon pending input from nephrology regarding Contrast and neurology regarding antiplatelet options.   December 27-She did have Solu-Medrol per renal with hyperglycemia.  She had left heart catheterization which showed patent grafts.  She had a radial arterial graft to the mid LAD which had 50-60% stenosis with 2 stents and DUY-3 flow.  She had normal left ventricular filling pressures and there was no intervention.  Medical management was recommended.  Change to aspirin/Plavix    Hyperlipidemia -  alirocumab     Hypothyroidism-levothyroxine     Hypertension-on Cardizem and metoprolol.  Aldactone and lisinopril held after recent acute on chronic renal insufficiency.  On lower dose torsemide.     Chronic renal insufficiency    -January 6-urinary tract infection-E. coli-sensitive to cephalosporins-on cefdinir  Jan 8 - completes cefdinir with dose tonight     DVT prophylaxis-SCDs     Chronic low back pain-off nonsteroidal anti-inflammatory(Voltaren) .  Will try K pad.  May add Celebrex at later point, as has been shown not to raise risk of secondary coronary/cerebrovascular event.   -January 7- Will start celebrex for ongoing pain and will send patient with prescription at discharge  Patient previous took Diclofenac 75 mg bid for pain chronically and tolerated from renal standpoint. H/o lumbar pain.   Reviewed Celebrex has been shown not to raise risk of secondary cardiovascular/cerebrovascular event. Renal function- Cr normal.   Celebrex 100 mg bid ordered #30 with no refills. Refills per primary care.     Glucose intolerance-DM type 2 - newly diagnosed  December 31-her blood sugar still tend to run higher after pre-IV steroids for contrast December 26.  On December 12 hemoglobin A1c equals 6.1.  She received 5 units of insulin on sliding scale yesterday.  Will add consistent carbohydrate to diet restriction.   January 2-still has hyperglycemia.  In the past she was told she was borderline diabetic. Presently sliding scale insulin. If hyperglycemia persists, will ask endocrinology to see regarding initiation of oral hypoglycemic agent.   January 6-consult endocrinology as her sugars continue to run high in the hospital.  Started on Lantus 10 units every morning  January 8-Per endocrinology-Tradjenta 5 mg oral daily at the time of the discharge.   Follow-up with primary care physician with blood sugar trends to see if other oral hypoglycemic agents needs to be added.   Follow-up with primary in 2 to 4 weeks  from discharge.     REHAB -  admit for comprehensive acute inpatient rehabilitation .  This would be an interdisciplinary program with physical therapy 1 hour,  occupational therapy 1 hour, and speech therapy 1 hour, 5 days a week.  Rehabilitation nursing for carryover, monitoring of cardiac and neurologic   status, bowel and bladder, and skin  Ongoing physician follow-up.  Weekly team conferences.  Goals are to achieve a level of contact-guard with  mobility and self-care and improved visual-spatial.   Rehabilitation prognosis fair.  Medical prognosis indeterminate.  Estimated length of stay is approximately 2 weeks, but is only an estimation.   The patient's functional status and clinical status is unchanged from preadmission assessment and the patient continues appropriate for acute inpatient rehabilitation.  Goal is for home with outpatient   therapies.  Barrier to discharge: Impaired strength and mobility- work on transfers ambulation and self-care to overcome.         TEAM CONF- DEC 19 - TRANSFERS MIN. BED CTG. 40 FEET MIN RW. ADLS MIN ASSIST. LEFT VISUAL FIELD DEFICIT. LEFT INATTENTION  ELOS- 2 WEEKS    TEAM CONF - DEC 26 - BED SBA. TRANSFES MIN-CTG. GAIT 80 FEET MIN-CTG RW. ADLS CTG. LEFT INATTENTION. MODERATE DEFICITS FOR REASONING AND MATH.  ELOS - 1 WEEK from a rehab standpoint.  Family conference scheduled for tomorrow and family would like to still keep that time as members from out of town are here.    December 27-transfer back to the rehab unit after cardiac cath yesterday.    TEAM CONF - JAN 2 - BED SUP. 4 STAIRS CTG. GAIT 160 FEET MIN RW , IMPACTED BY VF CUT. BATH SBA. UBD MIN. LBD MIN. CUES TO ATTEND TO LEFT. COORDINAITON IMPROVING. MOD CUES WITH SLP TO ATTEND TO LEFT SIDE. MOD CUES FOR WORKING MEMORY, REASONING, MATH. BNE PENDING. CONTINENT BOWEL AND BLADDER. LEFT GROIN SITE HEALING WITH DRESSING. ZIO PATCH.  HYPERGLYCEMIA - ON CONSISTENT CARB DIET.  ELOS - WED JAN 8.    Jan 8 - discharge home  today.   >30 on discharge.     Name Relationship Specialty Phone Fax Address Order              Bud Stahl OD   Optometry [phone] [address] [facility] [address]     Next Steps: Follow up      Azael Abdullahi MD   Physical Medicine and Rehabilitation [phone] [address] [facility] [address]  [facility] [address]     Next Steps: Follow up in 1 month(s)      Instructions: [phone] . Office location will change on campus in February.       Francisco Gallagher MD  PCP - General Family Medicine [phone] [address] [facility] [address]     Next Steps: Schedule an appointment as soon as possible for a visit on 1/13/2020      Instructions: appointment on 1/13 @ 11:15 am      [facility] - [facility] [phone] [address] [facility] [address]  [facility] [address]     Next Steps: Follow up      Instructions: You will be receiving home PT, OT, ST, NSG. They will contact you to schedule in home visits.       Radha Thornton APRN   Cardiology  Nurse Practitioner [phone] [address] [facility] [address]  [place] [id number]     Next Steps: Schedule an appointment as soon as possible for a visit in 4 week(s)      [facility]      [address] [facility] [id number] [phone]     Next Steps: Schedule an appointment as soon as possible for a visit      Instructions: follow up sleep study in 2-3 weeks  Seen by Dr. Simmons while at [facility]      Grant Mack MD   Cardiology [phone] [facility]  [address]  [address]  [facility] [address]     Next Steps: Follow up on 1/20/2020      Instructions: appointment on 1/20 @ 2:20 with dr mack [facility]      Son Mccann MD   Neurology [phone] [facility]  [address] [facility] [address]  [address]  [facility] [address]     Next Steps: Schedule  an appointment as soon as possible for a visit on 2/12/2020      Instructions: follow up on 2/12 @ 3:00 pm at [facility] [address] [facility] [address]               Discharge Medications      New Medications      Instructions Start Date   acetaminophen 325 MG tablet  Commonly known as:  TYLENOL   650 mg, Oral, Every 4 Hours PRN      aspirin 81 MG EC tablet   81 mg, Oral, Daily   Start Date:  January 9, 2020     cefdinir 300 MG capsule  Commonly known as:  OMNICEF   300 mg, Oral, Every 12 Hours Scheduled      celecoxib 100 MG capsule  Commonly known as:  CeleBREX   100 mg, Oral, Every 12 Hours Scheduled      cyanocobalamin 1000 MCG tablet  Commonly known as:  VITAMIN B-12   1,000 mcg, Oral, Daily      linagliptin 5 MG tablet tablet  Commonly known as:  TRADJENTA   5 mg, Oral, Daily   Start Date:  January 9, 2020     nitroglycerin 0.4 MG SL tablet  Commonly known as:  NITROSTAT   0.4 mg, Sublingual, Every 5 Minutes PRN, Take no more than 3 doses in 15 minutes.      pantoprazole 40 MG EC tablet  Commonly known as:  PROTONIX   40 mg, Oral, 2 Times Daily Before Meals      vitamin D 1.25 MG (24460 UT) capsule capsule  Commonly known as:  ERGOCALCIFEROL   50,000 Units, Oral, Every 7 Days, On Thursdays   Start Date:  January 9, 2020        Changes to Medications      Instructions Start Date   clopidogrel 75 MG tablet  Commonly known as:  PLAVIX  What changed:  Another medication with the same name was removed. Continue taking this medication, and follow the directions you see here.   75 mg, Oral, Daily      dilTIAZem  MG 24 hr capsule  Commonly known as:  CARDIZEM CD  What changed:  Another medication with the same name was removed. Continue taking this medication, and follow the directions you see here.   180 mg, Oral, 2 Times Daily      levothyroxine 125 MCG tablet  Commonly known as:  SYNTHROID, LEVOTHROID  What changed:  Another medication with the same name was removed. Continue taking this medication, and  follow the directions you see here.   125 mcg, Oral, Daily      metoprolol tartrate 50 MG tablet  Commonly known as:  LOPRESSOR  What changed:    · medication strength  · how much to take  · when to take this   50 mg, Oral, 2 Times Daily      PRALUENT SC  What changed:  Another medication with the same name was removed. Continue taking this medication, and follow the directions you see here.   75 mg, Subcutaneous, Every 14 Days      torsemide 10 MG tablet  Commonly known as:  DEMADEX  What changed:    · medication strength  · how much to take  · additional instructions   10 mg, Oral, Daily         Continue These Medications      Instructions Start Date   docusate sodium 100 MG capsule  Commonly known as:  COLACE   100 mg, Oral, 2 Times Daily      vitamin E 400 UNIT capsule   400 Units, Oral, Daily         Stop These Medications    aspirin-dipyridamole  MG per 12 hr capsule  Commonly known as:  AGGRENOX     DICLOFENAC PO     isosorbide mononitrate 60 MG 24 hr tablet  Commonly known as:  IMDUR     lisinopril 20 MG tablet  Commonly known as:  PRINIVIL,ZESTRIL     omeprazole 20 MG capsule  Commonly known as:  priLOSEC     spironolactone 25 MG tablet  Commonly known as:  ALDACTONE          Completes Cefdinir this evening.  Rx sent for Celebrex 100 mg po bid. Disp:  #30 with no refills. Refills per primary care.    Check blood glucose 4 times a day.  Follow-up with primary care physician with blood sugar trends to see if other oral hypoglycemic agents needs to be added.   Follow-up with primary in 2 to 4 weeks from discharge.    Follow up with cardiology on ZioPatch reading    Vitamin D replacement - ergocalciferol 50,000 units weekly on Thursdays for 5 more weeks, then change to cholecalciferol 1,000 units po bid.    Medication refills per Primary Care or Cardiology.     No driving.     Azael Abdullahi MD  01/08/20  10:34 AM

## 2020-01-08 NOTE — DISCHARGE SUMMARY
[facility] - REHABILITATION UNIT    KATHE JETT  1935    Chief Complaint:   Status post right CVA with TPA/petechial hemorrhage  Stroke prophylaxis-  ASA/Plavix  Left hemiparesis/left visual field deficit  Coronary artery disease  Hyperlipidemia - alirocumab  Hypothyroidism   Hypertension  Chronic renal insufficiency  DM type 2 - newly diagnosed    History of Present Illness  84 yr/o female, with a PMH of HTN, HLD, CHF with prior left thalamic lacunar infarct in December 2014 with no residual deficits.  On December 11, 2019, patient was brought into the emergency room to [facility] with acute onset of left hemiparesis, neglect and visual field defect. Initially reported NIH of 20 as per Emergency Room. Her symptoms began at 3 PM when she reported headache with sudden onset of left hemiparesis.  subsequently CT of the head was unremarkable for any acute hemorrhage . Patient received IV TPA bolus at 4:41 PM. .   Her CT angiogram the brain revealed right PCA stenosis with high suspicious of thrombus  . CT angiogram of the cervical carotid revealed no evidence of acute thrombosis or any major vessel occlusion . CT perfusion revealed which I personally reviewed with a core infarct in the distribution of right PCA with a large penumbra on the right occipital and parietal lobe.  The patient was taken for diagnostic angiography but no intervention performed.  Echocardiogram without clear evidence of shunting or PFO.  Holter monitor without atrial fibrillation.  Neurology felt most likely etiology for stroke with atheroembolic and recommended continuing previous home regimen of Plavix and Aggrenox, which the patient was previously taking at home per cardiology note in October.  The patient had a follow-up MRI of the brain on December 12, 2019 that showed evolving acute to subacute infarct involving the right posterior cerebral artery territory and right medial thalamus..  Associated  petechial hemorrhage demonstrated.  There are tiny acute to subacute infarcts in the left occipital and right cerebellar hemisphere.     The patient continues with a left visual field deficit and left-sided weakness and incoordination.  She is ambulated short distances to the bathroom with assistance.  Does not describe any cognitive changes.  No swallowing complaints.  She ambulated moderate assist of 2 lean to the left, short distance.  Dependent for lower body ADLs.  Premorbidly she was independent.     She has history of hypertension.  Cardizem and beta-blocker were continued and torsemide was resumed at a lower dose.  Lisinopril and Aldactone were held secondary to renal failure.  She had acute renal failure on chronic kidney disease stage III that was exacerbated by contrast received on admission.  Given aggressive IV fluids and creatinine improved.     Patient has a history of coronary artery disease.  In October she was having dyspnea on exertion.  At that time she was having allergy/sinus symptoms and the plan per the patient was if her dyspnea on exertion did not improve after resolution of her upper respiratory symptoms, then her cardiologist was planning on assessing for cardiac cath in follow-up in January.  At [facility] she had complaints of chest pain and exertional dyspnea.  Troponin negative x2.  Internal medicine discussed with her primary cardiologist Dr. Mack who did not plan intervention during that admission secondary to acute issues.  This morning she had development of complaint of chest pain.  Did not really describe the character of the chest pain.  She felt it in the chest and into the right neck.  She had some headache but has difficulty differentiating that from headache that she is had since the stroke.  She did not describe any nausea or diaphoresis.  No pleuritic chest pain.  The chest pain resolved with nitroglycerin sublingual x1 after rapid response team was  called.  She is on nasal cannula oxygen.  She has been seen by cardiology and further plan pending results of her troponin.  If troponin is negative she may resume with her physical therapy and Occupational Therapy activities.  She is able to do speech therapy this morning.    Hospital course:  Problem list-    Status post right CVA with TPA/petechial hemorrhage  Stroke prophylaxis-Aggrenox/Plavix changed to aspirin/Plavix after the cardiac cath on December 26  Zio patch placed  Jan 8 - Get ZIO patch read- was placed Dec 18 ( 21 days ago)      Left hemiparesis/left visual field deficit  Impaired mobility/self-care     Adjunctive medications post stroke-on levothyroxine with TSH within normal limits.  B12 within normal limits.  Vitamin D deficiency-associated with stroke/stroke recovery-  will add ergocalciferol 50,000 units weekly.     Coronary artery disease -history of CABG/stent.    December 19-troponins negative yesterday.  No chest pain today.  Tolerating therapies.  December 26-Patient with episode of crushing chest pain this morning into her right neck.  Relieved with nitroglycerin x3.  Transferred to monitored bed on 5 S.  Seen by cardiology with plans for cardiac cath this afternoon pending input from nephrology regarding Contrast and neurology regarding antiplatelet options.   December 27-She did have Solu-Medrol per renal with hyperglycemia.  She had left heart catheterization which showed patent grafts.  She had a radial arterial graft to the mid LAD which had 50-60% stenosis with 2 stents and DUY-3 flow.  She had normal left ventricular filling pressures and there was no intervention.  Medical management was recommended.  Change to aspirin/Plavix     Hyperlipidemia - alirocumab     Hypothyroidism-levothyroxine     Hypertension-on Cardizem and metoprolol.  Aldactone and lisinopril held after recent acute on chronic renal insufficiency.  On lower dose torsemide.     Chronic renal  insufficiency     -January 6-urinary tract infection-E. coli-sensitive to cephalosporins-on cefdinir  Jan 8 - completes cefdinir with dose tonight     DVT prophylaxis-SCDs     Chronic low back pain-off nonsteroidal anti-inflammatory(Voltaren) .  Will try K pad.  May add Celebrex at later point, as has been shown not to raise risk of secondary coronary/cerebrovascular event.   -January 7- Will start celebrex for ongoing pain and will send patient with prescription at discharge  Patient previous took Diclofenac 75 mg bid for pain chronically and tolerated from renal standpoint. H/o lumbar pain.   Reviewed Celebrex has been shown not to raise risk of secondary cardiovascular/cerebrovascular event. Renal function- Cr normal.   Celebrex 100 mg bid ordered #30 with no refills. Refills per primary care.      Glucose intolerance-DM type 2 - newly diagnosed  December 31-her blood sugar still tend to run higher after pre-IV steroids for contrast December 26.  On December 12 hemoglobin A1c equals 6.1.  She received 5 units of insulin on sliding scale yesterday.  Will add consistent carbohydrate to diet restriction.   January 2-still has hyperglycemia.  In the past she was told she was borderline diabetic. Presently sliding scale insulin. If hyperglycemia persists, will ask endocrinology to see regarding initiation of oral hypoglycemic agent.   January 6-consult endocrinology as her sugars continue to run high in the hospital.  Started on Lantus 10 units every morning  January 8-Per endocrinology-Tradjenta 5 mg oral daily at the time of the discharge.   Follow-up with primary care physician with blood sugar trends to see if other oral hypoglycemic agents needs to be added.   Follow-up with primary in 2 to 4 weeks from discharge.     REHAB -  admit for comprehensive acute inpatient rehabilitation .  This would be an interdisciplinary program with physical therapy 1 hour,  occupational therapy 1 hour, and speech therapy 1 hour,  5 days a week.  Rehabilitation nursing for carryover, monitoring of cardiac and neurologic   status, bowel and bladder, and skin  Ongoing physician follow-up.  Weekly team conferences.  Goals are to achieve a level of contact-guard with  mobility and self-care and improved visual-spatial.   Rehabilitation prognosis fair.  Medical prognosis indeterminate.  Estimated length of stay is approximately 2 weeks, but is only an estimation.   The patient's functional status and clinical status is unchanged from preadmission assessment and the patient continues appropriate for acute inpatient rehabilitation.  Goal is for home with outpatient   therapies.  Barrier to discharge: Impaired strength and mobility- worked on transfers ambulation and self-care to overcome.         [facility]- DEC 19 - TRANSFERS MIN. BED CTG. 40 FEET MIN RW. ADLS MIN ASSIST. LEFT VISUAL FIELD DEFICIT. LEFT INATTENTION  [facility]- 2 WEEKS     TEAM [facility] - DEC 26 - BED SBA. TRANSFES MIN-CTG. GAIT 80 FEET MIN-CTG RW. ADLS CTG. LEFT INATTENTION. MODERATE DEFICITS FOR REASONING AND MATH.  [facility] - 1 WEEK from a rehab standpoint.  Family conference scheduled for tomorrow and family would like to still keep that time as members from out of town are here.     December 27-transfer back to the rehab unit after cardiac cath yesterday.     TEAM [facility] - JAN 2 - BED SUP. 4 STAIRS CTG. GAIT 160 FEET MIN RW , IMPACTED BY VF CUT. BATH SBA. UBD MIN. LBD MIN. CUES TO ATTEND TO LEFT. COORDINAITON IMPROVING. MOD CUES WITH SLP TO ATTEND TO LEFT SIDE. MOD CUES FOR WORKING MEMORY, REASONING, MATH. BNE PENDING. CONTINENT BOWEL AND BLADDER. LEFT GROIN SITE HEALING WITH DRESSING. ZIO PATCH.  HYPERGLYCEMIA - ON CONSISTENT CARB DIET.  [facility] - WED JAN 8.     Jan 8 - discharge home today.   >30 on discharge.   ====================================    Physical Exam  Mental status: Awake and alert -  AWAKE / ALERT  HEENT: normocephalic, atraumatic  Pulm - CTAB, no wheezing, rales, or rhonchi on  exam.   Heart- RRR, no MRG  ABD - Soft, non-tender, non-distended. +BS  Extremities: No edema  Neuro: Left visual field deficit.  No dysarthria.  Speech fluent.     Strength: 5/5 throughout bilateral upper and lower extremities.           Ref. Range 12/30/2019 05:27   Sodium Latest Ref Range: 136 - 145 mmol/L 140   Potassium Latest Ref Range: 3.5 - 5.2 mmol/L 4.5   CO2 Latest Ref Range: 22.0 - 29.0 mmol/L 25.5   Chloride Latest Ref Range: 98 - 107 mmol/L 104   Creatinine Latest Ref Range: 0.57 - 1.00 mg/dL 1.00   BUN Latest Ref Range: 8 - 23 mg/dL 25 (H)   Calcium Latest Ref Range: 8.6 - 10.5 mg/dL 8.8            Lab Units 01/06/20  0516   WBC 10*3/mm3 6.77   HEMOGLOBIN g/dL 10.5*   HEMATOCRIT % 31.4*   PLATELETS 10*3/mm3 214       Glucose   Date/Time Value Ref Range Status   01/08/2020 1113 170 (H) 70 - 130 mg/dL Final   01/08/2020 0720 143 (H) 70 - 130 mg/dL Final   01/07/2020 2038 150 (H) 70 - 130 mg/dL Final   01/07/2020 1611 164 (H) 70 - 130 mg/dL Final   01/07/2020 1115 178 (H) 70 - 130 mg/dL Final   01/07/2020 0715 152 (H) 70 - 130 mg/dL Final   01/06/2020 2008 216 (H) 70 - 130 mg/dL Final   01/06/2020 1645 147 (H) 70 - 130 mg/dL Final         Ref. Range 12/18/2019 08:53 12/18/2019 11:02   TSH Baseline Latest Ref Range: 0.270 - 4.200 uIU/mL 3.290     Vitamin B-12 Latest Ref Range: 211 - 946 pg/mL   339   25 Hydroxy, Vitamin D Latest Ref Range: 30.0 - 100.0 ng/ml   18.7 (L)                      Name Relationship Specialty Phone Fax Address Order                         Bud Stahl, SHANE    Optometry [phone] [address] [place] [address]        Next Steps: Follow up      Azael Abdullahi MD    Physical Medicine and Rehabilitation [phone] [address] [place] [address]        Next Steps: Follow up in 1 month(s)      Instructions: [phone] . Office location will change on campus in February.       Francisco Gallagher MD  PCP - General  Family Medicine [phone] [address] [facility] [address]        Next Steps: Schedule an appointment as soon as possible for a visit on 1/13/2020      Instructions: appointment on 1/13 @ 11:15 am      [facility] - [facility] [phone] [address] [facility]   [facility] [address]        Next Steps: Follow up      Instructions: You will be receiving home PT, OT, ST, NSG. They will contact you to schedule in home visits.       Radha Thornton APRN    Cardiology  Nurse Practitioner [phone] [address] [facility] [address]  [facility] [address]        Next Steps: Schedule an appointment as soon as possible for a visit in 4 week(s)      [facility]          [address] [id number] [phone]        Next Steps: Schedule an appointment as soon as possible for a visit      Instructions: follow up sleep study in 2-3 weeks  Seen by Dr. Simmons while at [facility]      Grant Mack MD    Cardiology [phone] [facility]  [address] [facility]  [address]  [facility] [address]        Next Steps: Follow up on 1/20/2020      Instructions: appointment on 1/20 @ 2:20 with dr mack [place], [place] Son Lao MD    Neurology [phone] [place] [address] [facility].  [address]  [place] [address]        Next Steps: Schedule an appointment as soon as possible for a visit on 2/12/2020      Instructions: follow up on 2/12 @ 3:00 pm at [facility] [address] [facility] [address]                        Discharge Medications            New Medications      Instructions Start Date   acetaminophen 325 MG tablet  Commonly known as:  TYLENOL    650 mg, Oral, Every 4 Hours PRN        aspirin 81 MG EC tablet    81 mg, Oral, Daily    Start Date:  January 9, 2020      cefdinir 300 MG capsule  Commonly known as:  OMNICEF    300 mg,  Oral, Every 12 Hours Scheduled        celecoxib 100 MG capsule  Commonly known as:  CeleBREX    100 mg, Oral, Every 12 Hours Scheduled     Disp: #30  No refills. Refills per Primary care.    cyanocobalamin 1000 MCG tablet  Commonly known as:  VITAMIN B-12    1,000 mcg, Oral, Daily        linagliptin 5 MG tablet tablet  Commonly known as:  TRADJENTA    5 mg, Oral, Daily    Start Date:  January 9, 2020      nitroglycerin 0.4 MG SL tablet  Commonly known as:  NITROSTAT    0.4 mg, Sublingual, Every 5 Minutes PRN, Take no more than 3 doses in 15 minutes.        pantoprazole 40 MG EC tablet  Commonly known as:  PROTONIX    40 mg, Oral, 2 Times Daily Before Meals        vitamin D 1.25 MG (62066 UT) capsule capsule  Commonly known as:  ERGOCALCIFEROL    50,000 Units, Oral, Every 7 Days, On Thursdays    Start Date:  January 9, 2020                    Changes to Medications      Instructions Start Date   clopidogrel 75 MG tablet  Commonly known as:  PLAVIX  What changed:  Another medication with the same name was removed. Continue taking this medication, and follow the directions you see here.    75 mg, Oral, Daily        dilTIAZem  MG 24 hr capsule  Commonly known as:  CARDIZEM CD  What changed:  Another medication with the same name was removed. Continue taking this medication, and follow the directions you see here.    180 mg, Oral, 2 Times Daily        levothyroxine 125 MCG tablet  Commonly known as:  SYNTHROID, LEVOTHROID  What changed:  Another medication with the same name was removed. Continue taking this medication, and follow the directions you see here.    125 mcg, Oral, Daily        metoprolol tartrate 50 MG tablet  Commonly known as:  LOPRESSOR  What changed:    · medication strength  · how much to take  · when to take this    50 mg, Oral, 2 Times Daily        PRALUENT SC  What changed:  Another medication with the same name was removed. Continue taking this medication, and follow the directions you see  here.    75 mg, Subcutaneous, Every 14 Days        torsemide 10 MG tablet  Commonly known as:  DEMADEX  What changed:    · medication strength  · how much to take    10 mg, Oral, Daily                       Continue These Medications      Instructions Start Date   docusate sodium 100 MG capsule  Commonly known as:  COLACE    100 mg, Oral, 2 Times Daily        vitamin E 400 UNIT capsule    400 Units, Oral, Daily            Stop These Medications    aspirin-dipyridamole  MG per 12 hr capsule  Commonly known as:  AGGRENOX      DICLOFENAC PO      isosorbide mononitrate 60 MG 24 hr tablet  Commonly known as:  IMDUR      lisinopril 20 MG tablet  Commonly known as:  PRINIVIL,ZESTRIL      omeprazole 20 MG capsule  Commonly known as:  priLOSEC      spironolactone 25 MG tablet  Commonly known as:  ALDACTONE                Check blood glucose 4 times a day.  Follow-up with primary care physician with blood sugar trends to see if other oral hypoglycemic agents needs to be added.   Follow-up with primary in 2 to 4 weeks from discharge.     Follow up with cardiology on ZioPatch reading     Vitamin D replacement - ergocalciferol 50,000 units weekly on Thursdays for 5 more weeks, then change to cholecalciferol 1,000 units po bid.     Medication refills per Primary Care or Cardiology.      No driving.     >30 on discharge    ADD:  Will have home health check BMP on Friday Frederick 10 and Monday Jan 20, 2020.

## 2020-01-08 NOTE — THERAPY DISCHARGE NOTE
Inpatient Rehabilitation - Physical Therapy Treatment Note/Discharge  [facility]     Patient Name: Liana Carrero  : 1935  MRN: [id number]  Today's Date: 2020             Admit Date: 2019    Visit Dx:    ICD-10-CM ICD-9-CM   1. Unsteadiness on feet R26.81 781.2   2. Difficulty walking R26.2 719.7   3. 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 (CMS/[facility]) I67.9 438.22    G81.94      Patient Active Problem List   Diagnosis   • Stroke (cerebrum) (CMS/[facility])   • Chest pain         PT IRF GOALS     Row Name 20 0900             Transfer Goal 2 (PT-IRF)    Progress/Outcomes (Transfer Goal 2, PT-IRF)  goal not met  -EE         Transfer Goal 3 (PT-IRF)    Progress/Outcomes (Transfer Goal 3, PT-IRF)  goal not met  -EE         Gait/Walking Locomotion Goal 2 (PT-IRF)    Progress/Outcomes (Gait/Walking Locomotion Goal 2, PT-IRF)  goal partially met distance achieved; level of assist not achieved  -EE        User Key  (r) = Recorded By, (t) = Taken By, (c) = Cosigned By    Initials Name Provider Type    EE Kayla Uribe PT Physical Therapist          Therapy Treatment    IRF Treatment Summary     Row Name 20 0930             Evaluation/Treatment Time and Intent    Subjective Information  no complaints  -EE      Existing Precautions/Restrictions  fall  -EE      Document Type  discharge treatment  -EE      Mode of Treatment  physical therapy  -EE      Patient/Family Observations  Pt supine in bed in no acute distress.   -EE      Recorded by [EE] Kayla Uribe PT      Row Name 20 0930             Cognition/Psychosocial- PT/OT    Orientation Status (Cognition)  oriented x 4  -EE      Follows Commands (Cognition)  follows one step commands;verbal cues/prompting required  -EE      Personal Safety Interventions  fall prevention program maintained;gait belt;muscle strengthening facilitated;nonskid shoes/slippers when out of bed;supervised activity  -EE      Cognitive Function  (Cognitive)  safety deficit  -EE      Safety Deficit (Cognitive)  mild deficit  -EE      Recorded by [EE] Kayla Uribe, PT      Row Name 01/08/20 0915             Bed Mobility Assessment/Treatment    Bed Mobility Assessment/Treatment  scooting/bridging  -EE      Rolling Left [place] (Bed Mobility)  conditional independence  -EE      Supine-Sit [place] (Bed Mobility)  independent  -EE      Sit-Supine [place] (Bed Mobility)  independent  -EE      Assistive Device (Bed Mobility)  bed rails for scooting  -EE      Recorded by [EE] Kayla Uribe, PT      Row Name 01/08/20 0915             Bed-Chair Transfer    Bed-Chair [place] (Transfers)  contact guard;stand by assist;verbal cues  -EE      Assistive Device (Bed-Chair Transfers)  wheelchair  -EE      Recorded by [EE] Kayla Uribe, PT      Row Name 01/08/20 0915             Chair-Bed Transfer    Chair-Bed [place] (Transfers)  contact guard;stand by assist;verbal cues  -EE      Assistive Device (Chair-Bed Transfers)  wheelchair  -EE      Recorded by [EE] Kayla Uribe, PT      Row Name 01/08/20 0915             Sit-Stand Transfer    Sit-Stand [place] (Transfers)  stand by assist;verbal cues  -EE      Assistive Device (Sit-Stand Transfers)  walker, front-wheeled  -EE      Recorded by [EE] Kayla Uribe, PT      Row Name 01/08/20 0915             Stand-Sit Transfer    Stand-Sit [place] (Transfers)  stand by assist;verbal cues  -EE      Assistive Device (Stand-Sit Transfers)  walker, front-wheeled  -EE      Recorded by [EE] Kayla Uribe, PT      Row Name 01/08/20 0915             Gait/Stairs Assessment/Training    [place] Level (Gait)  contact guard;verbal cues  -EE      Assistive Device (Gait)  walker, front-wheeled  -EE      Distance in Feet (Gait)  160', 80'  -EE      Pattern (Gait)  step-through  -EE      Deviations/Abnormal Patterns (Gait)  radha decreased  -EE      Bilateral Gait Deviations  forward flexed posture;heel strike  decreased  -EE      Right Sided Gait Deviations  weight shift ability decreased  -EE      Recorded by [EE] Kayla Uribe, PT      Row Name 01/08/20 0915             Curb Negotiation (Mobility)    [place], Curb Negotiation  contact guard  -EE      Comment, Curb Negotiation (Mobility)  with rwx  -EE      Recorded by [EE] Kayla Uribe, PT      Row Name 01/08/20 0915             Pain Scale: Numbers Pre/Post-Treatment    Pain Scale: Numbers, Pretreatment  0/10 - no pain  -EE      Pain Scale: Numbers, Post-Treatment  0/10 - no pain  -EE      Recorded by [EE] Kayla Uribe, PT      Row Name 01/08/20 0915             Lower Extremity Standing Therapeutic Exercise    Performed, Standing Lower Extremity (Therapeutic Exercise)  hip flexion/extension;hip abduction/adduction;knee flexion/extension;heel raises  -EE      Device, Standing Lower Extremity (Therapeutic Exercise)  jon bars  -EE      Exercise Type, Standing Lower Extremity (Therapeutic Exercise)  AROM (active range of motion)  -EE      Sets/Reps Detail, Standing Lower Extremity (Therapeutic Exercise)  1/10  -EE      Recorded by [EE] Kayla Uribe, PT      Row Name 01/08/20 0915             Positioning and Restraints    Pre-Treatment Position  in bed  -EE      Post Treatment Position  bed  -EE      In Bed  fowlers;call light within reach;encouraged to call for assist;exit alarm on  -EE      Recorded by [EE] Kayla Uribe, PT        User Key  (r) = Recorded By, (t) = Taken By, (c) = Cosigned By    Initials Name Effective Dates    EE Kayla Uribe, PT 04/03/18 -           PT Recommendation and Plan  Planned Therapy Interventions (PT Eval): balance training, home exercise program, bed mobility training, gait training, motor coordination training, neuromuscular re-education, patient/family education, postural re-education, ROM (range of motion), stair training, strengthening, stretching, transfer training            Time Calculation:   PT Charges     Row Name 01/08/20 0943              Time Calculation    Start Time  0900  -EE      Stop Time  0930  -EE      Time Calculation (min)  30 min  -EE      PT Received On  01/08/20  -EE         Time Calculation- PT    Total Timed Code Minutes- PT  30 minute(s)  -EE        User Key  (r) = Recorded By, (t) = Taken By, (c) = Cosigned By    Initials Name Provider Type    EE Kayla Uribe, PT Physical Therapist          Therapy Charges for Today     Code Description Service Date Service Provider Modifiers Qty    54550035051 HC GAIT TRAINING EA 15 MIN 1/7/2020 Kayla Uribe, PT GP 2    58956403584 HC PT NEUROMUSC RE EDUCATION EA 15 MIN 1/7/2020 Kayla Uribe, PT GP 1    50881995068 HC PT THERAPEUTIC ACT EA 15 MIN 1/7/2020 Kayla Uribe, PT GP 1    78606541467 HC GAIT TRAINING EA 15 MIN 1/8/2020 Kayla Uribe, PT GP 1    [id number]  PT THER PROC EA 15 MIN 1/8/2020 Kayla Uribe, PT GP 1               PT Discharge Summary  Reason for Discharge: Discharge from facility  Outcomes Achieved: Patient able to partially acheive established goals  Discharge Destination: Home with assist, Home with home health    Kayla Uribe, PT  1/8/2020

## 2020-01-09 NOTE — PROGRESS NOTES
Electronic Rx for Torsemide printed out 10 mg take with two instructions - one said one tab daily and another two tabs daily. Reviewed with daughter at 8:23 pm that only one tab daily. Will also notify her [facility]. [phone]

## 2020-01-10 ENCOUNTER — DOCUMENTATION (OUTPATIENT)
Dept: INTERNAL MEDICINE | Facility: HOSPITAL | Age: 85
End: 2020-01-10

## 2020-01-13 ENCOUNTER — TELEPHONE (OUTPATIENT)
Dept: CARDIOLOGY | Facility: CLINIC | Age: 85
End: 2020-01-13

## 2020-01-13 PROCEDURE — 0298T HOLTER MONITOR - 72 HOUR UP TO 21 DAY: CPT | Performed by: INTERNAL MEDICINE

## 2020-01-13 NOTE — TELEPHONE ENCOUNTER
Reading Physician Reading Date Result Priority   Arlene Aguilar MD 1/13/2020 Routine      Result Text     · An abnormal monitor study.

## 2020-01-24 NOTE — PROGRESS NOTES
Per staff report.  SECTION GG    Eating Performance: [place] sets up or cleans up; patient completes activity.  [place] assists only prior to or following the activity.    Eating Discharge Goals: Branch    Signed by: Loco Cortes RN

## 2020-01-24 NOTE — PROGRESS NOTES
PPS CMG Coordinator  Inpatient Rehabilitation Discharge    Mode of Locomotion: Walking.    Discharge Against Medical Advice:  No.  Discharge Information  Patient Discharged Alive:  Yes  Discharge Destination/Living Setting: Home with Home Health Services  Diagnosis for Interruption/Death: ICD    Impairment Group: Stroke: 01.1 Left Body Involvement (Right Brain)    Comorbidities: ICD    Complications: ICD    QUALITY INDICATORS  Section J Health Conditions: Fall(s) Since Admission:  No    Section M. Skin Conditions Discharge:  Unhealed Pressure Ulcer(s) at Stage 1 or  Higher:  No    . Current Number of Unhealed Pressure Ulcers  Branch    Section N. Medication:  Medication Intervention: N/A - There were no potential clinically significant  medication issues identified since admission or patient is not taking any  medications.    Signed by: Loco Cortes RN

## 2020-01-24 NOTE — PROGRESS NOTES
Week 1                       Week 2  Occupational Therapy  Individual           -                            180    Signed by: Loco Cortes RN

## 2020-02-24 ENCOUNTER — APPOINTMENT (OUTPATIENT)
Dept: CT IMAGING | Facility: HOSPITAL | Age: 85
End: 2020-02-24

## 2020-02-24 ENCOUNTER — HOSPITAL ENCOUNTER (INPATIENT)
Facility: HOSPITAL | Age: 85
LOS: 1 days | Discharge: HOME HEALTH | End: 2020-02-28
Attending: EMERGENCY MEDICINE | Admitting: HOSPITALIST

## 2020-02-24 DIAGNOSIS — I16.0 HYPERTENSIVE URGENCY: Primary | ICD-10-CM

## 2020-02-24 DIAGNOSIS — R51.9 RIGHT-SIDED HEADACHE: ICD-10-CM

## 2020-02-24 DIAGNOSIS — Z86.79 HX OF SUBARACHNOID HEMORRHAGE: ICD-10-CM

## 2020-02-24 DIAGNOSIS — I63.9 CEREBROVASCULAR ACCIDENT (CVA), UNSPECIFIED MECHANISM (HCC): ICD-10-CM

## 2020-02-24 PROBLEM — E87.6 HYPOKALEMIA: Status: ACTIVE | Noted: 2020-02-24

## 2020-02-24 PROBLEM — E03.9 HYPOTHYROIDISM: Status: ACTIVE | Noted: 2020-02-24

## 2020-02-24 PROBLEM — Z86.73 HISTORY OF EMBOLIC STROKE: Status: ACTIVE | Noted: 2020-02-24

## 2020-02-24 LAB
ALBUMIN SERPL-MCNC: 3.9 G/DL (ref 3.5–5.2)
ALBUMIN/GLOB SERPL: 1.5 G/DL
ALP SERPL-CCNC: 80 U/L (ref 39–117)
ALT SERPL W P-5'-P-CCNC: 10 U/L (ref 1–33)
ANION GAP SERPL CALCULATED.3IONS-SCNC: 13 MMOL/L (ref 5–15)
AST SERPL-CCNC: 19 U/L (ref 1–32)
BASOPHILS # BLD AUTO: 0.05 10*3/MM3 (ref 0–0.2)
BASOPHILS NFR BLD AUTO: 0.9 % (ref 0–1.5)
BILIRUB SERPL-MCNC: 0.3 MG/DL (ref 0.2–1.2)
BILIRUB UR QL STRIP: NEGATIVE
BUN BLD-MCNC: 10 MG/DL (ref 8–23)
BUN/CREAT SERPL: 11.9 (ref 7–25)
CALCIUM SPEC-SCNC: 9.5 MG/DL (ref 8.6–10.5)
CHLORIDE SERPL-SCNC: 101 MMOL/L (ref 98–107)
CLARITY UR: CLEAR
CO2 SERPL-SCNC: 31 MMOL/L (ref 22–29)
COLOR UR: YELLOW
CREAT BLD-MCNC: 0.84 MG/DL (ref 0.57–1)
DEPRECATED RDW RBC AUTO: 48.6 FL (ref 37–54)
EOSINOPHIL # BLD AUTO: 0.18 10*3/MM3 (ref 0–0.4)
EOSINOPHIL NFR BLD AUTO: 3.2 % (ref 0.3–6.2)
ERYTHROCYTE [DISTWIDTH] IN BLOOD BY AUTOMATED COUNT: 15.2 % (ref 12.3–15.4)
GFR SERPL CREATININE-BSD FRML MDRD: 65 ML/MIN/1.73
GLOBULIN UR ELPH-MCNC: 2.6 GM/DL
GLUCOSE BLD-MCNC: 110 MG/DL (ref 65–99)
GLUCOSE UR STRIP-MCNC: NEGATIVE MG/DL
HCT VFR BLD AUTO: 37.2 % (ref 34–46.6)
HGB BLD-MCNC: 12.2 G/DL (ref 12–15.9)
HGB UR QL STRIP.AUTO: NEGATIVE
HOLD SPECIMEN: NORMAL
HOLD SPECIMEN: NORMAL
IMM GRANULOCYTES # BLD AUTO: 0.01 10*3/MM3 (ref 0–0.05)
IMM GRANULOCYTES NFR BLD AUTO: 0.2 % (ref 0–0.5)
INR PPP: 1.04 (ref 0.9–1.1)
KETONES UR QL STRIP: NEGATIVE
LEUKOCYTE ESTERASE UR QL STRIP.AUTO: NEGATIVE
LYMPHOCYTES # BLD AUTO: 2.11 10*3/MM3 (ref 0.7–3.1)
LYMPHOCYTES NFR BLD AUTO: 37.5 % (ref 19.6–45.3)
MCH RBC QN AUTO: 28.7 PG (ref 26.6–33)
MCHC RBC AUTO-ENTMCNC: 32.8 G/DL (ref 31.5–35.7)
MCV RBC AUTO: 87.5 FL (ref 79–97)
MONOCYTES # BLD AUTO: 0.49 10*3/MM3 (ref 0.1–0.9)
MONOCYTES NFR BLD AUTO: 8.7 % (ref 5–12)
NEUTROPHILS # BLD AUTO: 2.78 10*3/MM3 (ref 1.7–7)
NEUTROPHILS NFR BLD AUTO: 49.5 % (ref 42.7–76)
NITRITE UR QL STRIP: NEGATIVE
NRBC BLD AUTO-RTO: 0 /100 WBC (ref 0–0.2)
PH UR STRIP.AUTO: 7.5 [PH] (ref 5–8)
PLATELET # BLD AUTO: 214 10*3/MM3 (ref 140–450)
PMV BLD AUTO: 10.6 FL (ref 6–12)
POTASSIUM BLD-SCNC: 3.4 MMOL/L (ref 3.5–5.2)
PROT SERPL-MCNC: 6.5 G/DL (ref 6–8.5)
PROT UR QL STRIP: NEGATIVE
PROTHROMBIN TIME: 13.3 SECONDS (ref 11.7–14.2)
RBC # BLD AUTO: 4.25 10*6/MM3 (ref 3.77–5.28)
SODIUM BLD-SCNC: 145 MMOL/L (ref 136–145)
SP GR UR STRIP: 1.01 (ref 1–1.03)
UROBILINOGEN UR QL STRIP: NORMAL
WBC NRBC COR # BLD: 5.62 10*3/MM3 (ref 3.4–10.8)
WHOLE BLOOD HOLD SPECIMEN: NORMAL
WHOLE BLOOD HOLD SPECIMEN: NORMAL

## 2020-02-24 PROCEDURE — 70450 CT HEAD/BRAIN W/O DYE: CPT

## 2020-02-24 PROCEDURE — 81003 URINALYSIS AUTO W/O SCOPE: CPT | Performed by: NURSE PRACTITIONER

## 2020-02-24 PROCEDURE — 85025 COMPLETE CBC W/AUTO DIFF WBC: CPT | Performed by: NURSE PRACTITIONER

## 2020-02-24 PROCEDURE — 80053 COMPREHEN METABOLIC PANEL: CPT | Performed by: NURSE PRACTITIONER

## 2020-02-24 PROCEDURE — 25010000002 PROCHLORPERAZINE 10 MG/2ML SOLUTION: Performed by: NURSE PRACTITIONER

## 2020-02-24 PROCEDURE — 85610 PROTHROMBIN TIME: CPT | Performed by: NURSE PRACTITIONER

## 2020-02-24 PROCEDURE — 99285 EMERGENCY DEPT VISIT HI MDM: CPT

## 2020-02-24 PROCEDURE — G0378 HOSPITAL OBSERVATION PER HR: HCPCS

## 2020-02-24 PROCEDURE — 25010000002 DIPHENHYDRAMINE PER 50 MG: Performed by: NURSE PRACTITIONER

## 2020-02-24 RX ORDER — LABETALOL HYDROCHLORIDE 5 MG/ML
10 INJECTION, SOLUTION INTRAVENOUS ONCE
Status: COMPLETED | OUTPATIENT
Start: 2020-02-24 | End: 2020-02-24

## 2020-02-24 RX ORDER — DIPHENHYDRAMINE HYDROCHLORIDE 50 MG/ML
12.5 INJECTION INTRAMUSCULAR; INTRAVENOUS ONCE
Status: COMPLETED | OUTPATIENT
Start: 2020-02-24 | End: 2020-02-24

## 2020-02-24 RX ORDER — HYDROCODONE BITARTRATE AND ACETAMINOPHEN 7.5; 325 MG/1; MG/1
1 TABLET ORAL 2 TIMES DAILY PRN
COMMUNITY
End: 2020-03-30 | Stop reason: HOSPADM

## 2020-02-24 RX ORDER — FAMOTIDINE 20 MG/1
20 TABLET, FILM COATED ORAL NIGHTLY
COMMUNITY
End: 2020-03-30 | Stop reason: HOSPADM

## 2020-02-24 RX ORDER — ERGOCALCIFEROL 1.25 MG/1
50000 CAPSULE ORAL WEEKLY
Status: ON HOLD | COMMUNITY
End: 2021-01-09

## 2020-02-24 RX ORDER — PROCHLORPERAZINE EDISYLATE 5 MG/ML
10 INJECTION INTRAMUSCULAR; INTRAVENOUS ONCE
Status: COMPLETED | OUTPATIENT
Start: 2020-02-24 | End: 2020-02-24

## 2020-02-24 RX ADMIN — LABETALOL 20 MG/4 ML (5 MG/ML) INTRAVENOUS SYRINGE 10 MG: at 19:18

## 2020-02-24 RX ADMIN — PROCHLORPERAZINE EDISYLATE 10 MG: 5 INJECTION INTRAMUSCULAR; INTRAVENOUS at 17:47

## 2020-02-24 RX ADMIN — DIPHENHYDRAMINE HYDROCHLORIDE 12.5 MG: 50 INJECTION, SOLUTION INTRAMUSCULAR; INTRAVENOUS at 17:52

## 2020-02-24 RX ADMIN — LABETALOL 20 MG/4 ML (5 MG/ML) INTRAVENOUS SYRINGE 10 MG: at 17:43

## 2020-02-25 ENCOUNTER — APPOINTMENT (OUTPATIENT)
Dept: CARDIOLOGY | Facility: HOSPITAL | Age: 85
End: 2020-02-25

## 2020-02-25 ENCOUNTER — APPOINTMENT (OUTPATIENT)
Dept: MRI IMAGING | Facility: HOSPITAL | Age: 85
End: 2020-02-25

## 2020-02-25 PROBLEM — I25.10 CAD IN NATIVE ARTERY: Status: ACTIVE | Noted: 2020-02-25

## 2020-02-25 LAB
BH CV XLRA MEAS LEFT CCA RATIO VEL: -69.8 CM/SEC
BH CV XLRA MEAS LEFT DIST CCA EDV: -10.1 CM/SEC
BH CV XLRA MEAS LEFT DIST CCA PSV: -69.8 CM/SEC
BH CV XLRA MEAS LEFT DIST ICA EDV: -17 CM/SEC
BH CV XLRA MEAS LEFT DIST ICA PSV: -89.4 CM/SEC
BH CV XLRA MEAS LEFT ICA RATIO VEL: -89.4 CM/SEC
BH CV XLRA MEAS LEFT ICA/CCA RATIO: 1.3
BH CV XLRA MEAS LEFT MID ICA EDV: -17.6 CM/SEC
BH CV XLRA MEAS LEFT MID ICA PSV: -62.3 CM/SEC
BH CV XLRA MEAS LEFT PROX CCA EDV: 10.4 CM/SEC
BH CV XLRA MEAS LEFT PROX CCA PSV: 74.6 CM/SEC
BH CV XLRA MEAS LEFT PROX ECA EDV: -15.5 CM/SEC
BH CV XLRA MEAS LEFT PROX ECA PSV: -308 CM/SEC
BH CV XLRA MEAS LEFT PROX ICA EDV: 19.2 CM/SEC
BH CV XLRA MEAS LEFT PROX ICA PSV: 75.7 CM/SEC
BH CV XLRA MEAS LEFT PROX SCLA PSV: -117 CM/SEC
BH CV XLRA MEAS LEFT VERTEBRAL A EDV: 8.4 CM/SEC
BH CV XLRA MEAS LEFT VERTEBRAL A PSV: 39.1 CM/SEC
BH CV XLRA MEAS RIGHT CCA RATIO VEL: -65.9 CM/SEC
BH CV XLRA MEAS RIGHT DIST CCA EDV: -14.3 CM/SEC
BH CV XLRA MEAS RIGHT DIST CCA PSV: -65.9 CM/SEC
BH CV XLRA MEAS RIGHT DIST ICA EDV: -14.1 CM/SEC
BH CV XLRA MEAS RIGHT DIST ICA PSV: -76.6 CM/SEC
BH CV XLRA MEAS RIGHT ICA RATIO VEL: -76.6 CM/SEC
BH CV XLRA MEAS RIGHT ICA/CCA RATIO: 1.2
BH CV XLRA MEAS RIGHT MID ICA EDV: 11.6 CM/SEC
BH CV XLRA MEAS RIGHT MID ICA PSV: 61.3 CM/SEC
BH CV XLRA MEAS RIGHT PROX CCA EDV: 12.6 CM/SEC
BH CV XLRA MEAS RIGHT PROX CCA PSV: 73 CM/SEC
BH CV XLRA MEAS RIGHT PROX ECA EDV: -8.8 CM/SEC
BH CV XLRA MEAS RIGHT PROX ECA PSV: -100.4 CM/SEC
BH CV XLRA MEAS RIGHT PROX ICA EDV: -15.8 CM/SEC
BH CV XLRA MEAS RIGHT PROX ICA PSV: -63.7 CM/SEC
BH CV XLRA MEAS RIGHT PROX SCLA PSV: -136.4 CM/SEC
BH CV XLRA MEAS RIGHT VERTEBRAL A EDV: 14.9 CM/SEC
BH CV XLRA MEAS RIGHT VERTEBRAL A PSV: 60 CM/SEC
CRP SERPL-MCNC: 0.16 MG/DL (ref 0–0.5)
ERYTHROCYTE [SEDIMENTATION RATE] IN BLOOD: 31 MM/HR (ref 0–30)
GLUCOSE BLDC GLUCOMTR-MCNC: 124 MG/DL (ref 70–130)
GLUCOSE BLDC GLUCOMTR-MCNC: 132 MG/DL (ref 70–130)
HBA1C MFR BLD: 5.4 % (ref 4.8–5.6)
MAGNESIUM SERPL-MCNC: 1.8 MG/DL (ref 1.6–2.4)
PA ADP PRP-ACNC: 234 PRU (ref 194–418)
RIGHT ARM BP: NORMAL MMHG
TSH SERPL DL<=0.05 MIU/L-ACNC: 3.08 UIU/ML (ref 0.27–4.2)

## 2020-02-25 PROCEDURE — 97162 PT EVAL MOD COMPLEX 30 MIN: CPT | Performed by: PHYSICAL THERAPIST

## 2020-02-25 PROCEDURE — 99221 1ST HOSP IP/OBS SF/LOW 40: CPT | Performed by: PSYCHIATRY & NEUROLOGY

## 2020-02-25 PROCEDURE — 70551 MRI BRAIN STEM W/O DYE: CPT

## 2020-02-25 PROCEDURE — 83036 HEMOGLOBIN GLYCOSYLATED A1C: CPT | Performed by: INTERNAL MEDICINE

## 2020-02-25 PROCEDURE — 83735 ASSAY OF MAGNESIUM: CPT | Performed by: INTERNAL MEDICINE

## 2020-02-25 PROCEDURE — 86140 C-REACTIVE PROTEIN: CPT | Performed by: HOSPITALIST

## 2020-02-25 PROCEDURE — 82962 GLUCOSE BLOOD TEST: CPT

## 2020-02-25 PROCEDURE — 85576 BLOOD PLATELET AGGREGATION: CPT | Performed by: PSYCHIATRY & NEUROLOGY

## 2020-02-25 PROCEDURE — 97535 SELF CARE MNGMENT TRAINING: CPT

## 2020-02-25 PROCEDURE — 93880 EXTRACRANIAL BILAT STUDY: CPT

## 2020-02-25 PROCEDURE — G0378 HOSPITAL OBSERVATION PER HR: HCPCS

## 2020-02-25 PROCEDURE — 85652 RBC SED RATE AUTOMATED: CPT | Performed by: HOSPITALIST

## 2020-02-25 PROCEDURE — 97110 THERAPEUTIC EXERCISES: CPT | Performed by: PHYSICAL THERAPIST

## 2020-02-25 PROCEDURE — 97165 OT EVAL LOW COMPLEX 30 MIN: CPT

## 2020-02-25 PROCEDURE — 84443 ASSAY THYROID STIM HORMONE: CPT | Performed by: HOSPITALIST

## 2020-02-25 RX ORDER — ONDANSETRON 4 MG/1
4 TABLET, FILM COATED ORAL EVERY 6 HOURS PRN
Status: DISCONTINUED | OUTPATIENT
Start: 2020-02-25 | End: 2020-02-28 | Stop reason: HOSPADM

## 2020-02-25 RX ORDER — POTASSIUM CHLORIDE 750 MG/1
40 CAPSULE, EXTENDED RELEASE ORAL ONCE
Status: COMPLETED | OUTPATIENT
Start: 2020-02-25 | End: 2020-02-25

## 2020-02-25 RX ORDER — METOPROLOL TARTRATE 50 MG/1
50 TABLET, FILM COATED ORAL 2 TIMES DAILY
Status: DISCONTINUED | OUTPATIENT
Start: 2020-02-25 | End: 2020-02-27

## 2020-02-25 RX ORDER — HYDROCODONE BITARTRATE AND ACETAMINOPHEN 7.5; 325 MG/1; MG/1
1 TABLET ORAL 2 TIMES DAILY PRN
Status: DISCONTINUED | OUTPATIENT
Start: 2020-02-25 | End: 2020-02-28 | Stop reason: HOSPADM

## 2020-02-25 RX ORDER — LOSARTAN POTASSIUM 50 MG/1
50 TABLET ORAL
Status: DISCONTINUED | OUTPATIENT
Start: 2020-02-25 | End: 2020-02-26

## 2020-02-25 RX ORDER — ONDANSETRON 2 MG/ML
4 INJECTION INTRAMUSCULAR; INTRAVENOUS EVERY 6 HOURS PRN
Status: DISCONTINUED | OUTPATIENT
Start: 2020-02-25 | End: 2020-02-28 | Stop reason: HOSPADM

## 2020-02-25 RX ORDER — SODIUM CHLORIDE 0.9 % (FLUSH) 0.9 %
10 SYRINGE (ML) INJECTION EVERY 12 HOURS SCHEDULED
Status: DISCONTINUED | OUTPATIENT
Start: 2020-02-25 | End: 2020-02-28 | Stop reason: HOSPADM

## 2020-02-25 RX ORDER — CLOPIDOGREL BISULFATE 75 MG/1
75 TABLET ORAL DAILY
Status: DISCONTINUED | OUTPATIENT
Start: 2020-02-25 | End: 2020-02-25

## 2020-02-25 RX ORDER — LEVOTHYROXINE SODIUM 0.12 MG/1
125 TABLET ORAL
Status: DISCONTINUED | OUTPATIENT
Start: 2020-02-25 | End: 2020-02-28 | Stop reason: HOSPADM

## 2020-02-25 RX ORDER — CLOPIDOGREL BISULFATE 75 MG/1
300 TABLET ORAL ONCE
Status: COMPLETED | OUTPATIENT
Start: 2020-02-25 | End: 2020-02-25

## 2020-02-25 RX ORDER — LOSARTAN POTASSIUM 25 MG/1
25 TABLET ORAL
Status: DISCONTINUED | OUTPATIENT
Start: 2020-02-25 | End: 2020-02-25

## 2020-02-25 RX ORDER — DILTIAZEM HYDROCHLORIDE 180 MG/1
180 CAPSULE, COATED, EXTENDED RELEASE ORAL 2 TIMES DAILY
Status: DISCONTINUED | OUTPATIENT
Start: 2020-02-25 | End: 2020-02-28 | Stop reason: HOSPADM

## 2020-02-25 RX ORDER — CLOPIDOGREL BISULFATE 75 MG/1
75 TABLET ORAL DAILY
Status: DISCONTINUED | OUTPATIENT
Start: 2020-02-25 | End: 2020-02-26

## 2020-02-25 RX ORDER — ACETAMINOPHEN 325 MG/1
650 TABLET ORAL EVERY 6 HOURS PRN
Status: DISCONTINUED | OUTPATIENT
Start: 2020-02-25 | End: 2020-02-28 | Stop reason: HOSPADM

## 2020-02-25 RX ORDER — SODIUM CHLORIDE 0.9 % (FLUSH) 0.9 %
10 SYRINGE (ML) INJECTION AS NEEDED
Status: DISCONTINUED | OUTPATIENT
Start: 2020-02-25 | End: 2020-02-28 | Stop reason: HOSPADM

## 2020-02-25 RX ORDER — FAMOTIDINE 20 MG/1
20 TABLET, FILM COATED ORAL NIGHTLY
Status: DISCONTINUED | OUTPATIENT
Start: 2020-02-25 | End: 2020-02-28 | Stop reason: HOSPADM

## 2020-02-25 RX ADMIN — POTASSIUM CHLORIDE 40 MEQ: 750 CAPSULE, EXTENDED RELEASE ORAL at 09:15

## 2020-02-25 RX ADMIN — SODIUM CHLORIDE, PRESERVATIVE FREE 10 ML: 5 INJECTION INTRAVENOUS at 01:13

## 2020-02-25 RX ADMIN — FAMOTIDINE 20 MG: 20 TABLET, FILM COATED ORAL at 20:52

## 2020-02-25 RX ADMIN — DILTIAZEM HYDROCHLORIDE 180 MG: 180 CAPSULE, COATED, EXTENDED RELEASE ORAL at 20:51

## 2020-02-25 RX ADMIN — LOSARTAN POTASSIUM 50 MG: 50 TABLET, FILM COATED ORAL at 09:13

## 2020-02-25 RX ADMIN — CLOPIDOGREL 75 MG: 75 TABLET, FILM COATED ORAL at 09:13

## 2020-02-25 RX ADMIN — SODIUM CHLORIDE, PRESERVATIVE FREE 10 ML: 5 INJECTION INTRAVENOUS at 20:52

## 2020-02-25 RX ADMIN — METOPROLOL TARTRATE 50 MG: 50 TABLET, FILM COATED ORAL at 02:48

## 2020-02-25 RX ADMIN — POTASSIUM CHLORIDE 40 MEQ: 750 CAPSULE, EXTENDED RELEASE ORAL at 01:12

## 2020-02-25 RX ADMIN — SODIUM CHLORIDE, PRESERVATIVE FREE 10 ML: 5 INJECTION INTRAVENOUS at 09:13

## 2020-02-25 RX ADMIN — FAMOTIDINE 20 MG: 20 TABLET, FILM COATED ORAL at 02:48

## 2020-02-25 RX ADMIN — CLOPIDOGREL 300 MG: 75 TABLET, FILM COATED ORAL at 15:39

## 2020-02-25 RX ADMIN — METOPROLOL TARTRATE 50 MG: 50 TABLET, FILM COATED ORAL at 09:13

## 2020-02-25 RX ADMIN — METOPROLOL TARTRATE 50 MG: 50 TABLET, FILM COATED ORAL at 20:52

## 2020-02-25 RX ADMIN — LEVOTHYROXINE SODIUM 125 MCG: 125 TABLET ORAL at 06:26

## 2020-02-25 RX ADMIN — ACETAMINOPHEN 650 MG: 325 TABLET, FILM COATED ORAL at 20:59

## 2020-02-25 RX ADMIN — DILTIAZEM HYDROCHLORIDE 180 MG: 180 CAPSULE, COATED, EXTENDED RELEASE ORAL at 09:13

## 2020-02-25 RX ADMIN — DILTIAZEM HYDROCHLORIDE 180 MG: 180 CAPSULE, COATED, EXTENDED RELEASE ORAL at 02:48

## 2020-02-26 DIAGNOSIS — I63.9 CEREBROVASCULAR ACCIDENT (CVA), UNSPECIFIED MECHANISM (HCC): Primary | ICD-10-CM

## 2020-02-26 LAB
ANION GAP SERPL CALCULATED.3IONS-SCNC: 10.7 MMOL/L (ref 5–15)
BUN BLD-MCNC: 11 MG/DL (ref 8–23)
BUN/CREAT SERPL: 14.3 (ref 7–25)
CALCIUM SPEC-SCNC: 8.4 MG/DL (ref 8.6–10.5)
CHLORIDE SERPL-SCNC: 102 MMOL/L (ref 98–107)
CO2 SERPL-SCNC: 27.3 MMOL/L (ref 22–29)
CREAT BLD-MCNC: 0.77 MG/DL (ref 0.57–1)
GFR SERPL CREATININE-BSD FRML MDRD: 72 ML/MIN/1.73
GLUCOSE BLD-MCNC: 148 MG/DL (ref 65–99)
PA ADP PRP-ACNC: 221 PRU (ref 194–418)
POTASSIUM BLD-SCNC: 3.6 MMOL/L (ref 3.5–5.2)
SODIUM BLD-SCNC: 140 MMOL/L (ref 136–145)

## 2020-02-26 PROCEDURE — 80048 BASIC METABOLIC PNL TOTAL CA: CPT | Performed by: INTERNAL MEDICINE

## 2020-02-26 PROCEDURE — 93005 ELECTROCARDIOGRAM TRACING: CPT | Performed by: INTERNAL MEDICINE

## 2020-02-26 PROCEDURE — 85576 BLOOD PLATELET AGGREGATION: CPT | Performed by: PSYCHIATRY & NEUROLOGY

## 2020-02-26 PROCEDURE — G0378 HOSPITAL OBSERVATION PER HR: HCPCS

## 2020-02-26 PROCEDURE — 93010 ELECTROCARDIOGRAM REPORT: CPT | Performed by: INTERNAL MEDICINE

## 2020-02-26 PROCEDURE — 99214 OFFICE O/P EST MOD 30 MIN: CPT | Performed by: NURSE PRACTITIONER

## 2020-02-26 RX ORDER — LOSARTAN POTASSIUM 100 MG/1
100 TABLET ORAL
Status: DISCONTINUED | OUTPATIENT
Start: 2020-02-26 | End: 2020-02-28 | Stop reason: HOSPADM

## 2020-02-26 RX ADMIN — SODIUM CHLORIDE, PRESERVATIVE FREE 10 ML: 5 INJECTION INTRAVENOUS at 09:23

## 2020-02-26 RX ADMIN — ACETAMINOPHEN 650 MG: 325 TABLET, FILM COATED ORAL at 19:24

## 2020-02-26 RX ADMIN — METOPROLOL TARTRATE 50 MG: 50 TABLET, FILM COATED ORAL at 09:23

## 2020-02-26 RX ADMIN — FAMOTIDINE 20 MG: 20 TABLET, FILM COATED ORAL at 20:24

## 2020-02-26 RX ADMIN — CLOPIDOGREL 75 MG: 75 TABLET, FILM COATED ORAL at 09:23

## 2020-02-26 RX ADMIN — ACETAMINOPHEN 650 MG: 325 TABLET, FILM COATED ORAL at 11:23

## 2020-02-26 RX ADMIN — DILTIAZEM HYDROCHLORIDE 180 MG: 180 CAPSULE, COATED, EXTENDED RELEASE ORAL at 20:25

## 2020-02-26 RX ADMIN — ACETAMINOPHEN 650 MG: 325 TABLET, FILM COATED ORAL at 03:47

## 2020-02-26 RX ADMIN — HYDROCODONE BITARTRATE AND ACETAMINOPHEN 1 TABLET: 7.5; 325 TABLET ORAL at 20:24

## 2020-02-26 RX ADMIN — DILTIAZEM HYDROCHLORIDE 180 MG: 180 CAPSULE, COATED, EXTENDED RELEASE ORAL at 09:23

## 2020-02-26 RX ADMIN — TICAGRELOR 120 MG: 60 TABLET ORAL at 14:30

## 2020-02-26 RX ADMIN — SODIUM CHLORIDE, PRESERVATIVE FREE 10 ML: 5 INJECTION INTRAVENOUS at 20:25

## 2020-02-26 RX ADMIN — LEVOTHYROXINE SODIUM 125 MCG: 125 TABLET ORAL at 06:44

## 2020-02-26 RX ADMIN — TICAGRELOR 60 MG: 60 TABLET ORAL at 22:45

## 2020-02-26 RX ADMIN — LOSARTAN POTASSIUM 100 MG: 100 TABLET, FILM COATED ORAL at 09:24

## 2020-02-26 RX ADMIN — METOPROLOL TARTRATE 50 MG: 50 TABLET, FILM COATED ORAL at 20:25

## 2020-02-26 RX ADMIN — DEXTROMETHORPHAN HYDROBROMIDE AND QUINIDINE SULFATE 1 CAPSULE: 20; 10 CAPSULE, GELATIN COATED ORAL at 15:00

## 2020-02-27 ENCOUNTER — APPOINTMENT (OUTPATIENT)
Dept: GENERAL RADIOLOGY | Facility: HOSPITAL | Age: 85
End: 2020-02-27

## 2020-02-27 PROBLEM — J06.9 URTI (ACUTE UPPER RESPIRATORY INFECTION): Status: ACTIVE | Noted: 2020-02-27

## 2020-02-27 LAB
B PARAPERT DNA SPEC QL NAA+PROBE: NOT DETECTED
B PERT DNA SPEC QL NAA+PROBE: NOT DETECTED
BASOPHILS # BLD AUTO: 0.05 10*3/MM3 (ref 0–0.2)
BASOPHILS NFR BLD AUTO: 0.9 % (ref 0–1.5)
C PNEUM DNA NPH QL NAA+NON-PROBE: NOT DETECTED
DEPRECATED RDW RBC AUTO: 48.4 FL (ref 37–54)
EOSINOPHIL # BLD AUTO: 0.22 10*3/MM3 (ref 0–0.4)
EOSINOPHIL NFR BLD AUTO: 3.8 % (ref 0.3–6.2)
ERYTHROCYTE [DISTWIDTH] IN BLOOD BY AUTOMATED COUNT: 14.9 % (ref 12.3–15.4)
FLUAV H1 2009 PAND RNA NPH QL NAA+PROBE: NOT DETECTED
FLUAV H1 HA GENE NPH QL NAA+PROBE: NOT DETECTED
FLUAV H3 RNA NPH QL NAA+PROBE: NOT DETECTED
FLUAV SUBTYP SPEC NAA+PROBE: NOT DETECTED
FLUBV RNA ISLT QL NAA+PROBE: NOT DETECTED
HADV DNA SPEC NAA+PROBE: NOT DETECTED
HCOV 229E RNA SPEC QL NAA+PROBE: NOT DETECTED
HCOV HKU1 RNA SPEC QL NAA+PROBE: NOT DETECTED
HCOV NL63 RNA SPEC QL NAA+PROBE: NOT DETECTED
HCOV OC43 RNA SPEC QL NAA+PROBE: NOT DETECTED
HCT VFR BLD AUTO: 34.8 % (ref 34–46.6)
HGB BLD-MCNC: 11.4 G/DL (ref 12–15.9)
HMPV RNA NPH QL NAA+NON-PROBE: NOT DETECTED
HPIV1 RNA SPEC QL NAA+PROBE: NOT DETECTED
HPIV2 RNA SPEC QL NAA+PROBE: NOT DETECTED
HPIV3 RNA NPH QL NAA+PROBE: NOT DETECTED
HPIV4 P GENE NPH QL NAA+PROBE: NOT DETECTED
IMM GRANULOCYTES # BLD AUTO: 0.01 10*3/MM3 (ref 0–0.05)
IMM GRANULOCYTES NFR BLD AUTO: 0.2 % (ref 0–0.5)
LYMPHOCYTES # BLD AUTO: 1.81 10*3/MM3 (ref 0.7–3.1)
LYMPHOCYTES NFR BLD AUTO: 31.2 % (ref 19.6–45.3)
M PNEUMO IGG SER IA-ACNC: NOT DETECTED
MCH RBC QN AUTO: 29.1 PG (ref 26.6–33)
MCHC RBC AUTO-ENTMCNC: 32.8 G/DL (ref 31.5–35.7)
MCV RBC AUTO: 88.8 FL (ref 79–97)
MONOCYTES # BLD AUTO: 0.47 10*3/MM3 (ref 0.1–0.9)
MONOCYTES NFR BLD AUTO: 8.1 % (ref 5–12)
NEUTROPHILS # BLD AUTO: 3.24 10*3/MM3 (ref 1.7–7)
NEUTROPHILS NFR BLD AUTO: 55.8 % (ref 42.7–76)
NRBC BLD AUTO-RTO: 0 /100 WBC (ref 0–0.2)
PLATELET # BLD AUTO: 210 10*3/MM3 (ref 140–450)
PMV BLD AUTO: 10.3 FL (ref 6–12)
RBC # BLD AUTO: 3.92 10*6/MM3 (ref 3.77–5.28)
RHINOVIRUS RNA SPEC NAA+PROBE: NOT DETECTED
RSV RNA NPH QL NAA+NON-PROBE: NOT DETECTED
WBC NRBC COR # BLD: 5.8 10*3/MM3 (ref 3.4–10.8)

## 2020-02-27 PROCEDURE — 25010000002 AZITHROMYCIN PER 500 MG: Performed by: INTERNAL MEDICINE

## 2020-02-27 PROCEDURE — 85025 COMPLETE CBC W/AUTO DIFF WBC: CPT | Performed by: INTERNAL MEDICINE

## 2020-02-27 PROCEDURE — 97110 THERAPEUTIC EXERCISES: CPT

## 2020-02-27 PROCEDURE — 71046 X-RAY EXAM CHEST 2 VIEWS: CPT

## 2020-02-27 PROCEDURE — 0100U HC BIOFIRE FILMARRAY RESP PANEL 2: CPT | Performed by: INTERNAL MEDICINE

## 2020-02-27 RX ORDER — CETIRIZINE HYDROCHLORIDE 10 MG/1
10 TABLET ORAL DAILY
Status: DISCONTINUED | OUTPATIENT
Start: 2020-02-27 | End: 2020-02-28 | Stop reason: HOSPADM

## 2020-02-27 RX ORDER — METHYLPREDNISOLONE ACETATE 80 MG/ML
80 INJECTION, SUSPENSION INTRA-ARTICULAR; INTRALESIONAL; INTRAMUSCULAR; SOFT TISSUE ONCE
Status: DISCONTINUED | OUTPATIENT
Start: 2020-02-27 | End: 2020-02-28 | Stop reason: HOSPADM

## 2020-02-27 RX ORDER — PROPRANOLOL HYDROCHLORIDE 40 MG/1
40 TABLET ORAL EVERY 12 HOURS
Status: DISCONTINUED | OUTPATIENT
Start: 2020-02-27 | End: 2020-02-28 | Stop reason: HOSPADM

## 2020-02-27 RX ADMIN — TICAGRELOR 60 MG: 60 TABLET ORAL at 21:41

## 2020-02-27 RX ADMIN — PROPRANOLOL HYDROCHLORIDE 40 MG: 40 TABLET ORAL at 11:18

## 2020-02-27 RX ADMIN — DILTIAZEM HYDROCHLORIDE 180 MG: 180 CAPSULE, COATED, EXTENDED RELEASE ORAL at 21:42

## 2020-02-27 RX ADMIN — SODIUM CHLORIDE, PRESERVATIVE FREE 10 ML: 5 INJECTION INTRAVENOUS at 09:43

## 2020-02-27 RX ADMIN — AZITHROMYCIN 500 MG: 500 INJECTION, POWDER, LYOPHILIZED, FOR SOLUTION INTRAVENOUS at 09:42

## 2020-02-27 RX ADMIN — LEVOTHYROXINE SODIUM 125 MCG: 125 TABLET ORAL at 06:10

## 2020-02-27 RX ADMIN — LOSARTAN POTASSIUM 100 MG: 100 TABLET, FILM COATED ORAL at 09:43

## 2020-02-27 RX ADMIN — SODIUM CHLORIDE, PRESERVATIVE FREE 10 ML: 5 INJECTION INTRAVENOUS at 21:41

## 2020-02-27 RX ADMIN — DILTIAZEM HYDROCHLORIDE 180 MG: 180 CAPSULE, COATED, EXTENDED RELEASE ORAL at 09:44

## 2020-02-27 RX ADMIN — DEXTROMETHORPHAN HYDROBROMIDE AND QUINIDINE SULFATE 1 CAPSULE: 20; 10 CAPSULE, GELATIN COATED ORAL at 09:43

## 2020-02-27 RX ADMIN — CETIRIZINE HYDROCHLORIDE 10 MG: 10 TABLET, FILM COATED ORAL at 09:43

## 2020-02-27 RX ADMIN — PROPRANOLOL HYDROCHLORIDE 40 MG: 40 TABLET ORAL at 21:41

## 2020-02-27 RX ADMIN — FAMOTIDINE 20 MG: 20 TABLET, FILM COATED ORAL at 21:41

## 2020-02-27 RX ADMIN — ACETAMINOPHEN 650 MG: 325 TABLET, FILM COATED ORAL at 02:21

## 2020-02-27 RX ADMIN — HYDROCODONE BITARTRATE AND ACETAMINOPHEN 1 TABLET: 7.5; 325 TABLET ORAL at 13:29

## 2020-02-27 RX ADMIN — TICAGRELOR 60 MG: 60 TABLET ORAL at 09:43

## 2020-02-28 VITALS
TEMPERATURE: 97.8 F | HEART RATE: 60 BPM | DIASTOLIC BLOOD PRESSURE: 73 MMHG | HEIGHT: 62 IN | SYSTOLIC BLOOD PRESSURE: 147 MMHG | BODY MASS INDEX: 30.44 KG/M2 | WEIGHT: 165.4 LBS | OXYGEN SATURATION: 95 % | RESPIRATION RATE: 16 BRPM

## 2020-02-28 LAB
ANION GAP SERPL CALCULATED.3IONS-SCNC: 15.3 MMOL/L (ref 5–15)
BASOPHILS # BLD AUTO: 0.05 10*3/MM3 (ref 0–0.2)
BASOPHILS NFR BLD AUTO: 0.8 % (ref 0–1.5)
BUN BLD-MCNC: 8 MG/DL (ref 8–23)
BUN/CREAT SERPL: 13.6 (ref 7–25)
CALCIUM SPEC-SCNC: 8.9 MG/DL (ref 8.6–10.5)
CHLORIDE SERPL-SCNC: 105 MMOL/L (ref 98–107)
CO2 SERPL-SCNC: 21.7 MMOL/L (ref 22–29)
CREAT BLD-MCNC: 0.59 MG/DL (ref 0.57–1)
DEPRECATED RDW RBC AUTO: 48.1 FL (ref 37–54)
EOSINOPHIL # BLD AUTO: 0.26 10*3/MM3 (ref 0–0.4)
EOSINOPHIL NFR BLD AUTO: 4 % (ref 0.3–6.2)
ERYTHROCYTE [DISTWIDTH] IN BLOOD BY AUTOMATED COUNT: 14.9 % (ref 12.3–15.4)
GFR SERPL CREATININE-BSD FRML MDRD: 97 ML/MIN/1.73
GLUCOSE BLD-MCNC: 136 MG/DL (ref 65–99)
HCT VFR BLD AUTO: 34.6 % (ref 34–46.6)
HGB BLD-MCNC: 11.6 G/DL (ref 12–15.9)
IMM GRANULOCYTES # BLD AUTO: 0.02 10*3/MM3 (ref 0–0.05)
IMM GRANULOCYTES NFR BLD AUTO: 0.3 % (ref 0–0.5)
LYMPHOCYTES # BLD AUTO: 2.08 10*3/MM3 (ref 0.7–3.1)
LYMPHOCYTES NFR BLD AUTO: 32 % (ref 19.6–45.3)
MCH RBC QN AUTO: 29.4 PG (ref 26.6–33)
MCHC RBC AUTO-ENTMCNC: 33.5 G/DL (ref 31.5–35.7)
MCV RBC AUTO: 87.6 FL (ref 79–97)
MONOCYTES # BLD AUTO: 0.41 10*3/MM3 (ref 0.1–0.9)
MONOCYTES NFR BLD AUTO: 6.3 % (ref 5–12)
NEUTROPHILS # BLD AUTO: 3.67 10*3/MM3 (ref 1.7–7)
NEUTROPHILS NFR BLD AUTO: 56.6 % (ref 42.7–76)
NRBC BLD AUTO-RTO: 0 /100 WBC (ref 0–0.2)
PLATELET # BLD AUTO: 231 10*3/MM3 (ref 140–450)
PMV BLD AUTO: 10.5 FL (ref 6–12)
POTASSIUM BLD-SCNC: 3.4 MMOL/L (ref 3.5–5.2)
RBC # BLD AUTO: 3.95 10*6/MM3 (ref 3.77–5.28)
SODIUM BLD-SCNC: 142 MMOL/L (ref 136–145)
WBC NRBC COR # BLD: 6.49 10*3/MM3 (ref 3.4–10.8)

## 2020-02-28 PROCEDURE — 25010000002 AZITHROMYCIN PER 500 MG: Performed by: INTERNAL MEDICINE

## 2020-02-28 PROCEDURE — 97110 THERAPEUTIC EXERCISES: CPT

## 2020-02-28 PROCEDURE — 80048 BASIC METABOLIC PNL TOTAL CA: CPT | Performed by: INTERNAL MEDICINE

## 2020-02-28 PROCEDURE — 85025 COMPLETE CBC W/AUTO DIFF WBC: CPT | Performed by: INTERNAL MEDICINE

## 2020-02-28 RX ORDER — CETIRIZINE HYDROCHLORIDE 10 MG/1
10 TABLET ORAL DAILY
Qty: 30 TABLET | Refills: 0 | Status: ON HOLD
Start: 2020-02-29 | End: 2021-01-09

## 2020-02-28 RX ORDER — AZITHROMYCIN 250 MG/1
TABLET, FILM COATED ORAL
Qty: 2 TABLET | Refills: 2 | Status: SHIPPED | OUTPATIENT
Start: 2020-02-28 | End: 2020-03-30 | Stop reason: HOSPADM

## 2020-02-28 RX ORDER — LOSARTAN POTASSIUM 100 MG/1
100 TABLET ORAL
Qty: 30 TABLET | Refills: 0 | Status: SHIPPED | OUTPATIENT
Start: 2020-02-29 | End: 2020-03-30 | Stop reason: HOSPADM

## 2020-02-28 RX ORDER — PROPRANOLOL HYDROCHLORIDE 40 MG/1
40 TABLET ORAL EVERY 12 HOURS
Qty: 60 TABLET | Refills: 0 | Status: SHIPPED | OUTPATIENT
Start: 2020-02-28 | End: 2021-02-17 | Stop reason: HOSPADM

## 2020-02-28 RX ADMIN — PROPRANOLOL HYDROCHLORIDE 40 MG: 40 TABLET ORAL at 09:37

## 2020-02-28 RX ADMIN — SODIUM CHLORIDE, PRESERVATIVE FREE 10 ML: 5 INJECTION INTRAVENOUS at 09:37

## 2020-02-28 RX ADMIN — CETIRIZINE HYDROCHLORIDE 10 MG: 10 TABLET, FILM COATED ORAL at 09:36

## 2020-02-28 RX ADMIN — AZITHROMYCIN 500 MG: 500 INJECTION, POWDER, LYOPHILIZED, FOR SOLUTION INTRAVENOUS at 09:36

## 2020-02-28 RX ADMIN — ACETAMINOPHEN 650 MG: 325 TABLET, FILM COATED ORAL at 00:57

## 2020-02-28 RX ADMIN — ACETAMINOPHEN 650 MG: 325 TABLET, FILM COATED ORAL at 09:36

## 2020-02-28 RX ADMIN — DEXTROMETHORPHAN HYDROBROMIDE AND QUINIDINE SULFATE 1 CAPSULE: 20; 10 CAPSULE, GELATIN COATED ORAL at 09:36

## 2020-02-28 RX ADMIN — LEVOTHYROXINE SODIUM 125 MCG: 125 TABLET ORAL at 08:16

## 2020-02-28 RX ADMIN — LOSARTAN POTASSIUM 100 MG: 100 TABLET, FILM COATED ORAL at 09:37

## 2020-02-28 RX ADMIN — DILTIAZEM HYDROCHLORIDE 180 MG: 180 CAPSULE, COATED, EXTENDED RELEASE ORAL at 09:37

## 2020-02-28 RX ADMIN — TICAGRELOR 60 MG: 60 TABLET ORAL at 09:36

## 2020-02-29 ENCOUNTER — READMISSION MANAGEMENT (OUTPATIENT)
Dept: CALL CENTER | Facility: HOSPITAL | Age: 85
End: 2020-02-29

## 2020-02-29 NOTE — OUTREACH NOTE
Prep Survey      Responses   Facility patient discharged from?  [place]   Is patient eligible?  Yes   Discharge diagnosis  Hypertensive urgency   Does the patient have one of the following disease processes/diagnoses(primary or secondary)?  Other   Does the patient have Home health ordered?  Yes   What is the Home health agency?   [facility]    Is there a DME ordered?  No   Prep survey completed?  Yes          Gayatri Freeman RN

## 2020-03-01 ENCOUNTER — READMISSION MANAGEMENT (OUTPATIENT)
Dept: CALL CENTER | Facility: HOSPITAL | Age: 85
End: 2020-03-01

## 2020-03-01 NOTE — OUTREACH NOTE
Medical Week 1 Survey      Responses   Facility patient discharged from?  [place]   Does the patient have one of the following disease processes/diagnoses(primary or secondary)?  Other   Is there a successful TCM telephone encounter documented?  No   Week 1 attempt successful?  Yes   Call start time  1622   Revoke  Phone number issues [wrong number person states when answered. ]   Call end time  1624          Lola Crow RN

## 2020-03-02 NOTE — PROGRESS NOTES
Case Management Discharge Note      Final Note: Home with [facility]    Provided Post Acute Provider List?: N/A  Refused Provider List Comment: Current with [facility]  Provided Post Acute Provider Quality & Resource List?: N/A  Refused Quality and Resource List Comment: declined needing CMS website since current with [facility]    Destination      No service has been selected for the patient.      Durable Medical Equipment      No service has been selected for the patient.      Dialysis/Infusion      No service has been selected for the patient.      Home Medical Care - Selection Complete      Service Provider Request Status Selected Services Address Phone Number Fax Number    [facility] Selected Home Health Services [address] [place] [phone]      Therapy      No service has been selected for the patient.      Community Resources      No service has been selected for the patient.        Transportation Services  Private: Car    Final Discharge Disposition Code: 06 - home with home health care

## 2020-03-17 ENCOUNTER — TELEPHONE (OUTPATIENT)
Dept: NEUROLOGY | Facility: CLINIC | Age: 85
End: 2020-03-17

## 2020-03-17 NOTE — TELEPHONE ENCOUNTER
Daughter, Beatriz, sts pt has much improvement with crying since starting Nuedexta; daughter will schedule EKG.     Daughter worries pt is having pin strokes.  She has fallen 3-4 times since hosp d/c, bp is elevated at times and sugar elevated. BP this morning was 178/85, but Saturday (time of last fall) bp was 214/98. No medical attention sought.

## 2020-03-17 NOTE — TELEPHONE ENCOUNTER
Instructed daughter, Beatriz, jazmyn Clarke, to have pt evaluated at the ED. Beatriz agreed and will be taking pt to [facility].

## 2020-03-19 ENCOUNTER — LAB REQUISITION (OUTPATIENT)
Dept: LAB | Facility: HOSPITAL | Age: 85
End: 2020-03-19

## 2020-03-19 DIAGNOSIS — I13.0 HYPERTENSIVE HEART AND CHRONIC KIDNEY DISEASE WITH HEART FAILURE AND STAGE 1 THROUGH STAGE 4 CHRONIC KIDNEY DISEASE, OR UNSPECIFIED CHRONIC KIDNEY DISEASE (HCC): ICD-10-CM

## 2020-03-19 DIAGNOSIS — N18.30 CHRONIC KIDNEY DISEASE, STAGE 3 (MODERATE): ICD-10-CM

## 2020-03-19 DIAGNOSIS — I50.9 HEART FAILURE, UNSPECIFIED (HCC): ICD-10-CM

## 2020-03-19 LAB
ALBUMIN SERPL-MCNC: 3.5 G/DL (ref 3.5–5.2)
ALBUMIN/GLOB SERPL: 1.1 G/DL
ALP SERPL-CCNC: 84 U/L (ref 39–117)
ALT SERPL W P-5'-P-CCNC: 9 U/L (ref 1–33)
ANION GAP SERPL CALCULATED.3IONS-SCNC: 11 MMOL/L (ref 5–15)
AST SERPL-CCNC: 11 U/L (ref 1–32)
BILIRUB SERPL-MCNC: 0.3 MG/DL (ref 0.2–1.2)
BUN BLD-MCNC: 12 MG/DL (ref 8–23)
BUN/CREAT SERPL: 11.9 (ref 7–25)
CALCIUM SPEC-SCNC: 8.9 MG/DL (ref 8.6–10.5)
CHLORIDE SERPL-SCNC: 104 MMOL/L (ref 98–107)
CO2 SERPL-SCNC: 29 MMOL/L (ref 22–29)
CREAT BLD-MCNC: 1.01 MG/DL (ref 0.57–1)
GFR SERPL CREATININE-BSD FRML MDRD: 52 ML/MIN/1.73
GLOBULIN UR ELPH-MCNC: 3.1 GM/DL
GLUCOSE BLD-MCNC: 150 MG/DL (ref 65–99)
POTASSIUM BLD-SCNC: 4.2 MMOL/L (ref 3.5–5.2)
PROT SERPL-MCNC: 6.6 G/DL (ref 6–8.5)
SODIUM BLD-SCNC: 144 MMOL/L (ref 136–145)

## 2020-03-19 PROCEDURE — 80053 COMPREHEN METABOLIC PANEL: CPT | Performed by: FAMILY MEDICINE

## 2020-03-25 ENCOUNTER — APPOINTMENT (OUTPATIENT)
Dept: GENERAL RADIOLOGY | Facility: HOSPITAL | Age: 85
End: 2020-03-25

## 2020-03-25 ENCOUNTER — HOSPITAL ENCOUNTER (INPATIENT)
Facility: HOSPITAL | Age: 85
LOS: 5 days | Discharge: NURSING, REHAB OR LONG-TERM CARE | End: 2020-03-30
Attending: EMERGENCY MEDICINE | Admitting: INTERNAL MEDICINE

## 2020-03-25 DIAGNOSIS — S72.002A CLOSED FRACTURE OF LEFT HIP, INITIAL ENCOUNTER (HCC): Primary | ICD-10-CM

## 2020-03-25 DIAGNOSIS — S72.002D CLOSED FRACTURE OF LEFT HIP WITH ROUTINE HEALING, SUBSEQUENT ENCOUNTER: ICD-10-CM

## 2020-03-25 PROBLEM — Z86.79 HISTORY OF CHF (CONGESTIVE HEART FAILURE): Chronic | Status: ACTIVE | Noted: 2020-03-25

## 2020-03-25 LAB
ABO GROUP BLD: NORMAL
ALBUMIN SERPL-MCNC: 4 G/DL (ref 3.5–5.2)
ALBUMIN/GLOB SERPL: 1.5 G/DL
ALP SERPL-CCNC: 93 U/L (ref 39–117)
ALT SERPL W P-5'-P-CCNC: 14 U/L (ref 1–33)
ANION GAP SERPL CALCULATED.3IONS-SCNC: 10.9 MMOL/L (ref 5–15)
AST SERPL-CCNC: 15 U/L (ref 1–32)
BASOPHILS # BLD AUTO: 0.05 10*3/MM3 (ref 0–0.2)
BASOPHILS NFR BLD AUTO: 0.6 % (ref 0–1.5)
BILIRUB SERPL-MCNC: 0.4 MG/DL (ref 0.2–1.2)
BLD GP AB SCN SERPL QL: NEGATIVE
BUN BLD-MCNC: 12 MG/DL (ref 8–23)
BUN/CREAT SERPL: 14.6 (ref 7–25)
CALCIUM SPEC-SCNC: 9.1 MG/DL (ref 8.6–10.5)
CHLORIDE SERPL-SCNC: 102 MMOL/L (ref 98–107)
CO2 SERPL-SCNC: 27.1 MMOL/L (ref 22–29)
CREAT BLD-MCNC: 0.82 MG/DL (ref 0.57–1)
DEPRECATED RDW RBC AUTO: 43.9 FL (ref 37–54)
EOSINOPHIL # BLD AUTO: 0.23 10*3/MM3 (ref 0–0.4)
EOSINOPHIL NFR BLD AUTO: 2.7 % (ref 0.3–6.2)
ERYTHROCYTE [DISTWIDTH] IN BLOOD BY AUTOMATED COUNT: 13.8 % (ref 12.3–15.4)
GFR SERPL CREATININE-BSD FRML MDRD: 67 ML/MIN/1.73
GLOBULIN UR ELPH-MCNC: 2.6 GM/DL
GLUCOSE BLD-MCNC: 226 MG/DL (ref 65–99)
GLUCOSE BLDC GLUCOMTR-MCNC: 176 MG/DL (ref 70–130)
HCT VFR BLD AUTO: 39.1 % (ref 34–46.6)
HGB BLD-MCNC: 13.1 G/DL (ref 12–15.9)
IMM GRANULOCYTES # BLD AUTO: 0.08 10*3/MM3 (ref 0–0.05)
IMM GRANULOCYTES NFR BLD AUTO: 0.9 % (ref 0–0.5)
INR PPP: 1.04 (ref 0.9–1.1)
LYMPHOCYTES # BLD AUTO: 1.61 10*3/MM3 (ref 0.7–3.1)
LYMPHOCYTES NFR BLD AUTO: 18.9 % (ref 19.6–45.3)
MCH RBC QN AUTO: 29.6 PG (ref 26.6–33)
MCHC RBC AUTO-ENTMCNC: 33.5 G/DL (ref 31.5–35.7)
MCV RBC AUTO: 88.3 FL (ref 79–97)
MONOCYTES # BLD AUTO: 0.42 10*3/MM3 (ref 0.1–0.9)
MONOCYTES NFR BLD AUTO: 4.9 % (ref 5–12)
NEUTROPHILS # BLD AUTO: 6.14 10*3/MM3 (ref 1.7–7)
NEUTROPHILS NFR BLD AUTO: 72 % (ref 42.7–76)
NRBC BLD AUTO-RTO: 0 /100 WBC (ref 0–0.2)
PLATELET # BLD AUTO: 213 10*3/MM3 (ref 140–450)
PMV BLD AUTO: 10.7 FL (ref 6–12)
POTASSIUM BLD-SCNC: 4 MMOL/L (ref 3.5–5.2)
PROT SERPL-MCNC: 6.6 G/DL (ref 6–8.5)
PROTHROMBIN TIME: 13.3 SECONDS (ref 11.7–14.2)
RBC # BLD AUTO: 4.43 10*6/MM3 (ref 3.77–5.28)
RH BLD: POSITIVE
SODIUM BLD-SCNC: 140 MMOL/L (ref 136–145)
T&S EXPIRATION DATE: NORMAL
WBC NRBC COR # BLD: 8.53 10*3/MM3 (ref 3.4–10.8)

## 2020-03-25 PROCEDURE — 85025 COMPLETE CBC W/AUTO DIFF WBC: CPT | Performed by: EMERGENCY MEDICINE

## 2020-03-25 PROCEDURE — 25010000002 MORPHINE PER 10 MG: Performed by: INTERNAL MEDICINE

## 2020-03-25 PROCEDURE — 25010000002 ONDANSETRON PER 1 MG: Performed by: INTERNAL MEDICINE

## 2020-03-25 PROCEDURE — 25010000002 HYDROMORPHONE PER 4 MG: Performed by: INTERNAL MEDICINE

## 2020-03-25 PROCEDURE — 86850 RBC ANTIBODY SCREEN: CPT | Performed by: ORTHOPAEDIC SURGERY

## 2020-03-25 PROCEDURE — 85610 PROTHROMBIN TIME: CPT | Performed by: EMERGENCY MEDICINE

## 2020-03-25 PROCEDURE — 25010000002 ONDANSETRON PER 1 MG: Performed by: EMERGENCY MEDICINE

## 2020-03-25 PROCEDURE — 86901 BLOOD TYPING SEROLOGIC RH(D): CPT | Performed by: ORTHOPAEDIC SURGERY

## 2020-03-25 PROCEDURE — 93010 ELECTROCARDIOGRAM REPORT: CPT | Performed by: INTERNAL MEDICINE

## 2020-03-25 PROCEDURE — 99284 EMERGENCY DEPT VISIT MOD MDM: CPT

## 2020-03-25 PROCEDURE — 25010000002 HYDROMORPHONE PER 4 MG: Performed by: EMERGENCY MEDICINE

## 2020-03-25 PROCEDURE — 25010000002 MORPHINE PER 10 MG: Performed by: EMERGENCY MEDICINE

## 2020-03-25 PROCEDURE — 73502 X-RAY EXAM HIP UNI 2-3 VIEWS: CPT

## 2020-03-25 PROCEDURE — 86900 BLOOD TYPING SEROLOGIC ABO: CPT | Performed by: ORTHOPAEDIC SURGERY

## 2020-03-25 PROCEDURE — 71045 X-RAY EXAM CHEST 1 VIEW: CPT

## 2020-03-25 PROCEDURE — 82962 GLUCOSE BLOOD TEST: CPT

## 2020-03-25 PROCEDURE — 93005 ELECTROCARDIOGRAM TRACING: CPT | Performed by: EMERGENCY MEDICINE

## 2020-03-25 PROCEDURE — 80053 COMPREHEN METABOLIC PANEL: CPT | Performed by: EMERGENCY MEDICINE

## 2020-03-25 PROCEDURE — 63710000001 INSULIN LISPRO (HUMAN) PER 5 UNITS: Performed by: INTERNAL MEDICINE

## 2020-03-25 PROCEDURE — 36415 COLL VENOUS BLD VENIPUNCTURE: CPT | Performed by: ORTHOPAEDIC SURGERY

## 2020-03-25 RX ORDER — MORPHINE SULFATE 2 MG/ML
4 INJECTION, SOLUTION INTRAMUSCULAR; INTRAVENOUS ONCE
Status: COMPLETED | OUTPATIENT
Start: 2020-03-25 | End: 2020-03-25

## 2020-03-25 RX ORDER — MORPHINE SULFATE 2 MG/ML
2 INJECTION, SOLUTION INTRAMUSCULAR; INTRAVENOUS
Status: DISCONTINUED | OUTPATIENT
Start: 2020-03-25 | End: 2020-03-30 | Stop reason: HOSPADM

## 2020-03-25 RX ORDER — SODIUM CHLORIDE 0.9 % (FLUSH) 0.9 %
10 SYRINGE (ML) INJECTION AS NEEDED
Status: DISCONTINUED | OUTPATIENT
Start: 2020-03-25 | End: 2020-03-30 | Stop reason: HOSPADM

## 2020-03-25 RX ORDER — PANTOPRAZOLE SODIUM 40 MG/1
40 TABLET, DELAYED RELEASE ORAL 2 TIMES DAILY WITH MEALS
Status: ON HOLD | COMMUNITY
End: 2021-01-09

## 2020-03-25 RX ORDER — HYDRALAZINE HYDROCHLORIDE 20 MG/ML
10 INJECTION INTRAMUSCULAR; INTRAVENOUS EVERY 4 HOURS PRN
Status: DISCONTINUED | OUTPATIENT
Start: 2020-03-25 | End: 2020-03-30 | Stop reason: HOSPADM

## 2020-03-25 RX ORDER — NALOXONE HCL 0.4 MG/ML
0.4 VIAL (ML) INJECTION
Status: DISCONTINUED | OUTPATIENT
Start: 2020-03-25 | End: 2020-03-30 | Stop reason: HOSPADM

## 2020-03-25 RX ORDER — NALOXONE HCL 0.4 MG/ML
0.4 VIAL (ML) INJECTION
Status: DISCONTINUED | OUTPATIENT
Start: 2020-03-25 | End: 2020-03-25

## 2020-03-25 RX ORDER — ONDANSETRON 4 MG/1
4 TABLET, FILM COATED ORAL EVERY 6 HOURS PRN
Status: DISCONTINUED | OUTPATIENT
Start: 2020-03-25 | End: 2020-03-30 | Stop reason: HOSPADM

## 2020-03-25 RX ORDER — CEFAZOLIN SODIUM 2 G/100ML
2 INJECTION, SOLUTION INTRAVENOUS
Status: COMPLETED | OUTPATIENT
Start: 2020-03-26 | End: 2020-03-26

## 2020-03-25 RX ORDER — ACETAMINOPHEN 325 MG/1
650 TABLET ORAL EVERY 4 HOURS PRN
Status: DISCONTINUED | OUTPATIENT
Start: 2020-03-25 | End: 2020-03-30 | Stop reason: HOSPADM

## 2020-03-25 RX ORDER — LISINOPRIL 20 MG/1
20 TABLET ORAL 2 TIMES DAILY
Status: ON HOLD | COMMUNITY
End: 2021-01-09

## 2020-03-25 RX ORDER — CALCIUM CARBONATE 200(500)MG
2 TABLET,CHEWABLE ORAL 2 TIMES DAILY PRN
Status: DISCONTINUED | OUTPATIENT
Start: 2020-03-25 | End: 2020-03-30 | Stop reason: HOSPADM

## 2020-03-25 RX ORDER — DOCUSATE SODIUM 100 MG/1
100 CAPSULE, LIQUID FILLED ORAL 2 TIMES DAILY PRN
Status: DISCONTINUED | OUTPATIENT
Start: 2020-03-25 | End: 2020-03-30 | Stop reason: HOSPADM

## 2020-03-25 RX ORDER — HYDROMORPHONE HYDROCHLORIDE 1 MG/ML
0.5 INJECTION, SOLUTION INTRAMUSCULAR; INTRAVENOUS; SUBCUTANEOUS ONCE AS NEEDED
Status: COMPLETED | OUTPATIENT
Start: 2020-03-25 | End: 2020-03-25

## 2020-03-25 RX ORDER — NICOTINE POLACRILEX 4 MG
15 LOZENGE BUCCAL
Status: DISCONTINUED | OUTPATIENT
Start: 2020-03-25 | End: 2020-03-30 | Stop reason: HOSPADM

## 2020-03-25 RX ORDER — ACETAMINOPHEN 160 MG/5ML
650 SOLUTION ORAL EVERY 4 HOURS PRN
Status: DISCONTINUED | OUTPATIENT
Start: 2020-03-25 | End: 2020-03-30 | Stop reason: HOSPADM

## 2020-03-25 RX ORDER — MORPHINE SULFATE 2 MG/ML
2 INJECTION, SOLUTION INTRAMUSCULAR; INTRAVENOUS EVERY 4 HOURS PRN
Status: DISCONTINUED | OUTPATIENT
Start: 2020-03-25 | End: 2020-03-25

## 2020-03-25 RX ORDER — ONDANSETRON 2 MG/ML
4 INJECTION INTRAMUSCULAR; INTRAVENOUS ONCE
Status: COMPLETED | OUTPATIENT
Start: 2020-03-25 | End: 2020-03-25

## 2020-03-25 RX ORDER — BISACODYL 10 MG
10 SUPPOSITORY, RECTAL RECTAL DAILY PRN
Status: DISCONTINUED | OUTPATIENT
Start: 2020-03-25 | End: 2020-03-30 | Stop reason: HOSPADM

## 2020-03-25 RX ORDER — DEXTROSE MONOHYDRATE 25 G/50ML
25 INJECTION, SOLUTION INTRAVENOUS
Status: DISCONTINUED | OUTPATIENT
Start: 2020-03-25 | End: 2020-03-30 | Stop reason: HOSPADM

## 2020-03-25 RX ORDER — HYDROCODONE BITARTRATE AND ACETAMINOPHEN 5; 325 MG/1; MG/1
1 TABLET ORAL EVERY 4 HOURS PRN
Status: DISCONTINUED | OUTPATIENT
Start: 2020-03-25 | End: 2020-03-30 | Stop reason: HOSPADM

## 2020-03-25 RX ORDER — ACETAMINOPHEN 650 MG/1
650 SUPPOSITORY RECTAL EVERY 4 HOURS PRN
Status: DISCONTINUED | OUTPATIENT
Start: 2020-03-25 | End: 2020-03-30 | Stop reason: HOSPADM

## 2020-03-25 RX ORDER — HYDROMORPHONE HYDROCHLORIDE 1 MG/ML
0.5 INJECTION, SOLUTION INTRAMUSCULAR; INTRAVENOUS; SUBCUTANEOUS ONCE
Status: COMPLETED | OUTPATIENT
Start: 2020-03-25 | End: 2020-03-25

## 2020-03-25 RX ORDER — SODIUM CHLORIDE 0.9 % (FLUSH) 0.9 %
10 SYRINGE (ML) INJECTION EVERY 12 HOURS SCHEDULED
Status: DISCONTINUED | OUTPATIENT
Start: 2020-03-25 | End: 2020-03-30 | Stop reason: HOSPADM

## 2020-03-25 RX ORDER — ONDANSETRON 2 MG/ML
4 INJECTION INTRAMUSCULAR; INTRAVENOUS EVERY 6 HOURS PRN
Status: DISCONTINUED | OUTPATIENT
Start: 2020-03-25 | End: 2020-03-30 | Stop reason: HOSPADM

## 2020-03-25 RX ADMIN — ONDANSETRON 4 MG: 2 INJECTION INTRAMUSCULAR; INTRAVENOUS at 16:40

## 2020-03-25 RX ADMIN — ACETAMINOPHEN 650 MG: 325 TABLET, FILM COATED ORAL at 21:03

## 2020-03-25 RX ADMIN — MORPHINE SULFATE 2 MG: 2 INJECTION, SOLUTION INTRAMUSCULAR; INTRAVENOUS at 20:36

## 2020-03-25 RX ADMIN — MORPHINE SULFATE 4 MG: 2 INJECTION, SOLUTION INTRAMUSCULAR; INTRAVENOUS at 17:41

## 2020-03-25 RX ADMIN — MORPHINE SULFATE 4 MG: 2 INJECTION, SOLUTION INTRAMUSCULAR; INTRAVENOUS at 16:40

## 2020-03-25 RX ADMIN — ONDANSETRON 4 MG: 2 INJECTION INTRAMUSCULAR; INTRAVENOUS at 21:25

## 2020-03-25 RX ADMIN — INSULIN LISPRO 2 UNITS: 100 INJECTION, SOLUTION INTRAVENOUS; SUBCUTANEOUS at 21:04

## 2020-03-25 RX ADMIN — HYDROMORPHONE HYDROCHLORIDE 0.5 MG: 1 INJECTION, SOLUTION INTRAMUSCULAR; INTRAVENOUS; SUBCUTANEOUS at 21:25

## 2020-03-25 RX ADMIN — HYDROMORPHONE HYDROCHLORIDE 0.5 MG: 1 INJECTION, SOLUTION INTRAMUSCULAR; INTRAVENOUS; SUBCUTANEOUS at 18:14

## 2020-03-25 RX ADMIN — SODIUM CHLORIDE, PRESERVATIVE FREE 10 ML: 5 INJECTION INTRAVENOUS at 20:37

## 2020-03-26 ENCOUNTER — APPOINTMENT (OUTPATIENT)
Dept: GENERAL RADIOLOGY | Facility: HOSPITAL | Age: 85
End: 2020-03-26

## 2020-03-26 ENCOUNTER — ANESTHESIA (OUTPATIENT)
Dept: PERIOP | Facility: HOSPITAL | Age: 85
End: 2020-03-26

## 2020-03-26 ENCOUNTER — ANESTHESIA EVENT (OUTPATIENT)
Dept: PERIOP | Facility: HOSPITAL | Age: 85
End: 2020-03-26

## 2020-03-26 PROBLEM — I25.810 CORONARY ARTERY DISEASE INVOLVING AUTOLOGOUS VEIN CORONARY BYPASS GRAFT WITHOUT ANGINA PECTORIS: Status: ACTIVE | Noted: 2020-03-26

## 2020-03-26 PROBLEM — I50.32 CHRONIC DIASTOLIC (CONGESTIVE) HEART FAILURE (HCC): Status: ACTIVE | Noted: 2020-03-25

## 2020-03-26 LAB
ANION GAP SERPL CALCULATED.3IONS-SCNC: 9.6 MMOL/L (ref 5–15)
BASOPHILS # BLD AUTO: 0.04 10*3/MM3 (ref 0–0.2)
BASOPHILS NFR BLD AUTO: 0.5 % (ref 0–1.5)
BUN BLD-MCNC: 9 MG/DL (ref 8–23)
BUN/CREAT SERPL: 13 (ref 7–25)
CALCIUM SPEC-SCNC: 9.2 MG/DL (ref 8.6–10.5)
CHLORIDE SERPL-SCNC: 100 MMOL/L (ref 98–107)
CO2 SERPL-SCNC: 30.4 MMOL/L (ref 22–29)
CREAT BLD-MCNC: 0.69 MG/DL (ref 0.57–1)
DEPRECATED RDW RBC AUTO: 42.7 FL (ref 37–54)
EOSINOPHIL # BLD AUTO: 0.22 10*3/MM3 (ref 0–0.4)
EOSINOPHIL NFR BLD AUTO: 2.7 % (ref 0.3–6.2)
ERYTHROCYTE [DISTWIDTH] IN BLOOD BY AUTOMATED COUNT: 13.6 % (ref 12.3–15.4)
GFR SERPL CREATININE-BSD FRML MDRD: 81 ML/MIN/1.73
GLUCOSE BLD-MCNC: 155 MG/DL (ref 65–99)
GLUCOSE BLDC GLUCOMTR-MCNC: 184 MG/DL (ref 70–130)
GLUCOSE BLDC GLUCOMTR-MCNC: 204 MG/DL (ref 70–130)
GLUCOSE BLDC GLUCOMTR-MCNC: 222 MG/DL (ref 70–130)
GLUCOSE BLDC GLUCOMTR-MCNC: 246 MG/DL (ref 70–130)
HCT VFR BLD AUTO: 30.2 % (ref 34–46.6)
HCT VFR BLD AUTO: 36 % (ref 34–46.6)
HGB BLD-MCNC: 10.1 G/DL (ref 12–15.9)
HGB BLD-MCNC: 11.7 G/DL (ref 12–15.9)
IMM GRANULOCYTES # BLD AUTO: 0.03 10*3/MM3 (ref 0–0.05)
IMM GRANULOCYTES NFR BLD AUTO: 0.4 % (ref 0–0.5)
LYMPHOCYTES # BLD AUTO: 1.51 10*3/MM3 (ref 0.7–3.1)
LYMPHOCYTES NFR BLD AUTO: 18.7 % (ref 19.6–45.3)
MCH RBC QN AUTO: 28.5 PG (ref 26.6–33)
MCHC RBC AUTO-ENTMCNC: 32.5 G/DL (ref 31.5–35.7)
MCV RBC AUTO: 87.6 FL (ref 79–97)
MONOCYTES # BLD AUTO: 0.57 10*3/MM3 (ref 0.1–0.9)
MONOCYTES NFR BLD AUTO: 7 % (ref 5–12)
NEUTROPHILS # BLD AUTO: 5.72 10*3/MM3 (ref 1.7–7)
NEUTROPHILS NFR BLD AUTO: 70.7 % (ref 42.7–76)
NRBC BLD AUTO-RTO: 0 /100 WBC (ref 0–0.2)
PLATELET # BLD AUTO: 211 10*3/MM3 (ref 140–450)
PMV BLD AUTO: 10.8 FL (ref 6–12)
POTASSIUM BLD-SCNC: 4.2 MMOL/L (ref 3.5–5.2)
RBC # BLD AUTO: 4.11 10*6/MM3 (ref 3.77–5.28)
SODIUM BLD-SCNC: 140 MMOL/L (ref 136–145)
WBC NRBC COR # BLD: 8.09 10*3/MM3 (ref 3.4–10.8)

## 2020-03-26 PROCEDURE — 82962 GLUCOSE BLOOD TEST: CPT

## 2020-03-26 PROCEDURE — 25010000002 ONDANSETRON PER 1 MG: Performed by: NURSE ANESTHETIST, CERTIFIED REGISTERED

## 2020-03-26 PROCEDURE — C1713 ANCHOR/SCREW BN/BN,TIS/BN: HCPCS | Performed by: ORTHOPAEDIC SURGERY

## 2020-03-26 PROCEDURE — 85025 COMPLETE CBC W/AUTO DIFF WBC: CPT | Performed by: INTERNAL MEDICINE

## 2020-03-26 PROCEDURE — 25010000002 FENTANYL CITRATE (PF) 100 MCG/2ML SOLUTION: Performed by: ANESTHESIOLOGY

## 2020-03-26 PROCEDURE — 85018 HEMOGLOBIN: CPT | Performed by: INTERNAL MEDICINE

## 2020-03-26 PROCEDURE — 25010000002 PROPOFOL 10 MG/ML EMULSION: Performed by: NURSE ANESTHETIST, CERTIFIED REGISTERED

## 2020-03-26 PROCEDURE — 73501 X-RAY EXAM HIP UNI 1 VIEW: CPT

## 2020-03-26 PROCEDURE — 25010000002 FENTANYL CITRATE (PF) 100 MCG/2ML SOLUTION: Performed by: NURSE ANESTHETIST, CERTIFIED REGISTERED

## 2020-03-26 PROCEDURE — 73502 X-RAY EXAM HIP UNI 2-3 VIEWS: CPT

## 2020-03-26 PROCEDURE — 2W6PXZZ TRACTION OF LEFT UPPER LEG: ICD-10-PCS | Performed by: ORTHOPAEDIC SURGERY

## 2020-03-26 PROCEDURE — 0QH706Z INSERTION OF INTRAMEDULLARY INTERNAL FIXATION DEVICE INTO LEFT UPPER FEMUR, OPEN APPROACH: ICD-10-PCS | Performed by: ORTHOPAEDIC SURGERY

## 2020-03-26 PROCEDURE — 25010000002 DEXAMETHASONE PER 1 MG: Performed by: NURSE ANESTHETIST, CERTIFIED REGISTERED

## 2020-03-26 PROCEDURE — 25010000003 CEFAZOLIN IN DEXTROSE 2-4 GM/100ML-% SOLUTION: Performed by: INTERNAL MEDICINE

## 2020-03-26 PROCEDURE — 80048 BASIC METABOLIC PNL TOTAL CA: CPT | Performed by: INTERNAL MEDICINE

## 2020-03-26 PROCEDURE — 0QS7XZZ REPOSITION LEFT UPPER FEMUR, EXTERNAL APPROACH: ICD-10-PCS | Performed by: ORTHOPAEDIC SURGERY

## 2020-03-26 PROCEDURE — 63710000001 INSULIN LISPRO (HUMAN) PER 5 UNITS: Performed by: ORTHOPAEDIC SURGERY

## 2020-03-26 PROCEDURE — 25010000002 SUCCINYLCHOLINE PER 20 MG: Performed by: NURSE ANESTHETIST, CERTIFIED REGISTERED

## 2020-03-26 PROCEDURE — 85014 HEMATOCRIT: CPT | Performed by: INTERNAL MEDICINE

## 2020-03-26 PROCEDURE — 25010000003 CEFAZOLIN IN DEXTROSE 2-4 GM/100ML-% SOLUTION: Performed by: ORTHOPAEDIC SURGERY

## 2020-03-26 PROCEDURE — 25010000002 MORPHINE PER 10 MG: Performed by: INTERNAL MEDICINE

## 2020-03-26 PROCEDURE — 25010000002 MIDAZOLAM PER 1 MG: Performed by: ANESTHESIOLOGY

## 2020-03-26 PROCEDURE — 76000 FLUOROSCOPY <1 HR PHYS/QHP: CPT

## 2020-03-26 DEVICE — TRIGEN LOW PROFILE SCREW 5.0MM X 30MM
Type: IMPLANTABLE DEVICE | Site: FEMUR | Status: FUNCTIONAL
Brand: TRIGEN

## 2020-03-26 DEVICE — TRIGEN INTERTAN 10S 10MM X 18CM 130DEGREE
Type: IMPLANTABLE DEVICE | Site: FEMUR | Status: FUNCTIONAL
Brand: TRIGEN

## 2020-03-26 DEVICE — INTERTAN LAG/COMPRESSION SCREW KIT                                    90MM / 85MM
Type: IMPLANTABLE DEVICE | Site: FEMUR | Status: FUNCTIONAL
Brand: TRIGEN

## 2020-03-26 RX ORDER — PROMETHAZINE HYDROCHLORIDE 25 MG/1
25 SUPPOSITORY RECTAL ONCE AS NEEDED
Status: DISCONTINUED | OUTPATIENT
Start: 2020-03-26 | End: 2020-03-26 | Stop reason: HOSPADM

## 2020-03-26 RX ORDER — FENTANYL CITRATE 50 UG/ML
50 INJECTION, SOLUTION INTRAMUSCULAR; INTRAVENOUS
Status: DISCONTINUED | OUTPATIENT
Start: 2020-03-26 | End: 2020-03-26 | Stop reason: HOSPADM

## 2020-03-26 RX ORDER — EPHEDRINE SULFATE 50 MG/ML
5 INJECTION, SOLUTION INTRAVENOUS ONCE AS NEEDED
Status: DISCONTINUED | OUTPATIENT
Start: 2020-03-26 | End: 2020-03-26 | Stop reason: HOSPADM

## 2020-03-26 RX ORDER — LISINOPRIL 20 MG/1
20 TABLET ORAL 2 TIMES DAILY
Status: DISCONTINUED | OUTPATIENT
Start: 2020-03-26 | End: 2020-03-30 | Stop reason: HOSPADM

## 2020-03-26 RX ORDER — LEVOTHYROXINE SODIUM 0.12 MG/1
125 TABLET ORAL
Status: DISCONTINUED | OUTPATIENT
Start: 2020-03-26 | End: 2020-03-30 | Stop reason: HOSPADM

## 2020-03-26 RX ORDER — CEFAZOLIN SODIUM 2 G/100ML
2 INJECTION, SOLUTION INTRAVENOUS EVERY 8 HOURS
Status: COMPLETED | OUTPATIENT
Start: 2020-03-26 | End: 2020-03-27

## 2020-03-26 RX ORDER — LIDOCAINE HYDROCHLORIDE 20 MG/ML
INJECTION, SOLUTION INFILTRATION; PERINEURAL AS NEEDED
Status: DISCONTINUED | OUTPATIENT
Start: 2020-03-26 | End: 2020-03-26 | Stop reason: SURG

## 2020-03-26 RX ORDER — BUPIVACAINE HYDROCHLORIDE AND EPINEPHRINE 5; 5 MG/ML; UG/ML
INJECTION, SOLUTION PERINEURAL AS NEEDED
Status: DISCONTINUED | OUTPATIENT
Start: 2020-03-26 | End: 2020-03-26 | Stop reason: HOSPADM

## 2020-03-26 RX ORDER — DILTIAZEM HYDROCHLORIDE 180 MG/1
180 CAPSULE, COATED, EXTENDED RELEASE ORAL 2 TIMES DAILY
Status: DISCONTINUED | OUTPATIENT
Start: 2020-03-26 | End: 2020-03-30 | Stop reason: HOSPADM

## 2020-03-26 RX ORDER — MIDAZOLAM HYDROCHLORIDE 1 MG/ML
1 INJECTION INTRAMUSCULAR; INTRAVENOUS
Status: DISCONTINUED | OUTPATIENT
Start: 2020-03-26 | End: 2020-03-26 | Stop reason: HOSPADM

## 2020-03-26 RX ORDER — NALOXONE HCL 0.4 MG/ML
0.2 VIAL (ML) INJECTION AS NEEDED
Status: DISCONTINUED | OUTPATIENT
Start: 2020-03-26 | End: 2020-03-26 | Stop reason: HOSPADM

## 2020-03-26 RX ORDER — OXYCODONE AND ACETAMINOPHEN 7.5; 325 MG/1; MG/1
1 TABLET ORAL ONCE AS NEEDED
Status: DISCONTINUED | OUTPATIENT
Start: 2020-03-26 | End: 2020-03-26 | Stop reason: HOSPADM

## 2020-03-26 RX ORDER — EPHEDRINE SULFATE 50 MG/ML
INJECTION, SOLUTION INTRAVENOUS AS NEEDED
Status: DISCONTINUED | OUTPATIENT
Start: 2020-03-26 | End: 2020-03-26 | Stop reason: SURG

## 2020-03-26 RX ORDER — FLUMAZENIL 0.1 MG/ML
0.2 INJECTION INTRAVENOUS AS NEEDED
Status: DISCONTINUED | OUTPATIENT
Start: 2020-03-26 | End: 2020-03-26 | Stop reason: HOSPADM

## 2020-03-26 RX ORDER — HYDROMORPHONE HYDROCHLORIDE 1 MG/ML
0.5 INJECTION, SOLUTION INTRAMUSCULAR; INTRAVENOUS; SUBCUTANEOUS
Status: DISCONTINUED | OUTPATIENT
Start: 2020-03-26 | End: 2020-03-26 | Stop reason: HOSPADM

## 2020-03-26 RX ORDER — PROPRANOLOL HYDROCHLORIDE 40 MG/1
40 TABLET ORAL 2 TIMES DAILY
Status: DISCONTINUED | OUTPATIENT
Start: 2020-03-26 | End: 2020-03-30 | Stop reason: HOSPADM

## 2020-03-26 RX ORDER — PROMETHAZINE HYDROCHLORIDE 25 MG/1
25 TABLET ORAL ONCE AS NEEDED
Status: DISCONTINUED | OUTPATIENT
Start: 2020-03-26 | End: 2020-03-26 | Stop reason: HOSPADM

## 2020-03-26 RX ORDER — ACETAMINOPHEN 325 MG/1
650 TABLET ORAL ONCE AS NEEDED
Status: DISCONTINUED | OUTPATIENT
Start: 2020-03-26 | End: 2020-03-26 | Stop reason: HOSPADM

## 2020-03-26 RX ORDER — ONDANSETRON 2 MG/ML
4 INJECTION INTRAMUSCULAR; INTRAVENOUS ONCE AS NEEDED
Status: DISCONTINUED | OUTPATIENT
Start: 2020-03-26 | End: 2020-03-26 | Stop reason: HOSPADM

## 2020-03-26 RX ORDER — SODIUM CHLORIDE 0.9 % (FLUSH) 0.9 %
3 SYRINGE (ML) INJECTION EVERY 12 HOURS SCHEDULED
Status: DISCONTINUED | OUTPATIENT
Start: 2020-03-26 | End: 2020-03-26 | Stop reason: HOSPADM

## 2020-03-26 RX ORDER — MAGNESIUM HYDROXIDE 1200 MG/15ML
LIQUID ORAL AS NEEDED
Status: DISCONTINUED | OUTPATIENT
Start: 2020-03-26 | End: 2020-03-26 | Stop reason: HOSPADM

## 2020-03-26 RX ORDER — LABETALOL HYDROCHLORIDE 5 MG/ML
5 INJECTION, SOLUTION INTRAVENOUS
Status: DISCONTINUED | OUTPATIENT
Start: 2020-03-26 | End: 2020-03-26 | Stop reason: HOSPADM

## 2020-03-26 RX ORDER — PROMETHAZINE HYDROCHLORIDE 25 MG/ML
6.25 INJECTION, SOLUTION INTRAMUSCULAR; INTRAVENOUS
Status: DISCONTINUED | OUTPATIENT
Start: 2020-03-26 | End: 2020-03-26 | Stop reason: HOSPADM

## 2020-03-26 RX ORDER — ROCURONIUM BROMIDE 10 MG/ML
INJECTION, SOLUTION INTRAVENOUS AS NEEDED
Status: DISCONTINUED | OUTPATIENT
Start: 2020-03-26 | End: 2020-03-26 | Stop reason: SURG

## 2020-03-26 RX ORDER — FAMOTIDINE 10 MG/ML
20 INJECTION, SOLUTION INTRAVENOUS ONCE
Status: COMPLETED | OUTPATIENT
Start: 2020-03-26 | End: 2020-03-26

## 2020-03-26 RX ORDER — HYDROCODONE BITARTRATE AND ACETAMINOPHEN 5; 325 MG/1; MG/1
1 TABLET ORAL EVERY 6 HOURS PRN
Status: DISCONTINUED | OUTPATIENT
Start: 2020-03-26 | End: 2020-03-30 | Stop reason: HOSPADM

## 2020-03-26 RX ORDER — FENTANYL CITRATE 50 UG/ML
INJECTION, SOLUTION INTRAMUSCULAR; INTRAVENOUS AS NEEDED
Status: DISCONTINUED | OUTPATIENT
Start: 2020-03-26 | End: 2020-03-26 | Stop reason: SURG

## 2020-03-26 RX ORDER — SODIUM CHLORIDE 0.9 % (FLUSH) 0.9 %
3-10 SYRINGE (ML) INJECTION AS NEEDED
Status: DISCONTINUED | OUTPATIENT
Start: 2020-03-26 | End: 2020-03-26 | Stop reason: HOSPADM

## 2020-03-26 RX ORDER — DEXAMETHASONE SODIUM PHOSPHATE 10 MG/ML
INJECTION INTRAMUSCULAR; INTRAVENOUS AS NEEDED
Status: DISCONTINUED | OUTPATIENT
Start: 2020-03-26 | End: 2020-03-26 | Stop reason: SURG

## 2020-03-26 RX ORDER — BUPIVACAINE HYDROCHLORIDE AND EPINEPHRINE 2.5; 5 MG/ML; UG/ML
INJECTION, SOLUTION EPIDURAL; INFILTRATION; INTRACAUDAL; PERINEURAL
Status: COMPLETED | OUTPATIENT
Start: 2020-03-26 | End: 2020-03-26

## 2020-03-26 RX ORDER — SUCCINYLCHOLINE CHLORIDE 20 MG/ML
INJECTION INTRAMUSCULAR; INTRAVENOUS AS NEEDED
Status: DISCONTINUED | OUTPATIENT
Start: 2020-03-26 | End: 2020-03-26 | Stop reason: SURG

## 2020-03-26 RX ORDER — DIPHENHYDRAMINE HYDROCHLORIDE 50 MG/ML
12.5 INJECTION INTRAMUSCULAR; INTRAVENOUS
Status: DISCONTINUED | OUTPATIENT
Start: 2020-03-26 | End: 2020-03-26 | Stop reason: HOSPADM

## 2020-03-26 RX ORDER — HYDRALAZINE HYDROCHLORIDE 20 MG/ML
5 INJECTION INTRAMUSCULAR; INTRAVENOUS
Status: DISCONTINUED | OUTPATIENT
Start: 2020-03-26 | End: 2020-03-26 | Stop reason: HOSPADM

## 2020-03-26 RX ORDER — ASPIRIN 325 MG
325 TABLET ORAL DAILY
Status: DISCONTINUED | OUTPATIENT
Start: 2020-03-27 | End: 2020-03-30 | Stop reason: HOSPADM

## 2020-03-26 RX ORDER — PROPOFOL 10 MG/ML
VIAL (ML) INTRAVENOUS AS NEEDED
Status: DISCONTINUED | OUTPATIENT
Start: 2020-03-26 | End: 2020-03-26 | Stop reason: SURG

## 2020-03-26 RX ORDER — PANTOPRAZOLE SODIUM 40 MG/1
40 TABLET, DELAYED RELEASE ORAL 2 TIMES DAILY WITH MEALS
Status: DISCONTINUED | OUTPATIENT
Start: 2020-03-26 | End: 2020-03-30 | Stop reason: HOSPADM

## 2020-03-26 RX ORDER — ONDANSETRON 2 MG/ML
INJECTION INTRAMUSCULAR; INTRAVENOUS AS NEEDED
Status: DISCONTINUED | OUTPATIENT
Start: 2020-03-26 | End: 2020-03-26 | Stop reason: SURG

## 2020-03-26 RX ORDER — HYDROCODONE BITARTRATE AND ACETAMINOPHEN 7.5; 325 MG/1; MG/1
1 TABLET ORAL ONCE AS NEEDED
Status: DISCONTINUED | OUTPATIENT
Start: 2020-03-26 | End: 2020-03-26 | Stop reason: HOSPADM

## 2020-03-26 RX ORDER — SODIUM CHLORIDE, SODIUM LACTATE, POTASSIUM CHLORIDE, CALCIUM CHLORIDE 600; 310; 30; 20 MG/100ML; MG/100ML; MG/100ML; MG/100ML
9 INJECTION, SOLUTION INTRAVENOUS CONTINUOUS
Status: DISCONTINUED | OUTPATIENT
Start: 2020-03-26 | End: 2020-03-28

## 2020-03-26 RX ORDER — PROMETHAZINE HYDROCHLORIDE 25 MG/ML
12.5 INJECTION, SOLUTION INTRAMUSCULAR; INTRAVENOUS ONCE AS NEEDED
Status: DISCONTINUED | OUTPATIENT
Start: 2020-03-26 | End: 2020-03-26 | Stop reason: HOSPADM

## 2020-03-26 RX ORDER — MIDAZOLAM HYDROCHLORIDE 1 MG/ML
2 INJECTION INTRAMUSCULAR; INTRAVENOUS
Status: DISCONTINUED | OUTPATIENT
Start: 2020-03-26 | End: 2020-03-26 | Stop reason: HOSPADM

## 2020-03-26 RX ORDER — LIDOCAINE HYDROCHLORIDE 10 MG/ML
0.5 INJECTION, SOLUTION EPIDURAL; INFILTRATION; INTRACAUDAL; PERINEURAL ONCE AS NEEDED
Status: DISCONTINUED | OUTPATIENT
Start: 2020-03-26 | End: 2020-03-26 | Stop reason: HOSPADM

## 2020-03-26 RX ORDER — DIPHENHYDRAMINE HCL 25 MG
25 CAPSULE ORAL
Status: DISCONTINUED | OUTPATIENT
Start: 2020-03-26 | End: 2020-03-26 | Stop reason: HOSPADM

## 2020-03-26 RX ADMIN — FENTANYL CITRATE 50 MCG: 50 INJECTION INTRAMUSCULAR; INTRAVENOUS at 08:14

## 2020-03-26 RX ADMIN — DILTIAZEM HYDROCHLORIDE 180 MG: 180 CAPSULE, COATED, EXTENDED RELEASE ORAL at 21:14

## 2020-03-26 RX ADMIN — LISINOPRIL 20 MG: 20 TABLET ORAL at 02:22

## 2020-03-26 RX ADMIN — SODIUM CHLORIDE, POTASSIUM CHLORIDE, SODIUM LACTATE AND CALCIUM CHLORIDE 9 ML/HR: 600; 310; 30; 20 INJECTION, SOLUTION INTRAVENOUS at 07:54

## 2020-03-26 RX ADMIN — HYDROCODONE BITARTRATE AND ACETAMINOPHEN 1 TABLET: 5; 325 TABLET ORAL at 06:35

## 2020-03-26 RX ADMIN — PANTOPRAZOLE SODIUM 40 MG: 40 TABLET, DELAYED RELEASE ORAL at 17:51

## 2020-03-26 RX ADMIN — FAMOTIDINE 20 MG: 10 INJECTION, SOLUTION INTRAVENOUS at 07:54

## 2020-03-26 RX ADMIN — HYDROCODONE BITARTRATE AND ACETAMINOPHEN 1 TABLET: 5; 325 TABLET ORAL at 00:35

## 2020-03-26 RX ADMIN — SUCCINYLCHOLINE CHLORIDE 150 MG: 20 INJECTION, SOLUTION INTRAMUSCULAR; INTRAVENOUS; PARENTERAL at 08:14

## 2020-03-26 RX ADMIN — EPHEDRINE SULFATE 10 MG: 50 INJECTION INTRAVENOUS at 08:27

## 2020-03-26 RX ADMIN — SODIUM CHLORIDE, PRESERVATIVE FREE 10 ML: 5 INJECTION INTRAVENOUS at 21:15

## 2020-03-26 RX ADMIN — CEFAZOLIN SODIUM 2 G: 2 INJECTION, SOLUTION INTRAVENOUS at 08:08

## 2020-03-26 RX ADMIN — LIDOCAINE HYDROCHLORIDE 60 MG: 20 INJECTION, SOLUTION INFILTRATION; PERINEURAL at 08:14

## 2020-03-26 RX ADMIN — CALCIUM CARBONATE-VITAMIN D TAB 500 MG-200 UNIT 500 MG: 500-200 TAB at 00:35

## 2020-03-26 RX ADMIN — EPHEDRINE SULFATE 10 MG: 50 INJECTION INTRAVENOUS at 08:30

## 2020-03-26 RX ADMIN — DILTIAZEM HYDROCHLORIDE 180 MG: 180 CAPSULE, COATED, EXTENDED RELEASE ORAL at 02:22

## 2020-03-26 RX ADMIN — CALCIUM CARBONATE-VITAMIN D TAB 500 MG-200 UNIT 500 MG: 500-200 TAB at 21:13

## 2020-03-26 RX ADMIN — FENTANYL CITRATE 50 MCG: 50 INJECTION, SOLUTION INTRAMUSCULAR; INTRAVENOUS at 07:44

## 2020-03-26 RX ADMIN — PROPRANOLOL HYDROCHLORIDE 40 MG: 40 TABLET ORAL at 02:23

## 2020-03-26 RX ADMIN — ONDANSETRON HYDROCHLORIDE 4 MG: 2 SOLUTION INTRAMUSCULAR; INTRAVENOUS at 08:46

## 2020-03-26 RX ADMIN — PROPRANOLOL HYDROCHLORIDE 40 MG: 40 TABLET ORAL at 21:59

## 2020-03-26 RX ADMIN — BUPIVACAINE HYDROCHLORIDE AND EPINEPHRINE 50 ML: 2.5; 5 INJECTION, SOLUTION EPIDURAL; INFILTRATION; INTRACAUDAL; PERINEURAL at 07:56

## 2020-03-26 RX ADMIN — DEXAMETHASONE SODIUM PHOSPHATE 8 MG: 10 INJECTION INTRAMUSCULAR; INTRAVENOUS at 08:19

## 2020-03-26 RX ADMIN — HYDROCODONE BITARTRATE AND ACETAMINOPHEN 1 TABLET: 5; 325 TABLET ORAL at 16:30

## 2020-03-26 RX ADMIN — FENTANYL CITRATE 50 MCG: 50 INJECTION INTRAMUSCULAR; INTRAVENOUS at 08:38

## 2020-03-26 RX ADMIN — MORPHINE SULFATE 2 MG: 2 INJECTION, SOLUTION INTRAMUSCULAR; INTRAVENOUS at 04:54

## 2020-03-26 RX ADMIN — HYDROCODONE BITARTRATE AND ACETAMINOPHEN 1 TABLET: 5; 325 TABLET ORAL at 21:14

## 2020-03-26 RX ADMIN — ROCURONIUM BROMIDE 5 MG: 10 INJECTION, SOLUTION INTRAVENOUS at 08:14

## 2020-03-26 RX ADMIN — MIDAZOLAM 1 MG: 1 INJECTION INTRAMUSCULAR; INTRAVENOUS at 07:53

## 2020-03-26 RX ADMIN — LISINOPRIL 20 MG: 20 TABLET ORAL at 21:59

## 2020-03-26 RX ADMIN — INSULIN LISPRO 3 UNITS: 100 INJECTION, SOLUTION INTRAVENOUS; SUBCUTANEOUS at 17:51

## 2020-03-26 RX ADMIN — LEVOTHYROXINE SODIUM 125 MCG: 125 TABLET ORAL at 06:36

## 2020-03-26 RX ADMIN — CEFAZOLIN SODIUM 2 G: 2 INJECTION, SOLUTION INTRAVENOUS at 16:31

## 2020-03-26 RX ADMIN — INSULIN LISPRO 3 UNITS: 100 INJECTION, SOLUTION INTRAVENOUS; SUBCUTANEOUS at 21:13

## 2020-03-26 RX ADMIN — PROPOFOL 100 MG: 10 INJECTION, EMULSION INTRAVENOUS at 08:14

## 2020-03-26 NOTE — ANESTHESIA POSTPROCEDURE EVALUATION
"Patient: Liana Carrero    Procedure Summary     Date:  03/26/20 Room / Location:  [facility] OR [address] [place] [address] MAIN OR    Anesthesia Start:  0805 Anesthesia Stop:  0909    Procedure:  LT.HIP NAILING/RODDING (Left Thigh) Diagnosis:      Surgeon:  Carlos Khan II, MD Provider:  Art Viera MD    Anesthesia Type:  general ASA Status:  3          Anesthesia Type: general    Vitals  Vitals Value Taken Time   /70 3/26/2020  9:45 AM   Temp 36.4 °C (97.5 °F) 3/26/2020  9:06 AM   Pulse 57 3/26/2020  9:53 AM   Resp 14 3/26/2020  9:30 AM   SpO2 100 % 3/26/2020  9:53 AM   Vitals shown include unvalidated device data.        Post Anesthesia Care and Evaluation    Patient location during evaluation: bedside  Patient participation: complete - patient participated  Level of consciousness: awake and alert  Pain score: 0  Pain management: adequate  Airway patency: patent  Anesthetic complications: No anesthetic complications    Cardiovascular status: acceptable  Respiratory status: acceptable  Hydration status: acceptable    Comments: /67   Pulse 58   Temp 36.4 °C (97.5 °F) (Oral)   Resp 14   Ht 165.1 cm (65\")   Wt 75 kg (165 lb 5.5 oz)   LMP  (LMP Unknown)   SpO2 100%   BMI 27.51 kg/m²       "

## 2020-03-26 NOTE — ANESTHESIA PREPROCEDURE EVALUATION
Anesthesia Evaluation     history of anesthetic complications:  NPO Solid Status: > 6 hours             Airway   Mallampati: III  TM distance: >3 FB  Neck ROM: limited  No difficulty expected  Dental    (+) partials    Pulmonary - normal exam   (+) sleep apnea,   (-) not a smoker  Cardiovascular - normal exam    ECG reviewed    (+) hypertension, CABG, cardiac stents   (-) angina    ROS comment: 1. Severe native vessel coronary artery disease with 100% proximal LAD, 100% proximal right coronary artery, and luminal irregularities throughout the circumflex with a focal 50% stenoses in the mid marginal.    2. Graft anatomy is patent with saphenous vein graft to distal RCA, LIMA to diagonal, and radial artery to mid LAD.  There is a 50 to 60% stenoses of the radial artery graft to the mid LAD in an area which likely contains 2 layers of stent but is maintaining DUY-3 flow.  3. Normal left ventricular filling pressures.       Neuro/Psych  (+) CVA,     GI/Hepatic/Renal/Endo    (+)  GERD,  diabetes mellitus,     Musculoskeletal     Abdominal    Substance History      OB/GYN          Other                        Anesthesia Plan    ASA 3     general   (LEFT FIC Block for P O Pain Control)    Anesthetic plan, all risks, benefits, and alternatives have been provided, discussed and informed consent has been obtained with: patient.

## 2020-03-26 NOTE — ANESTHESIA PROCEDURE NOTES
Airway  Procedure urgency: urgent.    Date/Time: 3/26/2020 8:17 AM  Airway not difficult    General Information and Staff    Patient location during procedure: OR  Anesthesiologist: Art Viera MD  CRNA: Flower Watson CRNA    Indications and Patient Condition  Indications for airway management: airway protection    Preoxygenated: yes  MILS not maintained throughout  Mask difficulty assessment: 0 - not attempted    Final Airway Details  Final airway type: endotracheal airway      Successful airway: ETT  Cuffed: yes   Successful intubation technique: direct laryngoscopy  Facilitating devices/methods: SGA  Endotracheal tube insertion site: oral  Blade: Melanie  Blade size: 3  ETT size (mm): 7.0  Cormack-Lehane Classification: grade I - full view of glottis  Placement verified by: chest auscultation and capnometry   Measured from: lips  ETT/EBT  to lips (cm): 21  Number of attempts at approach: 1  Assessment: lips, teeth, and gum same as pre-op and atraumatic intubation

## 2020-03-26 NOTE — ANESTHESIA PROCEDURE NOTES
Peripheral Block      Patient reassessed immediately prior to procedure    Patient location during procedure: holding area  Start time: 3/26/2020 7:44 AM  Stop time: 3/26/2020 7:55 AM  Reason for block: at surgeon's request and post-op pain management  Performed by  Anesthesiologist: Art Viera MD  Preanesthetic Checklist  Completed: patient identified, site marked, surgical consent, pre-op evaluation, timeout performed, IV checked, risks and benefits discussed and monitors and equipment checked  Prep:  Pt Position: supine  Sterile barriers:cap and gloves  Prep: ChloraPrep  Patient monitoring: blood pressure monitoring, continuous pulse oximetry and EKG  Procedure  Sedation:yes    Guidance:ultrasound guided  ULTRASOUND INTERPRETATION. Using ultrasound guidance a gauge needle was placed in close proximity to the femoral nerve, at which point, under ultrasound guidance anesthetic was injected in the area of the nerve and spread of the anesthesia was seen on ultrasound in close proximity thereto.  There were no abnormalities seen on ultrasound; a digital image was taken; and the patient tolerated the procedure with no complications. Images:still images obtained, printed/placed on chart    Laterality:left  Block Type:fascia iliaca compartment  Needle Gauge:21 G      Medications Used: bupivacaine-EPINEPHrine PF (MARCAINE w/EPI) 0.25% -[id number] injection, 50 mL  Med admintered at 3/26/2020 7:56 AM      Post Assessment  Injection Assessment: negative aspiration for heme, no paresthesia on injection and incremental injection  Patient Tolerance:comfortable throughout block  Complications:no

## 2020-03-27 LAB
ANION GAP SERPL CALCULATED.3IONS-SCNC: 12.8 MMOL/L (ref 5–15)
BASOPHILS # BLD AUTO: 0.01 10*3/MM3 (ref 0–0.2)
BASOPHILS NFR BLD AUTO: 0.1 % (ref 0–1.5)
BUN BLD-MCNC: 14 MG/DL (ref 8–23)
BUN/CREAT SERPL: 17.3 (ref 7–25)
CALCIUM SPEC-SCNC: 9.5 MG/DL (ref 8.6–10.5)
CHLORIDE SERPL-SCNC: 96 MMOL/L (ref 98–107)
CO2 SERPL-SCNC: 28.2 MMOL/L (ref 22–29)
CREAT BLD-MCNC: 0.81 MG/DL (ref 0.57–1)
DEPRECATED RDW RBC AUTO: 45 FL (ref 37–54)
EOSINOPHIL # BLD AUTO: 0 10*3/MM3 (ref 0–0.4)
EOSINOPHIL NFR BLD AUTO: 0 % (ref 0.3–6.2)
ERYTHROCYTE [DISTWIDTH] IN BLOOD BY AUTOMATED COUNT: 14.3 % (ref 12.3–15.4)
GFR SERPL CREATININE-BSD FRML MDRD: 68 ML/MIN/1.73
GLUCOSE BLD-MCNC: 181 MG/DL (ref 65–99)
GLUCOSE BLDC GLUCOMTR-MCNC: 188 MG/DL (ref 70–130)
GLUCOSE BLDC GLUCOMTR-MCNC: 219 MG/DL (ref 70–130)
GLUCOSE BLDC GLUCOMTR-MCNC: 223 MG/DL (ref 70–130)
GLUCOSE BLDC GLUCOMTR-MCNC: 261 MG/DL (ref 70–130)
HCT VFR BLD AUTO: 26 % (ref 34–46.6)
HGB BLD-MCNC: 8.6 G/DL (ref 12–15.9)
IMM GRANULOCYTES # BLD AUTO: 0.04 10*3/MM3 (ref 0–0.05)
IMM GRANULOCYTES NFR BLD AUTO: 0.4 % (ref 0–0.5)
LYMPHOCYTES # BLD AUTO: 1.27 10*3/MM3 (ref 0.7–3.1)
LYMPHOCYTES NFR BLD AUTO: 12.2 % (ref 19.6–45.3)
MCH RBC QN AUTO: 29.3 PG (ref 26.6–33)
MCHC RBC AUTO-ENTMCNC: 33.1 G/DL (ref 31.5–35.7)
MCV RBC AUTO: 88.4 FL (ref 79–97)
MONOCYTES # BLD AUTO: 0.68 10*3/MM3 (ref 0.1–0.9)
MONOCYTES NFR BLD AUTO: 6.5 % (ref 5–12)
NEUTROPHILS # BLD AUTO: 8.43 10*3/MM3 (ref 1.7–7)
NEUTROPHILS NFR BLD AUTO: 80.8 % (ref 42.7–76)
NRBC BLD AUTO-RTO: 0.1 /100 WBC (ref 0–0.2)
PLATELET # BLD AUTO: 216 10*3/MM3 (ref 140–450)
PMV BLD AUTO: 10.9 FL (ref 6–12)
POTASSIUM BLD-SCNC: 4.5 MMOL/L (ref 3.5–5.2)
RBC # BLD AUTO: 2.94 10*6/MM3 (ref 3.77–5.28)
SODIUM BLD-SCNC: 137 MMOL/L (ref 136–145)
WBC NRBC COR # BLD: 10.43 10*3/MM3 (ref 3.4–10.8)

## 2020-03-27 PROCEDURE — 25010000003 CEFAZOLIN IN DEXTROSE 2-4 GM/100ML-% SOLUTION: Performed by: ORTHOPAEDIC SURGERY

## 2020-03-27 PROCEDURE — 63710000001 INSULIN LISPRO (HUMAN) PER 5 UNITS: Performed by: ORTHOPAEDIC SURGERY

## 2020-03-27 PROCEDURE — 82962 GLUCOSE BLOOD TEST: CPT

## 2020-03-27 PROCEDURE — 80048 BASIC METABOLIC PNL TOTAL CA: CPT | Performed by: ORTHOPAEDIC SURGERY

## 2020-03-27 PROCEDURE — 97110 THERAPEUTIC EXERCISES: CPT

## 2020-03-27 PROCEDURE — 85025 COMPLETE CBC W/AUTO DIFF WBC: CPT | Performed by: ORTHOPAEDIC SURGERY

## 2020-03-27 PROCEDURE — 97162 PT EVAL MOD COMPLEX 30 MIN: CPT

## 2020-03-27 RX ADMIN — HYDROCODONE BITARTRATE AND ACETAMINOPHEN 1 TABLET: 5; 325 TABLET ORAL at 20:09

## 2020-03-27 RX ADMIN — DILTIAZEM HYDROCHLORIDE 180 MG: 180 CAPSULE, COATED, EXTENDED RELEASE ORAL at 08:13

## 2020-03-27 RX ADMIN — CEFAZOLIN SODIUM 2 G: 2 INJECTION, SOLUTION INTRAVENOUS at 00:04

## 2020-03-27 RX ADMIN — CALCIUM CARBONATE-VITAMIN D TAB 500 MG-200 UNIT 500 MG: 500-200 TAB at 20:09

## 2020-03-27 RX ADMIN — SODIUM CHLORIDE, PRESERVATIVE FREE 10 ML: 5 INJECTION INTRAVENOUS at 21:27

## 2020-03-27 RX ADMIN — PROPRANOLOL HYDROCHLORIDE 40 MG: 40 TABLET ORAL at 21:26

## 2020-03-27 RX ADMIN — HYDROCODONE BITARTRATE AND ACETAMINOPHEN 1 TABLET: 5; 325 TABLET ORAL at 14:33

## 2020-03-27 RX ADMIN — LISINOPRIL 20 MG: 20 TABLET ORAL at 21:26

## 2020-03-27 RX ADMIN — PROPRANOLOL HYDROCHLORIDE 40 MG: 40 TABLET ORAL at 08:13

## 2020-03-27 RX ADMIN — INSULIN LISPRO 2 UNITS: 100 INJECTION, SOLUTION INTRAVENOUS; SUBCUTANEOUS at 08:12

## 2020-03-27 RX ADMIN — PANTOPRAZOLE SODIUM 40 MG: 40 TABLET, DELAYED RELEASE ORAL at 17:47

## 2020-03-27 RX ADMIN — INSULIN LISPRO 4 UNITS: 100 INJECTION, SOLUTION INTRAVENOUS; SUBCUTANEOUS at 17:47

## 2020-03-27 RX ADMIN — PANTOPRAZOLE SODIUM 40 MG: 40 TABLET, DELAYED RELEASE ORAL at 08:13

## 2020-03-27 RX ADMIN — SODIUM CHLORIDE, PRESERVATIVE FREE 10 ML: 5 INJECTION INTRAVENOUS at 08:14

## 2020-03-27 RX ADMIN — DEXTROMETHORPHAN HYDROBROMIDE AND QUINIDINE SULFATE 1 CAPSULE: 20; 10 CAPSULE, GELATIN COATED ORAL at 08:13

## 2020-03-27 RX ADMIN — HYDROCODONE BITARTRATE AND ACETAMINOPHEN 1 TABLET: 5; 325 TABLET ORAL at 08:39

## 2020-03-27 RX ADMIN — LEVOTHYROXINE SODIUM 125 MCG: 125 TABLET ORAL at 06:25

## 2020-03-27 RX ADMIN — LISINOPRIL 20 MG: 20 TABLET ORAL at 08:13

## 2020-03-27 RX ADMIN — DILTIAZEM HYDROCHLORIDE 180 MG: 180 CAPSULE, COATED, EXTENDED RELEASE ORAL at 20:09

## 2020-03-27 RX ADMIN — INSULIN LISPRO 3 UNITS: 100 INJECTION, SOLUTION INTRAVENOUS; SUBCUTANEOUS at 21:26

## 2020-03-27 RX ADMIN — CALCIUM CARBONATE-VITAMIN D TAB 500 MG-200 UNIT 500 MG: 500-200 TAB at 08:13

## 2020-03-27 RX ADMIN — CEFAZOLIN SODIUM 2 G: 2 INJECTION, SOLUTION INTRAVENOUS at 08:13

## 2020-03-27 RX ADMIN — DOCUSATE SODIUM 100 MG: 100 CAPSULE, LIQUID FILLED ORAL at 08:39

## 2020-03-27 RX ADMIN — ASPIRIN 325 MG: 325 TABLET ORAL at 08:13

## 2020-03-27 RX ADMIN — INSULIN LISPRO 3 UNITS: 100 INJECTION, SOLUTION INTRAVENOUS; SUBCUTANEOUS at 12:25

## 2020-03-28 PROBLEM — R73.02 IMPAIRED GLUCOSE TOLERANCE: Status: ACTIVE | Noted: 2020-03-28

## 2020-03-28 LAB
ANION GAP SERPL CALCULATED.3IONS-SCNC: 8.5 MMOL/L (ref 5–15)
BASOPHILS # BLD AUTO: 0.04 10*3/MM3 (ref 0–0.2)
BASOPHILS NFR BLD AUTO: 0.4 % (ref 0–1.5)
BUN BLD-MCNC: 20 MG/DL (ref 8–23)
BUN/CREAT SERPL: 24.4 (ref 7–25)
CALCIUM SPEC-SCNC: 9 MG/DL (ref 8.6–10.5)
CHLORIDE SERPL-SCNC: 99 MMOL/L (ref 98–107)
CO2 SERPL-SCNC: 29.5 MMOL/L (ref 22–29)
CREAT BLD-MCNC: 0.82 MG/DL (ref 0.57–1)
DEPRECATED RDW RBC AUTO: 46.5 FL (ref 37–54)
EOSINOPHIL # BLD AUTO: 0.1 10*3/MM3 (ref 0–0.4)
EOSINOPHIL NFR BLD AUTO: 1 % (ref 0.3–6.2)
ERYTHROCYTE [DISTWIDTH] IN BLOOD BY AUTOMATED COUNT: 14.5 % (ref 12.3–15.4)
GFR SERPL CREATININE-BSD FRML MDRD: 67 ML/MIN/1.73
GLUCOSE BLD-MCNC: 171 MG/DL (ref 65–99)
GLUCOSE BLDC GLUCOMTR-MCNC: 181 MG/DL (ref 70–130)
GLUCOSE BLDC GLUCOMTR-MCNC: 210 MG/DL (ref 70–130)
GLUCOSE BLDC GLUCOMTR-MCNC: 233 MG/DL (ref 70–130)
GLUCOSE BLDC GLUCOMTR-MCNC: 271 MG/DL (ref 70–130)
HCT VFR BLD AUTO: 23.1 % (ref 34–46.6)
HGB BLD-MCNC: 7.6 G/DL (ref 12–15.9)
IMM GRANULOCYTES # BLD AUTO: 0.05 10*3/MM3 (ref 0–0.05)
IMM GRANULOCYTES NFR BLD AUTO: 0.5 % (ref 0–0.5)
LYMPHOCYTES # BLD AUTO: 2.48 10*3/MM3 (ref 0.7–3.1)
LYMPHOCYTES NFR BLD AUTO: 25.8 % (ref 19.6–45.3)
MCH RBC QN AUTO: 29.2 PG (ref 26.6–33)
MCHC RBC AUTO-ENTMCNC: 32.9 G/DL (ref 31.5–35.7)
MCV RBC AUTO: 88.8 FL (ref 79–97)
MONOCYTES # BLD AUTO: 0.82 10*3/MM3 (ref 0.1–0.9)
MONOCYTES NFR BLD AUTO: 8.5 % (ref 5–12)
NEUTROPHILS # BLD AUTO: 6.14 10*3/MM3 (ref 1.7–7)
NEUTROPHILS NFR BLD AUTO: 63.8 % (ref 42.7–76)
NRBC BLD AUTO-RTO: 0.1 /100 WBC (ref 0–0.2)
PLATELET # BLD AUTO: 191 10*3/MM3 (ref 140–450)
PMV BLD AUTO: 11.2 FL (ref 6–12)
POTASSIUM BLD-SCNC: 4 MMOL/L (ref 3.5–5.2)
RBC # BLD AUTO: 2.6 10*6/MM3 (ref 3.77–5.28)
SODIUM BLD-SCNC: 137 MMOL/L (ref 136–145)
WBC NRBC COR # BLD: 9.63 10*3/MM3 (ref 3.4–10.8)

## 2020-03-28 PROCEDURE — 82962 GLUCOSE BLOOD TEST: CPT

## 2020-03-28 PROCEDURE — 86923 COMPATIBILITY TEST ELECTRIC: CPT

## 2020-03-28 PROCEDURE — 97110 THERAPEUTIC EXERCISES: CPT

## 2020-03-28 PROCEDURE — 80048 BASIC METABOLIC PNL TOTAL CA: CPT | Performed by: ORTHOPAEDIC SURGERY

## 2020-03-28 PROCEDURE — 86901 BLOOD TYPING SEROLOGIC RH(D): CPT

## 2020-03-28 PROCEDURE — 86900 BLOOD TYPING SEROLOGIC ABO: CPT

## 2020-03-28 PROCEDURE — 25010000002 ONDANSETRON PER 1 MG: Performed by: ORTHOPAEDIC SURGERY

## 2020-03-28 PROCEDURE — 36430 TRANSFUSION BLD/BLD COMPNT: CPT

## 2020-03-28 PROCEDURE — 94799 UNLISTED PULMONARY SVC/PX: CPT

## 2020-03-28 PROCEDURE — 63710000001 INSULIN LISPRO (HUMAN) PER 5 UNITS: Performed by: ORTHOPAEDIC SURGERY

## 2020-03-28 PROCEDURE — P9016 RBC LEUKOCYTES REDUCED: HCPCS

## 2020-03-28 PROCEDURE — 85025 COMPLETE CBC W/AUTO DIFF WBC: CPT | Performed by: ORTHOPAEDIC SURGERY

## 2020-03-28 RX ADMIN — INSULIN LISPRO 2 UNITS: 100 INJECTION, SOLUTION INTRAVENOUS; SUBCUTANEOUS at 08:06

## 2020-03-28 RX ADMIN — DILTIAZEM HYDROCHLORIDE 180 MG: 180 CAPSULE, COATED, EXTENDED RELEASE ORAL at 09:37

## 2020-03-28 RX ADMIN — HYDROCODONE BITARTRATE AND ACETAMINOPHEN 1 TABLET: 5; 325 TABLET ORAL at 21:15

## 2020-03-28 RX ADMIN — HYDROCODONE BITARTRATE AND ACETAMINOPHEN 1 TABLET: 5; 325 TABLET ORAL at 01:47

## 2020-03-28 RX ADMIN — ONDANSETRON 4 MG: 2 INJECTION INTRAMUSCULAR; INTRAVENOUS at 18:30

## 2020-03-28 RX ADMIN — PANTOPRAZOLE SODIUM 40 MG: 40 TABLET, DELAYED RELEASE ORAL at 17:18

## 2020-03-28 RX ADMIN — CALCIUM CARBONATE-VITAMIN D TAB 500 MG-200 UNIT 500 MG: 500-200 TAB at 20:23

## 2020-03-28 RX ADMIN — SODIUM CHLORIDE, PRESERVATIVE FREE 10 ML: 5 INJECTION INTRAVENOUS at 20:23

## 2020-03-28 RX ADMIN — SODIUM CHLORIDE, PRESERVATIVE FREE 10 ML: 5 INJECTION INTRAVENOUS at 09:38

## 2020-03-28 RX ADMIN — LISINOPRIL 20 MG: 20 TABLET ORAL at 21:15

## 2020-03-28 RX ADMIN — INSULIN LISPRO 4 UNITS: 100 INJECTION, SOLUTION INTRAVENOUS; SUBCUTANEOUS at 21:14

## 2020-03-28 RX ADMIN — PANTOPRAZOLE SODIUM 40 MG: 40 TABLET, DELAYED RELEASE ORAL at 09:38

## 2020-03-28 RX ADMIN — LISINOPRIL 20 MG: 20 TABLET ORAL at 09:37

## 2020-03-28 RX ADMIN — INSULIN LISPRO 3 UNITS: 100 INJECTION, SOLUTION INTRAVENOUS; SUBCUTANEOUS at 17:16

## 2020-03-28 RX ADMIN — PROPRANOLOL HYDROCHLORIDE 40 MG: 40 TABLET ORAL at 09:38

## 2020-03-28 RX ADMIN — CALCIUM CARBONATE-VITAMIN D TAB 500 MG-200 UNIT 500 MG: 500-200 TAB at 09:37

## 2020-03-28 RX ADMIN — TICAGRELOR 60 MG: 60 TABLET ORAL at 20:23

## 2020-03-28 RX ADMIN — PROPRANOLOL HYDROCHLORIDE 40 MG: 40 TABLET ORAL at 21:15

## 2020-03-28 RX ADMIN — INSULIN LISPRO 3 UNITS: 100 INJECTION, SOLUTION INTRAVENOUS; SUBCUTANEOUS at 12:11

## 2020-03-28 RX ADMIN — DILTIAZEM HYDROCHLORIDE 180 MG: 180 CAPSULE, COATED, EXTENDED RELEASE ORAL at 20:23

## 2020-03-28 RX ADMIN — HYDROCODONE BITARTRATE AND ACETAMINOPHEN 1 TABLET: 5; 325 TABLET ORAL at 11:02

## 2020-03-28 RX ADMIN — DEXTROMETHORPHAN HYDROBROMIDE AND QUINIDINE SULFATE 1 CAPSULE: 20; 10 CAPSULE, GELATIN COATED ORAL at 09:37

## 2020-03-28 RX ADMIN — LEVOTHYROXINE SODIUM 125 MCG: 125 TABLET ORAL at 06:14

## 2020-03-28 RX ADMIN — ASPIRIN 325 MG: 325 TABLET ORAL at 09:36

## 2020-03-29 LAB
ANION GAP SERPL CALCULATED.3IONS-SCNC: 10.9 MMOL/L (ref 5–15)
BASOPHILS # BLD AUTO: 0.03 10*3/MM3 (ref 0–0.2)
BASOPHILS NFR BLD AUTO: 0.4 % (ref 0–1.5)
BH BB BLOOD EXPIRATION DATE: NORMAL
BH BB BLOOD TYPE BARCODE: 7300
BH BB DISPENSE STATUS: NORMAL
BH BB PRODUCT CODE: NORMAL
BH BB UNIT NUMBER: NORMAL
BUN BLD-MCNC: 15 MG/DL (ref 8–23)
BUN/CREAT SERPL: 22.4 (ref 7–25)
CALCIUM SPEC-SCNC: 8.9 MG/DL (ref 8.6–10.5)
CHLORIDE SERPL-SCNC: 100 MMOL/L (ref 98–107)
CO2 SERPL-SCNC: 27.1 MMOL/L (ref 22–29)
CREAT BLD-MCNC: 0.67 MG/DL (ref 0.57–1)
CROSSMATCH INTERPRETATION: NORMAL
DEPRECATED RDW RBC AUTO: 47.8 FL (ref 37–54)
EOSINOPHIL # BLD AUTO: 0.17 10*3/MM3 (ref 0–0.4)
EOSINOPHIL NFR BLD AUTO: 2.5 % (ref 0.3–6.2)
ERYTHROCYTE [DISTWIDTH] IN BLOOD BY AUTOMATED COUNT: 14.5 % (ref 12.3–15.4)
GFR SERPL CREATININE-BSD FRML MDRD: 84 ML/MIN/1.73
GLUCOSE BLD-MCNC: 157 MG/DL (ref 65–99)
GLUCOSE BLDC GLUCOMTR-MCNC: 166 MG/DL (ref 70–130)
GLUCOSE BLDC GLUCOMTR-MCNC: 192 MG/DL (ref 70–130)
GLUCOSE BLDC GLUCOMTR-MCNC: 231 MG/DL (ref 70–130)
GLUCOSE BLDC GLUCOMTR-MCNC: 237 MG/DL (ref 70–130)
HCT VFR BLD AUTO: 28.3 % (ref 34–46.6)
HGB BLD-MCNC: 9.4 G/DL (ref 12–15.9)
IMM GRANULOCYTES # BLD AUTO: 0.02 10*3/MM3 (ref 0–0.05)
IMM GRANULOCYTES NFR BLD AUTO: 0.3 % (ref 0–0.5)
LYMPHOCYTES # BLD AUTO: 1.54 10*3/MM3 (ref 0.7–3.1)
LYMPHOCYTES NFR BLD AUTO: 22.7 % (ref 19.6–45.3)
MCH RBC QN AUTO: 30 PG (ref 26.6–33)
MCHC RBC AUTO-ENTMCNC: 33.2 G/DL (ref 31.5–35.7)
MCV RBC AUTO: 90.4 FL (ref 79–97)
MONOCYTES # BLD AUTO: 0.57 10*3/MM3 (ref 0.1–0.9)
MONOCYTES NFR BLD AUTO: 8.4 % (ref 5–12)
NEUTROPHILS # BLD AUTO: 4.46 10*3/MM3 (ref 1.7–7)
NEUTROPHILS NFR BLD AUTO: 65.7 % (ref 42.7–76)
NRBC BLD AUTO-RTO: 0.1 /100 WBC (ref 0–0.2)
PLATELET # BLD AUTO: 173 10*3/MM3 (ref 140–450)
PMV BLD AUTO: 11.1 FL (ref 6–12)
POTASSIUM BLD-SCNC: 3.9 MMOL/L (ref 3.5–5.2)
RBC # BLD AUTO: 3.13 10*6/MM3 (ref 3.77–5.28)
SODIUM BLD-SCNC: 138 MMOL/L (ref 136–145)
UNIT  ABO: NORMAL
UNIT  RH: NORMAL
WBC NRBC COR # BLD: 6.79 10*3/MM3 (ref 3.4–10.8)

## 2020-03-29 PROCEDURE — 97116 GAIT TRAINING THERAPY: CPT

## 2020-03-29 PROCEDURE — 85025 COMPLETE CBC W/AUTO DIFF WBC: CPT | Performed by: ORTHOPAEDIC SURGERY

## 2020-03-29 PROCEDURE — 97530 THERAPEUTIC ACTIVITIES: CPT

## 2020-03-29 PROCEDURE — 63710000001 INSULIN LISPRO (HUMAN) PER 5 UNITS: Performed by: ORTHOPAEDIC SURGERY

## 2020-03-29 PROCEDURE — 80048 BASIC METABOLIC PNL TOTAL CA: CPT | Performed by: ORTHOPAEDIC SURGERY

## 2020-03-29 PROCEDURE — 82962 GLUCOSE BLOOD TEST: CPT

## 2020-03-29 RX ADMIN — LEVOTHYROXINE SODIUM 125 MCG: 125 TABLET ORAL at 06:06

## 2020-03-29 RX ADMIN — TICAGRELOR 60 MG: 60 TABLET ORAL at 09:34

## 2020-03-29 RX ADMIN — INSULIN LISPRO 3 UNITS: 100 INJECTION, SOLUTION INTRAVENOUS; SUBCUTANEOUS at 17:37

## 2020-03-29 RX ADMIN — DEXTROMETHORPHAN HYDROBROMIDE AND QUINIDINE SULFATE 1 CAPSULE: 20; 10 CAPSULE, GELATIN COATED ORAL at 09:34

## 2020-03-29 RX ADMIN — PROPRANOLOL HYDROCHLORIDE 40 MG: 40 TABLET ORAL at 20:42

## 2020-03-29 RX ADMIN — ASPIRIN 325 MG: 325 TABLET ORAL at 09:34

## 2020-03-29 RX ADMIN — CALCIUM CARBONATE-VITAMIN D TAB 500 MG-200 UNIT 500 MG: 500-200 TAB at 20:42

## 2020-03-29 RX ADMIN — HYDROCODONE BITARTRATE AND ACETAMINOPHEN 1 TABLET: 5; 325 TABLET ORAL at 20:44

## 2020-03-29 RX ADMIN — HYDROCODONE BITARTRATE AND ACETAMINOPHEN 1 TABLET: 5; 325 TABLET ORAL at 13:47

## 2020-03-29 RX ADMIN — DILTIAZEM HYDROCHLORIDE 180 MG: 180 CAPSULE, COATED, EXTENDED RELEASE ORAL at 09:34

## 2020-03-29 RX ADMIN — PANTOPRAZOLE SODIUM 40 MG: 40 TABLET, DELAYED RELEASE ORAL at 17:38

## 2020-03-29 RX ADMIN — TICAGRELOR 60 MG: 60 TABLET ORAL at 20:41

## 2020-03-29 RX ADMIN — PANTOPRAZOLE SODIUM 40 MG: 40 TABLET, DELAYED RELEASE ORAL at 09:36

## 2020-03-29 RX ADMIN — HYDROCODONE BITARTRATE AND ACETAMINOPHEN 1 TABLET: 5; 325 TABLET ORAL at 06:06

## 2020-03-29 RX ADMIN — SODIUM CHLORIDE, PRESERVATIVE FREE 10 ML: 5 INJECTION INTRAVENOUS at 20:44

## 2020-03-29 RX ADMIN — LISINOPRIL 20 MG: 20 TABLET ORAL at 20:41

## 2020-03-29 RX ADMIN — INSULIN LISPRO 3 UNITS: 100 INJECTION, SOLUTION INTRAVENOUS; SUBCUTANEOUS at 12:03

## 2020-03-29 RX ADMIN — LISINOPRIL 20 MG: 20 TABLET ORAL at 09:34

## 2020-03-29 RX ADMIN — INSULIN LISPRO 2 UNITS: 100 INJECTION, SOLUTION INTRAVENOUS; SUBCUTANEOUS at 21:37

## 2020-03-29 RX ADMIN — CALCIUM CARBONATE-VITAMIN D TAB 500 MG-200 UNIT 500 MG: 500-200 TAB at 09:34

## 2020-03-29 RX ADMIN — PROPRANOLOL HYDROCHLORIDE 40 MG: 40 TABLET ORAL at 09:34

## 2020-03-29 RX ADMIN — SODIUM CHLORIDE, PRESERVATIVE FREE 10 ML: 5 INJECTION INTRAVENOUS at 09:34

## 2020-03-30 VITALS
WEIGHT: 161.3 LBS | HEART RATE: 63 BPM | DIASTOLIC BLOOD PRESSURE: 58 MMHG | OXYGEN SATURATION: 99 % | SYSTOLIC BLOOD PRESSURE: 147 MMHG | RESPIRATION RATE: 18 BRPM | BODY MASS INDEX: 26.87 KG/M2 | TEMPERATURE: 98.1 F | HEIGHT: 65 IN

## 2020-03-30 LAB
ANION GAP SERPL CALCULATED.3IONS-SCNC: 12 MMOL/L (ref 5–15)
BASOPHILS # BLD AUTO: 0.03 10*3/MM3 (ref 0–0.2)
BASOPHILS NFR BLD AUTO: 0.4 % (ref 0–1.5)
BUN BLD-MCNC: 12 MG/DL (ref 8–23)
BUN/CREAT SERPL: 18.8 (ref 7–25)
CALCIUM SPEC-SCNC: 8.9 MG/DL (ref 8.6–10.5)
CHLORIDE SERPL-SCNC: 99 MMOL/L (ref 98–107)
CO2 SERPL-SCNC: 27 MMOL/L (ref 22–29)
CREAT BLD-MCNC: 0.64 MG/DL (ref 0.57–1)
DEPRECATED RDW RBC AUTO: 45.3 FL (ref 37–54)
EOSINOPHIL # BLD AUTO: 0.17 10*3/MM3 (ref 0–0.4)
EOSINOPHIL NFR BLD AUTO: 2.4 % (ref 0.3–6.2)
ERYTHROCYTE [DISTWIDTH] IN BLOOD BY AUTOMATED COUNT: 14 % (ref 12.3–15.4)
GFR SERPL CREATININE-BSD FRML MDRD: 89 ML/MIN/1.73
GLUCOSE BLD-MCNC: 171 MG/DL (ref 65–99)
GLUCOSE BLDC GLUCOMTR-MCNC: 174 MG/DL (ref 70–130)
GLUCOSE BLDC GLUCOMTR-MCNC: 178 MG/DL (ref 70–130)
HCT VFR BLD AUTO: 29.7 % (ref 34–46.6)
HGB BLD-MCNC: 9.6 G/DL (ref 12–15.9)
IMM GRANULOCYTES # BLD AUTO: 0.03 10*3/MM3 (ref 0–0.05)
IMM GRANULOCYTES NFR BLD AUTO: 0.4 % (ref 0–0.5)
LYMPHOCYTES # BLD AUTO: 1.68 10*3/MM3 (ref 0.7–3.1)
LYMPHOCYTES NFR BLD AUTO: 23.4 % (ref 19.6–45.3)
MCH RBC QN AUTO: 28.8 PG (ref 26.6–33)
MCHC RBC AUTO-ENTMCNC: 32.3 G/DL (ref 31.5–35.7)
MCV RBC AUTO: 89.2 FL (ref 79–97)
MONOCYTES # BLD AUTO: 0.64 10*3/MM3 (ref 0.1–0.9)
MONOCYTES NFR BLD AUTO: 8.9 % (ref 5–12)
NEUTROPHILS # BLD AUTO: 4.64 10*3/MM3 (ref 1.7–7)
NEUTROPHILS NFR BLD AUTO: 64.5 % (ref 42.7–76)
NRBC BLD AUTO-RTO: 0.3 /100 WBC (ref 0–0.2)
PLATELET # BLD AUTO: 210 10*3/MM3 (ref 140–450)
PMV BLD AUTO: 11.1 FL (ref 6–12)
POTASSIUM BLD-SCNC: 3.9 MMOL/L (ref 3.5–5.2)
RBC # BLD AUTO: 3.33 10*6/MM3 (ref 3.77–5.28)
SODIUM BLD-SCNC: 138 MMOL/L (ref 136–145)
WBC NRBC COR # BLD: 7.19 10*3/MM3 (ref 3.4–10.8)

## 2020-03-30 PROCEDURE — 25010000002 ONDANSETRON PER 1 MG: Performed by: ORTHOPAEDIC SURGERY

## 2020-03-30 PROCEDURE — 80048 BASIC METABOLIC PNL TOTAL CA: CPT | Performed by: ORTHOPAEDIC SURGERY

## 2020-03-30 PROCEDURE — 82962 GLUCOSE BLOOD TEST: CPT

## 2020-03-30 PROCEDURE — 85025 COMPLETE CBC W/AUTO DIFF WBC: CPT | Performed by: ORTHOPAEDIC SURGERY

## 2020-03-30 PROCEDURE — 63710000001 INSULIN LISPRO (HUMAN) PER 5 UNITS: Performed by: ORTHOPAEDIC SURGERY

## 2020-03-30 RX ORDER — HYDROCODONE BITARTRATE AND ACETAMINOPHEN 5; 325 MG/1; MG/1
1 TABLET ORAL EVERY 4 HOURS PRN
Qty: 12 TABLET | Refills: 0 | Status: SHIPPED | OUTPATIENT
Start: 2020-03-30 | End: 2020-04-04

## 2020-03-30 RX ORDER — ASPIRIN 325 MG
325 TABLET ORAL DAILY
Qty: 10 TABLET | Refills: 0
Start: 2020-03-31 | End: 2020-04-10

## 2020-03-30 RX ADMIN — CALCIUM CARBONATE-VITAMIN D TAB 500 MG-200 UNIT 500 MG: 500-200 TAB at 09:14

## 2020-03-30 RX ADMIN — TICAGRELOR 60 MG: 60 TABLET ORAL at 09:14

## 2020-03-30 RX ADMIN — PANTOPRAZOLE SODIUM 40 MG: 40 TABLET, DELAYED RELEASE ORAL at 09:14

## 2020-03-30 RX ADMIN — DILTIAZEM HYDROCHLORIDE 180 MG: 180 CAPSULE, COATED, EXTENDED RELEASE ORAL at 09:14

## 2020-03-30 RX ADMIN — LISINOPRIL 20 MG: 20 TABLET ORAL at 09:14

## 2020-03-30 RX ADMIN — INSULIN LISPRO 2 UNITS: 100 INJECTION, SOLUTION INTRAVENOUS; SUBCUTANEOUS at 12:23

## 2020-03-30 RX ADMIN — HYDROCODONE BITARTRATE AND ACETAMINOPHEN 1 TABLET: 5; 325 TABLET ORAL at 09:20

## 2020-03-30 RX ADMIN — HYDROCODONE BITARTRATE AND ACETAMINOPHEN 1 TABLET: 5; 325 TABLET ORAL at 13:30

## 2020-03-30 RX ADMIN — ONDANSETRON 4 MG: 2 INJECTION INTRAMUSCULAR; INTRAVENOUS at 09:20

## 2020-03-30 RX ADMIN — ACETAMINOPHEN 650 MG: 325 TABLET, FILM COATED ORAL at 16:17

## 2020-03-30 RX ADMIN — SODIUM CHLORIDE, PRESERVATIVE FREE 10 ML: 5 INJECTION INTRAVENOUS at 09:15

## 2020-03-30 RX ADMIN — DEXTROMETHORPHAN HYDROBROMIDE AND QUINIDINE SULFATE 1 CAPSULE: 20; 10 CAPSULE, GELATIN COATED ORAL at 09:14

## 2020-03-30 RX ADMIN — INSULIN LISPRO 2 UNITS: 100 INJECTION, SOLUTION INTRAVENOUS; SUBCUTANEOUS at 09:14

## 2020-03-30 RX ADMIN — PROPRANOLOL HYDROCHLORIDE 40 MG: 40 TABLET ORAL at 09:14

## 2020-03-30 RX ADMIN — ASPIRIN 325 MG: 325 TABLET ORAL at 09:14

## 2020-03-30 RX ADMIN — LEVOTHYROXINE SODIUM 125 MCG: 125 TABLET ORAL at 06:46

## 2020-04-06 PROBLEM — E44.0 MODERATE MALNUTRITION (HCC): Status: ACTIVE | Noted: 2020-04-06

## 2020-05-05 ENCOUNTER — APPOINTMENT (OUTPATIENT)
Dept: CT IMAGING | Facility: HOSPITAL | Age: 85
End: 2020-05-05

## 2020-05-05 ENCOUNTER — APPOINTMENT (OUTPATIENT)
Dept: GENERAL RADIOLOGY | Facility: HOSPITAL | Age: 85
End: 2020-05-05

## 2020-05-05 ENCOUNTER — HOSPITAL ENCOUNTER (EMERGENCY)
Facility: HOSPITAL | Age: 85
Discharge: HOME | End: 2020-05-05
Attending: EMERGENCY MEDICINE | Admitting: EMERGENCY MEDICINE

## 2020-05-05 VITALS
DIASTOLIC BLOOD PRESSURE: 75 MMHG | BODY MASS INDEX: 29.08 KG/M2 | TEMPERATURE: 98.1 F | RESPIRATION RATE: 22 BRPM | HEIGHT: 62 IN | HEART RATE: 67 BPM | OXYGEN SATURATION: 95 % | SYSTOLIC BLOOD PRESSURE: 183 MMHG | WEIGHT: 158 LBS

## 2020-05-05 DIAGNOSIS — S39.012A STRAIN OF LUMBAR REGION, INITIAL ENCOUNTER: ICD-10-CM

## 2020-05-05 DIAGNOSIS — S09.90XA CLOSED HEAD INJURY, INITIAL ENCOUNTER: ICD-10-CM
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
## 2020-05-05 DIAGNOSIS — S16.1XXA STRAIN OF NECK MUSCLE, INITIAL ENCOUNTER: ICD-10-CM

## 2020-05-05 LAB
ALBUMIN SERPL-MCNC: 3.5 G/DL (ref 3.5–5.2)
ALBUMIN/GLOB SERPL: 1.2 G/DL
ALP SERPL-CCNC: 111 U/L (ref 39–117)
ALT SERPL W P-5'-P-CCNC: 15 U/L (ref 1–33)
ANION GAP SERPL CALCULATED.3IONS-SCNC: 11.9 MMOL/L (ref 5–15)
AST SERPL-CCNC: 19 U/L (ref 1–32)
BASOPHILS # BLD AUTO: 0.06 10*3/MM3 (ref 0–0.2)
BASOPHILS NFR BLD AUTO: 0.7 % (ref 0–1.5)
BILIRUB SERPL-MCNC: 0.3 MG/DL (ref 0.2–1.2)
BILIRUB UR QL STRIP: NEGATIVE
BUN BLD-MCNC: 11 MG/DL (ref 8–23)
BUN/CREAT SERPL: 14.1 (ref 7–25)
CALCIUM SPEC-SCNC: 9 MG/DL (ref 8.6–10.5)
CHLORIDE SERPL-SCNC: 102 MMOL/L (ref 98–107)
CLARITY UR: CLEAR
CO2 SERPL-SCNC: 25.1 MMOL/L (ref 22–29)
COLOR UR: YELLOW
CREAT BLD-MCNC: 0.78 MG/DL (ref 0.57–1)
DEPRECATED RDW RBC AUTO: 49.7 FL (ref 37–54)
EOSINOPHIL # BLD AUTO: 0.16 10*3/MM3 (ref 0–0.4)
EOSINOPHIL NFR BLD AUTO: 1.8 % (ref 0.3–6.2)
ERYTHROCYTE [DISTWIDTH] IN BLOOD BY AUTOMATED COUNT: 14.6 % (ref 12.3–15.4)
GFR SERPL CREATININE-BSD FRML MDRD: 71 ML/MIN/1.73
GLOBULIN UR ELPH-MCNC: 2.9 GM/DL
GLUCOSE BLD-MCNC: 163 MG/DL (ref 65–99)
GLUCOSE UR STRIP-MCNC: NEGATIVE MG/DL
HCT VFR BLD AUTO: 33.3 % (ref 34–46.6)
HGB BLD-MCNC: 11.2 G/DL (ref 12–15.9)
HGB UR QL STRIP.AUTO: NEGATIVE
IMM GRANULOCYTES # BLD AUTO: 0.08 10*3/MM3 (ref 0–0.05)
IMM GRANULOCYTES NFR BLD AUTO: 0.9 % (ref 0–0.5)
KETONES UR QL STRIP: NEGATIVE
LEUKOCYTE ESTERASE UR QL STRIP.AUTO: NEGATIVE
LYMPHOCYTES # BLD AUTO: 1.51 10*3/MM3 (ref 0.7–3.1)
LYMPHOCYTES NFR BLD AUTO: 17.4 % (ref 19.6–45.3)
MCH RBC QN AUTO: 30.8 PG (ref 26.6–33)
MCHC RBC AUTO-ENTMCNC: 33.6 G/DL (ref 31.5–35.7)
MCV RBC AUTO: 91.5 FL (ref 79–97)
MONOCYTES # BLD AUTO: 0.49 10*3/MM3 (ref 0.1–0.9)
MONOCYTES NFR BLD AUTO: 5.7 % (ref 5–12)
NEUTROPHILS # BLD AUTO: 6.37 10*3/MM3 (ref 1.7–7)
NEUTROPHILS NFR BLD AUTO: 73.5 % (ref 42.7–76)
NITRITE UR QL STRIP: NEGATIVE
NRBC BLD AUTO-RTO: 0 /100 WBC (ref 0–0.2)
PH UR STRIP.AUTO: 6.5 [PH] (ref 5–8)
PLATELET # BLD AUTO: 226 10*3/MM3 (ref 140–450)
PMV BLD AUTO: 10.3 FL (ref 6–12)
POTASSIUM BLD-SCNC: 4.5 MMOL/L (ref 3.5–5.2)
PROT SERPL-MCNC: 6.4 G/DL (ref 6–8.5)
PROT UR QL STRIP: NEGATIVE
RBC # BLD AUTO: 3.64 10*6/MM3 (ref 3.77–5.28)
SODIUM BLD-SCNC: 139 MMOL/L (ref 136–145)
SP GR UR STRIP: 1.01 (ref 1–1.03)
UROBILINOGEN UR QL STRIP: NORMAL
WBC NRBC COR # BLD: 8.67 10*3/MM3 (ref 3.4–10.8)

## 2020-05-05 PROCEDURE — 85025 COMPLETE CBC W/AUTO DIFF WBC: CPT | Performed by: EMERGENCY MEDICINE

## 2020-05-05 PROCEDURE — 81003 URINALYSIS AUTO W/O SCOPE: CPT | Performed by: EMERGENCY MEDICINE

## 2020-05-05 PROCEDURE — 72110 X-RAY EXAM L-2 SPINE 4/>VWS: CPT

## 2020-05-05 PROCEDURE — 72125 CT NECK SPINE W/O DYE: CPT

## 2020-05-05 PROCEDURE — 80053 COMPREHEN METABOLIC PANEL: CPT | Performed by: EMERGENCY MEDICINE

## 2020-05-05 PROCEDURE — 99283 EMERGENCY DEPT VISIT LOW MDM: CPT

## 2020-05-05 PROCEDURE — 70450 CT HEAD/BRAIN W/O DYE: CPT

## 2020-05-05 PROCEDURE — P9612 CATHETERIZE FOR URINE SPEC: HCPCS

## 2020-05-05 RX ORDER — SODIUM CHLORIDE 0.9 % (FLUSH) 0.9 %
10 SYRINGE (ML) INJECTION AS NEEDED
Status: DISCONTINUED | OUTPATIENT
Start: 2020-05-05 | End: 2020-05-05 | Stop reason: HOSPADM

## 2020-05-05 RX ORDER — HYDROCODONE BITARTRATE AND ACETAMINOPHEN 5; 325 MG/1; MG/1
1 TABLET ORAL EVERY 12 HOURS PRN
Status: ON HOLD | COMMUNITY
End: 2021-02-17 | Stop reason: SDUPTHER

## 2020-05-05 RX ORDER — ZOLPIDEM TARTRATE 10 MG/1
10 TABLET ORAL NIGHTLY PRN
Status: ON HOLD | COMMUNITY
End: 2021-01-09

## 2020-05-05 NOTE — ED PROVIDER NOTES
EMERGENCY DEPARTMENT ENCOUNTER    CHIEF COMPLAINT  Chief Complaint: fall/head trauma  History given by: pt  History limited by: none  Room Number: 09/09  PMD: Francisco Gallagher MD      HPI:  Pt is a 85 y.o. female who presents to the emergency room after a fall tonight.  EMS reports that the patient fell out of bed however the patient reports that she was attempting to walk on her walker and secondary to a previous stroke she has chronic balance issues and that caused her fall tonight.  The fall that brought her here happened just prior to presentation in the emergency department.  She states that when she fell she did strike her head but did not lose consciousness.  She currently complains of headache, neck pain, and lower back pain.  She describes the pain as a sharp sensation that has been pretty consistent since the fall.  The patient denies chest pain, abdominal pain, shortness of breath, blurry vision, mental status changes.  Per EMS reports, she has not been eating or drinking well lately and she has had frequent falls at her rehab facility over the last several days to weeks.  It appears as though she had a hip fracture that was repaired here last month.    PAST MEDICAL HISTORY  Active Ambulatory Problems     Diagnosis Date Noted   • Stroke (cerebrum) (CMS/[facility]) 12/18/2019   • Chest pain 12/26/2019   • Hypertensive urgency 02/24/2020   • Headache 02/24/2020   • History of embolic stroke 02/24/2020   • Hypothyroidism 02/24/2020   • Hypokalemia 02/24/2020   • CAD in native artery 02/25/2020   • URTI (acute upper respiratory infection) 02/27/2020   • Closed fracture of left hip (CMS/[facility]) 03/25/2020   • Chronic diastolic (congestive) heart failure (CMS/[facility]) 03/25/2020   • Coronary artery disease involving autologous vein coronary bypass graft without angina pectoris 03/26/2020   • Impaired glucose tolerance 03/28/2020   • Moderate malnutrition (CMS/[facility]) 04/06/2020     Resolved Ambulatory Problems     Diagnosis  Date Noted   • No Resolved Ambulatory Problems     Past Medical History:   Diagnosis Date   • Arthritis    • CHF (congestive heart failure) (CMS/HCC)    • Diabetes mellitus (CMS/HCC)    • Disease of thyroid gland    • Elevated cholesterol    • GERD (gastroesophageal reflux disease)    • Hypertension    • PONV (postoperative nausea and vomiting)    • Sleep apnea    • Spinal headache    • Stroke (CMS/HCC)        PAST SURGICAL HISTORY  Past Surgical History:   Procedure Laterality Date   • APPENDECTOMY     • CARDIAC CATHETERIZATION       x2   • CARDIAC CATHETERIZATION N/A 12/26/2019    Procedure: Left Heart Cath;  Surgeon: Bentley Chapin MD;  Location: [facility] CATH INVASIVE LOCATION;  Service: Cardiovascular   • CARDIAC CATHETERIZATION N/A 12/26/2019    Procedure: Coronary angiography;  Surgeon: Bentley Chapin MD;  Location: [facility] CATH INVASIVE LOCATION;  Service: Cardiovascular   • CARDIAC CATHETERIZATION N/A 12/26/2019    Procedure: Native mammary injection;  Surgeon: Bentley Chapin MD;  Location: [facility] CATH INVASIVE LOCATION;  Service: Cardiovascular   • CARDIAC CATHETERIZATION N/A 12/26/2019    Procedure: Saphenous Vein Graft;  Surgeon: Bentley Chapin MD;  Location: [facility] CATH INVASIVE LOCATION;  Service: Cardiovascular   • COLON SURGERY     • COLONOSCOPY     • EYE SURGERY      lens implants and cataract surgery   • FEMUR IM NAILING/RODDING Left 3/26/2020    Procedure: LT.HIP NAILING/RODDING;  Surgeon: Carlos Khan II, MD;  Location: [facility] MAIN OR;  Service: Orthopedics;  Laterality: Left;   • HYSTERECTOMY     • VASCULAR SURGERY      varicous veins       FAMILY HISTORY  Family History   Problem Relation Age of Onset   • Heart disease Mother    • Stroke Mother    • Cancer Sister    • Alzheimer's disease Brother    • Cancer Sister        SOCIAL HISTORY  Social History     Socioeconomic History   • Marital status:      Spouse name: Not on file   • Number of children: Not on file    • Years of education: Not on file   • Highest education level: Not on file   Tobacco Use   • Smoking status: Never Smoker   • Smokeless tobacco: Never Used   Substance and Sexual Activity   • Alcohol use: Never     Frequency: Never   • Drug use: Never   • Sexual activity: Defer       ALLERGIES  Statins; Contrast dye; Iodinated diagnostic agents; and Iodine    REVIEW OF SYSTEMS  Review of Systems   Constitutional: Negative for fever.   HENT: Negative for sore throat.    Eyes: Negative.    Respiratory: Negative for cough and shortness of breath.    Cardiovascular: Negative for chest pain.   Gastrointestinal: Negative for abdominal pain, diarrhea and vomiting.   Genitourinary: Negative for dysuria.   Musculoskeletal: Positive for back pain and neck pain.   Skin: Negative for rash.   Allergic/Immunologic: Negative.    Neurological: Positive for headaches. Negative for weakness and numbness.   Hematological: Negative.    Psychiatric/Behavioral: Negative.    All other systems reviewed and are negative.      PHYSICAL EXAM  ED Triage Vitals [05/05/20 0308]   Temp Heart Rate Resp BP SpO2   98.1 °F (36.7 °C) 67 22 (!) 158/122 95 %      Temp src Heart Rate Source Patient Position BP Location FiO2 (%)   Tympanic -- -- -- --       Physical Exam   Constitutional: She is oriented to person, place, and time. No distress.   HENT:   Head: Normocephalic and atraumatic.   Eyes: Pupils are equal, round, and reactive to light. EOM are normal.   Neck: Normal range of motion. Neck supple.   Midline tenderness to palpation without deformity   Cardiovascular: Normal rate, regular rhythm and normal heart sounds.   Pulmonary/Chest: Effort normal and breath sounds normal. No respiratory distress.   Abdominal: Soft. There is no tenderness. There is no rebound and no guarding.   Musculoskeletal: Normal range of motion. She exhibits no edema.   Neurological: She is alert and oriented to person, place, and time. She has normal sensation and  normal strength.   Skin: Skin is warm and dry. No rash noted.   Psychiatric: Mood and affect normal.   Nursing note and vitals reviewed.      LAB RESULTS  Lab Results (last 24 hours)     Procedure Component Value Units Date/Time    Urinalysis With Microscopic If Indicated (No Culture) - Urine, Catheter [[id number]]  (Normal) Collected:  05/05/20 0327    Specimen:  Urine, Catheter Updated:  05/05/20 0348     Color, UA Yellow     Appearance, UA Clear     pH, UA 6.5     Specific Gravity, UA 1.013     Glucose, UA Negative     Ketones, UA Negative     Bilirubin, UA Negative     Blood, UA Negative     Protein, UA Negative     Leuk Esterase, UA Negative     Nitrite, UA Negative     Urobilinogen, UA 0.2 E.U./dL    Narrative:       Urine microscopic not indicated.    CBC & Differential [[id number]] Collected:  05/05/20 0334    Specimen:  Blood Updated:  05/05/20 0356    Narrative:       The following orders were created for panel order CBC & Differential.  Procedure                               Abnormality         Status                     ---------                               -----------         ------                     CBC Auto Differential[[id number]]        Abnormal            Final result                 Please view results for these tests on the individual orders.    Comprehensive Metabolic Panel [[id number]]  (Abnormal) Collected:  05/05/20 0334    Specimen:  Blood Updated:  05/05/20 0410     Glucose 163 mg/dL      BUN 11 mg/dL      Creatinine 0.78 mg/dL      Sodium 139 mmol/L      Potassium 4.5 mmol/L      Chloride 102 mmol/L      CO2 25.1 mmol/L      Calcium 9.0 mg/dL      Total Protein 6.4 g/dL      Albumin 3.50 g/dL      ALT (SGPT) 15 U/L      AST (SGOT) 19 U/L      Alkaline Phosphatase 111 U/L      Total Bilirubin 0.3 mg/dL      eGFR Non African Amer 71 mL/min/1.73      Globulin 2.9 gm/dL      A/G Ratio 1.2 g/dL      BUN/Creatinine Ratio 14.1     Anion Gap 11.9 mmol/L     Narrative:       GFR Normal  >60  Chronic Kidney Disease <60  Kidney Failure <15      CBC Auto Differential [[id number]]  (Abnormal) Collected:  05/05/20 0334    Specimen:  Blood Updated:  05/05/20 0356     WBC 8.67 10*3/mm3      RBC 3.64 10*6/mm3      Hemoglobin 11.2 g/dL      Hematocrit 33.3 %      MCV 91.5 fL      MCH 30.8 pg      MCHC 33.6 g/dL      RDW 14.6 %      RDW-SD 49.7 fl      MPV 10.3 fL      Platelets 226 10*3/mm3      Neutrophil % 73.5 %      Lymphocyte % 17.4 %      Monocyte % 5.7 %      Eosinophil % 1.8 %      Basophil % 0.7 %      Immature Grans % 0.9 %      Neutrophils, Absolute 6.37 10*3/mm3      Lymphocytes, Absolute 1.51 10*3/mm3      Monocytes, Absolute 0.49 10*3/mm3      Eosinophils, Absolute 0.16 10*3/mm3      Basophils, Absolute 0.06 10*3/mm3      Immature Grans, Absolute 0.08 10*3/mm3      nRBC 0.0 /100 WBC           I ordered the above labs and reviewed the results    RADIOLOGY  CT Head Without Contrast   Final Result       1. No acute intracranial hemorrhage.   2. No acute fracture or subluxation the cervical spine.   3. Partial opacification of the left mastoid air cells. This is new when   compared to prior exam. Correlation with any evidence of mastoiditis is   suggested.       Radiation dose reduction techniques were utilized, including automated   exposure control and exposure modulation based on body size.       This report was finalized on 5/5/2020 4:27 AM by Dr. Deanna Sandoval M.D.          CT Cervical Spine Without Contrast   Final Result       1. No acute intracranial hemorrhage.   2. No acute fracture or subluxation the cervical spine.   3. Partial opacification of the left mastoid air cells. This is new when   compared to prior exam. Correlation with any evidence of mastoiditis is   suggested.       Radiation dose reduction techniques were utilized, including automated   exposure control and exposure modulation based on body size.       This report was finalized on 5/5/2020 4:27 AM by Dr. Huerta  VIDA Sandoval.          XR Spine Lumbar Complete 4+VW   Final Result   No obvious acute fracture or subluxation identified.       This report was finalized on 5/5/2020 4:04 AM by Dr. Deanna Sandoval M.D.               I ordered the above noted radiological studies. Interpreted by radiologist.  Reviewed by me in PACS.       PROCEDURES  Procedures      PROGRESS AND CONSULTS     The patient was wearing a facemask upon entrance into the room and remained in such throughout their visit.  I was wearing PPE including a facemask as well as gloves at any point entering the room and throughout the visit    0450  Patient resting comfortably and in no distress.  Normal laboratory results as well as unremarkable CT scan/x-rays were discussed at length with the patient.  All questions were answered and addressed and the patient currently is stable for discharge      MEDICAL DECISION MAKING  Results were reviewed/discussed with the patient and they were also made aware of online access. Pt also made aware that some labs, such as cultures, will not be resulted during ER visit and follow up with PMD is necessary.     MDM  Number of Diagnoses or Management Options     Amount and/or Complexity of Data Reviewed  Clinical lab tests: ordered and reviewed  Tests in the radiology section of CPT®: ordered and reviewed  Review and summarize past medical records: yes (Patient was admitted here April/2020 secondary to a fall with a hip fracture requiring operative repair)           DIAGNOSIS  Final diagnoses:   Fall, initial encounter   Closed head injury, initial encounter   Strain of neck muscle, initial encounter   Strain of lumbar region, initial encounter       DISPOSITION  DISCHARGE    Patient discharged in stable condition.    Reviewed implications of results, diagnosis, meds, responsibility to follow up, warning signs and symptoms of possible worsening, potential complications and reasons to return to ER    Patient/Family  voiced understanding of above instructions.    Discussed plan for discharge, as there is no emergent indication for admission. Patient referred to primary care provider for BP management due to today's BP. Pt/family is agreeable and understands need for follow up and repeat testing.  Pt is aware that discharge does not mean that nothing is wrong but it indicates no emergency is present that requires admission and they must continue care with follow-up as given below or physician of their choice.     FOLLOW-UP  Francisco Gallagher MD  [address] [place] [id number]  [phone]    Schedule an appointment as soon as possible for a visit            Medication List      No changes were made to your prescriptions during this visit.           Latest Documented Vital Signs:  As of 05:25  BP- (!) 183/75 HR- 67 Temp- 98.1 °F (36.7 °C) (Tympanic) O2 sat- 95%         Singh Comer MD  05/05/20 0510

## 2020-05-05 NOTE — ED TRIAGE NOTES
To ER via EMS. Pt c/o fall, rolled off bed.  Pt has had frequent falls over the last few days.  Pt hit head.  Pt is on Brilinta.     Pt had hit replacement in March.  Denies hit pain.  C/o low back pain.

## 2020-05-05 NOTE — ED NOTES
Patient provided discharge instructions at this time.  Respirations are even and unlabored, chest rise and fall is equal in expansion.  All patients questions answered prior to departure from the ED.  Pt capillary refill within normal limit, speech normal.       Keli Marc RN  05/05/20 0696

## 2020-06-04 ENCOUNTER — LAB REQUISITION (OUTPATIENT)
Dept: LAB | Facility: HOSPITAL | Age: 85
End: 2020-06-04

## 2020-06-04 DIAGNOSIS — R39.0 EXTRAVASATION OF URINE: ICD-10-CM

## 2020-06-04 LAB
BACTERIA UR QL AUTO: ABNORMAL /HPF
BILIRUB UR QL STRIP: NEGATIVE
CLARITY UR: ABNORMAL
COLOR UR: YELLOW
GLUCOSE UR STRIP-MCNC: NEGATIVE MG/DL
HGB UR QL STRIP.AUTO: NEGATIVE
HYALINE CASTS UR QL AUTO: ABNORMAL /LPF
KETONES UR QL STRIP: NEGATIVE
LEUKOCYTE ESTERASE UR QL STRIP.AUTO: ABNORMAL
NITRITE UR QL STRIP: NEGATIVE
PH UR STRIP.AUTO: 5.5 [PH] (ref 4.5–8)
PROT UR QL STRIP: NEGATIVE
RBC # UR: ABNORMAL /HPF
REF LAB TEST METHOD: ABNORMAL
SP GR UR STRIP: 1.02 (ref 1–1.03)
SQUAMOUS #/AREA URNS HPF: ABNORMAL /HPF
UROBILINOGEN UR QL STRIP: ABNORMAL
WBC UR QL AUTO: ABNORMAL /HPF

## 2020-06-04 PROCEDURE — 87086 URINE CULTURE/COLONY COUNT: CPT | Performed by: FAMILY MEDICINE

## 2020-06-04 PROCEDURE — 81001 URINALYSIS AUTO W/SCOPE: CPT | Performed by: FAMILY MEDICINE

## 2020-06-05 LAB — BACTERIA SPEC AEROBE CULT: NORMAL

## 2020-06-08 ENCOUNTER — LAB REQUISITION (OUTPATIENT)
Dept: LAB | Facility: HOSPITAL | Age: 85
End: 2020-06-08

## 2020-06-08 DIAGNOSIS — Z87.440 PERSONAL HISTORY OF URINARY (TRACT) INFECTIONS: ICD-10-CM

## 2020-06-08 LAB
BACTERIA UR QL AUTO: ABNORMAL /HPF
BILIRUB UR QL STRIP: NEGATIVE
CLARITY UR: CLEAR
COLOR UR: YELLOW
GLUCOSE UR STRIP-MCNC: NEGATIVE MG/DL
HGB UR QL STRIP.AUTO: NEGATIVE
HYALINE CASTS UR QL AUTO: ABNORMAL /LPF
KETONES UR QL STRIP: NEGATIVE
LEUKOCYTE ESTERASE UR QL STRIP.AUTO: ABNORMAL
MUCOUS THREADS URNS QL MICRO: ABNORMAL /HPF
NITRITE UR QL STRIP: NEGATIVE
PH UR STRIP.AUTO: 5.5 [PH] (ref 4.5–8)
PROT UR QL STRIP: NEGATIVE
RBC # UR: ABNORMAL /HPF
REF LAB TEST METHOD: ABNORMAL
SP GR UR STRIP: 1.02 (ref 1–1.03)
SQUAMOUS #/AREA URNS HPF: ABNORMAL /HPF
UROBILINOGEN UR QL STRIP: ABNORMAL
WBC UR QL AUTO: ABNORMAL /HPF

## 2020-06-08 PROCEDURE — 81001 URINALYSIS AUTO W/SCOPE: CPT | Performed by: FAMILY MEDICINE

## 2020-06-08 PROCEDURE — 87086 URINE CULTURE/COLONY COUNT: CPT | Performed by: FAMILY MEDICINE

## 2020-06-09 LAB — BACTERIA SPEC AEROBE CULT: ABNORMAL

## 2020-06-13 ENCOUNTER — APPOINTMENT (OUTPATIENT)
Dept: GENERAL RADIOLOGY | Facility: HOSPITAL | Age: 85
End: 2020-06-13

## 2020-06-13 ENCOUNTER — HOSPITAL ENCOUNTER (EMERGENCY)
Facility: HOSPITAL | Age: 85
Discharge: HOME | End: 2020-06-13
Attending: EMERGENCY MEDICINE | Admitting: EMERGENCY MEDICINE

## 2020-06-13 VITALS
HEART RATE: 86 BPM | SYSTOLIC BLOOD PRESSURE: 125 MMHG | RESPIRATION RATE: 18 BRPM | OXYGEN SATURATION: 97 % | DIASTOLIC BLOOD PRESSURE: 72 MMHG | TEMPERATURE: 98.6 F

## 2020-06-13 DIAGNOSIS — S70.02XA HEMATOMA OF LEFT HIP, INITIAL ENCOUNTER: Primary | ICD-10-CM

## 2020-06-13 LAB
ABO GROUP BLD: NORMAL
ALBUMIN SERPL-MCNC: 3.6 G/DL (ref 3.5–5.2)
ALBUMIN/GLOB SERPL: 1.4 G/DL
ALP SERPL-CCNC: 127 U/L (ref 39–117)
ALT SERPL W P-5'-P-CCNC: 25 U/L (ref 1–33)
ANION GAP SERPL CALCULATED.3IONS-SCNC: 10.4 MMOL/L (ref 5–15)
AST SERPL-CCNC: 28 U/L (ref 1–32)
BASOPHILS # BLD AUTO: 0.05 10*3/MM3 (ref 0–0.2)
BASOPHILS NFR BLD AUTO: 0.8 % (ref 0–1.5)
BILIRUB SERPL-MCNC: 0.3 MG/DL (ref 0.2–1.2)
BLD GP AB SCN SERPL QL: NEGATIVE
BUN BLD-MCNC: 19 MG/DL (ref 8–23)
BUN/CREAT SERPL: 19.4 (ref 7–25)
CALCIUM SPEC-SCNC: 8.5 MG/DL (ref 8.6–10.5)
CHLORIDE SERPL-SCNC: 108 MMOL/L (ref 98–107)
CO2 SERPL-SCNC: 24.6 MMOL/L (ref 22–29)
CREAT BLD-MCNC: 0.98 MG/DL (ref 0.57–1)
DEPRECATED RDW RBC AUTO: 48.9 FL (ref 37–54)
EOSINOPHIL # BLD AUTO: 0.25 10*3/MM3 (ref 0–0.4)
EOSINOPHIL NFR BLD AUTO: 3.9 % (ref 0.3–6.2)
ERYTHROCYTE [DISTWIDTH] IN BLOOD BY AUTOMATED COUNT: 14.2 % (ref 12.3–15.4)
GFR SERPL CREATININE-BSD FRML MDRD: 54 ML/MIN/1.73
GLOBULIN UR ELPH-MCNC: 2.5 GM/DL
GLUCOSE BLD-MCNC: 129 MG/DL (ref 65–99)
HCT VFR BLD AUTO: 33.5 % (ref 34–46.6)
HGB BLD-MCNC: 11 G/DL (ref 12–15.9)
IMM GRANULOCYTES # BLD AUTO: 0.02 10*3/MM3 (ref 0–0.05)
IMM GRANULOCYTES NFR BLD AUTO: 0.3 % (ref 0–0.5)
INR PPP: 1.11 (ref 0.9–1.1)
LYMPHOCYTES # BLD AUTO: 1.35 10*3/MM3 (ref 0.7–3.1)
LYMPHOCYTES NFR BLD AUTO: 21.2 % (ref 19.6–45.3)
MCH RBC QN AUTO: 30.8 PG (ref 26.6–33)
MCHC RBC AUTO-ENTMCNC: 32.8 G/DL (ref 31.5–35.7)
MCV RBC AUTO: 93.8 FL (ref 79–97)
MONOCYTES # BLD AUTO: 0.36 10*3/MM3 (ref 0.1–0.9)
MONOCYTES NFR BLD AUTO: 5.6 % (ref 5–12)
NEUTROPHILS # BLD AUTO: 4.35 10*3/MM3 (ref 1.7–7)
NEUTROPHILS NFR BLD AUTO: 68.2 % (ref 42.7–76)
NRBC BLD AUTO-RTO: 0 /100 WBC (ref 0–0.2)
PLATELET # BLD AUTO: 192 10*3/MM3 (ref 140–450)
PMV BLD AUTO: 10.4 FL (ref 6–12)
POTASSIUM BLD-SCNC: 3.7 MMOL/L (ref 3.5–5.2)
PROT SERPL-MCNC: 6.1 G/DL (ref 6–8.5)
PROTHROMBIN TIME: 14 SECONDS (ref 11.7–14.2)
RBC # BLD AUTO: 3.57 10*6/MM3 (ref 3.77–5.28)
RH BLD: POSITIVE
SODIUM BLD-SCNC: 143 MMOL/L (ref 136–145)
T&S EXPIRATION DATE: NORMAL
WBC NRBC COR # BLD: 6.38 10*3/MM3 (ref 3.4–10.8)

## 2020-06-13 PROCEDURE — 85610 PROTHROMBIN TIME: CPT | Performed by: EMERGENCY MEDICINE

## 2020-06-13 PROCEDURE — 86850 RBC ANTIBODY SCREEN: CPT | Performed by: EMERGENCY MEDICINE

## 2020-06-13 PROCEDURE — 71045 X-RAY EXAM CHEST 1 VIEW: CPT

## 2020-06-13 PROCEDURE — 80053 COMPREHEN METABOLIC PANEL: CPT | Performed by: EMERGENCY MEDICINE

## 2020-06-13 PROCEDURE — 85025 COMPLETE CBC W/AUTO DIFF WBC: CPT | Performed by: EMERGENCY MEDICINE

## 2020-06-13 PROCEDURE — 86901 BLOOD TYPING SEROLOGIC RH(D): CPT | Performed by: EMERGENCY MEDICINE

## 2020-06-13 PROCEDURE — 99283 EMERGENCY DEPT VISIT LOW MDM: CPT

## 2020-06-13 PROCEDURE — 73502 X-RAY EXAM HIP UNI 2-3 VIEWS: CPT

## 2020-06-13 PROCEDURE — 86900 BLOOD TYPING SEROLOGIC ABO: CPT | Performed by: EMERGENCY MEDICINE

## 2020-06-13 RX ORDER — SODIUM CHLORIDE 0.9 % (FLUSH) 0.9 %
10 SYRINGE (ML) INJECTION AS NEEDED
Status: DISCONTINUED | OUTPATIENT
Start: 2020-06-13 | End: 2020-06-13 | Stop reason: HOSPADM

## 2020-06-13 NOTE — DISCHARGE INSTRUCTIONS
Alternate ice and heat to the affected hip as often as possible.  You can weight-bear as tolerated.  You should see your PCP in 2 to 3 days to have your hemoglobin rechecked.  Return of symptoms get worse, if the swelling significantly increases, or if you have any fever.

## 2020-06-13 NOTE — ED NOTES
Pt reports tripping at home and falling @11am. Pt reports left hip pain. Pt able to stand, but not able to walk.    Pt arrived wearing a face mask.      Cici Miranda, RN  06/13/20 2443

## 2020-06-13 NOTE — ED NOTES
Pt family called with access code and were updated with pt status. Family concerns will be given to primary nurse for further evaluation.     Nany Roberts RN  06/13/20 3827

## 2020-06-13 NOTE — ED PROVIDER NOTES
EMERGENCY DEPARTMENT ENCOUNTER    Room Number:  38/38  Date of encounter:  6/13/2020  PCP: Francisco Gallagher MD  Historian: Patient      HPI:  Chief Complaint: Left hip pain  A complete HPI/ROS/PMH/PSH/SH/FH are unobtainable due to: Nothing    Context: Liana Carrero is a 85 y.o. female who presents to the ED c/o severe left lateral hip pain.  The pain began as a result of a mechanical fall earlier today.  She fell and landed on the left hip.  At first there was minimal pain, but now the pain is severe, it is nonradiating, there is swelling, and she has difficulty weightbearing.  There is no numbness or tingling.    Patient is on Brilinta for cardiac indications.  She claims that she did not hit her head, and has no concussive symptoms.      PAST MEDICAL HISTORY  Active Ambulatory Problems     Diagnosis Date Noted   • Stroke (cerebrum) (CMS/[facility]) 12/18/2019   • Chest pain 12/26/2019   • Hypertensive urgency 02/24/2020   • Headache 02/24/2020   • History of embolic stroke 02/24/2020   • Hypothyroidism 02/24/2020   • Hypokalemia 02/24/2020   • CAD in native artery 02/25/2020   • URTI (acute upper respiratory infection) 02/27/2020   • Closed fracture of left hip (CMS/[facility]) 03/25/2020   • Chronic diastolic (congestive) heart failure (CMS/HCC) 03/25/2020   • Coronary artery disease involving autologous vein coronary bypass graft without angina pectoris 03/26/2020   • Impaired glucose tolerance 03/28/2020   • Moderate malnutrition (CMS/[facility]) 04/06/2020     Resolved Ambulatory Problems     Diagnosis Date Noted   • No Resolved Ambulatory Problems     Past Medical History:   Diagnosis Date   • Arthritis    • CHF (congestive heart failure) (CMS/HCC)    • Diabetes mellitus (CMS/HCC)    • Disease of thyroid gland    • Elevated cholesterol    • GERD (gastroesophageal reflux disease)    • Hypertension    • PONV (postoperative nausea and vomiting)    • Sleep apnea    • Spinal headache    • Stroke (CMS/HCC)          PAST SURGICAL  HISTORY  Past Surgical History:   Procedure Laterality Date   • APPENDECTOMY     • CARDIAC CATHETERIZATION       x2   • CARDIAC CATHETERIZATION N/A 12/26/2019    Procedure: Left Heart Cath;  Surgeon: Bentley Chapin MD;  Location: [facility] CATH INVASIVE LOCATION;  Service: Cardiovascular   • CARDIAC CATHETERIZATION N/A 12/26/2019    Procedure: Coronary angiography;  Surgeon: Bentley Chapin MD;  Location: [facility] CATH INVASIVE LOCATION;  Service: Cardiovascular   • CARDIAC CATHETERIZATION N/A 12/26/2019    Procedure: Native mammary injection;  Surgeon: Bentley Chapin MD;  Location: [facility] CATH INVASIVE LOCATION;  Service: Cardiovascular   • CARDIAC CATHETERIZATION N/A 12/26/2019    Procedure: Saphenous Vein Graft;  Surgeon: Bentley Chapin MD;  Location: [facility] CATH INVASIVE LOCATION;  Service: Cardiovascular   • COLON SURGERY     • COLONOSCOPY     • EYE SURGERY      lens implants and cataract surgery   • FEMUR IM NAILING/RODDING Left 3/26/2020    Procedure: LT.HIP NAILING/RODDING;  Surgeon: Carlos Khan II, MD;  Location: [facility] OR;  Service: Orthopedics;  Laterality: Left;   • HYSTERECTOMY     • VASCULAR SURGERY      varicous veins         FAMILY HISTORY  Family History   Problem Relation Age of Onset   • Heart disease Mother    • Stroke Mother    • Cancer Sister    • Alzheimer's disease Brother    • Cancer Sister          SOCIAL HISTORY  Social History     Socioeconomic History   • Marital status:      Spouse name: Not on file   • Number of children: Not on file   • Years of education: Not on file   • Highest education level: Not on file   Tobacco Use   • Smoking status: Never Smoker   • Smokeless tobacco: Never Used   Substance and Sexual Activity   • Alcohol use: Never     Frequency: Never   • Drug use: Never   • Sexual activity: Defer         ALLERGIES  Statins; Contrast dye; Iodinated diagnostic agents; and Iodine        REVIEW OF SYSTEMS  Review of Systems     All systems  reviewed and negative except for those discussed in HPI.       PHYSICAL EXAM    I have reviewed the triage vital signs and nursing notes.    ED Triage Vitals [06/13/20 1520]   Temp Heart Rate Resp BP SpO2   98.6 °F (37 °C) 70 18 135/87 97 %      Temp src Heart Rate Source Patient Position BP Location FiO2 (%)   -- -- -- -- --       Physical Exam  GENERAL: Awake and alert, nontoxic-appearing  HENT: nares patent, NCAT  EYES: no scleral icterus  CV: regular rhythm, regular rate  RESPIRATORY: normal effort  ABDOMEN: soft  MUSCULOSKELETAL: On the left lateral thigh and hip, there is a large subcutaneous hematoma.  It is tender to palpation, but there is no visible ecchymosis at this point.  There is no significant pain with range of motion of the hip, and the leg is neurovascular intact.  The pain is mostly with palpation.  I would estimate that the hematoma is about 10 cm in its largest diameter  NEURO: alert, moves all extremities, follows commands  SKIN: warm, dry        LAB RESULTS  Recent Results (from the past 24 hour(s))   Comprehensive Metabolic Panel    Collection Time: 06/13/20  5:06 PM   Result Value Ref Range    Glucose 129 (H) 65 - 99 mg/dL    BUN 19 8 - 23 mg/dL    Creatinine 0.98 0.57 - 1.00 mg/dL    Sodium 143 136 - 145 mmol/L    Potassium 3.7 3.5 - 5.2 mmol/L    Chloride 108 (H) 98 - 107 mmol/L    CO2 24.6 22.0 - 29.0 mmol/L    Calcium 8.5 (L) 8.6 - 10.5 mg/dL    Total Protein 6.1 6.0 - 8.5 g/dL    Albumin 3.60 3.50 - 5.20 g/dL    ALT (SGPT) 25 1 - 33 U/L    AST (SGOT) 28 1 - 32 U/L    Alkaline Phosphatase 127 (H) 39 - 117 U/L    Total Bilirubin 0.3 0.2 - 1.2 mg/dL    eGFR Non African Amer 54 (L) >60 mL/min/1.73    Globulin 2.5 gm/dL    A/G Ratio 1.4 g/dL    BUN/Creatinine Ratio 19.4 7.0 - 25.0    Anion Gap 10.4 5.0 - 15.0 mmol/L   Protime-INR    Collection Time: 06/13/20  5:06 PM   Result Value Ref Range    Protime 14.0 11.7 - 14.2 Seconds    INR 1.11 (H) 0.90 - 1.10   Type & Screen    Collection Time:  06/13/20  5:06 PM   Result Value Ref Range    ABO Type B     RH type Positive     Antibody Screen Negative     T&S Expiration Date 6/16/2020 11:59:59 PM    CBC Auto Differential    Collection Time: 06/13/20  5:06 PM   Result Value Ref Range    WBC 6.38 3.40 - 10.80 10*3/mm3    RBC 3.57 (L) 3.77 - 5.28 10*6/mm3    Hemoglobin 11.0 (L) 12.0 - 15.9 g/dL    Hematocrit 33.5 (L) 34.0 - 46.6 %    MCV 93.8 79.0 - 97.0 fL    MCH 30.8 26.6 - 33.0 pg    MCHC 32.8 31.5 - 35.7 g/dL    RDW 14.2 12.3 - 15.4 %    RDW-SD 48.9 37.0 - 54.0 fl    MPV 10.4 6.0 - 12.0 fL    Platelets 192 140 - 450 10*3/mm3    Neutrophil % 68.2 42.7 - 76.0 %    Lymphocyte % 21.2 19.6 - 45.3 %    Monocyte % 5.6 5.0 - 12.0 %    Eosinophil % 3.9 0.3 - 6.2 %    Basophil % 0.8 0.0 - 1.5 %    Immature Grans % 0.3 0.0 - 0.5 %    Neutrophils, Absolute 4.35 1.70 - 7.00 10*3/mm3    Lymphocytes, Absolute 1.35 0.70 - 3.10 10*3/mm3    Monocytes, Absolute 0.36 0.10 - 0.90 10*3/mm3    Eosinophils, Absolute 0.25 0.00 - 0.40 10*3/mm3    Basophils, Absolute 0.05 0.00 - 0.20 10*3/mm3    Immature Grans, Absolute 0.02 0.00 - 0.05 10*3/mm3    nRBC 0.0 0.0 - 0.2 /100 WBC       Ordered the above labs and independently reviewed the results.        RADIOLOGY  Xr Chest 1 View    Result Date: 6/13/2020  ONE-VIEW PORTABLE CHEST  HISTORY: Preop for hip surgery. Hypertension.  FINDINGS: The lungs are well-expanded and clear. There is mild cardiomegaly with sternal wires from previous cardiac surgery. There is no acute disease or change from 03/25/2020.  This report was finalized on 6/13/2020 4:58 PM by Dr. Bishop Dozier M.D.      Xr Hip With Or Without Pelvis 2 - 3 View Left    Result Date: 6/13/2020  PELVIS AND LEFT HIP X-RAYS  HISTORY: Fall. Left hip pain. Fracture repair in March 2020.  TECHNIQUE: An AP view the pelvis and two images of the left hip are correlated with x-rays from 03/26/2020.  FINDINGS: There is a left femoral gamma nail. The hardware appears intact and there is  some sclerosis along the intertrochanteric region at the site of the previously treated fracture. No acute fracture is identified. The bones of the pelvis appear normal. There is degenerative change in the lower lumbar spine.      No acute abnormality identified.  This report was finalized on 6/13/2020 4:33 PM by Dr. Azael Carrasquillo M.D.        I ordered the above noted radiological studies. Reviewed by me and discussed with radiologist.  See dictation for official radiology interpretation.      PROCEDURES    Procedures      MEDICATIONS GIVEN IN ER    Medications   sodium chloride 0.9 % flush 10 mL (has no administration in time range)         PROGRESS, DATA ANALYSIS, CONSULTS, AND MEDICAL DECISION MAKING    All labs have been independently reviewed by me.  All radiology studies have been reviewed by me and discussed with radiologist dictating the report.   EKG's independently viewed and interpreted by me.  Discussion below represents my analysis of pertinent findings related to patient's condition, differential diagnosis, treatment plan and final disposition.        ED Course as of Jun 13 2038   Sat Jun 13, 2020 2037 CBC is unremarkable, and hemoglobin is 11 which is consistent with multiple previous hemoglobins.  I do not think this represents an acute blood loss situation.    Coags are normal and chemistry is unremarkable    [DP]   2037 Plain film shows no acute fracture.  Hardware from a previous ORIF of that hip is seen and is in good position.    [DP]   2037 Patient has had difficulty with ambulation now for a couple years.  She is fallen several times and has been through physical therapy.  This was kind of a typical type fall for her    [DP]      ED Course User Index  [DP] Tristan Flores MD         AS OF 20:38 VITALS:    BP - 125/72  HR - 86  TEMP - 98.6 °F (37 °C)  O2 SATS - 97%        DIAGNOSIS  Final diagnoses:   Hematoma of left hip, initial encounter         DISPOSITION  Discharge           Mark  Tristan AGUILERA MD  06/13/20 [id number]

## 2020-09-28 ENCOUNTER — LAB REQUISITION (OUTPATIENT)
Dept: LAB | Facility: HOSPITAL | Age: 85
End: 2020-09-28

## 2020-09-28 DIAGNOSIS — I50.32 CHRONIC DIASTOLIC (CONGESTIVE) HEART FAILURE (HCC): ICD-10-CM

## 2020-09-28 DIAGNOSIS — E11.22 TYPE 2 DIABETES MELLITUS WITH DIABETIC CHRONIC KIDNEY DISEASE (HCC): ICD-10-CM

## 2020-09-28 DIAGNOSIS — N18.30 CHRONIC KIDNEY DISEASE, STAGE 3 (MODERATE): ICD-10-CM

## 2020-09-28 DIAGNOSIS — I13.0 HYPERTENSIVE HEART AND CHRONIC KIDNEY DISEASE WITH HEART FAILURE AND STAGE 1 THROUGH STAGE 4 CHRONIC KIDNEY DISEASE, OR UNSPECIFIED CHRONIC KIDNEY DISEASE (HCC): ICD-10-CM

## 2020-09-28 DIAGNOSIS — R60.9 EDEMA, UNSPECIFIED: ICD-10-CM

## 2020-09-28 LAB
ANION GAP SERPL CALCULATED.3IONS-SCNC: 11.6 MMOL/L (ref 5–15)
BUN SERPL-MCNC: 16 MG/DL (ref 8–23)
BUN/CREAT SERPL: 13.9 (ref 7–25)
CALCIUM SPEC-SCNC: 8.7 MG/DL (ref 8.6–10.5)
CHLORIDE SERPL-SCNC: 104 MMOL/L (ref 98–107)
CO2 SERPL-SCNC: 30.4 MMOL/L (ref 22–29)
CREAT SERPL-MCNC: 1.15 MG/DL (ref 0.57–1)
GFR SERPL CREATININE-BSD FRML MDRD: 45 ML/MIN/1.73
GLUCOSE SERPL-MCNC: 166 MG/DL (ref 65–99)
POTASSIUM SERPL-SCNC: 3.6 MMOL/L (ref 3.5–5.2)
SODIUM SERPL-SCNC: 146 MMOL/L (ref 136–145)

## 2020-09-28 PROCEDURE — 80048 BASIC METABOLIC PNL TOTAL CA: CPT | Performed by: FAMILY MEDICINE

## 2020-11-25 ENCOUNTER — LAB REQUISITION (OUTPATIENT)
Dept: LAB | Facility: HOSPITAL | Age: 85
End: 2020-11-25

## 2020-11-25 DIAGNOSIS — I13.0 HYPERTENSIVE HEART AND CHRONIC KIDNEY DISEASE WITH HEART FAILURE AND STAGE 1 THROUGH STAGE 4 CHRONIC KIDNEY DISEASE, OR UNSPECIFIED CHRONIC KIDNEY DISEASE (HCC): ICD-10-CM

## 2020-11-25 DIAGNOSIS — N18.30 CHRONIC KIDNEY DISEASE, STAGE 3 UNSPECIFIED (HCC): ICD-10-CM

## 2020-11-25 LAB
ANION GAP SERPL CALCULATED.3IONS-SCNC: 7.7 MMOL/L (ref 5–15)
BUN SERPL-MCNC: 14 MG/DL (ref 8–23)
BUN/CREAT SERPL: 15.9 (ref 7–25)
CALCIUM SPEC-SCNC: 8.6 MG/DL (ref 8.6–10.5)
CHLORIDE SERPL-SCNC: 102 MMOL/L (ref 98–107)
CO2 SERPL-SCNC: 32.3 MMOL/L (ref 22–29)
CREAT SERPL-MCNC: 0.88 MG/DL (ref 0.57–1)
GFR SERPL CREATININE-BSD FRML MDRD: 61 ML/MIN/1.73
GLUCOSE SERPL-MCNC: 141 MG/DL (ref 65–99)
POTASSIUM SERPL-SCNC: 3.6 MMOL/L (ref 3.5–5.2)
SODIUM SERPL-SCNC: 142 MMOL/L (ref 136–145)

## 2020-11-25 PROCEDURE — 80048 BASIC METABOLIC PNL TOTAL CA: CPT | Performed by: FAMILY MEDICINE

## 2020-12-11 ENCOUNTER — LAB REQUISITION (OUTPATIENT)
Dept: LAB | Facility: HOSPITAL | Age: 85
End: 2020-12-11

## 2020-12-11 DIAGNOSIS — I50.32 CHRONIC DIASTOLIC (CONGESTIVE) HEART FAILURE (HCC): ICD-10-CM

## 2020-12-11 DIAGNOSIS — E11.22 TYPE 2 DIABETES MELLITUS WITH DIABETIC CHRONIC KIDNEY DISEASE (HCC): ICD-10-CM

## 2020-12-11 LAB
ANION GAP SERPL CALCULATED.3IONS-SCNC: 7.4 MMOL/L (ref 5–15)
BUN SERPL-MCNC: 15 MG/DL (ref 8–23)
BUN/CREAT SERPL: 17.2 (ref 7–25)
CALCIUM SPEC-SCNC: 8.4 MG/DL (ref 8.6–10.5)
CHLORIDE SERPL-SCNC: 106 MMOL/L (ref 98–107)
CO2 SERPL-SCNC: 25.6 MMOL/L (ref 22–29)
CREAT SERPL-MCNC: 0.87 MG/DL (ref 0.57–1)
GFR SERPL CREATININE-BSD FRML MDRD: 62 ML/MIN/1.73
GLUCOSE SERPL-MCNC: 193 MG/DL (ref 65–99)
POTASSIUM SERPL-SCNC: 4.6 MMOL/L (ref 3.5–5.2)
SODIUM SERPL-SCNC: 139 MMOL/L (ref 136–145)

## 2020-12-11 PROCEDURE — 80048 BASIC METABOLIC PNL TOTAL CA: CPT | Performed by: FAMILY MEDICINE

## 2021-01-07 ENCOUNTER — HOSPITAL ENCOUNTER (INPATIENT)
Facility: HOSPITAL | Age: 86
LOS: 41 days | Discharge: HOME HEALTH | End: 2021-02-17
Attending: PHYSICAL MEDICINE & REHABILITATION | Admitting: PHYSICAL MEDICINE & REHABILITATION

## 2021-01-07 DIAGNOSIS — Z74.09 IMPAIRED FUNCTIONAL MOBILITY, BALANCE, GAIT, AND ENDURANCE: Primary | ICD-10-CM

## 2021-01-07 DIAGNOSIS — S72.002D CLOSED FRACTURE OF LEFT HIP WITH ROUTINE HEALING, SUBSEQUENT ENCOUNTER: ICD-10-CM

## 2021-01-07 DIAGNOSIS — G47.30 SLEEP APNEA, UNSPECIFIED TYPE: ICD-10-CM

## 2021-01-07 PROBLEM — I63.9 STROKE (HCC): Status: ACTIVE | Noted: 2021-01-07

## 2021-01-07 LAB
B PARAPERT DNA SPEC QL NAA+PROBE: NOT DETECTED
B PERT DNA SPEC QL NAA+PROBE: NOT DETECTED
C PNEUM DNA NPH QL NAA+NON-PROBE: NOT DETECTED
FLUAV SUBTYP SPEC NAA+PROBE: NOT DETECTED
FLUBV RNA ISLT QL NAA+PROBE: NOT DETECTED
HADV DNA SPEC NAA+PROBE: NOT DETECTED
HCOV 229E RNA SPEC QL NAA+PROBE: NOT DETECTED
HCOV HKU1 RNA SPEC QL NAA+PROBE: NOT DETECTED
HCOV NL63 RNA SPEC QL NAA+PROBE: NOT DETECTED
HCOV OC43 RNA SPEC QL NAA+PROBE: NOT DETECTED
HMPV RNA NPH QL NAA+NON-PROBE: NOT DETECTED
HPIV1 RNA SPEC QL NAA+PROBE: NOT DETECTED
HPIV2 RNA SPEC QL NAA+PROBE: NOT DETECTED
HPIV3 RNA NPH QL NAA+PROBE: NOT DETECTED
HPIV4 P GENE NPH QL NAA+PROBE: NOT DETECTED
M PNEUMO IGG SER IA-ACNC: NOT DETECTED
RHINOVIRUS RNA SPEC NAA+PROBE: NOT DETECTED
RSV RNA NPH QL NAA+NON-PROBE: NOT DETECTED
SARS-COV-2 RNA NPH QL NAA+NON-PROBE: NOT DETECTED

## 2021-01-07 PROCEDURE — 0202U NFCT DS 22 TRGT SARS-COV-2: CPT | Performed by: PHYSICAL MEDICINE & REHABILITATION

## 2021-01-07 RX ORDER — PANTOPRAZOLE SODIUM 40 MG/1
40 TABLET, DELAYED RELEASE ORAL EVERY MORNING
Status: DISCONTINUED | OUTPATIENT
Start: 2021-01-08 | End: 2021-02-17 | Stop reason: HOSPADM

## 2021-01-07 RX ORDER — HYDROCODONE BITARTRATE AND ACETAMINOPHEN 7.5; 325 MG/1; MG/1
1 TABLET ORAL EVERY 4 HOURS PRN
Status: DISCONTINUED | OUTPATIENT
Start: 2021-01-07 | End: 2021-01-14

## 2021-01-07 RX ORDER — OXYBUTYNIN CHLORIDE 5 MG/1
5 TABLET ORAL 2 TIMES DAILY
Status: DISCONTINUED | OUTPATIENT
Start: 2021-01-07 | End: 2021-01-22

## 2021-01-07 RX ORDER — DILTIAZEM HYDROCHLORIDE 180 MG/1
180 CAPSULE, COATED, EXTENDED RELEASE ORAL 2 TIMES DAILY
Status: DISCONTINUED | OUTPATIENT
Start: 2021-01-07 | End: 2021-02-17 | Stop reason: HOSPADM

## 2021-01-07 RX ORDER — LEVOTHYROXINE SODIUM 0.12 MG/1
125 TABLET ORAL
Status: DISCONTINUED | OUTPATIENT
Start: 2021-01-08 | End: 2021-02-17 | Stop reason: HOSPADM

## 2021-01-07 RX ORDER — FERROUS SULFATE 325(65) MG
325 TABLET ORAL
Status: DISCONTINUED | OUTPATIENT
Start: 2021-01-08 | End: 2021-02-17 | Stop reason: HOSPADM

## 2021-01-07 RX ORDER — DOCUSATE SODIUM 100 MG/1
100 CAPSULE, LIQUID FILLED ORAL 2 TIMES DAILY
Status: DISCONTINUED | OUTPATIENT
Start: 2021-01-07 | End: 2021-01-14

## 2021-01-07 RX ORDER — ASPIRIN 81 MG/1
81 TABLET, CHEWABLE ORAL DAILY
Status: DISCONTINUED | OUTPATIENT
Start: 2021-01-08 | End: 2021-02-17 | Stop reason: HOSPADM

## 2021-01-07 RX ORDER — LOSARTAN POTASSIUM 50 MG/1
50 TABLET ORAL DAILY
Status: DISCONTINUED | OUTPATIENT
Start: 2021-01-08 | End: 2021-02-17 | Stop reason: HOSPADM

## 2021-01-07 RX ADMIN — HYDROCODONE BITARTRATE AND ACETAMINOPHEN 1 TABLET: 7.5; 325 TABLET ORAL at 19:08

## 2021-01-07 RX ADMIN — DILTIAZEM HYDROCHLORIDE 180 MG: 180 CAPSULE, COATED, EXTENDED RELEASE ORAL at 21:50

## 2021-01-07 RX ADMIN — DOCUSATE SODIUM 100 MG: 100 CAPSULE, LIQUID FILLED ORAL at 21:50

## 2021-01-07 RX ADMIN — HYDROCODONE BITARTRATE AND ACETAMINOPHEN 1 TABLET: 7.5; 325 TABLET ORAL at 23:30

## 2021-01-07 RX ADMIN — OXYBUTYNIN CHLORIDE 5 MG: 5 TABLET ORAL at 21:50

## 2021-01-08 LAB
25(OH)D3 SERPL-MCNC: 20.6 NG/ML (ref 30–100)
ANION GAP SERPL CALCULATED.3IONS-SCNC: 5.1 MMOL/L (ref 5–15)
BASOPHILS # BLD AUTO: 0.04 10*3/MM3 (ref 0–0.2)
BASOPHILS NFR BLD AUTO: 0.7 % (ref 0–1.5)
BUN SERPL-MCNC: 13 MG/DL (ref 8–23)
BUN/CREAT SERPL: 21 (ref 7–25)
CALCIUM SPEC-SCNC: 8.4 MG/DL (ref 8.6–10.5)
CHLORIDE SERPL-SCNC: 103 MMOL/L (ref 98–107)
CO2 SERPL-SCNC: 28.9 MMOL/L (ref 22–29)
CREAT SERPL-MCNC: 0.62 MG/DL (ref 0.57–1)
DEPRECATED RDW RBC AUTO: 54.6 FL (ref 37–54)
EOSINOPHIL # BLD AUTO: 0.26 10*3/MM3 (ref 0–0.4)
EOSINOPHIL NFR BLD AUTO: 4.4 % (ref 0.3–6.2)
ERYTHROCYTE [DISTWIDTH] IN BLOOD BY AUTOMATED COUNT: 20.9 % (ref 12.3–15.4)
GFR SERPL CREATININE-BSD FRML MDRD: 92 ML/MIN/1.73
GLUCOSE SERPL-MCNC: 109 MG/DL (ref 65–99)
HCT VFR BLD AUTO: 28.4 % (ref 34–46.6)
HGB BLD-MCNC: 8.7 G/DL (ref 12–15.9)
IMM GRANULOCYTES # BLD AUTO: 0.03 10*3/MM3 (ref 0–0.05)
IMM GRANULOCYTES NFR BLD AUTO: 0.5 % (ref 0–0.5)
LYMPHOCYTES # BLD AUTO: 1.18 10*3/MM3 (ref 0.7–3.1)
LYMPHOCYTES NFR BLD AUTO: 20 % (ref 19.6–45.3)
MCH RBC QN AUTO: 23.6 PG (ref 26.6–33)
MCHC RBC AUTO-ENTMCNC: 30.6 G/DL (ref 31.5–35.7)
MCV RBC AUTO: 77 FL (ref 79–97)
MONOCYTES # BLD AUTO: 0.42 10*3/MM3 (ref 0.1–0.9)
MONOCYTES NFR BLD AUTO: 7.1 % (ref 5–12)
NEUTROPHILS NFR BLD AUTO: 3.96 10*3/MM3 (ref 1.7–7)
NEUTROPHILS NFR BLD AUTO: 67.3 % (ref 42.7–76)
NRBC BLD AUTO-RTO: 0 /100 WBC (ref 0–0.2)
PLATELET # BLD AUTO: 211 10*3/MM3 (ref 140–450)
PMV BLD AUTO: 10 FL (ref 6–12)
POTASSIUM SERPL-SCNC: 3.5 MMOL/L (ref 3.5–5.2)
RBC # BLD AUTO: 3.69 10*6/MM3 (ref 3.77–5.28)
SODIUM SERPL-SCNC: 137 MMOL/L (ref 136–145)
TSH SERPL DL<=0.05 MIU/L-ACNC: 2.83 UIU/ML (ref 0.27–4.2)
VIT B12 BLD-MCNC: 381 PG/ML (ref 211–946)
WBC # BLD AUTO: 5.89 10*3/MM3 (ref 3.4–10.8)

## 2021-01-08 PROCEDURE — 97112 NEUROMUSCULAR REEDUCATION: CPT

## 2021-01-08 PROCEDURE — 97530 THERAPEUTIC ACTIVITIES: CPT

## 2021-01-08 PROCEDURE — 82607 VITAMIN B-12: CPT | Performed by: PHYSICAL MEDICINE & REHABILITATION

## 2021-01-08 PROCEDURE — 97162 PT EVAL MOD COMPLEX 30 MIN: CPT

## 2021-01-08 PROCEDURE — 97535 SELF CARE MNGMENT TRAINING: CPT

## 2021-01-08 PROCEDURE — 63710000001 ONDANSETRON ODT 4 MG TABLET DISPERSIBLE: Performed by: PHYSICAL MEDICINE & REHABILITATION

## 2021-01-08 PROCEDURE — 85025 COMPLETE CBC W/AUTO DIFF WBC: CPT | Performed by: PHYSICAL MEDICINE & REHABILITATION

## 2021-01-08 PROCEDURE — 80048 BASIC METABOLIC PNL TOTAL CA: CPT | Performed by: PHYSICAL MEDICINE & REHABILITATION

## 2021-01-08 PROCEDURE — 92523 SPEECH SOUND LANG COMPREHEN: CPT

## 2021-01-08 PROCEDURE — 97167 OT EVAL HIGH COMPLEX 60 MIN: CPT

## 2021-01-08 PROCEDURE — 25010000002 ENOXAPARIN PER 10 MG: Performed by: PHYSICAL MEDICINE & REHABILITATION

## 2021-01-08 PROCEDURE — 82306 VITAMIN D 25 HYDROXY: CPT | Performed by: PHYSICAL MEDICINE & REHABILITATION

## 2021-01-08 PROCEDURE — 84443 ASSAY THYROID STIM HORMONE: CPT | Performed by: PHYSICAL MEDICINE & REHABILITATION

## 2021-01-08 PROCEDURE — 97110 THERAPEUTIC EXERCISES: CPT

## 2021-01-08 RX ORDER — ERGOCALCIFEROL 1.25 MG/1
50000 CAPSULE ORAL
Status: DISCONTINUED | OUTPATIENT
Start: 2021-01-09 | End: 2021-02-17 | Stop reason: HOSPADM

## 2021-01-08 RX ORDER — ONDANSETRON 4 MG/1
4 TABLET, ORALLY DISINTEGRATING ORAL EVERY 6 HOURS PRN
Status: DISCONTINUED | OUTPATIENT
Start: 2021-01-08 | End: 2021-01-12

## 2021-01-08 RX ORDER — MELATONIN
1000 DAILY
Status: DISCONTINUED | OUTPATIENT
Start: 2021-03-06 | End: 2021-02-17 | Stop reason: HOSPADM

## 2021-01-08 RX ADMIN — HYDROCODONE BITARTRATE AND ACETAMINOPHEN 1 TABLET: 7.5; 325 TABLET ORAL at 05:18

## 2021-01-08 RX ADMIN — ONDANSETRON 4 MG: 4 TABLET, ORALLY DISINTEGRATING ORAL at 12:17

## 2021-01-08 RX ADMIN — OXYBUTYNIN CHLORIDE 5 MG: 5 TABLET ORAL at 09:01

## 2021-01-08 RX ADMIN — HYDROCODONE BITARTRATE AND ACETAMINOPHEN 1 TABLET: 7.5; 325 TABLET ORAL at 12:21

## 2021-01-08 RX ADMIN — DILTIAZEM HYDROCHLORIDE 180 MG: 180 CAPSULE, COATED, EXTENDED RELEASE ORAL at 20:24

## 2021-01-08 RX ADMIN — DILTIAZEM HYDROCHLORIDE 180 MG: 180 CAPSULE, COATED, EXTENDED RELEASE ORAL at 09:02

## 2021-01-08 RX ADMIN — HYDROCODONE BITARTRATE AND ACETAMINOPHEN 1 TABLET: 7.5; 325 TABLET ORAL at 18:14

## 2021-01-08 RX ADMIN — OXYBUTYNIN CHLORIDE 5 MG: 5 TABLET ORAL at 20:24

## 2021-01-08 RX ADMIN — LEVOTHYROXINE SODIUM 125 MCG: 0.12 TABLET ORAL at 05:18

## 2021-01-08 RX ADMIN — PANTOPRAZOLE SODIUM 40 MG: 40 TABLET, DELAYED RELEASE ORAL at 05:18

## 2021-01-08 RX ADMIN — FERROUS SULFATE TAB 325 MG (65 MG ELEMENTAL FE) 325 MG: 325 (65 FE) TAB at 09:02

## 2021-01-08 RX ADMIN — DOCUSATE SODIUM 100 MG: 100 CAPSULE, LIQUID FILLED ORAL at 20:24

## 2021-01-08 RX ADMIN — DOCUSATE SODIUM 100 MG: 100 CAPSULE, LIQUID FILLED ORAL at 09:01

## 2021-01-08 RX ADMIN — ENOXAPARIN SODIUM 40 MG: 40 INJECTION SUBCUTANEOUS at 09:01

## 2021-01-08 RX ADMIN — LOSARTAN POTASSIUM 50 MG: 50 TABLET, FILM COATED ORAL at 09:02

## 2021-01-08 RX ADMIN — ASPIRIN 81 MG: 81 TABLET, CHEWABLE ORAL at 09:01

## 2021-01-09 LAB
BACTERIA UR QL AUTO: ABNORMAL /HPF
BILIRUB UR QL STRIP: NEGATIVE
CLARITY UR: ABNORMAL
COLOR UR: YELLOW
GLUCOSE UR STRIP-MCNC: NEGATIVE MG/DL
HGB UR QL STRIP.AUTO: NEGATIVE
HYALINE CASTS UR QL AUTO: ABNORMAL /LPF
KETONES UR QL STRIP: NEGATIVE
LEUKOCYTE ESTERASE UR QL STRIP.AUTO: ABNORMAL
NITRITE UR QL STRIP: NEGATIVE
PH UR STRIP.AUTO: 5.5 [PH] (ref 5–8)
PROT UR QL STRIP: NEGATIVE
RBC # UR: ABNORMAL /HPF
REF LAB TEST METHOD: ABNORMAL
SP GR UR STRIP: 1.01 (ref 1–1.03)
SQUAMOUS #/AREA URNS HPF: ABNORMAL /HPF
UROBILINOGEN UR QL STRIP: ABNORMAL
WBC UR QL AUTO: ABNORMAL /HPF

## 2021-01-09 PROCEDURE — 97535 SELF CARE MNGMENT TRAINING: CPT

## 2021-01-09 PROCEDURE — 25010000002 ENOXAPARIN PER 10 MG: Performed by: PHYSICAL MEDICINE & REHABILITATION

## 2021-01-09 PROCEDURE — 87086 URINE CULTURE/COLONY COUNT: CPT | Performed by: PHYSICAL MEDICINE & REHABILITATION

## 2021-01-09 PROCEDURE — 97112 NEUROMUSCULAR REEDUCATION: CPT

## 2021-01-09 PROCEDURE — 97110 THERAPEUTIC EXERCISES: CPT

## 2021-01-09 PROCEDURE — 81001 URINALYSIS AUTO W/SCOPE: CPT | Performed by: PHYSICAL MEDICINE & REHABILITATION

## 2021-01-09 PROCEDURE — 63710000001 ONDANSETRON ODT 4 MG TABLET DISPERSIBLE: Performed by: PHYSICAL MEDICINE & REHABILITATION

## 2021-01-09 PROCEDURE — 97129 THER IVNTJ 1ST 15 MIN: CPT

## 2021-01-09 PROCEDURE — 87088 URINE BACTERIA CULTURE: CPT | Performed by: PHYSICAL MEDICINE & REHABILITATION

## 2021-01-09 PROCEDURE — 97130 THER IVNTJ EA ADDL 15 MIN: CPT

## 2021-01-09 PROCEDURE — 87186 SC STD MICRODIL/AGAR DIL: CPT | Performed by: PHYSICAL MEDICINE & REHABILITATION

## 2021-01-09 RX ORDER — ASPIRIN 81 MG/1
81 TABLET, CHEWABLE ORAL DAILY
COMMUNITY
End: 2021-02-17 | Stop reason: HOSPADM

## 2021-01-09 RX ORDER — OMEPRAZOLE 20 MG/1
20 CAPSULE, DELAYED RELEASE ORAL 2 TIMES DAILY
COMMUNITY
End: 2021-02-17 | Stop reason: HOSPADM

## 2021-01-09 RX ORDER — OXYBUTYNIN CHLORIDE 5 MG/1
5 TABLET ORAL 2 TIMES DAILY
COMMUNITY

## 2021-01-09 RX ORDER — LOSARTAN POTASSIUM 100 MG/1
100 TABLET ORAL DAILY
COMMUNITY
End: 2021-02-17 | Stop reason: HOSPADM

## 2021-01-09 RX ORDER — NITROFURANTOIN 25; 75 MG/1; MG/1
100 CAPSULE ORAL EVERY 12 HOURS SCHEDULED
Status: COMPLETED | OUTPATIENT
Start: 2021-01-09 | End: 2021-01-16

## 2021-01-09 RX ADMIN — FERROUS SULFATE TAB 325 MG (65 MG ELEMENTAL FE) 325 MG: 325 (65 FE) TAB at 08:14

## 2021-01-09 RX ADMIN — LEVOTHYROXINE SODIUM 125 MCG: 0.12 TABLET ORAL at 05:40

## 2021-01-09 RX ADMIN — PANTOPRAZOLE SODIUM 40 MG: 40 TABLET, DELAYED RELEASE ORAL at 05:40

## 2021-01-09 RX ADMIN — NITROFURANTOIN MONOHYDRATE/MACROCRYSTALLINE 100 MG: 25; 75 CAPSULE ORAL at 21:29

## 2021-01-09 RX ADMIN — ERGOCALCIFEROL 50000 UNITS: 1.25 CAPSULE ORAL at 08:14

## 2021-01-09 RX ADMIN — DOCUSATE SODIUM 100 MG: 100 CAPSULE, LIQUID FILLED ORAL at 21:29

## 2021-01-09 RX ADMIN — HYDROCODONE BITARTRATE AND ACETAMINOPHEN 1 TABLET: 7.5; 325 TABLET ORAL at 08:17

## 2021-01-09 RX ADMIN — DOCUSATE SODIUM 100 MG: 100 CAPSULE, LIQUID FILLED ORAL at 08:14

## 2021-01-09 RX ADMIN — HYDROCODONE BITARTRATE AND ACETAMINOPHEN 1 TABLET: 7.5; 325 TABLET ORAL at 15:45

## 2021-01-09 RX ADMIN — LOSARTAN POTASSIUM 50 MG: 50 TABLET, FILM COATED ORAL at 08:14

## 2021-01-09 RX ADMIN — OXYBUTYNIN CHLORIDE 5 MG: 5 TABLET ORAL at 21:29

## 2021-01-09 RX ADMIN — ONDANSETRON 4 MG: 4 TABLET, ORALLY DISINTEGRATING ORAL at 09:21

## 2021-01-09 RX ADMIN — HYDROCODONE BITARTRATE AND ACETAMINOPHEN 1 TABLET: 7.5; 325 TABLET ORAL at 22:44

## 2021-01-09 RX ADMIN — OXYBUTYNIN CHLORIDE 5 MG: 5 TABLET ORAL at 08:14

## 2021-01-09 RX ADMIN — ASPIRIN 81 MG: 81 TABLET, CHEWABLE ORAL at 08:14

## 2021-01-09 RX ADMIN — DILTIAZEM HYDROCHLORIDE 180 MG: 180 CAPSULE, COATED, EXTENDED RELEASE ORAL at 21:29

## 2021-01-09 RX ADMIN — HYDROCODONE BITARTRATE AND ACETAMINOPHEN 1 TABLET: 7.5; 325 TABLET ORAL at 02:00

## 2021-01-09 RX ADMIN — DILTIAZEM HYDROCHLORIDE 180 MG: 180 CAPSULE, COATED, EXTENDED RELEASE ORAL at 08:14

## 2021-01-09 RX ADMIN — ENOXAPARIN SODIUM 40 MG: 40 INJECTION SUBCUTANEOUS at 08:14

## 2021-01-10 LAB — BACTERIA SPEC AEROBE CULT: ABNORMAL

## 2021-01-10 PROCEDURE — 25010000002 ENOXAPARIN PER 10 MG: Performed by: PHYSICAL MEDICINE & REHABILITATION

## 2021-01-10 RX ADMIN — LOSARTAN POTASSIUM 50 MG: 50 TABLET, FILM COATED ORAL at 08:12

## 2021-01-10 RX ADMIN — NITROFURANTOIN MONOHYDRATE/MACROCRYSTALLINE 100 MG: 25; 75 CAPSULE ORAL at 20:19

## 2021-01-10 RX ADMIN — DOCUSATE SODIUM 100 MG: 100 CAPSULE, LIQUID FILLED ORAL at 20:18

## 2021-01-10 RX ADMIN — DILTIAZEM HYDROCHLORIDE 180 MG: 180 CAPSULE, COATED, EXTENDED RELEASE ORAL at 20:18

## 2021-01-10 RX ADMIN — LEVOTHYROXINE SODIUM 125 MCG: 0.12 TABLET ORAL at 06:02

## 2021-01-10 RX ADMIN — FERROUS SULFATE TAB 325 MG (65 MG ELEMENTAL FE) 325 MG: 325 (65 FE) TAB at 08:12

## 2021-01-10 RX ADMIN — OXYBUTYNIN CHLORIDE 5 MG: 5 TABLET ORAL at 08:12

## 2021-01-10 RX ADMIN — DILTIAZEM HYDROCHLORIDE 180 MG: 180 CAPSULE, COATED, EXTENDED RELEASE ORAL at 08:11

## 2021-01-10 RX ADMIN — NITROFURANTOIN MONOHYDRATE/MACROCRYSTALLINE 100 MG: 25; 75 CAPSULE ORAL at 08:12

## 2021-01-10 RX ADMIN — OXYBUTYNIN CHLORIDE 5 MG: 5 TABLET ORAL at 20:18

## 2021-01-10 RX ADMIN — HYDROCODONE BITARTRATE AND ACETAMINOPHEN 1 TABLET: 7.5; 325 TABLET ORAL at 19:12

## 2021-01-10 RX ADMIN — HYDROCODONE BITARTRATE AND ACETAMINOPHEN 1 TABLET: 7.5; 325 TABLET ORAL at 14:34

## 2021-01-10 RX ADMIN — HYDROCODONE BITARTRATE AND ACETAMINOPHEN 1 TABLET: 7.5; 325 TABLET ORAL at 06:02

## 2021-01-10 RX ADMIN — ASPIRIN 81 MG: 81 TABLET, CHEWABLE ORAL at 08:12

## 2021-01-10 RX ADMIN — ENOXAPARIN SODIUM 40 MG: 40 INJECTION SUBCUTANEOUS at 08:11

## 2021-01-10 RX ADMIN — PANTOPRAZOLE SODIUM 40 MG: 40 TABLET, DELAYED RELEASE ORAL at 06:02

## 2021-01-10 RX ADMIN — DOCUSATE SODIUM 100 MG: 100 CAPSULE, LIQUID FILLED ORAL at 08:11

## 2021-01-11 LAB
ANION GAP SERPL CALCULATED.3IONS-SCNC: 7.7 MMOL/L (ref 5–15)
BASOPHILS # BLD AUTO: 0.04 10*3/MM3 (ref 0–0.2)
BASOPHILS NFR BLD AUTO: 0.7 % (ref 0–1.5)
BUN SERPL-MCNC: 12 MG/DL (ref 8–23)
BUN/CREAT SERPL: 20 (ref 7–25)
CALCIUM SPEC-SCNC: 9 MG/DL (ref 8.6–10.5)
CHLORIDE SERPL-SCNC: 98 MMOL/L (ref 98–107)
CO2 SERPL-SCNC: 30.3 MMOL/L (ref 22–29)
CREAT SERPL-MCNC: 0.6 MG/DL (ref 0.57–1)
DEPRECATED RDW RBC AUTO: 62.5 FL (ref 37–54)
EOSINOPHIL # BLD AUTO: 0.16 10*3/MM3 (ref 0–0.4)
EOSINOPHIL NFR BLD AUTO: 2.8 % (ref 0.3–6.2)
ERYTHROCYTE [DISTWIDTH] IN BLOOD BY AUTOMATED COUNT: 22.8 % (ref 12.3–15.4)
GFR SERPL CREATININE-BSD FRML MDRD: 95 ML/MIN/1.73
GLUCOSE SERPL-MCNC: 114 MG/DL (ref 65–99)
HCT VFR BLD AUTO: 30.3 % (ref 34–46.6)
HGB BLD-MCNC: 9.2 G/DL (ref 12–15.9)
IMM GRANULOCYTES # BLD AUTO: 0.02 10*3/MM3 (ref 0–0.05)
IMM GRANULOCYTES NFR BLD AUTO: 0.4 % (ref 0–0.5)
LYMPHOCYTES # BLD AUTO: 0.95 10*3/MM3 (ref 0.7–3.1)
LYMPHOCYTES NFR BLD AUTO: 16.8 % (ref 19.6–45.3)
MCH RBC QN AUTO: 24 PG (ref 26.6–33)
MCHC RBC AUTO-ENTMCNC: 30.4 G/DL (ref 31.5–35.7)
MCV RBC AUTO: 78.9 FL (ref 79–97)
MONOCYTES # BLD AUTO: 0.38 10*3/MM3 (ref 0.1–0.9)
MONOCYTES NFR BLD AUTO: 6.7 % (ref 5–12)
NEUTROPHILS NFR BLD AUTO: 4.12 10*3/MM3 (ref 1.7–7)
NEUTROPHILS NFR BLD AUTO: 72.6 % (ref 42.7–76)
NRBC BLD AUTO-RTO: 0.2 /100 WBC (ref 0–0.2)
PLATELET # BLD AUTO: 159 10*3/MM3 (ref 140–450)
PMV BLD AUTO: 10.4 FL (ref 6–12)
POTASSIUM SERPL-SCNC: 3.7 MMOL/L (ref 3.5–5.2)
RBC # BLD AUTO: 3.84 10*6/MM3 (ref 3.77–5.28)
SODIUM SERPL-SCNC: 136 MMOL/L (ref 136–145)
WBC # BLD AUTO: 5.67 10*3/MM3 (ref 3.4–10.8)

## 2021-01-11 PROCEDURE — 80048 BASIC METABOLIC PNL TOTAL CA: CPT | Performed by: PHYSICAL MEDICINE & REHABILITATION

## 2021-01-11 PROCEDURE — 25010000002 ENOXAPARIN PER 10 MG: Performed by: PHYSICAL MEDICINE & REHABILITATION

## 2021-01-11 PROCEDURE — 97535 SELF CARE MNGMENT TRAINING: CPT

## 2021-01-11 PROCEDURE — 85025 COMPLETE CBC W/AUTO DIFF WBC: CPT | Performed by: PHYSICAL MEDICINE & REHABILITATION

## 2021-01-11 PROCEDURE — 63710000001 ONDANSETRON ODT 4 MG TABLET DISPERSIBLE: Performed by: PHYSICAL MEDICINE & REHABILITATION

## 2021-01-11 PROCEDURE — 97130 THER IVNTJ EA ADDL 15 MIN: CPT

## 2021-01-11 PROCEDURE — 97110 THERAPEUTIC EXERCISES: CPT

## 2021-01-11 PROCEDURE — 97530 THERAPEUTIC ACTIVITIES: CPT

## 2021-01-11 PROCEDURE — 97129 THER IVNTJ 1ST 15 MIN: CPT

## 2021-01-11 RX ORDER — SUCRALFATE 1 G/1
1 TABLET ORAL
COMMUNITY
End: 2021-02-17 | Stop reason: HOSPADM

## 2021-01-11 RX ADMIN — OXYBUTYNIN CHLORIDE 5 MG: 5 TABLET ORAL at 08:03

## 2021-01-11 RX ADMIN — HYDROCODONE BITARTRATE AND ACETAMINOPHEN 1 TABLET: 7.5; 325 TABLET ORAL at 09:35

## 2021-01-11 RX ADMIN — HYDROCODONE BITARTRATE AND ACETAMINOPHEN 1 TABLET: 7.5; 325 TABLET ORAL at 04:48

## 2021-01-11 RX ADMIN — NITROFURANTOIN MONOHYDRATE/MACROCRYSTALLINE 100 MG: 25; 75 CAPSULE ORAL at 08:03

## 2021-01-11 RX ADMIN — DOCUSATE SODIUM 100 MG: 100 CAPSULE, LIQUID FILLED ORAL at 19:45

## 2021-01-11 RX ADMIN — OXYBUTYNIN CHLORIDE 5 MG: 5 TABLET ORAL at 19:44

## 2021-01-11 RX ADMIN — PANTOPRAZOLE SODIUM 40 MG: 40 TABLET, DELAYED RELEASE ORAL at 08:12

## 2021-01-11 RX ADMIN — ENOXAPARIN SODIUM 40 MG: 40 INJECTION SUBCUTANEOUS at 08:03

## 2021-01-11 RX ADMIN — LOSARTAN POTASSIUM 50 MG: 50 TABLET, FILM COATED ORAL at 08:03

## 2021-01-11 RX ADMIN — ONDANSETRON 4 MG: 4 TABLET, ORALLY DISINTEGRATING ORAL at 13:25

## 2021-01-11 RX ADMIN — HYDROCODONE BITARTRATE AND ACETAMINOPHEN 1 TABLET: 7.5; 325 TABLET ORAL at 17:40

## 2021-01-11 RX ADMIN — PANTOPRAZOLE SODIUM 40 MG: 40 TABLET, DELAYED RELEASE ORAL at 04:49

## 2021-01-11 RX ADMIN — ASPIRIN 81 MG: 81 TABLET, CHEWABLE ORAL at 08:03

## 2021-01-11 RX ADMIN — FERROUS SULFATE TAB 325 MG (65 MG ELEMENTAL FE) 325 MG: 325 (65 FE) TAB at 08:03

## 2021-01-11 RX ADMIN — ONDANSETRON 4 MG: 4 TABLET, ORALLY DISINTEGRATING ORAL at 17:46

## 2021-01-11 RX ADMIN — DILTIAZEM HYDROCHLORIDE 180 MG: 180 CAPSULE, COATED, EXTENDED RELEASE ORAL at 08:03

## 2021-01-11 RX ADMIN — DILTIAZEM HYDROCHLORIDE 180 MG: 180 CAPSULE, COATED, EXTENDED RELEASE ORAL at 19:44

## 2021-01-11 RX ADMIN — NITROFURANTOIN MONOHYDRATE/MACROCRYSTALLINE 100 MG: 25; 75 CAPSULE ORAL at 19:44

## 2021-01-11 RX ADMIN — HYDROCODONE BITARTRATE AND ACETAMINOPHEN 1 TABLET: 7.5; 325 TABLET ORAL at 13:25

## 2021-01-11 RX ADMIN — DOCUSATE SODIUM 100 MG: 100 CAPSULE, LIQUID FILLED ORAL at 08:03

## 2021-01-11 RX ADMIN — LEVOTHYROXINE SODIUM 125 MCG: 0.12 TABLET ORAL at 05:26

## 2021-01-12 ENCOUNTER — APPOINTMENT (OUTPATIENT)
Dept: GENERAL RADIOLOGY | Facility: HOSPITAL | Age: 86
End: 2021-01-12

## 2021-01-12 PROCEDURE — 97130 THER IVNTJ EA ADDL 15 MIN: CPT

## 2021-01-12 PROCEDURE — 25010000002 ENOXAPARIN PER 10 MG: Performed by: PHYSICAL MEDICINE & REHABILITATION

## 2021-01-12 PROCEDURE — 74018 RADEX ABDOMEN 1 VIEW: CPT

## 2021-01-12 PROCEDURE — 99223 1ST HOSP IP/OBS HIGH 75: CPT | Performed by: INTERNAL MEDICINE

## 2021-01-12 PROCEDURE — 97110 THERAPEUTIC EXERCISES: CPT

## 2021-01-12 PROCEDURE — 97535 SELF CARE MNGMENT TRAINING: CPT

## 2021-01-12 PROCEDURE — 63710000001 ONDANSETRON ODT 4 MG TABLET DISPERSIBLE: Performed by: PHYSICAL MEDICINE & REHABILITATION

## 2021-01-12 PROCEDURE — 97129 THER IVNTJ 1ST 15 MIN: CPT

## 2021-01-12 PROCEDURE — 63710000001 ONDANSETRON ODT 4 MG TABLET DISPERSIBLE: Performed by: INTERNAL MEDICINE

## 2021-01-12 PROCEDURE — 97530 THERAPEUTIC ACTIVITIES: CPT

## 2021-01-12 PROCEDURE — 97112 NEUROMUSCULAR REEDUCATION: CPT

## 2021-01-12 RX ORDER — ONDANSETRON 4 MG/1
4 TABLET, ORALLY DISINTEGRATING ORAL
Status: DISCONTINUED | OUTPATIENT
Start: 2021-01-12 | End: 2021-02-17 | Stop reason: HOSPADM

## 2021-01-12 RX ORDER — MIRTAZAPINE 15 MG/1
7.5 TABLET, FILM COATED ORAL NIGHTLY
Status: DISCONTINUED | OUTPATIENT
Start: 2021-01-12 | End: 2021-01-17

## 2021-01-12 RX ADMIN — DOCUSATE SODIUM 100 MG: 100 CAPSULE, LIQUID FILLED ORAL at 20:44

## 2021-01-12 RX ADMIN — ASPIRIN 81 MG: 81 TABLET, CHEWABLE ORAL at 07:38

## 2021-01-12 RX ADMIN — HYDROCODONE BITARTRATE AND ACETAMINOPHEN 1 TABLET: 7.5; 325 TABLET ORAL at 16:35

## 2021-01-12 RX ADMIN — NITROFURANTOIN MONOHYDRATE/MACROCRYSTALLINE 100 MG: 25; 75 CAPSULE ORAL at 20:02

## 2021-01-12 RX ADMIN — HYDROCODONE BITARTRATE AND ACETAMINOPHEN 1 TABLET: 7.5; 325 TABLET ORAL at 11:30

## 2021-01-12 RX ADMIN — NITROFURANTOIN MONOHYDRATE/MACROCRYSTALLINE 100 MG: 25; 75 CAPSULE ORAL at 07:38

## 2021-01-12 RX ADMIN — PANTOPRAZOLE SODIUM 40 MG: 40 TABLET, DELAYED RELEASE ORAL at 05:36

## 2021-01-12 RX ADMIN — OXYBUTYNIN CHLORIDE 5 MG: 5 TABLET ORAL at 07:38

## 2021-01-12 RX ADMIN — OXYBUTYNIN CHLORIDE 5 MG: 5 TABLET ORAL at 20:02

## 2021-01-12 RX ADMIN — DOCUSATE SODIUM 100 MG: 100 CAPSULE, LIQUID FILLED ORAL at 07:38

## 2021-01-12 RX ADMIN — FERROUS SULFATE TAB 325 MG (65 MG ELEMENTAL FE) 325 MG: 325 (65 FE) TAB at 07:38

## 2021-01-12 RX ADMIN — ENOXAPARIN SODIUM 40 MG: 40 INJECTION SUBCUTANEOUS at 07:38

## 2021-01-12 RX ADMIN — HYDROCODONE BITARTRATE AND ACETAMINOPHEN 1 TABLET: 7.5; 325 TABLET ORAL at 07:38

## 2021-01-12 RX ADMIN — LOSARTAN POTASSIUM 50 MG: 50 TABLET, FILM COATED ORAL at 07:38

## 2021-01-12 RX ADMIN — ONDANSETRON 4 MG: 4 TABLET, ORALLY DISINTEGRATING ORAL at 16:35

## 2021-01-12 RX ADMIN — ONDANSETRON 4 MG: 4 TABLET, ORALLY DISINTEGRATING ORAL at 11:25

## 2021-01-12 RX ADMIN — HYDROCODONE BITARTRATE AND ACETAMINOPHEN 1 TABLET: 7.5; 325 TABLET ORAL at 20:44

## 2021-01-12 RX ADMIN — LEVOTHYROXINE SODIUM 125 MCG: 0.12 TABLET ORAL at 05:36

## 2021-01-12 RX ADMIN — DILTIAZEM HYDROCHLORIDE 180 MG: 180 CAPSULE, COATED, EXTENDED RELEASE ORAL at 07:38

## 2021-01-12 RX ADMIN — DILTIAZEM HYDROCHLORIDE 180 MG: 180 CAPSULE, COATED, EXTENDED RELEASE ORAL at 20:02

## 2021-01-12 RX ADMIN — MIRTAZAPINE 7.5 MG: 15 TABLET, FILM COATED ORAL at 20:02

## 2021-01-13 PROCEDURE — 97530 THERAPEUTIC ACTIVITIES: CPT

## 2021-01-13 PROCEDURE — 97535 SELF CARE MNGMENT TRAINING: CPT

## 2021-01-13 PROCEDURE — 99232 SBSQ HOSP IP/OBS MODERATE 35: CPT | Performed by: INTERNAL MEDICINE

## 2021-01-13 PROCEDURE — 97112 NEUROMUSCULAR REEDUCATION: CPT

## 2021-01-13 PROCEDURE — 97129 THER IVNTJ 1ST 15 MIN: CPT

## 2021-01-13 PROCEDURE — 97110 THERAPEUTIC EXERCISES: CPT

## 2021-01-13 PROCEDURE — 63710000001 ONDANSETRON ODT 4 MG TABLET DISPERSIBLE: Performed by: INTERNAL MEDICINE

## 2021-01-13 PROCEDURE — 97130 THER IVNTJ EA ADDL 15 MIN: CPT

## 2021-01-13 PROCEDURE — 25010000002 ENOXAPARIN PER 10 MG: Performed by: PHYSICAL MEDICINE & REHABILITATION

## 2021-01-13 RX ORDER — BISACODYL 10 MG
10 SUPPOSITORY, RECTAL RECTAL DAILY PRN
Status: DISCONTINUED | OUTPATIENT
Start: 2021-01-13 | End: 2021-01-14

## 2021-01-13 RX ADMIN — FERROUS SULFATE TAB 325 MG (65 MG ELEMENTAL FE) 325 MG: 325 (65 FE) TAB at 08:01

## 2021-01-13 RX ADMIN — ONDANSETRON 4 MG: 4 TABLET, ORALLY DISINTEGRATING ORAL at 16:03

## 2021-01-13 RX ADMIN — BISACODYL 10 MG: 10 SUPPOSITORY RECTAL at 22:36

## 2021-01-13 RX ADMIN — HYDROCODONE BITARTRATE AND ACETAMINOPHEN 1 TABLET: 7.5; 325 TABLET ORAL at 00:49

## 2021-01-13 RX ADMIN — DILTIAZEM HYDROCHLORIDE 180 MG: 180 CAPSULE, COATED, EXTENDED RELEASE ORAL at 20:57

## 2021-01-13 RX ADMIN — ASPIRIN 81 MG: 81 TABLET, CHEWABLE ORAL at 08:01

## 2021-01-13 RX ADMIN — OXYBUTYNIN CHLORIDE 5 MG: 5 TABLET ORAL at 08:01

## 2021-01-13 RX ADMIN — HYDROCODONE BITARTRATE AND ACETAMINOPHEN 1 TABLET: 7.5; 325 TABLET ORAL at 18:22

## 2021-01-13 RX ADMIN — ONDANSETRON 4 MG: 4 TABLET, ORALLY DISINTEGRATING ORAL at 11:14

## 2021-01-13 RX ADMIN — LEVOTHYROXINE SODIUM 125 MCG: 0.12 TABLET ORAL at 06:12

## 2021-01-13 RX ADMIN — ENOXAPARIN SODIUM 40 MG: 40 INJECTION SUBCUTANEOUS at 08:01

## 2021-01-13 RX ADMIN — HYDROCODONE BITARTRATE AND ACETAMINOPHEN 1 TABLET: 7.5; 325 TABLET ORAL at 08:02

## 2021-01-13 RX ADMIN — DILTIAZEM HYDROCHLORIDE 180 MG: 180 CAPSULE, COATED, EXTENDED RELEASE ORAL at 08:02

## 2021-01-13 RX ADMIN — DOCUSATE SODIUM 100 MG: 100 CAPSULE, LIQUID FILLED ORAL at 22:36

## 2021-01-13 RX ADMIN — OXYBUTYNIN CHLORIDE 5 MG: 5 TABLET ORAL at 20:57

## 2021-01-13 RX ADMIN — NITROFURANTOIN MONOHYDRATE/MACROCRYSTALLINE 100 MG: 25; 75 CAPSULE ORAL at 20:57

## 2021-01-13 RX ADMIN — LOSARTAN POTASSIUM 50 MG: 50 TABLET, FILM COATED ORAL at 08:01

## 2021-01-13 RX ADMIN — NITROFURANTOIN MONOHYDRATE/MACROCRYSTALLINE 100 MG: 25; 75 CAPSULE ORAL at 08:02

## 2021-01-13 RX ADMIN — ONDANSETRON 4 MG: 4 TABLET, ORALLY DISINTEGRATING ORAL at 08:02

## 2021-01-13 RX ADMIN — HYDROCODONE BITARTRATE AND ACETAMINOPHEN 1 TABLET: 7.5; 325 TABLET ORAL at 15:58

## 2021-01-13 RX ADMIN — MIRTAZAPINE 7.5 MG: 15 TABLET, FILM COATED ORAL at 20:57

## 2021-01-13 RX ADMIN — PANTOPRAZOLE SODIUM 40 MG: 40 TABLET, DELAYED RELEASE ORAL at 06:12

## 2021-01-13 RX ADMIN — DOCUSATE SODIUM 100 MG: 100 CAPSULE, LIQUID FILLED ORAL at 08:01

## 2021-01-14 ENCOUNTER — APPOINTMENT (OUTPATIENT)
Dept: ULTRASOUND IMAGING | Facility: HOSPITAL | Age: 86
End: 2021-01-14

## 2021-01-14 PROCEDURE — 97130 THER IVNTJ EA ADDL 15 MIN: CPT

## 2021-01-14 PROCEDURE — 76705 ECHO EXAM OF ABDOMEN: CPT

## 2021-01-14 PROCEDURE — 99232 SBSQ HOSP IP/OBS MODERATE 35: CPT | Performed by: NURSE PRACTITIONER

## 2021-01-14 PROCEDURE — 63710000001 ONDANSETRON ODT 4 MG TABLET DISPERSIBLE: Performed by: INTERNAL MEDICINE

## 2021-01-14 PROCEDURE — 97110 THERAPEUTIC EXERCISES: CPT

## 2021-01-14 PROCEDURE — 97129 THER IVNTJ 1ST 15 MIN: CPT

## 2021-01-14 PROCEDURE — 97112 NEUROMUSCULAR REEDUCATION: CPT

## 2021-01-14 PROCEDURE — 97535 SELF CARE MNGMENT TRAINING: CPT

## 2021-01-14 PROCEDURE — 25010000002 ENOXAPARIN PER 10 MG: Performed by: PHYSICAL MEDICINE & REHABILITATION

## 2021-01-14 RX ORDER — POLYETHYLENE GLYCOL 3350 17 G/17G
17 POWDER, FOR SOLUTION ORAL DAILY PRN
Status: DISCONTINUED | OUTPATIENT
Start: 2021-01-14 | End: 2021-02-17

## 2021-01-14 RX ORDER — BISACODYL 10 MG
10 SUPPOSITORY, RECTAL RECTAL NIGHTLY
Status: DISCONTINUED | OUTPATIENT
Start: 2021-01-14 | End: 2021-01-18

## 2021-01-14 RX ORDER — DOCUSATE SODIUM 100 MG/1
200 CAPSULE, LIQUID FILLED ORAL 2 TIMES DAILY
Status: DISCONTINUED | OUTPATIENT
Start: 2021-01-14 | End: 2021-02-17 | Stop reason: HOSPADM

## 2021-01-14 RX ORDER — HYDROCODONE BITARTRATE AND ACETAMINOPHEN 5; 325 MG/1; MG/1
1 TABLET ORAL EVERY 4 HOURS PRN
Status: DISCONTINUED | OUTPATIENT
Start: 2021-01-14 | End: 2021-01-20

## 2021-01-14 RX ADMIN — DOCUSATE SODIUM 100 MG: 100 CAPSULE, LIQUID FILLED ORAL at 07:57

## 2021-01-14 RX ADMIN — ONDANSETRON 4 MG: 4 TABLET, ORALLY DISINTEGRATING ORAL at 05:48

## 2021-01-14 RX ADMIN — DILTIAZEM HYDROCHLORIDE 180 MG: 180 CAPSULE, COATED, EXTENDED RELEASE ORAL at 07:56

## 2021-01-14 RX ADMIN — DOCUSATE SODIUM 200 MG: 100 CAPSULE, LIQUID FILLED ORAL at 20:16

## 2021-01-14 RX ADMIN — ONDANSETRON 4 MG: 4 TABLET, ORALLY DISINTEGRATING ORAL at 17:07

## 2021-01-14 RX ADMIN — PANTOPRAZOLE SODIUM 40 MG: 40 TABLET, DELAYED RELEASE ORAL at 07:56

## 2021-01-14 RX ADMIN — LOSARTAN POTASSIUM 50 MG: 50 TABLET, FILM COATED ORAL at 07:57

## 2021-01-14 RX ADMIN — OXYBUTYNIN CHLORIDE 5 MG: 5 TABLET ORAL at 07:57

## 2021-01-14 RX ADMIN — ENOXAPARIN SODIUM 40 MG: 40 INJECTION SUBCUTANEOUS at 07:57

## 2021-01-14 RX ADMIN — HYDROCODONE BITARTRATE AND ACETAMINOPHEN 1 TABLET: 7.5; 325 TABLET ORAL at 14:35

## 2021-01-14 RX ADMIN — HYDROCODONE BITARTRATE AND ACETAMINOPHEN 1 TABLET: 7.5; 325 TABLET ORAL at 07:03

## 2021-01-14 RX ADMIN — DILTIAZEM HYDROCHLORIDE 180 MG: 180 CAPSULE, COATED, EXTENDED RELEASE ORAL at 20:12

## 2021-01-14 RX ADMIN — NITROFURANTOIN MONOHYDRATE/MACROCRYSTALLINE 100 MG: 25; 75 CAPSULE ORAL at 07:57

## 2021-01-14 RX ADMIN — NITROFURANTOIN MONOHYDRATE/MACROCRYSTALLINE 100 MG: 25; 75 CAPSULE ORAL at 20:12

## 2021-01-14 RX ADMIN — MIRTAZAPINE 7.5 MG: 15 TABLET, FILM COATED ORAL at 20:12

## 2021-01-14 RX ADMIN — ONDANSETRON 4 MG: 4 TABLET, ORALLY DISINTEGRATING ORAL at 10:57

## 2021-01-14 RX ADMIN — FERROUS SULFATE TAB 325 MG (65 MG ELEMENTAL FE) 325 MG: 325 (65 FE) TAB at 07:56

## 2021-01-14 RX ADMIN — HYDROCODONE BITARTRATE AND ACETAMINOPHEN 1 TABLET: 5; 325 TABLET ORAL at 22:06

## 2021-01-14 RX ADMIN — HYDROCODONE BITARTRATE AND ACETAMINOPHEN 1 TABLET: 5; 325 TABLET ORAL at 17:45

## 2021-01-14 RX ADMIN — LEVOTHYROXINE SODIUM 125 MCG: 0.12 TABLET ORAL at 05:48

## 2021-01-14 RX ADMIN — ASPIRIN 81 MG: 81 TABLET, CHEWABLE ORAL at 07:56

## 2021-01-14 RX ADMIN — OXYBUTYNIN CHLORIDE 5 MG: 5 TABLET ORAL at 20:12

## 2021-01-15 ENCOUNTER — TELEPHONE (OUTPATIENT)
Dept: GASTROENTEROLOGY | Facility: CLINIC | Age: 86
End: 2021-01-15

## 2021-01-15 LAB
ANION GAP SERPL CALCULATED.3IONS-SCNC: 9.5 MMOL/L (ref 5–15)
BASOPHILS # BLD AUTO: 0.04 10*3/MM3 (ref 0–0.2)
BASOPHILS NFR BLD AUTO: 0.8 % (ref 0–1.5)
BUN SERPL-MCNC: 18 MG/DL (ref 8–23)
BUN/CREAT SERPL: 21.7 (ref 7–25)
CALCIUM SPEC-SCNC: 9.1 MG/DL (ref 8.6–10.5)
CHLORIDE SERPL-SCNC: 97 MMOL/L (ref 98–107)
CO2 SERPL-SCNC: 30.5 MMOL/L (ref 22–29)
CREAT SERPL-MCNC: 0.83 MG/DL (ref 0.57–1)
DEPRECATED RDW RBC AUTO: 73.9 FL (ref 37–54)
EOSINOPHIL # BLD AUTO: 0.15 10*3/MM3 (ref 0–0.4)
EOSINOPHIL NFR BLD AUTO: 2.8 % (ref 0.3–6.2)
ERYTHROCYTE [DISTWIDTH] IN BLOOD BY AUTOMATED COUNT: 25.4 % (ref 12.3–15.4)
GFR SERPL CREATININE-BSD FRML MDRD: 66 ML/MIN/1.73
GLUCOSE SERPL-MCNC: 170 MG/DL (ref 65–99)
HCT VFR BLD AUTO: 32.7 % (ref 34–46.6)
HGB BLD-MCNC: 9.9 G/DL (ref 12–15.9)
IMM GRANULOCYTES # BLD AUTO: 0.03 10*3/MM3 (ref 0–0.05)
IMM GRANULOCYTES NFR BLD AUTO: 0.6 % (ref 0–0.5)
LYMPHOCYTES # BLD AUTO: 0.89 10*3/MM3 (ref 0.7–3.1)
LYMPHOCYTES NFR BLD AUTO: 16.9 % (ref 19.6–45.3)
MCH RBC QN AUTO: 25.4 PG (ref 26.6–33)
MCHC RBC AUTO-ENTMCNC: 30.3 G/DL (ref 31.5–35.7)
MCV RBC AUTO: 83.8 FL (ref 79–97)
MONOCYTES # BLD AUTO: 0.32 10*3/MM3 (ref 0.1–0.9)
MONOCYTES NFR BLD AUTO: 6.1 % (ref 5–12)
NEUTROPHILS NFR BLD AUTO: 3.85 10*3/MM3 (ref 1.7–7)
NEUTROPHILS NFR BLD AUTO: 72.8 % (ref 42.7–76)
NRBC BLD AUTO-RTO: 0 /100 WBC (ref 0–0.2)
PLATELET # BLD AUTO: 154 10*3/MM3 (ref 140–450)
PMV BLD AUTO: 10.8 FL (ref 6–12)
POTASSIUM SERPL-SCNC: 3.7 MMOL/L (ref 3.5–5.2)
RBC # BLD AUTO: 3.9 10*6/MM3 (ref 3.77–5.28)
SODIUM SERPL-SCNC: 137 MMOL/L (ref 136–145)
WBC # BLD AUTO: 5.28 10*3/MM3 (ref 3.4–10.8)

## 2021-01-15 PROCEDURE — 63710000001 PREDNISONE PER 5 MG: Performed by: NURSE PRACTITIONER

## 2021-01-15 PROCEDURE — 99233 SBSQ HOSP IP/OBS HIGH 50: CPT | Performed by: NURSE PRACTITIONER

## 2021-01-15 PROCEDURE — 97110 THERAPEUTIC EXERCISES: CPT

## 2021-01-15 PROCEDURE — 25010000002 ENOXAPARIN PER 10 MG: Performed by: PHYSICAL MEDICINE & REHABILITATION

## 2021-01-15 PROCEDURE — 63710000001 ONDANSETRON ODT 4 MG TABLET DISPERSIBLE: Performed by: INTERNAL MEDICINE

## 2021-01-15 PROCEDURE — 80048 BASIC METABOLIC PNL TOTAL CA: CPT | Performed by: PHYSICAL MEDICINE & REHABILITATION

## 2021-01-15 PROCEDURE — 97130 THER IVNTJ EA ADDL 15 MIN: CPT

## 2021-01-15 PROCEDURE — 97535 SELF CARE MNGMENT TRAINING: CPT

## 2021-01-15 PROCEDURE — 97129 THER IVNTJ 1ST 15 MIN: CPT

## 2021-01-15 PROCEDURE — 85025 COMPLETE CBC W/AUTO DIFF WBC: CPT | Performed by: PHYSICAL MEDICINE & REHABILITATION

## 2021-01-15 RX ORDER — DIPHENHYDRAMINE HCL 50 MG
50 CAPSULE ORAL ONCE
Status: COMPLETED | OUTPATIENT
Start: 2021-01-16 | End: 2021-01-16

## 2021-01-15 RX ORDER — PREDNISONE 50 MG/1
50 TABLET ORAL 3 TIMES DAILY
Status: COMPLETED | OUTPATIENT
Start: 2021-01-15 | End: 2021-01-16

## 2021-01-15 RX ORDER — DIPHENHYDRAMINE HCL 50 MG
50 CAPSULE ORAL ONCE
Status: DISCONTINUED | OUTPATIENT
Start: 2021-01-15 | End: 2021-01-15

## 2021-01-15 RX ORDER — PREDNISONE 50 MG/1
50 TABLET ORAL EVERY 6 HOURS
Status: DISCONTINUED | OUTPATIENT
Start: 2021-01-15 | End: 2021-01-15

## 2021-01-15 RX ADMIN — OXYBUTYNIN CHLORIDE 5 MG: 5 TABLET ORAL at 08:02

## 2021-01-15 RX ADMIN — ENOXAPARIN SODIUM 40 MG: 40 INJECTION SUBCUTANEOUS at 08:03

## 2021-01-15 RX ADMIN — LOSARTAN POTASSIUM 50 MG: 50 TABLET, FILM COATED ORAL at 08:02

## 2021-01-15 RX ADMIN — FERROUS SULFATE TAB 325 MG (65 MG ELEMENTAL FE) 325 MG: 325 (65 FE) TAB at 08:02

## 2021-01-15 RX ADMIN — ONDANSETRON 4 MG: 4 TABLET, ORALLY DISINTEGRATING ORAL at 17:56

## 2021-01-15 RX ADMIN — MIRTAZAPINE 7.5 MG: 15 TABLET, FILM COATED ORAL at 21:25

## 2021-01-15 RX ADMIN — DILTIAZEM HYDROCHLORIDE 180 MG: 180 CAPSULE, COATED, EXTENDED RELEASE ORAL at 08:02

## 2021-01-15 RX ADMIN — DOCUSATE SODIUM 200 MG: 100 CAPSULE, LIQUID FILLED ORAL at 08:02

## 2021-01-15 RX ADMIN — BISACODYL 10 MG: 10 SUPPOSITORY RECTAL at 01:01

## 2021-01-15 RX ADMIN — PANTOPRAZOLE SODIUM 40 MG: 40 TABLET, DELAYED RELEASE ORAL at 05:56

## 2021-01-15 RX ADMIN — NITROFURANTOIN MONOHYDRATE/MACROCRYSTALLINE 100 MG: 25; 75 CAPSULE ORAL at 08:02

## 2021-01-15 RX ADMIN — HYDROCODONE BITARTRATE AND ACETAMINOPHEN 1 TABLET: 5; 325 TABLET ORAL at 16:03

## 2021-01-15 RX ADMIN — ONDANSETRON 4 MG: 4 TABLET, ORALLY DISINTEGRATING ORAL at 11:51

## 2021-01-15 RX ADMIN — OXYBUTYNIN CHLORIDE 5 MG: 5 TABLET ORAL at 21:25

## 2021-01-15 RX ADMIN — HYDROCODONE BITARTRATE AND ACETAMINOPHEN 1 TABLET: 5; 325 TABLET ORAL at 06:01

## 2021-01-15 RX ADMIN — DILTIAZEM HYDROCHLORIDE 180 MG: 180 CAPSULE, COATED, EXTENDED RELEASE ORAL at 21:26

## 2021-01-15 RX ADMIN — ASPIRIN 81 MG: 81 TABLET, CHEWABLE ORAL at 08:02

## 2021-01-15 RX ADMIN — HYDROCODONE BITARTRATE AND ACETAMINOPHEN 1 TABLET: 5; 325 TABLET ORAL at 02:13

## 2021-01-15 RX ADMIN — HYDROCODONE BITARTRATE AND ACETAMINOPHEN 1 TABLET: 5; 325 TABLET ORAL at 11:51

## 2021-01-15 RX ADMIN — PREDNISONE 50 MG: 50 TABLET ORAL at 19:04

## 2021-01-15 RX ADMIN — ONDANSETRON 4 MG: 4 TABLET, ORALLY DISINTEGRATING ORAL at 05:56

## 2021-01-15 RX ADMIN — NITROFURANTOIN MONOHYDRATE/MACROCRYSTALLINE 100 MG: 25; 75 CAPSULE ORAL at 21:26

## 2021-01-15 RX ADMIN — LEVOTHYROXINE SODIUM 125 MCG: 0.12 TABLET ORAL at 05:56

## 2021-01-15 NOTE — TELEPHONE ENCOUNTER
Call Ирина in regards to this patient.   Ct Department wants to do an abdomen and pelvis CT. The CT department had this recommendation.   [phone]  [facility]

## 2021-01-16 ENCOUNTER — APPOINTMENT (OUTPATIENT)
Dept: CT IMAGING | Facility: HOSPITAL | Age: 86
End: 2021-01-16

## 2021-01-16 LAB
ALBUMIN SERPL-MCNC: 3.3 G/DL (ref 3.5–5.2)
ALBUMIN/GLOB SERPL: 1.2 G/DL
ALP SERPL-CCNC: 140 U/L (ref 39–117)
ALT SERPL W P-5'-P-CCNC: 43 U/L (ref 1–33)
ANION GAP SERPL CALCULATED.3IONS-SCNC: 10.8 MMOL/L (ref 5–15)
AST SERPL-CCNC: 38 U/L (ref 1–32)
BILIRUB SERPL-MCNC: 0.5 MG/DL (ref 0–1.2)
BUN SERPL-MCNC: 17 MG/DL (ref 8–23)
BUN/CREAT SERPL: 27 (ref 7–25)
CALCIUM SPEC-SCNC: 9.1 MG/DL (ref 8.6–10.5)
CANCER AG19-9 SERPL-ACNC: <0.6 U/ML
CHLORIDE SERPL-SCNC: 96 MMOL/L (ref 98–107)
CO2 SERPL-SCNC: 29.2 MMOL/L (ref 22–29)
CREAT SERPL-MCNC: 0.63 MG/DL (ref 0.57–1)
DEPRECATED RDW RBC AUTO: 71.4 FL (ref 37–54)
ERYTHROCYTE [DISTWIDTH] IN BLOOD BY AUTOMATED COUNT: 25.4 % (ref 12.3–15.4)
GFR SERPL CREATININE-BSD FRML MDRD: 90 ML/MIN/1.73
GLOBULIN UR ELPH-MCNC: 2.7 GM/DL
GLUCOSE BLDC GLUCOMTR-MCNC: 257 MG/DL (ref 70–130)
GLUCOSE SERPL-MCNC: 266 MG/DL (ref 65–99)
HCT VFR BLD AUTO: 31.4 % (ref 34–46.6)
HGB BLD-MCNC: 9.6 G/DL (ref 12–15.9)
MCH RBC QN AUTO: 25.4 PG (ref 26.6–33)
MCHC RBC AUTO-ENTMCNC: 30.6 G/DL (ref 31.5–35.7)
MCV RBC AUTO: 83.1 FL (ref 79–97)
PLATELET # BLD AUTO: 159 10*3/MM3 (ref 140–450)
PMV BLD AUTO: 11.1 FL (ref 6–12)
POTASSIUM SERPL-SCNC: 4.3 MMOL/L (ref 3.5–5.2)
PROT SERPL-MCNC: 6 G/DL (ref 6–8.5)
RBC # BLD AUTO: 3.78 10*6/MM3 (ref 3.77–5.28)
SODIUM SERPL-SCNC: 136 MMOL/L (ref 136–145)
WBC # BLD AUTO: 3.28 10*3/MM3 (ref 3.4–10.8)

## 2021-01-16 PROCEDURE — 82962 GLUCOSE BLOOD TEST: CPT

## 2021-01-16 PROCEDURE — 63710000001 DIPHENHYDRAMINE PER 50 MG: Performed by: NURSE PRACTITIONER

## 2021-01-16 PROCEDURE — 74177 CT ABD & PELVIS W/CONTRAST: CPT

## 2021-01-16 PROCEDURE — 97110 THERAPEUTIC EXERCISES: CPT

## 2021-01-16 PROCEDURE — 25010000002 IOPAMIDOL 61 % SOLUTION: Performed by: PHYSICAL MEDICINE & REHABILITATION

## 2021-01-16 PROCEDURE — 63710000001 ONDANSETRON ODT 4 MG TABLET DISPERSIBLE: Performed by: INTERNAL MEDICINE

## 2021-01-16 PROCEDURE — 63710000001 PREDNISONE PER 5 MG: Performed by: NURSE PRACTITIONER

## 2021-01-16 PROCEDURE — 99232 SBSQ HOSP IP/OBS MODERATE 35: CPT | Performed by: INTERNAL MEDICINE

## 2021-01-16 PROCEDURE — 86301 IMMUNOASSAY TUMOR CA 19-9: CPT | Performed by: NURSE PRACTITIONER

## 2021-01-16 PROCEDURE — 80053 COMPREHEN METABOLIC PANEL: CPT | Performed by: NURSE PRACTITIONER

## 2021-01-16 PROCEDURE — 85027 COMPLETE CBC AUTOMATED: CPT | Performed by: NURSE PRACTITIONER

## 2021-01-16 PROCEDURE — 0 DIATRIZOATE MEGLUMINE & SODIUM PER 1 ML: Performed by: NURSE PRACTITIONER

## 2021-01-16 PROCEDURE — 25010000002 ENOXAPARIN PER 10 MG: Performed by: PHYSICAL MEDICINE & REHABILITATION

## 2021-01-16 RX ADMIN — IOPAMIDOL 95 ML: 612 INJECTION, SOLUTION INTRAVENOUS at 10:02

## 2021-01-16 RX ADMIN — MIRTAZAPINE 7.5 MG: 15 TABLET, FILM COATED ORAL at 21:23

## 2021-01-16 RX ADMIN — DOCUSATE SODIUM 200 MG: 100 CAPSULE, LIQUID FILLED ORAL at 21:24

## 2021-01-16 RX ADMIN — ERGOCALCIFEROL 50000 UNITS: 1.25 CAPSULE ORAL at 07:44

## 2021-01-16 RX ADMIN — NITROFURANTOIN MONOHYDRATE/MACROCRYSTALLINE 100 MG: 25; 75 CAPSULE ORAL at 07:44

## 2021-01-16 RX ADMIN — ONDANSETRON 4 MG: 4 TABLET, ORALLY DISINTEGRATING ORAL at 11:29

## 2021-01-16 RX ADMIN — DIATRIZOATE MEGLUMINE AND DIATRIZOATE SODIUM 30 ML: 600; 100 SOLUTION ORAL; RECTAL at 07:40

## 2021-01-16 RX ADMIN — ONDANSETRON 4 MG: 4 TABLET, ORALLY DISINTEGRATING ORAL at 16:28

## 2021-01-16 RX ADMIN — OXYBUTYNIN CHLORIDE 5 MG: 5 TABLET ORAL at 21:23

## 2021-01-16 RX ADMIN — HYDROCODONE BITARTRATE AND ACETAMINOPHEN 1 TABLET: 5; 325 TABLET ORAL at 10:35

## 2021-01-16 RX ADMIN — PANTOPRAZOLE SODIUM 40 MG: 40 TABLET, DELAYED RELEASE ORAL at 06:25

## 2021-01-16 RX ADMIN — HYDROCODONE BITARTRATE AND ACETAMINOPHEN 1 TABLET: 5; 325 TABLET ORAL at 00:16

## 2021-01-16 RX ADMIN — DILTIAZEM HYDROCHLORIDE 180 MG: 180 CAPSULE, COATED, EXTENDED RELEASE ORAL at 21:24

## 2021-01-16 RX ADMIN — PREDNISONE 50 MG: 50 TABLET ORAL at 06:53

## 2021-01-16 RX ADMIN — PREDNISONE 50 MG: 50 TABLET ORAL at 00:16

## 2021-01-16 RX ADMIN — ENOXAPARIN SODIUM 40 MG: 40 INJECTION SUBCUTANEOUS at 07:45

## 2021-01-16 RX ADMIN — LOSARTAN POTASSIUM 50 MG: 50 TABLET, FILM COATED ORAL at 07:44

## 2021-01-16 RX ADMIN — ONDANSETRON 4 MG: 4 TABLET, ORALLY DISINTEGRATING ORAL at 06:54

## 2021-01-16 RX ADMIN — DILTIAZEM HYDROCHLORIDE 180 MG: 180 CAPSULE, COATED, EXTENDED RELEASE ORAL at 07:45

## 2021-01-16 RX ADMIN — HYDROCODONE BITARTRATE AND ACETAMINOPHEN 1 TABLET: 5; 325 TABLET ORAL at 21:40

## 2021-01-16 RX ADMIN — LEVOTHYROXINE SODIUM 125 MCG: 0.12 TABLET ORAL at 06:25

## 2021-01-16 RX ADMIN — DOCUSATE SODIUM 200 MG: 100 CAPSULE, LIQUID FILLED ORAL at 07:44

## 2021-01-16 RX ADMIN — DIPHENHYDRAMINE HYDROCHLORIDE 50 MG: 50 CAPSULE ORAL at 06:53

## 2021-01-16 RX ADMIN — ASPIRIN 81 MG: 81 TABLET, CHEWABLE ORAL at 07:45

## 2021-01-16 RX ADMIN — HYDROCODONE BITARTRATE AND ACETAMINOPHEN 1 TABLET: 5; 325 TABLET ORAL at 16:28

## 2021-01-16 RX ADMIN — FERROUS SULFATE TAB 325 MG (65 MG ELEMENTAL FE) 325 MG: 325 (65 FE) TAB at 07:45

## 2021-01-16 RX ADMIN — OXYBUTYNIN CHLORIDE 5 MG: 5 TABLET ORAL at 07:44

## 2021-01-17 PROCEDURE — 25010000002 ENOXAPARIN PER 10 MG: Performed by: PHYSICAL MEDICINE & REHABILITATION

## 2021-01-17 PROCEDURE — 63710000001 ONDANSETRON ODT 4 MG TABLET DISPERSIBLE: Performed by: INTERNAL MEDICINE

## 2021-01-17 RX ORDER — MIRTAZAPINE 15 MG/1
15 TABLET, FILM COATED ORAL NIGHTLY
Status: DISCONTINUED | OUTPATIENT
Start: 2021-01-17 | End: 2021-02-17 | Stop reason: HOSPADM

## 2021-01-17 RX ADMIN — DILTIAZEM HYDROCHLORIDE 180 MG: 180 CAPSULE, COATED, EXTENDED RELEASE ORAL at 22:23

## 2021-01-17 RX ADMIN — DOCUSATE SODIUM 200 MG: 100 CAPSULE, LIQUID FILLED ORAL at 22:22

## 2021-01-17 RX ADMIN — ONDANSETRON 4 MG: 4 TABLET, ORALLY DISINTEGRATING ORAL at 16:37

## 2021-01-17 RX ADMIN — DOCUSATE SODIUM 200 MG: 100 CAPSULE, LIQUID FILLED ORAL at 08:34

## 2021-01-17 RX ADMIN — ONDANSETRON 4 MG: 4 TABLET, ORALLY DISINTEGRATING ORAL at 11:16

## 2021-01-17 RX ADMIN — ASPIRIN 81 MG: 81 TABLET, CHEWABLE ORAL at 08:35

## 2021-01-17 RX ADMIN — LEVOTHYROXINE SODIUM 125 MCG: 0.12 TABLET ORAL at 06:22

## 2021-01-17 RX ADMIN — BISACODYL 10 MG: 10 SUPPOSITORY RECTAL at 22:23

## 2021-01-17 RX ADMIN — ENOXAPARIN SODIUM 40 MG: 40 INJECTION SUBCUTANEOUS at 08:34

## 2021-01-17 RX ADMIN — HYDROCODONE BITARTRATE AND ACETAMINOPHEN 1 TABLET: 5; 325 TABLET ORAL at 16:37

## 2021-01-17 RX ADMIN — ONDANSETRON 4 MG: 4 TABLET, ORALLY DISINTEGRATING ORAL at 06:23

## 2021-01-17 RX ADMIN — HYDROCODONE BITARTRATE AND ACETAMINOPHEN 1 TABLET: 5; 325 TABLET ORAL at 04:34

## 2021-01-17 RX ADMIN — MIRTAZAPINE 15 MG: 15 TABLET, FILM COATED ORAL at 22:22

## 2021-01-17 RX ADMIN — DILTIAZEM HYDROCHLORIDE 180 MG: 180 CAPSULE, COATED, EXTENDED RELEASE ORAL at 08:35

## 2021-01-17 RX ADMIN — OXYBUTYNIN CHLORIDE 5 MG: 5 TABLET ORAL at 08:35

## 2021-01-17 RX ADMIN — OXYBUTYNIN CHLORIDE 5 MG: 5 TABLET ORAL at 22:22

## 2021-01-17 RX ADMIN — LOSARTAN POTASSIUM 50 MG: 50 TABLET, FILM COATED ORAL at 08:35

## 2021-01-17 RX ADMIN — HYDROCODONE BITARTRATE AND ACETAMINOPHEN 1 TABLET: 5; 325 TABLET ORAL at 08:34

## 2021-01-17 RX ADMIN — FERROUS SULFATE TAB 325 MG (65 MG ELEMENTAL FE) 325 MG: 325 (65 FE) TAB at 08:34

## 2021-01-17 RX ADMIN — PANTOPRAZOLE SODIUM 40 MG: 40 TABLET, DELAYED RELEASE ORAL at 06:22

## 2021-01-18 LAB
ANION GAP SERPL CALCULATED.3IONS-SCNC: 7.2 MMOL/L (ref 5–15)
BASOPHILS # BLD AUTO: 0.04 10*3/MM3 (ref 0–0.2)
BASOPHILS NFR BLD AUTO: 0.7 % (ref 0–1.5)
BUN SERPL-MCNC: 20 MG/DL (ref 8–23)
BUN/CREAT SERPL: 27 (ref 7–25)
CALCIUM SPEC-SCNC: 9.3 MG/DL (ref 8.6–10.5)
CHLORIDE SERPL-SCNC: 97 MMOL/L (ref 98–107)
CO2 SERPL-SCNC: 33.8 MMOL/L (ref 22–29)
CREAT SERPL-MCNC: 0.74 MG/DL (ref 0.57–1)
DEPRECATED RDW RBC AUTO: 75.4 FL (ref 37–54)
EOSINOPHIL # BLD AUTO: 0.11 10*3/MM3 (ref 0–0.4)
EOSINOPHIL NFR BLD AUTO: 1.9 % (ref 0.3–6.2)
ERYTHROCYTE [DISTWIDTH] IN BLOOD BY AUTOMATED COUNT: 26.2 % (ref 12.3–15.4)
GFR SERPL CREATININE-BSD FRML MDRD: 75 ML/MIN/1.73
GLUCOSE SERPL-MCNC: 131 MG/DL (ref 65–99)
HCT VFR BLD AUTO: 35.3 % (ref 34–46.6)
HGB BLD-MCNC: 10.3 G/DL (ref 12–15.9)
IMM GRANULOCYTES # BLD AUTO: 0.01 10*3/MM3 (ref 0–0.05)
IMM GRANULOCYTES NFR BLD AUTO: 0.2 % (ref 0–0.5)
LYMPHOCYTES # BLD AUTO: 1.34 10*3/MM3 (ref 0.7–3.1)
LYMPHOCYTES NFR BLD AUTO: 23.3 % (ref 19.6–45.3)
MCH RBC QN AUTO: 24.7 PG (ref 26.6–33)
MCHC RBC AUTO-ENTMCNC: 29.2 G/DL (ref 31.5–35.7)
MCV RBC AUTO: 84.7 FL (ref 79–97)
MONOCYTES # BLD AUTO: 0.39 10*3/MM3 (ref 0.1–0.9)
MONOCYTES NFR BLD AUTO: 6.8 % (ref 5–12)
NEUTROPHILS NFR BLD AUTO: 3.86 10*3/MM3 (ref 1.7–7)
NEUTROPHILS NFR BLD AUTO: 67.1 % (ref 42.7–76)
NRBC BLD AUTO-RTO: 0 /100 WBC (ref 0–0.2)
PLATELET # BLD AUTO: 242 10*3/MM3 (ref 140–450)
PMV BLD AUTO: 10.8 FL (ref 6–12)
POTASSIUM SERPL-SCNC: 4.4 MMOL/L (ref 3.5–5.2)
RBC # BLD AUTO: 4.17 10*6/MM3 (ref 3.77–5.28)
SODIUM SERPL-SCNC: 138 MMOL/L (ref 136–145)
WBC # BLD AUTO: 5.75 10*3/MM3 (ref 3.4–10.8)

## 2021-01-18 PROCEDURE — 63710000001 ONDANSETRON ODT 4 MG TABLET DISPERSIBLE: Performed by: INTERNAL MEDICINE

## 2021-01-18 PROCEDURE — 97535 SELF CARE MNGMENT TRAINING: CPT

## 2021-01-18 PROCEDURE — 97110 THERAPEUTIC EXERCISES: CPT

## 2021-01-18 PROCEDURE — 97112 NEUROMUSCULAR REEDUCATION: CPT

## 2021-01-18 PROCEDURE — 97129 THER IVNTJ 1ST 15 MIN: CPT

## 2021-01-18 PROCEDURE — 80048 BASIC METABOLIC PNL TOTAL CA: CPT | Performed by: PHYSICAL MEDICINE & REHABILITATION

## 2021-01-18 PROCEDURE — 85025 COMPLETE CBC W/AUTO DIFF WBC: CPT | Performed by: PHYSICAL MEDICINE & REHABILITATION

## 2021-01-18 PROCEDURE — 25010000002 ENOXAPARIN PER 10 MG: Performed by: PHYSICAL MEDICINE & REHABILITATION

## 2021-01-18 PROCEDURE — 97130 THER IVNTJ EA ADDL 15 MIN: CPT

## 2021-01-18 RX ORDER — BISACODYL 10 MG
10 SUPPOSITORY, RECTAL RECTAL EVERY 24 HOURS
Status: DISCONTINUED | OUTPATIENT
Start: 2021-01-18 | End: 2021-02-17

## 2021-01-18 RX ADMIN — PANTOPRAZOLE SODIUM 40 MG: 40 TABLET, DELAYED RELEASE ORAL at 06:38

## 2021-01-18 RX ADMIN — HYDROCODONE BITARTRATE AND ACETAMINOPHEN 1 TABLET: 5; 325 TABLET ORAL at 14:50

## 2021-01-18 RX ADMIN — DILTIAZEM HYDROCHLORIDE 180 MG: 180 CAPSULE, COATED, EXTENDED RELEASE ORAL at 08:21

## 2021-01-18 RX ADMIN — ONDANSETRON 4 MG: 4 TABLET, ORALLY DISINTEGRATING ORAL at 10:48

## 2021-01-18 RX ADMIN — DOCUSATE SODIUM 200 MG: 100 CAPSULE, LIQUID FILLED ORAL at 08:21

## 2021-01-18 RX ADMIN — ONDANSETRON 4 MG: 4 TABLET, ORALLY DISINTEGRATING ORAL at 16:38

## 2021-01-18 RX ADMIN — HYDROCODONE BITARTRATE AND ACETAMINOPHEN 1 TABLET: 5; 325 TABLET ORAL at 19:20

## 2021-01-18 RX ADMIN — OXYBUTYNIN CHLORIDE 5 MG: 5 TABLET ORAL at 20:43

## 2021-01-18 RX ADMIN — ENOXAPARIN SODIUM 40 MG: 40 INJECTION SUBCUTANEOUS at 08:21

## 2021-01-18 RX ADMIN — HYDROCODONE BITARTRATE AND ACETAMINOPHEN 1 TABLET: 5; 325 TABLET ORAL at 06:38

## 2021-01-18 RX ADMIN — ASPIRIN 81 MG: 81 TABLET, CHEWABLE ORAL at 08:21

## 2021-01-18 RX ADMIN — ONDANSETRON 4 MG: 4 TABLET, ORALLY DISINTEGRATING ORAL at 06:38

## 2021-01-18 RX ADMIN — FERROUS SULFATE TAB 325 MG (65 MG ELEMENTAL FE) 325 MG: 325 (65 FE) TAB at 08:21

## 2021-01-18 RX ADMIN — LEVOTHYROXINE SODIUM 125 MCG: 0.12 TABLET ORAL at 06:38

## 2021-01-18 RX ADMIN — MIRTAZAPINE 15 MG: 15 TABLET, FILM COATED ORAL at 20:42

## 2021-01-18 RX ADMIN — OXYBUTYNIN CHLORIDE 5 MG: 5 TABLET ORAL at 08:21

## 2021-01-18 RX ADMIN — LOSARTAN POTASSIUM 50 MG: 50 TABLET, FILM COATED ORAL at 08:21

## 2021-01-18 RX ADMIN — HYDROCODONE BITARTRATE AND ACETAMINOPHEN 1 TABLET: 5; 325 TABLET ORAL at 10:48

## 2021-01-18 RX ADMIN — DOCUSATE SODIUM 200 MG: 100 CAPSULE, LIQUID FILLED ORAL at 20:42

## 2021-01-18 RX ADMIN — DILTIAZEM HYDROCHLORIDE 180 MG: 180 CAPSULE, COATED, EXTENDED RELEASE ORAL at 20:42

## 2021-01-19 PROCEDURE — 97112 NEUROMUSCULAR REEDUCATION: CPT

## 2021-01-19 PROCEDURE — 25010000002 ENOXAPARIN PER 10 MG: Performed by: PHYSICAL MEDICINE & REHABILITATION

## 2021-01-19 PROCEDURE — 97110 THERAPEUTIC EXERCISES: CPT

## 2021-01-19 PROCEDURE — 97535 SELF CARE MNGMENT TRAINING: CPT

## 2021-01-19 PROCEDURE — 97130 THER IVNTJ EA ADDL 15 MIN: CPT

## 2021-01-19 PROCEDURE — 97129 THER IVNTJ 1ST 15 MIN: CPT

## 2021-01-19 PROCEDURE — 63710000001 ONDANSETRON ODT 4 MG TABLET DISPERSIBLE: Performed by: INTERNAL MEDICINE

## 2021-01-19 RX ADMIN — FERROUS SULFATE TAB 325 MG (65 MG ELEMENTAL FE) 325 MG: 325 (65 FE) TAB at 07:41

## 2021-01-19 RX ADMIN — MIRTAZAPINE 15 MG: 15 TABLET, FILM COATED ORAL at 20:36

## 2021-01-19 RX ADMIN — OXYBUTYNIN CHLORIDE 5 MG: 5 TABLET ORAL at 07:41

## 2021-01-19 RX ADMIN — ENOXAPARIN SODIUM 40 MG: 40 INJECTION SUBCUTANEOUS at 07:41

## 2021-01-19 RX ADMIN — OXYBUTYNIN CHLORIDE 5 MG: 5 TABLET ORAL at 20:36

## 2021-01-19 RX ADMIN — ONDANSETRON 4 MG: 4 TABLET, ORALLY DISINTEGRATING ORAL at 06:46

## 2021-01-19 RX ADMIN — HYDROCODONE BITARTRATE AND ACETAMINOPHEN 1 TABLET: 5; 325 TABLET ORAL at 11:54

## 2021-01-19 RX ADMIN — DOCUSATE SODIUM 200 MG: 100 CAPSULE, LIQUID FILLED ORAL at 20:36

## 2021-01-19 RX ADMIN — HYDROCODONE BITARTRATE AND ACETAMINOPHEN 1 TABLET: 5; 325 TABLET ORAL at 18:16

## 2021-01-19 RX ADMIN — DILTIAZEM HYDROCHLORIDE 180 MG: 180 CAPSULE, COATED, EXTENDED RELEASE ORAL at 20:36

## 2021-01-19 RX ADMIN — ONDANSETRON 4 MG: 4 TABLET, ORALLY DISINTEGRATING ORAL at 15:35

## 2021-01-19 RX ADMIN — LOSARTAN POTASSIUM 50 MG: 50 TABLET, FILM COATED ORAL at 07:41

## 2021-01-19 RX ADMIN — DOCUSATE SODIUM 200 MG: 100 CAPSULE, LIQUID FILLED ORAL at 09:12

## 2021-01-19 RX ADMIN — HYDROCODONE BITARTRATE AND ACETAMINOPHEN 1 TABLET: 5; 325 TABLET ORAL at 07:40

## 2021-01-19 RX ADMIN — ONDANSETRON 4 MG: 4 TABLET, ORALLY DISINTEGRATING ORAL at 11:01

## 2021-01-19 RX ADMIN — PANTOPRAZOLE SODIUM 40 MG: 40 TABLET, DELAYED RELEASE ORAL at 06:46

## 2021-01-19 RX ADMIN — DILTIAZEM HYDROCHLORIDE 180 MG: 180 CAPSULE, COATED, EXTENDED RELEASE ORAL at 07:41

## 2021-01-19 RX ADMIN — ASPIRIN 81 MG: 81 TABLET, CHEWABLE ORAL at 07:40

## 2021-01-19 RX ADMIN — LEVOTHYROXINE SODIUM 125 MCG: 0.12 TABLET ORAL at 06:46

## 2021-01-20 ENCOUNTER — APPOINTMENT (OUTPATIENT)
Dept: ULTRASOUND IMAGING | Facility: HOSPITAL | Age: 86
End: 2021-01-20

## 2021-01-20 PROCEDURE — 97535 SELF CARE MNGMENT TRAINING: CPT

## 2021-01-20 PROCEDURE — 97110 THERAPEUTIC EXERCISES: CPT

## 2021-01-20 PROCEDURE — 25010000002 ENOXAPARIN PER 10 MG: Performed by: PHYSICAL MEDICINE & REHABILITATION

## 2021-01-20 PROCEDURE — 76642 ULTRASOUND BREAST LIMITED: CPT

## 2021-01-20 PROCEDURE — 97130 THER IVNTJ EA ADDL 15 MIN: CPT

## 2021-01-20 PROCEDURE — 97112 NEUROMUSCULAR REEDUCATION: CPT

## 2021-01-20 PROCEDURE — 63710000001 ONDANSETRON ODT 4 MG TABLET DISPERSIBLE: Performed by: INTERNAL MEDICINE

## 2021-01-20 PROCEDURE — 97129 THER IVNTJ 1ST 15 MIN: CPT

## 2021-01-20 RX ORDER — HYDROCODONE BITARTRATE AND ACETAMINOPHEN 5; 325 MG/1; MG/1
1 TABLET ORAL EVERY 6 HOURS PRN
Status: DISCONTINUED | OUTPATIENT
Start: 2021-01-20 | End: 2021-02-17 | Stop reason: HOSPADM

## 2021-01-20 RX ADMIN — HYDROCODONE BITARTRATE AND ACETAMINOPHEN 1 TABLET: 5; 325 TABLET ORAL at 08:07

## 2021-01-20 RX ADMIN — MIRTAZAPINE 15 MG: 15 TABLET, FILM COATED ORAL at 20:34

## 2021-01-20 RX ADMIN — DOCUSATE SODIUM 200 MG: 100 CAPSULE, LIQUID FILLED ORAL at 08:04

## 2021-01-20 RX ADMIN — LEVOTHYROXINE SODIUM 125 MCG: 0.12 TABLET ORAL at 05:43

## 2021-01-20 RX ADMIN — BISACODYL 10 MG: 10 SUPPOSITORY RECTAL at 15:29

## 2021-01-20 RX ADMIN — LOSARTAN POTASSIUM 50 MG: 50 TABLET, FILM COATED ORAL at 08:03

## 2021-01-20 RX ADMIN — ASPIRIN 81 MG: 81 TABLET, CHEWABLE ORAL at 08:03

## 2021-01-20 RX ADMIN — HYDROCODONE BITARTRATE AND ACETAMINOPHEN 1 TABLET: 5; 325 TABLET ORAL at 02:08

## 2021-01-20 RX ADMIN — ONDANSETRON 4 MG: 4 TABLET, ORALLY DISINTEGRATING ORAL at 11:21

## 2021-01-20 RX ADMIN — DILTIAZEM HYDROCHLORIDE 180 MG: 180 CAPSULE, COATED, EXTENDED RELEASE ORAL at 20:34

## 2021-01-20 RX ADMIN — DILTIAZEM HYDROCHLORIDE 180 MG: 180 CAPSULE, COATED, EXTENDED RELEASE ORAL at 08:04

## 2021-01-20 RX ADMIN — OXYBUTYNIN CHLORIDE 5 MG: 5 TABLET ORAL at 08:04

## 2021-01-20 RX ADMIN — HYDROCODONE BITARTRATE AND ACETAMINOPHEN 1 TABLET: 5; 325 TABLET ORAL at 12:41

## 2021-01-20 RX ADMIN — ONDANSETRON 4 MG: 4 TABLET, ORALLY DISINTEGRATING ORAL at 18:10

## 2021-01-20 RX ADMIN — ENOXAPARIN SODIUM 40 MG: 40 INJECTION SUBCUTANEOUS at 08:04

## 2021-01-20 RX ADMIN — HYDROCODONE BITARTRATE AND ACETAMINOPHEN 1 TABLET: 5; 325 TABLET ORAL at 18:12

## 2021-01-20 RX ADMIN — PANTOPRAZOLE SODIUM 40 MG: 40 TABLET, DELAYED RELEASE ORAL at 05:43

## 2021-01-20 RX ADMIN — ONDANSETRON 4 MG: 4 TABLET, ORALLY DISINTEGRATING ORAL at 05:43

## 2021-01-20 RX ADMIN — DOCUSATE SODIUM 200 MG: 100 CAPSULE, LIQUID FILLED ORAL at 20:34

## 2021-01-20 RX ADMIN — OXYBUTYNIN CHLORIDE 5 MG: 5 TABLET ORAL at 20:34

## 2021-01-20 RX ADMIN — FERROUS SULFATE TAB 325 MG (65 MG ELEMENTAL FE) 325 MG: 325 (65 FE) TAB at 08:03

## 2021-01-21 PROCEDURE — 97112 NEUROMUSCULAR REEDUCATION: CPT

## 2021-01-21 PROCEDURE — 97130 THER IVNTJ EA ADDL 15 MIN: CPT

## 2021-01-21 PROCEDURE — 63710000001 ONDANSETRON ODT 4 MG TABLET DISPERSIBLE: Performed by: INTERNAL MEDICINE

## 2021-01-21 PROCEDURE — 97129 THER IVNTJ 1ST 15 MIN: CPT

## 2021-01-21 PROCEDURE — 97110 THERAPEUTIC EXERCISES: CPT

## 2021-01-21 PROCEDURE — 97535 SELF CARE MNGMENT TRAINING: CPT

## 2021-01-21 PROCEDURE — 25010000002 ENOXAPARIN PER 10 MG: Performed by: PHYSICAL MEDICINE & REHABILITATION

## 2021-01-21 RX ORDER — LIDOCAINE 50 MG/G
1 PATCH TOPICAL
Status: DISCONTINUED | OUTPATIENT
Start: 2021-01-21 | End: 2021-02-17 | Stop reason: HOSPADM

## 2021-01-21 RX ADMIN — DOCUSATE SODIUM 200 MG: 100 CAPSULE, LIQUID FILLED ORAL at 20:07

## 2021-01-21 RX ADMIN — HYDROCODONE BITARTRATE AND ACETAMINOPHEN 1 TABLET: 5; 325 TABLET ORAL at 14:02

## 2021-01-21 RX ADMIN — HYDROCODONE BITARTRATE AND ACETAMINOPHEN 1 TABLET: 5; 325 TABLET ORAL at 20:07

## 2021-01-21 RX ADMIN — DOCUSATE SODIUM 200 MG: 100 CAPSULE, LIQUID FILLED ORAL at 08:48

## 2021-01-21 RX ADMIN — FERROUS SULFATE TAB 325 MG (65 MG ELEMENTAL FE) 325 MG: 325 (65 FE) TAB at 08:48

## 2021-01-21 RX ADMIN — LOSARTAN POTASSIUM 50 MG: 50 TABLET, FILM COATED ORAL at 08:48

## 2021-01-21 RX ADMIN — ONDANSETRON 4 MG: 4 TABLET, ORALLY DISINTEGRATING ORAL at 11:21

## 2021-01-21 RX ADMIN — HYDROCODONE BITARTRATE AND ACETAMINOPHEN 1 TABLET: 5; 325 TABLET ORAL at 03:00

## 2021-01-21 RX ADMIN — DILTIAZEM HYDROCHLORIDE 180 MG: 180 CAPSULE, COATED, EXTENDED RELEASE ORAL at 20:07

## 2021-01-21 RX ADMIN — OXYBUTYNIN CHLORIDE 5 MG: 5 TABLET ORAL at 08:48

## 2021-01-21 RX ADMIN — PANTOPRAZOLE SODIUM 40 MG: 40 TABLET, DELAYED RELEASE ORAL at 06:26

## 2021-01-21 RX ADMIN — LEVOTHYROXINE SODIUM 125 MCG: 0.12 TABLET ORAL at 06:27

## 2021-01-21 RX ADMIN — ENOXAPARIN SODIUM 40 MG: 40 INJECTION SUBCUTANEOUS at 08:48

## 2021-01-21 RX ADMIN — MIRTAZAPINE 15 MG: 15 TABLET, FILM COATED ORAL at 21:25

## 2021-01-21 RX ADMIN — ASPIRIN 81 MG: 81 TABLET, CHEWABLE ORAL at 08:48

## 2021-01-21 RX ADMIN — OXYBUTYNIN CHLORIDE 5 MG: 5 TABLET ORAL at 20:07

## 2021-01-21 RX ADMIN — DILTIAZEM HYDROCHLORIDE 180 MG: 180 CAPSULE, COATED, EXTENDED RELEASE ORAL at 08:48

## 2021-01-21 RX ADMIN — LIDOCAINE 1 PATCH: 50 PATCH TOPICAL at 14:02

## 2021-01-21 RX ADMIN — HYDROCODONE BITARTRATE AND ACETAMINOPHEN 1 TABLET: 5; 325 TABLET ORAL at 07:56

## 2021-01-21 RX ADMIN — ONDANSETRON 4 MG: 4 TABLET, ORALLY DISINTEGRATING ORAL at 06:27

## 2021-01-21 RX ADMIN — ONDANSETRON 4 MG: 4 TABLET, ORALLY DISINTEGRATING ORAL at 16:29

## 2021-01-22 LAB
ANION GAP SERPL CALCULATED.3IONS-SCNC: 7.5 MMOL/L (ref 5–15)
BASOPHILS # BLD AUTO: 0.04 10*3/MM3 (ref 0–0.2)
BASOPHILS NFR BLD AUTO: 0.8 % (ref 0–1.5)
BUN SERPL-MCNC: 18 MG/DL (ref 8–23)
BUN/CREAT SERPL: 25.7 (ref 7–25)
CALCIUM SPEC-SCNC: 8.8 MG/DL (ref 8.6–10.5)
CHLORIDE SERPL-SCNC: 100 MMOL/L (ref 98–107)
CO2 SERPL-SCNC: 29.5 MMOL/L (ref 22–29)
CREAT SERPL-MCNC: 0.7 MG/DL (ref 0.57–1)
DEPRECATED RDW RBC AUTO: ABNORMAL FL
EOSINOPHIL # BLD AUTO: 0.12 10*3/MM3 (ref 0–0.4)
EOSINOPHIL NFR BLD AUTO: 2.4 % (ref 0.3–6.2)
ERYTHROCYTE [DISTWIDTH] IN BLOOD BY AUTOMATED COUNT: ABNORMAL %
GFR SERPL CREATININE-BSD FRML MDRD: 80 ML/MIN/1.73
GLUCOSE SERPL-MCNC: 110 MG/DL (ref 65–99)
HCT VFR BLD AUTO: 31.9 % (ref 34–46.6)
HGB BLD-MCNC: 10 G/DL (ref 12–15.9)
LYMPHOCYTES # BLD AUTO: 1.21 10*3/MM3 (ref 0.7–3.1)
LYMPHOCYTES NFR BLD AUTO: 24.5 % (ref 19.6–45.3)
MCH RBC QN AUTO: 27.1 PG (ref 26.6–33)
MCHC RBC AUTO-ENTMCNC: 31.3 G/DL (ref 31.5–35.7)
MCV RBC AUTO: 86.4 FL (ref 79–97)
MONOCYTES # BLD AUTO: 0.42 10*3/MM3 (ref 0.1–0.9)
MONOCYTES NFR BLD AUTO: 8.5 % (ref 5–12)
NEUTROPHILS NFR BLD AUTO: 3.14 10*3/MM3 (ref 1.7–7)
NEUTROPHILS NFR BLD AUTO: 63.6 % (ref 42.7–76)
PLATELET # BLD AUTO: 204 10*3/MM3 (ref 140–450)
PMV BLD AUTO: 11.1 FL (ref 6–12)
POTASSIUM SERPL-SCNC: 4.4 MMOL/L (ref 3.5–5.2)
RBC # BLD AUTO: 3.69 10*6/MM3 (ref 3.77–5.28)
SODIUM SERPL-SCNC: 137 MMOL/L (ref 136–145)
WBC # BLD AUTO: 4.94 10*3/MM3 (ref 3.4–10.8)

## 2021-01-22 PROCEDURE — 97110 THERAPEUTIC EXERCISES: CPT

## 2021-01-22 PROCEDURE — 97112 NEUROMUSCULAR REEDUCATION: CPT

## 2021-01-22 PROCEDURE — 97130 THER IVNTJ EA ADDL 15 MIN: CPT

## 2021-01-22 PROCEDURE — 97129 THER IVNTJ 1ST 15 MIN: CPT

## 2021-01-22 PROCEDURE — 97535 SELF CARE MNGMENT TRAINING: CPT

## 2021-01-22 PROCEDURE — 80048 BASIC METABOLIC PNL TOTAL CA: CPT | Performed by: PHYSICAL MEDICINE & REHABILITATION

## 2021-01-22 PROCEDURE — 63710000001 ONDANSETRON ODT 4 MG TABLET DISPERSIBLE: Performed by: INTERNAL MEDICINE

## 2021-01-22 PROCEDURE — 97530 THERAPEUTIC ACTIVITIES: CPT

## 2021-01-22 PROCEDURE — 25010000002 ENOXAPARIN PER 10 MG: Performed by: PHYSICAL MEDICINE & REHABILITATION

## 2021-01-22 PROCEDURE — 85025 COMPLETE CBC W/AUTO DIFF WBC: CPT | Performed by: PHYSICAL MEDICINE & REHABILITATION

## 2021-01-22 RX ORDER — OXYBUTYNIN CHLORIDE 5 MG/1
5 TABLET ORAL
Status: DISCONTINUED | OUTPATIENT
Start: 2021-01-22 | End: 2021-02-12

## 2021-01-22 RX ADMIN — OXYBUTYNIN CHLORIDE 5 MG: 5 TABLET ORAL at 07:52

## 2021-01-22 RX ADMIN — BISACODYL 10 MG: 10 SUPPOSITORY RECTAL at 16:35

## 2021-01-22 RX ADMIN — FERROUS SULFATE TAB 325 MG (65 MG ELEMENTAL FE) 325 MG: 325 (65 FE) TAB at 07:52

## 2021-01-22 RX ADMIN — ENOXAPARIN SODIUM 40 MG: 40 INJECTION SUBCUTANEOUS at 07:53

## 2021-01-22 RX ADMIN — ASPIRIN 81 MG: 81 TABLET, CHEWABLE ORAL at 07:52

## 2021-01-22 RX ADMIN — DOCUSATE SODIUM 200 MG: 100 CAPSULE, LIQUID FILLED ORAL at 07:52

## 2021-01-22 RX ADMIN — HYDROCODONE BITARTRATE AND ACETAMINOPHEN 1 TABLET: 5; 325 TABLET ORAL at 06:12

## 2021-01-22 RX ADMIN — LOSARTAN POTASSIUM 50 MG: 50 TABLET, FILM COATED ORAL at 07:52

## 2021-01-22 RX ADMIN — ONDANSETRON 4 MG: 4 TABLET, ORALLY DISINTEGRATING ORAL at 06:13

## 2021-01-22 RX ADMIN — OXYBUTYNIN CHLORIDE 5 MG: 5 TABLET ORAL at 16:36

## 2021-01-22 RX ADMIN — PANTOPRAZOLE SODIUM 40 MG: 40 TABLET, DELAYED RELEASE ORAL at 06:13

## 2021-01-22 RX ADMIN — HYDROCODONE BITARTRATE AND ACETAMINOPHEN 1 TABLET: 5; 325 TABLET ORAL at 14:01

## 2021-01-22 RX ADMIN — ONDANSETRON 4 MG: 4 TABLET, ORALLY DISINTEGRATING ORAL at 11:32

## 2021-01-22 RX ADMIN — DOCUSATE SODIUM 200 MG: 100 CAPSULE, LIQUID FILLED ORAL at 20:33

## 2021-01-22 RX ADMIN — ONDANSETRON 4 MG: 4 TABLET, ORALLY DISINTEGRATING ORAL at 16:36

## 2021-01-22 RX ADMIN — LIDOCAINE 1 PATCH: 50 PATCH TOPICAL at 07:52

## 2021-01-22 RX ADMIN — DILTIAZEM HYDROCHLORIDE 180 MG: 180 CAPSULE, COATED, EXTENDED RELEASE ORAL at 11:32

## 2021-01-22 RX ADMIN — DILTIAZEM HYDROCHLORIDE 180 MG: 180 CAPSULE, COATED, EXTENDED RELEASE ORAL at 20:33

## 2021-01-22 RX ADMIN — MIRTAZAPINE 15 MG: 15 TABLET, FILM COATED ORAL at 20:33

## 2021-01-22 RX ADMIN — LEVOTHYROXINE SODIUM 125 MCG: 0.12 TABLET ORAL at 06:13

## 2021-01-23 LAB
BACTERIA UR QL AUTO: ABNORMAL /HPF
BILIRUB UR QL STRIP: NEGATIVE
CLARITY UR: ABNORMAL
COLOR UR: YELLOW
GLUCOSE UR STRIP-MCNC: NEGATIVE MG/DL
HGB UR QL STRIP.AUTO: ABNORMAL
HYALINE CASTS UR QL AUTO: ABNORMAL /LPF
KETONES UR QL STRIP: NEGATIVE
LEUKOCYTE ESTERASE UR QL STRIP.AUTO: ABNORMAL
NITRITE UR QL STRIP: POSITIVE
PH UR STRIP.AUTO: 7 [PH] (ref 5–8)
PROT UR QL STRIP: ABNORMAL
RBC # UR: ABNORMAL /HPF
REF LAB TEST METHOD: ABNORMAL
SP GR UR STRIP: 1.01 (ref 1–1.03)
SQUAMOUS #/AREA URNS HPF: ABNORMAL /HPF
UROBILINOGEN UR QL STRIP: ABNORMAL
WBC UR QL AUTO: ABNORMAL /HPF

## 2021-01-23 PROCEDURE — 63710000001 ONDANSETRON ODT 4 MG TABLET DISPERSIBLE: Performed by: INTERNAL MEDICINE

## 2021-01-23 PROCEDURE — 97112 NEUROMUSCULAR REEDUCATION: CPT

## 2021-01-23 PROCEDURE — 25010000002 ENOXAPARIN PER 10 MG: Performed by: PHYSICAL MEDICINE & REHABILITATION

## 2021-01-23 PROCEDURE — 81001 URINALYSIS AUTO W/SCOPE: CPT | Performed by: PHYSICAL MEDICINE & REHABILITATION

## 2021-01-23 PROCEDURE — 87077 CULTURE AEROBIC IDENTIFY: CPT | Performed by: PHYSICAL MEDICINE & REHABILITATION

## 2021-01-23 PROCEDURE — 87186 SC STD MICRODIL/AGAR DIL: CPT | Performed by: PHYSICAL MEDICINE & REHABILITATION

## 2021-01-23 PROCEDURE — 87086 URINE CULTURE/COLONY COUNT: CPT | Performed by: PHYSICAL MEDICINE & REHABILITATION

## 2021-01-23 RX ORDER — CEFDINIR 300 MG/1
300 CAPSULE ORAL EVERY 12 HOURS SCHEDULED
Status: COMPLETED | OUTPATIENT
Start: 2021-01-23 | End: 2021-02-01

## 2021-01-23 RX ADMIN — DOCUSATE SODIUM 200 MG: 100 CAPSULE, LIQUID FILLED ORAL at 08:30

## 2021-01-23 RX ADMIN — ONDANSETRON 4 MG: 4 TABLET, ORALLY DISINTEGRATING ORAL at 10:49

## 2021-01-23 RX ADMIN — ONDANSETRON 4 MG: 4 TABLET, ORALLY DISINTEGRATING ORAL at 04:55

## 2021-01-23 RX ADMIN — FERROUS SULFATE TAB 325 MG (65 MG ELEMENTAL FE) 325 MG: 325 (65 FE) TAB at 08:31

## 2021-01-23 RX ADMIN — DILTIAZEM HYDROCHLORIDE 180 MG: 180 CAPSULE, COATED, EXTENDED RELEASE ORAL at 08:31

## 2021-01-23 RX ADMIN — DOCUSATE SODIUM 200 MG: 100 CAPSULE, LIQUID FILLED ORAL at 20:33

## 2021-01-23 RX ADMIN — LIDOCAINE 1 PATCH: 50 PATCH TOPICAL at 08:31

## 2021-01-23 RX ADMIN — PANTOPRAZOLE SODIUM 40 MG: 40 TABLET, DELAYED RELEASE ORAL at 04:55

## 2021-01-23 RX ADMIN — ERGOCALCIFEROL 50000 UNITS: 1.25 CAPSULE ORAL at 08:30

## 2021-01-23 RX ADMIN — ASPIRIN 81 MG: 81 TABLET, CHEWABLE ORAL at 08:31

## 2021-01-23 RX ADMIN — CEFDINIR 300 MG: 300 CAPSULE ORAL at 20:33

## 2021-01-23 RX ADMIN — HYDROCODONE BITARTRATE AND ACETAMINOPHEN 1 TABLET: 5; 325 TABLET ORAL at 17:11

## 2021-01-23 RX ADMIN — ENOXAPARIN SODIUM 40 MG: 40 INJECTION SUBCUTANEOUS at 08:30

## 2021-01-23 RX ADMIN — ONDANSETRON 4 MG: 4 TABLET, ORALLY DISINTEGRATING ORAL at 16:08

## 2021-01-23 RX ADMIN — MIRTAZAPINE 15 MG: 15 TABLET, FILM COATED ORAL at 20:33

## 2021-01-23 RX ADMIN — LOSARTAN POTASSIUM 50 MG: 50 TABLET, FILM COATED ORAL at 08:31

## 2021-01-23 RX ADMIN — HYDROCODONE BITARTRATE AND ACETAMINOPHEN 1 TABLET: 5; 325 TABLET ORAL at 10:49

## 2021-01-23 RX ADMIN — LEVOTHYROXINE SODIUM 125 MCG: 0.12 TABLET ORAL at 04:55

## 2021-01-23 RX ADMIN — DILTIAZEM HYDROCHLORIDE 180 MG: 180 CAPSULE, COATED, EXTENDED RELEASE ORAL at 20:33

## 2021-01-23 RX ADMIN — OXYBUTYNIN CHLORIDE 5 MG: 5 TABLET ORAL at 17:11

## 2021-01-23 RX ADMIN — HYDROCODONE BITARTRATE AND ACETAMINOPHEN 1 TABLET: 5; 325 TABLET ORAL at 04:55

## 2021-01-23 RX ADMIN — CEFDINIR 300 MG: 300 CAPSULE ORAL at 09:17

## 2021-01-24 PROCEDURE — 25010000002 ENOXAPARIN PER 10 MG: Performed by: PHYSICAL MEDICINE & REHABILITATION

## 2021-01-24 PROCEDURE — 63710000001 ONDANSETRON ODT 4 MG TABLET DISPERSIBLE: Performed by: INTERNAL MEDICINE

## 2021-01-24 RX ORDER — TRAMADOL HYDROCHLORIDE 50 MG/1
50 TABLET ORAL EVERY 8 HOURS PRN
Status: DISPENSED | OUTPATIENT
Start: 2021-01-24 | End: 2021-02-13

## 2021-01-24 RX ADMIN — PANTOPRAZOLE SODIUM 40 MG: 40 TABLET, DELAYED RELEASE ORAL at 06:19

## 2021-01-24 RX ADMIN — DILTIAZEM HYDROCHLORIDE 180 MG: 180 CAPSULE, COATED, EXTENDED RELEASE ORAL at 20:28

## 2021-01-24 RX ADMIN — LOSARTAN POTASSIUM 50 MG: 50 TABLET, FILM COATED ORAL at 08:24

## 2021-01-24 RX ADMIN — HYDROCODONE BITARTRATE AND ACETAMINOPHEN 1 TABLET: 5; 325 TABLET ORAL at 04:12

## 2021-01-24 RX ADMIN — ONDANSETRON 4 MG: 4 TABLET, ORALLY DISINTEGRATING ORAL at 17:03

## 2021-01-24 RX ADMIN — OXYBUTYNIN CHLORIDE 5 MG: 5 TABLET ORAL at 17:03

## 2021-01-24 RX ADMIN — BISACODYL 10 MG: 10 SUPPOSITORY RECTAL at 17:50

## 2021-01-24 RX ADMIN — ASPIRIN 81 MG: 81 TABLET, CHEWABLE ORAL at 08:20

## 2021-01-24 RX ADMIN — ENOXAPARIN SODIUM 40 MG: 40 INJECTION SUBCUTANEOUS at 08:19

## 2021-01-24 RX ADMIN — DOCUSATE SODIUM 200 MG: 100 CAPSULE, LIQUID FILLED ORAL at 20:29

## 2021-01-24 RX ADMIN — TRAMADOL HYDROCHLORIDE 50 MG: 50 TABLET ORAL at 22:41

## 2021-01-24 RX ADMIN — FERROUS SULFATE TAB 325 MG (65 MG ELEMENTAL FE) 325 MG: 325 (65 FE) TAB at 08:20

## 2021-01-24 RX ADMIN — CEFDINIR 300 MG: 300 CAPSULE ORAL at 08:20

## 2021-01-24 RX ADMIN — CEFDINIR 300 MG: 300 CAPSULE ORAL at 20:29

## 2021-01-24 RX ADMIN — HYDROCODONE BITARTRATE AND ACETAMINOPHEN 1 TABLET: 5; 325 TABLET ORAL at 20:31

## 2021-01-24 RX ADMIN — LIDOCAINE 1 PATCH: 50 PATCH TOPICAL at 08:24

## 2021-01-24 RX ADMIN — ONDANSETRON 4 MG: 4 TABLET, ORALLY DISINTEGRATING ORAL at 06:19

## 2021-01-24 RX ADMIN — DOCUSATE SODIUM 200 MG: 100 CAPSULE, LIQUID FILLED ORAL at 08:20

## 2021-01-24 RX ADMIN — LEVOTHYROXINE SODIUM 125 MCG: 0.12 TABLET ORAL at 06:19

## 2021-01-24 RX ADMIN — ONDANSETRON 4 MG: 4 TABLET, ORALLY DISINTEGRATING ORAL at 10:56

## 2021-01-24 RX ADMIN — MIRTAZAPINE 15 MG: 15 TABLET, FILM COATED ORAL at 20:29

## 2021-01-24 RX ADMIN — HYDROCODONE BITARTRATE AND ACETAMINOPHEN 1 TABLET: 5; 325 TABLET ORAL at 10:56

## 2021-01-24 RX ADMIN — DILTIAZEM HYDROCHLORIDE 180 MG: 180 CAPSULE, COATED, EXTENDED RELEASE ORAL at 08:23

## 2021-01-25 LAB
ANION GAP SERPL CALCULATED.3IONS-SCNC: 10 MMOL/L (ref 5–15)
BACTERIA SPEC AEROBE CULT: ABNORMAL
BASOPHILS # BLD MANUAL: 0.04 10*3/MM3 (ref 0–0.2)
BASOPHILS NFR BLD AUTO: 1 % (ref 0–1.5)
BUN SERPL-MCNC: 13 MG/DL (ref 8–23)
BUN/CREAT SERPL: 21.3 (ref 7–25)
CALCIUM SPEC-SCNC: 9.3 MG/DL (ref 8.6–10.5)
CHLORIDE SERPL-SCNC: 100 MMOL/L (ref 98–107)
CO2 SERPL-SCNC: 26 MMOL/L (ref 22–29)
CREAT SERPL-MCNC: 0.61 MG/DL (ref 0.57–1)
DEPRECATED RDW RBC AUTO: ABNORMAL FL
EOSINOPHIL # BLD MANUAL: 0.17 10*3/MM3 (ref 0–0.4)
EOSINOPHIL NFR BLD MANUAL: 4 % (ref 0.3–6.2)
ERYTHROCYTE [DISTWIDTH] IN BLOOD BY AUTOMATED COUNT: ABNORMAL %
GFR SERPL CREATININE-BSD FRML MDRD: 93 ML/MIN/1.73
GLUCOSE SERPL-MCNC: 161 MG/DL (ref 65–99)
HCT VFR BLD AUTO: 35.1 % (ref 34–46.6)
HGB BLD-MCNC: 11 G/DL (ref 12–15.9)
LYMPHOCYTES # BLD MANUAL: 1.28 10*3/MM3 (ref 0.7–3.1)
LYMPHOCYTES NFR BLD MANUAL: 31 % (ref 19.6–45.3)
LYMPHOCYTES NFR BLD MANUAL: 8 % (ref 5–12)
MCH RBC QN AUTO: 27.2 PG (ref 26.6–33)
MCHC RBC AUTO-ENTMCNC: 31.3 G/DL (ref 31.5–35.7)
MCV RBC AUTO: 86.7 FL (ref 79–97)
MONOCYTES # BLD AUTO: 0.33 10*3/MM3 (ref 0.1–0.9)
NEUTROPHILS # BLD AUTO: 2.27 10*3/MM3 (ref 1.7–7)
NEUTROPHILS NFR BLD MANUAL: 55 % (ref 42.7–76)
PLAT MORPH BLD: NORMAL
PLATELET # BLD AUTO: 243 10*3/MM3 (ref 140–450)
PMV BLD AUTO: 10.6 FL (ref 6–12)
POTASSIUM SERPL-SCNC: 4.5 MMOL/L (ref 3.5–5.2)
RBC # BLD AUTO: 4.05 10*6/MM3 (ref 3.77–5.28)
RBC MORPH BLD: NORMAL
SODIUM SERPL-SCNC: 136 MMOL/L (ref 136–145)
WBC # BLD AUTO: 4.13 10*3/MM3 (ref 3.4–10.8)
WBC MORPH BLD: NORMAL

## 2021-01-25 PROCEDURE — 80048 BASIC METABOLIC PNL TOTAL CA: CPT | Performed by: PHYSICAL MEDICINE & REHABILITATION

## 2021-01-25 PROCEDURE — 97110 THERAPEUTIC EXERCISES: CPT

## 2021-01-25 PROCEDURE — 25010000002 ENOXAPARIN PER 10 MG: Performed by: PHYSICAL MEDICINE & REHABILITATION

## 2021-01-25 PROCEDURE — 85025 COMPLETE CBC W/AUTO DIFF WBC: CPT | Performed by: PHYSICAL MEDICINE & REHABILITATION

## 2021-01-25 PROCEDURE — 85007 BL SMEAR W/DIFF WBC COUNT: CPT | Performed by: PHYSICAL MEDICINE & REHABILITATION

## 2021-01-25 PROCEDURE — 97130 THER IVNTJ EA ADDL 15 MIN: CPT

## 2021-01-25 PROCEDURE — 97129 THER IVNTJ 1ST 15 MIN: CPT

## 2021-01-25 PROCEDURE — 97535 SELF CARE MNGMENT TRAINING: CPT

## 2021-01-25 PROCEDURE — 63710000001 ONDANSETRON ODT 4 MG TABLET DISPERSIBLE: Performed by: INTERNAL MEDICINE

## 2021-01-25 RX ADMIN — HYDROCODONE BITARTRATE AND ACETAMINOPHEN 1 TABLET: 5; 325 TABLET ORAL at 19:22

## 2021-01-25 RX ADMIN — CEFDINIR 300 MG: 300 CAPSULE ORAL at 07:27

## 2021-01-25 RX ADMIN — ONDANSETRON 4 MG: 4 TABLET, ORALLY DISINTEGRATING ORAL at 11:25

## 2021-01-25 RX ADMIN — MIRTAZAPINE 15 MG: 15 TABLET, FILM COATED ORAL at 19:23

## 2021-01-25 RX ADMIN — TRAMADOL HYDROCHLORIDE 50 MG: 50 TABLET ORAL at 15:36

## 2021-01-25 RX ADMIN — LEVOTHYROXINE SODIUM 125 MCG: 0.12 TABLET ORAL at 06:24

## 2021-01-25 RX ADMIN — CEFDINIR 300 MG: 300 CAPSULE ORAL at 19:23

## 2021-01-25 RX ADMIN — DOCUSATE SODIUM 200 MG: 100 CAPSULE, LIQUID FILLED ORAL at 07:27

## 2021-01-25 RX ADMIN — TRAMADOL HYDROCHLORIDE 50 MG: 50 TABLET ORAL at 07:29

## 2021-01-25 RX ADMIN — LIDOCAINE 1 PATCH: 50 PATCH TOPICAL at 07:27

## 2021-01-25 RX ADMIN — HYDROCODONE BITARTRATE AND ACETAMINOPHEN 1 TABLET: 5; 325 TABLET ORAL at 11:25

## 2021-01-25 RX ADMIN — BISACODYL 10 MG: 10 SUPPOSITORY RECTAL at 15:36

## 2021-01-25 RX ADMIN — DILTIAZEM HYDROCHLORIDE 180 MG: 180 CAPSULE, COATED, EXTENDED RELEASE ORAL at 07:27

## 2021-01-25 RX ADMIN — FERROUS SULFATE TAB 325 MG (65 MG ELEMENTAL FE) 325 MG: 325 (65 FE) TAB at 07:27

## 2021-01-25 RX ADMIN — ONDANSETRON 4 MG: 4 TABLET, ORALLY DISINTEGRATING ORAL at 16:40

## 2021-01-25 RX ADMIN — DILTIAZEM HYDROCHLORIDE 180 MG: 180 CAPSULE, COATED, EXTENDED RELEASE ORAL at 19:23

## 2021-01-25 RX ADMIN — PANTOPRAZOLE SODIUM 40 MG: 40 TABLET, DELAYED RELEASE ORAL at 06:24

## 2021-01-25 RX ADMIN — HYDROCODONE BITARTRATE AND ACETAMINOPHEN 1 TABLET: 5; 325 TABLET ORAL at 03:04

## 2021-01-25 RX ADMIN — ASPIRIN 81 MG: 81 TABLET, CHEWABLE ORAL at 07:27

## 2021-01-25 RX ADMIN — LOSARTAN POTASSIUM 50 MG: 50 TABLET, FILM COATED ORAL at 07:27

## 2021-01-25 RX ADMIN — ONDANSETRON 4 MG: 4 TABLET, ORALLY DISINTEGRATING ORAL at 06:25

## 2021-01-25 RX ADMIN — DOCUSATE SODIUM 200 MG: 100 CAPSULE, LIQUID FILLED ORAL at 19:22

## 2021-01-25 RX ADMIN — ENOXAPARIN SODIUM 40 MG: 40 INJECTION SUBCUTANEOUS at 07:28

## 2021-01-25 RX ADMIN — OXYBUTYNIN CHLORIDE 5 MG: 5 TABLET ORAL at 16:40

## 2021-01-26 ENCOUNTER — APPOINTMENT (OUTPATIENT)
Dept: GENERAL RADIOLOGY | Facility: HOSPITAL | Age: 86
End: 2021-01-26

## 2021-01-26 PROCEDURE — 97535 SELF CARE MNGMENT TRAINING: CPT

## 2021-01-26 PROCEDURE — 25010000002 ENOXAPARIN PER 10 MG: Performed by: PHYSICAL MEDICINE & REHABILITATION

## 2021-01-26 PROCEDURE — 97130 THER IVNTJ EA ADDL 15 MIN: CPT

## 2021-01-26 PROCEDURE — 97129 THER IVNTJ 1ST 15 MIN: CPT

## 2021-01-26 PROCEDURE — 73502 X-RAY EXAM HIP UNI 2-3 VIEWS: CPT

## 2021-01-26 PROCEDURE — 97530 THERAPEUTIC ACTIVITIES: CPT

## 2021-01-26 PROCEDURE — 97110 THERAPEUTIC EXERCISES: CPT

## 2021-01-26 PROCEDURE — 97112 NEUROMUSCULAR REEDUCATION: CPT

## 2021-01-26 PROCEDURE — 63710000001 ONDANSETRON ODT 4 MG TABLET DISPERSIBLE: Performed by: INTERNAL MEDICINE

## 2021-01-26 RX ADMIN — ENOXAPARIN SODIUM 40 MG: 40 INJECTION SUBCUTANEOUS at 07:40

## 2021-01-26 RX ADMIN — LOSARTAN POTASSIUM 50 MG: 50 TABLET, FILM COATED ORAL at 07:39

## 2021-01-26 RX ADMIN — TRAMADOL HYDROCHLORIDE 50 MG: 50 TABLET ORAL at 20:11

## 2021-01-26 RX ADMIN — DILTIAZEM HYDROCHLORIDE 180 MG: 180 CAPSULE, COATED, EXTENDED RELEASE ORAL at 07:39

## 2021-01-26 RX ADMIN — TRAMADOL HYDROCHLORIDE 50 MG: 50 TABLET ORAL at 07:39

## 2021-01-26 RX ADMIN — DOCUSATE SODIUM 200 MG: 100 CAPSULE, LIQUID FILLED ORAL at 07:40

## 2021-01-26 RX ADMIN — LEVOTHYROXINE SODIUM 125 MCG: 0.12 TABLET ORAL at 06:03

## 2021-01-26 RX ADMIN — CEFDINIR 300 MG: 300 CAPSULE ORAL at 07:39

## 2021-01-26 RX ADMIN — LIDOCAINE 1 PATCH: 50 PATCH TOPICAL at 07:39

## 2021-01-26 RX ADMIN — ASPIRIN 81 MG: 81 TABLET, CHEWABLE ORAL at 07:39

## 2021-01-26 RX ADMIN — DOCUSATE SODIUM 200 MG: 100 CAPSULE, LIQUID FILLED ORAL at 20:12

## 2021-01-26 RX ADMIN — CEFDINIR 300 MG: 300 CAPSULE ORAL at 20:11

## 2021-01-26 RX ADMIN — ONDANSETRON 4 MG: 4 TABLET, ORALLY DISINTEGRATING ORAL at 06:03

## 2021-01-26 RX ADMIN — FERROUS SULFATE TAB 325 MG (65 MG ELEMENTAL FE) 325 MG: 325 (65 FE) TAB at 07:39

## 2021-01-26 RX ADMIN — OXYBUTYNIN CHLORIDE 5 MG: 5 TABLET ORAL at 18:02

## 2021-01-26 RX ADMIN — ONDANSETRON 4 MG: 4 TABLET, ORALLY DISINTEGRATING ORAL at 11:54

## 2021-01-26 RX ADMIN — PANTOPRAZOLE SODIUM 40 MG: 40 TABLET, DELAYED RELEASE ORAL at 06:03

## 2021-01-26 RX ADMIN — ONDANSETRON 4 MG: 4 TABLET, ORALLY DISINTEGRATING ORAL at 18:02

## 2021-01-26 RX ADMIN — HYDROCODONE BITARTRATE AND ACETAMINOPHEN 1 TABLET: 5; 325 TABLET ORAL at 11:54

## 2021-01-26 RX ADMIN — MIRTAZAPINE 15 MG: 15 TABLET, FILM COATED ORAL at 20:11

## 2021-01-26 RX ADMIN — DILTIAZEM HYDROCHLORIDE 180 MG: 180 CAPSULE, COATED, EXTENDED RELEASE ORAL at 20:11

## 2021-01-26 RX ADMIN — HYDROCODONE BITARTRATE AND ACETAMINOPHEN 1 TABLET: 5; 325 TABLET ORAL at 02:28

## 2021-01-27 PROCEDURE — 63710000001 ONDANSETRON ODT 4 MG TABLET DISPERSIBLE: Performed by: INTERNAL MEDICINE

## 2021-01-27 PROCEDURE — 97112 NEUROMUSCULAR REEDUCATION: CPT

## 2021-01-27 PROCEDURE — 25010000002 ENOXAPARIN PER 10 MG: Performed by: PHYSICAL MEDICINE & REHABILITATION

## 2021-01-27 PROCEDURE — 97110 THERAPEUTIC EXERCISES: CPT

## 2021-01-27 PROCEDURE — 97130 THER IVNTJ EA ADDL 15 MIN: CPT

## 2021-01-27 PROCEDURE — 97129 THER IVNTJ 1ST 15 MIN: CPT

## 2021-01-27 PROCEDURE — 97530 THERAPEUTIC ACTIVITIES: CPT

## 2021-01-27 PROCEDURE — 97535 SELF CARE MNGMENT TRAINING: CPT

## 2021-01-27 RX ORDER — NITROGLYCERIN 0.4 MG/1
0.4 TABLET SUBLINGUAL
Status: DISCONTINUED | OUTPATIENT
Start: 2021-01-27 | End: 2021-02-17

## 2021-01-27 RX ADMIN — CEFDINIR 300 MG: 300 CAPSULE ORAL at 20:05

## 2021-01-27 RX ADMIN — LIDOCAINE 1 PATCH: 50 PATCH TOPICAL at 09:01

## 2021-01-27 RX ADMIN — ONDANSETRON 4 MG: 4 TABLET, ORALLY DISINTEGRATING ORAL at 06:24

## 2021-01-27 RX ADMIN — TRAMADOL HYDROCHLORIDE 50 MG: 50 TABLET ORAL at 17:51

## 2021-01-27 RX ADMIN — DILTIAZEM HYDROCHLORIDE 180 MG: 180 CAPSULE, COATED, EXTENDED RELEASE ORAL at 09:01

## 2021-01-27 RX ADMIN — HYDROCODONE BITARTRATE AND ACETAMINOPHEN 1 TABLET: 5; 325 TABLET ORAL at 21:33

## 2021-01-27 RX ADMIN — DILTIAZEM HYDROCHLORIDE 180 MG: 180 CAPSULE, COATED, EXTENDED RELEASE ORAL at 20:05

## 2021-01-27 RX ADMIN — FERROUS SULFATE TAB 325 MG (65 MG ELEMENTAL FE) 325 MG: 325 (65 FE) TAB at 09:02

## 2021-01-27 RX ADMIN — ASPIRIN 81 MG: 81 TABLET, CHEWABLE ORAL at 09:02

## 2021-01-27 RX ADMIN — BISACODYL 10 MG: 10 SUPPOSITORY RECTAL at 15:57

## 2021-01-27 RX ADMIN — DOCUSATE SODIUM 200 MG: 100 CAPSULE, LIQUID FILLED ORAL at 09:01

## 2021-01-27 RX ADMIN — DOCUSATE SODIUM 200 MG: 100 CAPSULE, LIQUID FILLED ORAL at 20:05

## 2021-01-27 RX ADMIN — LOSARTAN POTASSIUM 50 MG: 50 TABLET, FILM COATED ORAL at 09:01

## 2021-01-27 RX ADMIN — LEVOTHYROXINE SODIUM 125 MCG: 0.12 TABLET ORAL at 06:24

## 2021-01-27 RX ADMIN — OXYBUTYNIN CHLORIDE 5 MG: 5 TABLET ORAL at 16:55

## 2021-01-27 RX ADMIN — ONDANSETRON 4 MG: 4 TABLET, ORALLY DISINTEGRATING ORAL at 16:55

## 2021-01-27 RX ADMIN — MIRTAZAPINE 15 MG: 15 TABLET, FILM COATED ORAL at 20:05

## 2021-01-27 RX ADMIN — PANTOPRAZOLE SODIUM 40 MG: 40 TABLET, DELAYED RELEASE ORAL at 06:24

## 2021-01-27 RX ADMIN — ENOXAPARIN SODIUM 40 MG: 40 INJECTION SUBCUTANEOUS at 09:01

## 2021-01-27 RX ADMIN — CEFDINIR 300 MG: 300 CAPSULE ORAL at 09:02

## 2021-01-27 RX ADMIN — ONDANSETRON 4 MG: 4 TABLET, ORALLY DISINTEGRATING ORAL at 13:21

## 2021-01-28 PROCEDURE — 97530 THERAPEUTIC ACTIVITIES: CPT

## 2021-01-28 PROCEDURE — 97129 THER IVNTJ 1ST 15 MIN: CPT

## 2021-01-28 PROCEDURE — 97535 SELF CARE MNGMENT TRAINING: CPT

## 2021-01-28 PROCEDURE — 63710000001 ONDANSETRON ODT 4 MG TABLET DISPERSIBLE: Performed by: INTERNAL MEDICINE

## 2021-01-28 PROCEDURE — 97130 THER IVNTJ EA ADDL 15 MIN: CPT

## 2021-01-28 PROCEDURE — 25010000002 ENOXAPARIN PER 10 MG: Performed by: PHYSICAL MEDICINE & REHABILITATION

## 2021-01-28 PROCEDURE — 97110 THERAPEUTIC EXERCISES: CPT

## 2021-01-28 RX ADMIN — ENOXAPARIN SODIUM 40 MG: 40 INJECTION SUBCUTANEOUS at 09:09

## 2021-01-28 RX ADMIN — ONDANSETRON 4 MG: 4 TABLET, ORALLY DISINTEGRATING ORAL at 05:16

## 2021-01-28 RX ADMIN — LOSARTAN POTASSIUM 50 MG: 50 TABLET, FILM COATED ORAL at 09:09

## 2021-01-28 RX ADMIN — OXYBUTYNIN CHLORIDE 5 MG: 5 TABLET ORAL at 17:34

## 2021-01-28 RX ADMIN — DOCUSATE SODIUM 100 MG: 100 CAPSULE, LIQUID FILLED ORAL at 09:10

## 2021-01-28 RX ADMIN — DOCUSATE SODIUM 200 MG: 100 CAPSULE, LIQUID FILLED ORAL at 19:37

## 2021-01-28 RX ADMIN — PANTOPRAZOLE SODIUM 40 MG: 40 TABLET, DELAYED RELEASE ORAL at 05:16

## 2021-01-28 RX ADMIN — ONDANSETRON 4 MG: 4 TABLET, ORALLY DISINTEGRATING ORAL at 11:23

## 2021-01-28 RX ADMIN — DILTIAZEM HYDROCHLORIDE 180 MG: 180 CAPSULE, COATED, EXTENDED RELEASE ORAL at 09:09

## 2021-01-28 RX ADMIN — LIDOCAINE 1 PATCH: 50 PATCH TOPICAL at 09:10

## 2021-01-28 RX ADMIN — ASPIRIN 81 MG: 81 TABLET, CHEWABLE ORAL at 09:09

## 2021-01-28 RX ADMIN — MIRTAZAPINE 15 MG: 15 TABLET, FILM COATED ORAL at 19:37

## 2021-01-28 RX ADMIN — LEVOTHYROXINE SODIUM 125 MCG: 0.12 TABLET ORAL at 05:16

## 2021-01-28 RX ADMIN — FERROUS SULFATE TAB 325 MG (65 MG ELEMENTAL FE) 325 MG: 325 (65 FE) TAB at 07:27

## 2021-01-28 RX ADMIN — DILTIAZEM HYDROCHLORIDE 180 MG: 180 CAPSULE, COATED, EXTENDED RELEASE ORAL at 19:37

## 2021-01-28 RX ADMIN — CEFDINIR 300 MG: 300 CAPSULE ORAL at 09:09

## 2021-01-28 RX ADMIN — HYDROCODONE BITARTRATE AND ACETAMINOPHEN 1 TABLET: 5; 325 TABLET ORAL at 10:03

## 2021-01-28 RX ADMIN — HYDROCODONE BITARTRATE AND ACETAMINOPHEN 1 TABLET: 5; 325 TABLET ORAL at 17:37

## 2021-01-28 RX ADMIN — CEFDINIR 300 MG: 300 CAPSULE ORAL at 19:37

## 2021-01-28 RX ADMIN — ONDANSETRON 4 MG: 4 TABLET, ORALLY DISINTEGRATING ORAL at 17:34

## 2021-01-29 LAB
ANION GAP SERPL CALCULATED.3IONS-SCNC: 6.7 MMOL/L (ref 5–15)
BASOPHILS # BLD AUTO: 0.06 10*3/MM3 (ref 0–0.2)
BASOPHILS NFR BLD AUTO: 1.2 % (ref 0–1.5)
BUN SERPL-MCNC: 13 MG/DL (ref 8–23)
BUN/CREAT SERPL: 19.7 (ref 7–25)
CALCIUM SPEC-SCNC: 9.3 MG/DL (ref 8.6–10.5)
CHLORIDE SERPL-SCNC: 101 MMOL/L (ref 98–107)
CO2 SERPL-SCNC: 30.3 MMOL/L (ref 22–29)
CREAT SERPL-MCNC: 0.66 MG/DL (ref 0.57–1)
DEPRECATED RDW RBC AUTO: 70.8 FL (ref 37–54)
EOSINOPHIL # BLD AUTO: 0.17 10*3/MM3 (ref 0–0.4)
EOSINOPHIL NFR BLD AUTO: 3.4 % (ref 0.3–6.2)
ERYTHROCYTE [DISTWIDTH] IN BLOOD BY AUTOMATED COUNT: 22.9 % (ref 12.3–15.4)
GFR SERPL CREATININE-BSD FRML MDRD: 85 ML/MIN/1.73
GLUCOSE SERPL-MCNC: 108 MG/DL (ref 65–99)
HCT VFR BLD AUTO: 33.5 % (ref 34–46.6)
HGB BLD-MCNC: 10.4 G/DL (ref 12–15.9)
IMM GRANULOCYTES # BLD AUTO: 0.01 10*3/MM3 (ref 0–0.05)
IMM GRANULOCYTES NFR BLD AUTO: 0.2 % (ref 0–0.5)
LYMPHOCYTES # BLD AUTO: 1.33 10*3/MM3 (ref 0.7–3.1)
LYMPHOCYTES NFR BLD AUTO: 26.7 % (ref 19.6–45.3)
MCH RBC QN AUTO: 27.4 PG (ref 26.6–33)
MCHC RBC AUTO-ENTMCNC: 31 G/DL (ref 31.5–35.7)
MCV RBC AUTO: 88.2 FL (ref 79–97)
MONOCYTES # BLD AUTO: 0.36 10*3/MM3 (ref 0.1–0.9)
MONOCYTES NFR BLD AUTO: 7.2 % (ref 5–12)
NEUTROPHILS NFR BLD AUTO: 3.05 10*3/MM3 (ref 1.7–7)
NEUTROPHILS NFR BLD AUTO: 61.3 % (ref 42.7–76)
NRBC BLD AUTO-RTO: 0 /100 WBC (ref 0–0.2)
PLATELET # BLD AUTO: 197 10*3/MM3 (ref 140–450)
PMV BLD AUTO: 10.5 FL (ref 6–12)
POTASSIUM SERPL-SCNC: 4.5 MMOL/L (ref 3.5–5.2)
RBC # BLD AUTO: 3.8 10*6/MM3 (ref 3.77–5.28)
SODIUM SERPL-SCNC: 138 MMOL/L (ref 136–145)
WBC # BLD AUTO: 4.98 10*3/MM3 (ref 3.4–10.8)

## 2021-01-29 PROCEDURE — 63710000001 ONDANSETRON ODT 4 MG TABLET DISPERSIBLE: Performed by: INTERNAL MEDICINE

## 2021-01-29 PROCEDURE — 80048 BASIC METABOLIC PNL TOTAL CA: CPT | Performed by: PHYSICAL MEDICINE & REHABILITATION

## 2021-01-29 PROCEDURE — 97535 SELF CARE MNGMENT TRAINING: CPT

## 2021-01-29 PROCEDURE — 85025 COMPLETE CBC W/AUTO DIFF WBC: CPT | Performed by: PHYSICAL MEDICINE & REHABILITATION

## 2021-01-29 PROCEDURE — 97112 NEUROMUSCULAR REEDUCATION: CPT

## 2021-01-29 PROCEDURE — 25010000002 ENOXAPARIN PER 10 MG: Performed by: PHYSICAL MEDICINE & REHABILITATION

## 2021-01-29 PROCEDURE — 97110 THERAPEUTIC EXERCISES: CPT

## 2021-01-29 PROCEDURE — 92507 TX SP LANG VOICE COMM INDIV: CPT

## 2021-01-29 PROCEDURE — 97530 THERAPEUTIC ACTIVITIES: CPT

## 2021-01-29 RX ADMIN — PANTOPRAZOLE SODIUM 40 MG: 40 TABLET, DELAYED RELEASE ORAL at 05:08

## 2021-01-29 RX ADMIN — TRAMADOL HYDROCHLORIDE 50 MG: 50 TABLET ORAL at 17:34

## 2021-01-29 RX ADMIN — LOSARTAN POTASSIUM 50 MG: 50 TABLET, FILM COATED ORAL at 09:25

## 2021-01-29 RX ADMIN — OXYBUTYNIN CHLORIDE 5 MG: 5 TABLET ORAL at 16:35

## 2021-01-29 RX ADMIN — HYDROCODONE BITARTRATE AND ACETAMINOPHEN 1 TABLET: 5; 325 TABLET ORAL at 21:55

## 2021-01-29 RX ADMIN — FERROUS SULFATE TAB 325 MG (65 MG ELEMENTAL FE) 325 MG: 325 (65 FE) TAB at 09:25

## 2021-01-29 RX ADMIN — DOCUSATE SODIUM 200 MG: 100 CAPSULE, LIQUID FILLED ORAL at 21:56

## 2021-01-29 RX ADMIN — CEFDINIR 300 MG: 300 CAPSULE ORAL at 09:25

## 2021-01-29 RX ADMIN — CEFDINIR 300 MG: 300 CAPSULE ORAL at 21:56

## 2021-01-29 RX ADMIN — ONDANSETRON 4 MG: 4 TABLET, ORALLY DISINTEGRATING ORAL at 05:08

## 2021-01-29 RX ADMIN — DILTIAZEM HYDROCHLORIDE 180 MG: 180 CAPSULE, COATED, EXTENDED RELEASE ORAL at 09:25

## 2021-01-29 RX ADMIN — LIDOCAINE 1 PATCH: 50 PATCH TOPICAL at 09:25

## 2021-01-29 RX ADMIN — ONDANSETRON 4 MG: 4 TABLET, ORALLY DISINTEGRATING ORAL at 12:28

## 2021-01-29 RX ADMIN — LEVOTHYROXINE SODIUM 125 MCG: 0.12 TABLET ORAL at 05:08

## 2021-01-29 RX ADMIN — MIRTAZAPINE 15 MG: 15 TABLET, FILM COATED ORAL at 21:56

## 2021-01-29 RX ADMIN — HYDROCODONE BITARTRATE AND ACETAMINOPHEN 1 TABLET: 5; 325 TABLET ORAL at 05:08

## 2021-01-29 RX ADMIN — ASPIRIN 81 MG: 81 TABLET, CHEWABLE ORAL at 09:25

## 2021-01-29 RX ADMIN — TRAMADOL HYDROCHLORIDE 50 MG: 50 TABLET ORAL at 09:25

## 2021-01-29 RX ADMIN — ENOXAPARIN SODIUM 40 MG: 40 INJECTION SUBCUTANEOUS at 09:25

## 2021-01-29 RX ADMIN — DOCUSATE SODIUM 200 MG: 100 CAPSULE, LIQUID FILLED ORAL at 09:25

## 2021-01-29 RX ADMIN — ONDANSETRON 4 MG: 4 TABLET, ORALLY DISINTEGRATING ORAL at 16:35

## 2021-01-29 RX ADMIN — DILTIAZEM HYDROCHLORIDE 180 MG: 180 CAPSULE, COATED, EXTENDED RELEASE ORAL at 21:56

## 2021-01-29 RX ADMIN — HYDROCODONE BITARTRATE AND ACETAMINOPHEN 1 TABLET: 5; 325 TABLET ORAL at 12:28

## 2021-01-30 PROCEDURE — 25010000002 ENOXAPARIN PER 10 MG: Performed by: PHYSICAL MEDICINE & REHABILITATION

## 2021-01-30 PROCEDURE — 97110 THERAPEUTIC EXERCISES: CPT

## 2021-01-30 PROCEDURE — 97530 THERAPEUTIC ACTIVITIES: CPT

## 2021-01-30 PROCEDURE — 63710000001 ONDANSETRON ODT 4 MG TABLET DISPERSIBLE: Performed by: INTERNAL MEDICINE

## 2021-01-30 RX ADMIN — ONDANSETRON 4 MG: 4 TABLET, ORALLY DISINTEGRATING ORAL at 16:23

## 2021-01-30 RX ADMIN — ONDANSETRON 4 MG: 4 TABLET, ORALLY DISINTEGRATING ORAL at 11:32

## 2021-01-30 RX ADMIN — DILTIAZEM HYDROCHLORIDE 180 MG: 180 CAPSULE, COATED, EXTENDED RELEASE ORAL at 20:30

## 2021-01-30 RX ADMIN — DILTIAZEM HYDROCHLORIDE 180 MG: 180 CAPSULE, COATED, EXTENDED RELEASE ORAL at 08:29

## 2021-01-30 RX ADMIN — TRAMADOL HYDROCHLORIDE 50 MG: 50 TABLET ORAL at 20:30

## 2021-01-30 RX ADMIN — DOCUSATE SODIUM 200 MG: 100 CAPSULE, LIQUID FILLED ORAL at 08:29

## 2021-01-30 RX ADMIN — LIDOCAINE 1 PATCH: 50 PATCH TOPICAL at 08:29

## 2021-01-30 RX ADMIN — LEVOTHYROXINE SODIUM 125 MCG: 0.12 TABLET ORAL at 06:09

## 2021-01-30 RX ADMIN — HYDROCODONE BITARTRATE AND ACETAMINOPHEN 1 TABLET: 5; 325 TABLET ORAL at 11:34

## 2021-01-30 RX ADMIN — ENOXAPARIN SODIUM 40 MG: 40 INJECTION SUBCUTANEOUS at 09:57

## 2021-01-30 RX ADMIN — PANTOPRAZOLE SODIUM 40 MG: 40 TABLET, DELAYED RELEASE ORAL at 06:09

## 2021-01-30 RX ADMIN — MIRTAZAPINE 15 MG: 15 TABLET, FILM COATED ORAL at 20:30

## 2021-01-30 RX ADMIN — FERROUS SULFATE TAB 325 MG (65 MG ELEMENTAL FE) 325 MG: 325 (65 FE) TAB at 08:29

## 2021-01-30 RX ADMIN — CEFDINIR 300 MG: 300 CAPSULE ORAL at 20:30

## 2021-01-30 RX ADMIN — CEFDINIR 300 MG: 300 CAPSULE ORAL at 08:29

## 2021-01-30 RX ADMIN — ONDANSETRON 4 MG: 4 TABLET, ORALLY DISINTEGRATING ORAL at 06:09

## 2021-01-30 RX ADMIN — HYDROCODONE BITARTRATE AND ACETAMINOPHEN 1 TABLET: 5; 325 TABLET ORAL at 18:09

## 2021-01-30 RX ADMIN — TRAMADOL HYDROCHLORIDE 50 MG: 50 TABLET ORAL at 03:25

## 2021-01-30 RX ADMIN — LOSARTAN POTASSIUM 50 MG: 50 TABLET, FILM COATED ORAL at 08:28

## 2021-01-30 RX ADMIN — ASPIRIN 81 MG: 81 TABLET, CHEWABLE ORAL at 08:29

## 2021-01-30 RX ADMIN — DOCUSATE SODIUM 200 MG: 100 CAPSULE, LIQUID FILLED ORAL at 20:30

## 2021-01-30 RX ADMIN — BISACODYL 10 MG: 10 SUPPOSITORY RECTAL at 16:23

## 2021-01-30 RX ADMIN — OXYBUTYNIN CHLORIDE 5 MG: 5 TABLET ORAL at 16:23

## 2021-01-30 RX ADMIN — ERGOCALCIFEROL 50000 UNITS: 1.25 CAPSULE ORAL at 08:29

## 2021-01-31 PROCEDURE — 25010000002 ENOXAPARIN PER 10 MG: Performed by: PHYSICAL MEDICINE & REHABILITATION

## 2021-01-31 PROCEDURE — 63710000001 ONDANSETRON ODT 4 MG TABLET DISPERSIBLE: Performed by: INTERNAL MEDICINE

## 2021-01-31 RX ADMIN — BISACODYL 10 MG: 10 SUPPOSITORY RECTAL at 15:42

## 2021-01-31 RX ADMIN — PANTOPRAZOLE SODIUM 40 MG: 40 TABLET, DELAYED RELEASE ORAL at 06:32

## 2021-01-31 RX ADMIN — TRAMADOL HYDROCHLORIDE 50 MG: 50 TABLET ORAL at 21:13

## 2021-01-31 RX ADMIN — HYDROCODONE BITARTRATE AND ACETAMINOPHEN 1 TABLET: 5; 325 TABLET ORAL at 15:05

## 2021-01-31 RX ADMIN — ONDANSETRON 4 MG: 4 TABLET, ORALLY DISINTEGRATING ORAL at 06:32

## 2021-01-31 RX ADMIN — CEFDINIR 300 MG: 300 CAPSULE ORAL at 21:13

## 2021-01-31 RX ADMIN — DILTIAZEM HYDROCHLORIDE 180 MG: 180 CAPSULE, COATED, EXTENDED RELEASE ORAL at 21:13

## 2021-01-31 RX ADMIN — LOSARTAN POTASSIUM 50 MG: 50 TABLET, FILM COATED ORAL at 08:20

## 2021-01-31 RX ADMIN — DOCUSATE SODIUM 200 MG: 100 CAPSULE, LIQUID FILLED ORAL at 08:11

## 2021-01-31 RX ADMIN — LEVOTHYROXINE SODIUM 125 MCG: 0.12 TABLET ORAL at 06:32

## 2021-01-31 RX ADMIN — OXYBUTYNIN CHLORIDE 5 MG: 5 TABLET ORAL at 18:08

## 2021-01-31 RX ADMIN — MIRTAZAPINE 15 MG: 15 TABLET, FILM COATED ORAL at 21:13

## 2021-01-31 RX ADMIN — ENOXAPARIN SODIUM 40 MG: 40 INJECTION SUBCUTANEOUS at 08:12

## 2021-01-31 RX ADMIN — DILTIAZEM HYDROCHLORIDE 180 MG: 180 CAPSULE, COATED, EXTENDED RELEASE ORAL at 08:19

## 2021-01-31 RX ADMIN — DOCUSATE SODIUM 200 MG: 100 CAPSULE, LIQUID FILLED ORAL at 21:13

## 2021-01-31 RX ADMIN — LIDOCAINE 1 PATCH: 50 PATCH TOPICAL at 08:12

## 2021-01-31 RX ADMIN — ONDANSETRON 4 MG: 4 TABLET, ORALLY DISINTEGRATING ORAL at 15:42

## 2021-01-31 RX ADMIN — FERROUS SULFATE TAB 325 MG (65 MG ELEMENTAL FE) 325 MG: 325 (65 FE) TAB at 08:12

## 2021-01-31 RX ADMIN — CEFDINIR 300 MG: 300 CAPSULE ORAL at 08:12

## 2021-01-31 RX ADMIN — ASPIRIN 81 MG: 81 TABLET, CHEWABLE ORAL at 08:12

## 2021-01-31 RX ADMIN — HYDROCODONE BITARTRATE AND ACETAMINOPHEN 1 TABLET: 5; 325 TABLET ORAL at 06:32

## 2021-01-31 RX ADMIN — ONDANSETRON 4 MG: 4 TABLET, ORALLY DISINTEGRATING ORAL at 11:30

## 2021-02-01 LAB
ANION GAP SERPL CALCULATED.3IONS-SCNC: 8.4 MMOL/L (ref 5–15)
BASOPHILS # BLD AUTO: 0.04 10*3/MM3 (ref 0–0.2)
BASOPHILS NFR BLD AUTO: 0.9 % (ref 0–1.5)
BUN SERPL-MCNC: 16 MG/DL (ref 8–23)
BUN/CREAT SERPL: 26.7 (ref 7–25)
CALCIUM SPEC-SCNC: 9.2 MG/DL (ref 8.6–10.5)
CHLORIDE SERPL-SCNC: 100 MMOL/L (ref 98–107)
CO2 SERPL-SCNC: 29.6 MMOL/L (ref 22–29)
CREAT SERPL-MCNC: 0.6 MG/DL (ref 0.57–1)
DEPRECATED RDW RBC AUTO: 69.2 FL (ref 37–54)
EOSINOPHIL # BLD AUTO: 0.13 10*3/MM3 (ref 0–0.4)
EOSINOPHIL NFR BLD AUTO: 2.8 % (ref 0.3–6.2)
ERYTHROCYTE [DISTWIDTH] IN BLOOD BY AUTOMATED COUNT: 22.8 % (ref 12.3–15.4)
GFR SERPL CREATININE-BSD FRML MDRD: 95 ML/MIN/1.73
GLUCOSE SERPL-MCNC: 112 MG/DL (ref 65–99)
HCT VFR BLD AUTO: 33.8 % (ref 34–46.6)
HGB BLD-MCNC: 10.9 G/DL (ref 12–15.9)
IMM GRANULOCYTES # BLD AUTO: 0.01 10*3/MM3 (ref 0–0.05)
IMM GRANULOCYTES NFR BLD AUTO: 0.2 % (ref 0–0.5)
LYMPHOCYTES # BLD AUTO: 1.6 10*3/MM3 (ref 0.7–3.1)
LYMPHOCYTES NFR BLD AUTO: 34.9 % (ref 19.6–45.3)
MCH RBC QN AUTO: 28.3 PG (ref 26.6–33)
MCHC RBC AUTO-ENTMCNC: 32.2 G/DL (ref 31.5–35.7)
MCV RBC AUTO: 87.8 FL (ref 79–97)
MONOCYTES # BLD AUTO: 0.32 10*3/MM3 (ref 0.1–0.9)
MONOCYTES NFR BLD AUTO: 7 % (ref 5–12)
NEUTROPHILS NFR BLD AUTO: 2.48 10*3/MM3 (ref 1.7–7)
NEUTROPHILS NFR BLD AUTO: 54.2 % (ref 42.7–76)
NRBC BLD AUTO-RTO: 0 /100 WBC (ref 0–0.2)
PLATELET # BLD AUTO: 164 10*3/MM3 (ref 140–450)
PMV BLD AUTO: 10.3 FL (ref 6–12)
POTASSIUM SERPL-SCNC: 4.3 MMOL/L (ref 3.5–5.2)
RBC # BLD AUTO: 3.85 10*6/MM3 (ref 3.77–5.28)
SODIUM SERPL-SCNC: 138 MMOL/L (ref 136–145)
WBC # BLD AUTO: 4.58 10*3/MM3 (ref 3.4–10.8)

## 2021-02-01 PROCEDURE — 25010000002 ENOXAPARIN PER 10 MG: Performed by: PHYSICAL MEDICINE & REHABILITATION

## 2021-02-01 PROCEDURE — 97535 SELF CARE MNGMENT TRAINING: CPT

## 2021-02-01 PROCEDURE — 97130 THER IVNTJ EA ADDL 15 MIN: CPT

## 2021-02-01 PROCEDURE — 97129 THER IVNTJ 1ST 15 MIN: CPT

## 2021-02-01 PROCEDURE — 97112 NEUROMUSCULAR REEDUCATION: CPT

## 2021-02-01 PROCEDURE — 85025 COMPLETE CBC W/AUTO DIFF WBC: CPT | Performed by: PHYSICAL MEDICINE & REHABILITATION

## 2021-02-01 PROCEDURE — 97110 THERAPEUTIC EXERCISES: CPT

## 2021-02-01 PROCEDURE — 63710000001 ONDANSETRON ODT 4 MG TABLET DISPERSIBLE: Performed by: INTERNAL MEDICINE

## 2021-02-01 PROCEDURE — 80048 BASIC METABOLIC PNL TOTAL CA: CPT | Performed by: PHYSICAL MEDICINE & REHABILITATION

## 2021-02-01 RX ADMIN — ONDANSETRON 4 MG: 4 TABLET, ORALLY DISINTEGRATING ORAL at 07:47

## 2021-02-01 RX ADMIN — PANTOPRAZOLE SODIUM 40 MG: 40 TABLET, DELAYED RELEASE ORAL at 05:36

## 2021-02-01 RX ADMIN — ASPIRIN 81 MG: 81 TABLET, CHEWABLE ORAL at 07:47

## 2021-02-01 RX ADMIN — ONDANSETRON 4 MG: 4 TABLET, ORALLY DISINTEGRATING ORAL at 16:42

## 2021-02-01 RX ADMIN — DOCUSATE SODIUM 200 MG: 100 CAPSULE, LIQUID FILLED ORAL at 19:44

## 2021-02-01 RX ADMIN — LIDOCAINE 1 PATCH: 50 PATCH TOPICAL at 07:47

## 2021-02-01 RX ADMIN — CEFDINIR 300 MG: 300 CAPSULE ORAL at 19:44

## 2021-02-01 RX ADMIN — FERROUS SULFATE TAB 325 MG (65 MG ELEMENTAL FE) 325 MG: 325 (65 FE) TAB at 07:47

## 2021-02-01 RX ADMIN — ENOXAPARIN SODIUM 40 MG: 40 INJECTION SUBCUTANEOUS at 07:46

## 2021-02-01 RX ADMIN — HYDROCODONE BITARTRATE AND ACETAMINOPHEN 1 TABLET: 5; 325 TABLET ORAL at 19:44

## 2021-02-01 RX ADMIN — DILTIAZEM HYDROCHLORIDE 180 MG: 180 CAPSULE, COATED, EXTENDED RELEASE ORAL at 19:45

## 2021-02-01 RX ADMIN — MIRTAZAPINE 15 MG: 15 TABLET, FILM COATED ORAL at 19:44

## 2021-02-01 RX ADMIN — LOSARTAN POTASSIUM 50 MG: 50 TABLET, FILM COATED ORAL at 07:47

## 2021-02-01 RX ADMIN — CEFDINIR 300 MG: 300 CAPSULE ORAL at 07:47

## 2021-02-01 RX ADMIN — HYDROCODONE BITARTRATE AND ACETAMINOPHEN 1 TABLET: 5; 325 TABLET ORAL at 11:52

## 2021-02-01 RX ADMIN — DILTIAZEM HYDROCHLORIDE 180 MG: 180 CAPSULE, COATED, EXTENDED RELEASE ORAL at 07:47

## 2021-02-01 RX ADMIN — TRAMADOL HYDROCHLORIDE 50 MG: 50 TABLET ORAL at 05:36

## 2021-02-01 RX ADMIN — TRAMADOL HYDROCHLORIDE 50 MG: 50 TABLET ORAL at 17:16

## 2021-02-01 RX ADMIN — DOCUSATE SODIUM 200 MG: 100 CAPSULE, LIQUID FILLED ORAL at 07:47

## 2021-02-01 RX ADMIN — OXYBUTYNIN CHLORIDE 5 MG: 5 TABLET ORAL at 17:14

## 2021-02-01 RX ADMIN — LEVOTHYROXINE SODIUM 125 MCG: 0.12 TABLET ORAL at 05:37

## 2021-02-01 RX ADMIN — ONDANSETRON 4 MG: 4 TABLET, ORALLY DISINTEGRATING ORAL at 11:31

## 2021-02-01 NOTE — PROGRESS NOTES
Inpatient Rehabilitation Plan of Care Note    Plan of Care  Care Plan Reviewed - No updates at this time.    Psychosocial    Performed Intervention(s)  Verbalize needs and concerns  Medications as needed/ ordered  Therapeutic environmental set up      Safety    Performed Intervention(s)  Items with in reach and environmental set up  Bed alarm/ Chair alarm  Safety rounds and falls protocols/ precautions      Sphincter Control    Performed Intervention(s)  Proper diet and fluid intake  Medication as ordered  Incontinent care if needed  Monitor intake and output      Body Systems    Performed Intervention(s)  O2 2L/NC @ HS  assess lungs, sats every shift    Signed by: Liliya Michaels RN

## 2021-02-01 NOTE — PLAN OF CARE
Goal Outcome Evaluation:  Plan of Care Reviewed With: patient     Outcome Summary: Ms Carrero has been pleasant and cooperative, medication with water, and incontinent/continent with some urgency- BM today after suppository administered. Pain medication given once, assist of 2, and up to the chair for meals. Left side flaccid, alert and oriented x4, and no unsafe behavior.

## 2021-02-01 NOTE — PROGRESS NOTES
Inpatient Rehabilitation Plan of Care Note    Plan of Care  Care Plan Reviewed - No updates at this time.    Psychosocial    Performed Intervention(s)  Verbalize needs and concerns  Medications as needed/ ordered  Therapeutic environmental set up      Safety    Performed Intervention(s)  Items with in reach and environmental set up  Bed alarm/ Chair alarm  Safety rounds and falls protocols/ precautions      Sphincter Control    Performed Intervention(s)  Proper diet and fluid intake  Medication as ordered  Incontinent care if needed  Monitor intake and output      Body Systems    Performed Intervention(s)  O2 2L/NC @ HS  assess lungs, sats every shift    Signed by: Gloria Baeza RN

## 2021-02-01 NOTE — THERAPY TREATMENT NOTE
Inpatient Rehabilitation - Occupational Therapy Treatment Note    [facility]     Patient Name: Liana Carrero  : 1935  MRN: [id number]    Today's Date: 2021                 Admit Date: 2021         ICD-10-CM ICD-9-CM   1. Impaired functional mobility, balance, gait, and endurance  Z74.09 V49.89       Patient Active Problem List   Diagnosis   • Stroke (cerebrum) (CMS/[facility])   • Chest pain   • Hypertensive urgency   • Headache   • History of embolic stroke   • Hypothyroidism   • Hypokalemia   • CAD in native artery   • URTI (acute upper respiratory infection)   • Closed fracture of left hip (CMS/[facility])   • Chronic diastolic (congestive) heart failure (CMS/[facility])   • Coronary artery disease involving autologous vein coronary bypass graft without angina pectoris   • Impaired glucose tolerance   • Moderate malnutrition (CMS/[facility])   • Stroke (CMS/[facility])       Past Medical History:   Diagnosis Date   • Arthritis    • CHF (congestive heart failure) (CMS/[facility])    • Coronary artery disease    • Diabetes mellitus (CMS/[facility])     new DX related to stroke, started on insulin at hospital   • Disease of thyroid gland    • Elevated cholesterol    • GERD (gastroesophageal reflux disease)    • History of transfusion    • Hypertension    • PONV (postoperative nausea and vomiting)    • Sleep apnea    • Spinal headache    • Stroke (CMS/[facility])        Past Surgical History:   Procedure Laterality Date   • APPENDECTOMY     • CARDIAC CATHETERIZATION       x2   • CARDIAC CATHETERIZATION N/A 2019    Procedure: Left Heart Cath;  Surgeon: Bentley Chapin MD;  Location: [facility] CATH INVASIVE LOCATION;  Service: Cardiovascular   • CARDIAC CATHETERIZATION N/A 2019    Procedure: Coronary angiography;  Surgeon: Bentley Chapin MD;  Location: [facility] CATH INVASIVE LOCATION;  Service: Cardiovascular   • CARDIAC CATHETERIZATION N/A 2019    Procedure: Native mammary injection;  Surgeon: Bentley Chapin MD;  Location: [facility]  CATH INVASIVE LOCATION;  Service: Cardiovascular   • CARDIAC CATHETERIZATION N/A 12/26/2019    Procedure: Saphenous Vein Graft;  Surgeon: Bentley Chapin MD;  Location: [facility] CATH INVASIVE LOCATION;  Service: Cardiovascular   • COLON SURGERY     • COLONOSCOPY     • EYE SURGERY      lens implants and cataract surgery   • FEMUR IM NAILING/RODDING Left 3/26/2020    Procedure: LT.HIP NAILING/RODDING;  Surgeon: Carlos Khan II, MD;  Location: [facility] MAIN OR;  Service: Orthopedics;  Laterality: Left;   • FRACTURE SURGERY Left 2020   • HYSTERECTOMY     • VASCULAR SURGERY      varicous veins                IRF OT ASSESSMENT FLOWSHEET (last 12 hours)      IRF OT Evaluation and Treatment     Row Name 02/01/21 1431          OT Time and Intention    Document Type  daily treatment  -CC     Mode of Treatment  occupational therapy  -CC     Patient Effort  good  -CC     Row Name 02/01/21 1431          Cognition/Psychosocial    Orientation Status (Cognition)  oriented x 3  -CC     Personal Safety Interventions  fall prevention program maintained;gait belt;nonskid shoes/slippers when out of bed  -CC     Cognitive Function (Cognitive)  attention deficit  -CC     Attention Deficit (Cognitive)  distractible in noisy environment;divided attention;focused/sustained attention  -CC     Row Name 02/01/21 1431          Pain Scale: Numbers Pre/Post-Treatment    Pretreatment Pain Rating  5/10  -CC     Posttreatment Pain Rating  5/10  -CC     Pain Location  hand  -CC     Row Name 02/01/21 1431          Bed Mobility    Comment (Bed Mobility)  in w/c  -CC     Row Name 02/01/21 1431          Transfers    Sit-Stand [place] (Transfers)  maximum assist (25% patient effort);2 person assist;verbal cues;nonverbal cues (demo/gesture)  -CC     Stand-Sit [place] (Transfers)  maximum assist (25% patient effort);2 person assist;verbal cues;nonverbal cues (demo/gesture)  -CC     [place] Level (Toilet Transfer)  maximum assist  (25% patient effort);2 person assist;verbal cues;nonverbal cues (demo/gesture)  -     Assistive Device (Toilet Transfer)  commode, bedside with drop arms  -[facility] Name 02/01/21 1431          Sit-Stand Transfer    Assistive Device (Sit-Stand Transfers)  wheelchair  -[facility] Name 02/01/21 [facility]          Stand-Sit Transfer    Assistive Device (Stand-Sit Transfers)  wheelchair  -[facility] Name 02/01/21 [facility]          Toilet Transfer    Type (Toilet Transfer)  squat pivot  -[facility] Name 02/01/21 [facility]          Balance    Dynamic Sitting Balance  moderate impairment;severe impairment;supported;sitting, edge of mat  -[facility] Name 02/01/21 1431          Neuromuscular Re-education    Interventions (Neuromuscular Re-education)  facilitation/inhibition;weight bearing;weight shifting  -     Positioning (Neuromuscular Re-education)  sitting;supported  -CC     Comment (Neuromuscular Re-education)  seated EOB . VC and mirror use dto get to midline. Painful L hand. Tolerated WB. Increased WB   -[facility] Name 02/01/21 [facility]          Bathing    [place] Level (Bathing)  bathing skills;upper body;minimum assist (75% patient effort)  -CC     Position (Bathing)  sink side;supported sitting  -CC     Comment (Bathing)  x 2 to stand  -[facility] Name 02/01/21 [facility]          Upper Body Dressing    [place] Level (Upper Body Dressing)  upper body dressing skills;doff;don;pull over garment;moderate assist (50-74% patient effort);maximal assist (25-49% patient effort)  -CC     Position (Upper Body Dressing)  supported sitting  -CC     Set-up Assistance (Upper Body Dressing)  obtain clothing  -     Comment (Upper Body Dressing)  jon dressing   -[facility] Name 02/01/21 [facility]          Lower Body Dressing    [place] Level (Lower Body Dressing)  doff;don;pants/bottoms;maximum assist (25% patient effort)  -CC     Position (Lower Body Dressing)  supported sitting;supported standing  -CC     Set-up Assistance (Lower Body  Dressing)  obtain clothing  -     Comment (Lower Body Dressing)  jon dressing   -CC     Row Name 02/01/21 1431          Grooming    [place] Level (Grooming)  grooming skills;deodorant application;oral care regimen;wash face, hands;supervision;verbal cues;minimum assist (75% patient effort)  -CC     Position (Grooming)  sink side;supported sitting  -     Set-up Assistance (Grooming)  obtain supplies  -     Row Name 02/01/21 1431          Toileting    [place] Level (Toileting)  toileting skills;adjust/manage clothing;perform perineal hygiene;dependent (less than 25% patient effort)  -     Assistive Device Use (Toileting)  commode chair  -     Position (Toileting)  supported sitting;supported standing  -     Set-up Assistance (Toileting)  change pad/brief  -CC     Row Name 02/01/21 1431          Positioning and Restraints    Pre-Treatment Position  sitting in chair/recliner  -CC     Post Treatment Position  wheelchair  -CC     In Wheelchair  sitting;call light within reach;encouraged to call for assist;exit alarm on;with family/caregiver  -       User Key  (r) = Recorded By, (t) = Taken By, (c) = Cosigned By    Initials Name Effective Dates    CC Keya Miranda, OTR 06/08/18 -            Occupational Therapy Education                 Title: PT OT SLP Therapies (In Progress)     Topic: Occupational Therapy (Done)     Point: ADL training (Done)     Description:   Instruct learner(s) on proper safety adaptation and remediation techniques during self care or transfers.   Instruct in proper use of assistive devices.              Learning Progress Summary           Patient Acceptance, E,TB,D, VU by CC at 1/29/2021 8206    Comment: Pt repositioned in reg w/c with 1/2 arm tray on Left. Nursing notified of change from recliner w/c/. Commode tsf from w/c to droparm commode discussed and shown to NA.    Acceptance, E, VU by BL at 1/8/2021 1437   Caregiver Acceptance, E,TB,D, VU by CC at 1/29/2021 8743     Comment: Pt repositioned in reg w/c with 1/2 arm tray on Left. Nursing notified of change from recliner w/c/. Commode tsf from w/c to droparm commode discussed and shown to NA.                   Point: Home exercise program (Done)     Description:   Instruct learner(s) on appropriate technique for monitoring, assisting and/or progressing therapeutic exercises/activities.              Learning Progress Summary           Patient Acceptance, E, VU by BL at 1/8/2021 1434                   Point: Precautions (Done)     Description:   Instruct learner(s) on prescribed precautions during self-care and functional transfers.              Learning Progress Summary           Patient Acceptance, E, VU by BL at 1/8/2021 1434                   Point: Body mechanics (Done)     Description:   Instruct learner(s) on proper positioning and spine alignment during self-care, functional mobility activities and/or exercises.              Learning Progress Summary           Patient Acceptance, E,TB,D, VU by CC at 1/29/2021 1509    Comment: Pt repositioned in reg w/c with 1/2 arm tray on Left. Nursing notified of change from recliner w/c/. Commode tsf from w/c to droparm commode discussed and shown to NA.    Acceptance, E, VU by BL at 1/8/2021 1434   Caregiver Acceptance, E,TB,D, VU by CC at 1/29/2021 1509    Comment: Pt repositioned in reg w/c with 1/2 arm tray on Left. Nursing notified of change from recliner w/c/. Commode tsf from w/c to droparm commode discussed and shown to NA.                               User Key     Initials Effective Dates Name Provider Type Discipline     06/08/18 -  eKya Miranda OTR Occupational Therapist OT    BL 01/05/21 -  Marshall Oleary OT Occupational Therapist OT                    OT Recommendation and Plan                         Time Calculation:     Time Calculation- OT     Row Name 02/01/21 1400 02/01/21 1000          Time Calculation- OT    OT Start Time  1400  -CC  1000  -CC     OT  Stop Time  1430  -CC  1030  -CC     OT Time Calculation (min)  30 min  -CC  30 min  -CC     OT Non-Billable Time (min)  30 min tech  -CC  30 min tech  -CC       User Key  (r) = Recorded By, (t) = Taken By, (c) = Cosigned By    Initials Name Provider Type    CC Keya Miranda OTR Occupational Therapist        Therapy Charges for Today     Code Description Service Date Service Provider Modifiers Qty    83974029683 HC OT SELF CARE/MGMT/TRAIN EA 15 MIN 2/1/2021 Keya Miranda OTR GO 2    02904741944 HC OT NEUROMUSC RE EDUCATION EA 15 MIN 2/1/2021 Keya Miranda OTR GO 2    74165490051 HC OT THER SUPP EA 15 MIN 2/1/2021 Keya Miranda OTR GO 4                   GIAN Blackwell  2/1/2021

## 2021-02-01 NOTE — PLAN OF CARE
Goal Outcome Evaluation:  Plan of Care Reviewed With: patient  Progress: improving  Outcome Summary: Pt has been up via the lift today and with PT/OT therapies. Norco and Ultram given for generalized pain.

## 2021-02-01 NOTE — PROGRESS NOTES
Adult Nutrition  Assessment/PES    Patient Name:  Liana Carrero  YOB: 1935  MRN: [id number]  Admit Date:  1/7/2021    Assessment Date:  2/1/2021    Reason for Assessment     Row Name 02/01/21 1406          Reason for Assessment    Reason For Assessment  follow-up protocol         Nutrition/Diet History     Row Name 02/01/21 1406          Nutrition/Diet History    Typical Food/Fluid Intake  I've been helping with menus. Her preferences are narrow. Only drinking about one boost, if that, per day. Very flat affect with me. Slow to respond at times.           Labs/Tests/Procedures/Meds     Row Name 02/01/21 1407          Labs/Procedures/Meds    Lab Results Reviewed  reviewed        Diagnostic Tests/Procedures    Diagnostic Test/Procedure Reviewed  reviewed        Medications    Pertinent Medications Reviewed  reviewed         Physical Findings     Row Name 02/01/21 1407          Physical Findings    Overall Physical Appearance  hemiplegia;other (see comments)           Nutrition Prescription Ordered     Row Name 02/01/21 1407          Nutrition Prescription PO    Current PO Diet  Regular     Supplement  Boost Plus (Ensure Enlive, Ensure Plus)     Supplement Frequency  Daily     Common Modifiers  Cardiac;Low Fat;Low Sodium         Evaluation of Received Nutrient/Fluid Intake     Row Name 02/01/21 1407          PO Evaluation    % PO Intake                 Problem/Interventions:          Intervention Goal     Row Name 02/01/21 1408          Intervention Goal    General  Maintain nutrition;Disease management/therapy     PO Intake %  75 %     Weight  Maintain weight         Nutrition Intervention     Row Name 02/01/21 1401          Nutrition Intervention    RD/Tech Action  Interview for preference;Menu provided;Encourage intake;Follow Tx progress           Education/Evaluation     Row Name 02/01/21 1403          Education    Education  Will Instruct as appropriate        Monitor/Evaluation    Monitor  Per  protocol           Electronically signed by:  Angela Gonzalez RD  02/01/21 14:08 EST

## 2021-02-01 NOTE — PLAN OF CARE
Goal Outcome Evaluation:          Problem: Rehabilitation (IRF) Plan of Care  Goal: Plan of Care Review  Outcome: Ongoing, Progressing  Flowsheets  Taken 2/1/2021 0231 by Gloria Baeza RN  Outcome Summary: Pt is alert and oriented x 4. Can make all wants and needs known with clear speech. Friendly and talkative with all staff. Takes all meds whole PO. Regular cardiac diet, thin liquids. Left side hemiplegia. Incontinent of bowel and bladder. Wears briefs. Does call out for bed pan at times. Last BM 1/31. Bruising to upper extremities. Large bruise to left hand. Wears 1L o2 at hs n/c. Loop recorder to left chest. C/o of left hip pain this evening. PRN tramadol administered. No further complaints. Resting in bed with eyes closed. Call bell in reach.

## 2021-02-01 NOTE — PROGRESS NOTES
Inpatient Rehabilitation Functional Measures Assessment and Plan of Care    Plan of Care  Updated Problems/Interventions  Mobility    [OT] Toilet Transfers(Active)  Current Status(02/01/2021): max x2 to droparm  Weekly Goal(02/09/2021): max/mod  x 2  Discharge Goal: mod  A    [OT] Tub/Shower Transfers(Active)  Current Status(02/01/2021): TBA  Weekly Goal(02/08/2021): Max x 2  Discharge Goal: mod  A        Self Care    [OT] Bathing(Active)  Current Status(02/01/2021): Min UB max x 2 LB  Weekly Goal(02/09/2021): Max  Discharge Goal: Min A    [OT] Dressing (Lower)(Active)  Current Status(02/01/2021): Dependent x 2  Weekly Goal(02/08/2021): max x 2  Discharge Goal: Mod A    [OT] Dressing (Upper)(Active)  Current Status(02/01/2021): mod/maxA  Weekly Goal(02/09/2021): Min A  Discharge Goal: CGA    [OT] Grooming(Active)  Current Status(02/01/2021): min/SBA  Weekly Goal(02/08/2021): SBA  Discharge Goal: Set-up    [OT] Toileting(Active)  Current Status(02/01/2021): Dependent x 2  Weekly Goal(02/09/2021): max x 2  Discharge Goal: Mod A    Functional Measures  JALEEL Eating:  Branch  JALEEL Grooming: Branch  JALEEL Bathing:  Branch  JALEEL Upper Body Dressing:  Branch  JALEEL Lower Body Dressing:  Branch  JALEEL Toileting:  Branch    JALEEL Bladder Management  Level of Assistance:  Branch  Frequency/Number of Accidents this Shift:  Branch    JALEEL Bowel Management  Level of Assistance: Branch  Frequency/Number of Accidents this Shift: Branch    JALEEL Bed/Chair/Wheelchair Transfer:  Branch  JALEEL Toilet Transfer:  Branch  JALEEL Tub/Shower Transfer:  Branch    Previously Documented Mode of Locomotion at Discharge: Field  JALEEL Expected Mode of Locomotion at Discharge: Branch  JALEEL Walk/Wheelchair:  Branch  JALEEL Stairs:  Branch    JALEEL Comprehension:  Branch  JALEEL Expression:  Branch  JALEEL Social Interaction:  Branch  JALEEL Problem Solving:  Branch  JALEEL Memory:  Branch    Therapy Mode Minutes  Occupational Therapy: Branch  Physical Therapy: Branch  Speech Language  Pathology:  Branch    Signed by: Keya Miranda, OTR/L

## 2021-02-01 NOTE — THERAPY TREATMENT NOTE
Inpatient Rehabilitation - Physical Therapy Treatment Note       [facility]     Patient Name: Liana Carrero  : 1935  MRN: [id number]    Today's Date: 2021                    Admit Date: 2021      Visit Dx:     ICD-10-CM ICD-9-CM   1. Impaired functional mobility, balance, gait, and endurance  Z74.09 V49.89       Patient Active Problem List   Diagnosis   • Stroke (cerebrum) (CMS/[facility])   • Chest pain   • Hypertensive urgency   • Headache   • History of embolic stroke   • Hypothyroidism   • Hypokalemia   • CAD in native artery   • URTI (acute upper respiratory infection)   • Closed fracture of left hip (CMS/[facility])   • Chronic diastolic (congestive) heart failure (CMS/[facility])   • Coronary artery disease involving autologous vein coronary bypass graft without angina pectoris   • Impaired glucose tolerance   • Moderate malnutrition (CMS/[facility])   • Stroke (CMS/[facility])       Past Medical History:   Diagnosis Date   • Arthritis    • CHF (congestive heart failure) (CMS/[facility])    • Coronary artery disease    • Diabetes mellitus (CMS/[facility])     new DX related to stroke, started on insulin at hospital   • Disease of thyroid gland    • Elevated cholesterol    • GERD (gastroesophageal reflux disease)    • History of transfusion    • Hypertension    • PONV (postoperative nausea and vomiting)    • Sleep apnea    • Spinal headache    • Stroke (CMS/[facility])        Past Surgical History:   Procedure Laterality Date   • APPENDECTOMY     • CARDIAC CATHETERIZATION       x2   • CARDIAC CATHETERIZATION N/A 2019    Procedure: Left Heart Cath;  Surgeon: Bentley Chapin MD;  Location:  [facility] CATH INVASIVE [facility];  Service: Cardiovascular   • CARDIAC CATHETERIZATION N/A 2019    Procedure: Coronary angiography;  Surgeon: Bentley Chapin MD;  Location:  [facility];  Service: Cardiovascular   • CARDIAC CATHETERIZATION N/A 2019    Procedure: Native mammary injection;  Surgeon: Bentley Chapin MD;   Location:  [facility] CATH INVASIVE [facility];  Service: Cardiovascular   • CARDIAC CATHETERIZATION N/A 12/26/2019    Procedure: Saphenous Vein Graft;  Surgeon: Bentley Chapin MD;  Location: [facility] CATH INVASIVE LOCATION;  Service: Cardiovascular   • COLON SURGERY     • COLONOSCOPY     • EYE SURGERY      lens implants and cataract surgery   • FEMUR IM NAILING/RODDING Left 3/26/2020    Procedure: LT.HIP NAILING/RODDING;  Surgeon: Carlos Khan II, MD;  Location: [facility] MAIN OR;  Service: Orthopedics;  Laterality: Left;   • FRACTURE SURGERY Left 2020   • HYSTERECTOMY     • VASCULAR SURGERY      varicous veins          PT ASSESSMENT (last 12 hours)      IRF PT Evaluation and Treatment     Row Name 02/01/21 1054          PT Time and Intention    Document Type  daily treatment  -MS     Mode of Treatment  physical therapy  -MS     Patient/Family/Caregiver Comments/Observations  AM: up on BSC with nsg, reporting L hand pain and LBP, agreeable with encouragement; PM: supine in bed  -MS     Total Minutes, Physical Therapy  60  -MS     Row Name 02/01/21 1054          General Information    Existing Precautions/Restrictions  fall  -MS     Limitations/Impairments  insensate body part;safety/cognitive  -MS     Row Name 02/01/21 1054          Vision Assessment/Intervention    Visual Motor Impairment  visual tracking, left  -MS     Visual Processing Deficit  jon-inattention/neglect, left  -MS     Visual Treatment Interventions  scanning activities/strategies  -MS     Row Name 02/01/21 1054          Cognition/Psychosocial    Affect/Mental Status (Cognitive)  flat/blunted affect;sad/depressed affect  -MS     Orientation Status (Cognition)  oriented x 3  -MS     Follows Commands (Cognition)  follows two-step commands;repetition of directions required;verbal cues/prompting required  -MS     Personal Safety Interventions  fall prevention program maintained;gait belt;nonskid shoes/slippers when out of bed  -MS     Cognitive  Function (Cognitive)  attention deficit  -MS     Attention Deficit (Cognitive)  distractible in noisy environment;divided attention;focused/sustained attention  -MS     Row Name 02/01/21 1054          Pain Scale: Numbers Pre/Post-Treatment    Pretreatment Pain Rating  4/10  -MS     Posttreatment Pain Rating  4/10  -MS     Pain Location - Side  Left  -MS     Pain Location  hand  -MS     Pre/Posttreatment Pain Comment  Repositioned and elevated. Also reporting LBP when WBing.  -MS     Row Name 02/01/21 1054          Bed Mobility    Rolling Right [place] (Bed Mobility)  moderate assist (50% patient effort);maximum assist (25% patient effort);verbal cues;nonverbal cues (demo/gesture)  -MS     Supine-Sit [place] (Bed Mobility)  maximum assist (25% patient effort);verbal cues;nonverbal cues (demo/gesture)  -MS     Sidelying-Sit [place] (Bed Mobility)  maximum assist (25% patient effort);2 person assist;verbal cues;nonverbal cues (demo/gesture)  -MS     Bed Mobility, Safety Issues  decreased use of legs for bridging/pushing;impaired trunk control for bed mobility  -MS     Assistive Device (Bed Mobility)  bed rails;head of bed elevated  -MS     Comment (Bed Mobility)  Heavy cues for midline and sitting upright.  -MS     Row Name 02/01/21 1054          Transfer Assessment/Treatment    Comment (Transfers)  4 standing trials including standing at wheelchair for donning pants after toilet transfer- longest standing time was 60s at jon bars, heavy postural cues and fatigued quickly this AM.  -MS     Row Name 02/01/21 1054          Transfers    Sit-Stand [place] (Transfers)  maximum assist (25% patient effort);2 person assist;verbal cues;nonverbal cues (demo/gesture)  -MS     Stand-Sit [place] (Transfers)  maximum assist (25% patient effort);2 person assist;verbal cues;nonverbal cues (demo/gesture)  -MS     [place] Level (Toilet Transfer)  maximum assist (25% patient effort);2 person assist;verbal  cues;nonverbal cues (demo/gesture)  -MS     Assistive Device (Toilet Transfer)  commode, bedside with drop arms  -MS     Row Name 02/01/21 1054          Sit-Stand Transfer    Assistive Device (Sit-Stand Transfers)  other (see comments);wheelchair at jon bars  -MS     Row Name 02/01/21 1054          Stand-Sit Transfer    Assistive Device (Stand-Sit Transfers)  other (see comments);wheelchair at jon bars  -MS     Row Name 02/01/21 1054          Squat Pivot Transfer    Squat Pivot [place] (Transfers)  maximum assist (25% patient effort);2 person assist;verbal cues;nonverbal cues (demo/gesture)  -MS     Assistive Device (Squat Pivot Transfers)  wheelchair  -MS     Row Name 02/01/21 1054          Toilet Transfer    Type (Toilet Transfer)  squat pivot  -MS     Row Name 02/01/21 1054          Gait/Stairs (Locomotion)    Comment (Gait/Stairs)  Not appropriate to assess at this time.  -MS     Row Name 02/01/21 1054          Wheelchair Mobility/Management    Forward Propulsion [place] (Wheelchair)  minimum assist (75% patient effort);verbal cues  -MS     Distance Propelled in Feet (Wheelchair)  100'  -MS     Armrest Management [place] (Wheelchair)  dependent (less than 25% patient effort)  -MS     Front Rigging/Swing-away Footrest Management [place] (Wheelchair)  dependent (less than 25% patient effort)  -MS     Wheel Locks/Brakes Management [place] (Wheelchair)  dependent (less than 25% patient effort)  -MS     Row Name 02/01/21 1054          Balance    Static Sitting Balance  moderate impairment;sitting, edge of mat  -MS     Dynamic Sitting Balance  moderate impairment;sitting, edge of mat  -MS     Sit to Stand Dynamic Balance  severe impairment;supported  -MS     Static Standing Balance  severe impairment;supported  -MS     Comment, Balance  Sat EOB for ~12 minutes today primarily at SBA with cues for R weight shifting, attempted raising R arm from mat with emphasis on balance reactions.  Pertubations performed while sitting on EOM.  -MS     Row Name 02/01/21 1054          Positioning and Restraints    Pre-Treatment Position  sitting in chair/recliner  -MS     Post Treatment Position  wheelchair  -MS     In Wheelchair  sitting With SLP in AM; With OT in PM  -MS       User Key  (r) = Recorded By, (t) = Taken By, (c) = Cosigned By    Initials Name Provider Type    Nati Quinteros RAGHU, PT Physical Therapist        Wound 01/07/21 Left upper chest Incision (Active)   Dressing Appearance open to air 01/31/21 2113   Closure Liquid skin adhesive;Open to air 02/01/21 0749   Base clean;dry 02/01/21 0749   Periwound intact 02/01/21 0749   Drainage Amount none 02/01/21 0749   Dressing Care open to air 01/31/21 2113   Periwound Care dry periwound area maintained 01/31/21 2113     Physical Therapy Education                 Title: PT OT SLP Therapies (In Progress)     Topic: Physical Therapy (In Progress)     Point: Mobility training (In Progress)     Learning Progress Summary           Patient Acceptance, E,TB, NR by MS at 2/1/2021 1145    Acceptance, E, NR by  at 1/30/2021 1336    Acceptance, E,TB, VU,NR by MS at 1/29/2021 1535    Acceptance, E, NR by LB at 1/28/2021 1032    Acceptance, E,TB, VU,NR by MS at 1/27/2021 1447    Acceptance, E,TB, VU,NR by MS at 1/26/2021 0959    Acceptance, E,TB, VU,NR by MS at 1/25/2021 1316    Acceptance, E,TB, VU,NR by MS at 1/22/2021 0918    Acceptance, E,TB, VU by MS at 1/20/2021 1544    Acceptance, E,TB, VU,NR by MS at 1/19/2021 1401    Acceptance, E,TB, NR,NL by MS at 1/18/2021 1432    Acceptance, E,TB, VU,NR by IVAN at 1/16/2021 1444    Acceptance, E,TB, NR,NL by MS at 1/15/2021 1418    Acceptance, E,TB, NR by MS at 1/14/2021 1223    Acceptance, E,TB, NR by LB1 at 1/13/2021 1030    Comment: sitting balance    Acceptance, E,TB, NR by MS at 1/12/2021 1312    Acceptance, E,TB, VU,NR by MS at 1/11/2021 1140                   Point: Home exercise program (In Progress)      Learning Progress Summary           Patient Acceptance, E,TB, NR by MS at 2/1/2021 1145    Acceptance, E, NR by LH at 1/30/2021 1336    Acceptance, E,TB, VU,NR by MS at 1/29/2021 1535    Acceptance, E,TB, VU,NR by MS at 1/27/2021 1447    Acceptance, E,TB, VU,NR by MS at 1/26/2021 0959    Acceptance, E,TB, VU,NR by MS at 1/25/2021 1316    Acceptance, E,TB, VU,NR by MS at 1/22/2021 0918    Acceptance, E,TB, VU by MS at 1/20/2021 1544    Acceptance, E,TB, VU,NR by MS at 1/19/2021 1401    Acceptance, E,TB, NR,NL by MS at 1/18/2021 1432    Acceptance, E,TB, NR,NL by MS at 1/15/2021 1418    Acceptance, E,TB, NR by MS at 1/14/2021 1223                   Point: Body mechanics (In Progress)     Learning Progress Summary           Patient Acceptance, E,TB, NR by MS at 2/1/2021 1145    Acceptance, E, NR by LH at 1/30/2021 1336    Acceptance, E,TB, VU,NR by MS at 1/29/2021 1535    Acceptance, E,TB, VU,NR by MS at 1/27/2021 1447    Acceptance, E,TB, VU,NR by MS at 1/26/2021 0959    Acceptance, E,TB, VU,NR by MS at 1/25/2021 1316    Acceptance, E,TB, VU,NR by MS at 1/22/2021 0918    Acceptance, E,TB, VU by MS at 1/20/2021 1544    Acceptance, E,TB, VU,NR by MS at 1/19/2021 1401    Acceptance, E,TB, NR,NL by MS at 1/15/2021 1418    Acceptance, E,TB, NR by MS at 1/14/2021 1223    Acceptance, E,TB, NR by MS at 1/12/2021 1312    Acceptance, E,TB, VU,NR by MS at 1/11/2021 1140                   Point: Precautions (In Progress)     Learning Progress Summary           Patient Acceptance, E,TB, NR by MS at 2/1/2021 1145    Acceptance, E, NR by  at 1/30/2021 1336    Acceptance, E,TB, VU,NR by MS at 1/29/2021 1535    Acceptance, E,TB, VU,NR by MS at 1/27/2021 1447    Acceptance, E,TB, VU,NR by MS at 1/26/2021 0959    Acceptance, E,TB, VU,NR by MS at 1/25/2021 1316    Acceptance, E,TB, VU,NR by MS at 1/22/2021 0918    Acceptance, E, VU by MD at 1/21/2021 0926    Acceptance, E,TB, VU by MS at 1/20/2021 1544    Acceptance, E,TB, VU,NR by MS  at 1/19/2021 1401    Acceptance, E,TB, NR,NL by MS at 1/15/2021 1418    Acceptance, E,TB, NR by MS at 1/14/2021 1223    Acceptance, E,TB, NR by MS at 1/12/2021 1312    Acceptance, E,TB, VU,NR by MS at 1/11/2021 1140    Acceptance, E, VU by MD at 1/9/2021 0933                               User Key     Initials Effective Dates Name Provider Type Discipline     06/08/18 -  Ela Pritchett, PT Physical Therapist PT    LB1 04/03/18 -  Jazmin Schuster, PT Physical Therapist PT     04/03/18 -  Elba Myrick, PT Physical Therapist PT    LB 03/07/18 -  Elba Grigsby, PTA Physical Therapy Assistant PT    MD 04/03/18 -  Lisandra Lowery, PT Physical Therapist PT    MS 03/04/19 -  Nati Kaba, PT Physical Therapist PT                PT Recommendation and Plan    Planned Therapy Interventions (PT): balance training, bed mobility training, gait training, home exercise program, postural re-education, patient/family education, neuromuscular re-education, strengthening, transfer training, wheelchair management/propulsion training, ROM (range of motion)  Frequency of Treatment (PT): 60 minutes per session  Anticipated Equipment Needs at Discharge (PT Eval): wheelchair(noted patient already has wc(?) will get details)     Patient was wearing a face mask during this therapy encounter. Therapist used appropriate personal protective equipment including eye protection, mask, and gloves.  Mask used was standard procedure mask. Appropriate PPE was worn during the entire therapy session. Hand hygiene was completed before and after therapy session. Patient is not in enhanced droplet precautions.        Time Calculation:     PT Charges     Row Name 02/01/21 1442 02/01/21 1053          Time Calculation    Start Time  1330  -MS  1030  -MS     Stop Time  1400  -MS  1100  -MS     Time Calculation (min)  30 min  -MS  30 min  -MS     PT Received On  --  02/01/21  -MS     PT - Next Appointment  --  02/02/21  -MS       User Key  (r) =  Recorded By, (t) = Taken By, (c) = Cosigned By    Initials Name Provider Type    MS Nati Kaba, PT Physical Therapist          Therapy Charges for Today     Code Description Service Date Service Provider Modifiers Qty    34829264370 HC PT THER SUPP EA 15 MIN 2/1/2021 Nati Kaba, PT GP 4    [id number] HC PT THER PROC EA 15 MIN 2/1/2021 Nati Kaba, PT GP 4                   Nati Kaba PT  2/1/2021

## 2021-02-01 NOTE — PROGRESS NOTES
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  No changes.  She feels she is doing better in therapy.  Tolerating activities.     History taken from: patient    Objective     Vital Signs  Temp:  [97.9 °F (36.6 °C)-98.2 °F (36.8 °C)] 97.9 °F (36.6 °C)  Heart Rate:  [88-97] 88  Resp:  [16-19] 16  BP: (119-120)/(66-74) 119/66    Physical Exam  General: calm, in NAD  Neck: trachea midline  Cardio: well perfused distal extremities, regular radial pulse  Resp: normal WOB on RA, nonlabored.    Abdomen: NT/ND  Extremities:    No edema  No crepitus with range of motion of the left hip.  Neuro: dense left hemiparesis, flaccid tone  Left homonymous hemianopsia    Left inattention.  A&Ox4. 0/5 strength LUE/LLE.   Takes resistance on the right side.    Results from last 7 days   Lab Units 02/01/21  0737 01/29/21  0709   WBC 10*3/mm3 4.58 4.98   HEMOGLOBIN g/dL 10.9* 10.4*   HEMATOCRIT % 33.8* 33.5*   PLATELETS 10*3/mm3 164 197     Results from last 7 days   Lab Units 02/01/21  0737 01/29/21  0709   SODIUM mmol/L 138 138   POTASSIUM mmol/L 4.3 4.5   CHLORIDE mmol/L 100 101   CO2 mmol/L 29.6* 30.3*   BUN mg/dL 16 13   CREATININE mg/dL 0.60 0.66   CALCIUM mg/dL 9.2 9.3   GLUCOSE mg/dL 112* 108*             Ref. Range 1/8/2021 06:01   TSH Baseline Latest Ref Range: 0.270 - 4.200 uIU/mL 2.830   Vitamin B-12 Latest Ref Range: 211 - 946 pg/mL 381   25 Hydroxy, Vitamin D Latest Ref Range: 30.0 - 100.0 ng/ml 20.6 (L)     CT of the abdomen-January 16  CT Abdomen Pelvis With Contrast   Final Result       A cystic focus at the pancreatic head,  likely corresponds to the   ultrasound finding of concern, could be further evaluated with   endoscopic ultrasound as  indicated, interval follow-up also recommended   to exclude any possibility of a cystic neoplasm. The pancreas is   thinned. The main pancreatic duct shows a mildly prominent caliber, 4   mm.          US GALLBLADDER-  1/14/2021   IMPRESSION:  Small to moderate amount of gallbladder sludge with a few  possible tiny nonshadowing gallstones.  2. No wall thickening or pericholecystic fluid is seen. No sonographic  Garcia's sign is seen.  3. Fatty infiltration of the liver.  4. 1.2 cm hypoechoic pancreatic head lesion. No pancreatic head lesions  are seen on the CT of the abdomen and pelvis from [facility]  dated 12/11/2019.  5. CT of the abdomen with contrast using pancreatic protocol is  recommended for further assessment.    EXAMINATION: LIMITED LEFT BREAST SONOGRAPHY-January 20, 2021     HISTORY: 85-year-old female status post recent placement of a loop  recorder on the left side of the chest with an area of palpable concern  in the left breast.     FINDINGS: Targeted sonographic evaluation of the area of palpable  concern in the left breast which corresponds to the upper inner quadrant  was performed. No sonographic abnormality is appreciated.     IMPRESSION:  Negative targeted left breast sonography. Clinical followup  is suggested.     BI-RADS Category 1: Negative  Results Review:     I reviewed the patient's new clinical results.    Medication Review: Complete  Scheduled Meds:Alirocumab, 75 mg, Subcutaneous, Q14 Days  aspirin, 81 mg, Oral, Daily  bisacodyl, 10 mg, Rectal, Q24H  cefdinir, 300 mg, Oral, Q12H  [START ON 3/6/2021] cholecalciferol, 1,000 Units, Oral, Daily  dilTIAZem CD, 180 mg, Oral, BID  docusate sodium, 200 mg, Oral, BID  enoxaparin, 40 mg, Subcutaneous, Daily  ferrous sulfate, 325 mg, Oral, Daily With Breakfast  levothyroxine, 125 mcg, Oral, Q AM  lidocaine, 1 patch, Transdermal, Q24H  losartan, 50 mg, Oral, Daily  mirtazapine, 15 mg, Oral, Nightly  ondansetron ODT, 4 mg, Oral, TID  AC  oxybutynin, 5 mg, Oral, Daily With Dinner  pantoprazole, 40 mg, Oral, QAM  vitamin D, 50,000 Units, Oral, Q7 Days      Continuous Infusions:   PRN Meds:.HYDROcodone-acetaminophen  •  nitroglycerin  •  polyethylene glycol  •  traMADol      Assessment/Plan       Stroke (CMS/[facility])    Debility and functional deficits  -PT/OT/SLP     Acute CVA  -[place] 3 MRI at [facility] revealed acute infarct in the left temporal-occipital region, no TPA or MT  -Jan 4 had worsening of her NIHSS, MRI revealed a new right superior frontal gyrus acute infarct. Again outside of the window of TPA, not a candidate for mechanical thrombectomy  -continue ASA and Praluent as she is intolerant of statins  -Echo was unremarkable  -Holter normal  -Loop recorder placed  -A1c 5.0  -TSH and B12 level unremarkable    Loss of appetite  Weight loss  Nausea  -symptoms for approximately 8 months, has lost ~40lbs over that time per patient  -started Remeron 7.5 qHS  -GI consulted, appreciate recs. Started scheduled Zofran with meals on 1/13, continue Remeron.   Jan 14 - GI to check RUQ US, increase bowel regimen, continue Remeron and Zofran as above  January 15 -1.2 cm hypoechoic pancreatic head lesion on ultrasound.  To get CT of the abdomen tomorrow.  January 16 - CT scan showed pancreatic cyst.  Not felt to be the etiology of her nausea.  Follow-up as an outpatient.  January 17- Remeron increased to 15 mg nightly     Left breast nodule  -2x1cm nodule left breast at 10 o'clock position, borders feel smooth, nodule is not freely mobile, no overlying skin changes. Away from the site of recent left loop recorder placement Jan 5, 2021  -Last mammogram one year ago. May not tolerate mammogram with recent loop recorder placement. Patient discussed with Dr. Pride, left breast ultrasound ordered  -Left breast ultrasound negative at the area of interest in the left upper inner quadrant    HLD  -continue ASA and Praluent as she is intolerant of statins     HTN  -continue  cardizem, losartan  -normotensive goal  -monitor    Vit D insufficiency  -level 20 on admission  -started vitamin D replacement     Hypothyroidism  -continue levothyroxine    CAD  -continue ASA and Praluent   January 27-She had some sharp chest pain that was brief last evening at about 1 AM, resolved on its own.  Had nitroglycerin ordered but did not receive it.  She reports at home she did occasionally have chest pain and would take him to glycerin at home, stating that sometimes she would take it as she just felt nervous and worried with the chest pain.  She did feel some palpitations after the chest pain last evening.  We discussed seeing if anything was uploaded on her loop recorder to her cardiologist office and we will check those results.    Obstructive sleep apnea-January 27-she has been on 2 L nasal cannula oxygen at nighttime with sats 100%.  She did not have her CPAP brought in from home     Prerenal OBED, resolved  -secondary to decreased PO intake  -resolved prior to discharge from [facility]  -monitor electrolytes     Iron deficiency anemia  -received 2 doses of IV iron as well as 1UpRBCs on 1/4 at [facility]  -Hgb stable ~8 prior to discharge from [facility], 9.2 on admission to [facility]  -continue oral iron, monitor     Left hip pain  -xrays at [facility] unremarkable  Jan 13 - Fatigue continues to be an issue. She takes Norco 7.5 mg about 4 times a day with history of left hip pain-history of left femur fracture open reduction internal fixation, x-ray at the outside hospital on January 6 showed hardware in place.  January 14-She does have fatigue during the day.  Will try a lower dose of Norco 5 mg rather than 7.5 mg.  Is on Remeron 7.5 g at nighttime but that was only recently added.  January 15-she took Norco 5 mg twice a day at home.  More alert with better trunk control on lower dose of Norco today.  January 21-continues with left hip pain. Taking Norco 5 mg about 4 times a day. Add Lidoderm patch.  January 25-does not feel  Lidoderm patch that helpful.  Pain was better with addition of tramadol over the weekend.  Hip range of motion unremarkable with no crepitus  January 26-left hip xray did not show displacement of surgical hardware, fracture, erosion or dislocation  January 26-she feels tramadol works better for her than hydrocodone.  Anticipate going home on tramadol and discontinue hydrocodone.  She is presently taking tramadol twice a day.  Appears to be tolerating without any side effects.     Fluids/Electrolytes/Nutrition   -cardiac diet  -admission labs unremarkable     DVT ppx  -subQ Lovenox    /UTI  -on Ditropan 5 mg twice a day.  January 11 - voids with acceptable bladder scan postvoid residuals. Urinary tract infection with E. coli-sensitive to Macrobid  January 18 - required intermittent straight catheter 450 cc.  January 19 - incontinent and IC, PVRs 99-391cc  January 22 - has urge to urinate at times but cannot void, will decrease oxybutynin from bid to with dinner only. She is voiding with incontinence with postvoid residual 230-290 cc.  Discussed trial of decreasing Ditropan to 5 mg q. supper first from twice a day before doing a trial of Flomax.  January 23-urinary tract infection-E. coli-on cefdinir starting January 23.  Sensitive to cephalosporins  January 27-voids with postvoid residual  cc.  We will continue Ditropan at lower dose of 5 mg with supper      TEAM CONF - JAN 12 - BED MAX-DEP. TRANSFERS MAX 2 TRANSFER BOARD. NON-AMBULATORY. WHEELCHAIR 50 FEET MIN ASSIST. TOILET TRANSFERS DEP X 2. IMPAIRED VISION. BATH MAX. LBD DEP. UBD MAX. GROOMING MOD ASSIST. COGNITION - MILD TO MODERATE DEFICITS. 2L O2 AT HS. ON MACROBID FOR UTI. FATIGUES. HISTORY OF POOR APPETITE FOR MONTHS, WEIGHT LOSS 30-40 # OVER SEVERAL MONTHS AT HOME, HISTORY OF REPORTED NAUSEA AT HOME. WILL CONSULT GI.   TINY - 4 WEEKS.     TEAM CONF - JAN 19 - BED MAX 2 .  TRANSFERS MAX 2 TRANSFER BOARD. WHEELCHAIR MIN A DUE TO VISIN DEFICITS. HAS  SAT UNSUPPORTED FOR 30 SECS BEFORE STARTS TO LEAN. TOILET TRANSFERS DEP X 2. BATH UB MAX, LB DEP X 2. LBD DEP X 2. UBD MAX. GROOMING MOD. LEFT SHOULDER SUBLUXATION - ADD LEUKOTAPING. VOIDS WITH ELEVATED PVRS WITH OCCASIONAL ISC. CONTINENT INCONTINENT.  LEFT INATTENTION, LEFT VISUAL FIELD CUT. IMPAIRED ATTENTION, EXECUTIVE FUNCTION, REASONING. WORKING MEMORY IMPROVING.  BNE (Active)  Att'n. - WNL  Exec. Fx. - Mild Imp.  Rsng/Jgmnt - Mildly Imp. for abstract reaosning  Arith - Min Imp.  Visuospatial Skills - Mildly Imp.  Visual Mem. - Mildly Imp.  Verbal Mem. - Mildly Imp.  Emot - Pt endorsed dep related to current status  DEPRESSION - SUPPORTIVE THERAPY. ON REMERON.   ELOS -   3 WEEKS    TEAM CONF - JAN 26 -  BED MAX 2. TRANSFERS MAX 2. WHEELCHAIR 50 FEET MIN ASSIST, IMPACTED BY VISUAL DEFICITS. LEFT HIP PAIN INCREASED, WILL REPEAT X-RAY. AT OUTSIDE HOSPITAL X-RAY ON JAN 6 - [ORIF hardware in the proximal femur is stable from 5/6/2020. No hardware loosening or perihardware fracture is detected. The hip joint space is mildly narrowed as before. IMPRESSION: Stable ORIF hardware; no acute abnormality.]  TOILET TRANSFERS MAX 2 AS PUSHES. BATH UBB MIN, LBB MAX 2. UBD MAX ASSIST. LBD DEPENDENT. GROOMING MIN ASSIST. ATTENDS TO LEFT A LITTLE BETTER. TOILETING DEP X 2. LEFT VISUAL FIELD DEFICITS. EXECUTIVE FUNCTION - IMPAIRED ATTENTION. MEMORY MIN-MOD DEFICITS. MOD-MAX VISUAL CUES TO LEFT SIDE. VOIDS. ON DECREASED DITROPAN TO 5 MG ONCE A DAY DUE TO ELEVATED PVRS. CONTINENT AT TIMES WITH BLADDER. BOWEL CONTINENT. PAIN MANAGEMENT - TRAMADOL ADDED. WITH REC THERAPY, CUES TO ATTEND TO LEFT.  ELOS -  2-3 WEEKS    Azael Abdullahi MD  02/01/21  11:27 EST    During rounds, used appropriate personal protective equipment including mask and gloves.  Additional gown if indicated.  Mask used was standard procedure mask. Appropriate PPE was worn during the entire visit.  Hand hygiene was completed before and after.

## 2021-02-01 NOTE — PROGRESS NOTES
Inpatient Rehabilitation Plan of Care Note    Plan of Care  Updated Problems/Interventions  Mobility    [PT] Wheelchair(Active)  Current Status(02/01/2021): 100' Grant (visual deficits)  Weekly Goal(02/08/2021): CGA  Discharge Goal: SV    [PT] Walk(Active)  Current Status(02/01/2021): NA  Weekly Goal(02/08/2021): PT only  Discharge Goal: 8' modA    [PT] Bed/Chair/Wheelchair(Active)  Current Status(02/01/2021): maxA, squat pivot  Weekly Goal(02/08/2021): modA, transfer board  Discharge Goal: Grant    [PT] Bed Mobility(Active)  Current Status(02/01/2021): maxAx2  Weekly Goal(02/08/2021): modA  Discharge Goal: Grant    Signed by: Nati Kaba, PT

## 2021-02-01 NOTE — PROGRESS NOTES
Inpatient Rehabilitation Functional Measures Assessment and Plan of Care    Plan of Care  Updated Problems/Interventions  Cognition    [ST] Memory(Active)  Current Status(02/01/2021): min/mod memory deficits  Weekly Goal(02/09/2021): Patient will recall functional information after 5  minutes with MIN cues.  Discharge Goal: Patient will return to her baseline memory abilities.    [ST] Right Hemisphere Function(Active)  Current Status(02/01/2021):  SAME- mod/max visual  cues for reading/writing  tasks  Weekly Goal(01/26/2021): The patient will complete L/R discrimination and  spatial concept tasks with MOD cues.  Discharge Goal: The patient will attend to the left for functional tasks with  only initial min cues.    [ST] Executive Functions(Active)  Current Status(02/01/2021): impaired attention; fatiques quickly; distractible  Weekly Goal(02/02/2021): follow schedule with min cues  Discharge Goal: fxnl cognitive skills for home with assist    Functional Measures  JALEEL Eating:  [facility] Grooming: [facility] Bathing:  [facility] Upper Body Dressing:  [facility] Lower Body Dressing:  [facility] Toileting:  [facility] Bladder Management  Level of Assistance:  [place]  Frequency/Number of Accidents this Shift:  [facility] Bowel Management  Level of Assistance: [place]  Frequency/Number of Accidents this Shift: [facility] Bed/Chair/Wheelchair Transfer:  [facility] Toilet Transfer:  [facility] Tub/Shower Transfer:  [place]    Previously Documented Mode of Locomotion at Discharge: Field  JALEEL Expected Mode of Locomotion at Discharge: [facility] Walk/Wheelchair:  [facility] Stairs:  [facility] Comprehension:  [facility] Expression:  [facility] Social Interaction:  [facility] Problem Solving:  [facility] Memory:  [place]    Therapy Mode Minutes  Occupational Therapy: [place]  Physical Therapy: [place]  Speech Language Pathology:  [place]    Signed by: Katelyn James, SLP

## 2021-02-01 NOTE — THERAPY TREATMENT NOTE
Inpatient Rehabilitation - Speech Language Pathology Treatment Note    [facility]     Patient Name: Liana Carrero  : 1935  MRN: [id number]    Today's Date: 2021                   Admit Date: 2021       Visit Dx:      ICD-10-CM ICD-9-CM   1. Impaired functional mobility, balance, gait, and endurance  Z74.09 V49.89       Patient Active Problem List   Diagnosis   • Stroke (cerebrum) (CMS/[facility])   • Chest pain   • Hypertensive urgency   • Headache   • History of embolic stroke   • Hypothyroidism   • Hypokalemia   • CAD in native artery   • URTI (acute upper respiratory infection)   • Closed fracture of left hip (CMS/HCC)   • Chronic diastolic (congestive) heart failure (CMS/[facility])   • Coronary artery disease involving autologous vein coronary bypass graft without angina pectoris   • Impaired glucose tolerance   • Moderate malnutrition (CMS/[facility])   • Stroke (CMS/HCC)       Past Medical History:   Diagnosis Date   • Arthritis    • CHF (congestive heart failure) (CMS/[facility])    • Coronary artery disease    • Diabetes mellitus (CMS/[facility])     new DX related to stroke, started on insulin at hospital   • Disease of thyroid gland    • Elevated cholesterol    • GERD (gastroesophageal reflux disease)    • History of transfusion    • Hypertension    • PONV (postoperative nausea and vomiting)    • Sleep apnea    • Spinal headache    • Stroke (CMS/[facility])        Past Surgical History:   Procedure Laterality Date   • APPENDECTOMY     • CARDIAC CATHETERIZATION       x2   • CARDIAC CATHETERIZATION N/A 2019    Procedure: Left Heart Cath;  Surgeon: Bentley Chapin MD;  Location:  [facility];  Service: Cardiovascular   • CARDIAC CATHETERIZATION N/A 2019    Procedure: Coronary angiography;  Surgeon: Bentley Chapin MD;  Location:  [facility];  Service: Cardiovascular   • CARDIAC CATHETERIZATION N/A 2019    Procedure: Native mammary injection;  Surgeon: Bentley Chapin MD;   Location:  [facility];  Service: Cardiovascular   • CARDIAC CATHETERIZATION N/A 12/26/2019    Procedure: Saphenous Vein Graft;  Surgeon: Bentley Chapin MD;  Location:  [facility];  Service: Cardiovascular   • COLON SURGERY     • COLONOSCOPY     • EYE SURGERY      lens implants and cataract surgery   • FEMUR IM NAILING/RODDING Left 3/26/2020    Procedure: LT.HIP NAILING/RODDING;  Surgeon: Carlos Khan II, MD;  Location: [facility] MAIN OR;  Service: Orthopedics;  Laterality: Left;   • FRACTURE SURGERY Left 2020   • HYSTERECTOMY     • VASCULAR SURGERY      varicous veins          SLP EVALUATION (last 72 hours)      SLP SLC Evaluation     Row Name 02/01/21 1300 02/01/21 1104                Communication Assessment/Intervention    Document Type  therapy note (daily note)  -SL  therapy note (daily note)  -SL       Subjective Information  fatigue  -SL  no complaints  -SL       Patient Observations  lethargic  -SL  alert  -SL       Patient/Family/Caregiver Comments/Observations  BACK IN BED- DID NOT /UNABLE TO FOLLOW SITTING SCHEDULE FOR TX  -SL  leaning to left in w/c- despite having had rest break before therapies  -SL       Patient Effort  fair  -SL  adequate  -SL       Symptoms Noted During/After Treatment  fatigue  -SL  fatigue  -SL         User Key  (r) = Recorded By, (t) = Taken By, (c) = Cosigned By    Initials Name Effective Dates    SL Katelyn James, MS CCC-SLP 06/08/18 -              EDUCATION    The patient has been educated in the following areas:       Cognitive Impairment.      SLP Recommendation and Plan                                                  SLP GOALS     Row Name 02/01/21 1300 02/01/21 1100          Memory Skills Goal 1 (SLP)    Progress (Memory Skills Goal 1, SLP)  70%;independently (over 90% accuracy) 5 word recall- chaining task  -SL  --     Progress/Outcomes (Memory Skills Goal 1, SLP)  progress slower than expected;goal ongoing  -SL  --      "Comment (Memory Skills Goal 1, SLP)  50% for category exclusion task  -SL  --        Organizational Skills Goal 1 (SLP)    Progress (Thought Organization Skills Goal 1, SLP)  --  90%;independently (over 90% accuracy) category matrix- initial letter clues  -SL     Progress/Outcomes (Thought Organization Skills Goal 1, SLP)  --  goal ongoing  -SL        Reasoning Goal 1 (SLP)    Progress (Reasoning Goal 1, SLP)  80%;independently (over 90% accuracy);other (comment) word deductions  -SL  --     Progress/Outcomes (Reasoning Goal 1, SLP)  goal ongoing  -SL  --        Right Hemisphere Function Goal 1 (SLP)    Progress (Right Hemisphere Function Goal 1, SLP)  --  with maximum cues (25-49%)  -SL     Progress/Outcomes (Right Hemisphere Function Goal 1, SLP)  --  progress slower than expected;goal ongoing  -SL     Comment (Right Hemisphere Function Goal 1, SLP)  --  MAX CUES REQUIRED FOR READING PHRASE CLUES AND THEN PLACING WORDS IN APPROPRIATE SPACES/NUMBERED BOXES UP ABOVE CLUES; despite visual cues consistnetly on left pt returnrd to left side on 1x- 10% ; pt kept stating \"i cant see: and required continued MAX prompts and cues to turn head and look left  -       User Key  (r) = Recorded By, (t) = Taken By, (c) = Cosigned By    Initials Name Provider Type    Katelyn Fan MS CCC-SLP Speech and Language Pathologist                      Time Calculation:       Time Calculation- SLP     Row Name 02/01/21 1329 02/01/21 1152          Time Calculation- [facility]    SLP Start Time  1300  -SL  1100  -SL     SLP Stop Time  1330  -SL  1130  -SL     SLP Time Calculation (min)  30 min  -SL  30 min  -       User Key  (r) = Recorded By, (t) = Taken By, (c) = Cosigned By    Initials Name Provider Type    Katelyn Fan MS CCC-SLP Speech and Language Pathologist            Therapy Charges for Today     Code Description Service Date Service Provider Modifiers Qty    [id number]  ST DEV OF COGN SKILLS INITIAL 15 MIN 2/1/2021 Katelyn James, " MS CCC-SLP  1    [id number]  ST DEV OF COGN SKILLS EACH ADDT'L 15 MIN 2/1/2021 Katelyn James, MS CCC-SLP  1    [id number]  ST DEV OF COGN SKILLS EACH ADDT'L 15 MIN 2/1/2021 Katelyn James, MS CCC-SLP  2                           Katelyn James, MS CCC-SLP  2/1/2021

## 2021-02-02 LAB — GLUCOSE BLDC GLUCOMTR-MCNC: 120 MG/DL (ref 70–130)

## 2021-02-02 PROCEDURE — 97129 THER IVNTJ 1ST 15 MIN: CPT

## 2021-02-02 PROCEDURE — 97130 THER IVNTJ EA ADDL 15 MIN: CPT

## 2021-02-02 PROCEDURE — 97110 THERAPEUTIC EXERCISES: CPT

## 2021-02-02 PROCEDURE — 97112 NEUROMUSCULAR REEDUCATION: CPT

## 2021-02-02 PROCEDURE — 63710000001 ONDANSETRON ODT 4 MG TABLET DISPERSIBLE: Performed by: INTERNAL MEDICINE

## 2021-02-02 PROCEDURE — 25010000002 ENOXAPARIN PER 10 MG: Performed by: PHYSICAL MEDICINE & REHABILITATION

## 2021-02-02 PROCEDURE — 97535 SELF CARE MNGMENT TRAINING: CPT

## 2021-02-02 PROCEDURE — 82962 GLUCOSE BLOOD TEST: CPT

## 2021-02-02 RX ADMIN — DILTIAZEM HYDROCHLORIDE 180 MG: 180 CAPSULE, COATED, EXTENDED RELEASE ORAL at 20:17

## 2021-02-02 RX ADMIN — HYDROCODONE BITARTRATE AND ACETAMINOPHEN 1 TABLET: 5; 325 TABLET ORAL at 07:43

## 2021-02-02 RX ADMIN — LEVOTHYROXINE SODIUM 125 MCG: 0.12 TABLET ORAL at 05:19

## 2021-02-02 RX ADMIN — DOCUSATE SODIUM 200 MG: 100 CAPSULE, LIQUID FILLED ORAL at 08:57

## 2021-02-02 RX ADMIN — ONDANSETRON 4 MG: 4 TABLET, ORALLY DISINTEGRATING ORAL at 11:36

## 2021-02-02 RX ADMIN — ONDANSETRON 4 MG: 4 TABLET, ORALLY DISINTEGRATING ORAL at 18:04

## 2021-02-02 RX ADMIN — DILTIAZEM HYDROCHLORIDE 180 MG: 180 CAPSULE, COATED, EXTENDED RELEASE ORAL at 08:57

## 2021-02-02 RX ADMIN — OXYBUTYNIN CHLORIDE 5 MG: 5 TABLET ORAL at 17:47

## 2021-02-02 RX ADMIN — PANTOPRAZOLE SODIUM 40 MG: 40 TABLET, DELAYED RELEASE ORAL at 05:19

## 2021-02-02 RX ADMIN — MIRTAZAPINE 15 MG: 15 TABLET, FILM COATED ORAL at 20:17

## 2021-02-02 RX ADMIN — ENOXAPARIN SODIUM 40 MG: 40 INJECTION SUBCUTANEOUS at 08:57

## 2021-02-02 RX ADMIN — FERROUS SULFATE TAB 325 MG (65 MG ELEMENTAL FE) 325 MG: 325 (65 FE) TAB at 08:57

## 2021-02-02 RX ADMIN — BISACODYL 10 MG: 10 SUPPOSITORY RECTAL at 17:47

## 2021-02-02 RX ADMIN — ONDANSETRON 4 MG: 4 TABLET, ORALLY DISINTEGRATING ORAL at 05:20

## 2021-02-02 RX ADMIN — LOSARTAN POTASSIUM 50 MG: 50 TABLET, FILM COATED ORAL at 08:58

## 2021-02-02 RX ADMIN — DOCUSATE SODIUM 200 MG: 100 CAPSULE, LIQUID FILLED ORAL at 20:17

## 2021-02-02 RX ADMIN — TRAMADOL HYDROCHLORIDE 50 MG: 50 TABLET ORAL at 15:54

## 2021-02-02 RX ADMIN — ASPIRIN 81 MG: 81 TABLET, CHEWABLE ORAL at 08:57

## 2021-02-02 RX ADMIN — LIDOCAINE 1 PATCH: 50 PATCH TOPICAL at 08:57

## 2021-02-02 NOTE — PROGRESS NOTES
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  No changes.  She feels she is doing better in therapy.  Tolerating activities.     History taken from: patient    Objective     Vital Signs  Temp:  [97.7 °F (36.5 °C)-98 °F (36.7 °C)] 98 °F (36.7 °C)  Heart Rate:  [83-91] 88  Resp:  [16-18] 16  BP: (106-128)/(64-75) 106/64    Physical Exam  General: calm, in NAD  Neck: trachea midline  Cardio: well perfused distal extremities, regular radial pulse  Resp: normal WOB on RA, nonlabored.    Abdomen: NT/ND  Extremities:    No edema  No crepitus with range of motion of the left hip.  Neuro: dense left hemiparesis, flaccid tone  Left homonymous hemianopsia    Left inattention.  A&Ox4. 0/5 strength LUE/LLE.   Takes resistance on the right side.    Results from last 7 days   Lab Units 02/01/21  0737 01/29/21  0709   WBC 10*3/mm3 4.58 4.98   HEMOGLOBIN g/dL 10.9* 10.4*   HEMATOCRIT % 33.8* 33.5*   PLATELETS 10*3/mm3 164 197     Results from last 7 days   Lab Units 02/01/21  0737 01/29/21  0709   SODIUM mmol/L 138 138   POTASSIUM mmol/L 4.3 4.5   CHLORIDE mmol/L 100 101   CO2 mmol/L 29.6* 30.3*   BUN mg/dL 16 13   CREATININE mg/dL 0.60 0.66   CALCIUM mg/dL 9.2 9.3   GLUCOSE mg/dL 112* 108*             Ref. Range 1/8/2021 06:01   TSH Baseline Latest Ref Range: 0.270 - 4.200 uIU/mL 2.830   Vitamin B-12 Latest Ref Range: 211 - 946 pg/mL 381   25 Hydroxy, Vitamin D Latest Ref Range: 30.0 - 100.0 ng/ml 20.6 (L)     CT of the abdomen-January 16  CT Abdomen Pelvis With Contrast   Final Result       A cystic focus at the pancreatic head,  likely corresponds to the   ultrasound finding of concern, could be further evaluated with   endoscopic ultrasound as  indicated, interval follow-up also recommended   to exclude any possibility of a cystic neoplasm. The pancreas is   thinned. The main pancreatic duct shows a mildly prominent caliber, 4   mm.          US GALLBLADDER-  1/14/2021   IMPRESSION:  Small to moderate amount of gallbladder sludge with a few  possible tiny nonshadowing gallstones.  2. No wall thickening or pericholecystic fluid is seen. No sonographic  Garcia's sign is seen.  3. Fatty infiltration of the liver.  4. 1.2 cm hypoechoic pancreatic head lesion. No pancreatic head lesions  are seen on the CT of the abdomen and pelvis from [facility]  dated 12/11/2019.  5. CT of the abdomen with contrast using pancreatic protocol is  recommended for further assessment.    EXAMINATION: LIMITED LEFT BREAST SONOGRAPHY-January 20, 2021     HISTORY: 85-year-old female status post recent placement of a loop  recorder on the left side of the chest with an area of palpable concern  in the left breast.     FINDINGS: Targeted sonographic evaluation of the area of palpable  concern in the left breast which corresponds to the upper inner quadrant  was performed. No sonographic abnormality is appreciated.     IMPRESSION:  Negative targeted left breast sonography. Clinical followup  is suggested.     BI-RADS Category 1: Negative  Results Review:     I reviewed the patient's new clinical results.    Medication Review: Complete  Scheduled Meds:Alirocumab, 75 mg, Subcutaneous, Q14 Days  aspirin, 81 mg, Oral, Daily  bisacodyl, 10 mg, Rectal, Q24H  [START ON 3/6/2021] cholecalciferol, 1,000 Units, Oral, Daily  dilTIAZem CD, 180 mg, Oral, BID  docusate sodium, 200 mg, Oral, BID  enoxaparin, 40 mg, Subcutaneous, Daily  ferrous sulfate, 325 mg, Oral, Daily With Breakfast  levothyroxine, 125 mcg, Oral, Q AM  lidocaine, 1 patch, Transdermal, Q24H  losartan, 50 mg, Oral, Daily  mirtazapine, 15 mg, Oral, Nightly  ondansetron ODT, 4 mg, Oral, TID AC  oxybutynin, 5 mg, Oral, Daily With  Dinner  pantoprazole, 40 mg, Oral, QAM  vitamin D, 50,000 Units, Oral, Q7 Days      Continuous Infusions:   PRN Meds:.HYDROcodone-acetaminophen  •  nitroglycerin  •  polyethylene glycol  •  traMADol      Assessment/Plan       Stroke (CMS/HCC)    Debility and functional deficits  -PT/OT/SLP     Acute CVA  -[place] 3 MRI at [facility] revealed acute infarct in the left temporal-occipital region, no TPA or MT  -Jan 4 had worsening of her NIHSS, MRI revealed a new right superior frontal gyrus acute infarct. Again outside of the window of TPA, not a candidate for mechanical thrombectomy  -continue ASA and Praluent as she is intolerant of statins  -Echo was unremarkable  -Holter normal  -Loop recorder placed  -A1c 5.0  -TSH and B12 level unremarkable    Loss of appetite  Weight loss  Nausea  -symptoms for approximately 8 months, has lost ~40lbs over that time per patient  -started Remeron 7.5 qHS  -GI consulted, appreciate recs. Started scheduled Zofran with meals on 1/13, continue Remeron.   Jan 14 - GI to check RUQ US, increase bowel regimen, continue Remeron and Zofran as above  January 15 -1.2 cm hypoechoic pancreatic head lesion on ultrasound.  To get CT of the abdomen tomorrow.  January 16 - CT scan showed pancreatic cyst.  Not felt to be the etiology of her nausea.  Follow-up as an outpatient.  January 17- Remeron increased to 15 mg nightly     Left breast nodule  -2x1cm nodule left breast at 10 o'clock position, borders feel smooth, nodule is not freely mobile, no overlying skin changes. Away from the site of recent left loop recorder placement Jan 5, 2021  -Last mammogram one year ago. May not tolerate mammogram with recent loop recorder placement. Patient discussed with Dr. Pride, left breast ultrasound ordered  -Left breast ultrasound negative at the area of interest in the left upper inner quadrant    HLD  -continue ASA and Praluent as she is intolerant of statins     HTN  -continue cardizem, losartan  -normotensive  goal  -monitor    Vit D insufficiency  -level 20 on admission  -started vitamin D replacement     Hypothyroidism  -continue levothyroxine    CAD  -continue ASA and Praluent   January 27-She had some sharp chest pain that was brief last evening at about 1 AM, resolved on its own.  Had nitroglycerin ordered but did not receive it.  She reports at home she did occasionally have chest pain and would take him to glycerin at home, stating that sometimes she would take it as she just felt nervous and worried with the chest pain.  She did feel some palpitations after the chest pain last evening.  We discussed seeing if anything was uploaded on her loop recorder to her cardiologist office and we will check those results.    Obstructive sleep apnea-January 27-she has been on 2 L nasal cannula oxygen at nighttime with sats 100%.  She did not have her CPAP brought in from home     Prerenal OBED, resolved  -secondary to decreased PO intake  -resolved prior to discharge from [facility]  -monitor electrolytes     Iron deficiency anemia  -received 2 doses of IV iron as well as 1UpRBCs on 1/4 at [facility]  -Hgb stable ~8 prior to discharge from [facility], 9.2 on admission to [facility]  -continue oral iron, monitor     Left hip pain  -xrays at [facility] unremarkable  Jan 13 - Fatigue continues to be an issue. She takes Norco 7.5 mg about 4 times a day with history of left hip pain-history of left femur fracture open reduction internal fixation, x-ray at the outside hospital on January 6 showed hardware in place.  January 14-She does have fatigue during the day.  Will try a lower dose of Norco 5 mg rather than 7.5 mg.  Is on Remeron 7.5 g at nighttime but that was only recently added.  January 15-she took Norco 5 mg twice a day at home.  More alert with better trunk control on lower dose of Norco today.  January 21-continues with left hip pain. Taking Norco 5 mg about 4 times a day. Add Lidoderm patch.  January 25-does not feel Lidoderm patch that helpful.  Pain was  better with addition of tramadol over the weekend.  Hip range of motion unremarkable with no crepitus  January 26-left hip xray did not show displacement of surgical hardware, fracture, erosion or dislocation  January 26-she feels tramadol works better for her than hydrocodone.  Anticipate going home on tramadol and discontinue hydrocodone.  She is presently taking tramadol twice a day.  Appears to be tolerating without any side effects.     Fluids/Electrolytes/Nutrition   -cardiac diet  -admission labs unremarkable     DVT ppx  -subQ Lovenox    /UTI  -on Ditropan 5 mg twice a day.  January 11 - voids with acceptable bladder scan postvoid residuals. Urinary tract infection with E. coli-sensitive to Macrobid  January 18 - required intermittent straight catheter 450 cc.  January 19 - incontinent and IC, PVRs 99-391cc  January 22 - has urge to urinate at times but cannot void, will decrease oxybutynin from bid to with dinner only. She is voiding with incontinence with postvoid residual 230-290 cc.  Discussed trial of decreasing Ditropan to 5 mg q. supper first from twice a day before doing a trial of Flomax.  January 23-urinary tract infection-E. coli-on cefdinir starting January 23.  Sensitive to cephalosporins  January 27-voids with postvoid residual  cc.  We will continue Ditropan at lower dose of 5 mg with supper      TEAM CONF - JAN 12 - BED MAX-DEP. TRANSFERS MAX 2 TRANSFER BOARD. NON-AMBULATORY. WHEELCHAIR 50 FEET MIN ASSIST. TOILET TRANSFERS DEP X 2. IMPAIRED VISION. BATH MAX. LBD DEP. UBD MAX. GROOMING MOD ASSIST. COGNITION - MILD TO MODERATE DEFICITS. 2L O2 AT HS. ON MACROBID FOR UTI. FATIGUES. HISTORY OF POOR APPETITE FOR MONTHS, WEIGHT LOSS 30-40 # OVER SEVERAL MONTHS AT HOME, HISTORY OF REPORTED NAUSEA AT HOME. WILL CONSULT GI.   ELOS - 4 WEEKS.     TEAM CONF - JAN 19 - BED MAX 2 .  TRANSFERS MAX 2 TRANSFER BOARD. WHEELCHAIR MIN A DUE TO VISIN DEFICITS. HAS SAT UNSUPPORTED FOR 30 SECS BEFORE STARTS  TO LEAN. TOILET TRANSFERS DEP X 2. BATH UB MAX, LB DEP X 2. LBD DEP X 2. UBD MAX. GROOMING MOD. LEFT SHOULDER SUBLUXATION - ADD LEUKOTAPING. VOIDS WITH ELEVATED PVRS WITH OCCASIONAL ISC. CONTINENT INCONTINENT.  LEFT INATTENTION, LEFT VISUAL FIELD CUT. IMPAIRED ATTENTION, EXECUTIVE FUNCTION, REASONING. WORKING MEMORY IMPROVING.  BNE (Active)  Att'n. - WNL  Exec. Fx. - Mild Imp.  Rsng/Jgmnt - Mildly Imp. for abstract reaosning  Arith - Min Imp.  Visuospatial Skills - Mildly Imp.  Visual Mem. - Mildly Imp.  Verbal Mem. - Mildly Imp.  Emot - Pt endorsed dep related to current status  DEPRESSION - SUPPORTIVE THERAPY. ON REMERON.   ELOS -   3 WEEKS    TEAM CONF - JAN 26 -  BED MAX 2. TRANSFERS MAX 2. WHEELCHAIR 50 FEET MIN ASSIST, IMPACTED BY VISUAL DEFICITS. LEFT HIP PAIN INCREASED, WILL REPEAT X-RAY. AT OUTSIDE HOSPITAL X-RAY ON JAN 6 - [ORIF hardware in the proximal femur is stable from 5/6/2020. No hardware loosening or perihardware fracture is detected. The hip joint space is mildly narrowed as before. IMPRESSION: Stable ORIF hardware; no acute abnormality.]  TOILET TRANSFERS MAX 2 AS PUSHES. BATH UBB MIN, LBB MAX 2. UBD MAX ASSIST. LBD DEPENDENT. GROOMING MIN ASSIST. ATTENDS TO LEFT A LITTLE BETTER. TOILETING DEP X 2. LEFT VISUAL FIELD DEFICITS. EXECUTIVE FUNCTION - IMPAIRED ATTENTION. MEMORY MIN-MOD DEFICITS. MOD-MAX VISUAL CUES TO LEFT SIDE. VOIDS. ON DECREASED DITROPAN TO 5 MG ONCE A DAY DUE TO ELEVATED PVRS. CONTINENT AT TIMES WITH BLADDER. BOWEL CONTINENT. PAIN MANAGEMENT - TRAMADOL ADDED. WITH REC THERAPY, CUES TO ATTEND TO LEFT.  ELOS -  2-3 WEEKS    TEAM CONF- BED MAX 2. TRANSFERS MAX SQUAT PIVOT, USES LIFT WITH NURSING. SITTING BALANCE BETTER. LEFT INATTENTION. WHEELCHAIR MIN ASSIST WITH VISUAL CUES, DUE TO LEFT VISUAL INATTENTION/LEFT VISUAL FIELD CUT. STANDING AT PARALLEL BARS WITH BLOCKING LEFT KNEE. TOILET TRANSFERS MAX 2 TO DROPARM. BATH MIN UB AND MAX X 2 LB. DRESSING MOD-MAX UB AND DEP X 2 LB.  GROOMING MIN-SBA. TOILETING COMES UP TO STAND A LITTLE BETTER, DEP X 2.   IMPAIRED ATTENTION. DISTRACTIBLE. FATIGUES QUICKLY WITH COGNITIVE TASKS. MIN-MOD MEMORY DEFICITS. MOD-MAX VERBAL AND VISUAL CUES FOR READING AND WRITING TASKS. INCONTINENT BOWEL AND BLADDER. PVRS LOW. CHRONIC LEFT HIP PAIN.   ELOS -  ONE WEEK, WILL NEED SIGNIFICANT ASSISTANCE AT HOME.    Azael Abdullahi MD  02/02/21  18:25 EST    During rounds, used appropriate personal protective equipment including mask and gloves.  Additional gown if indicated.  Mask used was standard procedure mask. Appropriate PPE was worn during the entire visit.  Hand hygiene was completed before and after.

## 2021-02-02 NOTE — THERAPY TREATMENT NOTE
Inpatient Rehabilitation - Speech Language Pathology Treatment Note    [facility]     Patient Name: Liana Carrero  : 1935  MRN: [id number]    Today's Date: 2021                   Admit Date: 2021       Visit Dx:      ICD-10-CM ICD-9-CM   1. Impaired functional mobility, balance, gait, and endurance  Z74.09 V49.89       Patient Active Problem List   Diagnosis   • Stroke (cerebrum) (CMS/[facility])   • Chest pain   • Hypertensive urgency   • Headache   • History of embolic stroke   • Hypothyroidism   • Hypokalemia   • CAD in native artery   • URTI (acute upper respiratory infection)   • Closed fracture of left hip (CMS/HCC)   • Chronic diastolic (congestive) heart failure (CMS/[facility])   • Coronary artery disease involving autologous vein coronary bypass graft without angina pectoris   • Impaired glucose tolerance   • Moderate malnutrition (CMS/[facility])   • Stroke (CMS/HCC)       Past Medical History:   Diagnosis Date   • Arthritis    • CHF (congestive heart failure) (CMS/[facility])    • Coronary artery disease    • Diabetes mellitus (CMS/[facility])     new DX related to stroke, started on insulin at hospital   • Disease of thyroid gland    • Elevated cholesterol    • GERD (gastroesophageal reflux disease)    • History of transfusion    • Hypertension    • PONV (postoperative nausea and vomiting)    • Sleep apnea    • Spinal headache    • Stroke (CMS/[facility])        Past Surgical History:   Procedure Laterality Date   • APPENDECTOMY     • CARDIAC CATHETERIZATION       x2   • CARDIAC CATHETERIZATION N/A 2019    Procedure: Left Heart Cath;  Surgeon: Bentley Chapin MD;  Location:  [facility] CATH INVASIVE [facility];  Service: Cardiovascular   • CARDIAC CATHETERIZATION N/A 2019    Procedure: Coronary angiography;  Surgeon: Bentley Chapin MD;  Location:  [facility]VAS[facility];  Service: Cardiovascular   • CARDIAC CATHETERIZATION N/A 2019    Procedure: Native mammary injection;  Surgeon: Bentley Chapin MD;   Location:  [facility]ATH INVASIVE [facility]ATION;  Service: Cardiovascular   • CARDIAC CATHETERIZATION N/A 12/26/2019    Procedure: Saphenous Vein Graft;  Surgeon: Bentley Chapin MD;  Location: [facility] CATH INVASIVE LOCATION;  Service: Cardiovascular   • COLON SURGERY     • COLONOSCOPY     • EYE SURGERY      lens implants and cataract surgery   • FEMUR IM NAILING/RODDING Left 3/26/2020    Procedure: LT.HIP NAILING/RODDING;  Surgeon: Carlos Khan II, MD;  Location: [facility] MAIN OR;  Service: Orthopedics;  Laterality: Left;   • FRACTURE SURGERY Left 2020   • HYSTERECTOMY     • VASCULAR SURGERY      varicous veins          SLP EVALUATION (last 72 hours)      SLP SLC Evaluation     Row Name 02/02/21 1330 02/02/21 0951 02/01/21 1300 02/01/21 1104          Communication Assessment/Intervention    Document Type  therapy note (daily note)  -SL  therapy note (daily note)  -SL  therapy note (daily note)  -SL  therapy note (daily note)  -SL     Subjective Information  (S) complains of;fatigue  -SL  no complaints  -SL  fatigue  -SL  no complaints  -SL     Patient Observations  lethargic  -SL  alert;cooperative  -SL  lethargic  -SL  alert  -SL     Patient/Family/Caregiver Comments/Observations  IN BED- despite sitting schedule; needed cues to participate and stay awake  -SL  pt seen sitting in w/c  -SL  BACK IN BED- DID NOT /UNABLE TO FOLLOW SITTING SCHEDULE FOR TX  -SL  leaning to left in w/c- despite having had rest break before therapies  -SL     Patient Effort  fair  -SL  adequate  -SL  fair  -SL  adequate  -SL     Symptoms Noted During/After Treatment  fatigue  -SL  none  -SL  fatigue  -SL  fatigue  -SL       User Key  (r) = Recorded By, (t) = Taken By, (c) = Cosigned By    Initials Name Effective Dates    Katelyn Fan MS CCC-SLP 06/08/18 -              EDUCATION    The patient has been educated in the following areas:       Cognitive Impairment.      SLP Recommendation and Plan                                                   SLP GOALS     Row Name 02/02/21 1330 02/02/21 0951 02/01/21 1300       Memory Skills Goal 1 (SLP)    Progress (Memory Skills Goal 1, SLP)  70%;with minimal cues (75-90%) immed recall- 5 words- chaining strategy  -SL  --  70%;independently (over 90% accuracy) 5 word recall- chaining task  -SL    Progress/Outcomes (Memory Skills Goal 1, SLP)  goal ongoing  -SL  --  progress slower than expected;goal ongoing  -SL    Comment (Memory Skills Goal 1, SLP)  --  --  50% for category exclusion task  -SL       Organizational Skills Goal 1 (SLP)    Progress/Outcomes (Thought Organization Skills Goal 1, SLP)  goal ongoing  -SL  --  --    Comment (Thought Organization Skills Goal 1, SLP)  divergent - abstract naming of 5 items per category- 66% indep, 100% with MIN cues  -SL  --  --       Reasoning Goal 1 (SLP)    Progress (Reasoning Goal 1, SLP)  70%;with minimal cues (75-90%);independently (over 90% accuracy) category differentiation  -SL  80%;independently (over 90% accuracy) simple deductive reasoning activity- elimination task  -SL  80%;independently (over 90% accuracy);other (comment) word deductions  -SL    Progress/Outcomes (Reasoning Goal 1, SLP)  goal ongoing  -SL  --  goal ongoing  -SL    Comment (Reasoning Goal 1, SLP)  multiple reps of stimulus due to fatique and poor attn  -SL  --  --       Right Hemisphere Function Goal 1 (SLP)    Barriers (Right Hemisphere Function Goal 1, SLP)  --  crossword puzzle - pt able to place words/letters in appropriate boxes with MOD cues  -SL  --    Progress (Right Hemisphere Function Goal 1, SLP)  --  with maximum cues (25-49%)  -SL  --    Progress/Outcomes (Right Hemisphere Function Goal 1, SLP)  --  goal ongoing  -SL  --    Comment (Right Hemisphere Function Goal 1, SLP)  --  MAX cues still required to scan all the way to left side of page to start reading of phrase clues for crossword puzzle- visual cues provided on left; pt did return to left a bit better ths am, but  "tended to miss the first number ( ie said 4 instead of 14) or the first few words of the clues  -SL  --    Row Name 02/01/21 1100             Organizational Skills Goal 1 (SLP)    Progress (Thought Organization Skills Goal 1, SLP)  90%;independently (over 90% accuracy) category matrix- initial letter clues  -SL      Progress/Outcomes (Thought Organization Skills Goal 1, SLP)  goal ongoing  -SL         Right Hemisphere Function Goal 1 (SLP)    Progress (Right Hemisphere Function Goal 1, SLP)  with maximum cues (25-49%)  -SL      Progress/Outcomes (Right Hemisphere Function Goal 1, SLP)  progress slower than expected;goal ongoing  -SL      Comment (Right Hemisphere Function Goal 1, SLP)  MAX CUES REQUIRED FOR READING PHRASE CLUES AND THEN PLACING WORDS IN APPROPRIATE SPACES/NUMBERED BOXES UP ABOVE CLUES; despite visual cues consistnetly on left pt returnrd to left side on 1x- 10% ; pt kept stating \"i cant see: and required continued MAX prompts and cues to turn head and look left  -SL        User Key  (r) = Recorded By, (t) = Taken By, (c) = Cosigned By    Initials Name Provider Type    Katelyn Fan MS CCC-SLP Speech and Language Pathologist                      Time Calculation:       Time Calculation- SLP     Row Name 02/02/21 1443 02/02/21 0957 02/02/21 0954       Time Calculation- SLP    SLP Start Time  1300  -SL  --  0900  -SL    SLP Stop Time  1330  -SL  --  0945  -SL    SLP Time Calculation (min)  30 min  -SL  --  45 min  -SL    SLP Non-Billable Time (min)  --  15 min rounds  -SL  --      User Key  (r) = Recorded By, (t) = Taken By, (c) = Cosigned By    Initials Name Provider Type    Katelyn Fan MS CCC-SLP Speech and Language Pathologist            Therapy Charges for Today     Code Description Service Date Service Provider Modifiers Qty    38799644335 HC ST DEV OF COGN SKILLS INITIAL 15 MIN 2/1/2021 Katelyn James MS CCC-SLP  1    48873657170 HC ST DEV OF COGN SKILLS EACH ADDT'L 15 MIN 2/1/2021 Katelyn James, " MS CCC-SLP  1    58177025258 HC ST DEV OF COGN SKILLS EACH ADDT'L 15 MIN 2/1/2021 Katelyn James, MS CCC-SLP  2    [id number] HC ST DEV OF COGN SKILLS INITIAL 15 MIN 2/2/2021 Katelyn James, MS CCC-SLP  1    [id number] HC ST DEV OF COGN SKILLS EACH ADDT'L 15 MIN 2/2/2021 Katelyn James, MS CCC-SLP  2    56527040262 HC ST DEV OF COGN SKILLS EACH ADDT'L 15 MIN 2/2/2021 Katelyn James, MS CCC-SLP  2                           Katelyn James MS CCC-SLP  2/2/2021

## 2021-02-02 NOTE — THERAPY TREATMENT NOTE
Inpatient Rehabilitation - Occupational Therapy Treatment Note    [facility]     Patient Name: Liana Carrero  : 1935  MRN: [id number]    Today's Date: 2021                 Admit Date: 2021         ICD-10-CM ICD-9-CM   1. Impaired functional mobility, balance, gait, and endurance  Z74.09 V49.89       Patient Active Problem List   Diagnosis   • Stroke (cerebrum) (CMS/[facility])   • Chest pain   • Hypertensive urgency   • Headache   • History of embolic stroke   • Hypothyroidism   • Hypokalemia   • CAD in native artery   • URTI (acute upper respiratory infection)   • Closed fracture of left hip (CMS/[facility])   • Chronic diastolic (congestive) heart failure (CMS/[facility])   • Coronary artery disease involving autologous vein coronary bypass graft without angina pectoris   • Impaired glucose tolerance   • Moderate malnutrition (CMS/[facility])   • Stroke (CMS/[facility])       Past Medical History:   Diagnosis Date   • Arthritis    • CHF (congestive heart failure) (CMS/[facility])    • Coronary artery disease    • Diabetes mellitus (CMS/[facility])     new DX related to stroke, started on insulin at hospital   • Disease of thyroid gland    • Elevated cholesterol    • GERD (gastroesophageal reflux disease)    • History of transfusion    • Hypertension    • PONV (postoperative nausea and vomiting)    • Sleep apnea    • Spinal headache    • Stroke (CMS/[facility])        Past Surgical History:   Procedure Laterality Date   • APPENDECTOMY     • CARDIAC CATHETERIZATION       x2   • CARDIAC CATHETERIZATION N/A 2019    Procedure: Left Heart Cath;  Surgeon: Bentley Chapin MD;  Location: [facility] CATH INVASIVE LOCATION;  Service: Cardiovascular   • CARDIAC CATHETERIZATION N/A 2019    Procedure: Coronary angiography;  Surgeon: Bentley Chapin MD;  Location: [facility] CATH INVASIVE LOCATION;  Service: Cardiovascular   • CARDIAC CATHETERIZATION N/A 2019    Procedure: Native mammary injection;  Surgeon: Bentley Chapin MD;  Location: [facility]  CATH INVASIVE LOCATION;  Service: Cardiovascular   • CARDIAC CATHETERIZATION N/A 12/26/2019    Procedure: Saphenous Vein Graft;  Surgeon: Bentley Chapin MD;  Location: [facility] CATH INVASIVE LOCATION;  Service: Cardiovascular   • COLON SURGERY     • COLONOSCOPY     • EYE SURGERY      lens implants and cataract surgery   • FEMUR IM NAILING/RODDING Left 3/26/2020    Procedure: LT.HIP NAILING/RODDING;  Surgeon: Carlos Khan II, MD;  Location: [facility] MAIN OR;  Service: Orthopedics;  Laterality: Left;   • FRACTURE SURGERY Left 2020   • HYSTERECTOMY     • VASCULAR SURGERY      varicous veins                IRF OT ASSESSMENT FLOWSHEET (last 12 hours)      IRF OT Evaluation and Treatment     Row Name 02/02/21 1205          OT Time and Intention    Document Type  daily treatment  -CC     Mode of Treatment  occupational therapy  -CC     Patient Effort  good  -CC     Symptoms Noted During/After Treatment  none  -CC     Row Name 02/02/21 1205          Cognition/Psychosocial    Affect/Mental Status (Cognitive)  flat/blunted affect;sad/depressed affect  -CC     Orientation Status (Cognition)  oriented x 3  -CC     Personal Safety Interventions  fall prevention program maintained;gait belt;nonskid shoes/slippers when out of bed  -CC     Cognitive Function (Cognitive)  attention deficit  -CC     Attention Deficit (Cognitive)  distractible in noisy environment;divided attention;focused/sustained attention  -CC     Row Name 02/02/21 1205          Pain Scale: Numbers Pre/Post-Treatment    Pretreatment Pain Rating  4/10  -CC     Posttreatment Pain Rating  4/10  -CC     Pain Location  hand  -CC     Row Name 02/02/21 1205          Balance    Static Sitting Balance  mild impairment  -CC     Sit to Stand Dynamic Balance  severe impairment  -CC     Row Name 02/02/21 1205          Bathing    [place] Level (Bathing)  bathing skills;lower body;upper body;minimum assist (75% patient effort);maximum assist (25% patient effort)  min w bathing max x 2 for standing   -CC     Position (Bathing)  sink side;supported sitting;supported standing  -CC     Row Name 02/02/21 1205          Upper Body Dressing    [place] Level (Upper Body Dressing)  upper body dressing skills;doff;don;front opening garment;pull over garment;moderate assist (50-74% patient effort);maximal assist (25-49% patient effort)  -CC     Position (Upper Body Dressing)  supported sitting  -CC     Set-up Assistance (Upper Body Dressing)  obtain clothing  -CC     Comment (Upper Body Dressing)  jon dressing increased ability to dewayne   -CC     Row Name 02/02/21 1205          Lower Body Dressing    [place] Level (Lower Body Dressing)  doff;don;pants/bottoms;moderate assist (50% patient effort);maximum assist (25% patient effort)  -CC     Position (Lower Body Dressing)  supported sitting;supported standing  -CC     Set-up Assistance (Lower Body Dressing)  obtain clothing  -CC     Comment (Lower Body Dressing)  able to thread clothing on RLE nad assist w sock this date. Assist x 1 to stand and another for clothing management  -CC     Row Name 02/02/21 1205          Grooming    [place] Level (Grooming)  grooming skills;deodorant application;oral care regimen;wash face, hands;supervision;verbal cues;minimum assist (75% patient effort)  -CC     Position (Grooming)  sink side;supported sitting  -CC     Set-up Assistance (Grooming)  obtain supplies  -     Row Name 02/02/21 1205          Positioning and Restraints    Pre-Treatment Position  sitting in chair/recliner  -CC     Post Treatment Position  wheelchair  -CC     In Wheelchair  sitting;call light within reach;encouraged to call for assist;exit alarm on  -CC       User Key  (r) = Recorded By, (t) = Taken By, (c) = Cosigned By    Initials Name Effective Dates    Keya Zuñiga OTR 06/08/18 -            Occupational Therapy Education                 Title: PT OT SLP Therapies (In Progress)     Topic: Occupational  Therapy (Done)     Point: ADL training (Done)     Description:   Instruct learner(s) on proper safety adaptation and remediation techniques during self care or transfers.   Instruct in proper use of assistive devices.              Learning Progress Summary           Patient Acceptance, E,TB,D, VU by CC at 1/29/2021 1509    Comment: Pt repositioned in reg w/c with 1/2 arm tray on Left. Nursing notified of change from recliner w/c/. Commode tsf from w/c to droparm commode discussed and shown to NA.    Acceptance, E, VU by BL at 1/8/2021 1434   Caregiver Acceptance, E,TB,D, VU by CC at 1/29/2021 1509    Comment: Pt repositioned in reg w/c with 1/2 arm tray on Left. Nursing notified of change from recliner w/c/. Commode tsf from w/c to droparm commode discussed and shown to NA.                   Point: Home exercise program (Done)     Description:   Instruct learner(s) on appropriate technique for monitoring, assisting and/or progressing therapeutic exercises/activities.              Learning Progress Summary           Patient Acceptance, E, VU by BL at 1/8/2021 1434                   Point: Precautions (Done)     Description:   Instruct learner(s) on prescribed precautions during self-care and functional transfers.              Learning Progress Summary           Patient Acceptance, E, VU by BL at 1/8/2021 1434                   Point: Body mechanics (Done)     Description:   Instruct learner(s) on proper positioning and spine alignment during self-care, functional mobility activities and/or exercises.              Learning Progress Summary           Patient Acceptance, E,TB,D, VU by CC at 1/29/2021 1509    Comment: Pt repositioned in reg w/c with 1/2 arm tray on Left. Nursing notified of change from recliner w/c/. Commode tsf from w/c to droparm commode discussed and shown to NA.    Acceptance, E, VU by BL at 1/8/2021 1434   Caregiver Acceptance, E,TB,D, VU by CC at 1/29/2021 1509    Comment: Pt repositioned in reg  w/c with 1/2 arm tray on Left. Nursing notified of change from recliner w/c/. Commode tsf from w/c to droparm commode discussed and shown to NA.                               User Key     Initials Effective Dates Name Provider Type Discipline    CC 06/08/18 -  Keya Miranda OTR Occupational Therapist OT    BL 01/05/21 -  Marshall Oleary OT Occupational Therapist OT                    OT Recommendation and Plan                         Time Calculation:     Time Calculation- OT     Row Name 02/02/21 1000             Time Calculation- OT    OT Start Time  1000  -CC      OT Stop Time  1030  -CC      OT Time Calculation (min)  30 min  -CC      OT Non-Billable Time (min)  15 min tech  -CC        User Key  (r) = Recorded By, (t) = Taken By, (c) = Cosigned By    Initials Name Provider Type    CC Keya Miranda OTR Occupational Therapist        Therapy Charges for Today     Code Description Service Date Service Provider Modifiers Qty    55737650602 HC OT SELF CARE/MGMT/TRAIN EA 15 MIN 2/1/2021 Keya Miranda OTR GO 2    31591588099 HC OT NEUROMUSC RE EDUCATION EA 15 MIN 2/1/2021 Keya Miranda OTR GO 2    99693469956 HC OT THER SUPP EA 15 MIN 2/1/2021 Keya Miranda OTR GO 4    84320454628 HC OT SELF CARE/MGMT/TRAIN EA 15 MIN 2/2/2021 Keya Miranda OTR GO 2    97784966021 HC OT THER SUPP EA 15 MIN 2/2/2021 Keya Miranda OTR GO 1                   GIAN Blackwell  2/2/2021

## 2021-02-02 NOTE — PROGRESS NOTES
Reviewed team conference report regarding current status, goals, and ELOS with patient, daughter and grandson. Patient's son was on speaker phone to hear report as well. Patient stated she had a good day today. OT also had stated patient had a good today and sat up on mat unassisted for 45 minutes. OT had discussed with patient what she needs to work on in order to go home. Discussed concerns about care at home with patient and family since still needs assist of 2 people most of the time for transfers, especially to commode. Patient stated she can hire additional help at home since daughter cannot provide physical assist and granddaughter will not be able to do it all herself. Daughter stated patient can hire assistance and she has put feelers out for additional caregivers. Daughter stated she has had several people interested and will know more tonight after talking to some potential people. Son was concerned about toileting issues as patient has been requiring suppositories. Daughter asked about hospital bed for home and lift. Discussed ordering these items for home and that both should be covered by insurance. Will plan to schedule family conference early next week.

## 2021-02-02 NOTE — THERAPY TREATMENT NOTE
Inpatient Rehabilitation - Occupational Therapy Treatment Note    [facility]     Patient Name: Liana Carrero  : 1935  MRN: [id number]    Today's Date: 2021                 Admit Date: 2021         ICD-10-CM ICD-9-CM   1. Impaired functional mobility, balance, gait, and endurance  Z74.09 V49.89       Patient Active Problem List   Diagnosis   • Stroke (cerebrum) (CMS/[facility])   • Chest pain   • Hypertensive urgency   • Headache   • History of embolic stroke   • Hypothyroidism   • Hypokalemia   • CAD in native artery   • URTI (acute upper respiratory infection)   • Closed fracture of left hip (CMS/[facility])   • Chronic diastolic (congestive) heart failure (CMS/[facility])   • Coronary artery disease involving autologous vein coronary bypass graft without angina pectoris   • Impaired glucose tolerance   • Moderate malnutrition (CMS/[facility])   • Stroke (CMS/[facility])       Past Medical History:   Diagnosis Date   • Arthritis    • CHF (congestive heart failure) (CMS/[facility])    • Coronary artery disease    • Diabetes mellitus (CMS/[facility])     new DX related to stroke, started on insulin at hospital   • Disease of thyroid gland    • Elevated cholesterol    • GERD (gastroesophageal reflux disease)    • History of transfusion    • Hypertension    • PONV (postoperative nausea and vomiting)    • Sleep apnea    • Spinal headache    • Stroke (CMS/[facility])        Past Surgical History:   Procedure Laterality Date   • APPENDECTOMY     • CARDIAC CATHETERIZATION       x2   • CARDIAC CATHETERIZATION N/A 2019    Procedure: Left Heart Cath;  Surgeon: Bentley Chapin MD;  Location: [facility] CATH INVASIVE LOCATION;  Service: Cardiovascular   • CARDIAC CATHETERIZATION N/A 2019    Procedure: Coronary angiography;  Surgeon: Bentley Chapin MD;  Location: [facility] CATH INVASIVE LOCATION;  Service: Cardiovascular   • CARDIAC CATHETERIZATION N/A 2019    Procedure: Native mammary injection;  Surgeon: Bentley Chapin MD;  Location: [facility]  CATH INVASIVE LOCATION;  Service: Cardiovascular   • CARDIAC CATHETERIZATION N/A 12/26/2019    Procedure: Saphenous Vein Graft;  Surgeon: Bentley Chapin MD;  Location: [facility] CATH INVASIVE LOCATION;  Service: Cardiovascular   • COLON SURGERY     • COLONOSCOPY     • EYE SURGERY      lens implants and cataract surgery   • FEMUR IM NAILING/RODDING Left 3/26/2020    Procedure: LT.HIP NAILING/RODDING;  Surgeon: Carlos Khan II, MD;  Location: [facility] MAIN OR;  Service: Orthopedics;  Laterality: Left;   • FRACTURE SURGERY Left 2020   • HYSTERECTOMY     • VASCULAR SURGERY      varicous veins                IRF OT ASSESSMENT FLOWSHEET (last 12 hours)      IRF OT Evaluation and Treatment     Row Name 02/02/21 1455 02/02/21 1205       OT Time and Intention    Document Type  daily treatment  -CC  daily treatment  -CC    Mode of Treatment  occupational therapy  -CC  occupational therapy  -CC    Patient Effort  good  -CC  good  -CC    Symptoms Noted During/After Treatment  none  -CC  none  -CC    Row Name 02/02/21 1455 02/02/21 1205       Cognition/Psychosocial    Affect/Mental Status (Cognitive)  flat/blunted affect;sad/depressed affect tearful in pm over status  -CC  flat/blunted affect;sad/depressed affect  -CC    Orientation Status (Cognition)  oriented x 3  -CC  oriented x 3  -CC    Personal Safety Interventions  fall prevention program maintained;gait belt;nonskid shoes/slippers when out of bed  -CC  fall prevention program maintained;gait belt;nonskid shoes/slippers when out of bed  -CC    Cognitive Function (Cognitive)  attention deficit  -CC  attention deficit  -CC    Attention Deficit (Cognitive)  --  distractible in noisy environment;divided attention;focused/sustained attention  -CC    Row Name 02/02/21 1455 02/02/21 1205       Pain Scale: Numbers Pre/Post-Treatment    Pretreatment Pain Rating  2/10  -CC  4/10  -CC    Posttreatment Pain Rating  2/10  -CC  4/10  -CC    Pain Location  hand  -CC  hand   -    Pre/Posttreatment Pain Comment  ice   -CC  --    Row Name 02/02/21 1455          Bed Mobility    Comment (Bed Mobility)  in w/c; on therapy mat   -     Row Name 02/02/21 1455          Squat Pivot Transfer    Squat Pivot [place] (Transfers)  verbal cues;nonverbal cues (demo/gesture);maximum assist (25% patient effort);1 person to manage equipment  -     Assistive Device (Squat Pivot Transfers)  wheelchair  -     Row Name 02/02/21 1455 02/02/21 1205       Balance    Static Sitting Balance  mild impairment;unsupported;sitting, edge of mat pt sat EOM while engagaing in wt shifting activities w CGA  -  mild impairment  -    Sit to Stand Dynamic Balance  --  severe impairment  -    Balance Interventions  dynamic;minimal challenge  -  --    Comment, Balance  sate EOM for 23 min while engaging in wt shifting, Wb and reaching activity, Increased sitting balance. Tolerated minimal WB on L hand    -  --    Row Name 02/02/21 1455          Motor Skills    Muscle Tone  left;flaccidity  -     Motor Control/Coordination Interventions  weight-bearing activities  -     Row Name 02/02/21 1205          Bathing    [place] Level (Bathing)  bathing skills;lower body;upper body;minimum assist (75% patient effort);maximum assist (25% patient effort) min w bathing max x 2 for standing   -     Position (Bathing)  sink side;supported sitting;supported standing  -     Row Name 02/02/21 1205          Upper Body Dressing    [place] Level (Upper Body Dressing)  upper body dressing skills;doff;don;front opening garment;pull over garment;moderate assist (50-74% patient effort);maximal assist (25-49% patient effort)  -     Position (Upper Body Dressing)  supported sitting  -     Set-up Assistance (Upper Body Dressing)  obtain clothing  -     Comment (Upper Body Dressing)  jon dressing increased ability to dewayne   -     Row Name 02/02/21 1205          Lower Body Dressing    [place] Level  (Lower Body Dressing)  doff;don;pants/bottoms;moderate assist (50% patient effort);maximum assist (25% patient effort)  -CC     Position (Lower Body Dressing)  supported sitting;supported standing  -CC     Set-up Assistance (Lower Body Dressing)  obtain clothing  -     Comment (Lower Body Dressing)  able to thread clothing on RLE nad assist w sock this date. Assist x 1 to stand and another for clothing management  -     Row Name 02/02/21 1205          Grooming    [place] Level (Grooming)  grooming skills;deodorant application;oral care regimen;wash face, hands;supervision;verbal cues;minimum assist (75% patient effort)  -CC     Position (Grooming)  sink side;supported sitting  -CC     Set-up Assistance (Grooming)  obtain supplies  -     Row Name 02/02/21 1455 02/02/21 1205       Positioning and Restraints    Pre-Treatment Position  sitting in chair/recliner  -CC  sitting in chair/recliner  -CC    Post Treatment Position  wheelchair  -CC  wheelchair  -CC    In Wheelchair  sitting;call light within reach;encouraged to call for assist;exit alarm on;with family/caregiver  -  sitting;call light within reach;encouraged to call for assist;exit alarm on  -CC      User Key  (r) = Recorded By, (t) = Taken By, (c) = Cosigned By    Initials Name Effective Dates    CC Keya Miranda, GIAN 06/08/18 -            Occupational Therapy Education                 Title: PT OT SLP Therapies (Done)     Topic: Occupational Therapy (Done)     Point: ADL training (Done)     Description:   Instruct learner(s) on proper safety adaptation and remediation techniques during self care or transfers.   Instruct in proper use of assistive devices.              Learning Progress Summary           Patient Acceptance, E,TB,D, VU by  at 1/29/2021 1509    Comment: Pt repositioned in reg w/c with 1/2 arm tray on Left. Nursing notified of change from recliner w/c/. Commode tsf from w/c to droparm commode discussed and shown to NA.     Acceptance, E, VU by BL at 1/8/2021 1434   Caregiver Acceptance, E,TB,D, VU by CC at 1/29/2021 1509    Comment: Pt repositioned in reg w/c with 1/2 arm tray on Left. Nursing notified of change from recliner w/c/. Commode tsf from w/c to droparm commode discussed and shown to NA.                   Point: Home exercise program (Done)     Description:   Instruct learner(s) on appropriate technique for monitoring, assisting and/or progressing therapeutic exercises/activities.              Learning Progress Summary           Patient Acceptance, E, VU by BL at 1/8/2021 1434                   Point: Precautions (Done)     Description:   Instruct learner(s) on prescribed precautions during self-care and functional transfers.              Learning Progress Summary           Patient Acceptance, E, VU by BL at 1/8/2021 1434                   Point: Body mechanics (Done)     Description:   Instruct learner(s) on proper positioning and spine alignment during self-care, functional mobility activities and/or exercises.              Learning Progress Summary           Patient Acceptance, E,TB,D, VU by CC at 1/29/2021 1509    Comment: Pt repositioned in reg w/c with 1/2 arm tray on Left. Nursing notified of change from recliner w/c/. Commode tsf from w/c to droparm commode discussed and shown to NA.    Acceptance, E, VU by BL at 1/8/2021 1434   Caregiver Acceptance, E,TB,D, VU by CC at 1/29/2021 1509    Comment: Pt repositioned in reg w/c with 1/2 arm tray on Left. Nursing notified of change from recliner w/c/. Commode tsf from w/c to droparm commode discussed and shown to NA.                               User Key     Initials Effective Dates Name Provider Type Discipline    CC 06/08/18 -  Keya Miranda OTR Occupational Therapist OT    BL 01/05/21 -  Marshall Oleary OT Occupational Therapist OT                    OT Recommendation and Plan                         Time Calculation:     Time Calculation- OT     Row Name  02/02/21 1430 02/02/21 1000          Time Calculation- OT    OT Start Time  1430  -CC  1000  -CC     OT Stop Time  1500  -CC  1030  -CC     OT Time Calculation (min)  30 min  -CC  30 min  -CC     OT Non-Billable Time (min)  --  15 min tech  -CC       User Key  (r) = Recorded By, (t) = Taken By, (c) = Cosigned By    Initials Name Provider Type    CC Keya Miranda OTR Occupational Therapist        Therapy Charges for Today     Code Description Service Date Service Provider Modifiers Qty    36419331046 HC OT SELF CARE/MGMT/TRAIN EA 15 MIN 2/1/2021 Keya Miranda OTR GO 2    12319748609 HC OT NEUROMUSC RE EDUCATION EA 15 MIN 2/1/2021 Keya Miranda OTR GO 2    75644259416 HC OT THER SUPP EA 15 MIN 2/1/2021 Keya Miranda OTR GO 4    46098669840 HC OT SELF CARE/MGMT/TRAIN EA 15 MIN 2/2/2021 Keya Miranda OTR GO 2    [id number] HC OT THER SUPP EA 15 MIN 2/2/2021 Keya Miranda OTR GO 1    36199887101 HC OT NEUROMUSC RE EDUCATION EA 15 MIN 2/2/2021 Keya Miranda OTR GO 2                   GIAN Blackwell  2/2/2021

## 2021-02-02 NOTE — THERAPY TREATMENT NOTE
Inpatient Rehabilitation - Physical Therapy Treatment Note       [facility]     Patient Name: Liana Carrero  : 1935  MRN: [id number]    Today's Date: 2021                    Admit Date: 2021      Visit Dx:     ICD-10-CM ICD-9-CM   1. Impaired functional mobility, balance, gait, and endurance  Z74.09 V49.89       Patient Active Problem List   Diagnosis   • Stroke (cerebrum) (CMS/[facility])   • Chest pain   • Hypertensive urgency   • Headache   • History of embolic stroke   • Hypothyroidism   • Hypokalemia   • CAD in native artery   • URTI (acute upper respiratory infection)   • Closed fracture of left hip (CMS/[facility])   • Chronic diastolic (congestive) heart failure (CMS/[facility])   • Coronary artery disease involving autologous vein coronary bypass graft without angina pectoris   • Impaired glucose tolerance   • Moderate malnutrition (CMS/[facility])   • Stroke (CMS/[facility])       Past Medical History:   Diagnosis Date   • Arthritis    • CHF (congestive heart failure) (CMS/[facility])    • Coronary artery disease    • Diabetes mellitus (CMS/[facility])     new DX related to stroke, started on insulin at hospital   • Disease of thyroid gland    • Elevated cholesterol    • GERD (gastroesophageal reflux disease)    • History of transfusion    • Hypertension    • PONV (postoperative nausea and vomiting)    • Sleep apnea    • Spinal headache    • Stroke (CMS/[facility])        Past Surgical History:   Procedure Laterality Date   • APPENDECTOMY     • CARDIAC CATHETERIZATION       x2   • CARDIAC CATHETERIZATION N/A 2019    Procedure: Left Heart Cath;  Surgeon: Bentley Chapin MD;  Location:  [facility];  Service: Cardiovascular   • CARDIAC CATHETERIZATION N/A 2019    Procedure: Coronary angiography;  Surgeon: Bentley Chapin MD;  Location:  [facility];  Service: Cardiovascular   • CARDIAC CATHETERIZATION N/A 2019    Procedure: Native mammary injection;  Surgeon: Bentley Chapin MD;   Location:  [facility];  Service: Cardiovascular   • CARDIAC CATHETERIZATION N/A 12/26/2019    Procedure: Saphenous Vein Graft;  Surgeon: Bentley Chapin MD;  Location: [facility] CATH INVASIVE LOCATION;  Service: Cardiovascular   • COLON SURGERY     • COLONOSCOPY     • EYE SURGERY      lens implants and cataract surgery   • FEMUR IM NAILING/RODDING Left 3/26/2020    Procedure: LT.HIP NAILING/RODDING;  Surgeon: Carlos Khan II, MD;  Location: [facility] MAIN OR;  Service: Orthopedics;  Laterality: Left;   • FRACTURE SURGERY Left 2020   • HYSTERECTOMY     • VASCULAR SURGERY      varicous veins          PT ASSESSMENT (last 12 hours)      IRF PT Evaluation and Treatment     Row Name 02/02/21 1203          PT Time and Intention    Document Type  daily treatment  -MS     Mode of Treatment  physical therapy  -MS     Patient/Family/Caregiver Comments/Observations  AM: sitting up in wheelchair, reported her family was surprised with her assist level when they visited yesterday.  -MS     Total Minutes, Physical Therapy  60  -MS     Row Name 02/02/21 1203          General Information    Existing Precautions/Restrictions  fall  -MS     Limitations/Impairments  safety/cognitive;insensate body part;visual  -MS     Row Name 02/02/21 1203          Vision Assessment/Intervention    Visual Motor Impairment  visual tracking, left  -MS     Visual Processing Deficit  jon-inattention/neglect, left  -MS     Row Name 02/02/21 1203          Cognition/Psychosocial    Affect/Mental Status (Cognitive)  flat/blunted affect;sad/depressed affect  -MS     Orientation Status (Cognition)  oriented x 4  -MS     Follows Commands (Cognition)  follows two-step commands;verbal cues/prompting required;repetition of directions required  -MS     Personal Safety Interventions  fall prevention program maintained;gait belt;nonskid shoes/slippers when out of bed  -MS     Attention Deficit (Cognitive)  distractible in quiet  environment;divided attention;distractible in noisy environment  -MS     Row Name 02/02/21 1203          Pain Scale: Numbers Pre/Post-Treatment    Pretreatment Pain Rating  4/10  -MS     Posttreatment Pain Rating  4/10  -MS     Pain Location - Orientation  lower  -MS     Pain Location  back  -MS     Row Name 02/02/21 1203          Bed Mobility    Comment (Bed Mobility)  NT- up in wheelchair upon PT arrival  -MS     Row Name 02/02/21 1203          Transfer Assessment/Treatment    Comment (Transfers)  L knee blocked, arm placed at bracket of jon bars, fatigue and back pain limiting, stood for 60s and 45s  -MS     Row Name 02/02/21 1203          Transfers    Sit-Stand [place] (Transfers)  maximum assist (25% patient effort);2 person assist;verbal cues;nonverbal cues (demo/gesture)  -MS     Stand-Sit [place] (Transfers)  maximum assist (25% patient effort);2 person assist;verbal cues;nonverbal cues (demo/gesture)  -MS     Row Name 02/02/21 1203          Sit-Stand Transfer    Assistive Device (Sit-Stand Transfers)  wheelchair at jon bars  -MS     Row Name 02/02/21 1203          Stand-Sit Transfer    Assistive Device (Stand-Sit Transfers)  wheelchair at jon bars  -MS     Row Name 02/02/21 1203          Balance    Static Sitting Balance  moderate impairment;sitting, edge of mat  -MS     Dynamic Sitting Balance  moderate impairment;sitting, edge of mat  -MS     Sit to Stand Dynamic Balance  severe impairment;supported  -MS     Static Standing Balance  severe impairment;supported  -MS     Comment, Balance  Sitting unsupported up to 15 minutes today- reaching for rings with increased attention to the L  -MS     Row Name 02/02/21 1203          Knee (Therapeutic Exercise)    Knee Strengthening (Therapeutic Exercise)  sitting;right;LAQ (long arc quad);marching while seated;10 repetitions;2 sets  -MS     Row Name 02/02/21 1203          Positioning and Restraints    Pre-Treatment Position  sitting in chair/recliner   -MS     Post Treatment Position  wheelchair  -MS     In Bed  sitting EOB EOM with therapy tech, OT getting patient  -MS     In Wheelchair  sitting;call light within reach;encouraged to call for assist;exit alarm on  -MS       User Key  (r) = Recorded By, (t) = Taken By, (c) = Cosigned By    Initials Name Provider Type    Nati Quinteros RAGHU, PT Physical Therapist        Wound 01/07/21 Left upper chest Incision (Active)   Closure Liquid skin adhesive;Open to air 02/02/21 0731   Base clean;dry 02/02/21 0731   Periwound intact 02/02/21 0731   Drainage Amount none 02/02/21 0731   Dressing Care open to air 02/01/21 2020     Physical Therapy Education                 Title: PT OT SLP Therapies (Done)     Topic: Physical Therapy (Done)     Point: Mobility training (Done)     Learning Progress Summary           Patient Acceptance, E,TB, VU,NR by MS at 2/2/2021 1210    Acceptance, E,TB, NR by MS at 2/1/2021 1145    Acceptance, E, NR by  at 1/30/2021 1336    Acceptance, E,TB, VU,NR by MS at 1/29/2021 1535    Acceptance, E, NR by LB at 1/28/2021 1032    Acceptance, E,TB, VU,NR by MS at 1/27/2021 1447    Acceptance, E,TB, VU,NR by MS at 1/26/2021 0959    Acceptance, E,TB, VU,NR by MS at 1/25/2021 1316    Acceptance, E,TB, VU,NR by MS at 1/22/2021 0918    Acceptance, E,TB, VU by MS at 1/20/2021 1544    Acceptance, E,TB, VU,NR by MS at 1/19/2021 1401    Acceptance, E,TB, NR,NL by MS at 1/18/2021 1432    Acceptance, E,TB, VU,NR by  at 1/16/2021 1444    Acceptance, E,TB, NR,NL by MS at 1/15/2021 1418    Acceptance, E,TB, NR by MS at 1/14/2021 1223    Acceptance, E,TB, NR by LB1 at 1/13/2021 1030    Comment: sitting balance    Acceptance, E,TB, NR by MS at 1/12/2021 1312    Acceptance, E,TB, VU,NR by MS at 1/11/2021 1140                   Point: Home exercise program (Done)     Learning Progress Summary           Patient Acceptance, E,TB, VU,NR by MS at 2/2/2021 1210    Acceptance, E,TB, NR by MS at 2/1/2021 1145     Acceptance, E, NR by LH at 1/30/2021 1336    Acceptance, E,TB, VU,NR by MS at 1/29/2021 1535    Acceptance, E,TB, VU,NR by MS at 1/27/2021 1447    Acceptance, E,TB, VU,NR by MS at 1/26/2021 0959    Acceptance, E,TB, VU,NR by MS at 1/25/2021 1316    Acceptance, E,TB, VU,NR by MS at 1/22/2021 0918    Acceptance, E,TB, VU by MS at 1/20/2021 1544    Acceptance, E,TB, VU,NR by MS at 1/19/2021 1401    Acceptance, E,TB, NR,NL by MS at 1/18/2021 1432    Acceptance, E,TB, NR,NL by MS at 1/15/2021 1418    Acceptance, E,TB, NR by MS at 1/14/2021 1223                   Point: Body mechanics (Done)     Learning Progress Summary           Patient Acceptance, E,TB, VU,NR by MS at 2/2/2021 1210    Acceptance, E,TB, NR by MS at 2/1/2021 1145    Acceptance, E, NR by  at 1/30/2021 1336    Acceptance, E,TB, VU,NR by MS at 1/29/2021 1535    Acceptance, E,TB, VU,NR by MS at 1/27/2021 1447    Acceptance, E,TB, VU,NR by MS at 1/26/2021 0959    Acceptance, E,TB, VU,NR by MS at 1/25/2021 1316    Acceptance, E,TB, VU,NR by MS at 1/22/2021 0918    Acceptance, E,TB, VU by MS at 1/20/2021 1544    Acceptance, E,TB, VU,NR by MS at 1/19/2021 1401    Acceptance, E,TB, NR,NL by MS at 1/15/2021 1418    Acceptance, E,TB, NR by MS at 1/14/2021 1223    Acceptance, E,TB, NR by MS at 1/12/2021 1312    Acceptance, E,TB, VU,NR by MS at 1/11/2021 1140                   Point: Precautions (Done)     Learning Progress Summary           Patient Acceptance, E,TB, VU,NR by MS at 2/2/2021 1210    Acceptance, E,TB, NR by MS at 2/1/2021 1145    Acceptance, E, NR by LH at 1/30/2021 1336    Acceptance, E,TB, VU,NR by MS at 1/29/2021 1535    Acceptance, E,TB, VU,NR by MS at 1/27/2021 1447    Acceptance, E,TB, VU,NR by MS at 1/26/2021 0959    Acceptance, E,TB, VU,NR by MS at 1/25/2021 1316    Acceptance, E,TB, VU,NR by MS at 1/22/2021 0918    Acceptance, E, VU by MD at 1/21/2021 0926    Acceptance, E,TB, VU by MS at 1/20/2021 1544    Acceptance, E,TB, VU,NR by MS at  1/19/2021 1401    Acceptance, E,TB, NR,NL by MS at 1/15/2021 1418    Acceptance, E,TB, NR by MS at 1/14/2021 1223    Acceptance, E,TB, NR by MS at 1/12/2021 1312    Acceptance, E,TB, VU,NR by MS at 1/11/2021 1140    Acceptance, E, VU by MD at 1/9/2021 0933                               User Key     Initials Effective Dates Name Provider Type Discipline     06/08/18 -  Ela Pritchett, PT Physical Therapist PT    LB1 04/03/18 -  Jazmin Schuster, PT Physical Therapist PT     04/03/18 -  Elba Myrick, PT Physical Therapist PT    LB 03/07/18 -  Elba Grigsby, PTA Physical Therapy Assistant PT    MD 04/03/18 -  Lisandra Lowery, PT Physical Therapist PT    MS 03/04/19 -  Nati Kaba, PT Physical Therapist PT                PT Recommendation and Plan    Planned Therapy Interventions (PT): balance training, bed mobility training, gait training, home exercise program, postural re-education, patient/family education, neuromuscular re-education, strengthening, transfer training, wheelchair management/propulsion training, ROM (range of motion)  Frequency of Treatment (PT): 60 minutes per session  Anticipated Equipment Needs at Discharge (PT Eval): wheelchair(noted patient already has wc(?) will get details)     Patient was wearing a face mask during this therapy encounter. Therapist used appropriate personal protective equipment including eye protection, mask, and gloves.  Mask used was standard procedure mask. Appropriate PPE was worn during the entire therapy session. Hand hygiene was completed before and after therapy session. Patient is not in enhanced droplet precautions. Mirza Pike also present.     Time Calculation:     PT Charges     Row Name 02/02/21 1449 02/02/21 1203          Time Calculation    Start Time  1330  -MS  1030  -MS     Stop Time  1400  -MS  1100  -MS     Time Calculation (min)  30 min  -MS  30 min  -MS     PT Received On  --  02/02/21  -MS     PT - Next Appointment  --  02/03/21  -MS        User Key  (r) = Recorded By, (t) = Taken By, (c) = Cosigned By    Initials Name Provider Type    MS Kaba, Nati KRISHNAMURTHY, PT Physical Therapist          Therapy Charges for Today     Code Description Service Date Service Provider Modifiers Qty    [id number]  PT THER SUPP EA 15 MIN 2/1/2021 Kaba, Nati KRISHNAMURTHY, PT GP 4    [id number]  PT THER PROC EA 15 MIN 2/1/2021 KabaNati, PT GP 4    [id number]  PT THER PROC EA 15 MIN 2/2/2021 Kaba, Nati KRISHNAMURTHY, PT GP 4    [id number]  PT THER SUPP EA 15 MIN 2/2/2021 KabaNati, PT GP 4                   Nati Kaba, PT  2/2/2021

## 2021-02-02 NOTE — PLAN OF CARE
Goal Outcome Evaluation:  Plan of Care Reviewed With: patient  Progress: improving  Outcome Summary: Pt has been up in wc and working wit therapies today. Encouraing pt to follow sitting schedule.

## 2021-02-02 NOTE — PROGRESS NOTES
TEAM CONF- BED MAX 2. TRANSFERS MAX SQUAT PIVOT, USES LIFT WITH NURSING. SITTING BALANCE BETTER. LEFT INATTENTION. WHEELCHAIR MIN ASSIST WITH VISUAL CUES, DUE TO LEFT VISUAL INATTENTION/LEFT VISUAL FIELD CUT. STANDING AT PARALLEL BARS WITH BLOCKING LEFT KNEE. TOILET TRANSFERS MAX 2 TO DROPARM. BATH MIN UB AND MAX X 2 LB. DRESSING MOD-MAX UB AND DEP X 2 LB. GROOMING MIN-SBA. TOILETING COMES UP TO STAND A LITTLE BETTER, DEP X 2.   IMPAIRED ATTENTION. DISTRACTIBLE. FATIGUES QUICKLY WITH COGNITIVE TASKS. MIN-MOD MEMORY DEFICITS. MOD-MAX VERBAL AND VISUAL CUES FOR READING AND WRITING TASKS. INCONTINENT BOWEL AND BLADDER. PVRS LOW. CHRONIC LEFT HIP PAIN.   ELOS -  ONE WEEK, WILL NEED SIGNIFICANT ASSISTANCE AT HOME.

## 2021-02-02 NOTE — PLAN OF CARE
Problem: Rehabilitation (IRF) Plan of Care  Goal: Plan of Care Review  Outcome: Ongoing, Progressing  Flowsheets (Taken 2/2/2021 0332)  Progress: improving  Outcome Summary: Patient sleeping on and off tonight. Continues with incontinence with B&B. A&Ox4, O2 at 2L via NC at HS. L hemiplegia, L wrist edematous with bruise. Transfers via Lift. Pain medication given PRN.  Plan of Care Reviewed With: patient

## 2021-02-02 NOTE — PROGRESS NOTES
Inpatient Rehabilitation Plan of Care Note    Plan of Care  Care Plan Reviewed - No updates at this time.    Body Systems    [RN] Respiratory(Active)  Current Status(02/02/2021): O2 2L QHS  Weekly Goal(02/09/2021): Same as discharge  Discharge Goal: Least restrictive amount of 02    Performed Intervention(s)  O2 2L/NC @ HS  assess lungs, sats every shift      Safety    [RN] Potential for Injury(Active)  Current Status(02/02/2021): Patient at risk of injury related to previous falls.  flaccid on left side. dependent with transfers of 2.  Weekly Goal(02/09/2021): Paitent will use call light appropriately while in  Rehab.  Discharge Goal: Patient will continue to use call light while in Rehab  environment.    Performed Intervention(s)  Items with in reach and environmental set up  Bed alarm/ Chair alarm  Safety rounds and falls protocols/ precautions      Sphincter Control    [RN] Bladder Management(Active)  Current Status(02/02/2021): Patient is continent and incontinent, using bedpan,  PVRs, requiring I/C at times  Weekly Goal(02/09/2021): Patient will be 50% continent  Discharge Goal: Patient will be 100% continent    Performed Intervention(s)  Proper diet and fluid intake  Medication as ordered  Incontinent care if needed  Monitor intake and output      Psychosocial    Performed Intervention(s)  Verbalize needs and concerns  Medications as needed/ ordered  Therapeutic environmental set up    Signed by: Catherine Berg RN

## 2021-02-02 NOTE — PROGRESS NOTES
Case Management  Inpatient Rehabilitation Team Conference    Conference Date/Time: 2/2/2021 8:19:59 AM    Team Conference Attendees:  Nevaeh Zavala, Pharmacist  Katelyn Blevins, DAVIDW  Nati Kaba, PT  Keya Miranda, VICKI James, SLP  Katelyn Narvaez, CTRS  Angela Gonzalez RD, LD  Loco Cortes, RN  Liliya Michaels, MEHRDAD    Demographics            Age: 86Y            Gender: Female    Admission Date: 1/7/2021 2:21:00 PM  Rehabilitation Diagnosis:  CVA right jon  Past Medical History: Past Medical History:  DiagnosisDate  ?Arthritis?  ?CHF (congestive heart failure) (CMS/HCC)?  ?Diabetes mellitus (CMS/HCC)?  ?new DX related to stroke, started on insulin at hospital  ?Disease of thyroid gland?  ?Elevated cholesterol?  ?GERD (gastroesophageal reflux disease)?  ?Hypertension?  ?Sleep apnea?  ?Stroke (CMS/HCC)?    Coronary disease.  Myocardial infarction.  CABG 2000.  Stent 2015.  Stroke 2014  without residual deficit.  Thyroidectomy.  Parathyroid gland surgery.  Hypothyroidism.  Renal insufficiency.  Surgical HistoryExpand by Default  Past Surgical History:  ProcedureLateralityDate  ?APPENDECTOMY??  ?CARDIAC CATHETERIZATION??  ? x2  ?COLON SURGERY??  ?COLONOSCOPY??  ?EYE SURGERY??  ?lens implants and cataract surgery  ?HYSTERECTOMY??  ?VASCULAR SURGERY??  ?varicous veins      Plan of Care  Anticipated Discharge Date/Estimated Length of Stay: ELOS: 3 weeks  Anticipated Discharge Destination: Community discharge with assistance  Discharge Plan : Patient lives with daughter and son-in-law in 2 story home with  ramp entrance. Bed/bath on first floor. Granddaughter also helps out with care.  Patient [facility] with [facility]  D/C plan is home with daughter and son-in-law.  Medical Necessity Expected Level Rationale: Goals are to achieve a level of  MIN/MOD with  mobility and self-care and improved visual-spatial.  Rehabilitation prognosis fair.  Medical prognosis  indeterminate.  Intensity and Duration: an average of 3 hours/5 days per week  Medical Supervision and 24 Hour Rehab Nursing: x  Physical Therapy: x  PT Intensity/Duration: 1 hour/day, 5 days/week for approximately 15 - 20 days  Occupational Therapy: x  OT Intensity/Duration: 1 hour/day, 5 days/week for approximately 15 - 20 days  Speech and Language Therapy: x  SLP Intensity/Duration: 1 hour/day, 5 days/week for approximately 15 - 20 days  Social Work: x  Therapeutic Recreation: x  Psychology: x  Updated (if changes indicated)    Anticipated Discharge Date/Estimated Length of Stay:   ELOS: 2 weeks    Based on the patient's medical and functional status, their prognosis and  expected level of functional improvement is: Goals are to achieve a level of  MIN/MOD with  mobility and self-care and improved visual-spatial.  Rehabilitation prognosis fair.  Medical prognosis indeterminate.      Interdisciplinary Problem/Goals/Status    All Rehab Problems:  Body Systems    [RN] Respiratory(Active)  Current Status(02/02/2021): O2 2L QHS  Weekly Goal(02/09/2021): Same as discharge  Discharge Goal: Least restrictive amount of 02        Cognition    [ST] Memory(Active)  Current Status(02/01/2021): min/mod memory deficits  Weekly Goal(02/09/2021): Patient will recall functional information after 5  minutes with MIN cues.  Discharge Goal: Patient will return to her baseline memory abilities.    [ST] Right Hemisphere Function(Active)  Current Status(02/01/2021):  SAME- mod/max visual  cues for reading/writing  tasks  Weekly Goal(01/26/2021): The patient will complete L/R discrimination and  spatial concept tasks with MOD cues.  Discharge Goal: The patient will attend to the left for functional tasks with  only initial min cues.    [ST] Executive Functions(Active)  Current Status(02/01/2021): impaired attention; fatiques quickly; distractible  Weekly Goal(02/02/2021): follow schedule with min cues  Discharge Goal: fxnl cognitive  skills for home with assist        Mobility    [PT] Wheelchair(Active)  Current Status(02/01/2021): 100' [place] (visual deficits)  Weekly Goal(02/08/2021): CGA  Discharge Goal: SV    [PT] Walk(Active)  Current Status(02/01/2021): NA  Weekly Goal(02/08/2021): PT only  Discharge Goal: 8' modA    [PT] Bed/Chair/Wheelchair(Active)  Current Status(02/01/2021): maxA, squat pivot  Weekly Goal(02/08/2021): modA, transfer board  Discharge Goal: [place]    [PT] Bed Mobility(Active)  Current Status(02/01/2021): maxAx2  Weekly Goal(02/08/2021): modA  Discharge Goal: [place]    [OT] Toilet Transfers(Active)  Current Status(02/01/2021): max x2 to droparm  Weekly Goal(02/09/2021): max/mod  x 2  Discharge Goal: mod  A    [OT] Tub/Shower Transfers(Active)  Current Status(02/01/2021): TBA  Weekly Goal(02/08/2021): Max x 2  Discharge Goal: mod  A        Psychosocial    [RN] Coping/Adjustment(Active)  Current Status(02/02/2021): Patient has supportive family, but at risk of  ineffective coping regarding current situation.  Weekly Goal(02/09/2021): Allow patient the opportunity to discuss concerns  regarding new situation.  Discharge Goal: Patient will have healthy coping mechanisms at time of discharge        Safety    [RN] Potential for Injury(Active)  Current Status(02/02/2021): Patient at risk of injury related to previous falls.  flaccid on left side. dependent with transfers of 2.  Weekly Goal(02/09/2021): Paitent will use call light appropriately while in  Rehab.  Discharge Goal: Patient will continue to use call light while in Rehab  environment.        Self Care    [OT] Bathing(Active)  Current Status(02/01/2021): Min UB max x 2 LB  Weekly Goal(02/09/2021): Max  Discharge Goal: Min A    [OT] Dressing (Lower)(Active)  Current Status(02/01/2021): Dependent x 2  Weekly Goal(02/08/2021): max x 2  Discharge Goal: Mod A    [OT] Dressing (Upper)(Active)  Current Status(02/01/2021): mod/maxA  Weekly Goal(02/09/2021): Min A  Discharge Goal:  CGA    [OT] Grooming(Active)  Current Status(02/01/2021): min/SBA  Weekly Goal(02/08/2021): SBA  Discharge Goal: Set-up    [OT] Toileting(Active)  Current Status(02/01/2021): Dependent x 2  Weekly Goal(02/09/2021): max x 2  Discharge Goal: Mod A        Sphincter Control    [RN] Bladder Management(Active)  Current Status(02/02/2021): Patient is continent and incontinent, using bedpan,  PVRs, requiring I/C at times  Weekly Goal(02/09/2021): Patient will be 50% continent  Discharge Goal: Patient will be 100% continent    [RN] Bowel Management(Active)  Current Status(02/02/2021): Patient continent and incontinent  Weekly Goal(02/09/2021): continent 50% of the time  Discharge Goal: Patient will continent 100% of the time        Comments: 1/12: visual deficits significant at times; very weak; left neglect;  poor PO intake;    1/19: depression noted;    1/26: left hip pain worse recently, tearful at times - MD to order x-ray to  check status;    2/2: visual deficits remain a significant barrier;    Signed by: Loco Cortes RN    Physician CoSigned By: Azael Abdullahi 02/02/2021 09:01:33

## 2021-02-03 PROCEDURE — 97130 THER IVNTJ EA ADDL 15 MIN: CPT

## 2021-02-03 PROCEDURE — 63710000001 ONDANSETRON ODT 4 MG TABLET DISPERSIBLE: Performed by: INTERNAL MEDICINE

## 2021-02-03 PROCEDURE — 97129 THER IVNTJ 1ST 15 MIN: CPT

## 2021-02-03 PROCEDURE — 97112 NEUROMUSCULAR REEDUCATION: CPT

## 2021-02-03 PROCEDURE — 25010000002 ENOXAPARIN PER 10 MG: Performed by: PHYSICAL MEDICINE & REHABILITATION

## 2021-02-03 PROCEDURE — 97110 THERAPEUTIC EXERCISES: CPT

## 2021-02-03 RX ADMIN — DOCUSATE SODIUM 200 MG: 100 CAPSULE, LIQUID FILLED ORAL at 08:46

## 2021-02-03 RX ADMIN — TRAMADOL HYDROCHLORIDE 50 MG: 50 TABLET ORAL at 17:56

## 2021-02-03 RX ADMIN — LIDOCAINE 1 PATCH: 50 PATCH TOPICAL at 08:47

## 2021-02-03 RX ADMIN — DILTIAZEM HYDROCHLORIDE 180 MG: 180 CAPSULE, COATED, EXTENDED RELEASE ORAL at 20:40

## 2021-02-03 RX ADMIN — PANTOPRAZOLE SODIUM 40 MG: 40 TABLET, DELAYED RELEASE ORAL at 05:42

## 2021-02-03 RX ADMIN — FERROUS SULFATE TAB 325 MG (65 MG ELEMENTAL FE) 325 MG: 325 (65 FE) TAB at 08:46

## 2021-02-03 RX ADMIN — ONDANSETRON 4 MG: 4 TABLET, ORALLY DISINTEGRATING ORAL at 11:41

## 2021-02-03 RX ADMIN — DILTIAZEM HYDROCHLORIDE 180 MG: 180 CAPSULE, COATED, EXTENDED RELEASE ORAL at 08:46

## 2021-02-03 RX ADMIN — TRAMADOL HYDROCHLORIDE 50 MG: 50 TABLET ORAL at 08:47

## 2021-02-03 RX ADMIN — ONDANSETRON 4 MG: 4 TABLET, ORALLY DISINTEGRATING ORAL at 05:42

## 2021-02-03 RX ADMIN — LOSARTAN POTASSIUM 50 MG: 50 TABLET, FILM COATED ORAL at 08:46

## 2021-02-03 RX ADMIN — ASPIRIN 81 MG: 81 TABLET, CHEWABLE ORAL at 08:46

## 2021-02-03 RX ADMIN — ENOXAPARIN SODIUM 40 MG: 40 INJECTION SUBCUTANEOUS at 08:46

## 2021-02-03 RX ADMIN — ONDANSETRON 4 MG: 4 TABLET, ORALLY DISINTEGRATING ORAL at 17:52

## 2021-02-03 RX ADMIN — MIRTAZAPINE 15 MG: 15 TABLET, FILM COATED ORAL at 20:40

## 2021-02-03 RX ADMIN — DOCUSATE SODIUM 200 MG: 100 CAPSULE, LIQUID FILLED ORAL at 20:40

## 2021-02-03 RX ADMIN — OXYBUTYNIN CHLORIDE 5 MG: 5 TABLET ORAL at 17:52

## 2021-02-03 RX ADMIN — HYDROCODONE BITARTRATE AND ACETAMINOPHEN 1 TABLET: 5; 325 TABLET ORAL at 20:42

## 2021-02-03 RX ADMIN — LEVOTHYROXINE SODIUM 125 MCG: 0.12 TABLET ORAL at 05:42

## 2021-02-03 NOTE — THERAPY TREATMENT NOTE
Inpatient Rehabilitation - Occupational Therapy Treatment Note    [facility]     Patient Name: Liana Carrero  : 1935  MRN: [id number]    Today's Date: 2/3/2021                 Admit Date: 2021         ICD-10-CM ICD-9-CM   1. Impaired functional mobility, balance, gait, and endurance  Z74.09 V49.89       Patient Active Problem List   Diagnosis   • Stroke (cerebrum) (CMS/[facility])   • Chest pain   • Hypertensive urgency   • Headache   • History of embolic stroke   • Hypothyroidism   • Hypokalemia   • CAD in native artery   • URTI (acute upper respiratory infection)   • Closed fracture of left hip (CMS/[facility])   • Chronic diastolic (congestive) heart failure (CMS/[facility])   • Coronary artery disease involving autologous vein coronary bypass graft without angina pectoris   • Impaired glucose tolerance   • Moderate malnutrition (CMS/[facility])   • Stroke (CMS/[facility])       Past Medical History:   Diagnosis Date   • Arthritis    • CHF (congestive heart failure) (CMS/[facility])    • Coronary artery disease    • Diabetes mellitus (CMS/[facility])     new DX related to stroke, started on insulin at hospital   • Disease of thyroid gland    • Elevated cholesterol    • GERD (gastroesophageal reflux disease)    • History of transfusion    • Hypertension    • PONV (postoperative nausea and vomiting)    • Sleep apnea    • Spinal headache    • Stroke (CMS/[facility])        Past Surgical History:   Procedure Laterality Date   • APPENDECTOMY     • CARDIAC CATHETERIZATION       x2   • CARDIAC CATHETERIZATION N/A 2019    Procedure: Left Heart Cath;  Surgeon: Bentley Chapin MD;  Location: [facility] CATH INVASIVE LOCATION;  Service: Cardiovascular   • CARDIAC CATHETERIZATION N/A 2019    Procedure: Coronary angiography;  Surgeon: Bentley Chapin MD;  Location: [facility] CATH INVASIVE LOCATION;  Service: Cardiovascular   • CARDIAC CATHETERIZATION N/A 2019    Procedure: Native mammary injection;  Surgeon: Bentley Chapin MD;  Location: [facility]  CATH INVASIVE LOCATION;  Service: Cardiovascular   • CARDIAC CATHETERIZATION N/A 12/26/2019    Procedure: Saphenous Vein Graft;  Surgeon: Bentley Chapin MD;  Location: [facility] CATH INVASIVE LOCATION;  Service: Cardiovascular   • COLON SURGERY     • COLONOSCOPY     • EYE SURGERY      lens implants and cataract surgery   • FEMUR IM NAILING/RODDING Left 3/26/2020    Procedure: LT.HIP NAILING/RODDING;  Surgeon: Carlos Khan II, MD;  Location: [facility] MAIN OR;  Service: Orthopedics;  Laterality: Left;   • FRACTURE SURGERY Left 2020   • HYSTERECTOMY     • VASCULAR SURGERY      varicous veins                IRF OT ASSESSMENT FLOWSHEET (last 12 hours)      IRF OT Evaluation and Treatment     Row Name 02/03/21 1524          OT Time and Intention    Document Type  daily treatment  -CC     Mode of Treatment  occupational therapy  -CC     Patient Effort  good  -CC     Row Name 02/03/21 1524          Vision Assessment/Intervention    Visual Impairment/Limitations  visual/perceptual impairments present  -CC     Visual Field Deficit  homonymous hemianopsia, left  -CC     Visual Motor Impairment  visual tracking, left  -CC     Visual Processing Deficit  jon-inattention/neglect, left  -CC     Visual Treatment Interventions  scanning activities/strategies min vc to locate items on L during reaching activity  -CC     Row Name 02/03/21 1524          Pain Scale: Numbers Pre/Post-Treatment    Pretreatment Pain Rating  0/10 - no pain  -CC     Posttreatment Pain Rating  0/10 - no pain  -CC     Pre/Posttreatment Pain Comment  discomfort w L hand   -CC     Row Name 02/03/21 1524          Transfers    Chair-Bed [place] (Transfers)  verbal cues;nonverbal cues (demo/gesture);maximum assist (25% patient effort);1 person to manage equipment  -CC     Sit-Stand [place] (Transfers)  maximum assist (25% patient effort);2 person assist;verbal cues;nonverbal cues (demo/gesture)  -CC     Stand-Sit [place] (Transfers)   maximum assist (25% patient effort);2 person assist;verbal cues;nonverbal cues (demo/gesture)  -     [place] Level (Toilet Transfer)  verbal cues;nonverbal cues (demo/gesture);maximum assist (25% patient effort);1 person to manage equipment  -     Assistive Device (Toilet Transfer)  commode, bedside with drop arms  -     Row Name 02/03/21 1524          Chair-Bed Transfer    Assistive Device (Chair-Bed Transfers)  wheelchair;transfer board  -     Row Name 02/03/21 1524          Sit-Stand Transfer    Assistive Device (Sit-Stand Transfers)  wheelchair;other (see comments)  -     Row Name 02/03/21 1524          Stand-Sit Transfer    Assistive Device (Stand-Sit Transfers)  wheelchair  -     Row Name 02/03/21 1524          Toilet Transfer    Type (Toilet Transfer)  squat pivot  -     Row Name 02/03/21 1524          Balance    Static Sitting Balance  mild impairment  -     Static Standing Balance  severe impairment  -     Row Name 02/03/21 1524          Glenohumeral Joint Subluxation    Glenohumeral Joint Subluxation  left;moderate subluxation;1/2 finger width;1 finger width  -     Interventions, Glenohumeral Joint Subluxation  lapboard/arm trough;therapeutic taping  -     Row Name 02/03/21 1524          Neuromuscular Re-education    Interventions (Neuromuscular Re-education)  facilitation/inhibition;weight bearing;weight shifting  -     Positioning (Neuromuscular Re-education)  sitting;supported  -     Comment (Neuromuscular Re-education)  WB LUE; wt shifting while raeching for PVC ring sand placed on ladder board, CGA w balance. Increased wt shift R/L. Tolerated minimal WB on LUE.   -     Row Name 02/03/21 1524          Toileting    [place] Level (Toileting)  toileting skills;adjust/manage clothing;perform perineal hygiene;dependent (less than 25% patient effort)  -     Assistive Device Use (Toileting)  commode chair  -     Position (Toileting)  supported sitting;supported  standing  -CC     Row Name 02/03/21 1524          Positioning and Restraints    Pre-Treatment Position  sitting in chair/recliner  -CC     Post Treatment Position  bed  -CC     In Bed  notified nsg;supine;call light within reach;encouraged to call for assist;exit alarm on  -CC       User Key  (r) = Recorded By, (t) = Taken By, (c) = Cosigned By    Initials Name Effective Dates    CC Keya Miranda, OTR 06/08/18 -            Occupational Therapy Education                 Title: PT OT SLP Therapies (Done)     Topic: Occupational Therapy (Done)     Point: ADL training (Done)     Description:   Instruct learner(s) on proper safety adaptation and remediation techniques during self care or transfers.   Instruct in proper use of assistive devices.              Learning Progress Summary           Patient Acceptance, E,TB,D, VU by CC at 1/29/2021 1509    Comment: Pt repositioned in reg w/c with 1/2 arm tray on Left. Nursing notified of change from recliner w/c/. Commode tsf from w/c to droparm commode discussed and shown to NA.    Acceptance, E, VU by BL at 1/8/2021 1434   Caregiver Acceptance, E,TB,D, VU by CC at 1/29/2021 1509    Comment: Pt repositioned in reg w/c with 1/2 arm tray on Left. Nursing notified of change from recliner w/c/. Commode tsf from w/c to droparm commode discussed and shown to NA.                   Point: Home exercise program (Done)     Description:   Instruct learner(s) on appropriate technique for monitoring, assisting and/or progressing therapeutic exercises/activities.              Learning Progress Summary           Patient Acceptance, E, VU by BL at 1/8/2021 1434                   Point: Precautions (Done)     Description:   Instruct learner(s) on prescribed precautions during self-care and functional transfers.              Learning Progress Summary           Patient Acceptance, E, VU by BL at 1/8/2021 1434                   Point: Body mechanics (Done)     Description:   Instruct  learner(s) on proper positioning and spine alignment during self-care, functional mobility activities and/or exercises.              Learning Progress Summary           Patient Acceptance, E,TB,D, VU by  at 1/29/2021 1509    Comment: Pt repositioned in reg w/c with 1/2 arm tray on Left. Nursing notified of change from recliner w/c/. Commode tsf from w/c to droparm commode discussed and shown to NA.    Acceptance, E, VU by  at 1/8/2021 1434   Caregiver Acceptance, E,TB,D, VU by  at 1/29/2021 1509    Comment: Pt repositioned in reg w/c with 1/2 arm tray on Left. Nursing notified of change from recliner w/c/. Commode tsf from w/c to droparm commode discussed and shown to NA.                               User Key     Initials Effective Dates Name Provider Type Discipline     06/08/18 -  Keya Miranda OTR Occupational Therapist OT     01/05/21 -  Marshall Oleary OT Occupational Therapist OT                    OT Recommendation and Plan                         Time Calculation:     Time Calculation- OT     Row Name 02/03/21 1400             Time Calculation- OT    OT Start Time  1400  -CC      OT Stop Time  1430  -CC      OT Time Calculation (min)  30 min  -CC      OT Non-Billable Time (min)  30 min tech  -CC        User Key  (r) = Recorded By, (t) = Taken By, (c) = Cosigned By    Initials Name Provider Type    CC Keya Miranda OTR Occupational Therapist        Therapy Charges for Today     Code Description Service Date Service Provider Modifiers Qty    97663104601 HC OT SELF CARE/MGMT/TRAIN EA 15 MIN 2/2/2021 Keya Miranda OTR GO 2    47032722421 HC OT THER SUPP EA 15 MIN 2/2/2021 Keya Miranda OTR GO 1    80947535856 HC OT NEUROMUSC RE EDUCATION EA 15 MIN 2/2/2021 Keya Miranda OTR GO 2    [id number] HC OT NEUROMUSC RE EDUCATION EA 15 MIN 2/3/2021 Keya Miranda OTR GO 2    [id number] HC OT THER SUPP EA 15 MIN 2/3/2021 Keya Miranda OTR GO 2                   Keya Miranda  OTR  2/3/2021

## 2021-02-03 NOTE — THERAPY TREATMENT NOTE
Inpatient Rehabilitation - Physical Therapy Treatment Note       [facility]     Patient Name: Liana Carrero  : 1935  MRN: [id number]    Today's Date: 2/3/2021                    Admit Date: 2021      Visit Dx:     ICD-10-CM ICD-9-CM   1. Impaired functional mobility, balance, gait, and endurance  Z74.09 V49.89       Patient Active Problem List   Diagnosis   • Stroke (cerebrum) (CMS/[facility])   • Chest pain   • Hypertensive urgency   • Headache   • History of embolic stroke   • Hypothyroidism   • Hypokalemia   • CAD in native artery   • URTI (acute upper respiratory infection)   • Closed fracture of left hip (CMS/[facility])   • Chronic diastolic (congestive) heart failure (CMS/[facility])   • Coronary artery disease involving autologous vein coronary bypass graft without angina pectoris   • Impaired glucose tolerance   • Moderate malnutrition (CMS/[facility])   • Stroke (CMS/[facility])       Past Medical History:   Diagnosis Date   • Arthritis    • CHF (congestive heart failure) (CMS/[facility])    • Coronary artery disease    • Diabetes mellitus (CMS/[facility])     new DX related to stroke, started on insulin at hospital   • Disease of thyroid gland    • Elevated cholesterol    • GERD (gastroesophageal reflux disease)    • History of transfusion    • Hypertension    • PONV (postoperative nausea and vomiting)    • Sleep apnea    • Spinal headache    • Stroke (CMS/[facility])        Past Surgical History:   Procedure Laterality Date   • APPENDECTOMY     • CARDIAC CATHETERIZATION       x2   • CARDIAC CATHETERIZATION N/A 2019    Procedure: Left Heart Cath;  Surgeon: Bentley Chapin MD;  Location:  [facility];  Service: Cardiovascular   • CARDIAC CATHETERIZATION N/A 2019    Procedure: Coronary angiography;  Surgeon: Bentley Chapin MD;  Location:  [facility];  Service: Cardiovascular   • CARDIAC CATHETERIZATION N/A 2019    Procedure: Native mammary injection;  Surgeon: Bentley Chapin MD;   Location:  [facility];  Service: Cardiovascular   • CARDIAC CATHETERIZATION N/A 12/26/2019    Procedure: Saphenous Vein Graft;  Surgeon: Bentley Chapin MD;  [facility];  Service: Cardiovascular   • COLON SURGERY     • COLONOSCOPY     • EYE SURGERY      lens implants and cataract surgery   • FEMUR IM NAILING/RODDING Left 3/26/2020    Procedure: LT.HIP NAILING/RODDING;  Surgeon: Carlos Khan II, MD;  Location: [facility] MAIN OR;  Service: Orthopedics;  Laterality: Left;   • FRACTURE SURGERY Left 2020   • HYSTERECTOMY     • VASCULAR SURGERY      varicous veins          PT ASSESSMENT (last 12 hours)      IRF PT Evaluation and Treatment     Row Name 02/03/21 1132          PT Time and Intention    Document Type  daily treatment  -MS     Mode of Treatment  physical therapy  -MS     Patient/Family/Caregiver Comments/Observations  AM: sitting up in wheelchair and finished OT, exit alarm on; PM: supine in bed with HOB elevated, nsg present to assist with wc transfer  -MS     Total Minutes, Physical Therapy  60  -MS     Row Name 02/03/21 1132          General Information    Existing Precautions/Restrictions  fall  -MS     Limitations/Impairments  safety/cognitive  -MS     Row Name 02/03/21 1132          Vision Assessment/Intervention    Visual Motor Impairment  visual tracking, left  -MS     Visual Processing Deficit  jon-inattention/neglect, left  -MS     Row Name 02/03/21 1132          Sensory Assessment (Somatosensory)    Sensory Subjective Reports  tingling Reported on L LE today especially with mvmt and WBing  -MS     Row Name 02/03/21 1132          Cognition/Psychosocial    Affect/Mental Status (Cognitive)  sad/depressed affect  -MS     Orientation Status (Cognition)  oriented x 3  -MS     Follows Commands (Cognition)  follows two-step commands;repetition of directions required;verbal cues/prompting required  -MS     Personal Safety Interventions  fall  prevention program maintained;gait belt;nonskid shoes/slippers when out of bed  -MS     Attention Deficit (Cognitive)  distractible in noisy environment;divided attention;focused/sustained attention  -MS     Row Name 02/03/21 1132          Pain Scale: Numbers Pre/Post-Treatment    Pretreatment Pain Rating  4/10  -MS     Posttreatment Pain Rating  4/10  -MS     Pain Location - Side  Left  -MS     Pain Location - Orientation  lower  -MS     Pain Location  hand  -MS     Pre/Posttreatment Pain Comment  Also LBP reported (patient reported today she has chronic back issues)  -MS     Row Name 02/03/21 1132          Bed Mobility    Supine-Sit [place] (Bed Mobility)  maximum assist (25% patient effort);verbal cues;nonverbal cues (demo/gesture)  -MS     Comment (Bed Mobility)  Improvement noted with sitting balance in bed today and using R arm for support on bed rail.  -MS     Row Name 02/03/21 1132          Transfer Assessment/Treatment    Comment (Transfers)  3 attempts- 2 successful STS and with standing up to 45s- pain limiting  -MS     Row Name 02/03/21 1132          Transfers    Bed-Chair [place] (Transfers)  maximum assist (25% patient effort);2 person assist;verbal cues;nonverbal cues (demo/gesture)  -MS     Assistive Device (Bed-Chair Transfers)  wheelchair  -MS     Sit-Stand [place] (Transfers)  maximum assist (25% patient effort);2 person assist;verbal cues;nonverbal cues (demo/gesture)  -MS     Stand-Sit [place] (Transfers)  maximum assist (25% patient effort);2 person assist;verbal cues;nonverbal cues (demo/gesture)  -MS     Row Name 02/03/21 1132          Sit-Stand Transfer    Assistive Device (Sit-Stand Transfers)  wheelchair;other (see comments) at jon bars  -MS     Row Name 02/03/21 1132          Stand-Sit Transfer    Assistive Device (Stand-Sit Transfers)  wheelchair;other (see comments) at jon bars  -MS     Row Name 02/03/21 1132          Squat Pivot Transfer    Squat Pivot  [place] (Transfers)  maximum assist (25% patient effort);2 person assist;verbal cues;nonverbal cues (demo/gesture)  -MS     Assistive Device (Squat Pivot Transfers)  wheelchair  -MS     Row Name 02/03/21 1132          Wheelchair Mobility/Management    Armrest Management [place] (Wheelchair)  dependent (less than 25% patient effort)  -MS     Front Rigging/Swing-away Footrest Management [place] (Wheelchair)  dependent (less than 25% patient effort)  -MS     Wheel Locks/Brakes Management [place] (Wheelchair)  supervision;other (see comments) R side only.  -MS     Row Name 02/03/21 1132          Balance    Static Sitting Balance  moderate impairment;supported;unsupported;sitting, edge of mat varied throughout, up to SV at times  -MS     Dynamic Sitting Balance  moderate impairment;sitting, edge of mat  -MS     Static Standing Balance  severe impairment;supported  -MS     Balance Interventions  sitting;static;dynamic reaching;UE activity with balance activity emphasis on balance reactions, reaching for pool rings  -MS     Row Name 02/03/21 1132          Hip (Therapeutic Exercise)    Hip PROM (Therapeutic Exercise)  sitting;aBduction;aDduction;flexion;extension;left;10 repetitions  -MS     Hip Strengthening (Therapeutic Exercise)  sitting;marching while seated;10 repetitions;2 sets;red;aBduction;aDduction;right  -MS     Row Name 02/03/21 1132          Knee (Therapeutic Exercise)    Knee PROM (Therapeutic Exercise)  sitting;flexion;extension;10 repetitions;left  -MS     Knee Strengthening (Therapeutic Exercise)  sitting;LAQ (long arc quad);marching while seated;10 repetitions;2 sets;resistance band;red;right  -MS     Row Name 02/03/21 1132          Ankle (Therapeutic Exercise)    Ankle AROM (Therapeutic Exercise)  sitting;dorsiflexion;plantarflexion;right;10 repetitions  -MS     Ankle PROM (Therapeutic Exercise)  sitting;dorsiflexion;plantarflexion;left;10 repetitions  -MS     Row Name 02/03/21 1132           Positioning and Restraints    Pre-Treatment Position  sitting in chair/recliner  -MS     Post Treatment Position  wheelchair  -MS     In Bed  -- supine on mat waiting for OT, tech providing SV  -MS     In Wheelchair  sitting;call light within reach;encouraged to call for assist;exit alarm on  -MS       User Key  (r) = Recorded By, (t) = Taken By, (c) = Cosigned By    Initials Name Provider Type    MS Kaba Nati RAGHU, PT Physical Therapist        Wound 01/07/21 Left upper chest Incision (Active)   Closure Liquid skin adhesive 02/02/21 2000   Base clean;dry 02/02/21 2000   Periwound dry;intact 02/02/21 2000   Drainage Amount none 02/03/21 0846   Dressing Care open to air 02/02/21 2000     Physical Therapy Education                 Title: PT OT SLP Therapies (Done)     Topic: Physical Therapy (Done)     Point: Mobility training (Done)     Learning Progress Summary           Patient Acceptance, E,TB, VU,NR by MS at 2/3/2021 1151    Acceptance, E,TB, VU,NR by MS at 2/2/2021 1210    Acceptance, E,TB, NR by MS at 2/1/2021 1145    Acceptance, E, NR by  at 1/30/2021 1336    Acceptance, E,TB, VU,NR by MS at 1/29/2021 1535    Acceptance, E, NR by LB at 1/28/2021 1032    Acceptance, E,TB, VU,NR by MS at 1/27/2021 1447    Acceptance, E,TB, VU,NR by MS at 1/26/2021 0959    Acceptance, E,TB, VU,NR by MS at 1/25/2021 1316    Acceptance, E,TB, VU,NR by MS at 1/22/2021 0918    Acceptance, E,TB, VU by MS at 1/20/2021 1544    Acceptance, E,TB, VU,NR by MS at 1/19/2021 1401    Acceptance, E,TB, NR,NL by MS at 1/18/2021 1432    Acceptance, E,TB, VU,NR by IVAN at 1/16/2021 1444    Acceptance, E,TB, NR,NL by MS at 1/15/2021 1418    Acceptance, E,TB, NR by MS at 1/14/2021 1223    Acceptance, E,TB, NR by LB1 at 1/13/2021 1030    Comment: sitting balance    Acceptance, E,TB, NR by MS at 1/12/2021 1312    Acceptance, E,TB, VU,NR by MS at 1/11/2021 1140                   Point: Home exercise program (Done)     Learning Progress Summary            Patient Acceptance, E,TB, VU,NR by MS at 2/3/2021 1151    Acceptance, E,TB, VU,NR by MS at 2/2/2021 1210    Acceptance, E,TB, NR by MS at 2/1/2021 1145    Acceptance, E, NR by  at 1/30/2021 1336    Acceptance, E,TB, VU,NR by MS at 1/29/2021 1535    Acceptance, E,TB, VU,NR by MS at 1/27/2021 1447    Acceptance, E,TB, VU,NR by MS at 1/26/2021 0959    Acceptance, E,TB, VU,NR by MS at 1/25/2021 1316    Acceptance, E,TB, VU,NR by MS at 1/22/2021 0918    Acceptance, E,TB, VU by MS at 1/20/2021 1544    Acceptance, E,TB, VU,NR by MS at 1/19/2021 1401    Acceptance, E,TB, NR,NL by MS at 1/18/2021 1432    Acceptance, E,TB, NR,NL by MS at 1/15/2021 1418    Acceptance, E,TB, NR by MS at 1/14/2021 1223                   Point: Body mechanics (Done)     Learning Progress Summary           Patient Acceptance, E,TB, VU,NR by MS at 2/3/2021 1151    Acceptance, E,TB, VU,NR by MS at 2/2/2021 1210    Acceptance, E,TB, NR by MS at 2/1/2021 1145    Acceptance, E, NR by  at 1/30/2021 1336    Acceptance, E,TB, VU,NR by MS at 1/29/2021 1535    Acceptance, E,TB, VU,NR by MS at 1/27/2021 1447    Acceptance, E,TB, VU,NR by MS at 1/26/2021 0959    Acceptance, E,TB, VU,NR by MS at 1/25/2021 1316    Acceptance, E,TB, VU,NR by MS at 1/22/2021 0918    Acceptance, E,TB, VU by MS at 1/20/2021 1544    Acceptance, E,TB, VU,NR by MS at 1/19/2021 1401    Acceptance, E,TB, NR,NL by MS at 1/15/2021 1418    Acceptance, E,TB, NR by MS at 1/14/2021 1223    Acceptance, E,TB, NR by MS at 1/12/2021 1312    Acceptance, E,TB, VU,NR by MS at 1/11/2021 1140                   Point: Precautions (Done)     Learning Progress Summary           Patient Acceptance, E,TB, VU,NR by MS at 2/3/2021 1151    Acceptance, E,TB, VU,NR by MS at 2/2/2021 1210    Acceptance, E,TB, NR by MS at 2/1/2021 1145    Acceptance, E, NR by LH at 1/30/2021 1336    Acceptance, E,TB, VU,NR by MS at 1/29/2021 1535    Acceptance, E,TB, VU,NR by MS at 1/27/2021 1447    Acceptance,  E,TB, VU,NR by MS at 1/26/2021 0959    Acceptance, E,TB, VU,NR by MS at 1/25/2021 1316    Acceptance, E,TB, VU,NR by MS at 1/22/2021 0918    Acceptance, E, VU by MD at 1/21/2021 0926    Acceptance, E,TB, VU by MS at 1/20/2021 1544    Acceptance, E,TB, VU,NR by MS at 1/19/2021 1401    Acceptance, E,TB, NR,NL by MS at 1/15/2021 1418    Acceptance, E,TB, NR by MS at 1/14/2021 1223    Acceptance, E,TB, NR by MS at 1/12/2021 1312    Acceptance, E,TB, VU,NR by MS at 1/11/2021 1140    Acceptance, E, VU by MD at 1/9/2021 0933                               User Key     Initials Effective Dates Name Provider Type Discipline     06/08/18 -  Ela Pritchett, PT Physical Therapist PT    LB 04/03/18 -  Jazmin Schuster, PT Physical Therapist PT     04/03/18 -  Elba Myrick, PT Physical Therapist PT     03/07/18 -  Elba Grigsby, PTA Physical Therapy Assistant PT    MD 04/03/18 -  Lisandra Lowery, PT Physical Therapist PT    MS 03/04/19 -  Nati Kaba, PT Physical Therapist PT                PT Recommendation and Plan    Planned Therapy Interventions (PT): balance training, bed mobility training, gait training, home exercise program, postural re-education, patient/family education, neuromuscular re-education, strengthening, transfer training, wheelchair management/propulsion training, ROM (range of motion)  Frequency of Treatment (PT): 60 minutes per session  Anticipated Equipment Needs at Discharge (PT Eval): wheelchair(noted patient already has wc(?) will get details)     Patient was wearing a face mask during this therapy encounter. Therapist used appropriate personal protective equipment including eye protection, mask, and gloves.  Mask used was standard procedure mask. Appropriate PPE was worn during the entire therapy session. Hand hygiene was completed before and after therapy session. Patient is not in enhanced droplet precautions. Mirza Perezg also present.     Time Calculation:     PT Charges     Row  Name 02/03/21 1500 02/03/21 1132          Time Calculation    Start Time  1330  -MS  1030  -MS     Stop Time  1400  -MS  1100  -MS     Time Calculation (min)  30 min  -MS  30 min  -MS     PT Received On  --  02/03/21  -MS     PT - Next Appointment  --  02/04/21  -MS       User Key  (r) = Recorded By, (t) = Taken By, (c) = Cosigned By    Initials Name Provider Type    Nati Quinteros, PT Physical Therapist          Therapy Charges for Today     Code Description Service Date Service Provider Modifiers Qty    [id number]  PT THER PROC EA 15 MIN 2/2/2021 Nati Kaba, PT GP 4    [id number]  PT THER SUPP EA 15 MIN 2/2/2021 Nati Kaba, PT GP 4    [id number]  PT THER PROC EA 15 MIN 2/3/2021 Nati Kaba, PT GP 4    [id number]  PT THER SUPP EA 15 MIN 2/3/2021 Nati Kaba, PT GP 4                   Nati Kaba, PT  2/3/2021

## 2021-02-03 NOTE — PLAN OF CARE
Goal Outcome Evaluation:  Plan of Care Reviewed With: patient     Outcome Summary: Patient alert and oriented, medication with water, and pain medication once. She is continent and incontinent, suppository not given, and assist x2 to BSC when in chair or uses bedpan while in bed. O2 NC at night, VS stable and no unsafe behavior.

## 2021-02-03 NOTE — PLAN OF CARE
Problem: Rehabilitation (IRF) Plan of Care  Goal: Plan of Care Review  Outcome: Ongoing, Progressing  Flowsheets (Taken 2/3/2021 0440)  Progress: improving  Outcome Summary: Patient sleeping well tonight. L hemiplegia. Voiding frequently, she can be incontinent at times but has been continent so far with bedpan. She wears O2 at 2L via NC. Pain medication given PRN.  Plan of Care Reviewed With: patient

## 2021-02-03 NOTE — PROGRESS NOTES
Inpatient Rehabilitation Plan of Care Note    Plan of Care  Care Plan Reviewed - Updates as Follows    Body Systems    [RN] Respiratory(Active)  Current Status(02/03/2021): O2 2L QHS  Weekly Goal(02/09/2021): Same as discharge  Discharge Goal: Least restrictive amount of 02    Performed Intervention(s)  O2 2L/NC @ HS  assess lungs, sats every shift      Psychosocial    [RN] Coping/Adjustment(Active)  Current Status(02/03/2021): Patient has supportive family, but at risk of  ineffective coping regarding current situation.  Weekly Goal(02/09/2021): Allow patient the opportunity to discuss concerns  regarding new situation.  Discharge Goal: Patient will have healthy coping mechanisms at time of discharge    Performed Intervention(s)  Verbalize needs and concerns  Medications as needed/ ordered  Therapeutic environmental set up      Safety    [RN] Potential for Injury(Active)  Current Status(02/03/2021): Patient at risk of injury related to previous falls.  flaccid on left side. dependent with transfers of 2.  Weekly Goal(02/09/2021): Paitent will use call light appropriately while in  Rehab.  Discharge Goal: Patient will continue to use call light while in Rehab  environment.    Performed Intervention(s)  Items with in reach and environmental set up  Bed alarm/ Chair alarm  Safety rounds and falls protocols/ precautions      Sphincter Control    [RN] Bladder Management(Active)  Current Status(02/03/2021): Patient is continent and incontinent, using bedpan,  PVRs, requiring I/C at times  Weekly Goal(02/09/2021): Patient will be 50% continent  Discharge Goal: Patient will be 100% continent    [RN] Bowel Management(Active)  Current Status(02/03/2021): Patient continent and incontinent  Weekly Goal(02/09/2021): continent 50% of the time  Discharge Goal: Patient will continent 100% of the time    Performed Intervention(s)  Proper diet and fluid intake  Medication as ordered  Incontinent care if needed  Monitor intake and  output    Signed by: Mackenzie Alberts RN

## 2021-02-03 NOTE — THERAPY TREATMENT NOTE
Inpatient Rehabilitation - Speech Language Pathology Treatment Note    [facility]     Patient Name: Liana Carrero  : 1935  MRN: [id number]    Today's Date: 2/3/2021                   Admit Date: 2021       Visit Dx:      ICD-10-CM ICD-9-CM   1. Impaired functional mobility, balance, gait, and endurance  Z74.09 V49.89       Patient Active Problem List   Diagnosis   • Stroke (cerebrum) (CMS/[facility])   • Chest pain   • Hypertensive urgency   • Headache   • History of embolic stroke   • Hypothyroidism   • Hypokalemia   • CAD in native artery   • URTI (acute upper respiratory infection)   • Closed fracture of left hip (CMS/HCC)   • Chronic diastolic (congestive) heart failure (CMS/[facility])   • Coronary artery disease involving autologous vein coronary bypass graft without angina pectoris   • Impaired glucose tolerance   • Moderate malnutrition (CMS/[facility])   • Stroke (CMS/HCC)       Past Medical History:   Diagnosis Date   • Arthritis    • CHF (congestive heart failure) (CMS/[facility])    • Coronary artery disease    • Diabetes mellitus (CMS/[facility])     new DX related to stroke, started on insulin at hospital   • Disease of thyroid gland    • Elevated cholesterol    • GERD (gastroesophageal reflux disease)    • History of transfusion    • Hypertension    • PONV (postoperative nausea and vomiting)    • Sleep apnea    • Spinal headache    • Stroke (CMS/[facility])        Past Surgical History:   Procedure Laterality Date   • APPENDECTOMY     • CARDIAC CATHETERIZATION       x2   • CARDIAC CATHETERIZATION N/A 2019    Procedure: Left Heart Cath;  Surgeon: Bentley Chapin MD;  Location:  [facility] CATH INVASIVE LOCATION;  Service: Cardiovascular   • CARDIAC CATHETERIZATION N/A 2019    Procedure: Coronary angiography;  Surgeon: Bentley Chapin MD;  Location:  [facility] CATH INVASIVE LOCATION;  Service: Cardiovascular   • CARDIAC CATHETERIZATION N/A 2019    Procedure: Native mammary injection;  Surgeon: Bentley Chapin MD;   "Location:  [facility] CATH INVASIVE LOCATION;  Service: Cardiovascular   • CARDIAC CATHETERIZATION N/A 12/26/2019    Procedure: Saphenous Vein Graft;  Surgeon: Bentley Chapin MD;  Location: [facility] CATH INVASIVE LOCATION;  Service: Cardiovascular   • COLON SURGERY     • COLONOSCOPY     • EYE SURGERY      lens implants and cataract surgery   • FEMUR IM NAILING/RODDING Left 3/26/2020    Procedure: LT.HIP NAILING/RODDING;  Surgeon: Carlos Khan II, MD;  Location: [facility] MAIN OR;  Service: Orthopedics;  Laterality: Left;   • FRACTURE SURGERY Left 2020   • HYSTERECTOMY     • VASCULAR SURGERY      varicous veins          SLP EVALUATION (last 72 hours)      SLP SLC Evaluation     Row Name 02/03/21 1330 02/02/21 1330 02/02/21 0951 02/01/21 1300 02/01/21 1104       Communication Assessment/Intervention    Document Type  therapy note (daily note)  -SL  therapy note (daily note)  -SL  therapy note (daily note)  -SL  therapy note (daily note)  -SL  therapy note (daily note)  -SL    Subjective Information  complains of;fatigue second session  -SL  (S) complains of;fatigue  -SL  no complaints  -SL  fatigue  -SL  no complaints  -SL    Patient Observations  lethargic  -SL  lethargic  -SL  alert;cooperative  -SL  lethargic  -SL  alert  -SL    Patient/Family/Caregiver Comments/Observations  pt up in w/c for am session, but did poorly for pm session- back in bed c/o being \"very tired\" - difficult to keep focused on tx; reduced participation in second session every day as pt always fatiqued  -SL  IN BED- despite sitting schedule; needed cues to participate and stay awake  -SL  pt seen sitting in w/c  -SL  BACK IN BED- DID NOT /UNABLE TO FOLLOW SITTING SCHEDULE FOR TX  -SL  leaning to left in w/c- despite having had rest break before therapies  -SL    Patient Effort  fair  -SL  fair  -SL  adequate  -SL  fair  -SL  adequate  -SL    Symptoms Noted During/After Treatment  fatigue  -SL  fatigue  -SL  none  -SL  fatigue  -SL  " fatigue  -SL      User Key  (r) = Recorded By, (t) = Taken By, (c) = Cosigned By    Initials Name Effective Dates    SL Katelyn James, MS CCC-SLP 06/08/18 -              EDUCATION    The patient has been educated in the following areas:       Cognitive Impairment.      SLP Recommendation and Plan                                                  SLP GOALS     Row Name 02/03/21 1450 02/03/21 1400 02/02/21 1330       Memory Skills Goal 1 (SLP)    Barriers (Memory Skills Goal 1, SLP)  --  60% for short paragraph recall ( presented verbally)  -SL  --    Progress (Memory Skills Goal 1, SLP)  --  50%;independently (over 90% accuracy) mental manip task- 3 words - alphabetical order  -SL  70%;with minimal cues (75-90%) immed recall- 5 words- chaining strategy  -SL    Progress/Outcomes (Memory Skills Goal 1, SLP)  --  progress slower than expected  -SL  goal ongoing  -SL    Comment (Memory Skills Goal 1, SLP)  70% for recall of medicine label information   -SL  neg impacted by poor attn, fatique  -  --       Organizational Skills Goal 1 (SLP)    Progress/Outcomes (Thought Organization Skills Goal 1, SLP)  --  --  goal ongoing  -SL    Comment (Thought Organization Skills Goal 1, SLP)  --  --  divergent - abstract naming of 5 items per category- 66% indep, 100% with MIN cues  -SL       Reasoning Goal 1 (SLP)    Barriers (Reasoning Goal 1, SLP)  --  --  70% reasoning for determining simple sequences ( numerical) with MAX cues for left attn  -SL    Progress (Reasoning Goal 1, SLP)  --  60%;70%;with minimal cues (75-90%) inferences from short stories  -SL  70%;with minimal cues (75-90%);independently (over 90% accuracy) category differentiation  -SL    Progress/Outcomes (Reasoning Goal 1, SLP)  --  progress slower than expected;goal ongoing  -SL  goal ongoing  -SL    Comment (Reasoning Goal 1, SLP)  --  --  multiple reps of stimulus due to fatique and poor attn  -SL       Right Hemisphere Function Goal 1 (SLP)    Comment (Right  Hemisphere Function Goal 1, SLP)  MOD cues to scan to left of page with visual cues  for reading numerical sequences;  MAX cues for reading of medication label  -SL  --  --    Row Name 02/02/21 0951 02/01/21 1300 02/01/21 1100       Memory Skills Goal 1 (SLP)    Progress (Memory Skills Goal 1, SLP)  --  70%;independently (over 90% accuracy) 5 word recall- chaining task  -SL  --    Progress/Outcomes (Memory Skills Goal 1, SLP)  --  progress slower than expected;goal ongoing  -SL  --    Comment (Memory Skills Goal 1, SLP)  --  50% for category exclusion task  -SL  --       Organizational Skills Goal 1 (SLP)    Progress (Thought Organization Skills Goal 1, SLP)  --  --  90%;independently (over 90% accuracy) category matrix- initial letter clues  -SL    Progress/Outcomes (Thought Organization Skills Goal 1, SLP)  --  --  goal ongoing  -SL       Reasoning Goal 1 (SLP)    Progress (Reasoning Goal 1, SLP)  80%;independently (over 90% accuracy) simple deductive reasoning activity- elimination task  -SL  80%;independently (over 90% accuracy);other (comment) word deductions  -SL  --    Progress/Outcomes (Reasoning Goal 1, SLP)  --  goal ongoing  -SL  --       Right Hemisphere Function Goal 1 (SLP)    Barriers (Right Hemisphere Function Goal 1, SLP)  crossword puzzle - pt able to place words/letters in appropriate boxes with MOD cues  -SL  --  --    Progress (Right Hemisphere Function Goal 1, SLP)  with maximum cues (25-49%)  -SL  --  with maximum cues (25-49%)  -SL    Progress/Outcomes (Right Hemisphere Function Goal 1, SLP)  goal ongoing  -SL  --  progress slower than expected;goal ongoing  -SL    Comment (Right Hemisphere Function Goal 1, SLP)  MAX cues still required to scan all the way to left side of page to start reading of phrase clues for crossword puzzle- visual cues provided on left; pt did return to left a bit better ths am, but tended to miss the first number ( ie said 4 instead of 14) or the first few words of  "the clues  -SL  --  MAX CUES REQUIRED FOR READING PHRASE CLUES AND THEN PLACING WORDS IN APPROPRIATE SPACES/NUMBERED BOXES UP ABOVE CLUES; despite visual cues consistnetly on left pt returnrd to left side on 1x- 10% ; pt kept stating \"i cant see: and required continued MAX prompts and cues to turn head and look left  -SL      User Key  (r) = Recorded By, (t) = Taken By, (c) = Cosigned By    Initials Name Provider Type    Katelyn Fan MS CCC-SLP Speech and Language Pathologist                      Time Calculation:       Time Calculation- SLP     Row Name 02/03/21 1453 02/03/21 1451          Time Calculation- SLP    SLP Start Time  1300  -SL  0900  -SL     SLP Stop Time  1330  -SL  0930  -SL     SLP Time Calculation (min)  30 min  -SL  30 min  -SL       User Key  (r) = Recorded By, (t) = Taken By, (c) = Cosigned By    Initials Name Provider Type    Katelyn Fan MS CCC-SLP Speech and Language Pathologist            Therapy Charges for Today     Code Description Service Date Service Provider Modifiers Qty    28923428127 HC ST DEV OF COGN SKILLS INITIAL 15 MIN 2/2/2021 Katelyn James, MS CCC-SLP  1    10861409529 HC ST DEV OF COGN SKILLS EACH ADDT'L 15 MIN 2/2/2021 Katelyn James MS CCC-SLP  2    85929881615 HC ST DEV OF COGN SKILLS EACH ADDT'L 15 MIN 2/2/2021 Katelyn James, MS CCC-SLP  2    [id number] HC ST DEV OF COGN SKILLS INITIAL 15 MIN 2/3/2021 Katelyn James MS CCC-SLP  1    70116367030 HC ST DEV OF COGN SKILLS EACH ADDT'L 15 MIN 2/3/2021 Katelyn James, MS CCC-SLP  3                           Katelyn James MS CCC-SLP  2/3/2021    "

## 2021-02-03 NOTE — PROGRESS NOTES
Inpatient Rehabilitation Plan of Care Note    Plan of Care  Care Plan Reviewed - No updates at this time.    Psychosocial    Performed Intervention(s)  Verbalize needs and concerns  Medications as needed/ ordered  Therapeutic environmental set up      Safety    Performed Intervention(s)  Items with in reach and environmental set up  Bed alarm/ Chair alarm  Safety rounds and falls protocols/ precautions      Sphincter Control    Performed Intervention(s)  Proper diet and fluid intake  Medication as ordered  Incontinent care if needed  Monitor intake and output      Body Systems    Performed Intervention(s)  O2 2L/NC @ HS  assess lungs, sats every shift    Signed by: Catherine Berg RN

## 2021-02-04 PROCEDURE — 25010000002 ENOXAPARIN PER 10 MG: Performed by: PHYSICAL MEDICINE & REHABILITATION

## 2021-02-04 PROCEDURE — 97112 NEUROMUSCULAR REEDUCATION: CPT

## 2021-02-04 PROCEDURE — 63710000001 ONDANSETRON ODT 4 MG TABLET DISPERSIBLE: Performed by: INTERNAL MEDICINE

## 2021-02-04 PROCEDURE — 97130 THER IVNTJ EA ADDL 15 MIN: CPT

## 2021-02-04 PROCEDURE — 97129 THER IVNTJ 1ST 15 MIN: CPT

## 2021-02-04 PROCEDURE — 97110 THERAPEUTIC EXERCISES: CPT

## 2021-02-04 PROCEDURE — 97535 SELF CARE MNGMENT TRAINING: CPT

## 2021-02-04 RX ADMIN — ONDANSETRON 4 MG: 4 TABLET, ORALLY DISINTEGRATING ORAL at 12:41

## 2021-02-04 RX ADMIN — DILTIAZEM HYDROCHLORIDE 180 MG: 180 CAPSULE, COATED, EXTENDED RELEASE ORAL at 09:31

## 2021-02-04 RX ADMIN — ASPIRIN 81 MG: 81 TABLET, CHEWABLE ORAL at 09:31

## 2021-02-04 RX ADMIN — OXYBUTYNIN CHLORIDE 5 MG: 5 TABLET ORAL at 17:14

## 2021-02-04 RX ADMIN — ONDANSETRON 4 MG: 4 TABLET, ORALLY DISINTEGRATING ORAL at 06:22

## 2021-02-04 RX ADMIN — DOCUSATE SODIUM 200 MG: 100 CAPSULE, LIQUID FILLED ORAL at 09:31

## 2021-02-04 RX ADMIN — POLYETHYLENE GLYCOL 3350 17 G: 17 POWDER, FOR SOLUTION ORAL at 17:22

## 2021-02-04 RX ADMIN — LOSARTAN POTASSIUM 50 MG: 50 TABLET, FILM COATED ORAL at 09:31

## 2021-02-04 RX ADMIN — DOCUSATE SODIUM 200 MG: 100 CAPSULE, LIQUID FILLED ORAL at 21:14

## 2021-02-04 RX ADMIN — BISACODYL 10 MG: 10 SUPPOSITORY RECTAL at 17:16

## 2021-02-04 RX ADMIN — TRAMADOL HYDROCHLORIDE 50 MG: 50 TABLET ORAL at 21:14

## 2021-02-04 RX ADMIN — LEVOTHYROXINE SODIUM 125 MCG: 0.12 TABLET ORAL at 06:22

## 2021-02-04 RX ADMIN — HYDROCODONE BITARTRATE AND ACETAMINOPHEN 1 TABLET: 5; 325 TABLET ORAL at 09:31

## 2021-02-04 RX ADMIN — LIDOCAINE 1 PATCH: 50 PATCH TOPICAL at 09:31

## 2021-02-04 RX ADMIN — DILTIAZEM HYDROCHLORIDE 180 MG: 180 CAPSULE, COATED, EXTENDED RELEASE ORAL at 21:14

## 2021-02-04 RX ADMIN — FERROUS SULFATE TAB 325 MG (65 MG ELEMENTAL FE) 325 MG: 325 (65 FE) TAB at 09:30

## 2021-02-04 RX ADMIN — ENOXAPARIN SODIUM 40 MG: 40 INJECTION SUBCUTANEOUS at 09:31

## 2021-02-04 RX ADMIN — HYDROCODONE BITARTRATE AND ACETAMINOPHEN 1 TABLET: 5; 325 TABLET ORAL at 17:26

## 2021-02-04 RX ADMIN — ONDANSETRON 4 MG: 4 TABLET, ORALLY DISINTEGRATING ORAL at 17:14

## 2021-02-04 RX ADMIN — MIRTAZAPINE 15 MG: 15 TABLET, FILM COATED ORAL at 21:14

## 2021-02-04 RX ADMIN — PANTOPRAZOLE SODIUM 40 MG: 40 TABLET, DELAYED RELEASE ORAL at 06:22

## 2021-02-04 NOTE — THERAPY TREATMENT NOTE
Inpatient Rehabilitation - Speech Language Pathology Treatment Note    [facility]     Patient Name: Liana Carrero  : 1935  MRN: [id number]    Today's Date: 2021                   Admit Date: 2021       Visit Dx:      ICD-10-CM ICD-9-CM   1. Impaired functional mobility, balance, gait, and endurance  Z74.09 V49.89       Patient Active Problem List   Diagnosis   • Stroke (cerebrum) (CMS/[facility])   • Chest pain   • Hypertensive urgency   • Headache   • History of embolic stroke   • Hypothyroidism   • Hypokalemia   • CAD in native artery   • URTI (acute upper respiratory infection)   • Closed fracture of left hip (CMS/HCC)   • Chronic diastolic (congestive) heart failure (CMS/[facility])   • Coronary artery disease involving autologous vein coronary bypass graft without angina pectoris   • Impaired glucose tolerance   • Moderate malnutrition (CMS/[facility])   • Stroke (CMS/HCC)       Past Medical History:   Diagnosis Date   • Arthritis    • CHF (congestive heart failure) (CMS/[facility])    • Coronary artery disease    • Diabetes mellitus (CMS/[facility])     new DX related to stroke, started on insulin at hospital   • Disease of thyroid gland    • Elevated cholesterol    • GERD (gastroesophageal reflux disease)    • History of transfusion    • Hypertension    • PONV (postoperative nausea and vomiting)    • Sleep apnea    • Spinal headache    • Stroke (CMS/[facility])        Past Surgical History:   Procedure Laterality Date   • APPENDECTOMY     • CARDIAC CATHETERIZATION       x2   • CARDIAC CATHETERIZATION N/A 2019    Procedure: Left Heart Cath;  Surgeon: Bentley Chapin MD;  Location:  [facility] CATH INVASIVE LOC[facility];  Service: Cardiovascular   • CARDIAC CATHETERIZATION N/A 2019    Procedure: Coronary angiography;  Surgeon: Bentley Chapin MD;  Location:  [facility] CATH INVASIVE LOCATION;  Service: Cardiovascular   • CARDIAC CATHETERIZATION N/A 2019    Procedure: Native mammary injection;  Surgeon: Bentley Chapin MD;   "Location: [facility] CATH INVASIVE LOCATION;  Service: Cardiovascular   • CARDIAC CATHETERIZATION N/A 12/26/2019    Procedure: Saphenous Vein Graft;  Surgeon: Bentley Chapin MD;  Location: [facility] CATH INVASIVE LOCATION;  Service: Cardiovascular   • COLON SURGERY     • COLONOSCOPY     • EYE SURGERY      lens implants and cataract surgery   • FEMUR IM NAILING/RODDING Left 3/26/2020    Procedure: LT.HIP NAILING/RODDING;  Surgeon: Carlos Khan II, MD;  Location: [facility] MAIN OR;  Service: Orthopedics;  Laterality: Left;   • FRACTURE SURGERY Left 2020   • HYSTERECTOMY     • VASCULAR SURGERY      varicous veins     Patient was not wearing a face mask during this therapy encounter. Therapist used appropriate personal protective equipment including mask, eye protection and gloves.  Mask used was N95/duckbill. Appropriate PPE was worn during the entire therapy session. Hand hygiene was completed before and after therapy session. Patient is not in enhanced droplet precautions.            SLP EVALUATION (last 72 hours)      SLP SLC Evaluation     Row Name 02/03/21 1330 02/02/21 1330 02/02/21 0951             Communication Assessment/Intervention    Document Type  therapy note (daily note)  -SL  therapy note (daily note)  -SL  therapy note (daily note)  -SL      Subjective Information  complains of;fatigue second session  -SL  (S) complains of;fatigue  -SL  no complaints  -SL      Patient Observations  lethargic  -SL  lethargic  -SL  alert;cooperative  -SL      Patient/Family/Caregiver Comments/Observations  pt up in w/c for am session, but did poorly for pm session- back in bed c/o being \"very tired\" - difficult to keep focused on tx; reduced participation in second session every day as pt always fatiqued  -SL  IN BED- despite sitting schedule; needed cues to participate and stay awake  -SL  pt seen sitting in w/c  -SL      Patient Effort  fair  -SL  fair  -SL  adequate  -SL      Symptoms Noted During/After " Treatment  fatigue  -SL  fatigue  -SL  none  -SL        User Key  (r) = Recorded By, (t) = Taken By, (c) = Cosigned By    Initials Name Effective Dates    SL ErikaKatelyn alex, MS CCC-SLP 06/08/18 -              EDUCATION    The patient has been educated in the following areas:       Cognitive Impairment.      SLP Recommendation and Plan                                                  SLP GOALS     Row Name 02/04/21 0900 02/03/21 1450 02/03/21 1400       Memory Skills Goal 1 (SLP)    Barriers (Memory Skills Goal 1, SLP)  --  --  60% for short paragraph recall ( presented verbally)  -SL    Progress (Memory Skills Goal 1, SLP)  --  --  50%;independently (over 90% accuracy) mental manip task- 3 words - alphabetical order  -SL    Progress/Outcomes (Memory Skills Goal 1, SLP)  --  --  progress slower than expected  -    Comment (Memory Skills Goal 1, SLP)  --  70% for recall of medicine label information   -SL  neg impacted by poor attn, fatique  -       Organizational Skills Goal 1 (SLP)    Improve Thought Organization Through Goal 1 (SLP)  completing a divergent naming task;completing a convergent naming task;generating a list of items in a category;naming by category exclusion;naming similarities and differences;drawing conclusions;80%;with minimal cues (75-90%) pt reports 7/10 back pain  -SH  --  --    Time Frame (Thought Organization Skills Goal 1, SLP)  1 week  -SH  --  --    Progress/Outcomes (Thought Organization Skills Goal 1, SLP)  goal ongoing  -SH  --  --    Comment (Thought Organization Skills Goal 1, SLP)  Name category member when provided initial letter-70%  -SH  --  --       Reasoning Goal 1 (SLP)    Improve Reasoning Through Goal 1 (SLP)  --  -SH  --  --    Time Frame (Reasoning Goal 1, SLP)  --  -SH  --  --    Progress (Reasoning Goal 1, SLP)  --  --  60%;70%;with minimal cues (75-90%) inferences from short stories  -SL    Progress/Outcomes (Reasoning Goal 1, SLP)  --  --  progress slower than  expected;goal ongoing  -SL    Comment (Reasoning Goal 1, SLP)  --  -SH  --  --       Right Hemisphere Function Goal 1 (SLP)    Improve Right Hemisphere Function Through Goal 1 (SLP)  demonstrate awareness of communication partner in left visual field;complete visuo-spatial activities (visual closure, trail making, mazes;complete visuo-perceptual activities (L/R discrimination, spatial concepts);use compensatory strategies for left neglect;70%;with moderate cues (50-74%)  -  --  --    Time Frame (Right Hemisphere Function Goal 1, SLP)  1 week  -  --  --    Comment (Right Hemisphere Function Goal 1, SLP)  MOD-MAX cues to complete word search in AM session, min cues in PM session-75% acc 6/10 back pain-chronic per pt  -  MOD cues to scan to left of page with visual cues  for reading numerical sequences;  MAX cues for reading of medication label  -SL  --    Row Name 02/02/21 1330 02/02/21 0951          Memory Skills Goal 1 (SLP)    Progress (Memory Skills Goal 1, SLP)  70%;with minimal cues (75-90%) immed recall- 5 words- chaining strategy  -SL  --     Progress/Outcomes (Memory Skills Goal 1, SLP)  goal ongoing  -SL  --        Organizational Skills Goal 1 (SLP)    Progress/Outcomes (Thought Organization Skills Goal 1, SLP)  goal ongoing  -SL  --     Comment (Thought Organization Skills Goal 1, SLP)  divergent - abstract naming of 5 items per category- 66% indep, 100% with MIN cues  -SL  --        Reasoning Goal 1 (SLP)    Barriers (Reasoning Goal 1, SLP)  70% reasoning for determining simple sequences ( numerical) with MAX cues for left attn  -SL  --     Progress (Reasoning Goal 1, SLP)  70%;with minimal cues (75-90%);independently (over 90% accuracy) category differentiation  -SL  80%;independently (over 90% accuracy) simple deductive reasoning activity- elimination task  -SL     Progress/Outcomes (Reasoning Goal 1, SLP)  goal ongoing  -SL  --     Comment (Reasoning Goal 1, SLP)  multiple reps of stimulus due to  fatique and poor attn  -SL  --        Right Hemisphere Function Goal 1 (SLP)    Barriers (Right Hemisphere Function Goal 1, SLP)  --  crossword puzzle - pt able to place words/letters in appropriate boxes with MOD cues  -SL     Progress (Right Hemisphere Function Goal 1, SLP)  --  with maximum cues (25-49%)  -SL     Progress/Outcomes (Right Hemisphere Function Goal 1, SLP)  --  goal ongoing  -SL     Comment (Right Hemisphere Function Goal 1, SLP)  --  MAX cues still required to scan all the way to left side of page to start reading of phrase clues for crossword puzzle- visual cues provided on left; pt did return to left a bit better ths am, but tended to miss the first number ( ie said 4 instead of 14) or the first few words of the clues  -       User Key  (r) = Recorded By, (t) = Taken By, (c) = Cosigned By    Initials Name Provider Type     Katelyn James MS CCC-SLP Speech and Language Pathologist     Blanca Marquez MS CCC-SLP Speech and Language Pathologist                      Time Calculation:       Time Calculation- SLP     Row Name 02/04/21 0947 02/04/21 0910          Time Calculation- [facility]    SLP Start Time  0900  -  1300  -     SLP Stop Time  0930  -  1330  -SH     SLP Time Calculation (min)  30 min  -  30 min  -     SLP Received On  02/04/21  -  02/04/21  -       User Key  (r) = Recorded By, (t) = Taken By, (c) = Cosigned By    Initials Name Provider Type     Blanca Marquez MS CCC-SLP Speech and Language Pathologist            Therapy Charges for Today     Code Description Service Date Service Provider Modifiers Qty    18059546036  ST DEV OF COGN SKILLS INITIAL 15 MIN 2/4/2021 Blanca Marquez MS CCC-SLP  1    00845479377 HC ST DEV OF COGN SKILLS EACH ADDT'L 15 MIN 2/4/2021 Blanca Marquez MS CCC-SLP  3                           Blanca Marquez MS CCC-SLP  2/4/2021

## 2021-02-04 NOTE — PROGRESS NOTES
Inpatient Rehabilitation Plan of Care Note    Plan of Care  Care Plan Reviewed - Updates as Follows    Body Systems    [RN] Respiratory(Active)  Current Status(02/04/2021): O2 2L QHS  Weekly Goal(02/09/2021): Same as discharge  Discharge Goal: Least restrictive amount of 02        Psychosocial    [RN] Coping/Adjustment(Active)  Current Status(02/04/2021): Patient has supportive family, but at risk of  ineffective coping regarding current situation.  Weekly Goal(02/09/2021): Allow patient the opportunity to discuss concerns  regarding new situation.  Discharge Goal: Patient will have healthy coping mechanisms at time of discharge        Safety    [RN] Potential for Injury(Active)  Current Status(02/04/2021): Patient at risk of injury related to previous falls.  flaccid on left side. dependent with transfers of 2.  Weekly Goal(02/09/2021): Paitent will use call light appropriately while in  Rehab.  Discharge Goal: Patient will continue to use call light while in Rehab  environment.        Sphincter Control    [RN] Bladder Management(Active)  Current Status(02/04/2021): Patient is continent and incontinent, using bedpan,  PVRs, requiring I/C at times  Weekly Goal(02/09/2021): Patient will be 50% continent  Discharge Goal: Patient will be 100% continent    [RN] Bowel Management(Active)  Current Status(02/04/2021): Patient continent and incontinent  Weekly Goal(02/09/2021): continent 50% of the time  Discharge Goal: Patient will continent 100% of the time    Signed by: Gloria Baeza RN

## 2021-02-04 NOTE — THERAPY TREATMENT NOTE
Inpatient Rehabilitation - Physical Therapy Treatment Note       [facility]     Patient Name: Liana Carrero  : 1935  MRN: [id number]    Today's Date: 2021                    Admit Date: 2021      Visit Dx:     ICD-10-CM ICD-9-CM   1. Impaired functional mobility, balance, gait, and endurance  Z74.09 V49.89       Patient Active Problem List   Diagnosis   • Stroke (cerebrum) (CMS/[facility])   • Chest pain   • Hypertensive urgency   • Headache   • History of embolic stroke   • Hypothyroidism   • Hypokalemia   • CAD in native artery   • URTI (acute upper respiratory infection)   • Closed fracture of left hip (CMS/[facility])   • Chronic diastolic (congestive) heart failure (CMS/[facility])   • Coronary artery disease involving autologous vein coronary bypass graft without angina pectoris   • Impaired glucose tolerance   • Moderate malnutrition (CMS/[facility])   • Stroke (CMS/[facility])       Past Medical History:   Diagnosis Date   • Arthritis    • CHF (congestive heart failure) (CMS/[facility])    • Coronary artery disease    • Diabetes mellitus (CMS/[facility])     new DX related to stroke, started on insulin at hospital   • Disease of thyroid gland    • Elevated cholesterol    • GERD (gastroesophageal reflux disease)    • History of transfusion    • Hypertension    • PONV (postoperative nausea and vomiting)    • Sleep apnea    • Spinal headache    • Stroke (CMS/[facility])        Past Surgical History:   Procedure Laterality Date   • APPENDECTOMY     • CARDIAC CATHETERIZATION       x2   • CARDIAC CATHETERIZATION N/A 2019    Procedure: Left Heart Cath;  Surgeon: Bentley Chapin MD;  Location:  [facility];  Service: Cardiovascular   • CARDIAC CATHETERIZATION N/A 2019    Procedure: Coronary angiography;  Surgeon: Bentley Chapin MD;  Location:  [facility];  Service: Cardiovascular   • CARDIAC CATHETERIZATION N/A 2019    Procedure: Native mammary injection;  Surgeon: Bentley Chapin MD;   Location:  [facility];  Service: Cardiovascular   • CARDIAC CATHETERIZATION N/A 12/26/2019    Procedure: Saphenous Vein Graft;  Surgeon: Bentley Chapin MD;  Location:  [facility];  Service: Cardiovascular   • COLON SURGERY     • COLONOSCOPY     • EYE SURGERY      lens implants and cataract surgery   • FEMUR IM NAILING/RODDING Left 3/26/2020    Procedure: LT.HIP NAILING/RODDING;  Surgeon: Carlos Khan II, MD;  Location: [facility] MAIN OR;  Service: Orthopedics;  Laterality: Left;   • FRACTURE SURGERY Left 2020   • HYSTERECTOMY     • VASCULAR SURGERY      varicous veins          PT ASSESSMENT (last 12 hours)      IRF PT Evaluation and Treatment     Row Name 02/04/21 1219          PT Time and Intention    Document Type  daily treatment  -MS     Mode of Treatment  physical therapy  -MS     Patient/Family/Caregiver Comments/Observations  AM: sitting up in wheelchair, reporting LBP this AM (RN present and administering pain medicine);  PM: supine in bed on bed pan  -MS     Total Minutes, Physical Therapy  60  -MS     Row Name 02/04/21 1219          General Information    Existing Precautions/Restrictions  fall  -MS     Limitations/Impairments  safety/cognitive  -MS     Row Name 02/04/21 1219          Pain Scale: Numbers Pre/Post-Treatment    Pretreatment Pain Rating  4/10  -MS     Posttreatment Pain Rating  4/10  -MS     Pain Location - Side  Left  -MS     Pain Location - Orientation  lower  -MS     Pain Location  hand  -MS     Pre/Posttreatment Pain Comment  Also LBP reported during standing trials and limiting.  -MS     Row Name 02/04/21 1219          Bed Mobility    Supine-Sit [place] (Bed Mobility)  maximum assist (25% patient effort);verbal cues;nonverbal cues (demo/gesture)  -MS     Assistive Device (Bed Mobility)  bed rails;head of bed elevated  -MS     Row Name 02/04/21 1219          Transfers    Bed-Chair [place] (Transfers)  maximum assist (25% patient  effort);verbal cues;nonverbal cues (demo/gesture)  -MS     Assistive Device (Bed-Chair Transfers)  walker, front-wheeled  -MS     Sit-Stand [place] (Transfers)  maximum assist (25% patient effort);2 person assist;verbal cues;nonverbal cues (demo/gesture)  -MS     Stand-Sit [place] (Transfers)  maximum assist (25% patient effort);2 person assist;verbal cues;nonverbal cues (demo/gesture)  -MS     Row Name 02/04/21 1219          Sit-Stand Transfer    Assistive Device (Sit-Stand Transfers)  wheelchair at jon bars  -MS     Row Name 02/04/21 1219          Stand-Sit Transfer    Assistive Device (Stand-Sit Transfers)  wheelchair at jon bars  -MS     Row Name 02/04/21 1219          Squat Pivot Transfer    Squat Pivot [place] (Transfers)  maximum assist (25% patient effort);verbal cues;nonverbal cues (demo/gesture)  -MS     Assistive Device (Squat Pivot Transfers)  wheelchair  -MS     Row Name 02/04/21 1219          Balance    Static Sitting Balance  mild impairment;sitting, edge of mat  -MS     Dynamic Sitting Balance  mild impairment;sitting, edge of mat  -MS     Balance Interventions  sitting;static;dynamic;moderate challenge;core stability exercise;UE activity with balance activity;combined head and eye movements balloon toss, beachball toss  -MS     Comment, Balance  Balance reactions improved significantly today- much more interactive in PM(?)  -MS     Row Name 02/04/21 1219          Hip (Therapeutic Exercise)    Hip Strengthening (Therapeutic Exercise)  sitting;marching while seated 15 reps x 2 sets  -MS     Row Name 02/04/21 1219          Knee (Therapeutic Exercise)    Knee Strengthening (Therapeutic Exercise)  sitting;LAQ (long arc quad) 15 reps x 2 sets  -MS     Row Name 02/04/21 1219          Positioning and Restraints    Pre-Treatment Position  sitting in chair/recliner  -MS     In Wheelchair  sitting;exit alarm on;call light within reach;encouraged to call for assist PM session; with OT in AM   -MS       User Key  (r) = Recorded By, (t) = Taken By, (c) = Cosigned By    Initials Name Provider Type    MS KabaNati, PT Physical Therapist        Wound 01/07/21 Left upper chest Incision (Active)   Dressing Appearance open to air 02/03/21 2042   Closure Liquid skin adhesive 02/03/21 2042   Base clean;dry 02/03/21 2042   Periwound intact 02/03/21 2042   Drainage Amount none 02/04/21 0930   Dressing Care open to air 02/04/21 0930   Periwound Care dry periwound area maintained 02/03/21 2042     Physical Therapy Education                 Title: PT OT SLP Therapies (Done)     Topic: Physical Therapy (Done)     Point: Mobility training (Done)     Learning Progress Summary           Patient Acceptance, E,TB, VU,NR by MS at 2/4/2021 1518    Acceptance, E,TB, VU,NR by MS at 2/3/2021 1151    Acceptance, E,TB, VU,NR by MS at 2/2/2021 1210    Acceptance, E,TB, NR by MS at 2/1/2021 1145    Acceptance, E, NR by  at 1/30/2021 1336    Acceptance, E,TB, VU,NR by MS at 1/29/2021 1535    Acceptance, E, NR by LB at 1/28/2021 1032    Acceptance, E,TB, VU,NR by MS at 1/27/2021 1447    Acceptance, E,TB, VU,NR by MS at 1/26/2021 0959    Acceptance, E,TB, VU,NR by MS at 1/25/2021 1316    Acceptance, E,TB, VU,NR by MS at 1/22/2021 0918    Acceptance, E,TB, VU by MS at 1/20/2021 1544    Acceptance, E,TB, VU,NR by MS at 1/19/2021 1401    Acceptance, E,TB, NR,NL by MS at 1/18/2021 1432    Acceptance, E,TB, VU,NR by IVAN at 1/16/2021 1444    Acceptance, E,TB, NR,NL by MS at 1/15/2021 1418    Acceptance, E,TB, NR by MS at 1/14/2021 1223    Acceptance, E,TB, NR by LB1 at 1/13/2021 1030    Comment: sitting balance    Acceptance, E,TB, NR by MS at 1/12/2021 1312    Acceptance, E,TB, VU,NR by MS at 1/11/2021 1140                   Point: Home exercise program (Done)     Learning Progress Summary           Patient Acceptance, E,TB, VU,NR by MS at 2/4/2021 1518    Acceptance, E,TB, VU,NR by MS at 2/3/2021 1151    Acceptance, E,TB, VU,NR by  MS at 2/2/2021 1210    Acceptance, E,TB, NR by MS at 2/1/2021 1145    Acceptance, E, NR by LH at 1/30/2021 1336    Acceptance, E,TB, VU,NR by MS at 1/29/2021 1535    Acceptance, E,TB, VU,NR by MS at 1/27/2021 1447    Acceptance, E,TB, VU,NR by MS at 1/26/2021 0959    Acceptance, E,TB, VU,NR by MS at 1/25/2021 1316    Acceptance, E,TB, VU,NR by MS at 1/22/2021 0918    Acceptance, E,TB, VU by MS at 1/20/2021 1544    Acceptance, E,TB, VU,NR by MS at 1/19/2021 1401    Acceptance, E,TB, NR,NL by MS at 1/18/2021 1432    Acceptance, E,TB, NR,NL by MS at 1/15/2021 1418    Acceptance, E,TB, NR by MS at 1/14/2021 1223                   Point: Body mechanics (Done)     Learning Progress Summary           Patient Acceptance, E,TB, VU,NR by MS at 2/4/2021 1518    Acceptance, E,TB, VU,NR by MS at 2/3/2021 1151    Acceptance, E,TB, VU,NR by MS at 2/2/2021 1210    Acceptance, E,TB, NR by MS at 2/1/2021 1145    Acceptance, E, NR by  at 1/30/2021 1336    Acceptance, E,TB, VU,NR by MS at 1/29/2021 1535    Acceptance, E,TB, VU,NR by MS at 1/27/2021 1447    Acceptance, E,TB, VU,NR by MS at 1/26/2021 0959    Acceptance, E,TB, VU,NR by MS at 1/25/2021 1316    Acceptance, E,TB, VU,NR by MS at 1/22/2021 0918    Acceptance, E,TB, VU by MS at 1/20/2021 1544    Acceptance, E,TB, VU,NR by MS at 1/19/2021 1401    Acceptance, E,TB, NR,NL by MS at 1/15/2021 1418    Acceptance, E,TB, NR by MS at 1/14/2021 1223    Acceptance, E,TB, NR by MS at 1/12/2021 1312    Acceptance, E,TB, VU,NR by MS at 1/11/2021 1140                   Point: Precautions (Done)     Learning Progress Summary           Patient Acceptance, E,TB, VU,NR by MS at 2/4/2021 1518    Acceptance, E,TB, VU,NR by MS at 2/3/2021 1151    Acceptance, E,TB, VU,NR by MS at 2/2/2021 1210    Acceptance, E,TB, NR by MS at 2/1/2021 1145    Acceptance, E, NR by LH at 1/30/2021 1336    Acceptance, E,TB, VU,NR by MS at 1/29/2021 1535    Acceptance, E,TB, VU,NR by MS at 1/27/2021 1447    Acceptance,  E,TB, VU,NR by MS at 1/26/2021 0959    Acceptance, E,TB, VU,NR by MS at 1/25/2021 1316    Acceptance, E,TB, VU,NR by MS at 1/22/2021 0918    Acceptance, E, VU by MD at 1/21/2021 0926    Acceptance, E,TB, VU by MS at 1/20/2021 1544    Acceptance, E,TB, VU,NR by MS at 1/19/2021 1401    Acceptance, E,TB, NR,NL by MS at 1/15/2021 1418    Acceptance, E,TB, NR by MS at 1/14/2021 1223    Acceptance, E,TB, NR by MS at 1/12/2021 1312    Acceptance, E,TB, VU,NR by MS at 1/11/2021 1140    Acceptance, E, VU by MD at 1/9/2021 0933                               User Key     Initials Effective Dates Name Provider Type Discipline     06/08/18 -  Ela Pritchett, PT Physical Therapist PT    LB 04/03/18 -  Jazmin Schuster, PT Physical Therapist PT     04/03/18 -  Elba Myrick, PT Physical Therapist PT     03/07/18 -  Elba Grigsby, PTA Physical Therapy Assistant PT    MD 04/03/18 -  Lisandra Lowery, PT Physical Therapist PT    MS 03/04/19 -  Nati Kaba, PT Physical Therapist PT                PT Recommendation and Plan    Planned Therapy Interventions (PT): balance training, bed mobility training, gait training, home exercise program, postural re-education, patient/family education, neuromuscular re-education, strengthening, transfer training, wheelchair management/propulsion training, ROM (range of motion)  Frequency of Treatment (PT): 60 minutes per session  Anticipated Equipment Needs at Discharge (PT Eval): wheelchair(noted patient already has wc(?) will get details)     Patient was intermittently wearing a face mask during this therapy encounter. Therapist used appropriate personal protective equipment including eye protection, mask, and gloves.  Mask used was standard procedure mask. Appropriate PPE was worn during the entire therapy session. Hand hygiene was completed before and after therapy session. Patient is not in enhanced droplet precautions.        Time Calculation:     PT Charges     Row Name 02/04/21  1503 02/04/21 1218          Time Calculation    Start Time  1330  -MS  0930  -MS     Stop Time  1400  -MS  1000  -MS     Time Calculation (min)  30 min  -MS  30 min  -MS     PT Received On  --  02/04/21  -MS     PT - Next Appointment  --  02/05/21  -MS       User Key  (r) = Recorded By, (t) = Taken By, (c) = Cosigned By    Initials Name Provider Type    Nati Quinteros, PT Physical Therapist          Therapy Charges for Today     Code Description Service Date Service Provider Modifiers Qty    [id number]  PT THER PROC EA 15 MIN 2/3/2021 Nati Kaba, PT GP 4    [id number]  PT THER SUPP EA 15 MIN 2/3/2021 Nati Kaba, PT GP 4    [id number]  PT THER PROC EA 15 MIN 2/4/2021 Nati Kaba, PT GP 4    [id number]  PT THER SUPP EA 15 MIN 2/4/2021 Nati Kaba, PT GP 4                   Nati Kaba, PT  2/4/2021

## 2021-02-04 NOTE — PROGRESS NOTES
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  Heart control improving.  Tolerating therapies.     History taken from: patient    Objective     Vital Signs  Temp:  [97.5 °F (36.4 °C)-98 °F (36.7 °C)] 97.8 °F (36.6 °C)  Heart Rate:  [82-98] 98  Resp:  [18] 18  BP: (113-133)/(73-81) 126/78    Physical Exam  General: calm, in NAD  Neck: trachea midline  Cardio: well perfused distal extremities, regular radial pulse  Resp: normal WOB on RA, nonlabored.    Abdomen: NT/ND  Extremities:    No edema  No crepitus with range of motion of the left hip.  Neuro: dense left hemiparesis, flaccid tone  Left homonymous hemianopsia    Left inattention.  A&Ox4. 0/5 strength LUE/LLE.   Takes resistance on the right side.    Results from last 7 days   Lab Units 02/01/21  0737 01/29/21  0709   WBC 10*3/mm3 4.58 4.98   HEMOGLOBIN g/dL 10.9* 10.4*   HEMATOCRIT % 33.8* 33.5*   PLATELETS 10*3/mm3 164 197     Results from last 7 days   Lab Units 02/01/21  0737 01/29/21  0709   SODIUM mmol/L 138 138   POTASSIUM mmol/L 4.3 4.5   CHLORIDE mmol/L 100 101   CO2 mmol/L 29.6* 30.3*   BUN mg/dL 16 13   CREATININE mg/dL 0.60 0.66   CALCIUM mg/dL 9.2 9.3   GLUCOSE mg/dL 112* 108*             Ref. Range 1/8/2021 06:01   TSH Baseline Latest Ref Range: 0.270 - 4.200 uIU/mL 2.830   Vitamin B-12 Latest Ref Range: 211 - 946 pg/mL 381   25 Hydroxy, Vitamin D Latest Ref Range: 30.0 - 100.0 ng/ml 20.6 (L)     CT of the abdomen-January 16  CT Abdomen Pelvis With Contrast   Final Result       A cystic focus at the pancreatic head,  likely corresponds to the   ultrasound finding of concern, could be further evaluated with   endoscopic ultrasound as indicated, interval follow-up also recommended   to exclude any possibility of a cystic  neoplasm. The pancreas is   thinned. The main pancreatic duct shows a mildly prominent caliber, 4   mm.          US GALLBLADDER-  1/14/2021   IMPRESSION:  Small to moderate amount of gallbladder sludge with a few  possible tiny nonshadowing gallstones.  2. No wall thickening or pericholecystic fluid is seen. No sonographic  Garcia's sign is seen.  3. Fatty infiltration of the liver.  4. 1.2 cm hypoechoic pancreatic head lesion. No pancreatic head lesions  are seen on the CT of the abdomen and pelvis from [facility]  dated 12/11/2019.  5. CT of the abdomen with contrast using pancreatic protocol is  recommended for further assessment.    EXAMINATION: LIMITED LEFT BREAST SONOGRAPHY-January 20, 2021     HISTORY: 85-year-old female status post recent placement of a loop  recorder on the left side of the chest with an area of palpable concern  in the left breast.     FINDINGS: Targeted sonographic evaluation of the area of palpable  concern in the left breast which corresponds to the upper inner quadrant  was performed. No sonographic abnormality is appreciated.     IMPRESSION:  Negative targeted left breast sonography. Clinical followup  is suggested.     BI-RADS Category 1: Negative  Results Review:     I reviewed the patient's new clinical results.    Medication Review: Complete  Scheduled Meds:Alirocumab, 75 mg, Subcutaneous, Q14 Days  aspirin, 81 mg, Oral, Daily  bisacodyl, 10 mg, Rectal, Q24H  [START ON 3/6/2021] cholecalciferol, 1,000 Units, Oral, Daily  dilTIAZem CD, 180 mg, Oral, BID  docusate sodium, 200 mg, Oral, BID  enoxaparin, 40 mg, Subcutaneous, Daily  ferrous sulfate, 325 mg, Oral, Daily With Breakfast  levothyroxine, 125 mcg, Oral, Q AM  lidocaine, 1 patch, Transdermal, Q24H  losartan, 50 mg, Oral, Daily  mirtazapine, 15 mg, Oral, Nightly  ondansetron ODT, 4 mg, Oral, TID AC  oxybutynin, 5 mg, Oral, Daily With Dinner  pantoprazole, 40 mg, Oral, QAM  vitamin D, 50,000 Units, Oral, Q7  Days      Continuous Infusions:   PRN Meds:.HYDROcodone-acetaminophen  •  nitroglycerin  •  polyethylene glycol  •  traMADol      Assessment/Plan       Stroke (CMS/[facility])    Debility and functional deficits  -PT/OT/SLP     Acute CVA  -[place] 3 MRI at [facility] revealed acute infarct in the left temporal-occipital region, no TPA or MT  -Jan 4 had worsening of her NIHSS, MRI revealed a new right superior frontal gyrus acute infarct. Again outside of the window of TPA, not a candidate for mechanical thrombectomy  -continue ASA and Praluent as she is intolerant of statins  -Echo was unremarkable  -Holter normal  -Loop recorder placed  -A1c 5.0  -TSH and B12 level unremarkable    Loss of appetite  Weight loss  Nausea  -symptoms for approximately 8 months, has lost ~40lbs over that time per patient  -started Remeron 7.5 qHS  -GI consulted, appreciate recs. Started scheduled Zofran with meals on 1/13, continue Remeron.   Jan 14 - GI to check RUQ US, increase bowel regimen, continue Remeron and Zofran as above  January 15 -1.2 cm hypoechoic pancreatic head lesion on ultrasound.  To get CT of the abdomen tomorrow.  January 16 - CT scan showed pancreatic cyst.  Not felt to be the etiology of her nausea.  Follow-up as an outpatient.  January 17- Remeron increased to 15 mg nightly     Left breast nodule  -2x1cm nodule left breast at 10 o'clock position, borders feel smooth, nodule is not freely mobile, no overlying skin changes. Away from the site of recent left loop recorder placement Jan 5, 2021  -Last mammogram one year ago. May not tolerate mammogram with recent loop recorder placement. Patient discussed with Dr. Pride, left breast ultrasound ordered  -Left breast ultrasound negative at the area of interest in the left upper inner quadrant    HLD  -continue ASA and Praluent as she is intolerant of statins     HTN  -continue cardizem, losartan  -normotensive goal  -monitor    Vit D insufficiency  -level 20 on admission  -started  vitamin D replacement     Hypothyroidism  -continue levothyroxine    CAD  -continue ASA and Praluent   January 27-She had some sharp chest pain that was brief last evening at about 1 AM, resolved on its own.  Had nitroglycerin ordered but did not receive it.  She reports at home she did occasionally have chest pain and would take him to glycerin at home, stating that sometimes she would take it as she just felt nervous and worried with the chest pain.  She did feel some palpitations after the chest pain last evening.  We discussed seeing if anything was uploaded on her loop recorder to her cardiologist office and we will check those results.    Obstructive sleep apnea-January 27-she has been on 2 L nasal cannula oxygen at nighttime with sats 100%.  She did not have her CPAP brought in from home     Prerenal OBED, resolved  -secondary to decreased PO intake  -resolved prior to discharge from [facility]  -monitor electrolytes     Iron deficiency anemia  -received 2 doses of IV iron as well as 1UpRBCs on 1/4 at [facility]  -Hgb stable ~8 prior to discharge from [facility], 9.2 on admission to [facility]  -continue oral iron, monitor     Left hip pain  -xrays at [facility] unremarkable  Jan 13 - Fatigue continues to be an issue. She takes Norco 7.5 mg about 4 times a day with history of left hip pain-history of left femur fracture open reduction internal fixation, x-ray at the outside hospital on January 6 showed hardware in place.  January 14-She does have fatigue during the day.  Will try a lower dose of Norco 5 mg rather than 7.5 mg.  Is on Remeron 7.5 g at nighttime but that was only recently added.  January 15-she took Norco 5 mg twice a day at home.  More alert with better trunk control on lower dose of Norco today.  January 21-continues with left hip pain. Taking Norco 5 mg about 4 times a day. Add Lidoderm patch.  January 25-does not feel Lidoderm patch that helpful.  Pain was better with addition of tramadol over the weekend.  Hip range of motion  unremarkable with no crepitus  January 26-left hip xray did not show displacement of surgical hardware, fracture, erosion or dislocation  January 26-she feels tramadol works better for her than hydrocodone.  Anticipate going home on tramadol and discontinue hydrocodone.  She is presently taking tramadol twice a day.  Appears to be tolerating without any side effects.     Fluids/Electrolytes/Nutrition   -cardiac diet  -admission labs unremarkable     DVT ppx  -subQ Lovenox    /UTI  -on Ditropan 5 mg twice a day.  January 11 - voids with acceptable bladder scan postvoid residuals. Urinary tract infection with E. coli-sensitive to Macrobid  January 18 - required intermittent straight catheter 450 cc.  January 19 - incontinent and IC, PVRs 99-391cc  January 22 - has urge to urinate at times but cannot void, will decrease oxybutynin from bid to with dinner only. She is voiding with incontinence with postvoid residual 230-290 cc.  Discussed trial of decreasing Ditropan to 5 mg q. supper first from twice a day before doing a trial of Flomax.  January 23-urinary tract infection-E. coli-on cefdinir starting January 23.  Sensitive to cephalosporins  January 27-voids with postvoid residual  cc.  We will continue Ditropan at lower dose of 5 mg with supper      TEAM CONF - JAN 12 - BED MAX-DEP. TRANSFERS MAX 2 TRANSFER BOARD. NON-AMBULATORY. WHEELCHAIR 50 FEET MIN ASSIST. TOILET TRANSFERS DEP X 2. IMPAIRED VISION. BATH MAX. LBD DEP. UBD MAX. GROOMING MOD ASSIST. COGNITION - MILD TO MODERATE DEFICITS. 2L O2 AT HS. ON MACROBID FOR UTI. FATIGUES. HISTORY OF POOR APPETITE FOR MONTHS, WEIGHT LOSS 30-40 # OVER SEVERAL MONTHS AT HOME, HISTORY OF REPORTED NAUSEA AT HOME. WILL CONSULT GI.   TINY - 4 WEEKS.     TEAM CONF - JAN 19 - BED MAX 2 .  TRANSFERS MAX 2 TRANSFER BOARD. WHEELCHAIR MIN A DUE TO VISIN DEFICITS. HAS SAT UNSUPPORTED FOR 30 SECS BEFORE STARTS TO LEAN. TOILET TRANSFERS DEP X 2. BATH UB MAX, LB DEP X 2. LBD DEP X  2. UBD MAX. GROOMING MOD. LEFT SHOULDER SUBLUXATION - ADD LEUKOTAPING. VOIDS WITH ELEVATED PVRS WITH OCCASIONAL ISC. CONTINENT INCONTINENT.  LEFT INATTENTION, LEFT VISUAL FIELD CUT. IMPAIRED ATTENTION, EXECUTIVE FUNCTION, REASONING. WORKING MEMORY IMPROVING.  BNE (Active)  Att'n. - WNL  Exec. Fx. - Mild Imp.  Rsng/Jgmnt - Mildly Imp. for abstract reaosning  Arith - Min Imp.  Visuospatial Skills - Mildly Imp.  Visual Mem. - Mildly Imp.  Verbal Mem. - Mildly Imp.  Emot - Pt endorsed dep related to current status  DEPRESSION - SUPPORTIVE THERAPY. ON REMERON.   ELOS -   3 WEEKS    TEAM CONF - JAN 26 -  BED MAX 2. TRANSFERS MAX 2. WHEELCHAIR 50 FEET MIN ASSIST, IMPACTED BY VISUAL DEFICITS. LEFT HIP PAIN INCREASED, WILL REPEAT X-RAY. AT OUTSIDE HOSPITAL X-RAY ON JAN 6 - [ORIF hardware in the proximal femur is stable from 5/6/2020. No hardware loosening or perihardware fracture is detected. The hip joint space is mildly narrowed as before. IMPRESSION: Stable ORIF hardware; no acute abnormality.]  TOILET TRANSFERS MAX 2 AS PUSHES. BATH UBB MIN, LBB MAX 2. UBD MAX ASSIST. LBD DEPENDENT. GROOMING MIN ASSIST. ATTENDS TO LEFT A LITTLE BETTER. TOILETING DEP X 2. LEFT VISUAL FIELD DEFICITS. EXECUTIVE FUNCTION - IMPAIRED ATTENTION. MEMORY MIN-MOD DEFICITS. MOD-MAX VISUAL CUES TO LEFT SIDE. VOIDS. ON DECREASED DITROPAN TO 5 MG ONCE A DAY DUE TO ELEVATED PVRS. CONTINENT AT TIMES WITH BLADDER. BOWEL CONTINENT. PAIN MANAGEMENT - TRAMADOL ADDED. WITH REC THERAPY, CUES TO ATTEND TO LEFT.  ELOS -  2-3 WEEKS    TEAM CONF- BED MAX 2. TRANSFERS MAX SQUAT PIVOT, USES LIFT WITH NURSING. SITTING BALANCE BETTER. LEFT INATTENTION. WHEELCHAIR MIN ASSIST WITH VISUAL CUES, DUE TO LEFT VISUAL INATTENTION/LEFT VISUAL FIELD CUT. STANDING AT PARALLEL BARS WITH BLOCKING LEFT KNEE. TOILET TRANSFERS MAX 2 TO DROPARM. BATH MIN UB AND MAX X 2 LB. DRESSING MOD-MAX UB AND DEP X 2 LB. GROOMING MIN-SBA. TOILETING COMES UP TO STAND A LITTLE BETTER, DEP X 2.    IMPAIRED ATTENTION. DISTRACTIBLE. FATIGUES QUICKLY WITH COGNITIVE TASKS. MIN-MOD MEMORY DEFICITS. MOD-MAX VERBAL AND VISUAL CUES FOR READING AND WRITING TASKS. INCONTINENT BOWEL AND BLADDER. PVRS LOW. CHRONIC LEFT HIP PAIN.   ELOS -  ONE WEEK, WILL NEED SIGNIFICANT ASSISTANCE AT HOME.    Azael Abdullahi MD  02/04/21  11:17 EST    During rounds, used appropriate personal protective equipment including mask and gloves.  Additional gown if indicated.  Mask used was standard procedure mask. Appropriate PPE was worn during the entire visit.  Hand hygiene was completed before and after.

## 2021-02-04 NOTE — PROGRESS NOTES
Inpatient Rehabilitation Plan of Care Note    Plan of Care  Care Plan Reviewed - No updates at this time.    Psychosocial    Performed Intervention(s)  Verbalize needs and concerns  Medications as needed/ ordered  Therapeutic environmental set up      Safety    Performed Intervention(s)  Items with in reach and environmental set up  Bed alarm/ Chair alarm  Safety rounds and falls protocols/ precautions      Sphincter Control    Performed Intervention(s)  Proper diet and fluid intake  Medication as ordered  Incontinent care if needed  Monitor intake and output      Body Systems    Performed Intervention(s)  O2 2L/NC @ HS  assess lungs, sats every shift    Signed by: Mackenzie Alberts RN

## 2021-02-04 NOTE — PLAN OF CARE
Goal Outcome Evaluation:          Problem: Rehabilitation ([facility]) Plan of Care  Goal: Plan of Care Review  2/4/2021 0323 by Gloria Baeza, RN  Outcome: Ongoing, Progressing  Flowsheets  Taken 2/4/2021 0323 by Gloria Baeza, RN  Outcome Summary: Pt is alert and oriented x 4 with some forgetfulness. Able to make all needs known with clear speech. Takes all meds whole PO. Regular cardiac diet with thin liquids. Continent with incontinent episodes at times. Does use bedside commode when up in chair. Wears briefs at night, and does call out for bed pan. Last BM 2/2. Scattered bruising to extremities. O2 2l nc at HS. Loop recorder to left chest. Left side hemiplegia. C/o of headache this evening. PRN Prosser at 845pm. No further complaints. Resting in bed with eyes closed. Call light within reach.

## 2021-02-04 NOTE — THERAPY TREATMENT NOTE
Inpatient Rehabilitation - Occupational Therapy Treatment Note    [facility]     Patient Name: Liana Carrero  : 1935  MRN: [id number]    Today's Date: 2021                 Admit Date: 2021         ICD-10-CM ICD-9-CM   1. Impaired functional mobility, balance, gait, and endurance  Z74.09 V49.89       Patient Active Problem List   Diagnosis   • Stroke (cerebrum) (CMS/[facility])   • Chest pain   • Hypertensive urgency   • Headache   • History of embolic stroke   • Hypothyroidism   • Hypokalemia   • CAD in native artery   • URTI (acute upper respiratory infection)   • Closed fracture of left hip (CMS/[facility])   • Chronic diastolic (congestive) heart failure (CMS/[facility])   • Coronary artery disease involving autologous vein coronary bypass graft without angina pectoris   • Impaired glucose tolerance   • Moderate malnutrition (CMS/[facility])   • Stroke (CMS/[facility])       Past Medical History:   Diagnosis Date   • Arthritis    • CHF (congestive heart failure) (CMS/[facility])    • Coronary artery disease    • Diabetes mellitus (CMS/[facility])     new DX related to stroke, started on insulin at hospital   • Disease of thyroid gland    • Elevated cholesterol    • GERD (gastroesophageal reflux disease)    • History of transfusion    • Hypertension    • PONV (postoperative nausea and vomiting)    • Sleep apnea    • Spinal headache    • Stroke (CMS/[facility])        Past Surgical History:   Procedure Laterality Date   • APPENDECTOMY     • CARDIAC CATHETERIZATION       x2   • CARDIAC CATHETERIZATION N/A 2019    Procedure: Left Heart Cath;  Surgeon: Bentley Chapin MD;  Location: [facility] CATH INVASIVE LOCATION;  Service: Cardiovascular   • CARDIAC CATHETERIZATION N/A 2019    Procedure: Coronary angiography;  Surgeon: Bentley Chapin MD;  Location: [facility] CATH INVASIVE LOCATION;  Service: Cardiovascular   • CARDIAC CATHETERIZATION N/A 2019    Procedure: Native mammary injection;  Surgeon: Bentley Chapin MD;  Location: [facility]  CATH INVASIVE LOCATION;  Service: Cardiovascular   • CARDIAC CATHETERIZATION N/A 12/26/2019    Procedure: Saphenous Vein Graft;  Surgeon: Bentley Chapin MD;  Location: [facility] CATH INVASIVE LOCATION;  Service: Cardiovascular   • COLON SURGERY     • COLONOSCOPY     • EYE SURGERY      lens implants and cataract surgery   • FEMUR IM NAILING/RODDING Left 3/26/2020    Procedure: LT.HIP NAILING/RODDING;  Surgeon: Carlos Khan II, MD;  Location: [facility] MAIN OR;  Service: Orthopedics;  Laterality: Left;   • FRACTURE SURGERY Left 2020   • HYSTERECTOMY     • VASCULAR SURGERY      varicous veins                IRF OT ASSESSMENT FLOWSHEET (last 12 hours)      IRF OT Evaluation and Treatment     Row Name 02/04/21 1543          OT Time and Intention    Document Type  daily treatment  -CC     Mode of Treatment  occupational therapy  -CC     Patient Effort  good  -CC     Row Name 02/04/21 1543          Vision Assessment/Intervention    Visual Impairment/Limitations  visual/perceptual impairments present  -CC     Visual Field Deficit  homonymous hemianopsia, left  -CC     Visual Motor Impairment  visual tracking, left  -CC     Visual Processing Deficit  jon-inattention/neglect, left  -CC     Visual Treatment Interventions  scanning activities/strategies increased scanning to Left w reaching tasks  -CC     Row Name 02/04/21 1543          Pain Scale: Numbers Pre/Post-Treatment    Pretreatment Pain Rating  0/10 - no pain  -CC     Posttreatment Pain Rating  0/10 - no pain  -CC     Row Name 02/04/21 1543          Bed Mobility    Comment (Bed Mobility)  in w/c  -CC     Row Name 02/04/21 1543          Transfers    Sit-Stand [place] (Transfers)  maximum assist (25% patient effort);2 person assist;verbal cues;nonverbal cues (demo/gesture)  -CC     Stand-Sit [place] (Transfers)  maximum assist (25% patient effort);2 person assist;verbal cues;nonverbal cues (demo/gesture)  -     [place] Level (Toilet Transfer)   verbal cues;nonverbal cues (demo/gesture);maximum assist (25% patient effort);1 person to manage equipment  -     Assistive Device (Toilet Transfer)  commode, bedside with drop arms  -     [place] Level (Shower Transfer)  maximum assist (25% patient effort);1 person to manage equipment  -     Assistive Device (Shower Transfer)  tub bench;wheelchair  -     Row Name 02/04/21 1543          Sit-Stand Transfer    Assistive Device (Sit-Stand Transfers)  wheelchair  -CC     Row Name 02/04/21 1543          Stand-Sit Transfer    Assistive Device (Stand-Sit Transfers)  wheelchair  -CC     Row Name 02/04/21 1543          Squat Pivot Transfer    Squat Pivot [place] (Transfers)  maximum assist (25% patient effort);verbal cues;nonverbal cues (demo/gesture)  -     Assistive Device (Squat Pivot Transfers)  wheelchair  -     Row Name 02/04/21 1543          Toilet Transfer    Type (Toilet Transfer)  squat pivot  -     Row Name 02/04/21 1543          Shower Transfer    Type (Shower Transfer)  squat pivot  -     Row Name 02/04/21 1543          Glenohumeral Joint Subluxation    Glenohumeral Joint Subluxation  left;moderate subluxation;severe subluxation;1 finger width  -     Interventions, Glenohumeral Joint Subluxation  lapboard/arm trough;positioning techniques;therapeutic taping  -     Row Name 02/04/21 1543          Neuromuscular Re-education    Interventions (Neuromuscular Re-education)  facilitation/inhibition;weight bearing;weight shifting  -     Positioning (Neuromuscular Re-education)  sitting;unsupported EOM CGA  -     Comment (Neuromuscular Re-education)  reaching activity w CGA and assist for WB through LUE. Increased scanning to L while locating items. Wt shifting improved. Abble to hold balance seated EOM for 17 min w SBA.  -     Row Name 02/04/21 1543          Bathing    [place] Level (Bathing)  bathing skills;lower body;upper body;moderate assist (50% patient effort)  -      Assistive Device (Bathing)  grab bar/tub rail;hand held shower spray hose;tub bench  -CC     Position (Bathing)  supported sitting  -CC     Comment (Bathing)  remained seated. Buttocks already washed with toileting prior to getting in shower  -CC     Row Name 02/04/21 1543          Upper Body Dressing    [place] Level (Upper Body Dressing)  upper body dressing skills;doff;don;pull over garment;verbal cues;nonverbal cues (demo/gesture);moderate assist (50-74% patient effort);maximal assist (25-49% patient effort)  -CC     Position (Upper Body Dressing)  supported sitting  -CC     Set-up Assistance (Upper Body Dressing)  obtain clothing  -CC     Comment (Upper Body Dressing)  jon dressing   -CC     Row Name 02/04/21 1543          Lower Body Dressing    [place] Level (Lower Body Dressing)  doff;don;pants/bottoms;moderate assist (50% patient effort);maximum assist (25% patient effort)  -CC     Position (Lower Body Dressing)  supported sitting;supported standing  -CC     Set-up Assistance (Lower Body Dressing)  obtain clothing  -CC     Row Name 02/04/21 1543          Grooming    [place] Level (Grooming)  grooming skills;deodorant application;hair care, combing/brushing;oral care regimen;wash face, hands;supervision;minimum assist (75% patient effort)  -CC     Position (Grooming)  sink side;supported sitting  -CC     Set-up Assistance (Grooming)  obtain supplies;open containers  -CC     Comment (Grooming)  preet w deodorant  -CC     Row Name 02/04/21 1543          Toileting    [place] Level (Toileting)  toileting skills;adjust/manage clothing;perform perineal hygiene;dependent (less than 25% patient effort)  -CC     Assistive Device Use (Toileting)  commode chair  -CC     Position (Toileting)  supported sitting;supported standing  -CC     Set-up Assistance (Toileting)  change pad/brief  -CC     Comment (Toileting)  x 2  -CC     Row Name 02/04/21 1543          Transfer Goal 1 (OT-IRF)     Progress/Outcomes (Transfer Goal 1, OT-IRF)  goal ongoing  -     Row Name 02/04/21 1543          Transfer Goal 2 (OT-IRF)    Progress/Outcomes (Transfer Goal 2, OT-IRF)  goal ongoing  -     Row Name 02/04/21 1543          Bathing Goal 1 (OT-IRF)    Progress/Outcomes (Bathing Goal 1, OT-IRF)  goal ongoing  -     Row Name 02/04/21 1543          Bathing Goal 2 (OT-IRF)    Progress/Outcomes (Bathing Goal 2, OT-IRF)  goal ongoing  -     Row Name 02/04/21 1543          UB Dressing Goal 1 (OT-IRF)    Progress/Outcomes (UB Dressing Goal 1, OT-IRF)  goal ongoing  -     Row Name 02/04/21 1543          UB Dressing Goal 2 (OT-IRF)    Progress/Outcomes (UB Dressing Goal 2, OT-IRF)  goal ongoing  -     Row Name 02/04/21 1543          LB Dressing Goal 1 (OT-IRF)    Progress/Outcomes (LB Dressing Goal 1, OT-IRF)  goal ongoing  -     Row Name 02/04/21 1543          LB Dressing Goal 2 (OT-IRF)    Progress/Outcomes (LB Dressing Goal 2, OT-IRF)  goal ongoing  -     Row Name 02/04/21 1543          Grooming Goal 1 (OT-IRF)    Progress/Outcomes (Grooming Goal 1, OT-IRF)  goal met  -     Row Name 02/04/21 1543          Grooming Goal 2 (OT-IRF)    Progress/Outcomes (Grooming Goal 2, OT-IRF)  goal ongoing  -     Row Name 02/04/21 1543          Toileting Goal 1 (OT-IRF)    Progress/Outcomes (Toileting Goal 1, OT-IRF)  goal met  -     Row Name 02/04/21 1543          Toileting Goal 2 (OT-IRF)    Progress/Outcomes (Toileting Goal 2, OT-IRF)  goal ongoing  -     Row Name 02/04/21 1543          Strength Goal 1 (OT-IRF)    Progress/Outcomes (Strength Goal 1, OT-IRF)  goal ongoing  -     Row Name 02/04/21 1543          Strength Goal 2 (OT-IRF)    Progress/Outcomes (Strength Goal 2, OT-IRF)  goal ongoing  -     Row Name 02/04/21 1543          Balance Goal 2 (OT)    Progress/Outcome (Balance Goal 2, OT)  goal met  -     Row Name 02/04/21 1543          Coordination Goal 1 (OT)    Progress/Outcomes (Coordination Goal 1, OT)   goal ongoing  -     Row Name 02/04/21 1543          Coordination Goal 2 (OT)    Progress/Outcomes (Coordination Goal 2, OT)  goal ongoing  -     Row Name 02/04/21 1543          Caregiver Training Goal 1 (OT-IRF)    Progress/Outcomes (Caregiver Training Goal 1, OT-IRF)  goal met  -     Row Name 02/04/21 1543          Positioning and Restraints    Pre-Treatment Position  sitting in chair/recliner  -CC     Post Treatment Position  wheelchair  -CC     In Wheelchair  notified nsg;sitting;call light within reach;encouraged to call for assist;with family/caregiver  -CC       User Key  (r) = Recorded By, (t) = Taken By, (c) = Cosigned By    Initials Name Effective Dates    CC Keya Miranda, OTR 06/08/18 -            Occupational Therapy Education                 Title: PT OT SLP Therapies (Done)     Topic: Occupational Therapy (Done)     Point: ADL training (Done)     Description:   Instruct learner(s) on proper safety adaptation and remediation techniques during self care or transfers.   Instruct in proper use of assistive devices.              Learning Progress Summary           Patient Acceptance, E,TB,D, VU by CC at 1/29/2021 1509    Comment: Pt repositioned in reg w/c with 1/2 arm tray on Left. Nursing notified of change from recliner w/c/. Commode tsf from w/c to droparm commode discussed and shown to NA.    Acceptance, E, VU by BL at 1/8/2021 1434   Caregiver Acceptance, E,TB,D, VU by CC at 1/29/2021 1509    Comment: Pt repositioned in reg w/c with 1/2 arm tray on Left. Nursing notified of change from recliner w/c/. Commode tsf from w/c to droparm commode discussed and shown to NA.                   Point: Home exercise program (Done)     Description:   Instruct learner(s) on appropriate technique for monitoring, assisting and/or progressing therapeutic exercises/activities.              Learning Progress Summary           Patient Acceptance, E, VU by BL at 1/8/2021 1434                   Point: Precautions  (Done)     Description:   Instruct learner(s) on prescribed precautions during self-care and functional transfers.              Learning Progress Summary           Patient Acceptance, E, VU by BL at 1/8/2021 1434                   Point: Body mechanics (Done)     Description:   Instruct learner(s) on proper positioning and spine alignment during self-care, functional mobility activities and/or exercises.              Learning Progress Summary           Patient Acceptance, E,TB,D, VU by CC at 1/29/2021 1509    Comment: Pt repositioned in reg w/c with 1/2 arm tray on Left. Nursing notified of change from recliner w/c/. Commode tsf from w/c to droparm commode discussed and shown to NA.    Acceptance, E, VU by BL at 1/8/2021 1434   Caregiver Acceptance, E,TB,D, VU by CC at 1/29/2021 1509    Comment: Pt repositioned in reg w/c with 1/2 arm tray on Left. Nursing notified of change from recliner w/c/. Commode tsf from w/c to droparm commode discussed and shown to NA.                               User Key     Initials Effective Dates Name Provider Type Discipline     06/08/18 -  Keya Miranda OTR Occupational Therapist OT    BL 01/05/21 -  Marshall Oleary OT Occupational Therapist OT                    OT Recommendation and Plan                         Time Calculation:     Time Calculation- OT     Row Name 02/04/21 1400 02/04/21 1000          Time Calculation- OT    OT Start Time  1400  -CC  1000  -CC     OT Stop Time  1430  -CC  1045  -CC     OT Time Calculation (min)  30 min  -CC  45 min  -CC     OT Non-Billable Time (min)  30 min tech  -CC  30 min tech\  -CC       User Key  (r) = Recorded By, (t) = Taken By, (c) = Cosigned By    Initials Name Provider Type    CC Keya Miranda OTR Occupational Therapist        Therapy Charges for Today     Code Description Service Date Service Provider Modifiers Qty    61163372752 HC OT NEUROMUSC RE EDUCATION EA 15 MIN 2/3/2021 Keya Miranda OTR GO 2    76613882510 HC OT  THER SUPP EA 15 MIN 2/3/2021 Keya Miranda OTR GO 2    41770731694 HC OT THER SUPP EA 15 MIN 2/4/2021 Keya Miranda OTR GO 4    38939010420 HC OT SELF CARE/MGMT/TRAIN EA 15 MIN 2/4/2021 Keya Miranda OTR GO 3    45983846402 HC OT NEUROMUSC RE EDUCATION EA 15 MIN 2/4/2021 Keya Miranda OTR GO 2                   GIAN Blackwell  2/4/2021

## 2021-02-05 LAB
ANION GAP SERPL CALCULATED.3IONS-SCNC: 5.9 MMOL/L (ref 5–15)
BASOPHILS # BLD AUTO: 0.05 10*3/MM3 (ref 0–0.2)
BASOPHILS NFR BLD AUTO: 1 % (ref 0–1.5)
BUN SERPL-MCNC: 19 MG/DL (ref 8–23)
BUN/CREAT SERPL: 27.9 (ref 7–25)
CALCIUM SPEC-SCNC: 9.1 MG/DL (ref 8.6–10.5)
CHLORIDE SERPL-SCNC: 102 MMOL/L (ref 98–107)
CO2 SERPL-SCNC: 31.1 MMOL/L (ref 22–29)
CREAT SERPL-MCNC: 0.68 MG/DL (ref 0.57–1)
DEPRECATED RDW RBC AUTO: 65.7 FL (ref 37–54)
EOSINOPHIL # BLD AUTO: 0.18 10*3/MM3 (ref 0–0.4)
EOSINOPHIL NFR BLD AUTO: 3.5 % (ref 0.3–6.2)
ERYTHROCYTE [DISTWIDTH] IN BLOOD BY AUTOMATED COUNT: 21.7 % (ref 12.3–15.4)
GFR SERPL CREATININE-BSD FRML MDRD: 82 ML/MIN/1.73
GLUCOSE SERPL-MCNC: 114 MG/DL (ref 65–99)
HCT VFR BLD AUTO: 32.6 % (ref 34–46.6)
HGB BLD-MCNC: 10.4 G/DL (ref 12–15.9)
IMM GRANULOCYTES # BLD AUTO: 0.02 10*3/MM3 (ref 0–0.05)
IMM GRANULOCYTES NFR BLD AUTO: 0.4 % (ref 0–0.5)
LYMPHOCYTES # BLD AUTO: 1.56 10*3/MM3 (ref 0.7–3.1)
LYMPHOCYTES NFR BLD AUTO: 30 % (ref 19.6–45.3)
MCH RBC QN AUTO: 27.9 PG (ref 26.6–33)
MCHC RBC AUTO-ENTMCNC: 31.9 G/DL (ref 31.5–35.7)
MCV RBC AUTO: 87.4 FL (ref 79–97)
MONOCYTES # BLD AUTO: 0.41 10*3/MM3 (ref 0.1–0.9)
MONOCYTES NFR BLD AUTO: 7.9 % (ref 5–12)
NEUTROPHILS NFR BLD AUTO: 2.98 10*3/MM3 (ref 1.7–7)
NEUTROPHILS NFR BLD AUTO: 57.2 % (ref 42.7–76)
NRBC BLD AUTO-RTO: 0 /100 WBC (ref 0–0.2)
PLATELET # BLD AUTO: 165 10*3/MM3 (ref 140–450)
PMV BLD AUTO: 10.2 FL (ref 6–12)
POTASSIUM SERPL-SCNC: 4.2 MMOL/L (ref 3.5–5.2)
RBC # BLD AUTO: 3.73 10*6/MM3 (ref 3.77–5.28)
SODIUM SERPL-SCNC: 139 MMOL/L (ref 136–145)
WBC # BLD AUTO: 5.2 10*3/MM3 (ref 3.4–10.8)

## 2021-02-05 PROCEDURE — 97535 SELF CARE MNGMENT TRAINING: CPT

## 2021-02-05 PROCEDURE — 25010000002 ENOXAPARIN PER 10 MG: Performed by: PHYSICAL MEDICINE & REHABILITATION

## 2021-02-05 PROCEDURE — 97112 NEUROMUSCULAR REEDUCATION: CPT

## 2021-02-05 PROCEDURE — 63710000001 ONDANSETRON ODT 4 MG TABLET DISPERSIBLE: Performed by: INTERNAL MEDICINE

## 2021-02-05 PROCEDURE — 97129 THER IVNTJ 1ST 15 MIN: CPT

## 2021-02-05 PROCEDURE — 80048 BASIC METABOLIC PNL TOTAL CA: CPT | Performed by: PHYSICAL MEDICINE & REHABILITATION

## 2021-02-05 PROCEDURE — 97110 THERAPEUTIC EXERCISES: CPT

## 2021-02-05 PROCEDURE — 85025 COMPLETE CBC W/AUTO DIFF WBC: CPT | Performed by: PHYSICAL MEDICINE & REHABILITATION

## 2021-02-05 RX ADMIN — ASPIRIN 81 MG: 81 TABLET, CHEWABLE ORAL at 09:16

## 2021-02-05 RX ADMIN — TRAMADOL HYDROCHLORIDE 50 MG: 50 TABLET ORAL at 23:56

## 2021-02-05 RX ADMIN — PANTOPRAZOLE SODIUM 40 MG: 40 TABLET, DELAYED RELEASE ORAL at 06:17

## 2021-02-05 RX ADMIN — ONDANSETRON 4 MG: 4 TABLET, ORALLY DISINTEGRATING ORAL at 12:00

## 2021-02-05 RX ADMIN — DOCUSATE SODIUM 200 MG: 100 CAPSULE, LIQUID FILLED ORAL at 20:21

## 2021-02-05 RX ADMIN — DOCUSATE SODIUM 200 MG: 100 CAPSULE, LIQUID FILLED ORAL at 09:15

## 2021-02-05 RX ADMIN — MIRTAZAPINE 15 MG: 15 TABLET, FILM COATED ORAL at 20:21

## 2021-02-05 RX ADMIN — BISACODYL 10 MG: 10 SUPPOSITORY RECTAL at 18:09

## 2021-02-05 RX ADMIN — LEVOTHYROXINE SODIUM 125 MCG: 0.12 TABLET ORAL at 06:18

## 2021-02-05 RX ADMIN — LIDOCAINE 1 PATCH: 50 PATCH TOPICAL at 09:16

## 2021-02-05 RX ADMIN — HYDROCODONE BITARTRATE AND ACETAMINOPHEN 1 TABLET: 5; 325 TABLET ORAL at 20:21

## 2021-02-05 RX ADMIN — OXYBUTYNIN CHLORIDE 5 MG: 5 TABLET ORAL at 18:08

## 2021-02-05 RX ADMIN — ONDANSETRON 4 MG: 4 TABLET, ORALLY DISINTEGRATING ORAL at 06:18

## 2021-02-05 RX ADMIN — DILTIAZEM HYDROCHLORIDE 180 MG: 180 CAPSULE, COATED, EXTENDED RELEASE ORAL at 09:16

## 2021-02-05 RX ADMIN — ENOXAPARIN SODIUM 40 MG: 40 INJECTION SUBCUTANEOUS at 10:56

## 2021-02-05 RX ADMIN — FERROUS SULFATE TAB 325 MG (65 MG ELEMENTAL FE) 325 MG: 325 (65 FE) TAB at 09:16

## 2021-02-05 RX ADMIN — LOSARTAN POTASSIUM 50 MG: 50 TABLET, FILM COATED ORAL at 09:16

## 2021-02-05 RX ADMIN — DILTIAZEM HYDROCHLORIDE 180 MG: 180 CAPSULE, COATED, EXTENDED RELEASE ORAL at 20:21

## 2021-02-05 RX ADMIN — ONDANSETRON 4 MG: 4 TABLET, ORALLY DISINTEGRATING ORAL at 18:02

## 2021-02-05 NOTE — THERAPY TREATMENT NOTE
Inpatient Rehabilitation - Occupational Therapy Treatment Note    [facility]     Patient Name: Liana Carrero  : 1935  MRN: [id number]    Today's Date: 2021                 Admit Date: 2021         ICD-10-CM ICD-9-CM   1. Impaired functional mobility, balance, gait, and endurance  Z74.09 V49.89       Patient Active Problem List   Diagnosis   • Stroke (cerebrum) (CMS/[facility])   • Chest pain   • Hypertensive urgency   • Headache   • History of embolic stroke   • Hypothyroidism   • Hypokalemia   • CAD in native artery   • URTI (acute upper respiratory infection)   • Closed fracture of left hip (CMS/[facility])   • Chronic diastolic (congestive) heart failure (CMS/[facility])   • Coronary artery disease involving autologous vein coronary bypass graft without angina pectoris   • Impaired glucose tolerance   • Moderate malnutrition (CMS/[facility])   • Stroke (CMS/[facility])       Past Medical History:   Diagnosis Date   • Arthritis    • CHF (congestive heart failure) (CMS/[facility])    • Coronary artery disease    • Diabetes mellitus (CMS/[facility])     new DX related to stroke, started on insulin at hospital   • Disease of thyroid gland    • Elevated cholesterol    • GERD (gastroesophageal reflux disease)    • History of transfusion    • Hypertension    • PONV (postoperative nausea and vomiting)    • Sleep apnea    • Spinal headache    • Stroke (CMS/[facility])        Past Surgical History:   Procedure Laterality Date   • APPENDECTOMY     • CARDIAC CATHETERIZATION       x2   • CARDIAC CATHETERIZATION N/A 2019    Procedure: Left Heart Cath;  Surgeon: Bentley Chapin MD;  Location: [facility] CATH INVASIVE LOCATION;  Service: Cardiovascular   • CARDIAC CATHETERIZATION N/A 2019    Procedure: Coronary angiography;  Surgeon: Bentley Chapin MD;  Location: [facility] CATH INVASIVE LOCATION;  Service: Cardiovascular   • CARDIAC CATHETERIZATION N/A 2019    Procedure: Native mammary injection;  Surgeon: Bentley Chapin MD;  Location: [facility]  CATH INVASIVE LOCATION;  Service: Cardiovascular   • CARDIAC CATHETERIZATION N/A 12/26/2019    Procedure: Saphenous Vein Graft;  Surgeon: Bentley Chapin MD;  Location: [facility] CATH INVASIVE LOCATION;  Service: Cardiovascular   • COLON SURGERY     • COLONOSCOPY     • EYE SURGERY      lens implants and cataract surgery   • FEMUR IM NAILING/RODDING Left 3/26/2020    Procedure: LT.HIP NAILING/RODDING;  Surgeon: Carlos Khan II, MD;  Location: [facility] MAIN OR;  Service: Orthopedics;  Laterality: Left;   • FRACTURE SURGERY Left 2020   • HYSTERECTOMY     • VASCULAR SURGERY      varicous veins                IRF OT ASSESSMENT FLOWSHEET (last 12 hours)      IRF OT Evaluation and Treatment     Row Name 02/05/21 1000          OT Time and Intention    Document Type  daily treatment  -RD     Mode of Treatment  occupational therapy  -RD     Patient Effort  good  -RD     Symptoms Noted During/After Treatment  none  -RD     Row Name 02/05/21 1000          General Information    Patient Profile Reviewed  yes  -RD     Patient/Family/Caregiver Comments/Observations  pt seated in w/c w/ no signs of acute distress  -RD     Existing Precautions/Restrictions  fall  -RD     Row Name 02/05/21 1000          Pain Scale: FACES Pre/Post-Treatment    Pain: FACES Scale, Pretreatment  0-->no hurt  -RD     Posttreatment Pain Rating  0-->no hurt  -RD     Row Name 02/05/21 1000          Transfer Assessment/Treatment    Transfers  sit-stand transfer;stand-sit transfer  -RD     Row Name 02/05/21 1000          Transfers    Sit-Stand [place] (Transfers)  moderate assist (50% patient effort);maximum assist (25% patient effort);2 person assist;verbal cues;nonverbal cues (demo/gesture)  -RD     Stand-Sit [place] (Transfers)  moderate assist (50% patient effort);maximum assist (25% patient effort);2 person assist;verbal cues;nonverbal cues (demo/gesture)  -RD     Row Name 02/05/21 1000          Sit-Stand Transfer    Assistive Device  (Sit-Stand Transfers)  wheelchair grab bar  -RD     Row Name 02/05/21 1000          Stand-Sit Transfer    Assistive Device (Stand-Sit Transfers)  wheelchair grab bar  -RD     Row Name 02/05/21 1000          Basic Activities of Daily Living (BADLs)    Basic Activities of Daily Living  bathing;upper body dressing;lower body dressing;grooming  -RD     Row Name 02/05/21 1000          Bathing    [place] Level (Bathing)  bathing skills;upper body;lower body;moderate assist (50% patient effort);verbal cues;nonverbal cues (demo/gesture)  -RD     Position (Bathing)  sink side;supported sitting;supported standing  -RD     Set-up Assistance (Bathing)  obtain supplies  -RD     Comment (Bathing)  assist for B LEs, antonio region, and R UE  -RD     Row Name 02/05/21 1000          Upper Body Dressing    [place] Level (Upper Body Dressing)  upper body dressing skills;doff;don;front opening garment;moderate assist (50-74% patient effort);maximal assist (25-49% patient effort);verbal cues;nonverbal cues (demo/gesture)  -RD     Position (Upper Body Dressing)  supported sitting  -RD     Set-up Assistance (Upper Body Dressing)  obtain clothing  -RD     Comment (Upper Body Dressing)  VCs for jon dressing tech  -RD     Row Name 02/05/21 1000          Lower Body Dressing    [place] Level (Lower Body Dressing)  doff;don;pants/bottoms;socks;dependent (less than 25% patient effort)  -RD     Position (Lower Body Dressing)  supported sitting;supported standing  -RD     Set-up Assistance (Lower Body Dressing)  obtain clothing  -RD     Comment (Lower Body Dressing)  A x2 when standing to pull up LB clothing  -RD     Row Name 02/05/21 1000          Grooming    [place] Level (Grooming)  grooming skills;wash face, hands;set up;supervision  -RD     Position (Grooming)  sink side;supported sitting  -RD     Set-up Assistance (Grooming)  obtain supplies  -RD     Row Name 02/05/21 1000          Positioning and Restraints     Pre-Treatment Position  sitting in chair/recliner  -RD     Post Treatment Position  wheelchair  -RD     In Wheelchair  sitting;call light within reach;encouraged to call for assist;exit alarm on  -RD       User Key  (r) = Recorded By, (t) = Taken By, (c) = Cosigned By    Initials Name Effective Dates    RD Susie Barajas, OT 10/14/19 -            Occupational Therapy Education                 Title: PT OT SLP Therapies (Done)     Topic: Occupational Therapy (Done)     Point: ADL training (Done)     Description:   Instruct learner(s) on proper safety adaptation and remediation techniques during self care or transfers.   Instruct in proper use of assistive devices.              Learning Progress Summary           Patient Acceptance, E,TB,D, VU by CC at 1/29/2021 1509    Comment: Pt repositioned in reg w/c with 1/2 arm tray on Left. Nursing notified of change from recliner w/c/. Commode tsf from w/c to droparm commode discussed and shown to NA.    Acceptance, E, VU by BL at 1/8/2021 1434   Caregiver Acceptance, E,TB,D, VU by CC at 1/29/2021 1509    Comment: Pt repositioned in reg w/c with 1/2 arm tray on Left. Nursing notified of change from recliner w/c/. Commode tsf from w/c to droparm commode discussed and shown to NA.                   Point: Home exercise program (Done)     Description:   Instruct learner(s) on appropriate technique for monitoring, assisting and/or progressing therapeutic exercises/activities.              Learning Progress Summary           Patient Acceptance, E, VU by BL at 1/8/2021 1434                   Point: Precautions (Done)     Description:   Instruct learner(s) on prescribed precautions during self-care and functional transfers.              Learning Progress Summary           Patient Acceptance, E, VU by BL at 1/8/2021 1434                   Point: Body mechanics (Done)     Description:   Instruct learner(s) on proper positioning and spine alignment during self-care, functional  mobility activities and/or exercises.              Learning Progress Summary           Patient Acceptance, E,TB,D, VU by  at 1/29/2021 1509    Comment: Pt repositioned in reg w/c with 1/2 arm tray on Left. Nursing notified of change from recliner w/c/. Commode tsf from w/c to droparm commode discussed and shown to NA.    Acceptance, E, VU by  at 1/8/2021 1434   Caregiver Acceptance, E,TB,D, VU by  at 1/29/2021 1509    Comment: Pt repositioned in reg w/c with 1/2 arm tray on Left. Nursing notified of change from recliner w/c/. Commode tsf from w/c to droparm commode discussed and shown to NA.                               User Key     Initials Effective Dates Name Provider Type Discipline     06/08/18 -  Keya Miranda OTR Occupational Therapist OT    BL 01/05/21 -  Marshall Oleary OT Occupational Therapist OT                    OT Recommendation and Plan                         Time Calculation:     Time Calculation- OT     Row Name 02/05/21 1148             Time Calculation- OT    OT Start Time  1000  -RD      OT Stop Time  1030  -RD      OT Time Calculation (min)  30 min  -RD      Total Timed Code Minutes- OT  30 minute(s)  -RD      OT Received On  02/05/21  -RD        User Key  (r) = Recorded By, (t) = Taken By, (c) = Cosigned By    Initials Name Provider Type    RD Susie Baraajs, OT Occupational Therapist        Therapy Charges for Today     Code Description Service Date Service Provider Modifiers Qty    69811938378 HC OT SELF CARE/MGMT/TRAIN EA 15 MIN 2/5/2021 Susie Barajas OT GO 2                   Susie Barajas OT  2/5/2021

## 2021-02-05 NOTE — PLAN OF CARE
Goal Outcome Evaluation:          Problem: Rehabilitation (IRF) Plan of Care  Goal: Plan of Care Review  Outcome: Ongoing, Progressing  Flowsheets  Taken 2/5/2021 0312 by Gloria Baeza RN  Outcome Summary: Pt is alert and oriented x 4. Pleasant but flat at times. Left side hemiplegia. No movement or grasp to left hand. Takes meds whole PO. Regular cardiac diet with thin liquids. Continent with incontinent episodes, wears briefs at HS. Will call out for bed pan at times. Last BM 2/5. Assist x 2 during transfers. Scattered brusing to BUE. Loop recorder to left chest. Wears 1L o2 at HS. C/o of lef thip pain this evening. PRN Tramadol administered. No further complaints. Resting well this evening. Call light within reach.

## 2021-02-05 NOTE — THERAPY TREATMENT NOTE
Inpatient Rehabilitation - Occupational Therapy Treatment Note    [facility]     Patient Name: Liana Carrero  : 1935  MRN: [id number]    Today's Date: 2021                 Admit Date: 2021         ICD-10-CM ICD-9-CM   1. Impaired functional mobility, balance, gait, and endurance  Z74.09 V49.89       Patient Active Problem List   Diagnosis   • Stroke (cerebrum) (CMS/Formerly [facility])   • Chest pain   • Hypertensive urgency   • Headache   • History of embolic stroke   • Hypothyroidism   • Hypokalemia   • CAD in native artery   • URTI (acute upper respiratory infection)   • Closed fracture of left hip (CMS/Formerly [facility])   • Chronic diastolic (congestive) heart failure (CMS/Formerly [facility])   • Coronary artery disease involving autologous vein coronary bypass graft without angina pectoris   • Impaired glucose tolerance   • Moderate malnutrition (CMS/Formerly [facility])   • Stroke (CMS/Formerly [facility])       Past Medical History:   Diagnosis Date   • Arthritis    • CHF (congestive heart failure) (CMS/Formerly [facility])    • Coronary artery disease    • Diabetes mellitus (CMS/Formerly [facility])     new DX related to stroke, started on insulin at hospital   • Disease of thyroid gland    • Elevated cholesterol    • GERD (gastroesophageal reflux disease)    • History of transfusion    • Hypertension    • PONV (postoperative nausea and vomiting)    • Sleep apnea    • Spinal headache    • Stroke (CMS/Formerly [facility])        Past Surgical History:   Procedure Laterality Date   • APPENDECTOMY     • CARDIAC CATHETERIZATION       x2   • CARDIAC CATHETERIZATION N/A 2019    Procedure: Left Heart Cath;  Surgeon: Bentley Chapin MD;  Location: [facility] CATH INVASIVE LOCATION;  Service: Cardiovascular   • CARDIAC CATHETERIZATION N/A 2019    Procedure: Coronary angiography;  Surgeon: Bentley Chapin MD;  Location: [facility] CATH INVASIVE LOCATION;  Service: Cardiovascular   • CARDIAC CATHETERIZATION N/A 2019    Procedure: Native mammary injection;  Surgeon: Bentley Chapin MD;  Location: [facility]  CATH INVASIVE LOCATION;  Service: Cardiovascular   • CARDIAC CATHETERIZATION N/A 12/26/2019    Procedure: Saphenous Vein Graft;  Surgeon: Bentley Chapin MD;  Location: [facility] CATH INVASIVE LOCATION;  Service: Cardiovascular   • COLON SURGERY     • COLONOSCOPY     • EYE SURGERY      lens implants and cataract surgery   • FEMUR IM NAILING/RODDING Left 3/26/2020    Procedure: LT.HIP NAILING/RODDING;  Surgeon: Carlos Khan II, MD;  Location: [facility] MAIN OR;  Service: Orthopedics;  Laterality: Left;   • FRACTURE SURGERY Left 2020   • HYSTERECTOMY     • VASCULAR SURGERY      varicous veins                IRF OT ASSESSMENT FLOWSHEET (last 12 hours)      IRF OT Evaluation and Treatment     Row Name 02/05/21 1400 02/05/21 1000       OT Time and Intention    Document Type  daily treatment  -RD  daily treatment  -RD    Mode of Treatment  occupational therapy  -RD  occupational therapy  -RD    Patient Effort  good  -RD  good  -RD    Symptoms Noted During/After Treatment  none  -RD  none  -RD    Row Name 02/05/21 1400 02/05/21 1000       General Information    Patient Profile Reviewed  yes  -RD  yes  -RD    Patient/Family/Caregiver Comments/Observations  pt seated in w/c w/ no signs of acute distress  -RD  pt seated in w/c w/ no signs of acute distress  -RD    Existing Precautions/Restrictions  --  fall  -RD    Row Name 02/05/21 1400 02/05/21 1000       Pain Scale: FACES Pre/Post-Treatment    Pain: FACES Scale, Pretreatment  0-->no hurt  -RD  0-->no hurt  -RD    Posttreatment Pain Rating  0-->no hurt  -RD  0-->no hurt  -RD    Row Name 02/05/21 1400 02/05/21 1000       Transfer Assessment/Treatment    Transfers  --  sit-stand transfer;stand-sit transfer  -RD    Comment (Transfers)  squat pivot transfer w/c <> EOM- max A x2 w/ VCs  -RD  --    Row Name 02/05/21 1000          Transfers    Sit-Stand [place] (Transfers)  moderate assist (50% patient effort);maximum assist (25% patient effort);2 person  assist;verbal cues;nonverbal cues (demo/gesture)  -     Stand-Sit [place] (Transfers)  moderate assist (50% patient effort);maximum assist (25% patient effort);2 person assist;verbal cues;nonverbal cues (demo/gesture)  -     Row Name 02/05/21 1000          Sit-Stand Transfer    Assistive Device (Sit-Stand Transfers)  wheelchair grab bar  -     Row Name 02/05/21 1000          Stand-Sit Transfer    Assistive Device (Stand-Sit Transfers)  wheelchair grab bar  -     Row Name 02/05/21 1400          Elbow/Forearm (Therapeutic Exercise)    Elbow/Forearm (Therapeutic Exercise)  PROM (passive range of motion)  -     Elbow/Forearm PROM (Therapeutic Exercise)  left;flexion;extension;5 repetitions;sitting  -     Row Name 02/05/21 1400          Wrist (Therapeutic Exercise)    Wrist (Therapeutic Exercise)  PROM (passive range of motion)  -     Wrist PROM (Therapeutic Exercise)  left;flexion;extension;5 repetitions  -     Row Name 02/05/21 1400          Neuromuscular Re-education    Interventions (Neuromuscular Re-education)  weight bearing;weight shifting;facilitation/inhibition  -     Positioning (Neuromuscular Re-education)  sitting;unsupported seated at EOM- CGA/SBA  -RD     Comment (Neuromuscular Re-education)  seated at EOM w/ CGA for sitting balance, pt engages in L UE weight shifting and weight bearing w/ OT assist. Pt also begins to L visually scanning activity to use R UE to reach across midline to don cones onto vertical dowel michelle- while weight bearing through L UE; multiple reps completed w/ CGA/min A for dynamic sitting balance and VCs for scanning to L side   -     Row Name 02/05/21 1000          Basic Activities of Daily Living (BADLs)    Basic Activities of Daily Living  bathing;upper body dressing;lower body dressing;grooming  -     Row Name 02/05/21 1000          Bathing    [place] Level (Bathing)  bathing skills;upper body;lower body;moderate assist (50% patient effort);verbal  cues;nonverbal cues (demo/gesture)  -RD     Position (Bathing)  sink side;supported sitting;supported standing  -RD     Set-up Assistance (Bathing)  obtain supplies  -RD     Comment (Bathing)  assist for B LEs, [place]io region, and R UE  -RD     Row Name 02/05/21 1000          Upper Body Dressing    [place] Level (Upper Body Dressing)  upper body dressing skills;doff;don;front opening garment;moderate assist (50-74% patient effort);maximal assist (25-49% patient effort);verbal cues;nonverbal cues (demo/gesture)  -RD     Position (Upper Body Dressing)  supported sitting  -RD     Set-up Assistance (Upper Body Dressing)  obtain clothing  -RD     Comment (Upper Body Dressing)  VCs for jon dressing tech  -RD     Row Name 02/05/21 1000          Lower Body Dressing    [place] Level (Lower Body Dressing)  doff;don;pants/bottoms;socks;dependent (less than 25% patient effort)  -RD     Position (Lower Body Dressing)  supported sitting;supported standing  -RD     Set-up Assistance (Lower Body Dressing)  obtain clothing  -RD     Comment (Lower Body Dressing)  A x2 when standing to pull up LB clothing  -RD     Row Name 02/05/21 1000          Grooming    [place] Level (Grooming)  grooming skills;wash face, hands;set up;supervision  -RD     Position (Grooming)  sink side;supported sitting  -RD     Set-up Assistance (Grooming)  obtain supplies  -RD     Row Name 02/05/21 1400 02/05/21 1000       Positioning and Restraints    Pre-Treatment Position  sitting in chair/recliner  -RD  sitting in chair/recliner  -RD    Post Treatment Position  wheelchair  -RD  wheelchair  -RD    In Wheelchair  sitting;call light within reach;encouraged to call for assist;exit alarm on;with family/caregiver  -RD  sitting;call light within reach;encouraged to call for assist;exit alarm on  -RD      User Key  (r) = Recorded By, (t) = Taken By, (c) = Cosigned By    Initials Name Effective Dates    RD Susie Barajas, OT 10/14/19 -             Occupational Therapy Education                 Title: PT OT SLP Therapies (Done)     Topic: Occupational Therapy (Done)     Point: ADL training (Done)     Description:   Instruct learner(s) on proper safety adaptation and remediation techniques during self care or transfers.   Instruct in proper use of assistive devices.              Learning Progress Summary           Patient Acceptance, E,TB,D, VU by CC at 1/29/2021 1509    Comment: Pt repositioned in reg w/c with 1/2 arm tray on Left. Nursing notified of change from recliner w/c/. Commode tsf from w/c to droparm commode discussed and shown to NA.    Acceptance, E, VU by BL at 1/8/2021 1434   Caregiver Acceptance, E,TB,D, VU by CC at 1/29/2021 1509    Comment: Pt repositioned in reg w/c with 1/2 arm tray on Left. Nursing notified of change from recliner w/c/. Commode tsf from w/c to droparm commode discussed and shown to NA.                   Point: Home exercise program (Done)     Description:   Instruct learner(s) on appropriate technique for monitoring, assisting and/or progressing therapeutic exercises/activities.              Learning Progress Summary           Patient Acceptance, E, VU by BL at 1/8/2021 1434                   Point: Precautions (Done)     Description:   Instruct learner(s) on prescribed precautions during self-care and functional transfers.              Learning Progress Summary           Patient Acceptance, E, VU by BL at 1/8/2021 1434                   Point: Body mechanics (Done)     Description:   Instruct learner(s) on proper positioning and spine alignment during self-care, functional mobility activities and/or exercises.              Learning Progress Summary           Patient Acceptance, E,TB,D, VU by CC at 1/29/2021 1509    Comment: Pt repositioned in reg w/c with 1/2 arm tray on Left. Nursing notified of change from recliner w/c/. Commode tsf from w/c to droparm commode discussed and shown to NA.    Acceptance, E, VU by BL at  1/8/2021 1434   Caregiver Acceptance, E,TB,D, VU by CC at 1/29/2021 1509    Comment: Pt repositioned in reg w/c with 1/2 arm tray on Left. Nursing notified of change from recliner w/c/. Commode tsf from w/c to droparm commode discussed and shown to NA.                               User Key     Initials Effective Dates Name Provider Type Discipline    CC 06/08/18 -  Keya Miranda OTR Occupational Therapist OT    BL 01/05/21 -  Marshall Oleary OT Occupational Therapist OT                    OT Recommendation and Plan                         Time Calculation:     Time Calculation- OT     Row Name 02/05/21 1538 02/05/21 1148          Time Calculation- OT    OT Start Time  1400  -RD  1000  -RD     OT Stop Time  1430  -RD  1030  -RD     OT Time Calculation (min)  30 min  -RD  30 min  -RD     Total Timed Code Minutes- OT  30 minute(s)  -RD  30 minute(s)  -RD     OT Received On  02/05/21  -RD  02/05/21  -RD       User Key  (r) = Recorded By, (t) = Taken By, (c) = Cosigned By    Initials Name Provider Type    RD Susie Barajas, OT Occupational Therapist        Therapy Charges for Today     Code Description Service Date Service Provider Modifiers Qty    87044239710 HC OT SELF CARE/MGMT/TRAIN EA 15 MIN 2/5/2021 Susie Barajas OT GO 2    81491536273 HC OT NEUROMUSC RE EDUCATION EA 15 MIN 2/5/2021 Susie Barajas OT GO 2                   Susie Barajas OT  2/5/2021

## 2021-02-05 NOTE — PLAN OF CARE
Goal Outcome Evaluation:  Plan of Care Reviewed With: patient     Outcome Summary: Pleasant and cooperative, assist x2, and medication with water. Continent and incontinent- 2 small BMs today- Miralax and suppository given. Pain medication given twice

## 2021-02-05 NOTE — PROGRESS NOTES
Inpatient Rehabilitation Plan of Care Note    Plan of Care  Care Plan Reviewed - No updates at this time.    Body Systems    [RN] Respiratory(Active)  Current Status(02/05/2021): O2 2L QHS  Weekly Goal(02/09/2021): Same as discharge  Discharge Goal: Least restrictive amount of 02    Performed Intervention(s)  O2 2L/NC @ HS  assess lungs, sats every shift      Psychosocial    [RN] Coping/Adjustment(Active)  Current Status(02/05/2021): Patient has supportive family, but at risk of  ineffective coping regarding current situation.  Weekly Goal(02/09/2021): Allow patient the opportunity to discuss concerns  regarding new situation.  Discharge Goal: Patient will have healthy coping mechanisms at time of discharge    Performed Intervention(s)  Verbalize needs and concerns  Medications as needed/ ordered  Therapeutic environmental set up      Safety    [RN] Potential for Injury(Active)  Current Status(02/05/2021): Patient at risk of injury related to previous falls.  flaccid on left side. dependent with transfers of 2.  Weekly Goal(02/09/2021): Paitent will use call light appropriately while in  Rehab.  Discharge Goal: Patient will continue to use call light while in Rehab  environment.    Performed Intervention(s)  Items with in reach and environmental set up  Bed alarm/ Chair alarm  Safety rounds and falls protocols/ precautions      Sphincter Control    [RN] Bladder Management(Active)  Current Status(02/05/2021): Patient is continent and incontinent, using bedpan,  PVRs, requiring I/C at times  Weekly Goal(02/09/2021): Patient will be 50% continent  Discharge Goal: Patient will be 100% continent    [RN] Bowel Management(Active)  Current Status(02/05/2021): Patient continent and incontinent  Weekly Goal(02/09/2021): continent 50% of the time  Discharge Goal: Patient will continent 100% of the time    Performed Intervention(s)  Proper diet and fluid intake  Medication as ordered  Incontinent care if needed  Monitor intake  and output    Signed by: Gloria Baeza RN

## 2021-02-05 NOTE — PROGRESS NOTES
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  Trunk control better.   Progressing with ADLS    History taken from: patient    Objective     Vital Signs  Temp:  [97.7 °F (36.5 °C)-98.3 °F (36.8 °C)] 97.7 °F (36.5 °C)  Heart Rate:  [81-95] 81  Resp:  [16-18] 18  BP: (113-123)/(64-75) 123/70    Physical Exam  General: calm, in NAD  Neck: trachea midline  Cardio: well perfused distal extremities, regular radial pulse  Resp: normal WOB on RA, nonlabored.    Abdomen: NT/ND  Extremities:    No edema  No crepitus with range of motion of the left hip.  Neuro: dense left hemiparesis, flaccid tone  Left homonymous hemianopsia    Left inattention.  A&Ox4. 0/5 strength LUE/LLE.   Takes resistance on the right side.    Results from last 7 days   Lab Units 02/05/21  0645 02/01/21  0737   WBC 10*3/mm3 5.20 4.58   HEMOGLOBIN g/dL 10.4* 10.9*   HEMATOCRIT % 32.6* 33.8*   PLATELETS 10*3/mm3 165 164     Results from last 7 days   Lab Units 02/05/21  0645 02/01/21  0737   SODIUM mmol/L 139 138   POTASSIUM mmol/L 4.2 4.3   CHLORIDE mmol/L 102 100   CO2 mmol/L 31.1* 29.6*   BUN mg/dL 19 16   CREATININE mg/dL 0.68 0.60   CALCIUM mg/dL 9.1 9.2   GLUCOSE mg/dL 114* 112*             Ref. Range 1/8/2021 06:01   TSH Baseline Latest Ref Range: 0.270 - 4.200 uIU/mL 2.830   Vitamin B-12 Latest Ref Range: 211 - 946 pg/mL 381   25 Hydroxy, Vitamin D Latest Ref Range: 30.0 - 100.0 ng/ml 20.6 (L)     CT of the abdomen-January 16  CT Abdomen Pelvis With Contrast   Final Result       A cystic focus at the pancreatic head,  likely corresponds to the   ultrasound finding of concern, could be further evaluated with   endoscopic ultrasound as indicated, interval follow-up also recommended   to exclude any possibility of a cystic  neoplasm. The pancreas is   thinned. The main pancreatic duct shows a mildly prominent caliber, 4   mm.          US GALLBLADDER-  1/14/2021   IMPRESSION:  Small to moderate amount of gallbladder sludge with a few  possible tiny nonshadowing gallstones.  2. No wall thickening or pericholecystic fluid is seen. No sonographic  Garcia's sign is seen.  3. Fatty infiltration of the liver.  4. 1.2 cm hypoechoic pancreatic head lesion. No pancreatic head lesions  are seen on the CT of the abdomen and pelvis from [facility]  dated 12/11/2019.  5. CT of the abdomen with contrast using pancreatic protocol is  recommended for further assessment.    EXAMINATION: LIMITED LEFT BREAST SONOGRAPHY-January 20, 2021     HISTORY: 85-year-old female status post recent placement of a loop  recorder on the left side of the chest with an area of palpable concern  in the left breast.     FINDINGS: Targeted sonographic evaluation of the area of palpable  concern in the left breast which corresponds to the upper inner quadrant  was performed. No sonographic abnormality is appreciated.     IMPRESSION:  Negative targeted left breast sonography. Clinical followup  is suggested.     BI-RADS Category 1: Negative  Results Review:     I reviewed the patient's new clinical results.    Medication Review: Complete  Scheduled Meds:Alirocumab, 75 mg, Subcutaneous, Q14 Days  aspirin, 81 mg, Oral, Daily  bisacodyl, 10 mg, Rectal, Q24H  [START ON 3/6/2021] cholecalciferol, 1,000 Units, Oral, Daily  dilTIAZem CD, 180 mg, Oral, BID  docusate sodium, 200 mg, Oral, BID  enoxaparin, 40 mg, Subcutaneous, Daily  ferrous sulfate, 325 mg, Oral, Daily With Breakfast  levothyroxine, 125 mcg, Oral, Q AM  lidocaine, 1 patch, Transdermal, Q24H  losartan, 50 mg, Oral, Daily  mirtazapine, 15 mg, Oral, Nightly  ondansetron ODT, 4 mg, Oral, TID AC  oxybutynin, 5 mg, Oral, Daily With Dinner  pantoprazole, 40 mg, Oral, QAM  vitamin D, 50,000 Units, Oral, Q7  Days      Continuous Infusions:   PRN Meds:.HYDROcodone-acetaminophen  •  nitroglycerin  •  polyethylene glycol  •  traMADol      Assessment/Plan       Stroke (CMS/[facility])    Debility and functional deficits  -PT/OT/SLP     Acute CVA  -[place] 3 MRI at [facility] revealed acute infarct in the left temporal-occipital region, no TPA or MT  -Jan 4 had worsening of her NIHSS, MRI revealed a new right superior frontal gyrus acute infarct. Again outside of the window of TPA, not a candidate for mechanical thrombectomy  -continue ASA and Praluent as she is intolerant of statins  -Echo was unremarkable  -Holter normal  -Loop recorder placed  -A1c 5.0  -TSH and B12 level unremarkable    Loss of appetite  Weight loss  Nausea  -symptoms for approximately 8 months, has lost ~40lbs over that time per patient  -started Remeron 7.5 qHS  -GI consulted, appreciate recs. Started scheduled Zofran with meals on 1/13, continue Remeron.   Jan 14 - GI to check RUQ US, increase bowel regimen, continue Remeron and Zofran as above  January 15 -1.2 cm hypoechoic pancreatic head lesion on ultrasound.  To get CT of the abdomen tomorrow.  January 16 - CT scan showed pancreatic cyst.  Not felt to be the etiology of her nausea.  Follow-up as an outpatient.  January 17- Remeron increased to 15 mg nightly     Left breast nodule  -2x1cm nodule left breast at 10 o'clock position, borders feel smooth, nodule is not freely mobile, no overlying skin changes. Away from the site of recent left loop recorder placement Jan 5, 2021  -Last mammogram one year ago. May not tolerate mammogram with recent loop recorder placement. Patient discussed with Dr. Pride, left breast ultrasound ordered  -Left breast ultrasound negative at the area of interest in the left upper inner quadrant    HLD  -continue ASA and Praluent as she is intolerant of statins     HTN  -continue cardizem, losartan  -normotensive goal  -monitor    Vit D insufficiency  -level 20 on admission  -started  vitamin D replacement     Hypothyroidism  -continue levothyroxine    CAD  -continue ASA and Praluent   January 27-She had some sharp chest pain that was brief last evening at about 1 AM, resolved on its own.  Had nitroglycerin ordered but did not receive it.  She reports at home she did occasionally have chest pain and would take him to glycerin at home, stating that sometimes she would take it as she just felt nervous and worried with the chest pain.  She did feel some palpitations after the chest pain last evening.  We discussed seeing if anything was uploaded on her loop recorder to her cardiologist office and we will check those results.    Obstructive sleep apnea-January 27-she has been on 2 L nasal cannula oxygen at nighttime with sats 100%.  She did not have her CPAP brought in from home     Prerenal OBED, resolved  -secondary to decreased PO intake  -resolved prior to discharge from [facility]  -monitor electrolytes     Iron deficiency anemia  -received 2 doses of IV iron as well as 1UpRBCs on 1/4 at [facility]  -Hgb stable ~8 prior to discharge from [facility], 9.2 on admission to [facility]  -continue oral iron, monitor     Left hip pain  -xrays at [facility] unremarkable  Jan 13 - Fatigue continues to be an issue. She takes Norco 7.5 mg about 4 times a day with history of left hip pain-history of left femur fracture open reduction internal fixation, x-ray at the outside hospital on January 6 showed hardware in place.  January 14-She does have fatigue during the day.  Will try a lower dose of Norco 5 mg rather than 7.5 mg.  Is on Remeron 7.5 g at nighttime but that was only recently added.  January 15-she took Norco 5 mg twice a day at home.  More alert with better trunk control on lower dose of Norco today.  January 21-continues with left hip pain. Taking Norco 5 mg about 4 times a day. Add Lidoderm patch.  January 25-does not feel Lidoderm patch that helpful.  Pain was better with addition of tramadol over the weekend.  Hip range of motion  unremarkable with no crepitus  January 26-left hip xray did not show displacement of surgical hardware, fracture, erosion or dislocation  January 26-she feels tramadol works better for her than hydrocodone.  Anticipate going home on tramadol and discontinue hydrocodone.  She is presently taking tramadol twice a day.  Appears to be tolerating without any side effects.     Fluids/Electrolytes/Nutrition   -cardiac diet  -admission labs unremarkable     DVT ppx  -subQ Lovenox    /UTI  -on Ditropan 5 mg twice a day.  January 11 - voids with acceptable bladder scan postvoid residuals. Urinary tract infection with E. coli-sensitive to Macrobid  January 18 - required intermittent straight catheter 450 cc.  January 19 - incontinent and IC, PVRs 99-391cc  January 22 - has urge to urinate at times but cannot void, will decrease oxybutynin from bid to with dinner only. She is voiding with incontinence with postvoid residual 230-290 cc.  Discussed trial of decreasing Ditropan to 5 mg q. supper first from twice a day before doing a trial of Flomax.  January 23-urinary tract infection-E. coli-on cefdinir starting January 23.  Sensitive to cephalosporins  January 27-voids with postvoid residual  cc.  We will continue Ditropan at lower dose of 5 mg with supper      TEAM CONF - JAN 12 - BED MAX-DEP. TRANSFERS MAX 2 TRANSFER BOARD. NON-AMBULATORY. WHEELCHAIR 50 FEET MIN ASSIST. TOILET TRANSFERS DEP X 2. IMPAIRED VISION. BATH MAX. LBD DEP. UBD MAX. GROOMING MOD ASSIST. COGNITION - MILD TO MODERATE DEFICITS. 2L O2 AT HS. ON MACROBID FOR UTI. FATIGUES. HISTORY OF POOR APPETITE FOR MONTHS, WEIGHT LOSS 30-40 # OVER SEVERAL MONTHS AT HOME, HISTORY OF REPORTED NAUSEA AT HOME. WILL CONSULT GI.   TINY - 4 WEEKS.     TEAM CONF - JAN 19 - BED MAX 2 .  TRANSFERS MAX 2 TRANSFER BOARD. WHEELCHAIR MIN A DUE TO VISIN DEFICITS. HAS SAT UNSUPPORTED FOR 30 SECS BEFORE STARTS TO LEAN. TOILET TRANSFERS DEP X 2. BATH UB MAX, LB DEP X 2. LBD DEP X  2. UBD MAX. GROOMING MOD. LEFT SHOULDER SUBLUXATION - ADD LEUKOTAPING. VOIDS WITH ELEVATED PVRS WITH OCCASIONAL ISC. CONTINENT INCONTINENT.  LEFT INATTENTION, LEFT VISUAL FIELD CUT. IMPAIRED ATTENTION, EXECUTIVE FUNCTION, REASONING. WORKING MEMORY IMPROVING.  BNE (Active)  Att'n. - WNL  Exec. Fx. - Mild Imp.  Rsng/Jgmnt - Mildly Imp. for abstract reaosning  Arith - Min Imp.  Visuospatial Skills - Mildly Imp.  Visual Mem. - Mildly Imp.  Verbal Mem. - Mildly Imp.  Emot - Pt endorsed dep related to current status  DEPRESSION - SUPPORTIVE THERAPY. ON REMERON.   ELOS -   3 WEEKS    TEAM CONF - JAN 26 -  BED MAX 2. TRANSFERS MAX 2. WHEELCHAIR 50 FEET MIN ASSIST, IMPACTED BY VISUAL DEFICITS. LEFT HIP PAIN INCREASED, WILL REPEAT X-RAY. AT OUTSIDE HOSPITAL X-RAY ON JAN 6 - [ORIF hardware in the proximal femur is stable from 5/6/2020. No hardware loosening or perihardware fracture is detected. The hip joint space is mildly narrowed as before. IMPRESSION: Stable ORIF hardware; no acute abnormality.]  TOILET TRANSFERS MAX 2 AS PUSHES. BATH UBB MIN, LBB MAX 2. UBD MAX ASSIST. LBD DEPENDENT. GROOMING MIN ASSIST. ATTENDS TO LEFT A LITTLE BETTER. TOILETING DEP X 2. LEFT VISUAL FIELD DEFICITS. EXECUTIVE FUNCTION - IMPAIRED ATTENTION. MEMORY MIN-MOD DEFICITS. MOD-MAX VISUAL CUES TO LEFT SIDE. VOIDS. ON DECREASED DITROPAN TO 5 MG ONCE A DAY DUE TO ELEVATED PVRS. CONTINENT AT TIMES WITH BLADDER. BOWEL CONTINENT. PAIN MANAGEMENT - TRAMADOL ADDED. WITH REC THERAPY, CUES TO ATTEND TO LEFT.  ELOS -  2-3 WEEKS    TEAM CONF- BED MAX 2. TRANSFERS MAX SQUAT PIVOT, USES LIFT WITH NURSING. SITTING BALANCE BETTER. LEFT INATTENTION. WHEELCHAIR MIN ASSIST WITH VISUAL CUES, DUE TO LEFT VISUAL INATTENTION/LEFT VISUAL FIELD CUT. STANDING AT PARALLEL BARS WITH BLOCKING LEFT KNEE. TOILET TRANSFERS MAX 2 TO DROPARM. BATH MIN UB AND MAX X 2 LB. DRESSING MOD-MAX UB AND DEP X 2 LB. GROOMING MIN-SBA. TOILETING COMES UP TO STAND A LITTLE BETTER, DEP X 2.    IMPAIRED ATTENTION. DISTRACTIBLE. FATIGUES QUICKLY WITH COGNITIVE TASKS. MIN-MOD MEMORY DEFICITS. MOD-MAX VERBAL AND VISUAL CUES FOR READING AND WRITING TASKS. INCONTINENT BOWEL AND BLADDER. PVRS LOW. CHRONIC LEFT HIP PAIN.   ELOS -  ONE WEEK, WILL NEED SIGNIFICANT ASSISTANCE AT HOME.    Azael Abdullahi MD  02/05/21  10:59 EST    During rounds, used appropriate personal protective equipment including mask and gloves.  Additional gown if indicated.  Mask used was standard procedure mask. Appropriate PPE was worn during the entire visit.  Hand hygiene was completed before and after.

## 2021-02-05 NOTE — THERAPY TREATMENT NOTE
Inpatient Rehabilitation - Speech Language Pathology Treatment Note    [facility]     Patient Name: Liana Carrero  : 1935  MRN: [id number]    Today's Date: 2021                   Admit Date: 2021       Visit Dx:      ICD-10-CM ICD-9-CM   1. Impaired functional mobility, balance, gait, and endurance  Z74.09 V49.89       Patient Active Problem List   Diagnosis   • Stroke (cerebrum) (CMS/[facility])   • Chest pain   • Hypertensive urgency   • Headache   • History of embolic stroke   • Hypothyroidism   • Hypokalemia   • CAD in native artery   • URTI (acute upper respiratory infection)   • Closed fracture of left hip (CMS/HCC)   • Chronic diastolic (congestive) heart failure (CMS/[facility])   • Coronary artery disease involving autologous vein coronary bypass graft without angina pectoris   • Impaired glucose tolerance   • Moderate malnutrition (CMS/[facility])   • Stroke (CMS/HCC)       Past Medical History:   Diagnosis Date   • Arthritis    • CHF (congestive heart failure) (CMS/[facility])    • Coronary artery disease    • Diabetes mellitus (CMS/[facility])     new DX related to stroke, started on insulin at hospital   • Disease of thyroid gland    • Elevated cholesterol    • GERD (gastroesophageal reflux disease)    • History of transfusion    • Hypertension    • PONV (postoperative nausea and vomiting)    • Sleep apnea    • Spinal headache    • Stroke (CMS/[facility])        Past Surgical History:   Procedure Laterality Date   • APPENDECTOMY     • CARDIAC CATHETERIZATION       x2   • CARDIAC CATHETERIZATION N/A 2019    Procedure: Left Heart Cath;  Surgeon: Bentley Chapin MD;  Location:  [facility] CATH INVASIVE LOCATION;  Service: Cardiovascular   • CARDIAC CATHETERIZATION N/A 2019    Procedure: Coronary angiography;  Surgeon: Bentley Chapin MD;  Location:  [facility] INVAS[facility]ATION;  Service: Cardiovascular   • CARDIAC CATHETERIZATION N/A 2019    Procedure: Native mammary injection;  Surgeon: Bentley Chapin MD;   "Location: [facility] CATH INVASIVE LOCATION;  Service: Cardiovascular   • CARDIAC CATHETERIZATION N/A 12/26/2019    Procedure: Saphenous Vein Graft;  Surgeon: Bentley Chapin MD;  Location: [facility] CATH INVASIVE LOCATION;  Service: Cardiovascular   • COLON SURGERY     • COLONOSCOPY     • EYE SURGERY      lens implants and cataract surgery   • FEMUR IM NAILING/RODDING Left 3/26/2020    Procedure: LT.HIP NAILING/RODDING;  Surgeon: Carlos Khan II, MD;  Location: [facility] MAIN OR;  Service: Orthopedics;  Laterality: Left;   • FRACTURE SURGERY Left 2020   • HYSTERECTOMY     • VASCULAR SURGERY      varicous veins     Patient was wearing a face mask during this therapy encounter. Therapist used appropriate personal protective equipment including mask, eye protection, and gloves.  Mask used was standard procedure mask. Appropriate PPE was worn during the entire therapy session. Hand hygiene was completed before and after therapy session. Patient is not in enhanced droplet precautions.       SLP EVALUATION (last 72 hours)      SLP SLC Evaluation     Row Name 02/05/21 1301 02/05/21 0931 02/03/21 1330             Communication Assessment/Intervention    Document Type  therapy note (daily note)  -LN  therapy note (daily note)  -LN  therapy note (daily note)  -SL      Subjective Information  no complaints  -LN  no complaints  -LN  complains of;fatigue second session  -SL      Patient Observations  -- lethargic in PM. Needed frequent cues to maintain adequate alertness, though she was still able to participate fully.   -LN  --  lethargic  -SL      Patient/Family/Caregiver Comments/Observations  --  Sitting upright in w/c.  -LN  pt up in w/c for am session, but did poorly for pm session- back in bed c/o being \"very tired\" - difficult to keep focused on tx; reduced participation in second session every day as pt always fatiqued  -SL      Patient Effort  adequate  -LN  good  -LN  fair  -SL      Symptoms Noted During/After " Treatment  none  -LN  none  -LN  fatigue  -        User Key  (r) = Recorded By, (t) = Taken By, (c) = Cosigned By    Initials Name Effective Dates    RL Katelyn James, MS CCC-SLP 06/08/18 -     LN Nuzhat Beebe 12/17/19 -              EDUCATION    The patient has been educated in the following areas:       Cognitive Impairment.      SLP Recommendation and Plan                                                  SLP GOALS     Row Name 02/05/21 0931 02/04/21 0900 02/03/21 1450       Attention Goal 1 (SLP)    Improve Attention by Goal 1 (SLP)  looking at speaker;attending to task;complete selective attention task;80%;with minimal cues (75-90%)  -LN  --  --    Time Frame (Attention Goal 1, SLP)  1 week  -LN  --  --    Progress (Attention Goal 1, SLP)  30%;independently (over 90% accuracy);90%;with minimal cues (75-90%)  -LN  --  --    Progress/Outcomes (Attention Goal 1, SLP)  goal ongoing  -LN  --  --    Comment (Attention Goal 1, SLP)  Following multi-step written directions  -LN  --  --       Memory Skills Goal 1 (SLP)    Improve Memory Skills Through Goal 1 (SLP)  recalling related word lists immediately;recalling related word lists with an imposed delay;recall details from a word list;repeat sentence;select a word from a list by exclusion;listen to a paragraph and answer questions;use memory strategies;80%;with minimal cues (75-90%)  -LN  --  --    Time Frame (Memory Skills Goal 1, SLP)  1 week  -LN  --  --    Progress (Memory Skills Goal 1, SLP)  90%;independently (over 90% accuracy)  -LN  --  --    Progress/Outcomes (Memory Skills Goal 1, SLP)  good progress toward goal  -LN  --  --    Comment (Memory Skills Goal 1, SLP)  Working memory (manipulation of three words). Short-term memory of related items: recalled 1/3 with no cues, 2/3 with min cues, and 3/3 with mod cues after 5 minutes.   -LN  --  70% for recall of medicine label information   -       Organizational Skills Goal 1 (SLP)    Improve Thought  "Organization Through Goal 1 (SLP)  completing a divergent naming task;completing a convergent naming task;generating a list of items in a category;naming by category exclusion;naming similarities and differences;drawing conclusions;80%;with minimal cues (75-90%)  -LN  completing a divergent naming task;completing a convergent naming task;generating a list of items in a category;naming by category exclusion;naming similarities and differences;drawing conclusions;80%;with minimal cues (75-90%) pt reports 7/10 back pain  -SH  --    Time Frame (Thought Organization Skills Goal 1, SLP)  1 week  -LN  1 week  -SH  --    Progress (Thought Organization Skills Goal 1, SLP)  90%;independently (over 90% accuracy)  -LN  --  --    Progress/Outcomes (Thought Organization Skills Goal 1, SLP)  goal ongoing  -LN  goal ongoing  -SH  --    Comment (Thought Organization Skills Goal 1, SLP)  Basic divergent naming (oral)  -LN  Name category member when provided initial letter-70%  -SH  --       Reasoning Goal 1 (SLP)    Improve Reasoning Through Goal 1 (SLP)  complete basic reasoning task;complete analogies;complete deductive reasoning task;80%;with minimal cues (75-90%)  -LN  --  -SH  --    Time Frame (Reasoning Goal 1, SLP)  1 week  -LN  --  -SH  --    Progress (Reasoning Goal 1, SLP)  20%;with minimal cues (75-90%);100%;with moderate cues (50-74%)  -LN  --  --    Progress/Outcomes (Reasoning Goal 1, SLP)  goal ongoing  -LN  --  --    Comment (Reasoning Goal 1, SLP)  High-level mental flexibility   -LN  --  -SH  --       Functional Problem Solving Skills Goal 1 (SLP)    Improve Problem Solving Through Goal 1 (SLP)  answer questions about ADL problems;determine solutions to simple ADL/safety problems;answer \"what if\" questions;tell similarity between items;name items for a task;80%;with minimal cues (75-90%)  -LN  --  --    Time Frame (Problem Solving Goal 1, SLP)  1 week  -LN  --  --    Progress (Problem Solving Goal 1, SLP)  " 80%;independently (over 90% accuracy);other (comment) extra time frequently needed; though due in part to lethargy  -LN  --  --    Progress/Outcomes (Problem Solving Goal 1, SLP)  goal ongoing  -LN  --  --    Comment (Problem Solving Goal 1, SLP)  Identifying similarities and differences (two each)  -LN  --  --       Right Hemisphere Function Goal 1 (SLP)    Improve Right Hemisphere Function Through Goal 1 (SLP)  demonstrate awareness of communication partner in left visual field;complete visuo-spatial activities (visual closure, trail making, mazes;complete visuo-perceptual activities (L/R discrimination, spatial concepts);use compensatory strategies for left neglect;70%;with moderate cues (50-74%)  -LN  demonstrate awareness of communication partner in left visual field;complete visuo-spatial activities (visual closure, trail making, mazes;complete visuo-perceptual activities (L/R discrimination, spatial concepts);use compensatory strategies for left neglect;70%;with moderate cues (50-74%)  -SH  --    Time Frame (Right Hemisphere Function Goal 1, SLP)  1 week  -LN  1 week  -SH  --    Progress (Right Hemisphere Function Goal 1, SLP)  70%;with minimal cues (75-90%);100%;with moderate cues (50-74%)  -LN  --  --    Progress/Outcomes (Right Hemisphere Function Goal 1, SLP)  goal ongoing  -LN  --  --    Comment (Right Hemisphere Function Goal 1, SLP)  Reading multi-step directions and shifting gaze left/right and up/down  -LN  MOD-MAX cues to complete word search in AM session, min cues in PM session-75% acc 6/10 back pain-chronic per pt  -SH  MOD cues to scan to left of page with visual cues  for reading numerical sequences;  MAX cues for reading of medication label  -SL    Row Name 02/03/21 1400             Memory Skills Goal 1 (SLP)    Barriers (Memory Skills Goal 1, SLP)  60% for short paragraph recall ( presented verbally)  -SL      Progress (Memory Skills Goal 1, SLP)  50%;independently (over 90% accuracy) mental  manip task- 3 words - alphabetical order  -SL      Progress/Outcomes (Memory Skills Goal 1, SLP)  progress slower than expected  -SL      Comment (Memory Skills Goal 1, SLP)  neg impacted by poor attnlissett  -SL         Reasoning Goal 1 (SLP)    Progress (Reasoning Goal 1, SLP)  60%;70%;with minimal cues (75-90%) inferences from short stories  -SL      Progress/Outcomes (Reasoning Goal 1, SLP)  progress slower than expected;goal ongoing  -SL        User Key  (r) = Recorded By, (t) = Taken By, (c) = Cosigned By    Initials Name Provider Type    Katelyn Fan, MS CCC-SLP Speech and Language Pathologist     Blanca Marquez MS CCC-SLP Speech and Language Pathologist    Nuzhat Worley Speech and Language Pathologist                      Time Calculation:       Time Calculation- SLP     Row Name 02/05/21 1501             Time Calculation- SLP    SLP Start Time  0930  -LN      SLP Stop Time  1000  -LN      SLP Time Calculation (min)  30 min  -LN      SLP Received On  02/05/21  -        User Key  (r) = Recorded By, (t) = Taken By, (c) = Cosigned By    Initials Name Provider Type    Nuzhat Worley Speech and Language Pathologist            Therapy Charges for Today     Code Description Service Date Service Provider Modifiers Qty    [id number]  ST SELF CARE/MGMT/TRAIN EA 15 MIN 2/5/2021 Nuzhat Beebe GN 1    [id number]  ST DEV OF COGN SKILLS INITIAL 15 MIN 2/5/2021 Nuzhat Beebe  3                           Nuzhat Beebe  2/5/2021

## 2021-02-05 NOTE — THERAPY PROGRESS REPORT/RE-CERT
Inpatient Rehabilitation - Physical Therapy Progress Note       [facility]     Patient Name: Liana Carrero  : 1935  MRN: [id number]    Today's Date: 2021                    Admit Date: 2021      Visit Dx:     ICD-10-CM ICD-9-CM   1. Impaired functional mobility, balance, gait, and endurance  Z74.09 V49.89       Patient Active Problem List   Diagnosis   • Stroke (cerebrum) (CMS/[facility])   • Chest pain   • Hypertensive urgency   • Headache   • History of embolic stroke   • Hypothyroidism   • Hypokalemia   • CAD in native artery   • URTI (acute upper respiratory infection)   • Closed fracture of left hip (CMS/[facility])   • Chronic diastolic (congestive) heart failure (CMS/[facility])   • Coronary artery disease involving autologous vein coronary bypass graft without angina pectoris   • Impaired glucose tolerance   • Moderate malnutrition (CMS/[facility])   • Stroke (CMS/[facility])       Past Medical History:   Diagnosis Date   • Arthritis    • CHF (congestive heart failure) (CMS/[facility])    • Coronary artery disease    • Diabetes mellitus (CMS/[facility])     new DX related to stroke, started on insulin at hospital   • Disease of thyroid gland    • Elevated cholesterol    • GERD (gastroesophageal reflux disease)    • History of transfusion    • Hypertension    • PONV (postoperative nausea and vomiting)    • Sleep apnea    • Spinal headache    • Stroke (CMS/[facility])        Past Surgical History:   Procedure Laterality Date   • APPENDECTOMY     • CARDIAC CATHETERIZATION       x2   • CARDIAC CATHETERIZATION N/A 2019    Procedure: Left Heart Cath;  Surgeon: Bentley Chapin MD;  Location:  [facility] CATH INVASIVE LOCATION;  Service: Cardiovascular   • CARDIAC CATHETERIZATION N/A 2019    Procedure: Coronary angiography;  Surgeon: Bentley Chapin MD;  Location:  [facility]ATH [facility]VAS[facility];  Service: Cardiovascular   • CARDIAC CATHETERIZATION N/A 2019    Procedure: Native mammary injection;  Surgeon: Bentley Chapin MD;   Location:  [facility] CATH INVASIVE LOCATION;  Service: Cardiovascular   • CARDIAC CATHETERIZATION N/A 12/26/2019    Procedure: Saphenous Vein Graft;  Surgeon: Bentley Chapin MD;  Location: [facility] CATH INVASIVE LOCATION;  Service: Cardiovascular   • COLON SURGERY     • COLONOSCOPY     • EYE SURGERY      lens implants and cataract surgery   • FEMUR IM NAILING/RODDING Left 3/26/2020    Procedure: LT.HIP NAILING/RODDING;  Surgeon: Carlos Khan II, MD;  Location: [facility] MAIN OR;  Service: Orthopedics;  Laterality: Left;   • FRACTURE SURGERY Left 2020   • HYSTERECTOMY     • VASCULAR SURGERY      varicous veins          PT ASSESSMENT (last 12 hours)      IRF PT Evaluation and Treatment     Row Name 02/05/21 1201          PT Time and Intention    Document Type  progress note  -MS     Mode of Treatment  physical therapy  -MS     Patient/Family/Caregiver Comments/Observations  AM: seated up in wheelchair, upset her phone has been lost, exit alarm on  -MS     Total Minutes, Physical Therapy  60  -MS     Row Name 02/05/21 1201          General Information    Existing Precautions/Restrictions  fall  -MS     Limitations/Impairments  safety/cognitive  -MS     Row Name 02/05/21 1201          Vision Assessment/Intervention    Visual Impairment/Limitations  visual/perceptual impairments present  -MS     Visual Field Deficit  homonymous hemianopsia, left  -MS     Visual Motor Impairment  visual tracking, left  -MS     Visual Processing Deficit  jon-inattention/neglect, left  -MS     Row Name 02/05/21 1201          Cognition/Psychosocial    Affect/Mental Status (Cognitive)  sad/depressed affect  -MS     Orientation Status (Cognition)  oriented x 3  -MS     Follows Commands (Cognition)  follows two-step commands;repetition of directions required;verbal cues/prompting required;physical/tactile prompts required  -MS     Personal Safety Interventions  fall prevention program maintained;gait belt;nonskid shoes/slippers when out  of bed  -MS     Attention Deficit (Cognitive)  distractible in noisy environment;distractible in quiet environment;divided attention  -MS     Executive Function Deficit (Cognition)  judgment  -MS     Row Name 02/05/21 1201          Pain Scale: Numbers Pre/Post-Treatment    Pretreatment Pain Rating  4/10  -MS     Posttreatment Pain Rating  4/10  -MS     Pain Location - Orientation  lower  -MS     Pain Location  back  -MS     Row Name 02/05/21 1201          Bed Mobility    Comment (Bed Mobility)  NT- up in wheelchair  -MS     Row Name 02/05/21 1201          Transfer Assessment/Treatment    Comment (Transfers)  2 standing trials- 60s, LBP limiting  -MS     Row Name 02/05/21 1201          Transfers    Bed-Chair [place] (Transfers)  maximum assist (25% patient effort);verbal cues  -MS     Chair-Bed [place] (Transfers)  maximum assist (25% patient effort);verbal cues;nonverbal cues (demo/gesture)  -MS     Assistive Device (Bed-Chair Transfers)  wheelchair rehab mat  -MS     Sit-Stand [place] (Transfers)  maximum assist (25% patient effort);2 person assist;verbal cues;nonverbal cues (demo/gesture)  -MS     Stand-Sit [place] (Transfers)  maximum assist (25% patient effort);2 person assist;verbal cues;nonverbal cues (demo/gesture)  -MS     Row Name 02/05/21 1201          Chair-Bed Transfer    Assistive Device (Chair-Bed Transfers)  wheelchair rehab mat  -MS     Row Name 02/05/21 1201          Sit-Stand Transfer    Assistive Device (Sit-Stand Transfers)  other (see comments);wheelchair // bars  -MS     Row Name 02/05/21 1201          Stand-Sit Transfer    Assistive Device (Stand-Sit Transfers)  other (see comments);wheelchair // bars  -MS     Row Name 02/05/21 1201          Squat Pivot Transfer    Squat Pivot [place] (Transfers)  maximum assist (25% patient effort);verbal cues;nonverbal cues (demo/gesture)  -MS     Assistive Device (Squat Pivot Transfers)  wheelchair rehab mat  -MS     Row Name  02/05/21 1201          Wheelchair Mobility/Management    Method of Wheelchair Locomotion (Mobility)  jon propulsion, right sided  -MS     Mobility Activities (Wheelchair)  forward propulsion  -MS     Forward Propulsion [place] (Wheelchair)  contact guard;verbal cues  -MS     Distance Propelled in Feet (Wheelchair)  25  -MS     Armrest Management [place] (Wheelchair)  dependent (less than 25% patient effort)  -MS     Front Rigging/Swing-away Footrest Management [place] (Wheelchair)  dependent (less than 25% patient effort)  -MS     Wheel Locks/Brakes Management [place] (Wheelchair)  dependent (less than 25% patient effort) SV on R lock  -MS     Row Name 02/05/21 1201          Balance    Static Sitting Balance  mild impairment  -MS     Dynamic Sitting Balance  mild impairment  -MS     Sit to Stand Dynamic Balance  moderate impairment;supported  -MS     Static Standing Balance  moderate impairment;supported  -MS     Balance Interventions  sitting;static;dynamic;moderate challenge;highly challenging;trunk training exercise;weight shifting activity;UE activity with balance activity  -MS     Comment, Balance  Corn hole, reaching for bean bags on L, LOB but able to correct and achieve midline 50-75% of the time.  -MS     Row Name 02/05/21 1201          Bed Mobility Goal 2 (PT-IRF)    Progress/Outcomes (Bed Mobility Goal 2, PT-IRF)  goal ongoing  -MS     Row Name 02/05/21 1201          Transfer Goal 1 (PT-IRF)    Progress/Outcomes (Transfer Goal 1, PT-IRF)  goal ongoing  -MS     Row Name 02/05/21 1201          Transfer Goal 2 (PT-IRF)    Progress/Outcomes (Transfer Goal 2, PT-IRF)  goal ongoing  -MS     Row Name 02/05/21 1201          Gait/Walking Locomotion Goal 1 (PT-IRF)    Progress/Outcomes (Gait/Walking Locomotion Goal 1, PT-IRF)  goal no longer appropriate  -MS     Row Name 02/05/21 1201          Wheelchair Locomotion Goal 1 (PT-IRF)    Progress/Outcomes (Wheelchair Locomotion Goal 1, PT-IRF)   goal ongoing  -MS     Row Name 02/05/21 1201          Balance Goal 1 (PT)    Progress/Outcomes (Balance Goal 1, PT)  goal met  -MS     Row Name 02/05/21 1201          Balance Goal 2 (PT)    Progress/Outcome (Balance Goal 2, PT)  goal met  -MS     Row Name 02/05/21 1201          Positioning and Restraints    Pre-Treatment Position  sitting in chair/recliner  -MS     Post Treatment Position  wheelchair  -MS     In Wheelchair  sitting;call light within reach;encouraged to call for assist;exit alarm on AM session  -MS       User Key  (r) = Recorded By, (t) = Taken By, (c) = Cosigned By    Initials Name Provider Type    MS Kaba Nati RAGHU, PT Physical Therapist        Wound 01/07/21 Left upper chest Incision (Active)   Dressing Appearance open to air 02/04/21 2114   Closure Liquid skin adhesive;Open to air 02/05/21 0757   Base clean;dry 02/05/21 0757   Periwound intact 02/05/21 0757   Drainage Amount none 02/05/21 0757   Dressing Care open to air 02/04/21 2114   Periwound Care dry periwound area maintained 02/04/21 2114     Physical Therapy Education                 Title: PT OT SLP Therapies (Done)     Topic: Physical Therapy (Done)     Point: Mobility training (Done)     Learning Progress Summary           Patient Acceptance, E,TB, VU,NR by MS at 2/5/2021 1208    Acceptance, E,TB, VU,NR by MS at 2/4/2021 1518    Acceptance, E,TB, VU,NR by MS at 2/3/2021 1151    Acceptance, E,TB, VU,NR by MS at 2/2/2021 1210    Acceptance, E,TB, NR by MS at 2/1/2021 1145    Acceptance, E, NR by  at 1/30/2021 1336    Acceptance, E,TB, VU,NR by MS at 1/29/2021 1535    Acceptance, E, NR by LB at 1/28/2021 1032    Acceptance, E,TB, VU,NR by MS at 1/27/2021 1447    Acceptance, E,TB, VU,NR by MS at 1/26/2021 0959    Acceptance, E,TB, VU,NR by MS at 1/25/2021 1316    Acceptance, E,TB, VU,NR by MS at 1/22/2021 0918    Acceptance, E,TB, VU by MS at 1/20/2021 1544    Acceptance, E,TB, VU,NR by MS at 1/19/2021 1401    Acceptance, E,TB, NR,NL  by MS at 1/18/2021 1432    Acceptance, E,TB, VU,NR by IVAN at 1/16/2021 1444    Acceptance, E,TB, NR,NL by MS at 1/15/2021 1418    Acceptance, E,TB, NR by MS at 1/14/2021 1223    Acceptance, E,TB, NR by LB1 at 1/13/2021 1030    Comment: sitting balance    Acceptance, E,TB, NR by MS at 1/12/2021 1312    Acceptance, E,TB, VU,NR by MS at 1/11/2021 1140                   Point: Home exercise program (Done)     Learning Progress Summary           Patient Acceptance, E,TB, VU,NR by MS at 2/5/2021 1208    Acceptance, E,TB, VU,NR by MS at 2/4/2021 1518    Acceptance, E,TB, VU,NR by MS at 2/3/2021 1151    Acceptance, E,TB, VU,NR by MS at 2/2/2021 1210    Acceptance, E,TB, NR by MS at 2/1/2021 1145    Acceptance, E, NR by LH at 1/30/2021 1336    Acceptance, E,TB, VU,NR by MS at 1/29/2021 1535    Acceptance, E,TB, VU,NR by MS at 1/27/2021 1447    Acceptance, E,TB, VU,NR by MS at 1/26/2021 0959    Acceptance, E,TB, VU,NR by MS at 1/25/2021 1316    Acceptance, E,TB, VU,NR by MS at 1/22/2021 0918    Acceptance, E,TB, VU by MS at 1/20/2021 1544    Acceptance, E,TB, VU,NR by MS at 1/19/2021 1401    Acceptance, E,TB, NR,NL by MS at 1/18/2021 1432    Acceptance, E,TB, NR,NL by MS at 1/15/2021 1418    Acceptance, E,TB, NR by MS at 1/14/2021 1223                   Point: Body mechanics (Done)     Learning Progress Summary           Patient Acceptance, E,TB, VU,NR by MS at 2/5/2021 1208    Acceptance, E,TB, VU,NR by MS at 2/4/2021 1518    Acceptance, E,TB, VU,NR by MS at 2/3/2021 1151    Acceptance, E,TB, VU,NR by MS at 2/2/2021 1210    Acceptance, E,TB, NR by MS at 2/1/2021 1145    Acceptance, E, NR by LH at 1/30/2021 1336    Acceptance, E,TB, VU,NR by MS at 1/29/2021 1535    Acceptance, E,TB, VU,NR by MS at 1/27/2021 1447    Acceptance, E,TB, VU,NR by MS at 1/26/2021 0959    Acceptance, E,TB, VU,NR by MS at 1/25/2021 1316    Acceptance, E,TB, VU,NR by MS at 1/22/2021 0918    Acceptance, E,TB, VU by MS at 1/20/2021 1544    Acceptance,  E,TB, VU,NR by MS at 1/19/2021 1401    Acceptance, E,TB, NR,NL by MS at 1/15/2021 1418    Acceptance, E,TB, NR by MS at 1/14/2021 1223    Acceptance, E,TB, NR by MS at 1/12/2021 1312    Acceptance, E,TB, VU,NR by MS at 1/11/2021 1140                   Point: Precautions (Done)     Learning Progress Summary           Patient Acceptance, E,TB, VU,NR by MS at 2/5/2021 1208    Acceptance, E,TB, VU,NR by MS at 2/4/2021 1518    Acceptance, E,TB, VU,NR by MS at 2/3/2021 1151    Acceptance, E,TB, VU,NR by MS at 2/2/2021 1210    Acceptance, E,TB, NR by MS at 2/1/2021 1145    Acceptance, E, NR by LH at 1/30/2021 1336    Acceptance, E,TB, VU,NR by MS at 1/29/2021 1535    Acceptance, E,TB, VU,NR by MS at 1/27/2021 1447    Acceptance, E,TB, VU,NR by MS at 1/26/2021 0959    Acceptance, E,TB, VU,NR by MS at 1/25/2021 1316    Acceptance, E,TB, VU,NR by MS at 1/22/2021 0918    Acceptance, E, VU by MD at 1/21/2021 0926    Acceptance, E,TB, VU by MS at 1/20/2021 1544    Acceptance, E,TB, VU,NR by MS at 1/19/2021 1401    Acceptance, E,TB, NR,NL by MS at 1/15/2021 1418    Acceptance, E,TB, NR by MS at 1/14/2021 1223    Acceptance, E,TB, NR by MS at 1/12/2021 1312    Acceptance, E,TB, VU,NR by MS at 1/11/2021 1140    Acceptance, E, VU by MD at 1/9/2021 0933                               User Key     Initials Effective Dates Name Provider Type Discipline    IVAN 06/08/18 -  Ela Pritchett, PT Physical Therapist PT    LB1 04/03/18 -  Baehl, Jazmin B, PT Physical Therapist PT    LH 04/03/18 -  Elba Myrick, PT Physical Therapist PT    LB 03/07/18 -  Elba Grigsby, PTA Physical Therapy Assistant PT    MD 04/03/18 -  Lisandra Lowery, PT Physical Therapist PT    MS 03/04/19 -  Nati Kaba, PT Physical Therapist PT                PT Recommendation and Plan    Planned Therapy Interventions (PT): balance training, bed mobility training, gait training, home exercise program, postural re-education, patient/family education, neuromuscular  re-education, strengthening, transfer training, wheelchair management/propulsion training, ROM (range of motion)  Frequency of Treatment (PT): 60 minutes per session  Anticipated Equipment Needs at Discharge (PT Eval): wheelchair(noted patient already has wc(?) will get details)     Patient continues to require maxAx2 for bed mobility and maxA for transfers. Over the last week, static sitting balance has significantly improved and have been able to progress towards activities with emphasis on dynamic sitting balance. Patient continues to require maxAx2 for standing trials at jon bars. Patient has met all sitting balance goals while all others are ongoing. Will continue to advance as able.    Patient was intermittently wearing a face mask during this therapy encounter. Therapist used appropriate personal protective equipment including eye protection, mask, and gloves.  Mask used was standard procedure mask. Appropriate PPE was worn during the entire therapy session. Hand hygiene was completed before and after therapy session. Patient is not in enhanced droplet precautions. Mirza Pike also present during therapy sessions.        Time Calculation:     PT Charges     Row Name 02/05/21 1348 02/05/21 1347          Time Calculation    Start Time  1330  -MS  1100  -MS     Stop Time  1400  -MS  1130  -MS     Time Calculation (min)  30 min  -MS  30 min  -MS     PT Received On  --  02/05/21  -MS     PT - Next Appointment  --  02/06/21  -MS     PT Goal Re-Cert Due Date  --  02/12/21  -MS       User Key  (r) = Recorded By, (t) = Taken By, (c) = Cosigned By    Initials Name Provider Type    Nati Quinteros PT Physical Therapist          Therapy Charges for Today     Code Description Service Date Service Provider Modifiers Qty    91801941915  PT THER PROC EA 15 MIN 2/4/2021 Nati Kaba, PT GP 4    [id number]  PT THER SUPP EA 15 MIN 2/4/2021 Nati Kaba, PT GP 4    [id number]  PT THER PROC EA 15  MIN 2/5/2021 Nati Kaba, PT GP 4    [id number]  PT THER SUPP EA 15 MIN 2/5/2021 KabaNati, PT GP 4                   Nati Casarezs, PT  2/5/2021

## 2021-02-06 PROCEDURE — 97112 NEUROMUSCULAR REEDUCATION: CPT

## 2021-02-06 PROCEDURE — 25010000002 ENOXAPARIN PER 10 MG: Performed by: PHYSICAL MEDICINE & REHABILITATION

## 2021-02-06 PROCEDURE — 63710000001 ONDANSETRON ODT 4 MG TABLET DISPERSIBLE: Performed by: INTERNAL MEDICINE

## 2021-02-06 RX ADMIN — DOCUSATE SODIUM 200 MG: 100 CAPSULE, LIQUID FILLED ORAL at 08:22

## 2021-02-06 RX ADMIN — TRAMADOL HYDROCHLORIDE 50 MG: 50 TABLET ORAL at 19:36

## 2021-02-06 RX ADMIN — LEVOTHYROXINE SODIUM 125 MCG: 0.12 TABLET ORAL at 06:27

## 2021-02-06 RX ADMIN — DOCUSATE SODIUM 200 MG: 100 CAPSULE, LIQUID FILLED ORAL at 19:36

## 2021-02-06 RX ADMIN — ONDANSETRON 4 MG: 4 TABLET, ORALLY DISINTEGRATING ORAL at 17:47

## 2021-02-06 RX ADMIN — ONDANSETRON 4 MG: 4 TABLET, ORALLY DISINTEGRATING ORAL at 06:27

## 2021-02-06 RX ADMIN — MIRTAZAPINE 15 MG: 15 TABLET, FILM COATED ORAL at 19:36

## 2021-02-06 RX ADMIN — DILTIAZEM HYDROCHLORIDE 180 MG: 180 CAPSULE, COATED, EXTENDED RELEASE ORAL at 19:36

## 2021-02-06 RX ADMIN — ERGOCALCIFEROL 50000 UNITS: 1.25 CAPSULE ORAL at 08:23

## 2021-02-06 RX ADMIN — OXYBUTYNIN CHLORIDE 5 MG: 5 TABLET ORAL at 17:47

## 2021-02-06 RX ADMIN — PANTOPRAZOLE SODIUM 40 MG: 40 TABLET, DELAYED RELEASE ORAL at 06:27

## 2021-02-06 RX ADMIN — BISACODYL 10 MG: 10 SUPPOSITORY RECTAL at 17:47

## 2021-02-06 RX ADMIN — HYDROCODONE BITARTRATE AND ACETAMINOPHEN 1 TABLET: 5; 325 TABLET ORAL at 15:34

## 2021-02-06 RX ADMIN — ONDANSETRON 4 MG: 4 TABLET, ORALLY DISINTEGRATING ORAL at 12:25

## 2021-02-06 RX ADMIN — DILTIAZEM HYDROCHLORIDE 180 MG: 180 CAPSULE, COATED, EXTENDED RELEASE ORAL at 08:23

## 2021-02-06 RX ADMIN — HYDROCODONE BITARTRATE AND ACETAMINOPHEN 1 TABLET: 5; 325 TABLET ORAL at 23:15

## 2021-02-06 RX ADMIN — ASPIRIN 81 MG: 81 TABLET, CHEWABLE ORAL at 08:23

## 2021-02-06 RX ADMIN — FERROUS SULFATE TAB 325 MG (65 MG ELEMENTAL FE) 325 MG: 325 (65 FE) TAB at 08:23

## 2021-02-06 RX ADMIN — TRAMADOL HYDROCHLORIDE 50 MG: 50 TABLET ORAL at 08:33

## 2021-02-06 RX ADMIN — LOSARTAN POTASSIUM 50 MG: 50 TABLET, FILM COATED ORAL at 08:22

## 2021-02-06 RX ADMIN — ENOXAPARIN SODIUM 40 MG: 40 INJECTION SUBCUTANEOUS at 08:22

## 2021-02-06 RX ADMIN — LIDOCAINE 1 PATCH: 50 PATCH TOPICAL at 08:23

## 2021-02-06 NOTE — PROGRESS NOTES
Cc left hand pain    Has all over achiness.  Has pain in the left hand where she hit it, not really neuropathic. Is not able to move yet.  Meds for pain help some.   No chest pain, sob.     97.8 84 16 117/58  Awake, alert an 0x3  NAD  Mood good  No increased work of breathing  abd soft,nt  Calves soft,nt, no edema  No movment left side      Debility and functional deficits  -PT/OT/SLP     Acute CVA  -[place] 3 MRI at [facility] revealed acute infarct in the left temporal-occipital region, no TPA or MT  -Jan 4 had worsening of her NIHSS, MRI revealed a new right superior frontal gyrus acute infarct. Again outside of the window of TPA, not a candidate for mechanical thrombectomy  -continue ASA and Praluent as she is intolerant of statins  -Echo was unremarkable  -Holter normal  -Loop recorder placed  -A1c 5.0  -TSH and B12 level unremarkable     Loss of appetite  Weight loss  Nausea  -symptoms for approximately 8 months, has lost ~40lbs over that time per patient  -started Remeron 7.5 qHS  -GI consulted, appreciate recs. Started scheduled Zofran with meals on 1/13, continue Remeron.   Jan 14 - GI to check RUQ US, increase bowel regimen, continue Remeron and Zofran as above  January 15 -1.2 cm hypoechoic pancreatic head lesion on ultrasound.  To get CT of the abdomen tomorrow.  January 16 - CT scan showed pancreatic cyst.  Not felt to be the etiology of her nausea.  Follow-up as an outpatient.  January 17- Remeron increased to 15 mg nightly     Left breast nodule  -2x1cm nodule left breast at 10 o'clock position, borders feel smooth, nodule is not freely mobile, no overlying skin changes. Away from the site of recent left loop recorder placement Jan 5, 2021  -Last mammogram one year ago. May not tolerate mammogram with recent loop recorder placement. Patient discussed with Dr. Pride, left breast ultrasound ordered  -Left breast ultrasound negative at the area of interest in the left upper inner quadrant     HLD  -continue ASA and  Praluent as she is intolerant of statins     HTN  -continue cardizem, losartan  -normotensive goal  -monitor     Vit D insufficiency  -level 20 on admission  -started vitamin D replacement     Hypothyroidism  -continue levothyroxine    CAD  -continue ASA and Praluent   January 27-She had some sharp chest pain that was brief last evening at about 1 AM, resolved on its own.  Had nitroglycerin ordered but did not receive it.  She reports at home she did occasionally have chest pain and would take him to glycerin at home, stating that sometimes she would take it as she just felt nervous and worried with the chest pain.  She did feel some palpitations after the chest pain last evening.  We discussed seeing if anything was uploaded on her loop recorder to her cardiologist office and we will check those results.     Obstructive sleep apnea-January 27-she has been on 2 L nasal cannula oxygen at nighttime with sats 100%.  She did not have her CPAP brought in from home     Prerenal OBED, resolved  -secondary to decreased PO intake  -resolved prior to discharge from [facility]  -monitor electrolytes     Iron deficiency anemia  -received 2 doses of IV iron as well as 1UpRBCs on 1/4 at [facility]  -Hgb stable ~8 prior to discharge from [facility], 9.2 on admission to [facility]  -continue oral iron, monitor     Left hip pain  -xrays at [facility] unremarkable  Jan 13 - Fatigue continues to be an issue. She takes Norco 7.5 mg about 4 times a day with history of left hip pain-history of left femur fracture open reduction internal fixation, x-ray at the outside hospital on January 6 showed hardware in place.  January 14-She does have fatigue during the day.  Will try a lower dose of Norco 5 mg rather than 7.5 mg.  Is on Remeron 7.5 g at nighttime but that was only recently added.  January 15-she took Norco 5 mg twice a day at home.  More alert with better trunk control on lower dose of Norco today.  January 21-continues with left hip pain. Taking Norco 5 mg about 4  times a day. Add Lidoderm patch.  January 25-does not feel Lidoderm patch that helpful.  Pain was better with addition of tramadol over the weekend.  Hip range of motion unremarkable with no crepitus  January 26-left hip xray did not show displacement of surgical hardware, fracture, erosion or dislocation  January 26-she feels tramadol works better for her than hydrocodone.  Anticipate going home on tramadol and discontinue hydrocodone.  She is presently taking tramadol twice a day.  Appears to be tolerating without any side effects.     Fluids/Electrolytes/Nutrition   -cardiac diet  -admission labs unremarkable     DVT ppx  -subQ Lovenox     /UTI  -on Ditropan 5 mg twice a day.  January 11 - voids with acceptable bladder scan postvoid residuals. Urinary tract infection with E. coli-sensitive to Macrobid  January 18 - required intermittent straight catheter 450 cc.  January 19 - incontinent and IC, PVRs 99-391cc  January 22 - has urge to urinate at times but cannot void, will decrease oxybutynin from bid to with dinner only. She is voiding with incontinence with postvoid residual 230-290 cc.  Discussed trial of decreasing Ditropan to 5 mg q. supper first from twice a day before doing a trial of Flomax.  January 23-urinary tract infection-E. coli-on cefdinir starting January 23.  Sensitive to cephalosporins  January 27-voids with postvoid residual  cc.  We will continue Ditropan at lower dose of 5 mg with supper      2/6/21- Has norco or ultram prn for pain- working ok  CVA left jon Asa and praluent  No changes today, continue therapy

## 2021-02-06 NOTE — PLAN OF CARE
Goal Outcome Evaluation:  Plan of Care Reviewed With: patient  Progress: improving  Outcome Summary: Pt's pain improved this shift.  Transfering to and from the [facility] with 2 staff. Preparing for DC home with family.

## 2021-02-06 NOTE — THERAPY TREATMENT NOTE
Inpatient Rehabilitation - Physical Therapy Treatment Note       [facility]     Patient Name: Liana Carrero  : 1935  MRN: [id number]    Today's Date: 2021                    Admit Date: 2021      Visit Dx:     ICD-10-CM ICD-9-CM   1. Impaired functional mobility, balance, gait, and endurance  Z74.09 V49.89       Patient Active Problem List   Diagnosis   • Stroke (cerebrum) (CMS/[facility])   • Chest pain   • Hypertensive urgency   • Headache   • History of embolic stroke   • Hypothyroidism   • Hypokalemia   • CAD in native artery   • URTI (acute upper respiratory infection)   • Closed fracture of left hip (CMS/[facility])   • Chronic diastolic (congestive) heart failure (CMS/[facility])   • Coronary artery disease involving autologous vein coronary bypass graft without angina pectoris   • Impaired glucose tolerance   • Moderate malnutrition (CMS/[facility])   • Stroke (CMS/[facility])       Past Medical History:   Diagnosis Date   • Arthritis    • CHF (congestive heart failure) (CMS/[facility])    • Coronary artery disease    • Diabetes mellitus (CMS/[facility])     new DX related to stroke, started on insulin at hospital   • Disease of thyroid gland    • Elevated cholesterol    • GERD (gastroesophageal reflux disease)    • History of transfusion    • Hypertension    • PONV (postoperative nausea and vomiting)    • Sleep apnea    • Spinal headache    • Stroke (CMS/[facility])        Past Surgical History:   Procedure Laterality Date   • APPENDECTOMY     • CARDIAC CATHETERIZATION       x2   • CARDIAC CATHETERIZATION N/A 2019    Procedure: Left Heart Cath;  Surgeon: Bentley Chapin MD;  Location:  [facility] CATH INVASIVE LOCATION;  Service: Cardiovascular   • CARDIAC CATHETERIZATION N/A 2019    Procedure: Coronary angiography;  Surgeon: Bentley Chapin MD;  Location:  [facility] CATH INVASIVE [facility]ATION;  Service: Cardiovascular   • CARDIAC CATHETERIZATION N/A 2019    Procedure: Native mammary injection;  Surgeon: Bentley Chapin MD;   "[facility];  Service: Cardiovascular   • CARDIAC CATHETERIZATION N/A 12/26/2019    Procedure: Saphenous Vein Graft;  Surgeon: Bentley Chapin MD;  Location: [facility] CATH INVASIVE LOCATION;  Service: Cardiovascular   • COLON SURGERY     • COLONOSCOPY     • EYE SURGERY      lens implants and cataract surgery   • FEMUR IM NAILING/RODDING Left 3/26/2020    Procedure: LT.HIP NAILING/RODDING;  Surgeon: Carlos Khan II, MD;  Location: [facility] MAIN OR;  Service: Orthopedics;  Laterality: Left;   • FRACTURE SURGERY Left 2020   • HYSTERECTOMY     • VASCULAR SURGERY      varicous veins          PT ASSESSMENT (last 12 hours)      IRF PT Evaluation and Treatment     Row Name 02/06/21 1100          General Information    Patient Profile Reviewed  yes  -LB     General Observations of Patient  Pt up in wheelchair.   -LB     Existing Precautions/Restrictions  fall;other (see comments) L UE flaccid   -LB     Row Name 02/06/21 1100          Cognition/Psychosocial    Affect/Mental Status (Cognitive)  WFL  -LB     Orientation Status (Cognition)  oriented x 3  -LB     Follows Commands (Cognition)  follows two-step commands;repetition of directions required;verbal cues/prompting required;physical/tactile prompts required  -LB     Personal Safety Interventions  fall prevention program maintained;other (see comments) L UE flaccid   -LB     Cognitive Function (Cognitive)  attention deficit  -LB     Attention Deficit (Cognitive)  distractible in noisy environment;distractible in quiet environment;divided attention  -LB     Executive Function Deficit (Cognition)  insight/awareness of deficits  -LB     Row Name 02/06/21 1100          Pain Scale: Numbers Pre/Post-Treatment    Pretreatment Pain Rating  3/10  -LB     Posttreatment Pain Rating  3/10  -LB     Pain Location - Orientation  lower  -LB     Pain Location  back  -LB     Pre/Posttreatment Pain Comment  \"Mu back is always sore.\"  -LB     Row Name " 02/06/21 1100          Bed Mobility    Comment (Bed Mobility)  up in w/c  -LB     Row Name 02/06/21 1100          Transfers    Bed-Chair [place] (Transfers)  maximum assist (25% patient effort);verbal cues squat pivot   -LB     Chair-Bed [place] (Transfers)  maximum assist (25% patient effort);verbal cues;nonverbal cues (demo/gesture) squat pivot   -LB     Assistive Device (Bed-Chair Transfers)  wheelchair  -LB     Sit-Stand [place] (Transfers)  maximum assist (25% patient effort);2 person assist;verbal cues  -LB     Stand-Sit [place] (Transfers)  maximum assist (25% patient effort);2 person assist;verbal cues;nonverbal cues (demo/gesture)  -LB     Row Name 02/06/21 1100          Chair-Bed Transfer    Assistive Device (Chair-Bed Transfers)  wheelchair  -LB     Row Name 02/06/21 1100          Sit-Stand Transfer    Assistive Device (Sit-Stand Transfers)  other (see comments);wheelchair // bars   -LB     Row Name 02/06/21 1100          Stand-Sit Transfer    Assistive Device (Stand-Sit Transfers)  other (see comments);wheelchair // bars   -LB     Row Name 02/06/21 1100          Gait/Stairs (Locomotion)    Comment (Gait/Stairs)  not appropriate   -LB     Row Name 02/06/21 1100          Balance    Balance Assessment  sitting static balance  -LB     Static Sitting Balance  mild impairment  -LB     Dynamic Sitting Balance  moderate impairment  -LB     Static Standing Balance  moderate impairment;severe impairment within // brs with blocking of L knee  -LB     Balance Interventions  sitting;static;core stability exercise;other (see comments) with R UE, wt shift to L LE then performing R hip flexion 5x  -LB     Comment, Balance  Pt stood 3 x within the // bars for ~ 1 minute with emphasis on wt shift to L LE.   -LB     Row Name 02/06/21 1100          Positioning and Restraints    Pre-Treatment Position  sitting in chair/recliner  -LB     Post Treatment Position  wheelchair  -LB     In Wheelchair  notified  nsg;sitting;call light within reach;encouraged to call for assist;exit alarm on  -LB       User Key  (r) = Recorded By, (t) = Taken By, (c) = Cosigned By    Initials Name Provider Type    Elba Huitron, PT Physical Therapist        Wound 01/07/21 Left upper chest Incision (Active)   Closure Liquid skin adhesive;Open to air 02/06/21 0824   Base clean;dry 02/06/21 0824   Periwound intact 02/06/21 0824   Drainage Amount none 02/06/21 0824     Physical Therapy Education                 Title: PT OT SLP Therapies (Done)     Topic: Physical Therapy (Done)     Point: Mobility training (Done)     Learning Progress Summary           Patient Acceptance, E,D, DU,NR by LB at 2/6/2021 1539    Comment: wt shift in standing    Acceptance, E,TB, VU,NR by MS at 2/5/2021 1208    Acceptance, E,TB, VU,NR by MS at 2/4/2021 1518    Acceptance, E,TB, VU,NR by MS at 2/3/2021 1151    Acceptance, E,TB, VU,NR by MS at 2/2/2021 1210    Acceptance, E,TB, NR by MS at 2/1/2021 1145    Acceptance, E, NR by  at 1/30/2021 1336    Acceptance, E,TB, VU,NR by MS at 1/29/2021 1535    Acceptance, E, NR by LB1 at 1/28/2021 1032    Acceptance, E,TB, VU,NR by MS at 1/27/2021 1447    Acceptance, E,TB, VU,NR by MS at 1/26/2021 0959    Acceptance, E,TB, VU,NR by MS at 1/25/2021 1316    Acceptance, E,TB, VU,NR by MS at 1/22/2021 0918    Acceptance, E,TB, VU by MS at 1/20/2021 1544    Acceptance, E,TB, VU,NR by MS at 1/19/2021 1401    Acceptance, E,TB, NR,NL by MS at 1/18/2021 1432    Acceptance, E,TB, VU,NR by IVAN at 1/16/2021 1444    Acceptance, E,TB, NR,NL by MS at 1/15/2021 1418    Acceptance, E,TB, NR by MS at 1/14/2021 1223    Acceptance, E,TB, NR by LB2 at 1/13/2021 1030    Comment: sitting balance    Acceptance, E,TB, NR by MS at 1/12/2021 1312    Acceptance, E,TB, VU,NR by MS at 1/11/2021 1140                   Point: Home exercise program (Done)     Learning Progress Summary           Patient Acceptance, E,TB, VU,NR by MS at 2/5/2021 1208     Acceptance, E,TB, VU,NR by MS at 2/4/2021 1518    Acceptance, E,TB, VU,NR by MS at 2/3/2021 1151    Acceptance, E,TB, VU,NR by MS at 2/2/2021 1210    Acceptance, E,TB, NR by MS at 2/1/2021 1145    Acceptance, E, NR by LH at 1/30/2021 1336    Acceptance, E,TB, VU,NR by MS at 1/29/2021 1535    Acceptance, E,TB, VU,NR by MS at 1/27/2021 1447    Acceptance, E,TB, VU,NR by MS at 1/26/2021 0959    Acceptance, E,TB, VU,NR by MS at 1/25/2021 1316    Acceptance, E,TB, VU,NR by MS at 1/22/2021 0918    Acceptance, E,TB, VU by MS at 1/20/2021 1544    Acceptance, E,TB, VU,NR by MS at 1/19/2021 1401    Acceptance, E,TB, NR,NL by MS at 1/18/2021 1432    Acceptance, E,TB, NR,NL by MS at 1/15/2021 1418    Acceptance, E,TB, NR by MS at 1/14/2021 1223                   Point: Body mechanics (Done)     Learning Progress Summary           Patient Acceptance, E,TB, VU,NR by MS at 2/5/2021 1208    Acceptance, E,TB, VU,NR by MS at 2/4/2021 1518    Acceptance, E,TB, VU,NR by MS at 2/3/2021 1151    Acceptance, E,TB, VU,NR by MS at 2/2/2021 1210    Acceptance, E,TB, NR by MS at 2/1/2021 1145    Acceptance, E, NR by  at 1/30/2021 1336    Acceptance, E,TB, VU,NR by MS at 1/29/2021 1535    Acceptance, E,TB, VU,NR by MS at 1/27/2021 1447    Acceptance, E,TB, VU,NR by MS at 1/26/2021 0959    Acceptance, E,TB, VU,NR by MS at 1/25/2021 1316    Acceptance, E,TB, VU,NR by MS at 1/22/2021 0918    Acceptance, E,TB, VU by MS at 1/20/2021 1544    Acceptance, E,TB, VU,NR by MS at 1/19/2021 1401    Acceptance, E,TB, NR,NL by MS at 1/15/2021 1418    Acceptance, E,TB, NR by MS at 1/14/2021 1223    Acceptance, E,TB, NR by MS at 1/12/2021 1312    Acceptance, E,TB, VU,NR by MS at 1/11/2021 1140                   Point: Precautions (Done)     Learning Progress Summary           Patient Acceptance, E,TB, VU,NR by MS at 2/5/2021 1208    Acceptance, E,TB, VU,NR by MS at 2/4/2021 1518    Acceptance, E,TB, VU,NR by MS at 2/3/2021 1151    Acceptance, E,TB, VU,NR by  MS at 2/2/2021 1210    Acceptance, E,TB, NR by MS at 2/1/2021 1145    Acceptance, E, NR by  at 1/30/2021 1336    Acceptance, E,TB, VU,NR by MS at 1/29/2021 1535    Acceptance, E,TB, VU,NR by MS at 1/27/2021 1447    Acceptance, E,TB, VU,NR by MS at 1/26/2021 0959    Acceptance, E,TB, VU,NR by MS at 1/25/2021 1316    Acceptance, E,TB, VU,NR by MS at 1/22/2021 0918    Acceptance, E, VU by MD at 1/21/2021 0926    Acceptance, E,TB, VU by MS at 1/20/2021 1544    Acceptance, E,TB, VU,NR by MS at 1/19/2021 1401    Acceptance, E,TB, NR,NL by MS at 1/15/2021 1418    Acceptance, E,TB, NR by MS at 1/14/2021 1223    Acceptance, E,TB, NR by MS at 1/12/2021 1312    Acceptance, E,TB, VU,NR by MS at 1/11/2021 1140    Acceptance, E, VU by MD at 1/9/2021 0933                               User Key     Initials Effective Dates Name Provider Type Discipline    LB 06/08/18 -  Elba Zarate, PT Physical Therapist PT    IVAN 06/08/18 -  Ela Pritchett, PT Physical Therapist PT    LB2 04/03/18 -  Jazmin Schuster, PT Physical Therapist PT     04/03/18 -  Elba Myrick, PT Physical Therapist PT    LB1 03/07/18 -  Elba Grigsby, PTA Physical Therapy Assistant PT    MD 04/03/18 -  Lisandra Lowery, PT Physical Therapist PT    MS 03/04/19 -  Nati Kaba, PT Physical Therapist PT                PT Recommendation and Plan                          Time Calculation:     PT Charges     Row Name 02/06/21 1539             Time Calculation    Start Time  1100  -LB      Stop Time  1130  -LB      Time Calculation (min)  30 min  -LB      PT Received On  02/06/21  -LB      PT - Next Appointment  02/08/21  -LB        User Key  (r) = Recorded By, (t) = Taken By, (c) = Cosigned By    Initials Name Provider Type    LB Elba Zarate, PT Physical Therapist          Therapy Charges for Today     Code Description Service Date Service Provider Modifiers Qty    [id number]  PT NEUROMUSC RE EDUCATION EA 15 MIN 2/6/2021 Elba Zarate, PT GP 2           Patient was wearing a face mask during this therapy encounter. Therapist used appropriate personal protective equipment including eye protection, mask, and gloves.  Mask used was standard procedure mask. Appropriate PPE was worn during the entire therapy session. Hand hygiene was completed before and after therapy session. Patient is not in enhanced droplet precautions.  Shellie Blackwell assisted with PT.           Elba Zarate, PT  2/6/2021

## 2021-02-06 NOTE — PLAN OF CARE
Goal Outcome Evaluation:  Plan of Care Reviewed With: patient  Progress: improving  Outcome Summary: Pt has gotten up to WC today and has taken Ultram and Norco for pain. Turned whie in the bed.

## 2021-02-06 NOTE — PROGRESS NOTES
Inpatient Rehabilitation Plan of Care Note    Plan of Care  Care Plan Reviewed - Updates as Follows    Psychosocial    Performed Intervention(s)  Verbalize needs and concerns  Medications as needed/ ordered  Therapeutic environmental set up      Safety    Performed Intervention(s)  Items with in reach and environmental set up  Bed alarm/ Chair alarm  Safety rounds and falls protocols/ precautions      Sphincter Control    Performed Intervention(s)  Proper diet and fluid intake  Medication as ordered  Incontinent care if needed  Monitor intake and output      Body Systems    Performed Intervention(s)  O2 2L/NC @ HS  assess lungs, sats every shift    Signed by: Liliya Michaels RN

## 2021-02-07 PROCEDURE — 63710000001 ONDANSETRON ODT 4 MG TABLET DISPERSIBLE: Performed by: INTERNAL MEDICINE

## 2021-02-07 PROCEDURE — 25010000002 ENOXAPARIN PER 10 MG: Performed by: PHYSICAL MEDICINE & REHABILITATION

## 2021-02-07 RX ADMIN — HYDROCODONE BITARTRATE AND ACETAMINOPHEN 1 TABLET: 5; 325 TABLET ORAL at 05:58

## 2021-02-07 RX ADMIN — DOCUSATE SODIUM 200 MG: 100 CAPSULE, LIQUID FILLED ORAL at 08:11

## 2021-02-07 RX ADMIN — TRAMADOL HYDROCHLORIDE 50 MG: 50 TABLET ORAL at 12:02

## 2021-02-07 RX ADMIN — ONDANSETRON 4 MG: 4 TABLET, ORALLY DISINTEGRATING ORAL at 05:55

## 2021-02-07 RX ADMIN — OXYBUTYNIN CHLORIDE 5 MG: 5 TABLET ORAL at 16:47

## 2021-02-07 RX ADMIN — ENOXAPARIN SODIUM 40 MG: 40 INJECTION SUBCUTANEOUS at 08:10

## 2021-02-07 RX ADMIN — LEVOTHYROXINE SODIUM 125 MCG: 0.12 TABLET ORAL at 05:55

## 2021-02-07 RX ADMIN — LIDOCAINE 1 PATCH: 50 PATCH TOPICAL at 08:11

## 2021-02-07 RX ADMIN — LOSARTAN POTASSIUM 50 MG: 50 TABLET, FILM COATED ORAL at 08:11

## 2021-02-07 RX ADMIN — DILTIAZEM HYDROCHLORIDE 180 MG: 180 CAPSULE, COATED, EXTENDED RELEASE ORAL at 08:11

## 2021-02-07 RX ADMIN — ASPIRIN 81 MG: 81 TABLET, CHEWABLE ORAL at 08:11

## 2021-02-07 RX ADMIN — PANTOPRAZOLE SODIUM 40 MG: 40 TABLET, DELAYED RELEASE ORAL at 05:55

## 2021-02-07 RX ADMIN — DOCUSATE SODIUM 200 MG: 100 CAPSULE, LIQUID FILLED ORAL at 20:31

## 2021-02-07 RX ADMIN — DILTIAZEM HYDROCHLORIDE 180 MG: 180 CAPSULE, COATED, EXTENDED RELEASE ORAL at 20:31

## 2021-02-07 RX ADMIN — MIRTAZAPINE 15 MG: 15 TABLET, FILM COATED ORAL at 20:31

## 2021-02-07 RX ADMIN — FERROUS SULFATE TAB 325 MG (65 MG ELEMENTAL FE) 325 MG: 325 (65 FE) TAB at 08:11

## 2021-02-07 RX ADMIN — ONDANSETRON 4 MG: 4 TABLET, ORALLY DISINTEGRATING ORAL at 12:02

## 2021-02-07 RX ADMIN — ONDANSETRON 4 MG: 4 TABLET, ORALLY DISINTEGRATING ORAL at 16:44

## 2021-02-07 RX ADMIN — BISACODYL 10 MG: 10 SUPPOSITORY RECTAL at 18:08

## 2021-02-07 NOTE — PLAN OF CARE
Goal Outcome Evaluation:  Plan of Care Reviewed With: patient  Progress: improving  Outcome Summary: Pt has gotten up to her WC this shift and has taken Ultram for pain. Pt visited with family today.

## 2021-02-07 NOTE — PROGRESS NOTES
Inpatient Rehabilitation Plan of Care Note    Plan of Care  Care Plan Reviewed - No updates at this time.    Psychosocial    Performed Intervention(s)  Verbalize needs and concerns  Medications as needed/ ordered  Therapeutic environmental set up      Safety    Performed Intervention(s)  Items with in reach and environmental set up  Bed alarm/ Chair alarm  Safety rounds and falls protocols/ precautions      Sphincter Control    Performed Intervention(s)  Proper diet and fluid intake  Medication as ordered  Incontinent care if needed  Monitor intake and output      Body Systems    Performed Intervention(s)  O2 2L/NC @ HS  assess lungs, sats every shift    Signed by: Afshin Vargas RN

## 2021-02-07 NOTE — PLAN OF CARE
Goal Outcome Evaluation:  Plan of Care Reviewed With: patient  Progress: improving  Outcome Summary: Pt rested on and off this shift.  C/o pain to left shoulder.  Meds whole with thin liquids.  frequent use of bedpan.  continent of bladder.  skin bruised, especially left hand.

## 2021-02-07 NOTE — PROGRESS NOTES
Cc cva    Patient slept well, eating well.  Has some soreness in her left hand. Frustrated that not getting return.  No cp, sob    97.7 82 15 118/76      Awake, alert an 0x3  NAD  Mood good  No increased work of breathing  abd soft,nt  Calves soft,nt, no edema  No movment left side        Debility and functional deficits  -PT/OT/SLP     Acute CVA  -[place] 3 MRI at [facility] revealed acute infarct in the left temporal-occipital region, no TPA or MT  -Jan 4 had worsening of her NIHSS, MRI revealed a new right superior frontal gyrus acute infarct. Again outside of the window of TPA, not a candidate for mechanical thrombectomy  -continue ASA and Praluent as she is intolerant of statins  -Echo was unremarkable  -Holter normal  -Loop recorder placed  -A1c 5.0  -TSH and B12 level unremarkable     Loss of appetite  Weight loss  Nausea  -symptoms for approximately 8 months, has lost ~40lbs over that time per patient  -started Remeron 7.5 qHS  -GI consulted, appreciate recs. Started scheduled Zofran with meals on 1/13, continue Remeron.   Jan 14 - GI to check RUQ US, increase bowel regimen, continue Remeron and Zofran as above  January 15 -1.2 cm hypoechoic pancreatic head lesion on ultrasound.  To get CT of the abdomen tomorrow.  January 16 - CT scan showed pancreatic cyst.  Not felt to be the etiology of her nausea.  Follow-up as an outpatient.  January 17- Remeron increased to 15 mg nightly     Left breast nodule  -2x1cm nodule left breast at 10 o'clock position, borders feel smooth, nodule is not freely mobile, no overlying skin changes. Away from the site of recent left loop recorder placement Jan 5, 2021  -Last mammogram one year ago. May not tolerate mammogram with recent loop recorder placement. Patient discussed with Dr. Pride, left breast ultrasound ordered  -Left breast ultrasound negative at the area of interest in the left upper inner quadrant     HLD  -continue ASA and Praluent as she is intolerant of  statins     HTN  -continue cardizem, losartan  -normotensive goal  -monitor     Vit D insufficiency  -level 20 on admission  -started vitamin D replacement     Hypothyroidism  -continue levothyroxine    CAD  -continue ASA and Praluent   January 27-She had some sharp chest pain that was brief last evening at about 1 AM, resolved on its own.  Had nitroglycerin ordered but did not receive it.  She reports at home she did occasionally have chest pain and would take him to glycerin at home, stating that sometimes she would take it as she just felt nervous and worried with the chest pain.  She did feel some palpitations after the chest pain last evening.  We discussed seeing if anything was uploaded on her loop recorder to her cardiologist office and we will check those results.     Obstructive sleep apnea-January 27-she has been on 2 L nasal cannula oxygen at nighttime with sats 100%.  She did not have her CPAP brought in from home     Prerenal OBED, resolved  -secondary to decreased PO intake  -resolved prior to discharge from [facility]  -monitor electrolytes     Iron deficiency anemia  -received 2 doses of IV iron as well as 1UpRBCs on 1/4 at [facility]  -Hgb stable ~8 prior to discharge from [facility], 9.2 on admission to [facility]  -continue oral iron, monitor     Left hip pain  -xrays at [facility] unremarkable  Jan 13 - Fatigue continues to be an issue. She takes Norco 7.5 mg about 4 times a day with history of left hip pain-history of left femur fracture open reduction internal fixation, x-ray at the outside hospital on January 6 showed hardware in place.  January 14-She does have fatigue during the day.  Will try a lower dose of Norco 5 mg rather than 7.5 mg.  Is on Remeron 7.5 g at nighttime but that was only recently added.  January 15-she took Norco 5 mg twice a day at home.  More alert with better trunk control on lower dose of Norco today.  January 21-continues with left hip pain. Taking Norco 5 mg about 4 times a day. Add Lidoderm  patch.  January 25-does not feel Lidoderm patch that helpful.  Pain was better with addition of tramadol over the weekend.  Hip range of motion unremarkable with no crepitus  January 26-left hip xray did not show displacement of surgical hardware, fracture, erosion or dislocation  January 26-she feels tramadol works better for her than hydrocodone.  Anticipate going home on tramadol and discontinue hydrocodone.  She is presently taking tramadol twice a day.  Appears to be tolerating without any side effects.     Fluids/Electrolytes/Nutrition   -cardiac diet  -admission labs unremarkable     DVT ppx  -subQ Lovenox     /UTI  -on Ditropan 5 mg twice a day.  January 11 - voids with acceptable bladder scan postvoid residuals. Urinary tract infection with E. coli-sensitive to Macrobid  January 18 - required intermittent straight catheter 450 cc.  January 19 - incontinent and IC, PVRs 99-391cc  January 22 - has urge to urinate at times but cannot void, will decrease oxybutynin from bid to with dinner only. She is voiding with incontinence with postvoid residual 230-290 cc.  Discussed trial of decreasing Ditropan to 5 mg q. supper first from twice a day before doing a trial of Flomax.  January 23-urinary tract infection-E. coli-on cefdinir starting January 23.  Sensitive to cephalosporins  January 27-voids with postvoid residual  cc.  We will continue Ditropan at lower dose of 5 mg with supper      2/6/21- Has norco or ultram prn for pain- working ok  CVA left jon Asa and praluent  No changes today, continue therapy    2/7Continue current pain meds.  Working on left hemiparesis from CVA  Continue therapy

## 2021-02-08 LAB
ANION GAP SERPL CALCULATED.3IONS-SCNC: 9.3 MMOL/L (ref 5–15)
BASOPHILS # BLD AUTO: 0.05 10*3/MM3 (ref 0–0.2)
BASOPHILS NFR BLD AUTO: 1 % (ref 0–1.5)
BUN SERPL-MCNC: 17 MG/DL (ref 8–23)
BUN/CREAT SERPL: 27.9 (ref 7–25)
CALCIUM SPEC-SCNC: 9.1 MG/DL (ref 8.6–10.5)
CHLORIDE SERPL-SCNC: 102 MMOL/L (ref 98–107)
CO2 SERPL-SCNC: 25.7 MMOL/L (ref 22–29)
CREAT SERPL-MCNC: 0.61 MG/DL (ref 0.57–1)
DEPRECATED RDW RBC AUTO: 64.9 FL (ref 37–54)
EOSINOPHIL # BLD AUTO: 0.14 10*3/MM3 (ref 0–0.4)
EOSINOPHIL NFR BLD AUTO: 2.9 % (ref 0.3–6.2)
ERYTHROCYTE [DISTWIDTH] IN BLOOD BY AUTOMATED COUNT: 21 % (ref 12.3–15.4)
GFR SERPL CREATININE-BSD FRML MDRD: 93 ML/MIN/1.73
GLUCOSE SERPL-MCNC: 109 MG/DL (ref 65–99)
HCT VFR BLD AUTO: 36.5 % (ref 34–46.6)
HGB BLD-MCNC: 11.7 G/DL (ref 12–15.9)
IMM GRANULOCYTES # BLD AUTO: 0.01 10*3/MM3 (ref 0–0.05)
IMM GRANULOCYTES NFR BLD AUTO: 0.2 % (ref 0–0.5)
LYMPHOCYTES # BLD AUTO: 1.33 10*3/MM3 (ref 0.7–3.1)
LYMPHOCYTES NFR BLD AUTO: 27.7 % (ref 19.6–45.3)
MCH RBC QN AUTO: 28.4 PG (ref 26.6–33)
MCHC RBC AUTO-ENTMCNC: 32.1 G/DL (ref 31.5–35.7)
MCV RBC AUTO: 88.6 FL (ref 79–97)
MONOCYTES # BLD AUTO: 0.44 10*3/MM3 (ref 0.1–0.9)
MONOCYTES NFR BLD AUTO: 9.1 % (ref 5–12)
NEUTROPHILS NFR BLD AUTO: 2.84 10*3/MM3 (ref 1.7–7)
NEUTROPHILS NFR BLD AUTO: 59.1 % (ref 42.7–76)
NRBC BLD AUTO-RTO: 0 /100 WBC (ref 0–0.2)
PLATELET # BLD AUTO: 162 10*3/MM3 (ref 140–450)
PMV BLD AUTO: 10.5 FL (ref 6–12)
POTASSIUM SERPL-SCNC: 4.3 MMOL/L (ref 3.5–5.2)
RBC # BLD AUTO: 4.12 10*6/MM3 (ref 3.77–5.28)
SODIUM SERPL-SCNC: 137 MMOL/L (ref 136–145)
WBC # BLD AUTO: 4.81 10*3/MM3 (ref 3.4–10.8)

## 2021-02-08 PROCEDURE — 85025 COMPLETE CBC W/AUTO DIFF WBC: CPT | Performed by: PHYSICAL MEDICINE & REHABILITATION

## 2021-02-08 PROCEDURE — 97110 THERAPEUTIC EXERCISES: CPT

## 2021-02-08 PROCEDURE — 63710000001 ONDANSETRON ODT 4 MG TABLET DISPERSIBLE: Performed by: INTERNAL MEDICINE

## 2021-02-08 PROCEDURE — 97112 NEUROMUSCULAR REEDUCATION: CPT

## 2021-02-08 PROCEDURE — 97130 THER IVNTJ EA ADDL 15 MIN: CPT

## 2021-02-08 PROCEDURE — 25010000002 ENOXAPARIN PER 10 MG: Performed by: PHYSICAL MEDICINE & REHABILITATION

## 2021-02-08 PROCEDURE — 80048 BASIC METABOLIC PNL TOTAL CA: CPT | Performed by: PHYSICAL MEDICINE & REHABILITATION

## 2021-02-08 PROCEDURE — 97535 SELF CARE MNGMENT TRAINING: CPT

## 2021-02-08 PROCEDURE — 97129 THER IVNTJ 1ST 15 MIN: CPT

## 2021-02-08 RX ADMIN — FERROUS SULFATE TAB 325 MG (65 MG ELEMENTAL FE) 325 MG: 325 (65 FE) TAB at 08:01

## 2021-02-08 RX ADMIN — DILTIAZEM HYDROCHLORIDE 180 MG: 180 CAPSULE, COATED, EXTENDED RELEASE ORAL at 08:01

## 2021-02-08 RX ADMIN — ASPIRIN 81 MG: 81 TABLET, CHEWABLE ORAL at 08:02

## 2021-02-08 RX ADMIN — HYDROCODONE BITARTRATE AND ACETAMINOPHEN 1 TABLET: 5; 325 TABLET ORAL at 18:33

## 2021-02-08 RX ADMIN — LIDOCAINE 1 PATCH: 50 PATCH TOPICAL at 07:58

## 2021-02-08 RX ADMIN — ASPIRIN 81 MG: 81 TABLET, CHEWABLE ORAL at 07:58

## 2021-02-08 RX ADMIN — MIRTAZAPINE 15 MG: 15 TABLET, FILM COATED ORAL at 21:32

## 2021-02-08 RX ADMIN — DOCUSATE SODIUM 200 MG: 100 CAPSULE, LIQUID FILLED ORAL at 21:31

## 2021-02-08 RX ADMIN — TRAMADOL HYDROCHLORIDE 50 MG: 50 TABLET ORAL at 22:58

## 2021-02-08 RX ADMIN — LOSARTAN POTASSIUM 50 MG: 50 TABLET, FILM COATED ORAL at 07:58

## 2021-02-08 RX ADMIN — DOCUSATE SODIUM 200 MG: 100 CAPSULE, LIQUID FILLED ORAL at 08:02

## 2021-02-08 RX ADMIN — ONDANSETRON 4 MG: 4 TABLET, ORALLY DISINTEGRATING ORAL at 11:24

## 2021-02-08 RX ADMIN — PANTOPRAZOLE SODIUM 40 MG: 40 TABLET, DELAYED RELEASE ORAL at 05:30

## 2021-02-08 RX ADMIN — ONDANSETRON 4 MG: 4 TABLET, ORALLY DISINTEGRATING ORAL at 16:07

## 2021-02-08 RX ADMIN — LEVOTHYROXINE SODIUM 125 MCG: 0.12 TABLET ORAL at 05:30

## 2021-02-08 RX ADMIN — OXYBUTYNIN CHLORIDE 5 MG: 5 TABLET ORAL at 17:15

## 2021-02-08 RX ADMIN — TRAMADOL HYDROCHLORIDE 50 MG: 50 TABLET ORAL at 14:34

## 2021-02-08 RX ADMIN — ENOXAPARIN SODIUM 40 MG: 40 INJECTION SUBCUTANEOUS at 08:02

## 2021-02-08 RX ADMIN — DILTIAZEM HYDROCHLORIDE 180 MG: 180 CAPSULE, COATED, EXTENDED RELEASE ORAL at 21:32

## 2021-02-08 NOTE — PROGRESS NOTES
Inpatient Rehabilitation Plan of Care Note    Plan of Care  Updated Problems/Interventions  Mobility    [PT] Wheelchair(Active)  Current Status(02/08/2021): 100' [place] (visual deficits)  Weekly Goal(02/15/2021): CGA  Discharge Goal: SV    [PT] Walk(Barrier)  Current Status(02/08/2021): DC goal  Weekly Goal(02/15/2021): DC goal  Discharge Goal: DC goal    [PT] Bed/Chair/Wheelchair(Active)  Current Status(02/08/2021): maxA, squat pivot  Weekly Goal(02/15/2021): modA, squat pivot  Discharge Goal: modA    [PT] Bed Mobility(Active)  Current Status(02/08/2021): maxAx2  Weekly Goal(02/15/2021): modA  Discharge Goal: modA    Signed by: Nati Kaba, PT

## 2021-02-08 NOTE — PROGRESS NOTES
Inpatient Rehabilitation Functional Measures Assessment and Plan of Care    Plan of Care  Updated Problems/Interventions  Mobility    [OT] Toilet Transfers(Active)  Current Status(02/08/2021): max 1 second for safety to droparm  Weekly Goal(02/16/2021): max/mod  Discharge Goal: mod  A    [OT] Tub/Shower Transfers(Active)  Current Status(02/08/2021): [place] bench second person for safety  Weekly Goal(02/17/2021): mod/max  Discharge Goal: mod  A        Self Care    [OT] Bathing(Active)  Current Status(02/08/2021): Min UB mod  x 2 LB  Weekly Goal(02/16/2021): Max  Discharge Goal: Min A    [OT] Dressing (Lower)(Active)  Current Status(02/08/2021): max/mod with max 2 for standing  Weekly Goal(02/17/2021): max  Discharge Goal: Mod A    [OT] Dressing (Upper)(Active)  Current Status(02/08/2021): mod pullover  Weekly Goal(02/16/2021): Min A  Discharge Goal: CGA    [OT] Grooming(Active)  Current Status(02/08/2021): min/SBA  Weekly Goal(02/17/2021): SBA  Discharge Goal: Set-up    [OT] Toileting(Active)  Current Status(02/08/2021): Dependent x 2  Weekly Goal(02/16/2021): max x 2  Discharge Goal: Mod A    Functional Measures  JALEEL Eating:  Branch  JALEEL Grooming: Branch  JALEEL Bathing:  Branch  JALEEL Upper Body Dressing:  Branch  JALEEL Lower Body Dressing:  Branch  JALEEL Toileting:  Branch    JALEEL Bladder Management  Level of Assistance:  Branch  Frequency/Number of Accidents this Shift:  Branch    JALEEL Bowel Management  Level of Assistance: Branch  Frequency/Number of Accidents this Shift: Branch    JALEEL Bed/Chair/Wheelchair Transfer:  Branch  JALEEL Toilet Transfer:  Branch  JALEEL Tub/Shower Transfer:  Branch    Previously Documented Mode of Locomotion at Discharge: Field  JALEEL Expected Mode of Locomotion at Discharge: Branch  JALEEL Walk/Wheelchair:  Branch  JALEEL Stairs:  Branch    JALEEL Comprehension:  Branch  JALEEL Expression:  Branch  JALEEL Social Interaction:  Branch  JALEEL Problem Solving:  Branch  JALEEL Memory:  Branch    Therapy Mode  Minutes  Occupational Therapy: Branch  Physical Therapy: Branch  Speech Language Pathology:  Branch    Signed by: GIAN Blackwell/TALIB

## 2021-02-08 NOTE — THERAPY TREATMENT NOTE
Inpatient Rehabilitation - Occupational Therapy Treatment Note    [facility]     Patient Name: Liana Carrero  : 1935  MRN: [id number]    Today's Date: 2021                 Admit Date: 2021         ICD-10-CM ICD-9-CM   1. Impaired functional mobility, balance, gait, and endurance  Z74.09 V49.89       Patient Active Problem List   Diagnosis   • Stroke (cerebrum) (CMS/[facility])   • Chest pain   • Hypertensive urgency   • Headache   • History of embolic stroke   • Hypothyroidism   • Hypokalemia   • CAD in native artery   • URTI (acute upper respiratory infection)   • Closed fracture of left hip (CMS/[facility])   • Chronic diastolic (congestive) heart failure (CMS/[facility])   • Coronary artery disease involving autologous vein coronary bypass graft without angina pectoris   • Impaired glucose tolerance   • Moderate malnutrition (CMS/[facility])   • Stroke (CMS/[facility])       Past Medical History:   Diagnosis Date   • Arthritis    • CHF (congestive heart failure) (CMS/[facility])    • Coronary artery disease    • Diabetes mellitus (CMS/[facility])     new DX related to stroke, started on insulin at hospital   • Disease of thyroid gland    • Elevated cholesterol    • GERD (gastroesophageal reflux disease)    • History of transfusion    • Hypertension    • PONV (postoperative nausea and vomiting)    • Sleep apnea    • Spinal headache    • Stroke (CMS/[facility])        Past Surgical History:   Procedure Laterality Date   • APPENDECTOMY     • CARDIAC CATHETERIZATION       x2   • CARDIAC CATHETERIZATION N/A 2019    Procedure: Left Heart Cath;  Surgeon: Bentley Chapin MD;  Location: [facility] CATH INVASIVE LOCATION;  Service: Cardiovascular   • CARDIAC CATHETERIZATION N/A 2019    Procedure: Coronary angiography;  Surgeon: Bentley Chapin MD;  Location: [facility] CATH INVASIVE LOCATION;  Service: Cardiovascular   • CARDIAC CATHETERIZATION N/A 2019    Procedure: Native mammary injection;  Surgeon: Bentley Chapin MD;  Location: [facility]  CATH INVASIVE LOCATION;  Service: Cardiovascular   • CARDIAC CATHETERIZATION N/A 12/26/2019    Procedure: Saphenous Vein Graft;  Surgeon: Bentley Chapin MD;  Location: [facility] CATH INVASIVE LOCATION;  Service: Cardiovascular   • COLON SURGERY     • COLONOSCOPY     • EYE SURGERY      lens implants and cataract surgery   • FEMUR IM NAILING/RODDING Left 3/26/2020    Procedure: LT.HIP NAILING/RODDING;  Surgeon: Carlos Khan II, MD;  Location: [facility] MAIN OR;  Service: Orthopedics;  Laterality: Left;   • FRACTURE SURGERY Left 2020   • HYSTERECTOMY     • VASCULAR SURGERY      varicous veins                IRF OT ASSESSMENT FLOWSHEET (last 12 hours)      IRF OT Evaluation and Treatment     Row Name 02/08/21 1200          OT Time and Intention    Document Type  daily treatment  -CC     Mode of Treatment  occupational therapy  -CC     Row Name 02/08/21 1200          Vision Assessment/Intervention    Visual Impairment/Limitations  visual/perceptual impairments present  -CC     Visual Field Deficit  homonymous hemianopsia, left  -CC     Visual Motor Impairment  visual tracking, left  -CC     Visual Processing Deficit  jon-inattention/neglect, left  -CC     Row Name 02/08/21 1200          Cognition/Psychosocial    Affect/Mental Status (Cognitive)  WFL  -CC     Orientation Status (Cognition)  oriented x 3  -CC     Personal Safety Interventions  fall prevention program maintained;gait belt;nonskid shoes/slippers when out of bed  -     Cognitive Function (Cognitive)  attention deficit  -CC     Row Name 02/08/21 1200          Pain Scale: Numbers Pre/Post-Treatment    Pretreatment Pain Rating  0/10 - no pain  -CC     Posttreatment Pain Rating  0/10 - no pain  -CC     Row Name 02/08/21 1200          Bed Mobility    Comment (Bed Mobility)  in w/c  -CC     Row Name 02/08/21 1200          Transfers    Sit-Stand [place] (Transfers)  maximum assist (25% patient effort);2 person assist;verbal cues  -CC     Stand-Sit  [place] (Transfers)  maximum assist (25% patient effort);2 person assist;verbal cues;nonverbal cues (demo/gesture)  -CC     Row Name 02/08/21 1200          Sit-Stand Transfer    Assistive Device (Sit-Stand Transfers)  wheelchair  -CC     Row Name 02/08/21 1200          Stand-Sit Transfer    Assistive Device (Stand-Sit Transfers)  wheelchair  -CC     Row Name 02/08/21 1200          Bathing    [place] Level (Bathing)  bathing skills;upper body;lower body;moderate assist (50% patient effort);verbal cues;nonverbal cues (demo/gesture)  -CC     Position (Bathing)  sink side;supported sitting;supported standing  -CC     Set-up Assistance (Bathing)  adjust water temperature;obtain supplies;open containers  -CC     Comment (Bathing)  2 for standing for safety   -CC     Row Name 02/08/21 1200          Upper Body Dressing    [place] Level (Upper Body Dressing)  upper body dressing skills;doff;don;front opening garment;verbal cues;nonverbal cues (demo/gesture);maximal assist (25-49% patient effort)  -CC     Position (Upper Body Dressing)  supported sitting  -CC     Set-up Assistance (Upper Body Dressing)  obtain clothing  -CC     Row Name 02/08/21 1200          Lower Body Dressing    [place] Level (Lower Body Dressing)  doff;don;pants/bottoms;socks;moderate assist (50% patient effort);maximum assist (25% patient effort)  -CC     Position (Lower Body Dressing)  supported sitting;supported standing  -CC     Set-up Assistance (Lower Body Dressing)  obtain clothing  -CC     Comment (Lower Body Dressing)  2 for standing   -CC     Row Name 02/08/21 1200          Grooming    [place] Level (Grooming)  grooming skills;deodorant application;hair care, combing/brushing;oral care regimen;wash face, hands;standby assist;minimum assist (75% patient effort)  -CC     Position (Grooming)  sink side;supported sitting  -CC     Set-up Assistance (Grooming)  obtain supplies  -CC     Comment (Grooming)  assist w dentutres    -CC     Row Name 02/08/21 1200          Positioning and Restraints    Pre-Treatment Position  sitting in chair/recliner  -CC     Post Treatment Position  wheelchair  -CC     In Wheelchair  sitting;call light within reach;encouraged to call for assist;exit alarm on  -CC       User Key  (r) = Recorded By, (t) = Taken By, (c) = Cosigned By    Initials Name Effective Dates    CC Keya Miranda, OTR 06/08/18 -            Occupational Therapy Education                 Title: PT OT SLP Therapies (Done)     Topic: Occupational Therapy (Done)     Point: ADL training (Done)     Description:   Instruct learner(s) on proper safety adaptation and remediation techniques during self care or transfers.   Instruct in proper use of assistive devices.              Learning Progress Summary           Patient Acceptance, E,TB,D, VU by CC at 1/29/2021 1509    Comment: Pt repositioned in reg w/c with 1/2 arm tray on Left. Nursing notified of change from recliner w/c/. Commode tsf from w/c to droparm commode discussed and shown to NA.    Acceptance, E, VU by BL at 1/8/2021 1434   Caregiver Acceptance, E,TB,D, VU by CC at 1/29/2021 1509    Comment: Pt repositioned in reg w/c with 1/2 arm tray on Left. Nursing notified of change from recliner w/c/. Commode tsf from w/c to droparm commode discussed and shown to NA.                   Point: Home exercise program (Done)     Description:   Instruct learner(s) on appropriate technique for monitoring, assisting and/or progressing therapeutic exercises/activities.              Learning Progress Summary           Patient Acceptance, E, VU by BL at 1/8/2021 1434                   Point: Precautions (Done)     Description:   Instruct learner(s) on prescribed precautions during self-care and functional transfers.              Learning Progress Summary           Patient Acceptance, E, VU by BL at 1/8/2021 1434                   Point: Body mechanics (Done)     Description:   Instruct learner(s) on  proper positioning and spine alignment during self-care, functional mobility activities and/or exercises.              Learning Progress Summary           Patient Acceptance, E,TB,D, VU by CC at 1/29/2021 1509    Comment: Pt repositioned in reg w/c with 1/2 arm tray on Left. Nursing notified of change from recliner w/c/. Commode tsf from w/c to droparm commode discussed and shown to NA.    Acceptance, E, VU by BL at 1/8/2021 1434   Caregiver Acceptance, E,TB,D, VU by CC at 1/29/2021 1509    Comment: Pt repositioned in reg w/c with 1/2 arm tray on Left. Nursing notified of change from recliner w/c/. Commode tsf from w/c to droparm commode discussed and shown to NA.                               User Key     Initials Effective Dates Name Provider Type Discipline    CC 06/08/18 -  Keya Miranda OTR Occupational Therapist OT    BL 01/05/21 -  Marshall Oleary OT Occupational Therapist OT                    OT Recommendation and Plan                         Time Calculation:     Time Calculation- OT     Row Name 02/08/21 1000             Time Calculation- OT    OT Start Time  1000  -CC      OT Stop Time  1030  -CC      OT Time Calculation (min)  30 min  -CC        User Key  (r) = Recorded By, (t) = Taken By, (c) = Cosigned By    Initials Name Provider Type    CC Keya Miranda OTR Occupational Therapist        Therapy Charges for Today     Code Description Service Date Service Provider Modifiers Qty    [id number] HC OT SELF CARE/MGMT/TRAIN EA 15 MIN 2/8/2021 Keya Miranda OTR GO 2                   GIAN Blackwell  2/8/2021

## 2021-02-08 NOTE — PLAN OF CARE
Goal Outcome Evaluation:  Plan of Care Reviewed With: patient      Patient is alert, oriented. Denies pain. Continent of bladder. Bed pan used for toiletting. Scattered bruises. Coccyx area pink ,blanchable. Z guard applied. No unsafe behaviors.

## 2021-02-08 NOTE — PROGRESS NOTES
Adult Nutrition  Assessment/PES    Patient Name:  Liana Carrero  YOB: 1935  MRN: [id number]  Admit Date:  1/7/2021    Assessment Date:  2/8/2021      Reason for Assessment     Row Name 02/08/21 1301          Reason for Assessment    Reason For Assessment  follow-up protocol             Labs/Tests/Procedures/Meds     Row Name 02/08/21 1301          Labs/Procedures/Meds    Lab Results Reviewed  reviewed        Diagnostic Tests/Procedures    Diagnostic Test/Procedure Reviewed  reviewed        Medications    Pertinent Medications Reviewed  reviewed             Nutrition Prescription Ordered     Row Name 02/08/21 1301          Nutrition Prescription PO    Current PO Diet  Regular     Supplement  Boost Plus (Ensure Enlive, Ensure Plus)     Common Modifiers  Cardiac;Low Fat;Low Sodium         Evaluation of Received Nutrient/Fluid Intake     Row Name 02/08/21 1302          PO Evaluation    % PO Intake                 Problem/Interventions:          Intervention Goal     Row Name 02/08/21 1302          Intervention Goal    General  Maintain nutrition     PO  Maintain intake;Continue positive trend     Weight  Maintain weight         Nutrition Intervention     Row Name 02/08/21 1302          Nutrition Intervention    RD/Tech Action  Follow Tx progress;Care plan reviewd;Encourage intake;Menu provided           Education/Evaluation     Row Name 02/08/21 1302          Monitor/Evaluation    Monitor  Per protocol           Electronically signed by:  Angela Gonzalez RD  02/08/21 13:02 EST

## 2021-02-08 NOTE — PROGRESS NOTES
Inpatient Rehabilitation Functional Measures Assessment and Plan of Care    Plan of Care  Updated Problems/Interventions  Cognition    [ST] Memory(Active)  Current Status(02/08/2021): min/mod memory deficits  Weekly Goal(02/09/2021): Patient will recall functional information after 5  minutes with MIN cues.  Discharge Goal: Patient will return to her baseline memory abilities.    [ST] Right Hemisphere Function(Active)  Current Status(02/08/2021):  mod/max visual  cues for reading/writing tasks  Weekly Goal(02/17/2021): The patient will complete L/R discrimination and  spatial concept tasks with MOD cues.  Discharge Goal: The patient will attend to the left for functional tasks with  only initial min cues.    [ST] Executive Functions(Active)  Current Status(02/08/2021): impaired attention; fatiques quickly; distractible  Weekly Goal(02/10/2021): follow schedule with min cues  Discharge Goal: fxnl cognitive skills for home with assist    Functional Measures  JALEEL Eating:  [facility] Grooming: [facility] Bathing:  [facility] Upper Body Dressing:  [facility] Lower Body Dressing:  [facility] Toileting:  [facility] Bladder Management  Level of Assistance:  [place]  Frequency/Number of Accidents this Shift:  [facility] Bowel Management  Level of Assistance: [place]  Frequency/Number of Accidents this Shift: [facility] Bed/Chair/Wheelchair Transfer:  [facility] Toilet Transfer:  [facility] Tub/Shower Transfer:  [place]    Previously Documented Mode of Locomotion at Discharge: Field  JALEEL Expected Mode of Locomotion at Discharge: [facility] Walk/Wheelchair:  [facility] Stairs:  [facility] Comprehension:  [facility] Expression:  [facility] Social Interaction:  [facility] Problem Solving:  [facility] Memory:  [place]    Therapy Mode Minutes  Occupational Therapy: [place]  Physical Therapy: [place]  Speech Language Pathology:  [place]    Signed by: Katelyn James, SLP

## 2021-02-08 NOTE — THERAPY TREATMENT NOTE
Inpatient Rehabilitation - Occupational Therapy Treatment Note    [facility]     Patient Name: Liana Carrero  : 1935  MRN: [id number]    Today's Date: 2021                 Admit Date: 2021         ICD-10-CM ICD-9-CM   1. Impaired functional mobility, balance, gait, and endurance  Z74.09 V49.89       Patient Active Problem List   Diagnosis   • Stroke (cerebrum) (CMS/[facility])   • Chest pain   • Hypertensive urgency   • Headache   • History of embolic stroke   • Hypothyroidism   • Hypokalemia   • CAD in native artery   • URTI (acute upper respiratory infection)   • Closed fracture of left hip (CMS/[facility])   • Chronic diastolic (congestive) heart failure (CMS/[facility])   • Coronary artery disease involving autologous vein coronary bypass graft without angina pectoris   • Impaired glucose tolerance   • Moderate malnutrition (CMS/[facility])   • Stroke (CMS/[facility])       Past Medical History:   Diagnosis Date   • Arthritis    • CHF (congestive heart failure) (CMS/[facility])    • Coronary artery disease    • Diabetes mellitus (CMS/[facility])     new DX related to stroke, started on insulin at hospital   • Disease of thyroid gland    • Elevated cholesterol    • GERD (gastroesophageal reflux disease)    • History of transfusion    • Hypertension    • PONV (postoperative nausea and vomiting)    • Sleep apnea    • Spinal headache    • Stroke (CMS/[facility])        Past Surgical History:   Procedure Laterality Date   • APPENDECTOMY     • CARDIAC CATHETERIZATION       x2   • CARDIAC CATHETERIZATION N/A 2019    Procedure: Left Heart Cath;  Surgeon: Bentley Chapin MD;  Location: [facility] CATH INVASIVE LOCATION;  Service: Cardiovascular   • CARDIAC CATHETERIZATION N/A 2019    Procedure: Coronary angiography;  Surgeon: Bentley Chapin MD;  Location: [facility] CATH INVASIVE LOCATION;  Service: Cardiovascular   • CARDIAC CATHETERIZATION N/A 2019    Procedure: Native mammary injection;  Surgeon: Bentley Chapin MD;  Location: [facility]  CATH INVASIVE LOCATION;  Service: Cardiovascular   • CARDIAC CATHETERIZATION N/A 12/26/2019    Procedure: Saphenous Vein Graft;  Surgeon: Bentley Chapin MD;  Location: [facility] CATH INVASIVE LOCATION;  Service: Cardiovascular   • COLON SURGERY     • COLONOSCOPY     • EYE SURGERY      lens implants and cataract surgery   • FEMUR IM NAILING/RODDING Left 3/26/2020    Procedure: LT.HIP NAILING/RODDING;  Surgeon: Carlos Khan II, MD;  Location: [facility] MAIN OR;  Service: Orthopedics;  Laterality: Left;   • FRACTURE SURGERY Left 2020   • HYSTERECTOMY     • VASCULAR SURGERY      varicous veins                IRF OT ASSESSMENT FLOWSHEET (last 12 hours)      IRF OT Evaluation and Treatment     Row Name 02/08/21 1504 02/08/21 1200       OT Time and Intention    Document Type  daily treatment  -CC  daily treatment  -CC    Mode of Treatment  occupational therapy  -CC  occupational therapy  -CC    Patient Effort  adequate  -CC  --    Row Name 02/08/21 1504 02/08/21 1200       Vision Assessment/Intervention    Visual Impairment/Limitations  visual/perceptual impairments present  -CC  visual/perceptual impairments present  -CC    Visual Field Deficit  homonymous hemianopsia, left  -CC  homonymous hemianopsia, left  -CC    Visual Motor Impairment  visual tracking, left  -CC  visual tracking, left  -CC    Visual Processing Deficit  jon-inattention/neglect, left  -CC  jon-inattention/neglect, left  -CC    Row Name 02/08/21 1504 02/08/21 1200       Cognition/Psychosocial    Affect/Mental Status (Cognitive)  WFL  -CC  WFL  -CC    Orientation Status (Cognition)  oriented x 3  -CC  oriented x 3  -CC    Personal Safety Interventions  fall prevention program maintained;gait belt;nonskid shoes/slippers when out of bed  -CC  fall prevention program maintained;gait belt;nonskid shoes/slippers when out of bed  -CC    Cognitive Function (Cognitive)  attention deficit  -CC  attention deficit  -CC    Attention Deficit (Cognitive)   distractible in noisy environment;divided attention  -  --    [facility] Name 02/08/21 1504 02/08/21 1200       Pain Scale: Numbers Pre/Post-Treatment    Pretreatment Pain Rating  0/10 - no pain  -  0/10 - no pain  -    Posttreatment Pain Rating  0/10 - no pain  -  0/10 - no pain  -CC    Row Name 02/08/21 1504 02/08/21 1200       Bed Mobility    Sit-Supine [place] (Bed Mobility)  verbal cues;moderate assist (50% patient effort)  -  --    Comment (Bed Mobility)  --  in w/c  -CC    [facility] Name 02/08/21 1504 02/08/21 1200       Transfers    Chair-Bed [place] (Transfers)  maximum assist (25% patient effort);verbal cues  -  --    Sit-Stand [place] (Transfers)  --  maximum assist (25% patient effort);2 person assist;verbal cues  -    Stand-Sit [place] (Transfers)  --  maximum assist (25% patient effort);2 person assist;verbal cues;nonverbal cues (demo/gesture)  -    [place] Level (Toilet Transfer)  verbal cues;nonverbal cues (demo/gesture);maximum assist (25% patient effort);1 person to manage equipment  -  --    Assistive Device (Toilet Transfer)  commode, bedside with drop arms  -  --    [facility] Name 02/08/21 1504          Chair-Bed Transfer    Assistive Device (Chair-Bed Transfers)  wheelchair  -[facility] Name 02/08/21 1200          Sit-Stand Transfer    Assistive Device (Sit-Stand Transfers)  wheelchair  -[facility] Name 02/08/21 1200          Stand-Sit Transfer    Assistive Device (Stand-Sit Transfers)  wheelchair  -[facility] Name 02/08/21 1504          Toilet Transfer    Type (Toilet Transfer)  squat pivot  -[facility] Name 02/08/21 1200          Bathing    [place] Level (Bathing)  bathing skills;upper body;lower body;moderate assist (50% patient effort);verbal cues;nonverbal cues (demo/gesture)  -     Position (Bathing)  sink side;supported sitting;supported standing  -     Set-up Assistance (Bathing)  adjust water temperature;obtain supplies;open containers  -      Comment (Bathing)  2 for standing for safety   -CC     Row Name 02/08/21 1200          Upper Body Dressing    [place] Level (Upper Body Dressing)  upper body dressing skills;doff;don;front opening garment;verbal cues;nonverbal cues (demo/gesture);maximal assist (25-49% patient effort)  -CC     Position (Upper Body Dressing)  supported sitting  -CC     Set-up Assistance (Upper Body Dressing)  obtain clothing  -CC     Row Name 02/08/21 1200          Lower Body Dressing    [place] Level (Lower Body Dressing)  doff;don;pants/bottoms;socks;moderate assist (50% patient effort);maximum assist (25% patient effort)  -CC     Position (Lower Body Dressing)  supported sitting;supported standing  -CC     Set-up Assistance (Lower Body Dressing)  obtain clothing  -CC     Comment (Lower Body Dressing)  2 for standing   -CC     Row Name 02/08/21 1200          Grooming    [place] Level (Grooming)  grooming skills;deodorant application;hair care, combing/brushing;oral care regimen;wash face, hands;standby assist;minimum assist (75% patient effort)  -CC     Position (Grooming)  sink side;supported sitting  -CC     Set-up Assistance (Grooming)  obtain supplies  -     Comment (Grooming)  assist w dentutres   -CC     Row Name 02/08/21 1504          Toileting    [place] Level (Toileting)  toileting skills;adjust/manage clothing;perform perineal hygiene;dependent (less than 25% patient effort)  -     Assistive Device Use (Toileting)  commode chair  -CC     Position (Toileting)  supported sitting;supported standing  -CC     Set-up Assistance (Toileting)  change pad/brief;obtain supplies  -     Row Name 02/08/21 1504 02/08/21 1200       Positioning and Restraints    Pre-Treatment Position  sitting in chair/recliner  -CC  sitting in chair/recliner  -CC    Post Treatment Position  wheelchair  -CC  wheelchair  -CC    In Wheelchair  sitting;call light within reach;encouraged to call for assist;exit alarm on  -CC   sitting;call light within reach;encouraged to call for assist;exit alarm on  -CC      User Key  (r) = Recorded By, (t) = Taken By, (c) = Cosigned By    Initials Name Effective Dates    CC Keya Miranda, OTR 06/08/18 -            Occupational Therapy Education                 Title: PT OT SLP Therapies (Done)     Topic: Occupational Therapy (Done)     Point: ADL training (Done)     Description:   Instruct learner(s) on proper safety adaptation and remediation techniques during self care or transfers.   Instruct in proper use of assistive devices.              Learning Progress Summary           Patient Acceptance, E,TB,D, VU by CC at 1/29/2021 1509    Comment: Pt repositioned in reg w/c with 1/2 arm tray on Left. Nursing notified of change from recliner w/c/. Commode tsf from w/c to droparm commode discussed and shown to NA.    Acceptance, E, VU by BL at 1/8/2021 1434   Caregiver Acceptance, E,TB,D, VU by CC at 1/29/2021 1509    Comment: Pt repositioned in reg w/c with 1/2 arm tray on Left. Nursing notified of change from recliner w/c/. Commode tsf from w/c to droparm commode discussed and shown to NA.                   Point: Home exercise program (Done)     Description:   Instruct learner(s) on appropriate technique for monitoring, assisting and/or progressing therapeutic exercises/activities.              Learning Progress Summary           Patient Acceptance, E, VU by BL at 1/8/2021 1434                   Point: Precautions (Done)     Description:   Instruct learner(s) on prescribed precautions during self-care and functional transfers.              Learning Progress Summary           Patient Acceptance, E, VU by BL at 1/8/2021 1434                   Point: Body mechanics (Done)     Description:   Instruct learner(s) on proper positioning and spine alignment during self-care, functional mobility activities and/or exercises.              Learning Progress Summary           Patient Acceptance, E,TB,D, VU by CC  at 1/29/2021 1509    Comment: Pt repositioned in reg w/c with 1/2 arm tray on Left. Nursing notified of change from recliner w/c/. Commode tsf from w/c to droparm commode discussed and shown to NA.    Acceptance, E, VU by BL at 1/8/2021 1434   Caregiver Acceptance, E,TB,D, VU by CC at 1/29/2021 1509    Comment: Pt repositioned in reg w/c with 1/2 arm tray on Left. Nursing notified of change from recliner w/c/. Commode tsf from w/c to droparm commode discussed and shown to NA.                               User Key     Initials Effective Dates Name Provider Type Discipline     06/08/18 -  Keya Miranda OTR Occupational Therapist OT    BL 01/05/21 -  Marshall Oleary OT Occupational Therapist OT                    OT Recommendation and Plan                         Time Calculation:     Time Calculation- OT     Row Name 02/08/21 1400 02/08/21 1000          Time Calculation- OT    OT Start Time  1400  -CC  1000  -CC     OT Stop Time  1430  -CC  1030  -CC     OT Time Calculation (min)  30 min  -CC  30 min  -CC     OT Non-Billable Time (min)  30 min  -CC  --       User Key  (r) = Recorded By, (t) = Taken By, (c) = Cosigned By    Initials Name Provider Type    CC Keya Miranda OTR Occupational Therapist        Therapy Charges for Today     Code Description Service Date Service Provider Modifiers Qty    11058065120 HC OT SELF CARE/MGMT/TRAIN EA 15 MIN 2/8/2021 Keya Miranda OTR GO 2    76004672791 HC OT NEUROMUSC RE EDUCATION EA 15 MIN 2/8/2021 Keya Miranda OTR GO 1    84401519151 HC OT THER SUPP EA 15 MIN 2/8/2021 Keya Miranda OTR GO 2    80169897640 HC OT SELF CARE/MGMT/TRAIN EA 15 MIN 2/8/2021 Keya Miranda OTR GO 1                   GIAN Blackwell  2/8/2021

## 2021-02-08 NOTE — PROGRESS NOTES
Inpatient Rehabilitation Plan of Care Note    Plan of Care  Care Plan Reviewed - No updates at this time.    Psychosocial    Performed Intervention(s)  Verbalize needs and concerns  Medications as needed/ ordered  Therapeutic environmental set up      Safety    Performed Intervention(s)  Items with in reach and environmental set up  Bed alarm/ Chair alarm  Safety rounds and falls protocols/ precautions      Sphincter Control    Performed Intervention(s)  Proper diet and fluid intake  Medication as ordered  Incontinent care if needed  Monitor intake and output      Body Systems    Performed Intervention(s)  O2 2L/NC @ HS  assess lungs, sats every shift    Signed by: Amalia Fuller RN

## 2021-02-08 NOTE — PLAN OF CARE
Goal Outcome Evaluation:         Pt is alert and oriented. Follows all commands. Denies pain this am. Left side flaccid. Left visual field cut.  Up in the chair for all meals. Up x 2 people to the bedside command. No unsafe behavior seen.

## 2021-02-08 NOTE — THERAPY TREATMENT NOTE
Inpatient Rehabilitation - Physical Therapy Treatment Note       [facility]     Patient Name: Liana Carrero  : 1935  MRN: [id number]    Today's Date: 2021                    Admit Date: 2021      Visit Dx:     ICD-10-CM ICD-9-CM   1. Impaired functional mobility, balance, gait, and endurance  Z74.09 V49.89       Patient Active Problem List   Diagnosis   • Stroke (cerebrum) (CMS/[facility])   • Chest pain   • Hypertensive urgency   • Headache   • History of embolic stroke   • Hypothyroidism   • Hypokalemia   • CAD in native artery   • URTI (acute upper respiratory infection)   • Closed fracture of left hip (CMS/[facility])   • Chronic diastolic (congestive) heart failure (CMS/[facility])   • Coronary artery disease involving autologous vein coronary bypass graft without angina pectoris   • Impaired glucose tolerance   • Moderate malnutrition (CMS/[facility])   • Stroke (CMS/[facility])       Past Medical History:   Diagnosis Date   • Arthritis    • CHF (congestive heart failure) (CMS/[facility])    • Coronary artery disease    • Diabetes mellitus (CMS/[facility])     new DX related to stroke, started on insulin at hospital   • Disease of thyroid gland    • Elevated cholesterol    • GERD (gastroesophageal reflux disease)    • History of transfusion    • Hypertension    • PONV (postoperative nausea and vomiting)    • Sleep apnea    • Spinal headache    • Stroke (CMS/[facility])        Past Surgical History:   Procedure Laterality Date   • APPENDECTOMY     • CARDIAC CATHETERIZATION       x2   • CARDIAC CATHETERIZATION N/A 2019    Procedure: Left Heart Cath;  Surgeon: Bentley Chapin MD;  Location:  [facility] CATH INVASIVE LOC[facility];  Service: Cardiovascular   • CARDIAC CATHETERIZATION N/A 2019    Procedure: Coronary angiography;  Surgeon: Bentley Chapin MD;  Location:  [facility] INVAS[facility];  Service: Cardiovascular   • CARDIAC CATHETERIZATION N/A 2019    Procedure: Native mammary injection;  Surgeon: Bentley Chapin MD;   Location:  [facility]ATH INVASIVE [facility];  Service: Cardiovascular   • CARDIAC CATHETERIZATION N/A 12/26/2019    Procedure: Saphenous Vein Graft;  Surgeon: Bentley Chapin MD;  Location: [facility] CATH INVASIVE LOCATION;  Service: Cardiovascular   • COLON SURGERY     • COLONOSCOPY     • EYE SURGERY      lens implants and cataract surgery   • FEMUR IM NAILING/RODDING Left 3/26/2020    Procedure: LT.HIP NAILING/RODDING;  Surgeon: Carlos Khan II, MD;  Location: [facility] MAIN OR;  Service: Orthopedics;  Laterality: Left;   • FRACTURE SURGERY Left 2020   • HYSTERECTOMY     • VASCULAR SURGERY      varicous veins          PT ASSESSMENT (last 12 hours)      IRF PT Evaluation and Treatment     Row Name 02/08/21 1148          PT Time and Intention    Document Type  daily treatment  -MS     Mode of Treatment  physical therapy  -MS     Patient/Family/Caregiver Comments/Observations  AM: up in rehab gym, no acute distress  -MS     Total Minutes, Physical Therapy  60  -MS     Row Name 02/08/21 1148          General Information    Existing Precautions/Restrictions  fall L jon, sore L wrist from injury  -MS     Row Name 02/08/21 1148          Vision Assessment/Intervention    Visual Impairment/Limitations  visual/perceptual impairments present  -MS     Visual Field Deficit  homonymous hemianopsia, left  -MS     Visual Motor Impairment  visual tracking, left  -MS     Visual Processing Deficit  jon-inattention/neglect, left  -MS     Row Name 02/08/21 1148          Cognition/Psychosocial    Orientation Status (Cognition)  oriented x 3  -MS     Follows Commands (Cognition)  follows two-step commands;repetition of directions required;verbal cues/prompting required  -MS     Personal Safety Interventions  fall prevention program maintained;gait belt;nonskid shoes/slippers when out of bed  -MS     Attention Deficit (Cognitive)  distractible in noisy environment;divided attention  -MS     Row Name 02/08/21 1148          Pain  Scale: Numbers Pre/Post-Treatment    Pretreatment Pain Rating  5/10  -MS     Posttreatment Pain Rating  5/10  -MS     Pain Location - Orientation  lower  -MS     Pain Location  back  -MS     Row Name 02/08/21 1148          Transfer Assessment/Treatment    Comment (Transfers)  Transfers performed to and from rehab gym mat in AM session. Two STS trials performed at jon bars in PM session- stood for up to 75s with min-modAx2 for maintaining balance. LBP limiting.  -MS     Row Name 02/08/21 1148          Transfers    Bed-Chair [place] (Transfers)  maximum assist (25% patient effort);verbal cues squat pivot  -MS     Chair-Bed [place] (Transfers)  maximum assist (25% patient effort);verbal cues  -MS     Assistive Device (Bed-Chair Transfers)  wheelchair  -MS     Sit-Stand [place] (Transfers)  maximum assist (25% patient effort);verbal cues  -MS     Stand-Sit [place] (Transfers)  maximum assist (25% patient effort);verbal cues  -MS     Row Name 02/08/21 1148          Chair-Bed Transfer    Assistive Device (Chair-Bed Transfers)  wheelchair  -MS     Row Name 02/08/21 1148          Sit-Stand Transfer    Assistive Device (Sit-Stand Transfers)  wheelchair jon bars  -MS     Row Name 02/08/21 1148          Stand-Sit Transfer    Assistive Device (Stand-Sit Transfers)  wheelchair jon bars  -MS     Row Name 02/08/21 1148          Balance    Static Sitting Balance  mild impairment;unsupported;supported;sitting, edge of mat  -MS     Dynamic Sitting Balance  mild impairment;moderate impairment;unsupported;sitting, edge of mat  -MS     Balance Interventions  sitting;static;dynamic;moderate challenge;weight shifting activity;UE activity with balance activity  -MS     Comment, Balance  Emphasis on balance reactions, reaching and scanning to L for pool ring then achieving mid line and passing object to therapist on R side- also performed in reverse.  -MS     Row Name 02/08/21 1148          Motor Skills    Therapeutic  Exercise  continuous passive motion  -MS     Row Name 02/08/21 1148          Hip (Therapeutic Exercise)    Hip PROM (Therapeutic Exercise)  left;sitting;flexion;extension;10 repetitions  -MS     Hip Strengthening (Therapeutic Exercise)  right;marching while seated;10 repetitions;2 sets;resistance band;green  -MS     Row Name 02/08/21 1148          Knee (Therapeutic Exercise)    Knee PROM (Therapeutic Exercise)  left;sitting;flexion;extension;10 repetitions  -MS     Knee Strengthening (Therapeutic Exercise)  right;sitting;LAQ (long arc quad);10 repetitions;2 sets;resistance band;green  -MS     Row Name 02/08/21 1148          Positioning and Restraints    Pre-Treatment Position  sitting in chair/recliner  -MS     Post Treatment Position  wheelchair  -MS     In Wheelchair  sitting;call light within reach;encouraged to call for assist;exit alarm on AM and PM session  -MS       User Key  (r) = Recorded By, (t) = Taken By, (c) = Cosigned By    Initials Name Provider Type    MS Nati Kaba PT Physical Therapist        Wound 01/07/21 Left upper chest Incision (Active)   Dressing Appearance dry;intact;no drainage 02/08/21 0800   Closure ESTEFANIA 02/08/21 0800   Periwound intact 02/08/21 0800   Drainage Amount none 02/07/21 2027   Dressing Care gauze, dry 02/08/21 0800     Physical Therapy Education                 Title: PT OT SLP Therapies (Done)     Topic: Physical Therapy (Done)     Point: Mobility training (Done)     Learning Progress Summary           Patient Acceptance, E,TB, VU,NR by MS at 2/8/2021 1152    Acceptance, E,D, DU,NR by LB at 2/6/2021 1539    Comment: wt shift in standing    Acceptance, E,TB, VU,NR by MS at 2/5/2021 1208    Acceptance, E,TB, VU,NR by MS at 2/4/2021 1518    Acceptance, E,TB, VU,NR by MS at 2/3/2021 1151    Acceptance, E,TB, VU,NR by MS at 2/2/2021 1210    Acceptance, E,TB, NR by MS at 2/1/2021 1145    Acceptance, E, NR by  at 1/30/2021 1336    Acceptance, E,TB, VU,NR by MS at 1/29/2021  1535    Acceptance, E, NR by LB1 at 1/28/2021 1032    Acceptance, E,TB, VU,NR by MS at 1/27/2021 1447    Acceptance, E,TB, VU,NR by MS at 1/26/2021 0959    Acceptance, E,TB, VU,NR by MS at 1/25/2021 1316    Acceptance, E,TB, VU,NR by MS at 1/22/2021 0918    Acceptance, E,TB, VU by MS at 1/20/2021 1544    Acceptance, E,TB, VU,NR by MS at 1/19/2021 1401    Acceptance, E,TB, NR,NL by MS at 1/18/2021 1432    Acceptance, E,TB, VU,NR by IVAN at 1/16/2021 1444    Acceptance, E,TB, NR,NL by MS at 1/15/2021 1418    Acceptance, E,TB, NR by MS at 1/14/2021 1223    Acceptance, E,TB, NR by LB2 at 1/13/2021 1030    Comment: sitting balance    Acceptance, E,TB, NR by MS at 1/12/2021 1312    Acceptance, E,TB, VU,NR by MS at 1/11/2021 1140                   Point: Home exercise program (Done)     Learning Progress Summary           Patient Acceptance, E,TB, VU,NR by MS at 2/8/2021 1152    Acceptance, E,TB, VU,NR by MS at 2/5/2021 1208    Acceptance, E,TB, VU,NR by MS at 2/4/2021 1518    Acceptance, E,TB, VU,NR by MS at 2/3/2021 1151    Acceptance, E,TB, VU,NR by MS at 2/2/2021 1210    Acceptance, E,TB, NR by MS at 2/1/2021 1145    Acceptance, E, NR by  at 1/30/2021 1336    Acceptance, E,TB, VU,NR by MS at 1/29/2021 1535    Acceptance, E,TB, VU,NR by MS at 1/27/2021 1447    Acceptance, E,TB, VU,NR by MS at 1/26/2021 0959    Acceptance, E,TB, VU,NR by MS at 1/25/2021 1316    Acceptance, E,TB, VU,NR by MS at 1/22/2021 0918    Acceptance, E,TB, VU by MS at 1/20/2021 1544    Acceptance, E,TB, VU,NR by MS at 1/19/2021 1401    Acceptance, E,TB, NR,NL by MS at 1/18/2021 1432    Acceptance, E,TB, NR,NL by MS at 1/15/2021 1418    Acceptance, E,TB, NR by MS at 1/14/2021 1223                   Point: Body mechanics (Done)     Learning Progress Summary           Patient Acceptance, E,TB, VU,NR by MS at 2/8/2021 1152    Acceptance, E,TB, VU,NR by MS at 2/5/2021 1208    Acceptance, E,TB, VU,NR by MS at 2/4/2021 1518    Acceptance, E,TB, VU,NR by  MS at 2/3/2021 1151    Acceptance, E,TB, VU,NR by MS at 2/2/2021 1210    Acceptance, E,TB, NR by MS at 2/1/2021 1145    Acceptance, E, NR by LH at 1/30/2021 1336    Acceptance, E,TB, VU,NR by MS at 1/29/2021 1535    Acceptance, E,TB, VU,NR by MS at 1/27/2021 1447    Acceptance, E,TB, VU,NR by MS at 1/26/2021 0959    Acceptance, E,TB, VU,NR by MS at 1/25/2021 1316    Acceptance, E,TB, VU,NR by MS at 1/22/2021 0918    Acceptance, E,TB, VU by MS at 1/20/2021 1544    Acceptance, E,TB, VU,NR by MS at 1/19/2021 1401    Acceptance, E,TB, NR,NL by MS at 1/15/2021 1418    Acceptance, E,TB, NR by MS at 1/14/2021 1223    Acceptance, E,TB, NR by MS at 1/12/2021 1312    Acceptance, E,TB, VU,NR by MS at 1/11/2021 1140                   Point: Precautions (Done)     Learning Progress Summary           Patient Acceptance, E,TB, VU,NR by MS at 2/8/2021 1152    Acceptance, E,TB, VU,NR by MS at 2/5/2021 1208    Acceptance, E,TB, VU,NR by MS at 2/4/2021 1518    Acceptance, E,TB, VU,NR by MS at 2/3/2021 1151    Acceptance, E,TB, VU,NR by MS at 2/2/2021 1210    Acceptance, E,TB, NR by MS at 2/1/2021 1145    Acceptance, E, NR by LH at 1/30/2021 1336    Acceptance, E,TB, VU,NR by MS at 1/29/2021 1535    Acceptance, E,TB, VU,NR by MS at 1/27/2021 1447    Acceptance, E,TB, VU,NR by MS at 1/26/2021 0959    Acceptance, E,TB, VU,NR by MS at 1/25/2021 1316    Acceptance, E,TB, VU,NR by MS at 1/22/2021 0918    Acceptance, E, VU by MD at 1/21/2021 0926    Acceptance, E,TB, VU by MS at 1/20/2021 1544    Acceptance, E,TB, VU,NR by MS at 1/19/2021 1401    Acceptance, E,TB, NR,NL by MS at 1/15/2021 1418    Acceptance, E,TB, NR by MS at 1/14/2021 1223    Acceptance, E,TB, NR by MS at 1/12/2021 1312    Acceptance, E,TB, VU,NR by MS at 1/11/2021 1140    Acceptance, E, VU by MD at 1/9/2021 0933                               User Key     Initials Effective Dates Name Provider Type Discipline     06/08/18 -  Elba Zarate, PT Physical Therapist PT    IVAN  06/08/18 -  Ela Pritchett, PT Physical Therapist PT    LB2 04/03/18 -  Jazmin Schuster, PT Physical Therapist PT     04/03/18 -  Elba Myrick, PT Physical Therapist PT    LB1 03/07/18 -  Elba Grigsby, PTA Physical Therapy Assistant PT    MD 04/03/18 -  Lisandra Lowery, PT Physical Therapist PT    MS 03/04/19 -  Nati Kaba, PT Physical Therapist PT                PT Recommendation and Plan    Planned Therapy Interventions (PT): balance training, bed mobility training, gait training, home exercise program, postural re-education, patient/family education, neuromuscular re-education, strengthening, transfer training, wheelchair management/propulsion training, ROM (range of motion)  Frequency of Treatment (PT): 60 minutes per session  Anticipated Equipment Needs at Discharge (PT Eval): wheelchair(noted patient already has wc(?) will get details)     Patient was intermittently wearing a face mask during this therapy encounter. Therapist used appropriate personal protective equipment including eye protection, mask, and gloves.  Mask used was standard procedure mask. Appropriate PPE was worn during the entire therapy session. Hand hygiene was completed before and after therapy session. Patient is not in enhanced droplet precautions. Shellie Blackwell also present.       Time Calculation:     PT Charges     Row Name 02/08/21 1507 02/08/21 1148          Time Calculation    Start Time  1330  -MS  1100  -MS     Stop Time  1400  -MS  1130  -MS     Time Calculation (min)  30 min  -MS  30 min  -MS     PT Received On  --  02/08/21  -MS     PT - Next Appointment  --  02/09/21  -MS       User Key  (r) = Recorded By, (t) = Taken By, (c) = Cosigned By    Initials Name Provider Type    MS Nati Kaba, PT Physical Therapist          Therapy Charges for Today     Code Description Service Date Service Provider Modifiers Qty    52382158836 HC PT THER PROC EA 15 MIN 2/8/2021 Nati Kaba, PT GP 4    52615319389 HC PT  THER SUPP EA 15 MIN 2/8/2021 Nati Kaba, PT GP 4                   Nati Kaba, PT  2/8/2021

## 2021-02-08 NOTE — THERAPY TREATMENT NOTE
Inpatient Rehabilitation - Speech Language Pathology Treatment Note    [facility]     Patient Name: Liana Carrero  : 1935  MRN: [id number]    Today's Date: 2021                   Admit Date: 2021       Visit Dx:      ICD-10-CM ICD-9-CM   1. Impaired functional mobility, balance, gait, and endurance  Z74.09 V49.89       Patient Active Problem List   Diagnosis   • Stroke (cerebrum) (CMS/[facility])   • Chest pain   • Hypertensive urgency   • Headache   • History of embolic stroke   • Hypothyroidism   • Hypokalemia   • CAD in native artery   • URTI (acute upper respiratory infection)   • Closed fracture of left hip (CMS/[facility])   • Chronic diastolic (congestive) heart failure (CMS/[facility])   • Coronary artery disease involving autologous vein coronary bypass graft without angina pectoris   • Impaired glucose tolerance   • Moderate malnutrition (CMS/[facility])   • Stroke (CMS/[facility])       Past Medical History:   Diagnosis Date   • Arthritis    • CHF (congestive heart failure) (CMS/[facility])    • Coronary artery disease    • Diabetes mellitus (CMS/[facility])     new DX related to stroke, started on insulin at hospital   • Disease of thyroid gland    • Elevated cholesterol    • GERD (gastroesophageal reflux disease)    • History of transfusion    • Hypertension    • PONV (postoperative nausea and vomiting)    • Sleep apnea    • Spinal headache    • Stroke (CMS/[facility])        Past Surgical History:   Procedure Laterality Date   • APPENDECTOMY     • CARDIAC CATHETERIZATION       x2   • CARDIAC CATHETERIZATION N/A 2019    Procedure: Left Heart Cath;  Surgeon: Bentley Chapin MD;  Location:  [facility]ATH [facility]VAS[facility];  Service: Cardiovascular   • CARDIAC CATHETERIZATION N/A 2019    Procedure: Coronary angiography;  Surgeon: Bentley Chapin MD;  Location:  [facility]ATH INVAS[facility];  Service: Cardiovascular   • CARDIAC CATHETERIZATION N/A 2019    Procedure: Native mammary injection;  Surgeon: Bentley Chapin MD;   Location:  [facility] CATH INVASIVE LOC[facility];  Service: Cardiovascular   • CARDIAC CATHETERIZATION N/A 12/26/2019    Procedure: Saphenous Vein Graft;  Surgeon: Bentley Chapin MD;  Location: [facility] CATH INVASIVE LOCATION;  Service: Cardiovascular   • COLON SURGERY     • COLONOSCOPY     • EYE SURGERY      lens implants and cataract surgery   • FEMUR IM NAILING/RODDING Left 3/26/2020    Procedure: LT.HIP NAILING/RODDING;  Surgeon: Carlos Khan II, MD;  Location: [facility] MAIN OR;  Service: Orthopedics;  Laterality: Left;   • FRACTURE SURGERY Left 2020   • HYSTERECTOMY     • VASCULAR SURGERY      varicous veins          SLP EVALUATION (last 72 hours)      SLP SLC Evaluation     Row Name 02/08/21 1056 02/05/21 1301                Communication Assessment/Intervention    Document Type  therapy note (daily note)  -SL  therapy note (daily note)  -LN       Subjective Information  complains of;fatigue  -SL  no complaints  -LN       Patient Observations  --  -- lethargic in PM. Needed frequent cues to maintain adequate a  -LN       Patient Effort  adequate  -SL  adequate  -LN       Symptoms Noted During/After Treatment  none  -SL  none  -LN         User Key  (r) = Recorded By, (t) = Taken By, (c) = Cosigned By    Initials Name Effective Dates    SL Katelyn James, MS CCC-SLP 06/08/18 -     LN Nuzhat Beebe 12/17/19 -              EDUCATION    The patient has been educated in the following areas:       Cognitive Impairment.      SLP Recommendation and Plan                                                  SLP GOALS     Row Name 02/08/21 1056             Memory Skills Goal 1 (SLP)    Progress (Memory Skills Goal 1, SLP)  100%;independently (over 90% accuracy) 3 word stimulus- mental manip task- progressions  -SL      Progress/Outcomes (Memory Skills Goal 1, SLP)  goal ongoing  -SL      Comment (Memory Skills Goal 1, SLP)  80% for reclal of short 3-4 sent instructions  -SL         Organizational Skills Goal 1 (SLP)     Progress (Thought Organization Skills Goal 1, SLP)  90%;independently (over 90% accuracy) 4 category sorting task  -      Progress/Outcomes (Thought Organization Skills Goal 1, SLP)  goal ongoing  -SL         Reasoning Goal 1 (SLP)    Progress (Reasoning Goal 1, SLP)  20%;30%;independently (over 90% accuracy) word formation with choice of 3 letters  -SL      Progress/Outcomes (Reasoning Goal 1, SLP)  goal ongoing  -SL      Comment (Reasoning Goal 1, SLP)  60% for inferences form stories  -SL         Right Hemisphere Function Goal 1 (SLP)    Barriers (Right Hemisphere Function Goal 1, SLP)  min to mod cues for am task of reading 3 columns of words and placing in appropriate category up above ( 4 categories) with visual cues on left  -SL      Progress (Right Hemisphere Function Goal 1, SLP)  with moderate cues (50-74%)  -      Progress/Outcomes (Right Hemisphere Function Goal 1, SLP)  goal ongoing  -SL      Comment (Right Hemisphere Function Goal 1, SLP)  40% for filling in blanks in line for word formation with visual cue son left;  reading a paragraph- 50% spont for retruning to left side with colored line- 90% with MOD/MAX cues;   -        User Key  (r) = Recorded By, (t) = Taken By, (c) = Cosigned By    Initials Name Provider Type    Katelyn Fan MS CCC-SLP Speech and Language Pathologist                      Time Calculation:       Time Calculation- SLP     Row Name 02/08/21 1107 02/08/21 1106          Time Calculation- [facility]    SLP Start Time  1030  -SL  0830  -     SLP Stop Time  1100  -SL  0900  -[facility] Time Calculation (min)  30 min  -SL  30 min  -       User Key  (r) = Recorded By, (t) = Taken By, (c) = Cosigned By    Initials Name Provider Type    Katelyn Fan MS CCC-SLP Speech and Language Pathologist            Therapy Charges for Today     Code Description Service Date Service Provider Modifiers Qty    73597475848  ST DEV OF COGN SKILLS INITIAL 15 MIN 2/8/2021 Katelyn James MS CCC-SLP  1     [id number] [facility] DEV OF COGN SKILLS EACH ADDT'L 15 MIN 2/8/2021 Katelyn James, MS CCC-SLP  3                           Katelyn James, MS CCC-SLP  2/8/2021

## 2021-02-08 NOTE — NURSING NOTE
Pt states that she maybe starting a UTI again. Denies burning, feeling of fullness and not being able to empty. She denies back pain. Urine is clear. No blood noted. No foul smelling urine. Has been voiding appropriately every 2 to 3  hours today. She has had an epsoide of incontinence during therapy today. She told RN she wants to see how she feels  overnight before RN calls MD to make him aware of a possible UTI.

## 2021-02-08 NOTE — PROGRESS NOTES
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  No changes in her strength.  Tolerating activities.    History taken from: patient    Objective     Vital Signs  Temp:  [97.9 °F (36.6 °C)-98.7 °F (37.1 °C)] 98.4 °F (36.9 °C)  Heart Rate:  [] 102  Resp:  [16-19] 17  BP: (109-134)/(61-73) 109/69    Physical Exam  General: calm, in NAD  Neck: trachea midline  Cardio: well perfused distal extremities, regular radial pulse  Resp: normal WOB on RA, nonlabored.    Abdomen: NT/ND  Extremities:    No edema  No crepitus with range of motion of the left hip.  Neuro: dense left hemiparesis, flaccid tone  Left homonymous hemianopsia    Left inattention.  A&Ox4. 0/5 strength LUE/LLE.   Takes resistance on the right side.    Results from last 7 days   Lab Units 02/08/21  0709 02/05/21  0645   WBC 10*3/mm3 4.81 5.20   HEMOGLOBIN g/dL 11.7* 10.4*   HEMATOCRIT % 36.5 32.6*   PLATELETS 10*3/mm3 162 165     Results from last 7 days   Lab Units 02/08/21  0709 02/05/21  0645   SODIUM mmol/L 137 139   POTASSIUM mmol/L 4.3 4.2   CHLORIDE mmol/L 102 102   CO2 mmol/L 25.7 31.1*   BUN mg/dL 17 19   CREATININE mg/dL 0.61 0.68   CALCIUM mg/dL 9.1 9.1   GLUCOSE mg/dL 109* 114*             Ref. Range 1/8/2021 06:01   TSH Baseline Latest Ref Range: 0.270 - 4.200 uIU/mL 2.830   Vitamin B-12 Latest Ref Range: 211 - 946 pg/mL 381   25 Hydroxy, Vitamin D Latest Ref Range: 30.0 - 100.0 ng/ml 20.6 (L)     CT of the abdomen-January 16  CT Abdomen Pelvis With Contrast   Final Result       A cystic focus at the pancreatic head,  likely corresponds to the   ultrasound finding of concern, could be further evaluated with   endoscopic ultrasound as indicated, interval follow-up also recommended   to exclude any possibility  of a cystic neoplasm. The pancreas is   thinned. The main pancreatic duct shows a mildly prominent caliber, 4   mm.          US GALLBLADDER-  1/14/2021   IMPRESSION:  Small to moderate amount of gallbladder sludge with a few  possible tiny nonshadowing gallstones.  2. No wall thickening or pericholecystic fluid is seen. No sonographic  Garcia's sign is seen.  3. Fatty infiltration of the liver.  4. 1.2 cm hypoechoic pancreatic head lesion. No pancreatic head lesions  are seen on the CT of the abdomen and pelvis from [facility]  dated 12/11/2019.  5. CT of the abdomen with contrast using pancreatic protocol is  recommended for further assessment.    EXAMINATION: LIMITED LEFT BREAST SONOGRAPHY-January 20, 2021     HISTORY: 85-year-old female status post recent placement of a loop  recorder on the left side of the chest with an area of palpable concern  in the left breast.     FINDINGS: Targeted sonographic evaluation of the area of palpable  concern in the left breast which corresponds to the upper inner quadrant  was performed. No sonographic abnormality is appreciated.     IMPRESSION:  Negative targeted left breast sonography. Clinical followup  is suggested.     BI-RADS Category 1: Negative  Results Review:     I reviewed the patient's new clinical results.    Medication Review: Complete  Scheduled Meds:Alirocumab, 75 mg, Subcutaneous, Q14 Days  aspirin, 81 mg, Oral, Daily  bisacodyl, 10 mg, Rectal, Q24H  [START ON 3/6/2021] cholecalciferol, 1,000 Units, Oral, Daily  dilTIAZem CD, 180 mg, Oral, BID  docusate sodium, 200 mg, Oral, BID  enoxaparin, 40 mg, Subcutaneous, Daily  ferrous sulfate, 325 mg, Oral, Daily With Breakfast  levothyroxine, 125 mcg, Oral, Q AM  lidocaine, 1 patch, Transdermal, Q24H  losartan, 50 mg, Oral, Daily  mirtazapine, 15 mg, Oral, Nightly  ondansetron ODT, 4 mg, Oral, TID AC  oxybutynin, 5 mg, Oral, Daily With Dinner  pantoprazole, 40 mg, Oral, QAM  vitamin D, 50,000 Units, Oral, Q7  Days      Continuous Infusions:   PRN Meds:.HYDROcodone-acetaminophen  •  nitroglycerin  •  polyethylene glycol  •  traMADol      Assessment/Plan       Stroke (CMS/[facility])    Debility and functional deficits  -PT/OT/SLP     Acute CVA  -[place] 3 MRI at [facility] revealed acute infarct in the left temporal-occipital region, no TPA or MT  -Jan 4 had worsening of her NIHSS, MRI revealed a new right superior frontal gyrus acute infarct. Again outside of the window of TPA, not a candidate for mechanical thrombectomy  -continue ASA and Praluent as she is intolerant of statins  -Echo was unremarkable  -Holter normal  -Loop recorder placed  -A1c 5.0  -TSH and B12 level unremarkable    Loss of appetite  Weight loss  Nausea  -symptoms for approximately 8 months, has lost ~40lbs over that time per patient  -started Remeron 7.5 qHS  -GI consulted, appreciate recs. Started scheduled Zofran with meals on 1/13, continue Remeron.   Jan 14 - GI to check RUQ US, increase bowel regimen, continue Remeron and Zofran as above  January 15 -1.2 cm hypoechoic pancreatic head lesion on ultrasound.  To get CT of the abdomen tomorrow.  January 16 - CT scan showed pancreatic cyst.  Not felt to be the etiology of her nausea.  Follow-up as an outpatient.  January 17- Remeron increased to 15 mg nightly     Left breast nodule  -2x1cm nodule left breast at 10 o'clock position, borders feel smooth, nodule is not freely mobile, no overlying skin changes. Away from the site of recent left loop recorder placement Jan 5, 2021  -Last mammogram one year ago. May not tolerate mammogram with recent loop recorder placement. Patient discussed with Dr. Pride, left breast ultrasound ordered  -Left breast ultrasound negative at the area of interest in the left upper inner quadrant    HLD  -continue ASA and Praluent as she is intolerant of statins     HTN  -continue cardizem, losartan  -normotensive goal  -monitor    Vit D insufficiency  -level 20 on admission  -started  vitamin D replacement     Hypothyroidism  -continue levothyroxine    CAD  -continue ASA and Praluent   January 27-She had some sharp chest pain that was brief last evening at about 1 AM, resolved on its own.  Had nitroglycerin ordered but did not receive it.  She reports at home she did occasionally have chest pain and would take him to glycerin at home, stating that sometimes she would take it as she just felt nervous and worried with the chest pain.  She did feel some palpitations after the chest pain last evening.  We discussed seeing if anything was uploaded on her loop recorder to her cardiologist office and we will check those results.    Obstructive sleep apnea-January 27-she has been on 2 L nasal cannula oxygen at nighttime with sats 100%.  She did not have her CPAP brought in from home     Prerenal OBED, resolved  -secondary to decreased PO intake  -resolved prior to discharge from [facility]  -monitor electrolytes     Iron deficiency anemia  -received 2 doses of IV iron as well as 1UpRBCs on 1/4 at [facility]  -Hgb stable ~8 prior to discharge from [facility], 9.2 on admission to [facility]  -continue oral iron, monitor     Left hip pain  -xrays at [facility] unremarkable  Jan 13 - Fatigue continues to be an issue. She takes Norco 7.5 mg about 4 times a day with history of left hip pain-history of left femur fracture open reduction internal fixation, x-ray at the outside hospital on January 6 showed hardware in place.  January 14-She does have fatigue during the day.  Will try a lower dose of Norco 5 mg rather than 7.5 mg.  Is on Remeron 7.5 g at nighttime but that was only recently added.  January 15-she took Norco 5 mg twice a day at home.  More alert with better trunk control on lower dose of Norco today.  January 21-continues with left hip pain. Taking Norco 5 mg about 4 times a day. Add Lidoderm patch.  January 25-does not feel Lidoderm patch that helpful.  Pain was better with addition of tramadol over the weekend.  Hip range of motion  unremarkable with no crepitus  January 26-left hip xray did not show displacement of surgical hardware, fracture, erosion or dislocation  January 26-she feels tramadol works better for her than hydrocodone.  Anticipate going home on tramadol and discontinue hydrocodone.  She is presently taking tramadol twice a day.  Appears to be tolerating without any side effects.     Fluids/Electrolytes/Nutrition   -cardiac diet  -admission labs unremarkable     DVT ppx  -subQ Lovenox    /UTI  -on Ditropan 5 mg twice a day.  January 11 - voids with acceptable bladder scan postvoid residuals. Urinary tract infection with E. coli-sensitive to Macrobid  January 18 - required intermittent straight catheter 450 cc.  January 19 - incontinent and IC, PVRs 99-391cc  January 22 - has urge to urinate at times but cannot void, will decrease oxybutynin from bid to with dinner only. She is voiding with incontinence with postvoid residual 230-290 cc.  Discussed trial of decreasing Ditropan to 5 mg q. supper first from twice a day before doing a trial of Flomax.  January 23-urinary tract infection-E. coli-on cefdinir starting January 23.  Sensitive to cephalosporins  January 27-voids with postvoid residual  cc.  We will continue Ditropan at lower dose of 5 mg with supper      TEAM CONF - JAN 12 - BED MAX-DEP. TRANSFERS MAX 2 TRANSFER BOARD. NON-AMBULATORY. WHEELCHAIR 50 FEET MIN ASSIST. TOILET TRANSFERS DEP X 2. IMPAIRED VISION. BATH MAX. LBD DEP. UBD MAX. GROOMING MOD ASSIST. COGNITION - MILD TO MODERATE DEFICITS. 2L O2 AT HS. ON MACROBID FOR UTI. FATIGUES. HISTORY OF POOR APPETITE FOR MONTHS, WEIGHT LOSS 30-40 # OVER SEVERAL MONTHS AT HOME, HISTORY OF REPORTED NAUSEA AT HOME. WILL CONSULT GI.   TINY - 4 WEEKS.     TEAM CONF - JAN 19 - BED MAX 2 .  TRANSFERS MAX 2 TRANSFER BOARD. WHEELCHAIR MIN A DUE TO VISIN DEFICITS. HAS SAT UNSUPPORTED FOR 30 SECS BEFORE STARTS TO LEAN. TOILET TRANSFERS DEP X 2. BATH UB MAX, LB DEP X 2. LBD DEP X  2. UBD MAX. GROOMING MOD. LEFT SHOULDER SUBLUXATION - ADD LEUKOTAPING. VOIDS WITH ELEVATED PVRS WITH OCCASIONAL ISC. CONTINENT INCONTINENT.  LEFT INATTENTION, LEFT VISUAL FIELD CUT. IMPAIRED ATTENTION, EXECUTIVE FUNCTION, REASONING. WORKING MEMORY IMPROVING.  BNE (Active)  Att'n. - WNL  Exec. Fx. - Mild Imp.  Rsng/Jgmnt - Mildly Imp. for abstract reaosning  Arith - Min Imp.  Visuospatial Skills - Mildly Imp.  Visual Mem. - Mildly Imp.  Verbal Mem. - Mildly Imp.  Emot - Pt endorsed dep related to current status  DEPRESSION - SUPPORTIVE THERAPY. ON REMERON.   ELOS -   3 WEEKS    TEAM CONF - JAN 26 -  BED MAX 2. TRANSFERS MAX 2. WHEELCHAIR 50 FEET MIN ASSIST, IMPACTED BY VISUAL DEFICITS. LEFT HIP PAIN INCREASED, WILL REPEAT X-RAY. AT OUTSIDE HOSPITAL X-RAY ON JAN 6 - [ORIF hardware in the proximal femur is stable from 5/6/2020. No hardware loosening or perihardware fracture is detected. The hip joint space is mildly narrowed as before. IMPRESSION: Stable ORIF hardware; no acute abnormality.]  TOILET TRANSFERS MAX 2 AS PUSHES. BATH UBB MIN, LBB MAX 2. UBD MAX ASSIST. LBD DEPENDENT. GROOMING MIN ASSIST. ATTENDS TO LEFT A LITTLE BETTER. TOILETING DEP X 2. LEFT VISUAL FIELD DEFICITS. EXECUTIVE FUNCTION - IMPAIRED ATTENTION. MEMORY MIN-MOD DEFICITS. MOD-MAX VISUAL CUES TO LEFT SIDE. VOIDS. ON DECREASED DITROPAN TO 5 MG ONCE A DAY DUE TO ELEVATED PVRS. CONTINENT AT TIMES WITH BLADDER. BOWEL CONTINENT. PAIN MANAGEMENT - TRAMADOL ADDED. WITH REC THERAPY, CUES TO ATTEND TO LEFT.  ELOS -  2-3 WEEKS    TEAM CONF- BED MAX 2. TRANSFERS MAX SQUAT PIVOT, USES LIFT WITH NURSING. SITTING BALANCE BETTER. LEFT INATTENTION. WHEELCHAIR MIN ASSIST WITH VISUAL CUES, DUE TO LEFT VISUAL INATTENTION/LEFT VISUAL FIELD CUT. STANDING AT PARALLEL BARS WITH BLOCKING LEFT KNEE. TOILET TRANSFERS MAX 2 TO DROPARM. BATH MIN UB AND MAX X 2 LB. DRESSING MOD-MAX UB AND DEP X 2 LB. GROOMING MIN-SBA. TOILETING COMES UP TO STAND A LITTLE BETTER, DEP X 2.    IMPAIRED ATTENTION. DISTRACTIBLE. FATIGUES QUICKLY WITH COGNITIVE TASKS. MIN-MOD MEMORY DEFICITS. MOD-MAX VERBAL AND VISUAL CUES FOR READING AND WRITING TASKS. INCONTINENT BOWEL AND BLADDER. PVRS LOW. CHRONIC LEFT HIP PAIN.   ELOS -  ONE WEEK, WILL NEED SIGNIFICANT ASSISTANCE AT HOME.    Azael Abdullahi MD  02/08/21  13:06 EST    During rounds, used appropriate personal protective equipment including mask and gloves.  Additional gown if indicated.  Mask used was standard procedure mask. Appropriate PPE was worn during the entire visit.  Hand hygiene was completed before and after.

## 2021-02-08 NOTE — PROGRESS NOTES
Inpatient Rehabilitation Plan of Care Note    Plan of Care  Care Plan Reviewed - No updates at this time.    Body Systems    [RN] Respiratory(Active)  Current Status(02/05/2021): O2 2L QHS  Weekly Goal(02/18/2021): Same as discharge  Discharge Goal: Least restrictive amount of 02    Performed Intervention(s)  O2 2L/NC @ HS  assess lungs, sats every shift      Psychosocial    [RN] Coping/Adjustment(Active)  Current Status(02/05/2021): Patient has supportive family, but at risk of  ineffective coping regarding current situation.  Weekly Goal(02/16/2021): Allow patient the opportunity to discuss concerns  regarding new situation.  Discharge Goal: Patient will have healthy coping mechanisms at time of discharge    Performed Intervention(s)  Verbalize needs and concerns  Medications as needed/ ordered  Therapeutic environmental set up      Safety    [RN] Potential for Injury(Active)  Current Status(02/05/2021): Patient at risk of injury related to previous falls.  flaccid on left side. dependent with transfers of 2.  Weekly Goal(02/17/2021): Paitent will use call light appropriately while in  Rehab.  Discharge Goal: Patient will continue to use call light while in Rehab  environment.    Performed Intervention(s)  Items with in reach and environmental set up  Bed alarm/ Chair alarm  Safety rounds and falls protocols/ precautions      Sphincter Control    [RN] Bladder Management(Active)  Current Status(02/05/2021): Patient is continent and incontinent, using bedpan,  PVRs, requiring I/C at times  Weekly Goal(02/18/2021): Patient will be 50% continent  Discharge Goal: Patient will be 100% continent    [RN] Bowel Management(Active)  Current Status(02/05/2021): Patient continent and incontinent  Weekly Goal(02/17/2021): continent 50% of the time  Discharge Goal: Patient will continent 100% of the time    Performed Intervention(s)  Proper diet and fluid intake  Medication as ordered  Incontinent care if needed  Monitor intake  and output    Signed by: Kamla Franco RN

## 2021-02-09 PROCEDURE — 97530 THERAPEUTIC ACTIVITIES: CPT

## 2021-02-09 PROCEDURE — 97129 THER IVNTJ 1ST 15 MIN: CPT

## 2021-02-09 PROCEDURE — 97130 THER IVNTJ EA ADDL 15 MIN: CPT

## 2021-02-09 PROCEDURE — 97112 NEUROMUSCULAR REEDUCATION: CPT

## 2021-02-09 PROCEDURE — 25010000002 ENOXAPARIN PER 10 MG: Performed by: PHYSICAL MEDICINE & REHABILITATION

## 2021-02-09 PROCEDURE — 97535 SELF CARE MNGMENT TRAINING: CPT

## 2021-02-09 PROCEDURE — 63710000001 ONDANSETRON ODT 4 MG TABLET DISPERSIBLE: Performed by: INTERNAL MEDICINE

## 2021-02-09 PROCEDURE — 97110 THERAPEUTIC EXERCISES: CPT

## 2021-02-09 RX ADMIN — LOSARTAN POTASSIUM 50 MG: 50 TABLET, FILM COATED ORAL at 08:12

## 2021-02-09 RX ADMIN — ONDANSETRON 4 MG: 4 TABLET, ORALLY DISINTEGRATING ORAL at 11:13

## 2021-02-09 RX ADMIN — PANTOPRAZOLE SODIUM 40 MG: 40 TABLET, DELAYED RELEASE ORAL at 05:45

## 2021-02-09 RX ADMIN — TRAMADOL HYDROCHLORIDE 50 MG: 50 TABLET ORAL at 22:34

## 2021-02-09 RX ADMIN — BISACODYL 10 MG: 10 SUPPOSITORY RECTAL at 16:36

## 2021-02-09 RX ADMIN — LEVOTHYROXINE SODIUM 125 MCG: 0.12 TABLET ORAL at 07:30

## 2021-02-09 RX ADMIN — DILTIAZEM HYDROCHLORIDE 180 MG: 180 CAPSULE, COATED, EXTENDED RELEASE ORAL at 22:33

## 2021-02-09 RX ADMIN — HYDROCODONE BITARTRATE AND ACETAMINOPHEN 1 TABLET: 5; 325 TABLET ORAL at 02:52

## 2021-02-09 RX ADMIN — DILTIAZEM HYDROCHLORIDE 180 MG: 180 CAPSULE, COATED, EXTENDED RELEASE ORAL at 08:11

## 2021-02-09 RX ADMIN — MIRTAZAPINE 15 MG: 15 TABLET, FILM COATED ORAL at 22:33

## 2021-02-09 RX ADMIN — ENOXAPARIN SODIUM 40 MG: 40 INJECTION SUBCUTANEOUS at 08:11

## 2021-02-09 RX ADMIN — DOCUSATE SODIUM 200 MG: 100 CAPSULE, LIQUID FILLED ORAL at 22:33

## 2021-02-09 RX ADMIN — ONDANSETRON 4 MG: 4 TABLET, ORALLY DISINTEGRATING ORAL at 05:46

## 2021-02-09 RX ADMIN — LIDOCAINE 1 PATCH: 50 PATCH TOPICAL at 08:13

## 2021-02-09 RX ADMIN — HYDROCODONE BITARTRATE AND ACETAMINOPHEN 1 TABLET: 5; 325 TABLET ORAL at 09:55

## 2021-02-09 RX ADMIN — TRAMADOL HYDROCHLORIDE 50 MG: 50 TABLET ORAL at 08:14

## 2021-02-09 RX ADMIN — HYDROCODONE BITARTRATE AND ACETAMINOPHEN 1 TABLET: 5; 325 TABLET ORAL at 16:38

## 2021-02-09 RX ADMIN — ASPIRIN 81 MG: 81 TABLET, CHEWABLE ORAL at 08:13

## 2021-02-09 RX ADMIN — DOCUSATE SODIUM 200 MG: 100 CAPSULE, LIQUID FILLED ORAL at 08:12

## 2021-02-09 RX ADMIN — OXYBUTYNIN CHLORIDE 5 MG: 5 TABLET ORAL at 16:36

## 2021-02-09 RX ADMIN — FERROUS SULFATE TAB 325 MG (65 MG ELEMENTAL FE) 325 MG: 325 (65 FE) TAB at 08:11

## 2021-02-09 NOTE — PROGRESS NOTES
Inpatient Rehabilitation Plan of Care Note    Plan of Care  Care Plan Reviewed - Updates as Follows    Body Systems    [RN] Respiratory(Active)  Current Status(02/09/2021): O2 2L QHS  Weekly Goal(02/16/2021): Same as discharge  Discharge Goal: Least restrictive amount of 02    Performed Intervention(s)  O2 2L/NC @ HS  assess lungs, sats every shift      Psychosocial    [RN] Coping/Adjustment(Active)  Current Status(02/09/2021): Patient has supportive family, but at risk of  ineffective coping regarding current situation.  Weekly Goal(02/16/2021): Allow patient the opportunity to discuss concerns  regarding new situation.  Discharge Goal: Patient will have healthy coping mechanisms at time of discharge    Performed Intervention(s)  Verbalize needs and concerns  Medications as needed/ ordered  Therapeutic environmental set up      Safety    [RN] Potential for Injury(Active)  Current Status(02/09/2021): Patient at risk of injury related to previous falls.  flaccid on left side. dependent with transfers of 2.  Weekly Goal(02/16/2021): Paitent will use call light appropriately while in  Rehab.  Discharge Goal: Patient will continue to use call light while in Rehab  environment.    Performed Intervention(s)  Items with in reach and environmental set up  Bed alarm/ Chair alarm  Safety rounds and falls protocols/ precautions      Sphincter Control    [RN] Bladder Management(Active)  Current Status(02/09/2021): Patient is continent and incontinent, using bedpan,  PVRs, requiring I/C at times  Weekly Goal(02/16/2021): Patient will be 50% continent  Discharge Goal: Patient will be 100% continent    [RN] Bowel Management(Active)  Current Status(02/09/2021): Patient continent and incontinent  Weekly Goal(02/16/2021): continent 50% of the time  Discharge Goal: Patient will continent 100% of the time    Performed Intervention(s)  Proper diet and fluid intake  Medication as ordered  Incontinent care if needed  Monitor intake and  output    Signed by: Maelna Mansfield RN

## 2021-02-09 NOTE — THERAPY TREATMENT NOTE
Inpatient Rehabilitation - Speech Language Pathology Treatment Note    [facility]     Patient Name: Liana Carrero  : 1935  MRN: [id number]    Today's Date: 2021                   Admit Date: 2021       Visit Dx:      ICD-10-CM ICD-9-CM   1. Impaired functional mobility, balance, gait, and endurance  Z74.09 V49.89       Patient Active Problem List   Diagnosis   • Stroke (cerebrum) (CMS/[facility])   • Chest pain   • Hypertensive urgency   • Headache   • History of embolic stroke   • Hypothyroidism   • Hypokalemia   • CAD in native artery   • URTI (acute upper respiratory infection)   • Closed fracture of left hip (CMS/H[facility])   • Chronic diastolic (congestive) heart failure (CMS/[facility])   • Coronary artery disease involving autologous vein coronary bypass graft without angina pectoris   • Impaired glucose tolerance   • Moderate malnutrition (CMS/[facility])   • Stroke (CMS/HCC)       Past Medical History:   Diagnosis Date   • Arthritis    • CHF (congestive heart failure) (CMS/[facility])    • Coronary artery disease    • Diabetes mellitus (CMS/[facility])     new DX related to stroke, started on insulin at hospital   • Disease of thyroid gland    • Elevated cholesterol    • GERD (gastroesophageal reflux disease)    • History of transfusion    • Hypertension    • PONV (postoperative nausea and vomiting)    • Sleep apnea    • Spinal headache    • Stroke (CMS/[facility])        Past Surgical History:   Procedure Laterality Date   • APPENDECTOMY     • CARDIAC CATHETERIZATION       x2   • CARDIAC CATHETERIZATION N/A 2019    Procedure: Left Heart Cath;  Surgeon: Bentley Chapin MD;  Location:  [facility] CATH INVASIVE [facility]ATION;  Service: Cardiovascular   • CARDIAC CATHETERIZATION N/A 2019    Procedure: Coronary angiography;  Surgeon: Bentley Chapin MD;  Location:  [facility]ATH INVASIVE [facility];  Service: Cardiovascular   • CARDIAC CATHETERIZATION N/A 2019    Procedure: Native mammary injection;  Surgeon: Bentley Chapin MD;   Location:  [facility] CATH INVASIVE LOCATION;  Service: Cardiovascular   • CARDIAC CATHETERIZATION N/A 12/26/2019    Procedure: Saphenous Vein Graft;  Surgeon: Bentley Chapin MD;  Location: [facility] CATH INVASIVE LOCATION;  Service: Cardiovascular   • COLON SURGERY     • COLONOSCOPY     • EYE SURGERY      lens implants and cataract surgery   • FEMUR IM NAILING/RODDING Left 3/26/2020    Procedure: LT.HIP NAILING/RODDING;  Surgeon: Carlos Khan II, MD;  Location: [facility] MAIN OR;  Service: Orthopedics;  Laterality: Left;   • FRACTURE SURGERY Left 2020   • HYSTERECTOMY     • VASCULAR SURGERY      varicous veins          SLP EVALUATION (last 72 hours)      SLP SLC Evaluation     Row Name 02/09/21 1100 02/08/21 1059                Communication Assessment/Intervention    Document Type  therapy note (daily note)  -SL  therapy note (daily note)  -SL       Subjective Information  complains of shoulder pain  -SL  complains of;fatigue  -SL       Patient Observations  cooperative  -SL  --       Patient Effort  adequate  -SL  adequate  -SL       Symptoms Noted During/After Treatment  none  -SL  none  -SL         User Key  (r) = Recorded By, (t) = Taken By, (c) = Cosigned By    Initials Name Effective Dates    SL Katelyn James, MS CCC-SLP 06/08/18 -              EDUCATION    The patient has been educated in the following areas:       Cognitive Impairment.      SLP Recommendation and Plan                                                  SLP GOALS     Row Name 02/09/21 1100 02/08/21 1051          Attention Goal 1 (SLP)    Progress (Attention Goal 1, SLP)  60%;with minimal cues (75-90%);with moderate cues (50-74%) locating difference between 2 picture scenes  -SL  --     Progress/Outcomes (Attention Goal 1, SLP)  goal ongoing  -SL  --        Memory Skills Goal 1 (SLP)    Progress (Memory Skills Goal 1, SLP)  70%;independently (over 90% accuracy) immed recall- shrot paragrpahs/stories  -SL  100%;independently (over 90%  accuracy) 3 word stimulus- mental manip task- progressions  -SL     Progress/Outcomes (Memory Skills Goal 1, SLP)  goal ongoing  -SL  goal ongoing  -SL     Comment (Memory Skills Goal 1, SLP)  --  80% for reclal of short 3-4 sent instructions  -SL        Organizational Skills Goal 1 (SLP)    Progress (Thought Organization Skills Goal 1, SLP)  --  90%;independently (over 90% accuracy) 4 category sorting task  -SL     Progress/Outcomes (Thought Organization Skills Goal 1, SLP)  --  goal ongoing  -SL        Reasoning Goal 1 (SLP)    Progress (Reasoning Goal 1, SLP)  60%;70%;with minimal cues (75-90%);with moderate cues (50-74%) ordering items by weight  -SL  20%;30%;independently (over 90% accuracy) word formation with choice of 3 letters  -SL     Progress/Outcomes (Reasoning Goal 1, SLP)  goal ongoing  -SL  goal ongoing  -SL     Comment (Reasoning Goal 1, SLP)  --  60% for inferences form stories  -        Right Hemisphere Function Goal 1 (SLP)    Barriers (Right Hemisphere Function Goal 1, SLP)  --  min to mod cues for am task of reading 3 columns of words and placing in appropriate category up above ( 4 categories) with visual cues on left  -SL     Progress (Right Hemisphere Function Goal 1, SLP)  with moderate cues (50-74%);with maximum cues (25-49%)  -SL  with moderate cues (50-74%)  -SL     Progress/Outcomes (Right Hemisphere Function Goal 1, SLP)  goal ongoing  -  goal ongoing  -SL     Comment (Right Hemisphere Function Goal 1, SLP)  mod visual and verbal cues to scan up/down and left/right for seq/ordering task;  mod cues to attend to left for numerical trailmaking task- 60% accurate with min cues, 100% with mod cues  -SL  40% for filling in blanks in line for word formation with visual cue son left;  reading a paragraph- 50% spont for retruning to left side with colored line- 90% with MOD/MAX cues;   -SL       User Key  (r) = Recorded By, (t) = Taken By, (c) = Cosigned By    Initials Name Provider Type     Katelyn Fan MS CCC-SLP Speech and Language Pathologist                      Time Calculation:       Time Calculation- SLP     Row Name 02/09/21 1213 02/09/21 1212          Time Calculation- SLP    SLP Start Time  1030  -  0830  -     SLP Stop Time  1100  -  0900  -     SLP Time Calculation (min)  30 min  -SL  30 min  -     SLP Non-Billable Time (min)  --  15 min rounds  -       User Key  (r) = Recorded By, (t) = Taken By, (c) = Cosigned By    Initials Name Provider Type    Katelyn Fan MS CCC-SLP Speech and Language Pathologist            Therapy Charges for Today     Code Description Service Date Service Provider Modifiers Qty    70127282638 HC ST DEV OF COGN SKILLS INITIAL 15 MIN 2/8/2021 Katelyn James MS CCC-SLP  1    35387009967 HC ST DEV OF COGN SKILLS EACH ADDT'L 15 MIN 2/8/2021 Katelyn James MS CCC-SLP  3    34056666121 HC ST DEV OF COGN SKILLS INITIAL 15 MIN 2/9/2021 Katelyn James MS CCC-SLP  1    88461217245 HC ST DEV OF COGN SKILLS EACH ADDT'L 15 MIN 2/9/2021 Katelyn James, MS CCC-SLP  3                           Katelyn James MS CCC-SLP  2/9/2021

## 2021-02-09 NOTE — PROGRESS NOTES
Discussed current status/progress, goals, and d/c date for Tuesday,  with patient, daughter and granddaughter. Scheduled family conference for tomorrow, 2/10 at 2:00 p.m. Discussed scheduling family teaching for possibly Thursday and Monday. They had death in family so will be unavailable later afternoon on Thursday and on Friday due to visitation and . Daughter stated patient's niece will be another caregiver at home to assist with patient. Daughter stated she has lots of other people who responded saying they can assist, so has other options for caregivers. Will discuss d/c plans, equipment, and further therapy tomorrow at family conference.

## 2021-02-09 NOTE — PLAN OF CARE
Goal Outcome Evaluation:  Plan of Care Reviewed With: patient  Progress: improving  Outcome Summary: A&O, cooperative. Meds whole with water. See MAR for prn pain meds for back and left hip pain. Incont bladder. Sleeping fair this shift. No unsafe behavior noted.

## 2021-02-09 NOTE — PLAN OF CARE
Goal Outcome Evaluation:        Outcome Summary: A&OX4. Forgetful. [place] continent B&B. Has been using the bed pan, bedside commode, and restroom. Urgency, frequency, stress incontinence. Ate fair at meals. Wears dentures. Stroke. Diet: Reg., cardiac, low fat, low cholesterol, low salt. W/C all meals. Pain meds every 4 hours. Nausea better controlled now that the zofran is scheduled instead of PRN. L side flaccid. 2 person assist or lift. Some visual field cuts, more to the L. Air mattress and wedge for comfort. Leans to L. O2 NC 2L at night. Slept between therapy sessions. Loop recorder to chest. On bowel regimen of 2 docusate sodium x2 daily, suppository in evening and miralax powder PRN. Last BM 2/08. Complained of L. shoulder pain. Was re-taped and this helped her pain lessen. Pt. believes she has a UTI again. Family visited today. D/C 2/16.

## 2021-02-09 NOTE — THERAPY TREATMENT NOTE
Inpatient Rehabilitation - Physical Therapy Treatment Note       [facility]     Patient Name: Liana Carrero  : 1935  MRN: [id number]    Today's Date: 2021                    Admit Date: 2021      Visit Dx:     ICD-10-CM ICD-9-CM   1. Impaired functional mobility, balance, gait, and endurance  Z74.09 V49.89       Patient Active Problem List   Diagnosis   • Stroke (cerebrum) (CMS/[facility])   • Chest pain   • Hypertensive urgency   • Headache   • History of embolic stroke   • Hypothyroidism   • Hypokalemia   • CAD in native artery   • URTI (acute upper respiratory infection)   • Closed fracture of left hip (CMS/[facility])   • Chronic diastolic (congestive) heart failure (CMS/[facility])   • Coronary artery disease involving autologous vein coronary bypass graft without angina pectoris   • Impaired glucose tolerance   • Moderate malnutrition (CMS/[facility])   • Stroke (CMS/[facility])       Past Medical History:   Diagnosis Date   • Arthritis    • CHF (congestive heart failure) (CMS/[facility])    • Coronary artery disease    • Diabetes mellitus (CMS/[facility])     new DX related to stroke, started on insulin at hospital   • Disease of thyroid gland    • Elevated cholesterol    • GERD (gastroesophageal reflux disease)    • History of transfusion    • Hypertension    • PONV (postoperative nausea and vomiting)    • Sleep apnea    • Spinal headache    • Stroke (CMS/[facility])        Past Surgical History:   Procedure Laterality Date   • APPENDECTOMY     • CARDIAC CATHETERIZATION       x2   • CARDIAC CATHETERIZATION N/A 2019    Procedure: Left Heart Cath;  Surgeon: Bentley Chpain MD;  Location:  [facility];  Service: Cardiovascular   • CARDIAC CATHETERIZATION N/A 2019    Procedure: Coronary angiography;  Surgeon: Bentley Chapin MD;  Location:  [facility];  Service: Cardiovascular   • CARDIAC CATHETERIZATION N/A 2019    Procedure: Native mammary injection;  Surgeon: Bentley Chapin MD;   Location:  [facility];  Service: Cardiovascular   • CARDIAC CATHETERIZATION N/A 12/26/2019    Procedure: Saphenous Vein Graft;  Surgeon: Bentley Chapin MD;  Location:  [facility];  Service: Cardiovascular   • COLON SURGERY     • COLONOSCOPY     • EYE SURGERY      lens implants and cataract surgery   • FEMUR IM NAILING/RODDING Left 3/26/2020    Procedure: LT.HIP NAILING/RODDING;  Surgeon: Carlos Khan II, MD;  Location: [facility] MAIN OR;  Service: Orthopedics;  Laterality: Left;   • FRACTURE SURGERY Left 2020   • HYSTERECTOMY     • VASCULAR SURGERY      varicous veins          PT ASSESSMENT (last 12 hours)      IRF PT Evaluation and Treatment     Row Name 02/09/21 1521 02/09/21 1116       PT Time and Intention    Document Type  daily treatment  -MS  daily treatment  -    Mode of Treatment  physical therapy  -MS  physical therapy  -    Patient/Family/Caregiver Comments/Observations  Supine in bed on bed pan, daughter and granddaughter present, discussed current assist level and follow up therapy after DC. Also discussed plan for wheelchair.  -MS  arrived to PT from [facility], [facility]  -    Total Minutes, Physical Therapy  30  -MS  --    Row Name 02/09/21 1115          PT Time and Intention    Document Type  daily treatment  -     Mode of Treatment  physical therapy  -     Patient/Family/Caregiver Comments/Observations  arrived to PT from [facility]  -KP     Row Name 02/09/21 1521          General Information    Existing Precautions/Restrictions  fall L jon, sore wrist from injury  -MS     Row Name 02/09/21 1521          Vision Assessment/Intervention    Visual Field Deficit  homonymous hemianopsia, left  -MS     Visual Motor Impairment  visual tracking, left  -MS     Visual Processing Deficit  jon-inattention/neglect, left  -MS     Row Name 02/09/21 1521 02/09/21 1116       Cognition/Psychosocial    Affect/Mental Status (Cognitive)  WFL  -MS  [facility]  -KP    Orientation Status  (Cognition)  oriented x 3  -MS  oriented x 3  -KP    Follows Commands (Cognition)  follows two-step commands;repetition of directions required;verbal cues/prompting required  -MS  follows two-step commands;repetition of directions required;verbal cues/prompting required  -KP    Personal Safety Interventions  fall prevention program maintained;gait belt;nonskid shoes/slippers when out of bed  -MS  fall prevention program maintained;gait belt;nonskid shoes/slippers when out of bed;supervised activity  -KP    Cognitive Function (Cognitive)  --  attention deficit;safety deficit  -KP    Attention Deficit (Cognitive)  --  distractible in noisy environment;divided attention  -KP    Executive Function Deficit (Cognition)  --  insight/awareness of deficits  -KP    Row Name 02/09/21 1115          Cognition/Psychosocial    Affect/Mental Status (Cognitive)  WFL  -KP     Orientation Status (Cognition)  oriented x 3  -KP     Follows Commands (Cognition)  follows two-step commands;repetition of directions required;verbal cues/prompting required  -KP     Personal Safety Interventions  fall prevention program maintained;gait belt;nonskid shoes/slippers when out of bed;supervised activity  -KP     Cognitive Function (Cognitive)  attention deficit  -KP     Attention Deficit (Cognitive)  distractible in noisy environment;divided attention  -KP     Executive Function Deficit (Cognition)  insight/awareness of deficits  -KP     Row Name 02/09/21 1521 02/09/21 1116       Pain Scale: Numbers Pre/Post-Treatment    Pretreatment Pain Rating  3/10  -MS  4/10  -KP    Posttreatment Pain Rating  3/10  -MS  4/10  -KP    Pain Location - Side  Left  -MS  Left  -KP    Pain Location - Orientation  lower  -MS  --    Pain Location  extremity  -MS  shoulder  -KP    Pre/Posttreatment Pain Comment  --  Pt reports shoulder with inc pain today.  Improved with new taping  -KP    Row Name 02/09/21 1115          Pain Scale: Numbers Pre/Post-Treatment     Pretreatment Pain Rating  6/10  -KP     Posttreatment Pain Rating  6/10  -KP     Pain Location - Side  Left  -KP     Pain Location  shoulder  -KP     Row Name 02/09/21 1521          Bed Mobility    Rolling Left [place] (Bed Mobility)  contact guard;verbal cues  -MS     Rolling Right [place] (Bed Mobility)  moderate assist (50% patient effort);verbal cues;nonverbal cues (demo/gesture)  -MS     Supine-Sit [place] (Bed Mobility)  maximum assist (25% patient effort);verbal cues;nonverbal cues (demo/gesture)  -MS     Sidelying-Sit [place] (Bed Mobility)  maximum assist (25% patient effort);dependent (less than 25% patient effort);verbal cues;nonverbal cues (demo/gesture)  -MS     Assistive Device (Bed Mobility)  bed rails;head of bed elevated  -MS     Comment (Bed Mobility)  CGA-Grant for sitting balance in bed (granddaughter assisting with support from behind)  -MS     Row Name 02/09/21 1521 02/09/21 1116       Transfers    Bed-Chair [place] (Transfers)  maximum assist (25% patient effort);verbal cues;nonverbal cues (demo/gesture)  -MS  --    Assistive Device (Bed-Chair Transfers)  wheelchair  -MS  --    Sit-Stand [place] (Transfers)  --  maximum assist (25% patient effort);verbal cues;set up  -KP    Stand-Sit [place] (Transfers)  --  moderate assist (50% patient effort);verbal cues;set up  -KP    Row Name 02/09/21 1115          Transfers    Sit-Stand [place] (Transfers)  maximum assist (25% patient effort);verbal cues;set up  -KP     Stand-Sit [place] (Transfers)  moderate assist (50% patient effort);verbal cues;set up  -     Row Name 02/09/21 1116 02/09/21 1115       Sit-Stand Transfer    Assistive Device (Sit-Stand Transfers)  wheelchair jon bar  -KP  wheelchair jon bar  -KP    Row Name 02/09/21 1115          Stand-Sit Transfer    Assistive Device (Stand-Sit Transfers)  wheelchair  -KP     Row Name 02/09/21 1521          Squat Pivot Transfer    Squat Pivot [place]  (Transfers)  maximum assist (25% patient effort);verbal cues;nonverbal cues (demo/gesture)  -MS     Assistive Device (Squat Pivot Transfers)  wheelchair  -MS     Row Name 02/09/21 1115          Gait/Stairs (Locomotion)    [place] Level (Gait)  maximum assist (25% patient effort);2 person assist 3rd follow with w/c  -KP     Assistive Device (Gait)  -- jon bar  -KP     Distance in Feet (Gait)  8 steps  -KP     Pattern (Gait)  step-through  -KP     Deviations/Abnormal Patterns (Gait)  base of support, wide;weight shifting decreased;radha decreased  -KP     Bilateral Gait Deviations  forward flexed posture  -KP     Left Sided Gait Deviations  foot drop/toe drag;heel strike decreased;knee buckling, left side;leans left;weight shift ability decreased;Trendelenburg sign  -KP     Right Sided Gait Deviations  weight shift ability decreased  -KP     Comment (Gait/Stairs)  Max MC at L hip and knee for ext in stance, max sequencing cues.  -     Row Name 02/09/21 1521          Wheelchair Mobility/Management    Forward Propulsion [place] (Wheelchair)  standby assist;contact guard;verbal cues  -MS     Distance Propelled in Feet (Wheelchair)  75'  -MS     Armrest Management [place] (Wheelchair)  dependent (less than 25% patient effort)  -MS     Front Rigging/Swing-away Footrest Management [place] (Wheelchair)  dependent (less than 25% patient effort)  -MS     Wheel Locks/Brakes Management [place] (Wheelchair)  dependent (less than 25% patient effort)  -MS     Row Name 02/09/21 1115          Safety Issues, Functional Mobility    Safety Issues Affecting Function (Mobility)  problem-solving;sequencing abilities  -     Impairments Affecting Function (Mobility)  balance;coordination;postural/trunk control  -KP     Cognitive Impairments, Mobility Safety/Performance  insight into deficits/self-awareness;judgment  -     Row Name 02/09/21 1521 02/09/21 1115       Balance    Static Sitting Balance  mild  impairment  -MS  --    Dynamic Sitting Balance  mild impairment  -MS  --    Sit to Stand Dynamic Balance  moderate impairment;supported  -MS  --    Static Standing Balance  moderate impairment;supported  -MS  moderate impairment;supported  -KP    Row Name 02/09/21 1521          Motor Skills    Therapeutic Exercise  core strength  -MS     Row Name 02/09/21 1521          Core Strength (Therapeutic Exercise)    Core Strength (Therapeutic Exercise)  10 repetitions;2 sets trunk rotation to L and R x 10 reps  -MS     Row Name 02/09/21 1521 02/09/21 1115       Positioning and Restraints    Pre-Treatment Position  sitting in chair/recliner  -MS  sitting in chair/recliner  -KP    Post Treatment Position  wheelchair  -MS  wheelchair  -KP    In Wheelchair  sitting in rehab gym waiting for OT  -MS  sitting;call light within reach;encouraged to call for assist;notified nsg;exit alarm on  -KP      User Key  (r) = Recorded By, (t) = Taken By, (c) = Cosigned By    Initials Name Provider Type     Dixie Graham, PT Physical Therapist    Nati Quinteros, PT Physical Therapist        Wound 01/07/21 Left upper chest Incision (Active)   Dressing Appearance dry;intact;no drainage 02/09/21 0810   Closure Liquid skin adhesive;Open to air 02/09/21 0810   Base clean;dry 02/09/21 0810   Periwound intact 02/09/21 0810   Drainage Amount none 02/09/21 0810   Dressing Care gauze, dry 02/09/21 0810   Periwound Care dry periwound area maintained 02/09/21 0810     Physical Therapy Education                 Title: PT OT SLP Therapies (Done)     Topic: Physical Therapy (Done)     Point: Mobility training (Done)     Learning Progress Summary           Patient Acceptance, E,TB, VU,NR by MS at 2/9/2021 1531    Eager, E,D, NR by KP at 2/9/2021 1119    Comment: standing posture, balance    Acceptance, E,TB, VU,NR by MS at 2/8/2021 1152    Acceptance, E,D, DU,NR by  at 2/6/2021 1539    Comment: wt shift in standing    Acceptance, E,TB, VU,NR by  MS at 2/5/2021 1208    Acceptance, E,TB, VU,NR by MS at 2/4/2021 1518    Acceptance, E,TB, VU,NR by MS at 2/3/2021 1151    Acceptance, E,TB, VU,NR by MS at 2/2/2021 1210    Acceptance, E,TB, NR by MS at 2/1/2021 1145    Acceptance, E, NR by  at 1/30/2021 1336    Acceptance, E,TB, VU,NR by MS at 1/29/2021 1535    Acceptance, E, NR by LB1 at 1/28/2021 1032    Acceptance, E,TB, VU,NR by MS at 1/27/2021 1447    Acceptance, E,TB, VU,NR by MS at 1/26/2021 0959    Acceptance, E,TB, VU,NR by MS at 1/25/2021 1316    Acceptance, E,TB, VU,NR by MS at 1/22/2021 0918    Acceptance, E,TB, VU by MS at 1/20/2021 1544    Acceptance, E,TB, VU,NR by MS at 1/19/2021 1401    Acceptance, E,TB, NR,NL by MS at 1/18/2021 1432    Acceptance, E,TB, VU,NR by IVAN at 1/16/2021 1444    Acceptance, E,TB, NR,NL by MS at 1/15/2021 1418    Acceptance, E,TB, NR by MS at 1/14/2021 1223    Acceptance, E,TB, NR by LB2 at 1/13/2021 1030    Comment: sitting balance    Acceptance, E,TB, NR by MS at 1/12/2021 1312    Acceptance, E,TB, VU,NR by MS at 1/11/2021 1140                   Point: Home exercise program (Done)     Learning Progress Summary           Patient Acceptance, E,TB, VU,NR by MS at 2/9/2021 1531    Acceptance, E,TB, VU,NR by MS at 2/8/2021 1152    Acceptance, E,TB, VU,NR by MS at 2/5/2021 1208    Acceptance, E,TB, VU,NR by MS at 2/4/2021 1518    Acceptance, E,TB, VU,NR by MS at 2/3/2021 1151    Acceptance, E,TB, VU,NR by MS at 2/2/2021 1210    Acceptance, E,TB, NR by MS at 2/1/2021 1145    Acceptance, E, NR by LH at 1/30/2021 1336    Acceptance, E,TB, VU,NR by MS at 1/29/2021 1535    Acceptance, E,TB, VU,NR by MS at 1/27/2021 1447    Acceptance, E,TB, VU,NR by MS at 1/26/2021 0959    Acceptance, E,TB, VU,NR by MS at 1/25/2021 1316    Acceptance, E,TB, VU,NR by MS at 1/22/2021 0918    Acceptance, E,TB, VU by MS at 1/20/2021 1544    Acceptance, E,TB, VU,NR by MS at 1/19/2021 1401    Acceptance, E,TB, NR,NL by MS at 1/18/2021 1432    Acceptance,  E,TB, NR,NL by MS at 1/15/2021 1418    Acceptance, E,TB, NR by MS at 1/14/2021 1223                   Point: Body mechanics (Done)     Learning Progress Summary           Patient Acceptance, E,TB, VU,NR by MS at 2/9/2021 1531    Acceptance, E,TB, VU,NR by MS at 2/8/2021 1152    Acceptance, E,TB, VU,NR by MS at 2/5/2021 1208    Acceptance, E,TB, VU,NR by MS at 2/4/2021 1518    Acceptance, E,TB, VU,NR by MS at 2/3/2021 1151    Acceptance, E,TB, VU,NR by MS at 2/2/2021 1210    Acceptance, E,TB, NR by MS at 2/1/2021 1145    Acceptance, E, NR by LH at 1/30/2021 1336    Acceptance, E,TB, VU,NR by MS at 1/29/2021 1535    Acceptance, E,TB, VU,NR by MS at 1/27/2021 1447    Acceptance, E,TB, VU,NR by MS at 1/26/2021 0959    Acceptance, E,TB, VU,NR by MS at 1/25/2021 1316    Acceptance, E,TB, VU,NR by MS at 1/22/2021 0918    Acceptance, E,TB, VU by MS at 1/20/2021 1544    Acceptance, E,TB, VU,NR by MS at 1/19/2021 1401    Acceptance, E,TB, NR,NL by MS at 1/15/2021 1418    Acceptance, E,TB, NR by MS at 1/14/2021 1223    Acceptance, E,TB, NR by MS at 1/12/2021 1312    Acceptance, E,TB, VU,NR by MS at 1/11/2021 1140                   Point: Precautions (Done)     Learning Progress Summary           Patient Acceptance, E,TB, VU,NR by MS at 2/9/2021 1531    Acceptance, E,TB, VU,NR by MS at 2/8/2021 1152    Acceptance, E,TB, VU,NR by MS at 2/5/2021 1208    Acceptance, E,TB, VU,NR by MS at 2/4/2021 1518    Acceptance, E,TB, VU,NR by MS at 2/3/2021 1151    Acceptance, E,TB, VU,NR by MS at 2/2/2021 1210    Acceptance, E,TB, NR by MS at 2/1/2021 1145    Acceptance, E, NR by LH at 1/30/2021 1336    Acceptance, E,TB, VU,NR by MS at 1/29/2021 1535    Acceptance, E,TB, VU,NR by MS at 1/27/2021 1447    Acceptance, E,TB, VU,NR by MS at 1/26/2021 0959    Acceptance, E,TB, VU,NR by MS at 1/25/2021 1316    Acceptance, E,TB, VU,NR by MS at 1/22/2021 0918    Acceptance, E, VU by MD at 1/21/2021 0926    Acceptance, E,TB, VU by MS at 1/20/2021 1544     Acceptance, E,TB, VU,NR by MS at 1/19/2021 1401    Acceptance, E,TB, NR,NL by MS at 1/15/2021 1418    Acceptance, E,TB, NR by MS at 1/14/2021 1223    Acceptance, E,TB, NR by MS at 1/12/2021 1312    Acceptance, E,TB, VU,NR by MS at 1/11/2021 1140    Acceptance, E, VU by MD at 1/9/2021 0933                               User Key     Initials Effective Dates Name Provider Type Discipline    LB 06/08/18 -  Elba Zarate, PT Physical Therapist PT    IVAN 06/08/18 -  Ela Pritchett, PT Physical Therapist PT    LB2 04/03/18 -  Jazmin Schuster, PT Physical Therapist PT     04/03/18 -  Elba Myrick, PT Physical Therapist PT    LB1 03/07/18 -  Elba Grigsby, PTA Physical Therapy Assistant PT    MD 04/03/18 -  Lisandra Lowery, PT Physical Therapist PT     04/03/18 -  Dxiie Graham, PT Physical Therapist PT    MS 03/04/19 -  Nati Kaba, PT Physical Therapist PT                PT Recommendation and Plan    Planned Therapy Interventions (PT): balance training, bed mobility training, gait training, home exercise program, postural re-education, patient/family education, neuromuscular re-education, strengthening, transfer training, wheelchair management/propulsion training, ROM (range of motion)  Frequency of Treatment (PT): 60 minutes per session  Anticipated Equipment Needs at Discharge (PT Eval): wheelchair(noted patient already has wc(?) will get details)     Patient was wearing a face mask during this therapy encounter. Therapist used appropriate personal protective equipment including eye protection, mask, and gloves.  Mask used was standard procedure mask. Appropriate PPE was worn during the entire therapy session. Hand hygiene was completed before and after therapy session. Patient is not in enhanced droplet precautions.      Time Calculation:     PT Charges     Row Name 02/09/21 1520 02/09/21 1119          Time Calculation    Start Time  1330  -MS  1100  -KP     Stop Time  1400  -MS  1130  -KP     Time  Calculation (min)  30 min  -MS  30 min  -     PT Received On  --  02/09/21  -     PT - Next Appointment  --  02/10/21  -       User Key  (r) = Recorded By, (t) = Taken By, (c) = Cosigned By    Initials Name Provider Type    Dixie Galindo, PT Physical Therapist    Nati Quinteros, PT Physical Therapist          Therapy Charges for Today     Code Description Service Date Service Provider Modifiers Qty    [id number]  PT THER PROC EA 15 MIN 2/8/2021 Nati Kaba, PT GP 4    [id number]  PT THER SUPP EA 15 MIN 2/8/2021 Ntai Kaba, PT GP 4    [id number]  PT THER PROC EA 15 MIN 2/9/2021 Nati Kaba, PT GP 2                   Nati Kaba, PT  2/9/2021

## 2021-02-09 NOTE — PROGRESS NOTES
Case Management  Inpatient Rehabilitation Team Conference    Conference Date/Time: 2/9/2021 8:12:37 AM    Team Conference Attendees:  Nevaeh Zavala, ALDO Garland, PT  Keya Miranda, OT  Katelyn James, SLP  Katelyn Narvaez, CTRS  Angela Gonzalez RD, LD  Loco Cortes, RN  Ирина Hair, RN    Demographics            Age: 86Y            Gender: Female    Admission Date: 1/7/2021 2:21:00 PM  Rehabilitation Diagnosis:  CVA right jon  Past Medical History: Past Medical History:  DiagnosisDate  ?Arthritis?  ?CHF (congestive heart failure) (CMS/HCC)?  ?Diabetes mellitus (CMS/HCC)?  ?new DX related to stroke, started on insulin at hospital  ?Disease of thyroid gland?  ?Elevated cholesterol?  ?GERD (gastroesophageal reflux disease)?  ?Hypertension?  ?Sleep apnea?  ?Stroke (CMS/HCC)?    Coronary disease.  Myocardial infarction.  CABG 2000.  Stent 2015.  Stroke 2014  without residual deficit.  Thyroidectomy.  Parathyroid gland surgery.  Hypothyroidism.  Renal insufficiency.  Surgical HistoryExpand by Default  Past Surgical History:  ProcedureLateralityDate  ?APPENDECTOMY??  ?CARDIAC CATHETERIZATION??  ? x2  ?COLON SURGERY??  ?COLONOSCOPY??  ?EYE SURGERY??  ?lens implants and cataract surgery  ?HYSTERECTOMY??  ?VASCULAR SURGERY??  ?varicous veins      Plan of Care  Anticipated Discharge Date/Estimated Length of Stay: ELOS: 2 weeks  Anticipated Discharge Destination: Community discharge with assistance  Discharge Plan : Patient lives with daughter and son-in-law in 2 story home with  ramp entrance. Bed/bath on first floor. Granddaughter also helps out with care.  Patient current with [facility]  D/C plan is home with daughter and son-in-law.  Medical Necessity Expected Level Rationale: Goals are to achieve a level of  MIN/MOD with  mobility and self-care and improved visual-spatial.  Rehabilitation prognosis fair.  Medical prognosis indeterminate.  Intensity and Duration: an  average of 3 hours/5 days per week  Medical Supervision and 24 Hour Rehab Nursing: x  Physical Therapy: x  PT Intensity/Duration: 1 hour/day, 5 days/week for approximately 15 - 20 days  Occupational Therapy: x  OT Intensity/Duration: 1 hour/day, 5 days/week for approximately 15 - 20 days  Speech and Language Therapy: x  SLP Intensity/Duration: 1 hour/day, 5 days/week for approximately 15 - 20 days  Social Work: x  Therapeutic Recreation: x  Psychology: x  Updated (if changes indicated)    Anticipated Discharge Date/Estimated Length of Stay:   ELOS: DC 2/16    Based on the patient's medical and functional status, their prognosis and  expected level of functional improvement is: Goals are to achieve a level of  MIN/MOD with  mobility and self-care and improved visual-spatial.  Rehabilitation prognosis fair.  Medical prognosis indeterminate.      Interdisciplinary Problem/Goals/Status    All Rehab Problems:  Body Systems    [RN] Respiratory(Active)  Current Status(02/09/2021): O2 2L QHS  Weekly Goal(02/16/2021): Same as discharge  Discharge Goal: Least restrictive amount of 02        Cognition    [ST] Memory(Active)  Current Status(02/08/2021): min/mod memory deficits  Weekly Goal(02/09/2021): Patient will recall functional information after 5  minutes with MIN cues.  Discharge Goal: Patient will return to her baseline memory abilities.    [ST] Right Hemisphere Function(Active)  Current Status(02/08/2021):  mod/max visual  cues for reading/writing tasks  Weekly Goal(02/17/2021): The patient will complete L/R discrimination and  spatial concept tasks with MOD cues.  Discharge Goal: The patient will attend to the left for functional tasks with  only initial min cues.    [ST] Executive Functions(Active)  Current Status(02/08/2021): impaired attention; fatiques quickly; distractible  Weekly Goal(02/10/2021): follow schedule with min cues  Discharge Goal: fxnl cognitive skills for home with assist        Mobility    [PT]  Wheelchair(Active)  Current Status(02/08/2021): 100' Grant (visual deficits)  Weekly Goal(02/15/2021): CGA  Discharge Goal: SV    [PT] Walk(Barrier)  Current Status(02/08/2021): DC goal  Weekly Goal(02/15/2021): DC goal  Discharge Goal: DC goal    [PT] Bed/Chair/Wheelchair(Active)  Current Status(02/08/2021): maxA, squat pivot  Weekly Goal(02/15/2021): modA, squat pivot  Discharge Goal: modA    [PT] Bed Mobility(Active)  Current Status(02/08/2021): maxAx2  Weekly Goal(02/15/2021): modA  Discharge Goal: modA    [OT] Toilet Transfers(Active)  Current Status(02/08/2021): max 1 second person for safety to droparm  Weekly Goal(02/16/2021): max/mod  Discharge Goal: mod  A    [OT] Tub/Shower Transfers(Active)  Current Status(02/08/2021): [place] bench second person for safety  Weekly Goal(02/17/2021): mod/max  Discharge Goal: mod  A        Psychosocial    [RN] Coping/Adjustment(Active)  Current Status(02/09/2021): Patient has supportive family, but at risk of  ineffective coping regarding current situation.  Weekly Goal(02/16/2021): Allow patient the opportunity to discuss concerns  regarding new situation.  Discharge Goal: Patient will have healthy coping mechanisms at time of discharge        Safety    [RN] Potential for Injury(Active)  Current Status(02/09/2021): Patient at risk of injury related to previous falls.  flaccid on left side. dependent with transfers of 2.  Weekly Goal(02/16/2021): Paitent will use call light appropriately while in  Rehab.  Discharge Goal: Patient will continue to use call light while in Rehab  environment.        Self Care    [OT] Bathing(Active)  Current Status(02/08/2021): Min UB mod  x 2 LB  Weekly Goal(02/16/2021): Max  Discharge Goal: Min A    [OT] Dressing (Lower)(Active)  Current Status(02/08/2021): max/mod with max 2 for standing  Weekly Goal(02/17/2021): max  Discharge Goal: Mod A    [OT] Dressing (Upper)(Active)  Current Status(02/08/2021): mod pullover  Weekly Goal(02/16/2021): Min  A  Discharge Goal: CGA    [OT] Grooming(Active)  Current Status(02/08/2021): min/SBA  Weekly Goal(02/17/2021): SBA  Discharge Goal: Set-up    [OT] Toileting(Active)  Current Status(02/08/2021): Dependent x 2  Weekly Goal(02/16/2021): max x 2  Discharge Goal: Mod A        Sphincter Control    [RN] Bladder Management(Active)  Current Status(02/09/2021): Patient is continent and incontinent, using bedpan,  PVRs, requiring I/C at times  Weekly Goal(02/16/2021): Patient will be 50% continent  Discharge Goal: Patient will be 100% continent    [RN] Bowel Management(Active)  Current Status(02/09/2021): Patient continent and incontinent  Weekly Goal(02/16/2021): continent 50% of the time  Discharge Goal: Patient will continent 100% of the time        Comments: 1/12: visual deficits significant at times; very weak; left neglect;  poor PO intake;    1/19: depression noted;    1/26: left hip pain worse recently, tearful at times - MD to order x-ray to  check status;    2/2: visual deficits remain a significant barrier;    Signed by: Loco Cortes RN    Physician CoSigned By: Azael Abdullahi 02/09/2021 08:23:38

## 2021-02-09 NOTE — PROGRESS NOTES
LOS: 33 days   Patient Care Team:  Francisco Gallagher MD as PCP - General (Family Medicine)    Chief Complaint:   CVAs of left temporal-occipital and right superior frontal gyrus  CAD s/p CABG  HLD  CHF  HTN  Pancreatic cyst    Subjective     She continues with left-sided weakness.  No changes in her strength.  Tolerating activities.  She was able to take a couple steps today.  Her trunk control is improved.  Nursing reports complaint of dysuria, patient felt like she was having another urinary tract infection.  Will check specimen.    History taken from: patient    Objective     Vital Signs  Temp:  [97.5 °F (36.4 °C)-98.8 °F (37.1 °C)] 98.8 °F (37.1 °C)  Heart Rate:  [] 89  Resp:  [14-18] 18  BP: (113-141)/(60-76) 120/76    Physical Exam  General: calm, in NAD  Neck: trachea midline  Cardio: well perfused distal extremities, regular radial pulse  Resp: normal WOB on RA, nonlabored.    Abdomen: NT/ND  Extremities:    No edema  No crepitus with range of motion of the left hip.  Neuro: dense left hemiparesis, flaccid tone  Left homonymous hemianopsia    Left inattention.  A&Ox4. 0/5 strength LUE/LLE.   Takes resistance on the right side.    Results from last 7 days   Lab Units 02/08/21  0709 02/05/21  0645   WBC 10*3/mm3 4.81 5.20   HEMOGLOBIN g/dL 11.7* 10.4*   HEMATOCRIT % 36.5 32.6*   PLATELETS 10*3/mm3 162 165     Results from last 7 days   Lab Units 02/08/21  0709 02/05/21  0645   SODIUM mmol/L 137 139   POTASSIUM mmol/L 4.3 4.2   CHLORIDE mmol/L 102 102   CO2 mmol/L 25.7 31.1*   BUN mg/dL 17 19   CREATININE mg/dL 0.61 0.68   CALCIUM mg/dL 9.1 9.1   GLUCOSE mg/dL 109* 114*             Ref. Range 1/8/2021 06:01   TSH Baseline Latest Ref Range: 0.270 - 4.200 uIU/mL 2.830   Vitamin B-12 Latest Ref Range: 211 - 946 pg/mL 381   25 Hydroxy, Vitamin D Latest Ref Range: 30.0 - 100.0 ng/ml 20.6 (L)     CT of the abdomen-January 16  CT Abdomen Pelvis With Contrast   Final Result       A cystic focus at the pancreatic  head,  likely corresponds to the   ultrasound finding of concern, could be further evaluated with   endoscopic ultrasound as indicated, interval follow-up also recommended   to exclude any possibility of a cystic neoplasm. The pancreas is   thinned. The main pancreatic duct shows a mildly prominent caliber, 4   mm.          US GALLBLADDER-  1/14/2021   IMPRESSION:  Small to moderate amount of gallbladder sludge with a few  possible tiny nonshadowing gallstones.  2. No wall thickening or pericholecystic fluid is seen. No sonographic  Garcia's sign is seen.  3. Fatty infiltration of the liver.  4. 1.2 cm hypoechoic pancreatic head lesion. No pancreatic head lesions  are seen on the CT of the abdomen and pelvis from [facility]  dated 12/11/2019.  5. CT of the abdomen with contrast using pancreatic protocol is  recommended for further assessment.    EXAMINATION: LIMITED LEFT BREAST SONOGRAPHY-January 20, 2021     HISTORY: 85-year-old female status post recent placement of a loop  recorder on the left side of the chest with an area of palpable concern  in the left breast.     FINDINGS: Targeted sonographic evaluation of the area of palpable  concern in the left breast which corresponds to the upper inner quadrant  was performed. No sonographic abnormality is appreciated.     IMPRESSION:  Negative targeted left breast sonography. Clinical followup  is suggested.     BI-RADS Category 1: Negative  Results Review:     I reviewed the patient's new clinical results.    Medication Review: Complete  Scheduled Meds:Alirocumab, 75 mg, Subcutaneous, Q14 Days  aspirin, 81 mg, Oral, Daily  bisacodyl, 10 mg, Rectal, Q24H  [START ON 3/6/2021] cholecalciferol, 1,000 Units, Oral, Daily  dilTIAZem CD, 180 mg, Oral, BID  docusate sodium, 200 mg, Oral, BID  enoxaparin, 40 mg, Subcutaneous, Daily  ferrous sulfate, 325 mg, Oral, Daily With Breakfast  levothyroxine, 125 mcg, Oral, Q AM  lidocaine, 1 patch, Transdermal, Q24H  losartan,  50 mg, Oral, Daily  mirtazapine, 15 mg, Oral, Nightly  ondansetron ODT, 4 mg, Oral, TID AC  oxybutynin, 5 mg, Oral, Daily With Dinner  pantoprazole, 40 mg, Oral, QAM  vitamin D, 50,000 Units, Oral, Q7 Days      Continuous Infusions:   PRN Meds:.HYDROcodone-acetaminophen  •  nitroglycerin  •  polyethylene glycol  •  traMADol      Assessment/Plan       Stroke (CMS/[facility])    Debility and functional deficits  -PT/OT/SLP     Acute CVA  -[place] 3 MRI at [facility] revealed acute infarct in the left temporal-occipital region, no TPA or MT  -Jan 4 had worsening of her NIHSS, MRI revealed a new right superior frontal gyrus acute infarct. Again outside of the window of TPA, not a candidate for mechanical thrombectomy  -continue ASA and Praluent as she is intolerant of statins  -Echo was unremarkable  -Holter normal  -Loop recorder placed  -A1c 5.0  -TSH and B12 level unremarkable    Loss of appetite  Weight loss  Nausea  -symptoms for approximately 8 months, has lost ~40lbs over that time per patient  -started Remeron 7.5 qHS  -GI consulted, appreciate recs. Started scheduled Zofran with meals on 1/13, continue Remeron.   Jan 14 - GI to check RUQ US, increase bowel regimen, continue Remeron and Zofran as above  January 15 -1.2 cm hypoechoic pancreatic head lesion on ultrasound.  To get CT of the abdomen tomorrow.  January 16 - CT scan showed pancreatic cyst.  Not felt to be the etiology of her nausea.  Follow-up as an outpatient.  January 17- Remeron increased to 15 mg nightly     Left breast nodule  -2x1cm nodule left breast at 10 o'clock position, borders feel smooth, nodule is not freely mobile, no overlying skin changes. Away from the site of recent left loop recorder placement Jan 5, 2021  -Last mammogram one year ago. May not tolerate mammogram with recent loop recorder placement. Patient discussed with Dr. Pride, left breast ultrasound ordered  -Left breast ultrasound negative at the area of interest in the left upper inner  quadrant    HLD  -continue ASA and Praluent as she is intolerant of statins     HTN  -continue cardizem, losartan  -normotensive goal  -monitor    Vit D insufficiency  -level 20 on admission  -started vitamin D replacement     Hypothyroidism  -continue levothyroxine    CAD  -continue ASA and Praluent   January 27-She had some sharp chest pain that was brief last evening at about 1 AM, resolved on its own.  Had nitroglycerin ordered but did not receive it.  She reports at home she did occasionally have chest pain and would take him to glycerin at home, stating that sometimes she would take it as she just felt nervous and worried with the chest pain.  She did feel some palpitations after the chest pain last evening.  We discussed seeing if anything was uploaded on her loop recorder to her cardiologist office and we will check those results.    Obstructive sleep apnea-January 27-she has been on 2 L nasal cannula oxygen at nighttime with sats 100%.  She did not have her CPAP brought in from home     Prerenal OBED, resolved  -secondary to decreased PO intake  -resolved prior to discharge from [facility]  -monitor electrolytes     Iron deficiency anemia  -received 2 doses of IV iron as well as 1UpRBCs on 1/4 at [facility]  -Hgb stable ~8 prior to discharge from [facility], 9.2 on admission to [facility]  -continue oral iron, monitor     Left hip pain  -xrays at [facility] unremarkable  Jan 13 - Fatigue continues to be an issue. She takes Norco 7.5 mg about 4 times a day with history of left hip pain-history of left femur fracture open reduction internal fixation, x-ray at the outside hospital on January 6 showed hardware in place.  January 14-She does have fatigue during the day.  Will try a lower dose of Norco 5 mg rather than 7.5 mg.  Is on Remeron 7.5 g at nighttime but that was only recently added.  January 15-she took Norco 5 mg twice a day at home.  More alert with better trunk control on lower dose of Norco today.  January 21-continues with left hip  pain. Taking Norco 5 mg about 4 times a day. Add Lidoderm patch.  January 25-does not feel Lidoderm patch that helpful.  Pain was better with addition of tramadol over the weekend.  Hip range of motion unremarkable with no crepitus  January 26-left hip xray did not show displacement of surgical hardware, fracture, erosion or dislocation  January 26-she feels tramadol works better for her than hydrocodone.  Anticipate going home on tramadol and discontinue hydrocodone.  She is presently taking tramadol twice a day.  Appears to be tolerating without any side effects.     Fluids/Electrolytes/Nutrition   -cardiac diet  -admission labs unremarkable     DVT ppx  -subQ Lovenox    /UTI  -on Ditropan 5 mg twice a day.  January 11 - voids with acceptable bladder scan postvoid residuals. Urinary tract infection with E. coli-sensitive to Macrobid  January 18 - required intermittent straight catheter 450 cc.  January 19 - incontinent and IC, PVRs 99-391cc  January 22 - has urge to urinate at times but cannot void, will decrease oxybutynin from bid to with dinner only. She is voiding with incontinence with postvoid residual 230-290 cc.  Discussed trial of decreasing Ditropan to 5 mg q. supper first from twice a day before doing a trial of Flomax.  January 23-urinary tract infection-E. coli-on cefdinir starting January 23.  Sensitive to cephalosporins  January 27-voids with postvoid residual  cc.  We will continue Ditropan at lower dose of 5 mg with supper  February 9-recurrent dysuria-recheck urinalysis with culture if indicated      TEAM CONF - JAN 12 - BED MAX-DEP. TRANSFERS MAX 2 TRANSFER BOARD. NON-AMBULATORY. WHEELCHAIR 50 FEET MIN ASSIST. TOILET TRANSFERS DEP X 2. IMPAIRED VISION. BATH MAX. LBD DEP. UBD MAX. GROOMING MOD ASSIST. COGNITION - MILD TO MODERATE DEFICITS. 2L O2 AT HS. ON MACROBID FOR UTI. FATIGUES. HISTORY OF POOR APPETITE FOR MONTHS, WEIGHT LOSS 30-40 # OVER SEVERAL MONTHS AT HOME, HISTORY OF REPORTED  NAUSEA AT HOME. WILL CONSULT GI.   ELOS - 4 WEEKS.     TEAM CONF - JAN 19 - BED MAX 2 .  TRANSFERS MAX 2 TRANSFER BOARD. WHEELCHAIR MIN A DUE TO VISIN DEFICITS. HAS SAT UNSUPPORTED FOR 30 SECS BEFORE STARTS TO LEAN. TOILET TRANSFERS DEP X 2. BATH UB MAX, LB DEP X 2. LBD DEP X 2. UBD MAX. GROOMING MOD. LEFT SHOULDER SUBLUXATION - ADD LEUKOTAPING. VOIDS WITH ELEVATED PVRS WITH OCCASIONAL ISC. CONTINENT INCONTINENT.  LEFT INATTENTION, LEFT VISUAL FIELD CUT. IMPAIRED ATTENTION, EXECUTIVE FUNCTION, REASONING. WORKING MEMORY IMPROVING.  BNE (Active)  Att'n. - WNL  Exec. Fx. - Mild Imp.  Rsng/Jgmnt - Mildly Imp. for abstract reaosning  Arith - Min Imp.  Visuospatial Skills - Mildly Imp.  Visual Mem. - Mildly Imp.  Verbal Mem. - Mildly Imp.  Emot - Pt endorsed dep related to current status  DEPRESSION - SUPPORTIVE THERAPY. ON REMERON.   ELOS -   3 WEEKS    TEAM CONF - JAN 26 -  BED MAX 2. TRANSFERS MAX 2. WHEELCHAIR 50 FEET MIN ASSIST, IMPACTED BY VISUAL DEFICITS. LEFT HIP PAIN INCREASED, WILL REPEAT X-RAY. AT OUTSIDE HOSPITAL X-RAY ON JAN 6 - [ORIF hardware in the proximal femur is stable from 5/6/2020. No hardware loosening or perihardware fracture is detected. The hip joint space is mildly narrowed as before. IMPRESSION: Stable ORIF hardware; no acute abnormality.]  TOILET TRANSFERS MAX 2 AS PUSHES. BATH UBB MIN, LBB MAX 2. UBD MAX ASSIST. LBD DEPENDENT. GROOMING MIN ASSIST. ATTENDS TO LEFT A LITTLE BETTER. TOILETING DEP X 2. LEFT VISUAL FIELD DEFICITS. EXECUTIVE FUNCTION - IMPAIRED ATTENTION. MEMORY MIN-MOD DEFICITS. MOD-MAX VISUAL CUES TO LEFT SIDE. VOIDS. ON DECREASED DITROPAN TO 5 MG ONCE A DAY DUE TO ELEVATED PVRS. CONTINENT AT TIMES WITH BLADDER. BOWEL CONTINENT. PAIN MANAGEMENT - TRAMADOL ADDED. WITH REC THERAPY, CUES TO ATTEND TO LEFT.  ELOS -  2-3 WEEKS    TEAM CONF- BED MAX 2. TRANSFERS MAX SQUAT PIVOT, USES LIFT WITH NURSING. SITTING BALANCE BETTER. LEFT INATTENTION. WHEELCHAIR MIN ASSIST WITH VISUAL CUES, DUE  TO LEFT VISUAL INATTENTION/LEFT VISUAL FIELD CUT. STANDING AT PARALLEL BARS WITH BLOCKING LEFT KNEE. TOILET TRANSFERS MAX 2 TO DROPARM. BATH MIN UB AND MAX X 2 LB. DRESSING MOD-MAX UB AND DEP X 2 LB. GROOMING MIN-SBA. TOILETING COMES UP TO STAND A LITTLE BETTER, DEP X 2.   IMPAIRED ATTENTION. DISTRACTIBLE. FATIGUES QUICKLY WITH COGNITIVE TASKS. MIN-MOD MEMORY DEFICITS. MOD-MAX VERBAL AND VISUAL CUES FOR READING AND WRITING TASKS. INCONTINENT BOWEL AND BLADDER. PVRS LOW. CHRONIC LEFT HIP PAIN.   ELOS -  ONE WEEK, WILL NEED SIGNIFICANT ASSISTANCE AT HOME.    TEAM CONF - FEB 9 - LEFT HEMIPLEGIA UNCHANGED. KINESIOTAPING LEFT SHOULDER.  BED MAX 2. TRANSFERS MAX ASSIST SQUAT PIVOT. NON-AMBULATORY. WHEELCHAIR 100 FEET MIN ASSIST DUE TO VISUAL DEFICITS. TOILET TRANSFERS MAX 1. SHOWER TRANSFERS MAX ASSIST TO BENCH. TOILETING DEP X 2. UBB MIN. LBB MOD OF 2. LBD MOD-MAX WITH MAX 2 FOR STANDNG. UBD MOD ASSIST FOR PULLOVER. GROOMING MIN-SBA. CONTINENT /INCONTINENT. DITROPAN FOR OVERACTIVE BLADDER. ABLE TO SIT FOR ABOUT 75 SECS. IMPAIRED ATTENTION. FATIGUES QUICKLY. DISTRACTIBLE. LEFT VISUAL FIELD CUT. MIN-MOD MEMORY DEFICITS. MOD-MAX VISUAL CUES FOR READING/WRITING TASKS.   ELOS -   FAMILY PLANS TO HIRE ADDITIONAL HELP FOR HOME. HOME ON FEB 16. FAMILY TEACHING NEEDED.     Azael Abdullahi MD  02/09/21  18:15 EST    During rounds, used appropriate personal protective equipment including mask and gloves.  Additional gown if indicated.  Mask used was standard procedure mask. Appropriate PPE was worn during the entire visit.  Hand hygiene was completed before and after.

## 2021-02-09 NOTE — PROGRESS NOTES
TEAM CONF - FEB 9 - LEFT HEMIPLEGIA UNCHANGED. KINESIOTAPING LEFT SHOULDER.  BED MAX 2. TRANSFERS MAX ASSIST SQUAT PIVOT. NON-AMBULATORY. WHEELCHAIR 100 FEET MIN ASSIST DUE TO VISUAL DEFICITS. TOILET TRANSFERS MAX 1. SHOWER TRANSFERS MAX ASSIST TO BENCH. TOILETING DEP X 2. UBB MIN. LBB MOD OF 2. LBD MOD-MAX WITH MAX 2 FOR STANDNG. UBD MOD ASSIST FOR PULLOVER. GROOMING MIN-SBA. CONTINENT /INCONTINENT. DITROPAN FOR OVERACTIVE BLADDER. ABLE TO SIT FOR ABOUT 75 SECS. IMPAIRED ATTENTION. FATIGUES QUICKLY. DISTRACTIBLE. LEFT VISUAL FIELD CUT. MIN-MOD MEMORY DEFICITS. MOD-MAX VISUAL CUES FOR READING/WRITING TASKS.   ELOS -   FAMILY PLANS TO HIRE ADDITIONAL HELP FOR HOME. HOME ON FEB 16. FAMILY TEACHING NEEDED.

## 2021-02-09 NOTE — PROGRESS NOTES
Inpatient Rehabilitation Plan of Care Note    Plan of Care  Care Plan Reviewed - No updates at this time.    Psychosocial    Performed Intervention(s)  Verbalize needs and concerns  Medications as needed/ ordered  Therapeutic environmental set up      Safety    Performed Intervention(s)  Items with in reach and environmental set up  Bed alarm/ Chair alarm  Safety rounds and falls protocols/ precautions      Sphincter Control    Performed Intervention(s)  Proper diet and fluid intake  Medication as ordered  Incontinent care if needed  Monitor intake and output      Body Systems    Performed Intervention(s)  O2 2L/NC @ HS  assess lungs, sats every shift    Signed by: Ирина Hair RN

## 2021-02-09 NOTE — THERAPY TREATMENT NOTE
Inpatient Rehabilitation - Occupational Therapy Treatment Note    [facility]     Patient Name: Liana Carrero  : 1935  MRN: [id number]    Today's Date: 2021                 Admit Date: 2021         ICD-10-CM ICD-9-CM   1. Impaired functional mobility, balance, gait, and endurance  Z74.09 V49.89       Patient Active Problem List   Diagnosis   • Stroke (cerebrum) (CMS/[facility])   • Chest pain   • Hypertensive urgency   • Headache   • History of embolic stroke   • Hypothyroidism   • Hypokalemia   • CAD in native artery   • URTI (acute upper respiratory infection)   • Closed fracture of left hip (CMS/[facility])   • Chronic diastolic (congestive) heart failure (CMS/[facility])   • Coronary artery disease involving autologous vein coronary bypass graft without angina pectoris   • Impaired glucose tolerance   • Moderate malnutrition (CMS/[facility])   • Stroke (CMS/[facility])       Past Medical History:   Diagnosis Date   • Arthritis    • CHF (congestive heart failure) (CMS/[facility])    • Coronary artery disease    • Diabetes mellitus (CMS/[facility])     new DX related to stroke, started on insulin at hospital   • Disease of thyroid gland    • Elevated cholesterol    • GERD (gastroesophageal reflux disease)    • History of transfusion    • Hypertension    • PONV (postoperative nausea and vomiting)    • Sleep apnea    • Spinal headache    • Stroke (CMS/[facility])        Past Surgical History:   Procedure Laterality Date   • APPENDECTOMY     • CARDIAC CATHETERIZATION       x2   • CARDIAC CATHETERIZATION N/A 2019    Procedure: Left Heart Cath;  Surgeon: Bentley Chapin MD;  Location: [facility] CATH INVASIVE LOCATION;  Service: Cardiovascular   • CARDIAC CATHETERIZATION N/A 2019    Procedure: Coronary angiography;  Surgeon: Bentley Chapin MD;  Location: [facility] CATH INVASIVE LOCATION;  Service: Cardiovascular   • CARDIAC CATHETERIZATION N/A 2019    Procedure: Native mammary injection;  Surgeon: Bentley Chapin MD;  Location: [facility]  CATH INVASIVE LOCATION;  Service: Cardiovascular   • CARDIAC CATHETERIZATION N/A 12/26/2019    Procedure: Saphenous Vein Graft;  Surgeon: Bentley Chapin MD;  Location: [facility] CATH INVASIVE LOCATION;  Service: Cardiovascular   • COLON SURGERY     • COLONOSCOPY     • EYE SURGERY      lens implants and cataract surgery   • FEMUR IM NAILING/RODDING Left 3/26/2020    Procedure: LT.HIP NAILING/RODDING;  Surgeon: Carlos Khan II, MD;  Location: [facility] MAIN OR;  Service: Orthopedics;  Laterality: Left;   • FRACTURE SURGERY Left 2020   • HYSTERECTOMY     • VASCULAR SURGERY      varicous veins                IRF OT ASSESSMENT FLOWSHEET (last 12 hours)      IRF OT Evaluation and Treatment     Row Name 02/09/21 1204          OT Time and Intention    Document Type  daily treatment  -CC     Mode of Treatment  occupational therapy  -CC     Patient Effort  adequate  -CC     Row Name 02/09/21 1204          Vision Assessment/Intervention    Visual Motor Impairment  visual tracking, left  -CC     Visual Processing Deficit  jon-inattention/neglect, left  -CC     Row Name 02/09/21 1204          Cognition/Psychosocial    Affect/Mental Status (Cognitive)  WFL  -CC     Personal Safety Interventions  fall prevention program maintained;gait belt;nonskid shoes/slippers when out of bed  -CC     Cognitive Function (Cognitive)  attention deficit  -CC     Row Name 02/09/21 1204          Pain Scale: Numbers Pre/Post-Treatment    Pretreatment Pain Rating  8/10  -CC     Posttreatment Pain Rating  1/10  -CC     Pain Location - Side  Left  -CC     Pain Location  shoulder  -CC     Pre/Posttreatment Pain Comment  pt reports hurt during tsf w nursing. UE wsa elevated too high on pillows. Pillows removed and shld kinesotaped. Pt reports pain sunsided.   -CC     Row Name 02/09/21 1204          Balance    Static Sitting Balance  mild impairment  -CC     Row Name 02/09/21 1204          Motor Skills    Neuromuscular Function  -- L shld  kinesotaped  -CC     Row Name 02/09/21 1204          Bathing    [place] Level (Bathing)  bathing skills;upper body;lower body;moderate assist (50% patient effort);verbal cues;nonverbal cues (demo/gesture)  -CC     Position (Bathing)  sink side;supported sitting;supported standing  -CC     Comment (Bathing)  2 for standing   -CC     Row Name 02/09/21 1204          Upper Body Dressing    [place] Level (Upper Body Dressing)  upper body dressing skills;doff;don;pull over garment;set up assistance;verbal cues;nonverbal cues (demo/gesture);minimum assist (75% or more patient effort)  -CC     Position (Upper Body Dressing)  supported sitting  -CC     Set-up Assistance (Upper Body Dressing)  obtain clothing  -CC     Row Name 02/09/21 1204          Lower Body Dressing    [place] Level (Lower Body Dressing)  doff;don;pants/bottoms;socks;moderate assist (50% patient effort);maximum assist (25% patient effort)  -CC     Position (Lower Body Dressing)  supported sitting;supported standing  -CC     Set-up Assistance (Lower Body Dressing)  obtain clothing  -CC     Row Name 02/09/21 1204          Grooming    [place] Level (Grooming)  grooming skills;deodorant application;hair care, combing/brushing;oral care regimen;wash face, hands;standby assist;minimum assist (75% patient effort)  -CC     Position (Grooming)  sink side;supported sitting  -CC     Comment (Grooming)  asssit w dentures and deodorant  -CC     Row Name 02/09/21 1204          Toileting    [place] Level (Toileting)  toileting skills  -CC     Position (Toileting)  supported sitting;supported standing  -CC     Set-up Assistance (Toileting)  change pad/brief  -CC     Comment (Toileting)  assist x 2  -CC     Row Name 02/09/21 1204          Positioning and Restraints    Pre-Treatment Position  sitting in chair/recliner  -CC     Post Treatment Position  wheelchair  -CC     In Wheelchair  sitting;with SLP  -CC       User Key  (r) = Recorded By, (t) =  Taken By, (c) = Cosigned By    Initials Name Effective Dates    CC Keya Miranda, OTR 06/08/18 -            Occupational Therapy Education                 Title: PT OT SLP Therapies (In Progress)     Topic: Occupational Therapy (Done)     Point: ADL training (Done)     Description:   Instruct learner(s) on proper safety adaptation and remediation techniques during self care or transfers.   Instruct in proper use of assistive devices.              Learning Progress Summary           Patient Acceptance, E,TB,D, VU by CC at 1/29/2021 1509    Comment: Pt repositioned in reg w/c with 1/2 arm tray on Left. Nursing notified of change from recliner w/c/. Commode tsf from w/c to droparm commode discussed and shown to NA.    Acceptance, E, VU by BL at 1/8/2021 1434   Caregiver Acceptance, E,TB,D, VU by CC at 1/29/2021 1509    Comment: Pt repositioned in reg w/c with 1/2 arm tray on Left. Nursing notified of change from recliner w/c/. Commode tsf from w/c to droparm commode discussed and shown to NA.                   Point: Home exercise program (Done)     Description:   Instruct learner(s) on appropriate technique for monitoring, assisting and/or progressing therapeutic exercises/activities.              Learning Progress Summary           Patient Acceptance, E, VU by BL at 1/8/2021 1434                   Point: Precautions (Done)     Description:   Instruct learner(s) on prescribed precautions during self-care and functional transfers.              Learning Progress Summary           Patient Acceptance, E, VU by BL at 1/8/2021 1434                   Point: Body mechanics (Done)     Description:   Instruct learner(s) on proper positioning and spine alignment during self-care, functional mobility activities and/or exercises.              Learning Progress Summary           Patient Acceptance, E,TB,D, VU by CC at 1/29/2021 1509    Comment: Pt repositioned in reg w/c with 1/2 arm tray on Left. Nursing notified of change from  recliner w/c/. Commode tsf from w/c to droparm commode discussed and shown to NA.    Acceptance, E, VU by BL at 1/8/2021 1434   Caregiver Acceptance, E,TB,D, VU by CC at 1/29/2021 1509    Comment: Pt repositioned in reg w/c with 1/2 arm tray on Left. Nursing notified of change from recliner w/c/. Commode tsf from w/c to droparm commode discussed and shown to NA.                               User Key     Initials Effective Dates Name Provider Type Discipline    CC 06/08/18 -  Keya Miranda OTR Occupational Therapist OT    BL 01/05/21 -  Marshall Oleary OT Occupational Therapist OT                    OT Recommendation and Plan                         Time Calculation:     Time Calculation- OT     Row Name 02/09/21 1000             Time Calculation- OT    OT Start Time  1000  -CC      OT Stop Time  1030  -CC      OT Time Calculation (min)  30 min  -CC      OT Non-Billable Time (min)  15 min tech  -CC        User Key  (r) = Recorded By, (t) = Taken By, (c) = Cosigned By    Initials Name Provider Type    CC Keya Miranda OTR Occupational Therapist        Therapy Charges for Today     Code Description Service Date Service Provider Modifiers Qty    78808610325 HC OT SELF CARE/MGMT/TRAIN EA 15 MIN 2/8/2021 Keya Miranda OTR GO 2    14552592164 HC OT NEUROMUSC RE EDUCATION EA 15 MIN 2/8/2021 Keya Miranda OTR GO 1    57594370503 HC OT THER SUPP EA 15 MIN 2/8/2021 Keya Miranda OTR GO 2    34891960328 HC OT SELF CARE/MGMT/TRAIN EA 15 MIN 2/8/2021 Keya Miranda OTR GO 1    20126738548 HC OT THER SUPP EA 15 MIN 2/9/2021 Keya Miranda OTR GO 1    32823417485 HC OT SELF CARE/MGMT/TRAIN EA 15 MIN 2/9/2021 Keya Miranda OTR GO 2                   GIAN Blackwell  2/9/2021

## 2021-02-09 NOTE — THERAPY TREATMENT NOTE
Inpatient Rehabilitation - Occupational Therapy Treatment Note    [facility]     Patient Name: Liana Carrero  : 1935  MRN: [id number]    Today's Date: 2021                 Admit Date: 2021         ICD-10-CM ICD-9-CM   1. Impaired functional mobility, balance, gait, and endurance  Z74.09 V49.89       Patient Active Problem List   Diagnosis   • Stroke (cerebrum) (CMS/[facility])   • Chest pain   • Hypertensive urgency   • Headache   • History of embolic stroke   • Hypothyroidism   • Hypokalemia   • CAD in native artery   • URTI (acute upper respiratory infection)   • Closed fracture of left hip (CMS/[facility])   • Chronic diastolic (congestive) heart failure (CMS/[facility])   • Coronary artery disease involving autologous vein coronary bypass graft without angina pectoris   • Impaired glucose tolerance   • Moderate malnutrition (CMS/[facility])   • Stroke (CMS/[facility])       Past Medical History:   Diagnosis Date   • Arthritis    • CHF (congestive heart failure) (CMS/[facility])    • Coronary artery disease    • Diabetes mellitus (CMS/[facility])     new DX related to stroke, started on insulin at hospital   • Disease of thyroid gland    • Elevated cholesterol    • GERD (gastroesophageal reflux disease)    • History of transfusion    • Hypertension    • PONV (postoperative nausea and vomiting)    • Sleep apnea    • Spinal headache    • Stroke (CMS/[facility])        Past Surgical History:   Procedure Laterality Date   • APPENDECTOMY     • CARDIAC CATHETERIZATION       x2   • CARDIAC CATHETERIZATION N/A 2019    Procedure: Left Heart Cath;  Surgeon: Bentley Chapin MD;  Location: [facility] CATH INVASIVE LOCATION;  Service: Cardiovascular   • CARDIAC CATHETERIZATION N/A 2019    Procedure: Coronary angiography;  Surgeon: Bentley Chapin MD;  Location: [facility] CATH INVASIVE LOCATION;  Service: Cardiovascular   • CARDIAC CATHETERIZATION N/A 2019    Procedure: Native mammary injection;  Surgeon: Bentley Chapin MD;  Location: [facility]  CATH INVASIVE LOCATION;  Service: Cardiovascular   • CARDIAC CATHETERIZATION N/A 12/26/2019    Procedure: Saphenous Vein Graft;  Surgeon: Bentley Chapin MD;  Location: [facility] CATH INVASIVE LOCATION;  Service: Cardiovascular   • COLON SURGERY     • COLONOSCOPY     • EYE SURGERY      lens implants and cataract surgery   • FEMUR IM NAILING/RODDING Left 3/26/2020    Procedure: LT.HIP NAILING/RODDING;  Surgeon: Carlos Khan II, MD;  Location: [facility] MAIN OR;  Service: Orthopedics;  Laterality: Left;   • FRACTURE SURGERY Left 2020   • HYSTERECTOMY     • VASCULAR SURGERY      varicous veins                IRF OT ASSESSMENT FLOWSHEET (last 12 hours)      IRF OT Evaluation and Treatment     Row Name 02/09/21 1508 02/09/21 1204       OT Time and Intention    Document Type  daily treatment  -CC  daily treatment  -CC    Mode of Treatment  occupational therapy  -CC  occupational therapy  -CC    Patient Effort  adequate  -CC  adequate  -CC    Row Name 02/09/21 1508 02/09/21 1204       Vision Assessment/Intervention    Visual Motor Impairment  visual tracking, left  -CC  visual tracking, left  -CC    Visual Processing Deficit  jon-inattention/neglect, left  -CC  jon-inattention/neglect, left  -CC    Row Name 02/09/21 1508 02/09/21 1204       Cognition/Psychosocial    Affect/Mental Status (Cognitive)  WFL  -CC  WFL  -CC    Personal Safety Interventions  fall prevention program maintained;gait belt;nonskid shoes/slippers when out of bed  -CC  fall prevention program maintained;gait belt;nonskid shoes/slippers when out of bed  -CC    Cognitive Function (Cognitive)  --  attention deficit  -CC    Row Name 02/09/21 1508 02/09/21 1204       Pain Scale: Numbers Pre/Post-Treatment    Pretreatment Pain Rating  3/10  -CC  8/10  -CC    Posttreatment Pain Rating  3/10  -CC  1/10  -CC    Pain Location - Side  --  Left  -CC    Pain Location  shoulder  -CC  shoulder  -CC    Pre/Posttreatment Pain Comment  REPOSITIONING; rom  -CC   pt reports hurt during tsf w nursing. UE wsa elevated too high on pillows. Pillows removed and shld kinesotaped. Pt reports pain sunsided.   -    Row Name 02/09/21 1508          Squat Pivot Transfer    Squat Pivot [place] (Transfers)  moderate assist (50% patient effort);maximum assist (25% patient effort) pt engeged in tsf training with scooting R/L on mat mod/max   -     Assistive Device (Squat Pivot Transfers)  wheelchair  -     Row Name 02/09/21 1508 02/09/21 1204       Balance    Static Sitting Balance  --  mild impairment  -    Balance Interventions  sitting;static;dynamic;minimal challenge;UE activity with balance activity  -  --    Row Name 02/09/21 1204          Motor Skills    Neuromuscular Function  -- L shld kinesotaped  -[facility] Name 02/09/21 1508          Shoulder (Therapeutic Exercise)    Shoulder AROM (Therapeutic Exercise)  left;flexion;extension;sitting;5 repetitions;3 second hold;3 sets table slides   -[facility] Name 02/09/21 1508          Elbow/Forearm (Therapeutic Exercise)    Elbow/Forearm (Therapeutic Exercise)  PROM (passive range of motion)  -     Elbow/Forearm PROM (Therapeutic Exercise)  left;flexion;extension;supination;pronation;sitting;10 repetitions  -     Row Name 02/09/21 1508          Wrist (Therapeutic Exercise)    Wrist (Therapeutic Exercise)  PROM (passive range of motion)  -     Wrist PROM (Therapeutic Exercise)  left;flexion;extension;10 repetitions  -[facility] Name 02/09/21 1508          Hand (Therapeutic Exercise)    Hand (Therapeutic Exercise)  PROM (passive range of motion)  -     Hand PROM (Therapeutic Exercise)  left;finger flexion;finger extension;finger aBduction;finger aDduction;thumb opposition;thumb flexion;thumb extension;5 repetitions  -     Row Name 02/09/21 1204          Bathing    [place] Level (Bathing)  bathing skills;upper body;lower body;moderate assist (50% patient effort);verbal cues;nonverbal cues (demo/gesture)  -      Position (Bathing)  sink side;supported sitting;supported standing  -CC     Comment (Bathing)  2 for standing   -CC     Row Name 02/09/21 1204          Upper Body Dressing    [place] Level (Upper Body Dressing)  upper body dressing skills;doff;don;pull over garment;set up assistance;verbal cues;nonverbal cues (demo/gesture);minimum assist (75% or more patient effort)  -CC     Position (Upper Body Dressing)  supported sitting  -CC     Set-up Assistance (Upper Body Dressing)  obtain clothing  -CC     Row Name 02/09/21 1204          Lower Body Dressing    [place] Level (Lower Body Dressing)  doff;don;pants/bottoms;socks;moderate assist (50% patient effort);maximum assist (25% patient effort)  -CC     Position (Lower Body Dressing)  supported sitting;supported standing  -CC     Set-up Assistance (Lower Body Dressing)  obtain clothing  -CC     Row Name 02/09/21 1204          Grooming    [place] Level (Grooming)  grooming skills;deodorant application;hair care, combing/brushing;oral care regimen;wash face, hands;standby assist;minimum assist (75% patient effort)  -CC     Position (Grooming)  sink side;supported sitting  -CC     Comment (Grooming)  asssit w dentures and deodorant  -CC     Row Name 02/09/21 1204          Toileting    [place] Level (Toileting)  toileting skills  -CC     Position (Toileting)  supported sitting;supported standing  -CC     Set-up Assistance (Toileting)  change pad/brief  -CC     Comment (Toileting)  assist x 2  -CC     Row Name 02/09/21 1508 02/09/21 1204       Positioning and Restraints    Pre-Treatment Position  sitting in chair/recliner  -CC  sitting in chair/recliner  -CC    Post Treatment Position  wheelchair  -CC  wheelchair  -CC    In Wheelchair  sitting;call light within reach;encouraged to call for assist;exit alarm on;with family/caregiver  -CC  sitting;with SLP  -CC      User Key  (r) = Recorded By, (t) = Taken By, (c) = Cosigned By    Initials Name Effective  Dates    CC Keya Miranda, OTR 06/08/18 -            Occupational Therapy Education                 Title: PT OT SLP Therapies (In Progress)     Topic: Occupational Therapy (Done)     Point: ADL training (Done)     Description:   Instruct learner(s) on proper safety adaptation and remediation techniques during self care or transfers.   Instruct in proper use of assistive devices.              Learning Progress Summary           Patient Acceptance, E,TB,D, VU by CC at 1/29/2021 1509    Comment: Pt repositioned in reg w/c with 1/2 arm tray on Left. Nursing notified of change from recliner w/c/. Commode tsf from w/c to droparm commode discussed and shown to NA.    Acceptance, E, VU by BL at 1/8/2021 1434   Caregiver Acceptance, E,TB,D, VU by CC at 1/29/2021 1509    Comment: Pt repositioned in reg w/c with 1/2 arm tray on Left. Nursing notified of change from recliner w/c/. Commode tsf from w/c to droparm commode discussed and shown to NA.                   Point: Home exercise program (Done)     Description:   Instruct learner(s) on appropriate technique for monitoring, assisting and/or progressing therapeutic exercises/activities.              Learning Progress Summary           Patient Acceptance, E, VU by BL at 1/8/2021 1434                   Point: Precautions (Done)     Description:   Instruct learner(s) on prescribed precautions during self-care and functional transfers.              Learning Progress Summary           Patient Acceptance, E, VU by BL at 1/8/2021 1434                   Point: Body mechanics (Done)     Description:   Instruct learner(s) on proper positioning and spine alignment during self-care, functional mobility activities and/or exercises.              Learning Progress Summary           Patient Acceptance, E,TB,D, VU by CC at 1/29/2021 1509    Comment: Pt repositioned in reg w/c with 1/2 arm tray on Left. Nursing notified of change from recliner w/c/. Commode tsf from w/c to droparm commode  discussed and shown to NA.    Acceptance, E, VU by BL at 1/8/2021 1434   Caregiver Acceptance, E,TB,D, VU by CC at 1/29/2021 1509    Comment: Pt repositioned in reg w/c with 1/2 arm tray on Left. Nursing notified of change from recliner w/c/. Commode tsf from w/c to droparm commode discussed and shown to NA.                               User Key     Initials Effective Dates Name Provider Type Discipline    CC 06/08/18 -  Keya Miranda OTR Occupational Therapist OT    BL 01/05/21 -  Marshall Oleary OT Occupational Therapist OT                    OT Recommendation and Plan                         Time Calculation:     Time Calculation- OT     Row Name 02/09/21 1400 02/09/21 1000          Time Calculation- OT    OT Start Time  1400  -CC  1000  -CC     OT Stop Time  1430  -CC  1030  -CC     OT Time Calculation (min)  30 min  -CC  30 min  -CC     OT Non-Billable Time (min)  --  15 min tech  -CC       User Key  (r) = Recorded By, (t) = Taken By, (c) = Cosigned By    Initials Name Provider Type    CC Keya Miranda OTR Occupational Therapist        Therapy Charges for Today     Code Description Service Date Service Provider Modifiers Qty    58045478106 HC OT SELF CARE/MGMT/TRAIN EA 15 MIN 2/8/2021 Keya Miranda OTR GO 2    02164435114 HC OT NEUROMUSC RE EDUCATION EA 15 MIN 2/8/2021 Keya Miranda OTR GO 1    49979675741 HC OT THER SUPP EA 15 MIN 2/8/2021 Keya Miranda OTR GO 2    54807705775 HC OT SELF CARE/MGMT/TRAIN EA 15 MIN 2/8/2021 Keya Miranda OTR GO 1    76985417194 HC OT THER SUPP EA 15 MIN 2/9/2021 Keya Miranda OTR GO 1    33587702996 HC OT SELF CARE/MGMT/TRAIN EA 15 MIN 2/9/2021 Keya Miranda OTR GO 2    35069079603 HC OT NEUROMUSC RE EDUCATION EA 15 MIN 2/9/2021 Keya Miranda OTR GO 2                   GIAN Blackwell  2/9/2021

## 2021-02-09 NOTE — THERAPY TREATMENT NOTE
Inpatient Rehabilitation - Physical Therapy Treatment Note       Psychiatric     Patient Name: Liana Carrero  : 1935  MRN: [id number]    Today's Date: 2021                    Admit Date: 2021      Visit Dx:     ICD-10-CM ICD-9-CM   1. Impaired functional mobility, balance, gait, and endurance  Z74.09 V49.89       Patient Active Problem List   Diagnosis   • Stroke (cerebrum) (CMS/[facility])   • Chest pain   • Hypertensive urgency   • Headache   • History of embolic stroke   • Hypothyroidism   • Hypokalemia   • CAD in native artery   • URTI (acute upper respiratory infection)   • Closed fracture of left hip (CMS/[facility])   • Chronic diastolic (congestive) heart failure (CMS/[facility])   • Coronary artery disease involving autologous vein coronary bypass graft without angina pectoris   • Impaired glucose tolerance   • Moderate malnutrition (CMS/[facility])   • Stroke (CMS/[facility])       Past Medical History:   Diagnosis Date   • Arthritis    • CHF (congestive heart failure) (CMS/[facility])    • Coronary artery disease    • Diabetes mellitus (CMS/[facility])     new DX related to stroke, started on insulin at hospital   • Disease of thyroid gland    • Elevated cholesterol    • GERD (gastroesophageal reflux disease)    • History of transfusion    • Hypertension    • PONV (postoperative nausea and vomiting)    • Sleep apnea    • Spinal headache    • Stroke (CMS/[facility])        Past Surgical History:   Procedure Laterality Date   • APPENDECTOMY     • CARDIAC CATHETERIZATION       x2   • CARDIAC CATHETERIZATION N/A 2019    Procedure: Left Heart Cath;  Surgeon: Bentley Chapin MD;  Location:  [facility]VAS[facility];  Service: Cardiovascular   • CARDIAC CATHETERIZATION N/A 2019    Procedure: Coronary angiography;  Surgeon: Bentley Chapin MD;  Location:  [facility];  Service: Cardiovascular   • CARDIAC CATHETERIZATION N/A 2019    Procedure: Native mammary injection;  Surgeon: Bentley Chapin MD;   Location:  [facility]VAS[facility];  Service: Cardiovascular   • CARDIAC CATHETERIZATION N/A 12/26/2019    Procedure: Saphenous Vein Graft;  Surgeon: Bentley Chapin MD;  Location: [facility] CATH INVASIVE LOCATION;  Service: Cardiovascular   • COLON SURGERY     • COLONOSCOPY     • EYE SURGERY      lens implants and cataract surgery   • FEMUR IM NAILING/RODDING Left 3/26/2020    Procedure: LT.HIP NAILING/RODDING;  Surgeon: Carlos Khan II, MD;  Location: [facility] MAIN OR;  Service: Orthopedics;  Laterality: Left;   • FRACTURE SURGERY Left 2020   • HYSTERECTOMY     • VASCULAR SURGERY      varicous veins          PT ASSESSMENT (last 12 hours)      IRF PT Evaluation and Treatment     Row Name 02/09/21 1116 02/09/21 1115       PT Time and Intention    Document Type  daily treatment  -KP  daily treatment  -    Mode of Treatment  physical therapy  -KP  physical therapy  -KP    Patient/Family/Caregiver Comments/Observations  arrived to PT from SLP, NAD  -KP  arrived to PT from SLP  -KP    Row Name 02/09/21 1116 02/09/21 1115       Cognition/Psychosocial    Affect/Mental Status (Cognitive)  WFL  -KP  WFL  -KP    Orientation Status (Cognition)  oriented x 3  -KP  oriented x 3  -KP    Follows Commands (Cognition)  follows two-step commands;repetition of directions required;verbal cues/prompting required  -KP  follows two-step commands;repetition of directions required;verbal cues/prompting required  -KP    Personal Safety Interventions  fall prevention program maintained;gait belt;nonskid shoes/slippers when out of bed;supervised activity  -  fall prevention program maintained;gait belt;nonskid shoes/slippers when out of bed;supervised activity  -KP    Cognitive Function (Cognitive)  attention deficit;safety deficit  -KP  attention deficit  -KP    Attention Deficit (Cognitive)  distractible in noisy environment;divided attention  -KP  distractible in noisy environment;divided attention  -KP    Executive  Function Deficit (Cognition)  insight/awareness of deficits  -  insight/awareness of deficits  -KP    Row Name 02/09/21 1116 02/09/21 1115       Pain Scale: Numbers Pre/Post-Treatment    Pretreatment Pain Rating  4/10  -KP  6/10  -KP    Posttreatment Pain Rating  4/10  -KP  6/10  -KP    Pain Location - Side  Left  -KP  Left  -KP    Pain Location  shoulder  -KP  shoulder  -KP    Pre/Posttreatment Pain Comment  Pt reports shoulder with inc pain today.  Improved with new taping  -  --    Row Name 02/09/21 1116 02/09/21 1115       Transfers    Sit-Stand [place] (Transfers)  maximum assist (25% patient effort);verbal cues;set up  -KP  maximum assist (25% patient effort);verbal cues;set up  -KP    Stand-Sit [place] (Transfers)  moderate assist (50% patient effort);verbal cues;set up  -KP  moderate assist (50% patient effort);verbal cues;set up  -KP    Row Name 02/09/21 1116 02/09/21 1115       Sit-Stand Transfer    Assistive Device (Sit-Stand Transfers)  wheelchair jon bar  -  wheelchair jon bar  -KP    Row Name 02/09/21 1115          Stand-Sit Transfer    Assistive Device (Stand-Sit Transfers)  wheelchair  -KP     Row Name 02/09/21 1115          Gait/Stairs (Locomotion)    [place] Level (Gait)  maximum assist (25% patient effort);2 person assist 3rd follow with w/c  -KP     Assistive Device (Gait)  -- jon bar  -     Distance in Feet (Gait)  8 steps  -     Pattern (Gait)  step-through  -     Deviations/Abnormal Patterns (Gait)  base of support, wide;weight shifting decreased;radha decreased  -     Bilateral Gait Deviations  forward flexed posture  -KP     Left Sided Gait Deviations  foot drop/toe drag;heel strike decreased;knee buckling, left side;leans left;weight shift ability decreased;Trendelenburg sign  -     Right Sided Gait Deviations  weight shift ability decreased  -     Comment (Gait/Stairs)  Max MC at L hip and knee for ext in stance, max sequencing cues.  -     Row Name  02/09/21 1115          Safety Issues, Functional Mobility    Safety Issues Affecting Function (Mobility)  problem-solving;sequencing abilities  -     Impairments Affecting Function (Mobility)  balance;coordination;postural/trunk control  -     Cognitive Impairments, Mobility Safety/Performance  insight into deficits/self-awareness;judgment  -KP     Row Name 02/09/21 1115          Balance    Static Standing Balance  moderate impairment;supported  -KP     Row Name 02/09/21 1115          Positioning and Restraints    Pre-Treatment Position  sitting in chair/recliner  -     Post Treatment Position  wheelchair  -KP     In Wheelchair  sitting;call light within reach;encouraged to call for assist;notified nsg;exit alarm on  -KP       User Key  (r) = Recorded By, (t) = Taken By, (c) = Cosigned By    Initials Name Provider Type     Dixie Graham, PT Physical Therapist        Wound 01/07/21 Left upper chest Incision (Active)   Dressing Appearance dry;intact;no drainage 02/09/21 0810   Closure Liquid skin adhesive;Open to air 02/09/21 0810   Base clean;dry 02/09/21 0810   Periwound intact 02/09/21 0810   Drainage Amount none 02/09/21 0810   Dressing Care gauze, dry 02/09/21 0810   Periwound Care dry periwound area maintained 02/09/21 0810     Physical Therapy Education                 Title: PT OT SLP Therapies (In Progress)     Topic: Physical Therapy (In Progress)     Point: Mobility training (In Progress)     Learning Progress Summary           Patient Eager, E,D, NR by KP at 2/9/2021 1119    Comment: standing posture, balance    Acceptance, E,TB, VU,NR by MS at 2/8/2021 1152    Acceptance, E,D, DU,NR by LB at 2/6/2021 1539    Comment: wt shift in standing    Acceptance, E,TB, VU,NR by MS at 2/5/2021 1208    Acceptance, E,TB, VU,NR by MS at 2/4/2021 1518    Acceptance, E,TB, VU,NR by MS at 2/3/2021 1151    Acceptance, E,TB, VU,NR by MS at 2/2/2021 1210    Acceptance, E,TB, NR by MS at 2/1/2021 1145    Acceptance,  E, NR by LH at 1/30/2021 1336    Acceptance, E,TB, VU,NR by MS at 1/29/2021 1535    Acceptance, E, NR by LB1 at 1/28/2021 1032    Acceptance, E,TB, VU,NR by MS at 1/27/2021 1447    Acceptance, E,TB, VU,NR by MS at 1/26/2021 0959    Acceptance, E,TB, VU,NR by MS at 1/25/2021 1316    Acceptance, E,TB, VU,NR by MS at 1/22/2021 0918    Acceptance, E,TB, VU by MS at 1/20/2021 1544    Acceptance, E,TB, VU,NR by MS at 1/19/2021 1401    Acceptance, E,TB, NR,NL by MS at 1/18/2021 1432    Acceptance, E,TB, VU,NR by IVAN at 1/16/2021 1444    Acceptance, E,TB, NR,NL by MS at 1/15/2021 1418    Acceptance, E,TB, NR by MS at 1/14/2021 1223    Acceptance, E,TB, NR by LB2 at 1/13/2021 1030    Comment: sitting balance    Acceptance, E,TB, NR by MS at 1/12/2021 1312    Acceptance, E,TB, VU,NR by MS at 1/11/2021 1140                   Point: Home exercise program (Done)     Learning Progress Summary           Patient Acceptance, E,TB, VU,NR by MS at 2/8/2021 1152    Acceptance, E,TB, VU,NR by MS at 2/5/2021 1208    Acceptance, E,TB, VU,NR by MS at 2/4/2021 1518    Acceptance, E,TB, VU,NR by MS at 2/3/2021 1151    Acceptance, E,TB, VU,NR by MS at 2/2/2021 1210    Acceptance, E,TB, NR by MS at 2/1/2021 1145    Acceptance, E, NR by  at 1/30/2021 1336    Acceptance, E,TB, VU,NR by MS at 1/29/2021 1535    Acceptance, E,TB, VU,NR by MS at 1/27/2021 1447    Acceptance, E,TB, VU,NR by MS at 1/26/2021 0959    Acceptance, E,TB, VU,NR by MS at 1/25/2021 1316    Acceptance, E,TB, VU,NR by MS at 1/22/2021 0918    Acceptance, E,TB, VU by MS at 1/20/2021 1544    Acceptance, E,TB, VU,NR by MS at 1/19/2021 1401    Acceptance, E,TB, NR,NL by MS at 1/18/2021 1432    Acceptance, E,TB, NR,NL by MS at 1/15/2021 1418    Acceptance, E,TB, NR by MS at 1/14/2021 1223                   Point: Body mechanics (Done)     Learning Progress Summary           Patient Acceptance, E,TB, VU,NR by MS at 2/8/2021 1152    Acceptance, E,TB, VU,NR by MS at 2/5/2021 1208     Acceptance, E,TB, VU,NR by MS at 2/4/2021 1518    Acceptance, E,TB, VU,NR by MS at 2/3/2021 1151    Acceptance, E,TB, VU,NR by MS at 2/2/2021 1210    Acceptance, E,TB, NR by MS at 2/1/2021 1145    Acceptance, E, NR by  at 1/30/2021 1336    Acceptance, E,TB, VU,NR by MS at 1/29/2021 1535    Acceptance, E,TB, VU,NR by MS at 1/27/2021 1447    Acceptance, E,TB, VU,NR by MS at 1/26/2021 0959    Acceptance, E,TB, VU,NR by MS at 1/25/2021 1316    Acceptance, E,TB, VU,NR by MS at 1/22/2021 0918    Acceptance, E,TB, VU by MS at 1/20/2021 1544    Acceptance, E,TB, VU,NR by MS at 1/19/2021 1401    Acceptance, E,TB, NR,NL by MS at 1/15/2021 1418    Acceptance, E,TB, NR by MS at 1/14/2021 1223    Acceptance, E,TB, NR by MS at 1/12/2021 1312    Acceptance, E,TB, VU,NR by MS at 1/11/2021 1140                   Point: Precautions (Done)     Learning Progress Summary           Patient Acceptance, E,TB, VU,NR by MS at 2/8/2021 1152    Acceptance, E,TB, VU,NR by MS at 2/5/2021 1208    Acceptance, E,TB, VU,NR by MS at 2/4/2021 1518    Acceptance, E,TB, VU,NR by MS at 2/3/2021 1151    Acceptance, E,TB, VU,NR by MS at 2/2/2021 1210    Acceptance, E,TB, NR by MS at 2/1/2021 1145    Acceptance, E, NR by  at 1/30/2021 1336    Acceptance, E,TB, VU,NR by MS at 1/29/2021 1535    Acceptance, E,TB, VU,NR by MS at 1/27/2021 1447    Acceptance, E,TB, VU,NR by MS at 1/26/2021 0959    Acceptance, E,TB, VU,NR by MS at 1/25/2021 1316    Acceptance, E,TB, VU,NR by MS at 1/22/2021 0918    Acceptance, E, VU by MD at 1/21/2021 0926    Acceptance, E,TB, VU by MS at 1/20/2021 1544    Acceptance, E,TB, VU,NR by MS at 1/19/2021 1401    Acceptance, E,TB, NR,NL by MS at 1/15/2021 1418    Acceptance, E,TB, NR by MS at 1/14/2021 1223    Acceptance, E,TB, NR by MS at 1/12/2021 1312    Acceptance, E,TB, VU,NR by MS at 1/11/2021 1140    Acceptance, E, VU by MD at 1/9/2021 0933                               User Key     Initials Effective Dates Name Provider Type  Discipline    LB 06/08/18 -  Elba Zarate, PT Physical Therapist PT    IVAN 06/08/18 -  Ela Pritchett, PT Physical Therapist PT    LB2 04/03/18 -  Jazmin Schuster, PT Physical Therapist PT     04/03/18 -  Elba Myrick, PT Physical Therapist PT    LB1 03/07/18 -  Elba Grigsby, PTA Physical Therapy Assistant PT    MD 04/03/18 -  Lisandra Lowery, PT Physical Therapist PT    KP 04/03/18 -  Dixie Graham, PT Physical Therapist PT    MS 03/04/19 -  Nati Kaba, PT Physical Therapist PT                PT Recommendation and Plan                          Time Calculation:     PT Charges     Row Name 02/09/21 1119             Time Calculation    Start Time  1100  -      Stop Time  1130  -      Time Calculation (min)  30 min  -      PT Received On  02/09/21  -      PT - Next Appointment  02/10/21  -        User Key  (r) = Recorded By, (t) = Taken By, (c) = Cosigned By    Initials Name Provider Type    Dixie Galindo, PT Physical Therapist          Therapy Charges for Today     Code Description Service Date Service Provider Modifiers Qty    74411822524 HC PT NEUROMUSC RE EDUCATION EA 15 MIN 2/9/2021 Dixie Graham, PT GP 1    [id number] HC PT THERAPEUTIC ACT EA 15 MIN 2/9/2021 Dixie Graham, PT GP 1          Patient was intermittently wearing a face mask during this therapy encounter. Therapist used appropriate personal protective equipment including eye protection, mask, and gloves.  Mask used was standard procedure mask. Appropriate PPE was worn during the entire therapy session. Hand hygiene was completed before and after therapy session. Patient is not in enhanced droplet precautions.            Dixie Graham PT  2/9/2021

## 2021-02-09 NOTE — THERAPY TREATMENT NOTE
Inpatient Rehabilitation - Occupational Therapy Treatment Note    [facility]     Patient Name: Liana Carrero  : 1935  MRN: [id number]    Today's Date: 2021                 Admit Date: 2021         ICD-10-CM ICD-9-CM   1. Impaired functional mobility, balance, gait, and endurance  Z74.09 V49.89       Patient Active Problem List   Diagnosis   • Stroke (cerebrum) (CMS/Formerly [facility])   • Chest pain   • Hypertensive urgency   • Headache   • History of embolic stroke   • Hypothyroidism   • Hypokalemia   • CAD in native artery   • URTI (acute upper respiratory infection)   • Closed fracture of left hip (CMS/Formerly [facility])   • Chronic diastolic (congestive) heart failure (CMS/Formerly [facility])   • Coronary artery disease involving autologous vein coronary bypass graft without angina pectoris   • Impaired glucose tolerance   • Moderate malnutrition (CMS/Formerly [facility])   • Stroke (CMS/Formerly [facility])       Past Medical History:   Diagnosis Date   • Arthritis    • CHF (congestive heart failure) (CMS/Formerly [facility])    • Coronary artery disease    • Diabetes mellitus (CMS/Formerly [facility])     new DX related to stroke, started on insulin at hospital   • Disease of thyroid gland    • Elevated cholesterol    • GERD (gastroesophageal reflux disease)    • History of transfusion    • Hypertension    • PONV (postoperative nausea and vomiting)    • Sleep apnea    • Spinal headache    • Stroke (CMS/Formerly [facility])        Past Surgical History:   Procedure Laterality Date   • APPENDECTOMY     • CARDIAC CATHETERIZATION       x2   • CARDIAC CATHETERIZATION N/A 2019    Procedure: Left Heart Cath;  Surgeon: eBntley Chapin MD;  Location: [facility] CATH INVASIVE LOCATION;  Service: Cardiovascular   • CARDIAC CATHETERIZATION N/A 2019    Procedure: Coronary angiography;  Surgeon: Bentley Chapin MD;  Location: [facility] CATH INVASIVE LOCATION;  Service: Cardiovascular   • CARDIAC CATHETERIZATION N/A 2019    Procedure: Native mammary injection;  Surgeon: Bentley Chapin MD;  Location: [facility]  CATH INVASIVE LOCATION;  Service: Cardiovascular   • CARDIAC CATHETERIZATION N/A 12/26/2019    Procedure: Saphenous Vein Graft;  Surgeon: Bentley Chapin MD;  Location: [facility] CATH INVASIVE LOCATION;  Service: Cardiovascular   • COLON SURGERY     • COLONOSCOPY     • EYE SURGERY      lens implants and cataract surgery   • FEMUR IM NAILING/RODDING Left 3/26/2020    Procedure: LT.HIP NAILING/RODDING;  Surgeon: Carlos Khan II, MD;  Location: [facility] MAIN OR;  Service: Orthopedics;  Laterality: Left;   • FRACTURE SURGERY Left 2020   • HYSTERECTOMY     • VASCULAR SURGERY      varicous veins                IRF OT ASSESSMENT FLOWSHEET (last 12 hours)      IRF OT Evaluation and Treatment     Row Name 02/09/21 1508 02/09/21 1204       OT Time and Intention    Document Type  daily treatment  -CC  daily treatment  -CC    Mode of Treatment  occupational therapy  -CC  occupational therapy  -CC    Patient Effort  adequate  -CC  adequate  -CC    Row Name 02/09/21 1508 02/09/21 1204       Vision Assessment/Intervention    Visual Motor Impairment  visual tracking, left  -CC  visual tracking, left  -CC    Visual Processing Deficit  jon-inattention/neglect, left  -CC  jon-inattention/neglect, left  -CC    Row Name 02/09/21 1508 02/09/21 1204       Cognition/Psychosocial    Affect/Mental Status (Cognitive)  WFL  -CC  WFL  -CC    Personal Safety Interventions  fall prevention program maintained;gait belt;nonskid shoes/slippers when out of bed  -CC  fall prevention program maintained;gait belt;nonskid shoes/slippers when out of bed  -CC    Cognitive Function (Cognitive)  --  attention deficit  -CC    Row Name 02/09/21 1508 02/09/21 1204       Pain Scale: Numbers Pre/Post-Treatment    Pretreatment Pain Rating  3/10  -CC  8/10  -CC    Posttreatment Pain Rating  3/10  -CC  1/10  -CC    Pain Location - Side  --  Left  -CC    Pain Location  shoulder  -CC  shoulder  -CC    Pre/Posttreatment Pain Comment  REPOSITIONING; rom  -CC   pt reports hurt during tsf w nursing. UE wsa elevated too high on pillows. Pillows removed and shld kinesotaped. Pt reports pain sunsided.   -    Row Name 02/09/21 1508          Squat Pivot Transfer    Squat Pivot [place] (Transfers)  moderate assist (50% patient effort);maximum assist (25% patient effort) pt engeged in tsf training with scooting R/L on mat mod/max   -     Assistive Device (Squat Pivot Transfers)  wheelchair  -     Row Name 02/09/21 1508 02/09/21 1204       Balance    Static Sitting Balance  --  mild impairment  -    Balance Interventions  sitting;static;dynamic;minimal challenge;UE activity with balance activity  -  --    Row Name 02/09/21 1204          Motor Skills    Neuromuscular Function  -- L shld kinesotaped  -[facility] Name 02/09/21 1508          Shoulder (Therapeutic Exercise)    Shoulder AROM (Therapeutic Exercise)  left;flexion;extension;sitting;5 repetitions;3 second hold;3 sets table slides   -[facility] Name 02/09/21 1508          Elbow/Forearm (Therapeutic Exercise)    Elbow/Forearm (Therapeutic Exercise)  PROM (passive range of motion)  -     Elbow/Forearm PROM (Therapeutic Exercise)  left;flexion;extension;supination;pronation;sitting;10 repetitions  -     Row Name 02/09/21 1508          Wrist (Therapeutic Exercise)    Wrist (Therapeutic Exercise)  PROM (passive range of motion)  -     Wrist PROM (Therapeutic Exercise)  left;flexion;extension;10 repetitions  -[facility] Name 02/09/21 1508          Hand (Therapeutic Exercise)    Hand (Therapeutic Exercise)  PROM (passive range of motion)  -     Hand PROM (Therapeutic Exercise)  left;finger flexion;finger extension;finger aBduction;finger aDduction;thumb opposition;thumb flexion;thumb extension;5 repetitions  -     Row Name 02/09/21 1204          Bathing    [place] Level (Bathing)  bathing skills;upper body;lower body;moderate assist (50% patient effort);verbal cues;nonverbal cues (demo/gesture)  -      Position (Bathing)  sink side;supported sitting;supported standing  -CC     Comment (Bathing)  2 for standing   -CC     Row Name 02/09/21 1204          Upper Body Dressing    [place] Level (Upper Body Dressing)  upper body dressing skills;doff;don;pull over garment;set up assistance;verbal cues;nonverbal cues (demo/gesture);minimum assist (75% or more patient effort)  -CC     Position (Upper Body Dressing)  supported sitting  -CC     Set-up Assistance (Upper Body Dressing)  obtain clothing  -CC     Row Name 02/09/21 1204          Lower Body Dressing    [place] Level (Lower Body Dressing)  doff;don;pants/bottoms;socks;moderate assist (50% patient effort);maximum assist (25% patient effort)  -CC     Position (Lower Body Dressing)  supported sitting;supported standing  -CC     Set-up Assistance (Lower Body Dressing)  obtain clothing  -CC     Row Name 02/09/21 1204          Grooming    [place] Level (Grooming)  grooming skills;deodorant application;hair care, combing/brushing;oral care regimen;wash face, hands;standby assist;minimum assist (75% patient effort)  -CC     Position (Grooming)  sink side;supported sitting  -CC     Comment (Grooming)  asssit w dentures and deodorant  -CC     Row Name 02/09/21 1204          Toileting    [place] Level (Toileting)  toileting skills  -CC     Position (Toileting)  supported sitting;supported standing  -CC     Set-up Assistance (Toileting)  change pad/brief  -CC     Comment (Toileting)  assist x 2  -CC     Row Name 02/09/21 1508 02/09/21 1204       Positioning and Restraints    Pre-Treatment Position  sitting in chair/recliner  -CC  sitting in chair/recliner  -CC    Post Treatment Position  wheelchair  -CC  wheelchair  -CC    In Wheelchair  sitting;call light within reach;encouraged to call for assist;exit alarm on;with family/caregiver  -CC  sitting;with SLP  -CC      User Key  (r) = Recorded By, (t) = Taken By, (c) = Cosigned By    Initials Name Effective  Dates    CC Keya Miranda, OTR 06/08/18 -            Occupational Therapy Education                 Title: PT OT SLP Therapies (In Progress)     Topic: Occupational Therapy (Done)     Point: ADL training (Done)     Description:   Instruct learner(s) on proper safety adaptation and remediation techniques during self care or transfers.   Instruct in proper use of assistive devices.              Learning Progress Summary           Patient Acceptance, E,TB,D, VU by CC at 1/29/2021 1509    Comment: Pt repositioned in reg w/c with 1/2 arm tray on Left. Nursing notified of change from recliner w/c/. Commode tsf from w/c to droparm commode discussed and shown to NA.    Acceptance, E, VU by BL at 1/8/2021 1434   Caregiver Acceptance, E,TB,D, VU by CC at 1/29/2021 1509    Comment: Pt repositioned in reg w/c with 1/2 arm tray on Left. Nursing notified of change from recliner w/c/. Commode tsf from w/c to droparm commode discussed and shown to NA.                   Point: Home exercise program (Done)     Description:   Instruct learner(s) on appropriate technique for monitoring, assisting and/or progressing therapeutic exercises/activities.              Learning Progress Summary           Patient Acceptance, E, VU by BL at 1/8/2021 1434                   Point: Precautions (Done)     Description:   Instruct learner(s) on prescribed precautions during self-care and functional transfers.              Learning Progress Summary           Patient Acceptance, E, VU by BL at 1/8/2021 1434                   Point: Body mechanics (Done)     Description:   Instruct learner(s) on proper positioning and spine alignment during self-care, functional mobility activities and/or exercises.              Learning Progress Summary           Patient Acceptance, E,TB,D, VU by CC at 1/29/2021 1509    Comment: Pt repositioned in reg w/c with 1/2 arm tray on Left. Nursing notified of change from recliner w/c/. Commode tsf from w/c to droparm commode  discussed and shown to NA.    Acceptance, E, VU by BL at 1/8/2021 1434   Caregiver Acceptance, E,TB,D, VU by CC at 1/29/2021 1509    Comment: Pt repositioned in reg w/c with 1/2 arm tray on Left. Nursing notified of change from recliner w/c/. Commode tsf from w/c to droparm commode discussed and shown to NA.                               User Key     Initials Effective Dates Name Provider Type Discipline    CC 06/08/18 -  Keya Miranda OTR Occupational Therapist OT    BL 01/05/21 -  Marshall Oleary OT Occupational Therapist OT                    OT Recommendation and Plan                         Time Calculation:     Time Calculation- OT     Row Name 02/09/21 1400 02/09/21 1000          Time Calculation- OT    OT Start Time  1400  -CC  1000  -CC     OT Stop Time  1430  -CC  1030  -CC     OT Time Calculation (min)  30 min  -CC  30 min  -CC     OT Non-Billable Time (min)  --  15 min tech  -CC       User Key  (r) = Recorded By, (t) = Taken By, (c) = Cosigned By    Initials Name Provider Type    CC Keya Miranda OTR Occupational Therapist        Therapy Charges for Today     Code Description Service Date Service Provider Modifiers Qty    99663682664 HC OT SELF CARE/MGMT/TRAIN EA 15 MIN 2/8/2021 Keya Miranda OTR GO 2    01939096497 HC OT NEUROMUSC RE EDUCATION EA 15 MIN 2/8/2021 Keya Miranda OTR GO 1    98866687022 HC OT THER SUPP EA 15 MIN 2/8/2021 Keya Miranda OTR GO 2    11653236552 HC OT SELF CARE/MGMT/TRAIN EA 15 MIN 2/8/2021 Keya Miranda OTR GO 1    27770414209 HC OT THER SUPP EA 15 MIN 2/9/2021 Keya Miranda OTR GO 1    [id number] HC OT SELF CARE/MGMT/TRAIN EA 15 MIN 2/9/2021 Keya Miranda OTR GO 2    [id number] HC OT NEUROMUSC RE EDUCATION EA 15 MIN 2/9/2021 Keya Miranda OTR GO 2                   GIAN Blackwell  2/9/2021

## 2021-02-10 LAB
BILIRUB UR QL STRIP: NEGATIVE
CLARITY UR: CLEAR
COLOR UR: YELLOW
GLUCOSE UR STRIP-MCNC: NEGATIVE MG/DL
HGB UR QL STRIP.AUTO: NEGATIVE
KETONES UR QL STRIP: NEGATIVE
LEUKOCYTE ESTERASE UR QL STRIP.AUTO: NEGATIVE
NITRITE UR QL STRIP: NEGATIVE
PH UR STRIP.AUTO: <=5 [PH] (ref 5–8)
PROT UR QL STRIP: NEGATIVE
SP GR UR STRIP: 1.02 (ref 1–1.03)
UROBILINOGEN UR QL STRIP: NORMAL

## 2021-02-10 PROCEDURE — 97129 THER IVNTJ 1ST 15 MIN: CPT

## 2021-02-10 PROCEDURE — 63710000001 ONDANSETRON ODT 4 MG TABLET DISPERSIBLE: Performed by: INTERNAL MEDICINE

## 2021-02-10 PROCEDURE — 97116 GAIT TRAINING THERAPY: CPT

## 2021-02-10 PROCEDURE — 25010000002 ENOXAPARIN PER 10 MG: Performed by: PHYSICAL MEDICINE & REHABILITATION

## 2021-02-10 PROCEDURE — 97130 THER IVNTJ EA ADDL 15 MIN: CPT

## 2021-02-10 PROCEDURE — 81003 URINALYSIS AUTO W/O SCOPE: CPT | Performed by: PHYSICAL MEDICINE & REHABILITATION

## 2021-02-10 PROCEDURE — 97112 NEUROMUSCULAR REEDUCATION: CPT

## 2021-02-10 PROCEDURE — 97535 SELF CARE MNGMENT TRAINING: CPT

## 2021-02-10 PROCEDURE — 97110 THERAPEUTIC EXERCISES: CPT

## 2021-02-10 RX ADMIN — TRAMADOL HYDROCHLORIDE 50 MG: 50 TABLET ORAL at 11:31

## 2021-02-10 RX ADMIN — ONDANSETRON 4 MG: 4 TABLET, ORALLY DISINTEGRATING ORAL at 16:33

## 2021-02-10 RX ADMIN — LOSARTAN POTASSIUM 50 MG: 50 TABLET, FILM COATED ORAL at 07:57

## 2021-02-10 RX ADMIN — DILTIAZEM HYDROCHLORIDE 180 MG: 180 CAPSULE, COATED, EXTENDED RELEASE ORAL at 19:28

## 2021-02-10 RX ADMIN — OXYBUTYNIN CHLORIDE 5 MG: 5 TABLET ORAL at 16:33

## 2021-02-10 RX ADMIN — PANTOPRAZOLE SODIUM 40 MG: 40 TABLET, DELAYED RELEASE ORAL at 05:55

## 2021-02-10 RX ADMIN — ONDANSETRON 4 MG: 4 TABLET, ORALLY DISINTEGRATING ORAL at 11:31

## 2021-02-10 RX ADMIN — MIRTAZAPINE 15 MG: 15 TABLET, FILM COATED ORAL at 19:27

## 2021-02-10 RX ADMIN — ASPIRIN 81 MG: 81 TABLET, CHEWABLE ORAL at 07:58

## 2021-02-10 RX ADMIN — DOCUSATE SODIUM 200 MG: 100 CAPSULE, LIQUID FILLED ORAL at 19:27

## 2021-02-10 RX ADMIN — DOCUSATE SODIUM 200 MG: 100 CAPSULE, LIQUID FILLED ORAL at 07:57

## 2021-02-10 RX ADMIN — TRAMADOL HYDROCHLORIDE 50 MG: 50 TABLET ORAL at 22:13

## 2021-02-10 RX ADMIN — HYDROCODONE BITARTRATE AND ACETAMINOPHEN 1 TABLET: 5; 325 TABLET ORAL at 06:02

## 2021-02-10 RX ADMIN — HYDROCODONE BITARTRATE AND ACETAMINOPHEN 1 TABLET: 5; 325 TABLET ORAL at 17:29

## 2021-02-10 RX ADMIN — BISACODYL 10 MG: 10 SUPPOSITORY RECTAL at 19:37

## 2021-02-10 RX ADMIN — ENOXAPARIN SODIUM 40 MG: 40 INJECTION SUBCUTANEOUS at 07:58

## 2021-02-10 RX ADMIN — DILTIAZEM HYDROCHLORIDE 180 MG: 180 CAPSULE, COATED, EXTENDED RELEASE ORAL at 07:58

## 2021-02-10 RX ADMIN — FERROUS SULFATE TAB 325 MG (65 MG ELEMENTAL FE) 325 MG: 325 (65 FE) TAB at 07:57

## 2021-02-10 RX ADMIN — LEVOTHYROXINE SODIUM 125 MCG: 0.12 TABLET ORAL at 06:02

## 2021-02-10 RX ADMIN — LIDOCAINE 1 PATCH: 50 PATCH TOPICAL at 07:58

## 2021-02-10 RX ADMIN — ONDANSETRON 4 MG: 4 TABLET, ORALLY DISINTEGRATING ORAL at 05:55

## 2021-02-10 NOTE — PROGRESS NOTES
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  No changes in her strength.  Tolerating activities.  She worked on car transfer today.  UA negative and PVR 53 cc    History taken from: patient    Objective     Vital Signs  Temp:  [97.3 °F (36.3 °C)-98.2 °F (36.8 °C)] 97.5 °F (36.4 °C)  Heart Rate:  [] 80  Resp:  [14-18] 16  BP: (126-151)/(60-77) 126/77    Physical Exam  General: calm, in NAD  Neck: trachea midline  Cardio: well perfused distal extremities, regular radial pulse  Resp: normal WOB on RA, nonlabored.    Abdomen: NT/ND  Extremities:    No edema  No crepitus with range of motion of the left hip.  Neuro: dense left hemiparesis, flaccid tone  Left homonymous hemianopsia    Left inattention.  A&Ox4. 0/5 strength LUE/LLE.   Takes resistance on the right side.    Results from last 7 days   Lab Units 02/08/21  0709 02/05/21  0645   WBC 10*3/mm3 4.81 5.20   HEMOGLOBIN g/dL 11.7* 10.4*   HEMATOCRIT % 36.5 32.6*   PLATELETS 10*3/mm3 162 165     Results from last 7 days   Lab Units 02/08/21  0709 02/05/21  0645   SODIUM mmol/L 137 139   POTASSIUM mmol/L 4.3 4.2   CHLORIDE mmol/L 102 102   CO2 mmol/L 25.7 31.1*   BUN mg/dL 17 19   CREATININE mg/dL 0.61 0.68   CALCIUM mg/dL 9.1 9.1   GLUCOSE mg/dL 109* 114*             Ref. Range 1/8/2021 06:01   TSH Baseline Latest Ref Range: 0.270 - 4.200 uIU/mL 2.830   Vitamin B-12 Latest Ref Range: 211 - 946 pg/mL 381   25 Hydroxy, Vitamin D Latest Ref Range: 30.0 - 100.0 ng/ml 20.6 (L)     CT of the abdomen-January 16  CT Abdomen Pelvis With Contrast   Final Result       A cystic focus at the pancreatic head,  likely corresponds to the   ultrasound finding of concern, could be further evaluated with   endoscopic ultrasound as indicated,  interval follow-up also recommended   to exclude any possibility of a cystic neoplasm. The pancreas is   thinned. The main pancreatic duct shows a mildly prominent caliber, 4   mm.          US GALLBLADDER-  1/14/2021   IMPRESSION:  Small to moderate amount of gallbladder sludge with a few  possible tiny nonshadowing gallstones.  2. No wall thickening or pericholecystic fluid is seen. No sonographic  Garcia's sign is seen.  3. Fatty infiltration of the liver.  4. 1.2 cm hypoechoic pancreatic head lesion. No pancreatic head lesions  are seen on the CT of the abdomen and pelvis from [facility]  dated 12/11/2019.  5. CT of the abdomen with contrast using pancreatic protocol is  recommended for further assessment.    EXAMINATION: LIMITED LEFT BREAST SONOGRAPHY-January 20, 2021     HISTORY: 85-year-old female status post recent placement of a loop  recorder on the left side of the chest with an area of palpable concern  in the left breast.     FINDINGS: Targeted sonographic evaluation of the area of palpable  concern in the left breast which corresponds to the upper inner quadrant  was performed. No sonographic abnormality is appreciated.     IMPRESSION:  Negative targeted left breast sonography. Clinical followup  is suggested.     BI-RADS Category 1: Negative  Results Review:     I reviewed the patient's new clinical results.    Medication Review: Complete  Scheduled Meds:Alirocumab, 75 mg, Subcutaneous, Q14 Days  aspirin, 81 mg, Oral, Daily  bisacodyl, 10 mg, Rectal, Q24H  [START ON 3/6/2021] cholecalciferol, 1,000 Units, Oral, Daily  dilTIAZem CD, 180 mg, Oral, BID  docusate sodium, 200 mg, Oral, BID  enoxaparin, 40 mg, Subcutaneous, Daily  ferrous sulfate, 325 mg, Oral, Daily With Breakfast  levothyroxine, 125 mcg, Oral, Q AM  lidocaine, 1 patch, Transdermal, Q24H  losartan, 50 mg, Oral, Daily  mirtazapine, 15 mg, Oral, Nightly  ondansetron ODT, 4 mg, Oral, TID AC  oxybutynin, 5 mg, Oral, Daily With  Dinner  pantoprazole, 40 mg, Oral, QAM  vitamin D, 50,000 Units, Oral, Q7 Days      Continuous Infusions:   PRN Meds:.HYDROcodone-acetaminophen  •  nitroglycerin  •  polyethylene glycol  •  traMADol      Assessment/Plan       Stroke (CMS/HCC)    Debility and functional deficits  -PT/OT/SLP     Acute CVA  -[place] 3 MRI at [facility] revealed acute infarct in the left temporal-occipital region, no TPA or MT  -Jan 4 had worsening of her NIHSS, MRI revealed a new right superior frontal gyrus acute infarct. Again outside of the window of TPA, not a candidate for mechanical thrombectomy  -continue ASA and Praluent as she is intolerant of statins  -Echo was unremarkable  -Holter normal  -Loop recorder placed  -A1c 5.0  -TSH and B12 level unremarkable    Loss of appetite  Weight loss  Nausea  -symptoms for approximately 8 months, has lost ~40lbs over that time per patient  -started Remeron 7.5 qHS  -GI consulted, appreciate recs. Started scheduled Zofran with meals on 1/13, continue Remeron.   Jan 14 - GI to check RUQ US, increase bowel regimen, continue Remeron and Zofran as above  January 15 -1.2 cm hypoechoic pancreatic head lesion on ultrasound.  To get CT of the abdomen tomorrow.  January 16 - CT scan showed pancreatic cyst.  Not felt to be the etiology of her nausea.  Follow-up as an outpatient.  January 17- Remeron increased to 15 mg nightly     Left breast nodule  -2x1cm nodule left breast at 10 o'clock position, borders feel smooth, nodule is not freely mobile, no overlying skin changes. Away from the site of recent left loop recorder placement Jan 5, 2021  -Last mammogram one year ago. May not tolerate mammogram with recent loop recorder placement. Patient discussed with Dr. Pride, left breast ultrasound ordered  -Left breast ultrasound negative at the area of interest in the left upper inner quadrant    HLD  -continue ASA and Praluent as she is intolerant of statins     HTN  -continue cardizem, losartan  -normotensive  goal  -monitor    Vit D insufficiency  -level 20 on admission  -started vitamin D replacement     Hypothyroidism  -continue levothyroxine    CAD  -continue ASA and Praluent   January 27-She had some sharp chest pain that was brief last evening at about 1 AM, resolved on its own.  Had nitroglycerin ordered but did not receive it.  She reports at home she did occasionally have chest pain and would take him to glycerin at home, stating that sometimes she would take it as she just felt nervous and worried with the chest pain.  She did feel some palpitations after the chest pain last evening.  We discussed seeing if anything was uploaded on her loop recorder to her cardiologist office and we will check those results.    Obstructive sleep apnea-January 27-she has been on 2 L nasal cannula oxygen at nighttime with sats 100%.  She did not have her CPAP brought in from home     Prerenal OBED, resolved  -secondary to decreased PO intake  -resolved prior to discharge from [facility]  -monitor electrolytes     Iron deficiency anemia  -received 2 doses of IV iron as well as 1UpRBCs on 1/4 at [facility]  -Hgb stable ~8 prior to discharge from [facility], 9.2 on admission to [facility]  -continue oral iron, monitor     Left hip pain  -xrays at [facility] unremarkable  Jan 13 - Fatigue continues to be an issue. She takes Norco 7.5 mg about 4 times a day with history of left hip pain-history of left femur fracture open reduction internal fixation, x-ray at the outside hospital on January 6 showed hardware in place.  January 14-She does have fatigue during the day.  Will try a lower dose of Norco 5 mg rather than 7.5 mg.  Is on Remeron 7.5 g at nighttime but that was only recently added.  January 15-she took Norco 5 mg twice a day at home.  More alert with better trunk control on lower dose of Norco today.  January 21-continues with left hip pain. Taking Norco 5 mg about 4 times a day. Add Lidoderm patch.  January 25-does not feel Lidoderm patch that helpful.  Pain was  better with addition of tramadol over the weekend.  Hip range of motion unremarkable with no crepitus  January 26-left hip xray did not show displacement of surgical hardware, fracture, erosion or dislocation  January 26-she feels tramadol works better for her than hydrocodone.  Anticipate going home on tramadol and discontinue hydrocodone.  She is presently taking tramadol twice a day.  Appears to be tolerating without any side effects.     Fluids/Electrolytes/Nutrition   -cardiac diet  -admission labs unremarkable     DVT ppx  -subQ Lovenox    /UTI  -on Ditropan 5 mg twice a day.  January 11 - voids with acceptable bladder scan postvoid residuals. Urinary tract infection with E. coli-sensitive to Macrobid  January 18 - required intermittent straight catheter 450 cc.  January 19 - incontinent and IC, PVRs 99-391cc  January 22 - has urge to urinate at times but cannot void, will decrease oxybutynin from bid to with dinner only. She is voiding with incontinence with postvoid residual 230-290 cc.  Discussed trial of decreasing Ditropan to 5 mg q. supper first from twice a day before doing a trial of Flomax.  January 23-urinary tract infection-E. coli-on cefdinir starting January 23.  Sensitive to cephalosporins  January 27-voids with postvoid residual  cc.  We will continue Ditropan at lower dose of 5 mg with supper  February 9-recurrent dysuria-recheck urinalysis with culture if indicated  Feb 10 - UA negative and PVR 53 cc      TEAM CONF - JAN 12 - BED MAX-DEP. TRANSFERS MAX 2 TRANSFER BOARD. NON-AMBULATORY. WHEELCHAIR 50 FEET MIN ASSIST. TOILET TRANSFERS DEP X 2. IMPAIRED VISION. BATH MAX. LBD DEP. UBD MAX. GROOMING MOD ASSIST. COGNITION - MILD TO MODERATE DEFICITS. 2L O2 AT HS. ON MACROBID FOR UTI. FATIGUES. HISTORY OF POOR APPETITE FOR MONTHS, WEIGHT LOSS 30-40 # OVER SEVERAL MONTHS AT HOME, HISTORY OF REPORTED NAUSEA AT HOME. WILL CONSULT GI.   TINY - 4 WEEKS.     TEAM CONF - JAN 19 - BED MAX 2 .   TRANSFERS MAX 2 TRANSFER BOARD. WHEELCHAIR MIN A DUE TO VISIN DEFICITS. HAS SAT UNSUPPORTED FOR 30 SECS BEFORE STARTS TO LEAN. TOILET TRANSFERS DEP X 2. BATH UB MAX, LB DEP X 2. LBD DEP X 2. UBD MAX. GROOMING MOD. LEFT SHOULDER SUBLUXATION - ADD LEUKOTAPING. VOIDS WITH ELEVATED PVRS WITH OCCASIONAL ISC. CONTINENT INCONTINENT.  LEFT INATTENTION, LEFT VISUAL FIELD CUT. IMPAIRED ATTENTION, EXECUTIVE FUNCTION, REASONING. WORKING MEMORY IMPROVING.  BNE (Active)  Att'n. - WNL  Exec. Fx. - Mild Imp.  Rsng/Jgmnt - Mildly Imp. for abstract reaosning  Arith - Min Imp.  Visuospatial Skills - Mildly Imp.  Visual Mem. - Mildly Imp.  Verbal Mem. - Mildly Imp.  Emot - Pt endorsed dep related to current status  DEPRESSION - SUPPORTIVE THERAPY. ON REMERON.   ELOS -   3 WEEKS    TEAM CONF - JAN 26 -  BED MAX 2. TRANSFERS MAX 2. WHEELCHAIR 50 FEET MIN ASSIST, IMPACTED BY VISUAL DEFICITS. LEFT HIP PAIN INCREASED, WILL REPEAT X-RAY. AT OUTSIDE HOSPITAL X-RAY ON JAN 6 - [ORIF hardware in the proximal femur is stable from 5/6/2020. No hardware loosening or perihardware fracture is detected. The hip joint space is mildly narrowed as before. IMPRESSION: Stable ORIF hardware; no acute abnormality.]  TOILET TRANSFERS MAX 2 AS PUSHES. BATH UBB MIN, LBB MAX 2. UBD MAX ASSIST. LBD DEPENDENT. GROOMING MIN ASSIST. ATTENDS TO LEFT A LITTLE BETTER. TOILETING DEP X 2. LEFT VISUAL FIELD DEFICITS. EXECUTIVE FUNCTION - IMPAIRED ATTENTION. MEMORY MIN-MOD DEFICITS. MOD-MAX VISUAL CUES TO LEFT SIDE. VOIDS. ON DECREASED DITROPAN TO 5 MG ONCE A DAY DUE TO ELEVATED PVRS. CONTINENT AT TIMES WITH BLADDER. BOWEL CONTINENT. PAIN MANAGEMENT - TRAMADOL ADDED. WITH REC THERAPY, CUES TO ATTEND TO LEFT.  ELOS -  2-3 WEEKS    TEAM CONF- BED MAX 2. TRANSFERS MAX SQUAT PIVOT, USES LIFT WITH NURSING. SITTING BALANCE BETTER. LEFT INATTENTION. WHEELCHAIR MIN ASSIST WITH VISUAL CUES, DUE TO LEFT VISUAL INATTENTION/LEFT VISUAL FIELD CUT. STANDING AT PARALLEL BARS WITH  BLOCKING LEFT KNEE. TOILET TRANSFERS MAX 2 TO DROPARM. BATH MIN UB AND MAX X 2 LB. DRESSING MOD-MAX UB AND DEP X 2 LB. GROOMING MIN-SBA. TOILETING COMES UP TO STAND A LITTLE BETTER, DEP X 2.   IMPAIRED ATTENTION. DISTRACTIBLE. FATIGUES QUICKLY WITH COGNITIVE TASKS. MIN-MOD MEMORY DEFICITS. MOD-MAX VERBAL AND VISUAL CUES FOR READING AND WRITING TASKS. INCONTINENT BOWEL AND BLADDER. PVRS LOW. CHRONIC LEFT HIP PAIN.   ELOS -  ONE WEEK, WILL NEED SIGNIFICANT ASSISTANCE AT HOME.    TEAM CONF - FEB 9 - LEFT HEMIPLEGIA UNCHANGED. KINESIOTAPING LEFT SHOULDER.  BED MAX 2. TRANSFERS MAX ASSIST SQUAT PIVOT. NON-AMBULATORY. WHEELCHAIR 100 FEET MIN ASSIST DUE TO VISUAL DEFICITS. TOILET TRANSFERS MAX 1. SHOWER TRANSFERS MAX ASSIST TO BENCH. TOILETING DEP X 2. UBB MIN. LBB MOD OF 2. LBD MOD-MAX WITH MAX 2 FOR STANDNG. UBD MOD ASSIST FOR PULLOVER. GROOMING MIN-SBA. CONTINENT /INCONTINENT. DITROPAN FOR OVERACTIVE BLADDER. ABLE TO SIT FOR ABOUT 75 SECS. IMPAIRED ATTENTION. FATIGUES QUICKLY. DISTRACTIBLE. LEFT VISUAL FIELD CUT. MIN-MOD MEMORY DEFICITS. MOD-MAX VISUAL CUES FOR READING/WRITING TASKS.   ELOS -   FAMILY PLANS TO HIRE ADDITIONAL HELP FOR HOME. HOME ON FEB 16. FAMILY TEACHING NEEDED.     Azael Abdullahi MD  02/10/21  17:47 EST    During rounds, used appropriate personal protective equipment including mask and gloves.  Additional gown if indicated.  Mask used was standard procedure mask. Appropriate PPE was worn during the entire visit.  Hand hygiene was completed before and after.

## 2021-02-10 NOTE — PLAN OF CARE
Goal Outcome Evaluation:  Plan of Care Reviewed With: patient  Progress: improving  Outcome Summary: A&O with forgetfulness. Cont bowel and bladder. Using bedpan. Hard stool, but refused any stool softners other than what was scheduled. Meds whole with water. C/o left hip and back pain. See MAR for treatment. No unsafe behavior. Sleeping well this shift.

## 2021-02-10 NOTE — DISCHARGE PLACEMENT REQUEST
"Kathe Carrero (86 y.o. Female)     Date of Birth Social Security Number Address Home Phone MRN    1935  [id number] [facility] [address] [phone] [id number]    [facility] Marital Status          Unknown        Admission Date Admission Type Admitting Provider Attending Provider Department, Room/Bed    1/7/21 Elective Azael Abdullahi MD Gormley, John Michael, MD [facility], 4406/1    Discharge Date Discharge Disposition Discharge Destination                       Attending Provider: Azael Abdullahi MD    Allergies: Coreg [Carvedilol], Statins, Contrast Dye, Iodinated Diagnostic Agents, Iodine    Isolation: None   Infection: None   Code Status: CPR    Ht: 157.5 cm (62\")   Wt: 63.3 kg (139 lb 8.8 oz)    Admission Cmt: None   Principal Problem: None                Active Insurance as of 1/7/2021     Primary Coverage     Payor Plan Insurance Group Employer/Plan Group    MEDICARE MEDICARE A & B      Payor Plan Address Payor Plan Phone Number Payor Plan Fax Number Effective Dates    [address] [phone]  1/1/2002 - None Entered    [facility] [id number]       Subscriber Name Subscriber Birth Date Member ID       KATHE CARRERO 1935 [id number]           Secondary Coverage     Payor Plan Insurance Group Employer/Plan Group    [employer] SUP [employer]AR HE[employer] C[employer] OPTIONS      Payor Plan Address Payor Plan Phone Number Payor Plan Fax Number Effective Dates    [facility] [phone]  1/1/2019 - None Entered    [address]       [facility] [address]       Subscriber Name Subscriber Birth Date Member ID       KATHE CARRERO 1935 [id number]                 Emergency Contacts      (Rel.) Home Phone Work Phone Mobile Phone    RALPH JACOBSEN (Daughter) [phone] -- --    GLORIA BOWENS (Grandchild) -- -- [phone]    HEATH JACOBSEN (Grandchild) -- -- [phone]    JOHNIEMARCELA (Daughter) -- -- [phone]    BOSTON ADAIR (Other) " [phone] -- --

## 2021-02-10 NOTE — PLAN OF CARE
"Goal Outcome Evaluation:         Pt is alert and oriented, yet forgetful at times. Follows commands. Left side flaccid.   UA obtained today. Pt denies pain with urination and feeling of fullness. No odor to the urine. Left shoulder pain managed with [place]. Family conference today. Daughter states that she thought her \"stomach\" looked bigger yesterday. Remains unchanged from what RN saw on Monday. Pt denies abdomen pain with palpation or percussion.  No unsafe behavior noted.   "

## 2021-02-10 NOTE — THERAPY TREATMENT NOTE
Inpatient Rehabilitation - Physical Therapy Treatment Note       [facility]     Patient Name: Liana Carrero  : 1935  MRN: [id number]    Today's Date: 2/10/2021                    Admit Date: 2021      Visit Dx:     ICD-10-CM ICD-9-CM   1. Impaired functional mobility, balance, gait, and endurance  Z74.09 V49.89       Patient Active Problem List   Diagnosis   • Stroke (cerebrum) (CMS/[facility])   • Chest pain   • Hypertensive urgency   • Headache   • History of embolic stroke   • Hypothyroidism   • Hypokalemia   • CAD in native artery   • URTI (acute upper respiratory infection)   • Closed fracture of left hip (CMS/[facility])   • Chronic diastolic (congestive) heart failure (CMS/[facility])   • Coronary artery disease involving autologous vein coronary bypass graft without angina pectoris   • Impaired glucose tolerance   • Moderate malnutrition (CMS/[facility])   • Stroke (CMS/[facility])       Past Medical History:   Diagnosis Date   • Arthritis    • CHF (congestive heart failure) (CMS/[facility])    • Coronary artery disease    • Diabetes mellitus (CMS/[facility])     new DX related to stroke, started on insulin at hospital   • Disease of thyroid gland    • Elevated cholesterol    • GERD (gastroesophageal reflux disease)    • History of transfusion    • Hypertension    • PONV (postoperative nausea and vomiting)    • Sleep apnea    • Spinal headache    • Stroke (CMS/[facility])        Past Surgical History:   Procedure Laterality Date   • APPENDECTOMY     • CARDIAC CATHETERIZATION       x2   • CARDIAC CATHETERIZATION N/A 2019    Procedure: Left Heart Cath;  Surgeon: Bentley Chapin MD;  Location:  [facility] CATH INVASIVE [facility];  Service: Cardiovascular   • CARDIAC CATHETERIZATION N/A 2019    Procedure: Coronary angiography;  Surgeon: Bentley Chapin MD;  Location:  [facility];  Service: Cardiovascular   • CARDIAC CATHETERIZATION N/A 2019    Procedure: Native mammary injection;  Surgeon: Bentley Chapin MD;   Location:  [facility] CATH INVASIVE [facility];  Service: Cardiovascular   • CARDIAC CATHETERIZATION N/A 12/26/2019    Procedure: Saphenous Vein Graft;  Surgeon: Bentley Chapin MD;  Location: [facility] CATH INVASIVE LOCATION;  Service: Cardiovascular   • COLON SURGERY     • COLONOSCOPY     • EYE SURGERY      lens implants and cataract surgery   • FEMUR IM NAILING/RODDING Left 3/26/2020    Procedure: LT.HIP NAILING/RODDING;  Surgeon: Carlos Khan II, MD;  Location: [facility] MAIN OR;  Service: Orthopedics;  Laterality: Left;   • FRACTURE SURGERY Left 2020   • HYSTERECTOMY     • VASCULAR SURGERY      varicous veins          PT ASSESSMENT (last 12 hours)      IRF PT Evaluation and Treatment     Row Name 02/10/21 1159          PT Time and Intention    Document Type  daily treatment  -MS     Mode of Treatment  physical therapy  -MS     Patient/Family/Caregiver Comments/Observations  AM: sitting up in wheelchair, no acute distress noted; PM: sitting up in wheelchair, exit alarm on  -MS     Total Minutes, Physical Therapy  60  -MS     Row Name 02/10/21 1159          General Information    Existing Precautions/Restrictions  fall L jon  -MS     Row Name 02/10/21 1159          Vision Assessment/Intervention    Visual Field Deficit  homonymous hemianopsia, left  -MS     Visual Motor Impairment  visual tracking, left  -MS     Visual Processing Deficit  jon-inattention/neglect, left  -MS     Row Name 02/10/21 1159          Cognition/Psychosocial    Affect/Mental Status (Cognitive)  WFL  -MS     Orientation Status (Cognition)  oriented x 3  -MS     Follows Commands (Cognition)  follows two-step commands;repetition of directions required;verbal cues/prompting required  -MS     Personal Safety Interventions  fall prevention program maintained;gait belt;nonskid shoes/slippers when out of bed  -MS     Attention Deficit (Cognitive)  distractible in noisy environment  -MS     Executive Function Deficit (Cognition)   insight/awareness of deficits  -MS     Row Name 02/10/21 1159          Pain Scale: Numbers Pre/Post-Treatment    Pretreatment Pain Rating  9/10  -MS     Posttreatment Pain Rating  9/10  -MS     Pain Location - Orientation  lower  -MS     Pain Location  back  -MS     Row Name 02/10/21 1159          Transfer Assessment/Treatment    Transfers  car transfer  -MS     Comment (Transfers)  Morning session spent specifically on car transfer- multiple trials and techniques attempted. Sliding board unsuccessful but able to perform stand pivot with L knee block. Plan to continue to practice.  -MS     Row Name 02/10/21 1159          Transfers    Sit-Stand [place] (Transfers)  maximum assist (25% patient effort);2 person assist;verbal cues;nonverbal cues (demo/gesture)  -MS     Stand-Sit [place] (Transfers)  maximum assist (25% patient effort);2 person assist;verbal cues;nonverbal cues (demo/gesture)  -MS     Row Name 02/10/21 1159          Sit-Stand Transfer    Assistive Device (Sit-Stand Transfers)  -- jon bars  -MS     Row Name 02/10/21 1159          Stand-Sit Transfer    Assistive Device (Stand-Sit Transfers)  -- jon bars  -MS     Row Name 02/10/21 1159          Car Transfer    Type (Car Transfer)  sit-stand;stand-sit;squat pivot;lateral  -MS     [place] Level (Car Transfer)  maximum assist (25% patient effort);2 person assist;verbal cues;nonverbal cues (demo/gesture)  -MS     Assistive Device (Car Transfer)  wheelchair  -MS     Row Name 02/10/21 1159          Gait/Stairs (Locomotion)    [place] Level (Gait)  maximum assist (25% patient effort);2 person assist;verbal cues;nonverbal cues (demo/gesture)  -MS     Assistive Device (Gait)  -- jon bars  -MS     Distance in Feet (Gait)  7 steps  -MS     Pattern (Gait)  step-through  -MS     Deviations/Abnormal Patterns (Gait)  base of support, wide  -MS     Bilateral Gait Deviations  forward flexed posture;leans left  -MS     Left Sided Gait Deviations  foot  drop/toe drag;heel strike decreased  -MS     Right Sided Gait Deviations  weight shift ability decreased  -MS     Comment (Gait/Stairs)  Max tactile cues and assist with advancing L LE. LBP limiting gait. Third person assisting with wc for safety.  -MS     Row Name 02/10/21 1159          Wheelchair Mobility/Management    Method of Wheelchair Locomotion (Mobility)  jon propulsion, right sided  -MS     Forward Propulsion [place] (Wheelchair)  standby assist;verbal cues  -MS     Distance Propelled in Feet (Wheelchair)  50'  -MS     Armrest Management [place] (Wheelchair)  dependent (less than 25% patient effort)  -MS     Front Rigging/Swing-away Footrest Management [place] (Wheelchair)  dependent (less than 25% patient effort)  -MS     Wheel Locks/Brakes Management [place] (Wheelchair)  dependent (less than 25% patient effort)  -MS     Row Name 02/10/21 1159          Knee (Therapeutic Exercise)    Knee Strengthening (Therapeutic Exercise)  sitting;right;LAQ (long arc quad);10 repetitions;2 sets  -MS     Row Name 02/10/21 1159          Positioning and Restraints    Pre-Treatment Position  sitting in chair/recliner  -MS     Post Treatment Position  wheelchair  -MS     In Wheelchair  sitting;call light within reach;encouraged to call for assist AM and PM session  -MS       User Key  (r) = Recorded By, (t) = Taken By, (c) = Cosigned By    Initials Name Provider Type    MS SesarNati, PT Physical Therapist        Wound 01/07/21 Left upper chest Incision (Active)   Dressing Appearance dry;intact;no drainage 02/10/21 0800   Closure Liquid skin adhesive 02/10/21 0800   Base clean;dry 02/10/21 0800   Periwound intact 02/10/21 0800   Drainage Amount none 02/10/21 0800   Dressing Care open to air 02/10/21 0800     Physical Therapy Education                 Title: PT OT SLP Therapies (Done)     Topic: Physical Therapy (Done)     Point: Mobility training (Done)     Learning Progress Summary            Patient Acceptance, E,TB, VU,NR by MS at 2/10/2021 1401    Acceptance, E,TB, VU,NR by MS at 2/9/2021 1531    Eager, E,D, NR by KP at 2/9/2021 1119    Comment: standing posture, balance    Acceptance, E,TB, VU,NR by MS at 2/8/2021 1152    Acceptance, E,D, DU,NR by LB at 2/6/2021 1539    Comment: wt shift in standing    Acceptance, E,TB, VU,NR by MS at 2/5/2021 1208    Acceptance, E,TB, VU,NR by MS at 2/4/2021 1518    Acceptance, E,TB, VU,NR by MS at 2/3/2021 1151    Acceptance, E,TB, VU,NR by MS at 2/2/2021 1210    Acceptance, E,TB, NR by MS at 2/1/2021 1145    Acceptance, E, NR by LH at 1/30/2021 1336    Acceptance, E,TB, VU,NR by MS at 1/29/2021 1535    Acceptance, E, NR by LB1 at 1/28/2021 1032    Acceptance, E,TB, VU,NR by MS at 1/27/2021 1447    Acceptance, E,TB, VU,NR by MS at 1/26/2021 0959    Acceptance, E,TB, VU,NR by MS at 1/25/2021 1316    Acceptance, E,TB, VU,NR by MS at 1/22/2021 0918    Acceptance, E,TB, VU by MS at 1/20/2021 1544    Acceptance, E,TB, VU,NR by MS at 1/19/2021 1401    Acceptance, E,TB, NR,NL by MS at 1/18/2021 1432    Acceptance, E,TB, VU,NR by IVAN at 1/16/2021 1444    Acceptance, E,TB, NR,NL by MS at 1/15/2021 1418    Acceptance, E,TB, NR by MS at 1/14/2021 1223    Acceptance, E,TB, NR by LB2 at 1/13/2021 1030    Comment: sitting balance    Acceptance, E,TB, NR by MS at 1/12/2021 1312    Acceptance, E,TB, VU,NR by MS at 1/11/2021 1140                   Point: Home exercise program (Done)     Learning Progress Summary           Patient Acceptance, E,TB, VU,NR by MS at 2/10/2021 1401    Acceptance, E,TB, VU,NR by MS at 2/9/2021 1531    Acceptance, E,TB, VU,NR by MS at 2/8/2021 1152    Acceptance, E,TB, VU,NR by MS at 2/5/2021 1208    Acceptance, E,TB, VU,NR by MS at 2/4/2021 1518    Acceptance, E,TB, VU,NR by MS at 2/3/2021 1151    Acceptance, E,TB, VU,NR by MS at 2/2/2021 1210    Acceptance, E,TB, NR by MS at 2/1/2021 1145    Acceptance, E, NR by  at 1/30/2021 1336    Acceptance,  E,TB, VU,NR by MS at 1/29/2021 1535    Acceptance, E,TB, VU,NR by MS at 1/27/2021 1447    Acceptance, E,TB, VU,NR by MS at 1/26/2021 0959    Acceptance, E,TB, VU,NR by MS at 1/25/2021 1316    Acceptance, E,TB, VU,NR by MS at 1/22/2021 0918    Acceptance, E,TB, VU by MS at 1/20/2021 1544    Acceptance, E,TB, VU,NR by MS at 1/19/2021 1401    Acceptance, E,TB, NR,NL by MS at 1/18/2021 1432    Acceptance, E,TB, NR,NL by MS at 1/15/2021 1418    Acceptance, E,TB, NR by MS at 1/14/2021 1223                   Point: Body mechanics (Done)     Learning Progress Summary           Patient Acceptance, E,TB, VU,NR by MS at 2/10/2021 1401    Acceptance, E,TB, VU,NR by MS at 2/9/2021 1531    Acceptance, E,TB, VU,NR by MS at 2/8/2021 1152    Acceptance, E,TB, VU,NR by MS at 2/5/2021 1208    Acceptance, E,TB, VU,NR by MS at 2/4/2021 1518    Acceptance, E,TB, VU,NR by MS at 2/3/2021 1151    Acceptance, E,TB, VU,NR by MS at 2/2/2021 1210    Acceptance, E,TB, NR by MS at 2/1/2021 1145    Acceptance, E, NR by LH at 1/30/2021 1336    Acceptance, E,TB, VU,NR by MS at 1/29/2021 1535    Acceptance, E,TB, VU,NR by MS at 1/27/2021 1447    Acceptance, E,TB, VU,NR by MS at 1/26/2021 0959    Acceptance, E,TB, VU,NR by MS at 1/25/2021 1316    Acceptance, E,TB, VU,NR by MS at 1/22/2021 0918    Acceptance, E,TB, VU by MS at 1/20/2021 1544    Acceptance, E,TB, VU,NR by MS at 1/19/2021 1401    Acceptance, E,TB, NR,NL by MS at 1/15/2021 1418    Acceptance, E,TB, NR by MS at 1/14/2021 1223    Acceptance, E,TB, NR by MS at 1/12/2021 1312    Acceptance, E,TB, VU,NR by MS at 1/11/2021 1140                   Point: Precautions (Done)     Learning Progress Summary           Patient Acceptance, E,TB, VU,NR by MS at 2/10/2021 1401    Acceptance, E,TB, VU,NR by MS at 2/9/2021 1531    Acceptance, E,TB, VU,NR by MS at 2/8/2021 1152    Acceptance, E,TB, VU,NR by MS at 2/5/2021 1208    Acceptance, E,TB, VU,NR by MS at 2/4/2021 1518    Acceptance, E,TB, VU,NR by MS  at 2/3/2021 1151    Acceptance, E,TB, VU,NR by MS at 2/2/2021 1210    Acceptance, E,TB, NR by MS at 2/1/2021 1145    Acceptance, E, NR by  at 1/30/2021 1336    Acceptance, E,TB, VU,NR by MS at 1/29/2021 1535    Acceptance, E,TB, VU,NR by MS at 1/27/2021 1447    Acceptance, E,TB, VU,NR by MS at 1/26/2021 0959    Acceptance, E,TB, VU,NR by MS at 1/25/2021 1316    Acceptance, E,TB, VU,NR by MS at 1/22/2021 0918    Acceptance, E, VU by MD at 1/21/2021 0926    Acceptance, E,TB, VU by MS at 1/20/2021 1544    Acceptance, E,TB, VU,NR by MS at 1/19/2021 1401    Acceptance, E,TB, NR,NL by MS at 1/15/2021 1418    Acceptance, E,TB, NR by MS at 1/14/2021 1223    Acceptance, E,TB, NR by MS at 1/12/2021 1312    Acceptance, E,TB, VU,NR by MS at 1/11/2021 1140    Acceptance, E, VU by MD at 1/9/2021 0933                               User Key     Initials Effective Dates Name Provider Type Discipline    LB 06/08/18 -  Elba Zarate, PT Physical Therapist PT    IVAN 06/08/18 -  Ela Pritchett, PT Physical Therapist PT    LB2 04/03/18 -  Jazmin Schuster, PT Physical Therapist PT     04/03/18 -  Elba Myrick, PT Physical Therapist PT    LB1 03/07/18 -  Elba Grigsby, PTA Physical Therapy Assistant PT    MD 04/03/18 -  Lisandra Lowery, PT Physical Therapist PT     04/03/18 -  Dixie Graham, PT Physical Therapist PT    MS 03/04/19 -  Nati Kaba, PT Physical Therapist PT                PT Recommendation and Plan    Planned Therapy Interventions (PT): balance training, bed mobility training, gait training, home exercise program, postural re-education, patient/family education, neuromuscular re-education, strengthening, transfer training, wheelchair management/propulsion training, ROM (range of motion)  Frequency of Treatment (PT): 60 minutes per session  Anticipated Equipment Needs at Discharge (PT Eval): wheelchair(noted patient already has wc(?) will get details)     Patient was wearing a face mask during this therapy  encounter. Therapist used appropriate personal protective equipment including eye protection, mask, and gloves.  Mask used was standard procedure mask. Appropriate PPE was worn during the entire therapy session. Hand hygiene was completed before and after therapy session. Patient is not in enhanced droplet precautions.          Time Calculation:     PT Charges     Row Name 02/10/21 1355 02/10/21 1159          Time Calculation    Start Time  1330  -MS  1100  -MS     Stop Time  1400  -MS  1130  -MS     Time Calculation (min)  30 min  -MS  30 min  -MS     PT Received On  --  02/10/21  -MS     PT - Next Appointment  --  02/11/21  -MS       User Key  (r) = Recorded By, (t) = Taken By, (c) = Cosigned By    Initials Name Provider Type    Nati Quinteros, PT Physical Therapist          Therapy Charges for Today     Code Description Service Date Service Provider Modifiers Qty    45883380968  PT THER PROC EA 15 MIN 2/9/2021 Nati Kaba, PT GP 2    [id number]  PT THER PROC EA 15 MIN 2/10/2021 Nati Kaba, PT GP 2    [id number]  GAIT TRAINING EA 15 MIN 2/10/2021 Nati Kaba, PT GP 2    [id number]  PT THER SUPP EA 15 MIN 2/10/2021 Nati Kaba, PT GP 4                   Nati Kaba PT  2/10/2021

## 2021-02-10 NOTE — PROGRESS NOTES
Inpatient Rehabilitation Plan of Care Note    Plan of Care  Care Plan Reviewed - No updates at this time.    Psychosocial    Performed Intervention(s)  Verbalize needs and concerns  Medications as needed/ ordered  Therapeutic environmental set up      Safety    Performed Intervention(s)  Items with in reach and environmental set up  Bed alarm/ Chair alarm  Safety rounds and falls protocols/ precautions      Sphincter Control    Performed Intervention(s)  Proper diet and fluid intake  Medication as ordered  Incontinent care if needed  Monitor intake and output      Body Systems    Performed Intervention(s)  O2 2L/NC @ HS  assess lungs, sats every shift    Signed by: Kamla Franco RN

## 2021-02-10 NOTE — THERAPY TREATMENT NOTE
Inpatient Rehabilitation - Occupational Therapy Treatment Note    [facility]     Patient Name: Liana Carrero  : 1935  MRN: [id number]    Today's Date: 2/10/2021                 Admit Date: 2021         ICD-10-CM ICD-9-CM   1. Impaired functional mobility, balance, gait, and endurance  Z74.09 V49.89       Patient Active Problem List   Diagnosis   • Stroke (cerebrum) (CMS/[facility])   • Chest pain   • Hypertensive urgency   • Headache   • History of embolic stroke   • Hypothyroidism   • Hypokalemia   • CAD in native artery   • URTI (acute upper respiratory infection)   • Closed fracture of left hip (CMS/[facility])   • Chronic diastolic (congestive) heart failure (CMS/[facility])   • Coronary artery disease involving autologous vein coronary bypass graft without angina pectoris   • Impaired glucose tolerance   • Moderate malnutrition (CMS/[facility])   • Stroke (CMS/[facility])       Past Medical History:   Diagnosis Date   • Arthritis    • CHF (congestive heart failure) (CMS/[facility])    • Coronary artery disease    • Diabetes mellitus (CMS/[facility])     new DX related to stroke, started on insulin at hospital   • Disease of thyroid gland    • Elevated cholesterol    • GERD (gastroesophageal reflux disease)    • History of transfusion    • Hypertension    • PONV (postoperative nausea and vomiting)    • Sleep apnea    • Spinal headache    • Stroke (CMS/[facility])        Past Surgical History:   Procedure Laterality Date   • APPENDECTOMY     • CARDIAC CATHETERIZATION       x2   • CARDIAC CATHETERIZATION N/A 2019    Procedure: Left Heart Cath;  Surgeon: Bentley Chapin MD;  Location: [facility] CATH INVASIVE LOCATION;  Service: Cardiovascular   • CARDIAC CATHETERIZATION N/A 2019    Procedure: Coronary angiography;  Surgeon: Bentley Chapin MD;  Location: [facility] CATH INVASIVE LOCATION;  Service: Cardiovascular   • CARDIAC CATHETERIZATION N/A 2019    Procedure: Native mammary injection;  Surgeon: Bentley Chapin MD;  Location: [facility]  CATH INVASIVE LOCATION;  Service: Cardiovascular   • CARDIAC CATHETERIZATION N/A 12/26/2019    Procedure: Saphenous Vein Graft;  Surgeon: Bentley Chapin MD;  Location: [facility] CATH INVASIVE LOCATION;  Service: Cardiovascular   • COLON SURGERY     • COLONOSCOPY     • EYE SURGERY      lens implants and cataract surgery   • FEMUR IM NAILING/RODDING Left 3/26/2020    Procedure: LT.HIP NAILING/RODDING;  Surgeon: Carlos Khan II, MD;  Location: [facility] MAIN OR;  Service: Orthopedics;  Laterality: Left;   • FRACTURE SURGERY Left 2020   • HYSTERECTOMY     • VASCULAR SURGERY      varicous veins                IRF OT ASSESSMENT FLOWSHEET (last 12 hours)      IRF OT Evaluation and Treatment     Row Name 02/10/21 1315 02/10/21 1203       OT Time and Intention    Document Type  daily treatment  -AF  daily treatment  -CC    Mode of Treatment  occupational therapy  -AF  occupational therapy  -CC    Patient Effort  good  -AF  good  -CC    Row Name 02/10/21 1315          General Information    General Observations of Patient  pt supine in bed  -AF     Existing Precautions/Restrictions  fall L jon  -AF     Limitations/Impairments  safety/cognitive  -AF     Row Name 02/10/21 1315 02/10/21 1203       Vision Assessment/Intervention    Visual Impairment/Limitations  visual/perceptual impairments present  -AF  --    Visual Field Deficit  --  homonymous hemianopsia, left  -CC    Visual Motor Impairment  --  visual tracking, left  -CC    Visual Processing Deficit  --  jon-inattention/neglect, left unable to locate call light/ sitting on lapboard on left  -CC    Row Name 02/10/21 1315          Cognition/Psychosocial    Affect/Mental Status (Cognitive)  WFL  -AF     Orientation Status (Cognition)  oriented x 3  -AF     Follows Commands (Cognition)  follows two-step commands;increased processing time needed;verbal cues/prompting required;repetition of directions required  -AF     Personal Safety Interventions  fall prevention  program maintained;gait belt;nonskid shoes/slippers when out of bed  -AF     Attention Deficit (Cognitive)  distractible in noisy environment;divided attention  -AF     Executive Function Deficit (Cognition)  insight/awareness of deficits  -AF     Row Name 02/10/21 1203          Pain Scale: Numbers Pre/Post-Treatment    Pretreatment Pain Rating  4/10  -CC     Posttreatment Pain Rating  4/10  -CC     Pain Location - Side  Left  -CC     Pain Location  hand;shoulder  -CC     Pre/Posttreatment Pain Comment  repositioned   -CC     Row Name 02/10/21 1315          Pain Scale: FACES Pre/Post-Treatment    Pain: FACES Scale, Pretreatment  0-->no hurt  -AF     Posttreatment Pain Rating  2-->hurts little bit  -AF     Pain Location - Side  Left  -AF     Pain Location  shoulder  -AF     Pre/Posttreatment Pain Comment  with certian movements   -[facility] Name 02/10/21 1315          Bed Mobility    Supine-Sit [place] (Bed Mobility)  maximum assist (25% patient effort);verbal cues;nonverbal cues (demo/gesture)  -AF     Assistive Device (Bed Mobility)  bed rails  -AF     [facility] Name 02/10/21 1315          Transfer Assessment/Treatment    Comment (Transfers)  EOM <> w/c sit pivot MOD x 2  -AF     [facility] Name 02/10/21 1315          Transfers    Bed-Chair [place] (Transfers)  moderate assist (50% patient effort);2 person assist  -AF     Assistive Device (Bed-Chair Transfers)  wheelchair  -AF     [facility] Name 02/10/21 1315          Balance    Static Sitting Balance  mild impairment;sitting, edge of mat  -AF     Dynamic Sitting Balance  mild impairment;sitting, edge of mat;moderate impairment  -AF     [facility] Name 02/10/21 1315          Motor Skills    Motor Control/Coordination Interventions  weight-bearing activities  -[facility] Name 02/10/21 1315          Neuromuscular Re-education    Interventions (Neuromuscular Re-education)  weight bearing;weight shifting  -AF     Positioning (Neuromuscular Re-education)  sitting;unsupported on EOM   -AF     Comment (Neuromuscular Re-education)  seated on EOM weight shifting and WB on L UE in modified arrangement secodnary to shoulder pain, visual scanning to the L during color matching activity. applied one more kinesotaping piece to L shoulder to assist with shoulder stabilization and reduction of pain   -AF     Row Name 02/10/21 1203          Bathing    [place] Level (Bathing)  bathing skills;upper body;lower body;moderate assist (50% patient effort);verbal cues;nonverbal cues (demo/gesture)  -CC     Position (Bathing)  sink side;supported sitting;supported standing  -CC     Set-up Assistance (Bathing)  adjust water temperature;obtain supplies  -CC     Row Name 02/10/21 1203          Upper Body Dressing    [place] Level (Upper Body Dressing)  upper body dressing skills;doff;don;pull over garment;supervision;verbal cues;nonverbal cues (demo/gesture);minimum assist (75% or more patient effort)  -CC     Position (Upper Body Dressing)  supported sitting  -CC     Set-up Assistance (Upper Body Dressing)  obtain clothing  -CC     Row Name 02/10/21 1203          Lower Body Dressing    [place] Level (Lower Body Dressing)  doff;don;pants/bottoms;socks;moderate assist (50% patient effort);maximum assist (25% patient effort)  -CC     Position (Lower Body Dressing)  supported sitting;supported standing  -CC     Set-up Assistance (Lower Body Dressing)  obtain clothing  -CC     Row Name 02/10/21 1203          Grooming    [place] Level (Grooming)  grooming skills;deodorant application;hair care, combing/brushing;oral care regimen;wash face, hands;standby assist;minimum assist (75% patient effort)  -CC     Position (Grooming)  sink side;supported sitting  -CC     Set-up Assistance (Grooming)  obtain supplies  -CC     Row Name 02/10/21 1203          Transfer Goal 1 (OT-IRF)    Progress/Outcomes (Transfer Goal 1, OT-IRF)  goal ongoing  -CC     Row Name 02/10/21 1203          Transfer Goal 2 (OT-IRF)     [place] Level (Transfer Goal 2, OT-IRF)  moderate assist (50-74% patient effort)  -     Progress/Outcomes (Transfer Goal 2, OT-IRF)  goal revised this date;goal ongoing  -     Row Name 02/10/21 1203          Bathing Goal 1 (OT-IRF)    Progress/Outcomes (Bathing Goal 1, OT-IRF)  goal ongoing  -     Row Name 02/10/21 1203          Bathing Goal 2 (OT-IRF)    Progress/Outcomes (Bathing Goal 2, OT-IRF)  goal ongoing  -[facility] Name 02/10/21 1203          UB Dressing Goal 1 (OT-IRF)    Progress/Outcomes (UB Dressing Goal 1, OT-IRF)  goal partially met  -[facility] Name 02/10/21 1203          UB Dressing Goal 2 (OT-IRF)    Progress/Outcomes (UB Dressing Goal 2, OT-IRF)  unable to make needed progress  -CC     Row Name 02/10/21 1203          LB Dressing Goal 1 (OT-IRF)    Progress/Outcomes (LB Dressing Goal 1, OT-IRF)  goal ongoing  -CC     Row Name 02/10/21 1203          LB Dressing Goal 2 (OT-IRF)    Progress/Outcomes (LB Dressing Goal 2, OT-IRF)  goal no longer appropriate  -[facility] Name 02/10/21 1203          Grooming Goal 1 (OT-IRF)    Progress/Outcomes (Grooming Goal 1, OT-IRF)  goal met  -CC     Row Name 02/10/21 1203          Grooming Goal 2 (OT-IRF)    Progress/Outcomes (Grooming Goal 2, OT-IRF)  goal ongoing  -[facility] Name 02/10/21 1203          Toileting Goal 2 (OT-IRF)    Progress/Outcomes (Toileting Goal 2, OT-IRF)  goal ongoing  -[facility] Name 02/10/21 1203          Strength Goal 1 (OT-IRF)    Progress/Outcomes (Strength Goal 1, OT-IRF)  goal ongoing  -[facility] Name 02/10/21 1203          Strength Goal 2 (OT-IRF)    Progress/Outcomes (Strength Goal 2, OT-IRF)  goal ongoing  -[facility] Name 02/10/21 1203          Balance Goal 1 (OT)    Progress/Outcomes (Balance Goal 1, OT)  goal met  -[facility] Name 02/10/21 1203          Balance Goal 2 (OT)    Progress/Outcome (Balance Goal 2, OT)  goal met  -CC     Row Name 02/10/21 1203          Coordination Goal 1 (OT)    Progress/Outcomes  (Coordination Goal 1, OT)  goal no longer appropriate  -CC     Row Name 02/10/21 1203          Coordination Goal 2 (OT)    Progress/Outcomes (Coordination Goal 2, OT)  goal no longer appropriate  -CC     Row Name 02/10/21 1315 02/10/21 1203       Positioning and Restraints    Pre-Treatment Position  in bed  -AF  sitting in chair/recliner  -CC    Post Treatment Position  wheelchair  -AF  wheelchair  -CC    In Wheelchair  sitting;call light within reach;encouraged to call for assist;exit alarm on;notified nsg in room  -AF  sitting;call light within reach;encouraged to call for assist;exit alarm on  -CC      User Key  (r) = Recorded By, (t) = Taken By, (c) = Cosigned By    Initials Name Effective Dates    CC Keya Miranda, OTR 06/08/18 -     AF Brynn Rodriguez, OTR 04/14/20 -            Occupational Therapy Education                 Title: PT OT SLP Therapies (Done)     Topic: Occupational Therapy (Done)     Point: ADL training (Done)     Description:   Instruct learner(s) on proper safety adaptation and remediation techniques during self care or transfers.   Instruct in proper use of assistive devices.              Learning Progress Summary           Patient Acceptance, E,TB,D, VU by CC at 1/29/2021 1509    Comment: Pt repositioned in reg w/c with 1/2 arm tray on Left. Nursing notified of change from recliner w/c/. Commode tsf from w/c to droparm commode discussed and shown to NA.    Acceptance, E, VU by  at 1/8/2021 1434   Caregiver Acceptance, E,TB,D, VU by CC at 1/29/2021 1509    Comment: Pt repositioned in reg w/c with 1/2 arm tray on Left. Nursing notified of change from recliner w/c/. Commode tsf from w/c to droparm commode discussed and shown to NA.                   Point: Home exercise program (Done)     Description:   Instruct learner(s) on appropriate technique for monitoring, assisting and/or progressing therapeutic exercises/activities.              Learning Progress Summary           Patient  Acceptance, E, VU by BL at 1/8/2021 1434                   Point: Precautions (Done)     Description:   Instruct learner(s) on prescribed precautions during self-care and functional transfers.              Learning Progress Summary           Patient Acceptance, E, VU by BL at 1/8/2021 1434                   Point: Body mechanics (Done)     Description:   Instruct learner(s) on proper positioning and spine alignment during self-care, functional mobility activities and/or exercises.              Learning Progress Summary           Patient Acceptance, E,TB,D, VU by CC at 1/29/2021 1509    Comment: Pt repositioned in reg w/c with 1/2 arm tray on Left. Nursing notified of change from recliner w/c/. Commode tsf from w/c to droparm commode discussed and shown to NA.    Acceptance, E, VU by BL at 1/8/2021 1434   Caregiver Acceptance, E,TB,D, VU by CC at 1/29/2021 1509    Comment: Pt repositioned in reg w/c with 1/2 arm tray on Left. Nursing notified of change from recliner w/c/. Commode tsf from w/c to droparm commode discussed and shown to NA.                               User Key     Initials Effective Dates Name Provider Type Discipline     06/08/18 -  Keya Miranda OTR Occupational Therapist OT     01/05/21 -  Marshall Oleary OT Occupational Therapist OT                    OT Recommendation and Plan                         Time Calculation:     Time Calculation- OT     Row Name 02/10/21 1320 02/10/21 1000          Time Calculation- OT    OT Start Time  1230  -AF  1000  -CC     OT Stop Time  1300  -AF  1030  -CC     OT Time Calculation (min)  30 min  -AF  30 min  -CC     OT Non-Billable Time (min)  --  15 min tech  -CC       User Key  (r) = Recorded By, (t) = Taken By, (c) = Cosigned By    Initials Name Provider Type    CC Keya Miranda OTR Occupational Therapist    AF Brynn Rodriguez OTR Occupational Therapist        Therapy Charges for Today     Code Description Service Date Service Provider Modifiers  Qty    80771999908 HC OT THER SUPP EA 15 MIN 2/10/2021 Brynn Rodriguez, OTR GO 2    06577808023 HC OT SELF CARE/MGMT/TRAIN EA 15 MIN 2/10/2021 Brynn Rodriguez OTR GO 1    60191995812 HC OT NEUROMUSC RE EDUCATION EA 15 MIN 2/10/2021 Brynn Rodriguez, OTR GO 1                   GIAN Martínez  2/10/2021

## 2021-02-10 NOTE — PROGRESS NOTES
Inpatient Rehabilitation Plan of Care Note    Plan of Care  Care Plan Reviewed - No updates at this time.    Psychosocial    Performed Intervention(s)  Verbalize needs and concerns  Medications as needed/ ordered  Therapeutic environmental set up      Safety    Performed Intervention(s)  Items with in reach and environmental set up  Bed alarm/ Chair alarm  Safety rounds and falls protocols/ precautions      Sphincter Control    Performed Intervention(s)  Proper diet and fluid intake  Medication as ordered  Incontinent care if needed  Monitor intake and output      Body Systems    Performed Intervention(s)  O2 2L/NC @ HS  assess lungs, sats every shift    Signed by: Malena Mansfield RN

## 2021-02-10 NOTE — PROGRESS NOTES
Family conference held today with patient, PT, OT, ST, NSG, and SW present. Patient's daughter, Beatriz, and son, Corona, were on conference call. Discussed progress and d/c recommendations.  PT reported patient's sitting balance much improved. Bed mobility requires Max assist of 1-2. Transfers bed-chair require Max assist of one with stand-pivot transfer. PT did practice car transfer with patient today, which required Max assist of 2 people. PT will continue to practice car transfers so patient will be able to go home by car. Patient able to propel wheelchair but needs assistance and cues due to decreased visual field and only use of right side. PT has also worked with patient at [employer] doing weight bearing and assistance to take a few steps. Patient has sensation in left leg but still no active movement. PT recommended family prepare to have 2 people to assist patient with transfers at home.   OT reported patient requires Max assist of 1 for commode transfer and one person for equipment. Patient now able to hold sitting balance on commode. Patient does require 2 people to assist with toileting--one to help patient stand and one to do hygiene. Transfer to shower bench requires Max assist of 1-2 for safety. Patient able to bathe UE with Min assist. [place] LE with Max assist. Patient using jon technique for UE dressing and does better with pullover shirt. Patient able to get pants on right leg but needs help with dressing left leg. OT recommended patient wear briefs at home. Patient needs assistance with deodorant and dentures. Suggested getting denture brush. Patient able to do better with eating and locating food on left side of tray. OT reported patient has sensation in left arm but no active movement yet. Patient does have subluxation at left shoulder and OT has been taping to help keep in place. Discussed getting shoulder harness for home to help hold shoulder in place. OT doing weight bearing exercises to left  arm. OT recommended keeping patient on schedule at home similar to schedule here on rehab and to use daily planner.  ST reported patient is scanning better to left side but needs to look for bright line on left when reading to cue her. ST working on visual scanning tasks, memory and attention. Patient has decreased attention which impacts her memory and ability to follow directions for tasks. Patient does better if well rested vs when she is fatigued. ST reported patient does well with verbal reasoning and problem solving. Recommended family manage patient's medications and limit distractions if want patient to remember information.   NSG reported no problems with skin. Patient is incontinent at times, mostly at night, but usually continent during day with getting on commode every 2-3 hours. NSG sent urine sample today to check for UTI. Will scan bladder, per daughter's request, to see if patient is emptying bladder. Patient taking pain medication for hip and shoulder pain. NSG reported giving patient one pill at a time since has hard time seeing pills. Discussed follow ups with GI, PCP, neurology.   Discussed equipment for home. Will order wheelchair, cushion, drop arm BSC. OT to give information to family on ordering harness for shoulder and denture brush. Discussed options for tub bench or shower chair. Patient has walk in tub at home. Daughter to show OT pictures so can assist with proper seat to use. Daughter stated they lowered patient's be at home and rail is now on left side, so feel patient will do okay sleeping in own bed vs getting hospital bed.   Home PT, OT, ST, NSG recommended. Patient was current with [facility] agency. Will notify agency to resume services and d/c date.  Family teaching scheduled for Monday, 2/15. D/C plan is home with daughter on Tuesday, 2/16. Will assist with plans.

## 2021-02-10 NOTE — THERAPY TREATMENT NOTE
Inpatient Rehabilitation - Occupational Therapy Treatment Note    [facility]     Patient Name: Liana Carrero  : 1935  MRN: [id number]    Today's Date: 2/10/2021                 Admit Date: 2021         ICD-10-CM ICD-9-CM   1. Impaired functional mobility, balance, gait, and endurance  Z74.09 V49.89       Patient Active Problem List   Diagnosis   • Stroke (cerebrum) (CMS/[facility])   • Chest pain   • Hypertensive urgency   • Headache   • History of embolic stroke   • Hypothyroidism   • Hypokalemia   • CAD in native artery   • URTI (acute upper respiratory infection)   • Closed fracture of left hip (CMS/[facility])   • Chronic diastolic (congestive) heart failure (CMS/[facility])   • Coronary artery disease involving autologous vein coronary bypass graft without angina pectoris   • Impaired glucose tolerance   • Moderate malnutrition (CMS/[facility])   • Stroke (CMS/[facility])       Past Medical History:   Diagnosis Date   • Arthritis    • CHF (congestive heart failure) (CMS/[facility])    • Coronary artery disease    • Diabetes mellitus (CMS/[facility])     new DX related to stroke, started on insulin at hospital   • Disease of thyroid gland    • Elevated cholesterol    • GERD (gastroesophageal reflux disease)    • History of transfusion    • Hypertension    • PONV (postoperative nausea and vomiting)    • Sleep apnea    • Spinal headache    • Stroke (CMS/[facility])        Past Surgical History:   Procedure Laterality Date   • APPENDECTOMY     • CARDIAC CATHETERIZATION       x2   • CARDIAC CATHETERIZATION N/A 2019    Procedure: Left Heart Cath;  Surgeon: Bentley Chapin MD;  Location: [facility] CATH INVASIVE LOCATION;  Service: Cardiovascular   • CARDIAC CATHETERIZATION N/A 2019    Procedure: Coronary angiography;  Surgeon: Bentley Chapin MD;  Location: [facility] CATH INVASIVE LOCATION;  Service: Cardiovascular   • CARDIAC CATHETERIZATION N/A 2019    Procedure: Native mammary injection;  Surgeon: Bentley Chapin MD;  Location: [facility]  CATH INVASIVE LOCATION;  Service: Cardiovascular   • CARDIAC CATHETERIZATION N/A 12/26/2019    Procedure: Saphenous Vein Graft;  Surgeon: Bentley Chapin MD;  Location: [facility] CATH INVASIVE LOCATION;  Service: Cardiovascular   • COLON SURGERY     • COLONOSCOPY     • EYE SURGERY      lens implants and cataract surgery   • FEMUR IM NAILING/RODDING Left 3/26/2020    Procedure: LT.HIP NAILING/RODDING;  Surgeon: Carlos Khan II, MD;  Location: [facility] MAIN OR;  Service: Orthopedics;  Laterality: Left;   • FRACTURE SURGERY Left 2020   • HYSTERECTOMY     • VASCULAR SURGERY      varicous veins                IRF OT ASSESSMENT FLOWSHEET (last 12 hours)      IRF OT Evaluation and Treatment     Row Name 02/10/21 1315 02/10/21 1203       OT Time and Intention    Document Type  daily treatment  -AF  daily treatment  -CC    Mode of Treatment  occupational therapy  -AF  occupational therapy  -CC    Patient Effort  good  -AF  good  -CC    Row Name 02/10/21 1315          General Information    General Observations of Patient  pt supine in bed  -AF     Existing Precautions/Restrictions  fall L jon  -AF     Limitations/Impairments  safety/cognitive  -AF     Row Name 02/10/21 1315 02/10/21 1203       Vision Assessment/Intervention    Visual Impairment/Limitations  visual/perceptual impairments present  -AF  --    Visual Field Deficit  --  homonymous hemianopsia, left  -CC    Visual Motor Impairment  --  visual tracking, left  -CC    Visual Processing Deficit  --  jon-inattention/neglect, left unable to locate call light/ sitting on lapboard on left  -CC    Row Name 02/10/21 1315          Cognition/Psychosocial    Affect/Mental Status (Cognitive)  WFL  -AF     Orientation Status (Cognition)  oriented x 3  -AF     Follows Commands (Cognition)  follows two-step commands;increased processing time needed;verbal cues/prompting required;repetition of directions required  -AF     Personal Safety Interventions  fall prevention  program maintained;gait belt;nonskid shoes/slippers when out of bed  -AF     Attention Deficit (Cognitive)  distractible in noisy environment;divided attention  -AF     Executive Function Deficit (Cognition)  insight/awareness of deficits  -AF     Row Name 02/10/21 1203          Pain Scale: Numbers Pre/Post-Treatment    Pretreatment Pain Rating  4/10  -CC     Posttreatment Pain Rating  4/10  -CC     Pain Location - Side  Left  -CC     Pain Location  hand;shoulder  -CC     Pre/Posttreatment Pain Comment  repositioned   -CC     Row Name 02/10/21 1315          Pain Scale: FACES Pre/Post-Treatment    Pain: FACES Scale, Pretreatment  0-->no hurt  -AF     Posttreatment Pain Rating  2-->hurts little bit  -AF     Pain Location - Side  Left  -AF     Pain Location  shoulder  -AF     Pre/Posttreatment Pain Comment  with certian movements   -[facility] Name 02/10/21 1315          Bed Mobility    Supine-Sit [place] (Bed Mobility)  maximum assist (25% patient effort);verbal cues;nonverbal cues (demo/gesture)  -AF     Assistive Device (Bed Mobility)  bed rails  -AF     [facility] Name 02/10/21 1315          Transfer Assessment/Treatment    Comment (Transfers)  EOM <> w/c sit pivot MOD x 2  -AF     [facility] Name 02/10/21 1315          Transfers    Bed-Chair [place] (Transfers)  moderate assist (50% patient effort);2 person assist  -AF     Assistive Device (Bed-Chair Transfers)  wheelchair  -AF     [facility] Name 02/10/21 1315          Balance    Static Sitting Balance  mild impairment;sitting, edge of mat  -AF     Dynamic Sitting Balance  mild impairment;sitting, edge of mat;moderate impairment  -AF     [facility] Name 02/10/21 1315          Motor Skills    Motor Control/Coordination Interventions  weight-bearing activities  -[facility] Name 02/10/21 1315          Neuromuscular Re-education    Interventions (Neuromuscular Re-education)  weight bearing;weight shifting  -AF     Positioning (Neuromuscular Re-education)  sitting;unsupported on EOM   -AF     Comment (Neuromuscular Re-education)  seated on EOM weight shifting and WB on L UE in modified arrangement secodnary to shoulder pain, visual scanning to the L during color matching activity. applied one more kinesotaping piece to L shoulder to assist with shoulder stabilization and reduction of pain   -AF     Row Name 02/10/21 1203          Bathing    [place] Level (Bathing)  bathing skills;upper body;lower body;moderate assist (50% patient effort);verbal cues;nonverbal cues (demo/gesture)  -CC     Position (Bathing)  sink side;supported sitting;supported standing  -CC     Set-up Assistance (Bathing)  adjust water temperature;obtain supplies  -CC     Row Name 02/10/21 1203          Upper Body Dressing    [place] Level (Upper Body Dressing)  upper body dressing skills;doff;don;pull over garment;supervision;verbal cues;nonverbal cues (demo/gesture);minimum assist (75% or more patient effort)  -CC     Position (Upper Body Dressing)  supported sitting  -CC     Set-up Assistance (Upper Body Dressing)  obtain clothing  -CC     Row Name 02/10/21 1203          Lower Body Dressing    [place] Level (Lower Body Dressing)  doff;don;pants/bottoms;socks;moderate assist (50% patient effort);maximum assist (25% patient effort)  -CC     Position (Lower Body Dressing)  supported sitting;supported standing  -CC     Set-up Assistance (Lower Body Dressing)  obtain clothing  -CC     Row Name 02/10/21 1203          Grooming    [place] Level (Grooming)  grooming skills;deodorant application;hair care, combing/brushing;oral care regimen;wash face, hands;standby assist;minimum assist (75% patient effort)  -CC     Position (Grooming)  sink side;supported sitting  -CC     Set-up Assistance (Grooming)  obtain supplies  -CC     Row Name 02/10/21 1203          Transfer Goal 1 (OT-IRF)    Progress/Outcomes (Transfer Goal 1, OT-IRF)  goal ongoing  -CC     Row Name 02/10/21 1203          Transfer Goal 2 (OT-IRF)     [place] Level (Transfer Goal 2, OT-IRF)  moderate assist (50-74% patient effort)  -     Progress/Outcomes (Transfer Goal 2, OT-IRF)  goal revised this date;goal ongoing  -     Row Name 02/10/21 1203          Bathing Goal 1 (OT-IRF)    Progress/Outcomes (Bathing Goal 1, OT-IRF)  goal ongoing  -     Row Name 02/10/21 1203          Bathing Goal 2 (OT-IRF)    Progress/Outcomes (Bathing Goal 2, OT-IRF)  goal ongoing  -[facility] Name 02/10/21 1203          UB Dressing Goal 1 (OT-IRF)    Progress/Outcomes (UB Dressing Goal 1, OT-IRF)  goal partially met  -[facility] Name 02/10/21 1203          UB Dressing Goal 2 (OT-IRF)    Progress/Outcomes (UB Dressing Goal 2, OT-IRF)  unable to make needed progress  -CC     Row Name 02/10/21 1203          LB Dressing Goal 1 (OT-IRF)    Progress/Outcomes (LB Dressing Goal 1, OT-IRF)  goal ongoing  -CC     Row Name 02/10/21 1203          LB Dressing Goal 2 (OT-IRF)    Progress/Outcomes (LB Dressing Goal 2, OT-IRF)  goal no longer appropriate  -[facility] Name 02/10/21 1203          Grooming Goal 1 (OT-IRF)    Progress/Outcomes (Grooming Goal 1, OT-IRF)  goal met  -CC     Row Name 02/10/21 1203          Grooming Goal 2 (OT-IRF)    Progress/Outcomes (Grooming Goal 2, OT-IRF)  goal ongoing  -[facility] Name 02/10/21 1203          Toileting Goal 2 (OT-IRF)    Progress/Outcomes (Toileting Goal 2, OT-IRF)  goal ongoing  -[facility] Name 02/10/21 1203          Strength Goal 1 (OT-IRF)    Progress/Outcomes (Strength Goal 1, OT-IRF)  goal ongoing  -[facility] Name 02/10/21 1203          Strength Goal 2 (OT-IRF)    Progress/Outcomes (Strength Goal 2, OT-IRF)  goal ongoing  -[facility] Name 02/10/21 1203          Balance Goal 1 (OT)    Progress/Outcomes (Balance Goal 1, OT)  goal met  -[facility] Name 02/10/21 1203          Balance Goal 2 (OT)    Progress/Outcome (Balance Goal 2, OT)  goal met  -CC     Row Name 02/10/21 1203          Coordination Goal 1 (OT)    Progress/Outcomes  (Coordination Goal 1, OT)  goal no longer appropriate  -CC     Row Name 02/10/21 1203          Coordination Goal 2 (OT)    Progress/Outcomes (Coordination Goal 2, OT)  goal no longer appropriate  -CC     Row Name 02/10/21 1315 02/10/21 1203       Positioning and Restraints    Pre-Treatment Position  in bed  -AF  sitting in chair/recliner  -CC    Post Treatment Position  wheelchair  -AF  wheelchair  -CC    In Wheelchair  sitting;call light within reach;encouraged to call for assist;exit alarm on;notified nsg in room  -AF  sitting;call light within reach;encouraged to call for assist;exit alarm on  -CC      User Key  (r) = Recorded By, (t) = Taken By, (c) = Cosigned By    Initials Name Effective Dates    CC Keya Miranda, OTR 06/08/18 -     AF Brynn Rodriguez, OTR 04/14/20 -            Occupational Therapy Education                 Title: PT OT SLP Therapies (Done)     Topic: Occupational Therapy (Done)     Point: ADL training (Done)     Description:   Instruct learner(s) on proper safety adaptation and remediation techniques during self care or transfers.   Instruct in proper use of assistive devices.              Learning Progress Summary           Patient Acceptance, E, VU by CC at 2/10/2021 1546    Comment: Family conf w pt, dgt and son in law. Status, DME, follow up theray and home needs discussed. Pt will need a droparm commode and possibly shower seat. Will provide info on jon harness for LUE. Family scheduled for teaching Monday.    Acceptance, E, VU by SL at 2/10/2021 1504    Comment: family conference- discussed need for visual and verbal cues for visual perceptual deficits, and deficts in attn, memory    Acceptance, E,TB,D, VU by CC at 1/29/2021 1509    Comment: Pt repositioned in reg w/c with 1/2 arm tray on Left. Nursing notified of change from recliner w/c/. Commode tsf from w/c to droparm commode discussed and shown to NA.    Acceptance, E, VU by BL at 1/8/2021 1434   Family Acceptance, E, VU by CC  at 2/10/2021 1546    Comment: Family conf w pt, dgt and son in law. Status, DME, follow up theray and home needs discussed. Pt will need a droparm commode and possibly shower seat. Will provide info on jon harness for LUE. Family scheduled for teaching Monday.    Acceptance, E, VU by SL at 2/10/2021 1504    Comment: family conference- discussed need for visual and verbal cues for visual perceptual deficits, and deficts in attn, memory   Caregiver Acceptance, E,TB,D, VU by CC at 1/29/2021 1509    Comment: Pt repositioned in reg w/c with 1/2 arm tray on Left. Nursing notified of change from recliner w/c/. Commode tsf from w/c to droparm commode discussed and shown to NA.                   Point: Home exercise program (Done)     Description:   Instruct learner(s) on appropriate technique for monitoring, assisting and/or progressing therapeutic exercises/activities.              Learning Progress Summary           Patient Acceptance, E, VU by CC at 2/10/2021 1546    Comment: Family conf w pt, dgt and son in law. Status, DME, follow up theray and home needs discussed. Pt will need a droparm commode and possibly shower seat. Will provide info on jon harness for LUE. Family scheduled for teaching Monday.    Acceptance, E, VU by SL at 2/10/2021 1504    Comment: family conference- discussed need for visual and verbal cues for visual perceptual deficits, and deficts in attn, memory    Acceptance, E, VU by BL at 1/8/2021 1434   Family Acceptance, E, VU by CC at 2/10/2021 1546    Comment: Family conf w pt, dgt and son in law. Status, DME, follow up theray and home needs discussed. Pt will need a droparm commode and possibly shower seat. Will provide info on jon harness for LUE. Family scheduled for teaching Monday.    Acceptance, E, VU by SL at 2/10/2021 1504    Comment: family conference- discussed need for visual and verbal cues for visual perceptual deficits, and deficts in attn, memory                   Point:  Precautions (Done)     Description:   Instruct learner(s) on prescribed precautions during self-care and functional transfers.              Learning Progress Summary           Patient Acceptance, E, VU by CC at 2/10/2021 1546    Comment: Family conf w pt, dgt and son in law. Status, DME, follow up theray and home needs discussed. Pt will need a droparm commode and possibly shower seat. Will provide info on jon harness for LUE. Family scheduled for teaching Monday.    Acceptance, E, VU by SL at 2/10/2021 1504    Comment: family conference- discussed need for visual and verbal cues for visual perceptual deficits, and deficts in attn, memory    Acceptance, E, VU by BL at 1/8/2021 1434   Family Acceptance, E, VU by CC at 2/10/2021 1546    Comment: Family conf w pt, dgt and son in law. Status, DME, follow up theray and home needs discussed. Pt will need a droparm commode and possibly shower seat. Will provide info on jon harness for LUE. Family scheduled for teaching Monday.    Acceptance, E, VU by SL at 2/10/2021 1504    Comment: family conference- discussed need for visual and verbal cues for visual perceptual deficits, and deficts in attn, memory                   Point: Body mechanics (Done)     Description:   Instruct learner(s) on proper positioning and spine alignment during self-care, functional mobility activities and/or exercises.              Learning Progress Summary           Patient Acceptance, E, VU by CC at 2/10/2021 1546    Comment: Family conf w pt, dgt and son in law. Status, DME, follow up theray and home needs discussed. Pt will need a droparm commode and possibly shower seat. Will provide info on jon harness for LUE. Family scheduled for teaching Monday.    Acceptance, E, VU by SL at 2/10/2021 1504    Comment: family conference- discussed need for visual and verbal cues for visual perceptual deficits, and deficts in attn, memory    Acceptance, E,TB,D, VU by CC at 1/29/2021 1509    Comment: Pt  repositioned in reg w/c with 1/2 arm tray on Left. Nursing notified of change from recliner w/c/. Commode tsf from w/c to droparm commode discussed and shown to NA.    Acceptance, E, VU by BL at 1/8/2021 1434   Family Acceptance, E, VU by CC at 2/10/2021 1546    Comment: Family conf w pt, dgt and son in law. Status, DME, follow up theray and home needs discussed. Pt will need a droparm commode and possibly shower seat. Will provide info on jon harness for LUE. Family scheduled for teaching Monday.    Acceptance, E, VU by  at 2/10/2021 1504    Comment: family conference- discussed need for visual and verbal cues for visual perceptual deficits, and deficts in attn, memory   Caregiver Acceptance, E,TB,D, VU by CC at 1/29/2021 1509    Comment: Pt repositioned in reg w/c with 1/2 arm tray on Left. Nursing notified of change from recliner w/c/. Commode tsf from w/c to droparm commode discussed and shown to NA.                               User Key     Initials Effective Dates Name Provider Type Discipline     06/08/18 -  Keya Miranda OTR Occupational Therapist OT    SL 06/08/18 -  Katelyn James MS CCC-SLP Speech and Language Pathologist SLP     01/05/21 -  Marshall Oleary OT Occupational Therapist OT                    OT Recommendation and Plan                         Time Calculation:     Time Calculation- OT     Row Name 02/10/21 1320 02/10/21 1000          Time Calculation- OT    OT Start Time  1230  -AF  1000  -CC     OT Stop Time  1300  -AF  1030  -CC     OT Time Calculation (min)  30 min  -AF  30 min  -CC     OT Non-Billable Time (min)  --  15 min tech  -CC       User Key  (r) = Recorded By, (t) = Taken By, (c) = Cosigned By    Initials Name Provider Type    CC Keya Miranda OTR Occupational Therapist    Brynn Arcos OTR Occupational Therapist        Therapy Charges for Today     Code Description Service Date Service Provider Modifiers Qty    66184365900 HC OT THER SUPP EA 15 MIN 2/9/2021  Keya Miranda OTR GO 1    45753541744 HC OT SELF CARE/MGMT/TRAIN EA 15 MIN 2/9/2021 Keya Miranda OTR GO 2    13808863881 HC OT NEUROMUSC RE EDUCATION EA 15 MIN 2/9/2021 Keya Miranda OTR GO 2    59457551206 HC OT SELF CARE/MGMT/TRAIN EA 15 MIN 2/10/2021 Keya Miranda OTR GO 2    [id number] HC OT THER SUPP EA 15 MIN 2/10/2021 Keya Miranda OTR GO 1                   GIAN Blackwell  2/10/2021

## 2021-02-10 NOTE — THERAPY TREATMENT NOTE
Inpatient Rehabilitation - Occupational Therapy Treatment Note    [facility]     Patient Name: Liana Carrero  : 1935  MRN: [id number]    Today's Date: 2/10/2021                 Admit Date: 2021         ICD-10-CM ICD-9-CM   1. Impaired functional mobility, balance, gait, and endurance  Z74.09 V49.89       Patient Active Problem List   Diagnosis   • Stroke (cerebrum) (CMS/[facility])   • Chest pain   • Hypertensive urgency   • Headache   • History of embolic stroke   • Hypothyroidism   • Hypokalemia   • CAD in native artery   • URTI (acute upper respiratory infection)   • Closed fracture of left hip (CMS/[facility])   • Chronic diastolic (congestive) heart failure (CMS/[facility])   • Coronary artery disease involving autologous vein coronary bypass graft without angina pectoris   • Impaired glucose tolerance   • Moderate malnutrition (CMS/[facility])   • Stroke (CMS/[facility])       Past Medical History:   Diagnosis Date   • Arthritis    • CHF (congestive heart failure) (CMS/[facility])    • Coronary artery disease    • Diabetes mellitus (CMS/[facility])     new DX related to stroke, started on insulin at hospital   • Disease of thyroid gland    • Elevated cholesterol    • GERD (gastroesophageal reflux disease)    • History of transfusion    • Hypertension    • PONV (postoperative nausea and vomiting)    • Sleep apnea    • Spinal headache    • Stroke (CMS/[facility])        Past Surgical History:   Procedure Laterality Date   • APPENDECTOMY     • CARDIAC CATHETERIZATION       x2   • CARDIAC CATHETERIZATION N/A 2019    Procedure: Left Heart Cath;  Surgeon: Bentley Chapin MD;  Location: [facility] CATH INVASIVE LOCATION;  Service: Cardiovascular   • CARDIAC CATHETERIZATION N/A 2019    Procedure: Coronary angiography;  Surgeon: Bentley Chapin MD;  Location: [facility] CATH INVASIVE LOCATION;  Service: Cardiovascular   • CARDIAC CATHETERIZATION N/A 2019    Procedure: Native mammary injection;  Surgeon: Bentley Chapin MD;  Location: [facility]  CATH INVASIVE LOCATION;  Service: Cardiovascular   • CARDIAC CATHETERIZATION N/A 12/26/2019    Procedure: Saphenous Vein Graft;  Surgeon: Bentley Chapin MD;  Location: [facility] CATH INVASIVE LOCATION;  Service: Cardiovascular   • COLON SURGERY     • COLONOSCOPY     • EYE SURGERY      lens implants and cataract surgery   • FEMUR IM NAILING/RODDING Left 3/26/2020    Procedure: LT.HIP NAILING/RODDING;  Surgeon: Carlos Khan II, MD;  Location: [facility] MAIN OR;  Service: Orthopedics;  Laterality: Left;   • FRACTURE SURGERY Left 2020   • HYSTERECTOMY     • VASCULAR SURGERY      varicous veins                IRF OT ASSESSMENT FLOWSHEET (last 12 hours)      IRF OT Evaluation and Treatment     Row Name 02/10/21 1203          OT Time and Intention    Document Type  daily treatment  -CC     Mode of Treatment  occupational therapy  -CC     Patient Effort  good  -CC     Row Name 02/10/21 1203          Vision Assessment/Intervention    Visual Field Deficit  homonymous hemianopsia, left  -CC     Visual Motor Impairment  visual tracking, left  -CC     Visual Processing Deficit  jon-inattention/neglect, left unable to locate call light/ sitting on lapboard on left  -CC     Row Name 02/10/21 1203          Pain Scale: Numbers Pre/Post-Treatment    Pretreatment Pain Rating  4/10  -CC     Posttreatment Pain Rating  4/10  -CC     Pain Location - Side  Left  -CC     Pain Location  hand;shoulder  -CC     Pre/Posttreatment Pain Comment  repositioned   -     Row Name 02/10/21 1203          Bathing    [place] Level (Bathing)  bathing skills;upper body;lower body;moderate assist (50% patient effort);verbal cues;nonverbal cues (demo/gesture)  -CC     Position (Bathing)  sink side;supported sitting;supported standing  -     Set-up Assistance (Bathing)  adjust water temperature;obtain supplies  -CC     Row Name 02/10/21 1203          Upper Body Dressing    [place] Level (Upper Body Dressing)  upper body dressing  skills;doff;don;pull over garment;supervision;verbal cues;nonverbal cues (demo/gesture);minimum assist (75% or more patient effort)  -CC     Position (Upper Body Dressing)  supported sitting  -CC     Set-up Assistance (Upper Body Dressing)  obtain clothing  -CC     Row Name 02/10/21 1203          Lower Body Dressing    [place] Level (Lower Body Dressing)  doff;don;pants/bottoms;socks;moderate assist (50% patient effort);maximum assist (25% patient effort)  -CC     Position (Lower Body Dressing)  supported sitting;supported standing  -CC     Set-up Assistance (Lower Body Dressing)  obtain clothing  -CC     Row Name 02/10/21 1203          Grooming    [place] Level (Grooming)  grooming skills;deodorant application;hair care, combing/brushing;oral care regimen;wash face, hands;standby assist;minimum assist (75% patient effort)  -CC     Position (Grooming)  sink side;supported sitting  -CC     Set-up Assistance (Grooming)  obtain supplies  -     Row Name 02/10/21 1203          Transfer Goal 1 (OT-IRF)    Progress/Outcomes (Transfer Goal 1, OT-IRF)  goal ongoing  -     Row Name 02/10/21 1203          Transfer Goal 2 (OT-IRF)    [place] Level (Transfer Goal 2, OT-IRF)  moderate assist (50-74% patient effort)  -CC     Progress/Outcomes (Transfer Goal 2, OT-IRF)  goal revised this date;goal ongoing  -     Row Name 02/10/21 1203          Bathing Goal 1 (OT-IRF)    Progress/Outcomes (Bathing Goal 1, OT-IRF)  goal ongoing  -     Row Name 02/10/21 1203          Bathing Goal 2 (OT-IRF)    Progress/Outcomes (Bathing Goal 2, OT-IRF)  goal ongoing  -     Row Name 02/10/21 1203          UB Dressing Goal 1 (OT-IRF)    Progress/Outcomes (UB Dressing Goal 1, OT-IRF)  goal partially met  -     Row Name 02/10/21 1203          UB Dressing Goal 2 (OT-IRF)    Progress/Outcomes (UB Dressing Goal 2, OT-IRF)  unable to make needed progress  -     Row Name 02/10/21 1203          LB Dressing Goal 1 (OT-IRF)     Progress/Outcomes (LB Dressing Goal 1, OT-IRF)  goal ongoing  -     Row Name 02/10/21 1203          LB Dressing Goal 2 (OT-IRF)    Progress/Outcomes (LB Dressing Goal 2, OT-IRF)  goal no longer appropriate  -     Row Name 02/10/21 1203          Grooming Goal 1 (OT-IRF)    Progress/Outcomes (Grooming Goal 1, OT-IRF)  goal met  -     Row Name 02/10/21 1203          Grooming Goal 2 (OT-IRF)    Progress/Outcomes (Grooming Goal 2, OT-IRF)  goal ongoing  -     Row Name 02/10/21 1203          Toileting Goal 2 (OT-IRF)    Progress/Outcomes (Toileting Goal 2, OT-IRF)  goal ongoing  -     Row Name 02/10/21 1203          Strength Goal 1 (OT-IRF)    Progress/Outcomes (Strength Goal 1, OT-IRF)  goal ongoing  -     Row Name 02/10/21 1203          Strength Goal 2 (OT-IRF)    Progress/Outcomes (Strength Goal 2, OT-IRF)  goal ongoing  -     Row Name 02/10/21 1203          Balance Goal 1 (OT)    Progress/Outcomes (Balance Goal 1, OT)  goal met  -     Row Name 02/10/21 1203          Balance Goal 2 (OT)    Progress/Outcome (Balance Goal 2, OT)  goal met  -     Row Name 02/10/21 1203          Coordination Goal 1 (OT)    Progress/Outcomes (Coordination Goal 1, OT)  goal no longer appropriate  -     Row Name 02/10/21 1203          Coordination Goal 2 (OT)    Progress/Outcomes (Coordination Goal 2, OT)  goal no longer appropriate  -     Row Name 02/10/21 1203          Positioning and Restraints    Pre-Treatment Position  sitting in chair/recliner  -CC     Post Treatment Position  wheelchair  -CC     In Wheelchair  sitting;call light within reach;encouraged to call for assist;exit alarm on  -CC       User Key  (r) = Recorded By, (t) = Taken By, (c) = Cosigned By    Initials Name Effective Dates    CC Keya Miranda, OTR 06/08/18 -            Occupational Therapy Education                 Title: PT OT SLP Therapies (Done)     Topic: Occupational Therapy (Done)     Point: ADL training (Done)     Description:   Instruct  learner(s) on proper safety adaptation and remediation techniques during self care or transfers.   Instruct in proper use of assistive devices.              Learning Progress Summary           Patient Acceptance, E,TB,D, VU by CC at 1/29/2021 1509    Comment: Pt repositioned in reg w/c with 1/2 arm tray on Left. Nursing notified of change from recliner w/c/. Commode tsf from w/c to droparm commode discussed and shown to NA.    Acceptance, E, VU by BL at 1/8/2021 1434   Caregiver Acceptance, E,TB,D, VU by CC at 1/29/2021 1509    Comment: Pt repositioned in reg w/c with 1/2 arm tray on Left. Nursing notified of change from recliner w/c/. Commode tsf from w/c to droparm commode discussed and shown to NA.                   Point: Home exercise program (Done)     Description:   Instruct learner(s) on appropriate technique for monitoring, assisting and/or progressing therapeutic exercises/activities.              Learning Progress Summary           Patient Acceptance, E, VU by BL at 1/8/2021 1434                   Point: Precautions (Done)     Description:   Instruct learner(s) on prescribed precautions during self-care and functional transfers.              Learning Progress Summary           Patient Acceptance, E, VU by BL at 1/8/2021 1434                   Point: Body mechanics (Done)     Description:   Instruct learner(s) on proper positioning and spine alignment during self-care, functional mobility activities and/or exercises.              Learning Progress Summary           Patient Acceptance, E,TB,D, VU by CC at 1/29/2021 1509    Comment: Pt repositioned in reg w/c with 1/2 arm tray on Left. Nursing notified of change from recliner w/c/. Commode tsf from w/c to droparm commode discussed and shown to NA.    Acceptance, E, VU by BL at 1/8/2021 1434   Caregiver Acceptance, E,TB,D, VU by CC at 1/29/2021 1509    Comment: Pt repositioned in reg w/c with 1/2 arm tray on Left. Nursing notified of change from recliner  w/c/. Commode tsf from w/c to droparm commode discussed and shown to NA.                               User Key     Initials Effective Dates Name Provider Type Discipline    CC 06/08/18 -  Keya Miranda OTR Occupational Therapist OT    BL 01/05/21 -  Marshall Oleary OT Occupational Therapist OT                    OT Recommendation and Plan                         Time Calculation:     Time Calculation- OT     Row Name 02/10/21 1000             Time Calculation- OT    OT Start Time  1000  -CC      OT Stop Time  1030  -CC      OT Time Calculation (min)  30 min  -CC      OT Non-Billable Time (min)  15 min tech  -CC        User Key  (r) = Recorded By, (t) = Taken By, (c) = Cosigned By    Initials Name Provider Type    CC Keya Miranda OTR Occupational Therapist        Therapy Charges for Today     Code Description Service Date Service Provider Modifiers Qty    01603395542 HC OT THER SUPP EA 15 MIN 2/9/2021 Keya Miranda OTR GO 1    68699070827 HC OT SELF CARE/MGMT/TRAIN EA 15 MIN 2/9/2021 Keya Miranda OTR GO 2    32812623471 HC OT NEUROMUSC RE EDUCATION EA 15 MIN 2/9/2021 Keya Miranda OTR GO 2    16288352544 HC OT SELF CARE/MGMT/TRAIN EA 15 MIN 2/10/2021 Keya Miranda OTR GO 2    00837226660 HC OT THER SUPP EA 15 MIN 2/10/2021 Keya Miranda OTR GO 1                   GIAN Blackwell  2/10/2021

## 2021-02-10 NOTE — THERAPY TREATMENT NOTE
Inpatient Rehabilitation - Speech Language Pathology Treatment Note    [facility]     Patient Name: Liana Carrero  : 1935  MRN: [id number]    Today's Date: 2/10/2021                   Admit Date: 2021       Visit Dx:      ICD-10-CM ICD-9-CM   1. Impaired functional mobility, balance, gait, and endurance  Z74.09 V49.89       Patient Active Problem List   Diagnosis   • Stroke (cerebrum) (CMS/[facility])   • Chest pain   • Hypertensive urgency   • Headache   • History of embolic stroke   • Hypothyroidism   • Hypokalemia   • CAD in native artery   • URTI (acute upper respiratory infection)   • Closed fracture of left hip (CMS/[facility])   • Chronic diastolic (congestive) heart failure (CMS/[facility])   • Coronary artery disease involving autologous vein coronary bypass graft without angina pectoris   • Impaired glucose tolerance   • Moderate malnutrition (CMS/[facility])   • Stroke (CMS/H[facility])       Past Medical History:   Diagnosis Date   • Arthritis    • CHF (congestive heart failure) (CMS/[facility])    • Coronary artery disease    • Diabetes mellitus (CMS/[facility])     new DX related to stroke, started on insulin at hospital   • Disease of thyroid gland    • Elevated cholesterol    • GERD (gastroesophageal reflux disease)    • History of transfusion    • Hypertension    • PONV (postoperative nausea and vomiting)    • Sleep apnea    • Spinal headache    • Stroke (CMS/[facility])        Past Surgical History:   Procedure Laterality Date   • APPENDECTOMY     • CARDIAC CATHETERIZATION       x2   • CARDIAC CATHETERIZATION N/A 2019    Procedure: Left Heart Cath;  Surgeon: Bentley Chapin MD;  Location:  [facility] CATH INVASIVE LOCATION;  Service: Cardiovascular   • CARDIAC CATHETERIZATION N/A 2019    Procedure: Coronary angiography;  Surgeon: Bentley Chapin MD;  Location:  [facility] CATH INVASIVE LOCATION;  Service: Cardiovascular   • CARDIAC CATHETERIZATION N/A 2019    Procedure: Native mammary injection;  Surgeon: Bentley Chapin MD;   Location:  [facility] CATH INVASIVE LOCATION;  Service: Cardiovascular   • CARDIAC CATHETERIZATION N/A 12/26/2019    Procedure: Saphenous Vein Graft;  Surgeon: Bentley Chapin MD;  Location: [facility] CATH INVASIVE LOCATION;  Service: Cardiovascular   • COLON SURGERY     • COLONOSCOPY     • EYE SURGERY      lens implants and cataract surgery   • FEMUR IM NAILING/RODDING Left 3/26/2020    Procedure: LT.HIP NAILING/RODDING;  Surgeon: Carlos Khan II, MD;  Location: [facility] MAIN OR;  Service: Orthopedics;  Laterality: Left;   • FRACTURE SURGERY Left 2020   • HYSTERECTOMY     • VASCULAR SURGERY      varicous veins          SLP EVALUATION (last 72 hours)      SLP SLC Evaluation     Row Name 02/10/21 1100 02/09/21 1100 02/08/21 1053             Communication Assessment/Intervention    Document Type  therapy note (daily note)  -SL  therapy note (daily note)  -SL  therapy note (daily note)  -SL      Subjective Information  no complaints  -SL  complains of shoulder pain  -SL  complains of;fatigue  -SL      Patient Observations  alert;cooperative  -SL  cooperative  -SL  --      Patient Effort  good  -SL  adequate  -SL  adequate  -SL      Symptoms Noted During/After Treatment  none  -SL  none  -SL  none  -SL        User Key  (r) = Recorded By, (t) = Taken By, (c) = Cosigned By    Initials Name Effective Dates    SL Katelyn James MS CCC-SLP 06/08/18 -              EDUCATION    The patient has been educated in the following areas:       Cognitive Impairment.      SLP Recommendation and Plan                                                  SLP GOALS     Row Name 02/10/21 1100 02/09/21 1100 02/08/21 1051       Attention Goal 1 (SLP)    Progress (Attention Goal 1, SLP)  --  60%;with minimal cues (75-90%);with moderate cues (50-74%) locating difference between 2 picture scenes  -  --    Progress/Outcomes (Attention Goal 1, SLP)  --  goal ongoing  -  --       Memory Skills Goal 1 (SLP)    Progress (Memory Skills Goal 1, SLP)   "80%;90%;independently (over 90% accuracy) 5 word stimulus- word placement recall task  -SL  70%;independently (over 90% accuracy) immed recall- shrot paragrpahs/stories  -SL  100%;independently (over 90% accuracy) 3 word stimulus- mental manip task- progressions  -SL    Progress/Outcomes (Memory Skills Goal 1, SLP)  goal ongoing  -SL  goal ongoing  -SL  goal ongoing  -SL    Comment (Memory Skills Goal 1, SLP)  70% for paragraph recall  -SL  --  80% for reclal of short 3-4 sent instructions  -SL       Organizational Skills Goal 1 (SLP)    Progress (Thought Organization Skills Goal 1, SLP)  --  --  90%;independently (over 90% accuracy) 4 category sorting task  -SL    Progress/Outcomes (Thought Organization Skills Goal 1, SLP)  --  --  goal ongoing  -SL       Reasoning Goal 1 (SLP)    Progress (Reasoning Goal 1, SLP)  80%;independently (over 90% accuracy);with minimal cues (75-90%) verbal analogies  -SL  60%;70%;with minimal cues (75-90%);with moderate cues (50-74%) ordering items by weight  -SL  20%;30%;independently (over 90% accuracy) word formation with choice of 3 letters  -SL    Progress/Outcomes (Reasoning Goal 1, SLP)  goal ongoing  -SL  goal ongoing  -SL  goal ongoing  -SL    Comment (Reasoning Goal 1, SLP)  50% for visual 'odd man out\" task  -SL  --  60% for inferences form stories  -SL       Right Hemisphere Function Goal 1 (SLP)    Barriers (Right Hemisphere Function Goal 1, SLP)  --  --  min to mod cues for am task of reading 3 columns of words and placing in appropriate category up above ( 4 categories) with visual cues on left  -SL    Progress (Right Hemisphere Function Goal 1, SLP)  with moderate cues (50-74%)  -SL  with moderate cues (50-74%);with maximum cues (25-49%)  -SL  with moderate cues (50-74%)  -SL    Progress/Outcomes (Right Hemisphere Function Goal 1, SLP)  goal ongoing  -SL  goal ongoing  -SL  goal ongoing  -SL    Comment (Right Hemisphere Function Goal 1, SLP)  visual and mod verbal cues- " 50% for letter placement in boxes to form words;    50% for returning to left side during oral reading of paragraph with visual cues  -  mod visual and verbal cues to scan up/down and left/right for seq/ordering task;  mod cues to attend to left for numerical trailmaking task- 60% accurate with min cues, 100% with mod cues  -  40% for filling in blanks in line for word formation with visual cue son left;  reading a paragraph- 50% spont for retruning to left side with colored line- 90% with MOD/MAX cues;   -      User Key  (r) = Recorded By, (t) = Taken By, (c) = Cosigned By    Initials Name Provider Type    Katelyn Fan MS CCC-SLP Speech and Language Pathologist                      Time Calculation:       Time Calculation- SLP     Row Name 02/10/21 1148 02/10/21 1147          Time Calculation- SLP    SLP Start Time  1030  -SL  0830  -     SLP Stop Time  1100  -SL  0900  -     SLP Time Calculation (min)  30 min  -  30 min  -       User Key  (r) = Recorded By, (t) = Taken By, (c) = Cosigned By    Initials Name Provider Type    Katelyn Fan MS CCC-SLP Speech and Language Pathologist            Therapy Charges for Today     Code Description Service Date Service Provider Modifiers Qty    97528219614 HC ST DEV OF COGN SKILLS INITIAL 15 MIN 2/9/2021 Katelyn James MS CCC-SLP  1    53750822419 HC ST DEV OF COGN SKILLS EACH ADDT'L 15 MIN 2/9/2021 Katelyn James MS CCC-SLP  3    07442063829 HC ST DEV OF COGN SKILLS INITIAL 15 MIN 2/10/2021 Katelyn James MS CCC-SLP  3    76648497177 HC ST DEV OF COGN SKILLS EACH ADDT'L 15 MIN 2/10/2021 Katelyn James MS CCC-SLP  1                           Katelyn James MS CCC-SLP  2/10/2021

## 2021-02-11 PROCEDURE — 97530 THERAPEUTIC ACTIVITIES: CPT

## 2021-02-11 PROCEDURE — 97130 THER IVNTJ EA ADDL 15 MIN: CPT

## 2021-02-11 PROCEDURE — 97112 NEUROMUSCULAR REEDUCATION: CPT

## 2021-02-11 PROCEDURE — 63710000001 ONDANSETRON ODT 4 MG TABLET DISPERSIBLE: Performed by: INTERNAL MEDICINE

## 2021-02-11 PROCEDURE — 97535 SELF CARE MNGMENT TRAINING: CPT

## 2021-02-11 PROCEDURE — 97129 THER IVNTJ 1ST 15 MIN: CPT

## 2021-02-11 PROCEDURE — 25010000002 ENOXAPARIN PER 10 MG: Performed by: PHYSICAL MEDICINE & REHABILITATION

## 2021-02-11 PROCEDURE — 97112 NEUROMUSCULAR REEDUCATION: CPT | Performed by: OCCUPATIONAL THERAPIST

## 2021-02-11 RX ADMIN — DOCUSATE SODIUM 200 MG: 100 CAPSULE, LIQUID FILLED ORAL at 20:52

## 2021-02-11 RX ADMIN — FERROUS SULFATE TAB 325 MG (65 MG ELEMENTAL FE) 325 MG: 325 (65 FE) TAB at 07:56

## 2021-02-11 RX ADMIN — TRAMADOL HYDROCHLORIDE 50 MG: 50 TABLET ORAL at 05:59

## 2021-02-11 RX ADMIN — ONDANSETRON 4 MG: 4 TABLET, ORALLY DISINTEGRATING ORAL at 05:59

## 2021-02-11 RX ADMIN — OXYBUTYNIN CHLORIDE 5 MG: 5 TABLET ORAL at 17:36

## 2021-02-11 RX ADMIN — TRAMADOL HYDROCHLORIDE 50 MG: 50 TABLET ORAL at 17:37

## 2021-02-11 RX ADMIN — MIRTAZAPINE 15 MG: 15 TABLET, FILM COATED ORAL at 20:52

## 2021-02-11 RX ADMIN — DILTIAZEM HYDROCHLORIDE 180 MG: 180 CAPSULE, COATED, EXTENDED RELEASE ORAL at 20:52

## 2021-02-11 RX ADMIN — ASPIRIN 81 MG: 81 TABLET, CHEWABLE ORAL at 07:57

## 2021-02-11 RX ADMIN — PANTOPRAZOLE SODIUM 40 MG: 40 TABLET, DELAYED RELEASE ORAL at 05:59

## 2021-02-11 RX ADMIN — HYDROCODONE BITARTRATE AND ACETAMINOPHEN 1 TABLET: 5; 325 TABLET ORAL at 03:00

## 2021-02-11 RX ADMIN — ONDANSETRON 4 MG: 4 TABLET, ORALLY DISINTEGRATING ORAL at 11:52

## 2021-02-11 RX ADMIN — HYDROCODONE BITARTRATE AND ACETAMINOPHEN 1 TABLET: 5; 325 TABLET ORAL at 20:52

## 2021-02-11 RX ADMIN — LIDOCAINE 1 PATCH: 50 PATCH TOPICAL at 07:57

## 2021-02-11 RX ADMIN — LOSARTAN POTASSIUM 50 MG: 50 TABLET, FILM COATED ORAL at 07:56

## 2021-02-11 RX ADMIN — DILTIAZEM HYDROCHLORIDE 180 MG: 180 CAPSULE, COATED, EXTENDED RELEASE ORAL at 07:56

## 2021-02-11 RX ADMIN — ENOXAPARIN SODIUM 40 MG: 40 INJECTION SUBCUTANEOUS at 07:57

## 2021-02-11 RX ADMIN — DOCUSATE SODIUM 200 MG: 100 CAPSULE, LIQUID FILLED ORAL at 07:56

## 2021-02-11 RX ADMIN — HYDROCODONE BITARTRATE AND ACETAMINOPHEN 1 TABLET: 5; 325 TABLET ORAL at 11:53

## 2021-02-11 RX ADMIN — ONDANSETRON 4 MG: 4 TABLET, ORALLY DISINTEGRATING ORAL at 17:36

## 2021-02-11 RX ADMIN — LEVOTHYROXINE SODIUM 125 MCG: 0.12 TABLET ORAL at 05:59

## 2021-02-11 NOTE — THERAPY TREATMENT NOTE
Inpatient Rehabilitation - Speech Language Pathology Treatment Note    [facility]     Patient Name: Liana Carrero  : 1935  MRN: [id number]    Today's Date: 2021                   Admit Date: 2021       Visit Dx:      ICD-10-CM ICD-9-CM   1. Impaired functional mobility, balance, gait, and endurance  Z74.09 V49.89       Patient Active Problem List   Diagnosis   • Stroke (cerebrum) (CMS/[facility])   • Chest pain   • Hypertensive urgency   • Headache   • History of embolic stroke   • Hypothyroidism   • Hypokalemia   • CAD in native artery   • URTI (acute upper respiratory infection)   • Closed fracture of left hip (CMS/HCC)   • Chronic diastolic (congestive) heart failure (CMS/[facility])   • Coronary artery disease involving autologous vein coronary bypass graft without angina pectoris   • Impaired glucose tolerance   • Moderate malnutrition (CMS/[facility])   • Stroke (CMS/HCC)       Past Medical History:   Diagnosis Date   • Arthritis    • CHF (congestive heart failure) (CMS/[facility])    • Coronary artery disease    • Diabetes mellitus (CMS/[facility])     new DX related to stroke, started on insulin at hospital   • Disease of thyroid gland    • Elevated cholesterol    • GERD (gastroesophageal reflux disease)    • History of transfusion    • Hypertension    • PONV (postoperative nausea and vomiting)    • Sleep apnea    • Spinal headache    • Stroke (CMS/[facility])        Past Surgical History:   Procedure Laterality Date   • APPENDECTOMY     • CARDIAC CATHETERIZATION       x2   • CARDIAC CATHETERIZATION N/A 2019    Procedure: Left Heart Cath;  Surgeon: Bentley Chapin MD;  Location:  [facility];  Service: Cardiovascular   • CARDIAC CATHETERIZATION N/A 2019    Procedure: Coronary angiography;  Surgeon: Bentley Chapin MD;  Location:  [facility];  Service: Cardiovascular   • CARDIAC CATHETERIZATION N/A 2019    Procedure: Native mammary injection;  Surgeon: Bentley Chapin MD;   Location:  [facility];  Service: Cardiovascular   • CARDIAC CATHETERIZATION N/A 12/26/2019    Procedure: Saphenous Vein Graft;  Surgeon: Bentley Chapin MD;  Location: [facility] CATH INVASIVE LOCATION;  Service: Cardiovascular   • COLON SURGERY     • COLONOSCOPY     • EYE SURGERY      lens implants and cataract surgery   • FEMUR IM NAILING/RODDING Left 3/26/2020    Procedure: LT.HIP NAILING/RODDING;  Surgeon: Carlos Khan II, MD;  Location: [facility] MAIN OR;  Service: Orthopedics;  Laterality: Left;   • FRACTURE SURGERY Left 2020   • HYSTERECTOMY     • VASCULAR SURGERY      varicous veins          SLP EVALUATION (last 72 hours)      SLP SLC Evaluation     Row Name 02/11/21 1100 02/10/21 1100 02/09/21 1100             Communication Assessment/Intervention    Document Type  therapy note (daily note)  -SL  therapy note (daily note)  -SL  therapy note (daily note)  -SL      Subjective Information  no complaints  -SL  no complaints  -SL  complains of shoulder pain  -SL      Patient Observations  cooperative;alert;agree to therapy  -SL  alert;cooperative  -SL  cooperative  -SL      Patient Effort  good  -SL  good  -SL  adequate  -SL      Symptoms Noted During/After Treatment  none  -SL  none  -SL  none  -SL        User Key  (r) = Recorded By, (t) = Taken By, (c) = Cosigned By    Initials Name Effective Dates    SL Katelyn James MS CCC-SLP 06/08/18 -              EDUCATION    The patient has been educated in the following areas:       Cognitive Impairment.      SLP Recommendation and Plan                                                  SLP GOALS     Row Name 02/11/21 1100 02/10/21 1100 02/09/21 1100       Attention Goal 1 (SLP)    Progress (Attention Goal 1, SLP)  --  --  60%;with minimal cues (75-90%);with moderate cues (50-74%) locating difference between 2 picture scenes  -SL    Progress/Outcomes (Attention Goal 1, SLP)  --  --  goal ongoing  -SL       Memory Skills Goal 1 (SLP)    Progress  "(Memory Skills Goal 1, SLP)  60%;independently (over 90% accuracy) recall of lengthy detailed paragraphs  -SL  80%;90%;independently (over 90% accuracy) 5 word stimulus- word placement recall task  -SL  70%;independently (over 90% accuracy) immed recall- shrot paragrpahs/stories  -SL    Progress/Outcomes (Memory Skills Goal 1, SLP)  goal ongoing  -SL  goal ongoing  -SL  goal ongoing  -SL    Comment (Memory Skills Goal 1, SLP)  --  70% for paragraph recall  -SL  --       Organizational Skills Goal 1 (SLP)    Progress (Thought Organization Skills Goal 1, SLP)  90%;independently (over 90% accuracy) unscrambling words, given categories   -SL  --  --    Progress/Outcomes (Thought Organization Skills Goal 1, SLP)  goal ongoing  -SL  --  --       Reasoning Goal 1 (SLP)    Progress (Reasoning Goal 1, SLP)  70%;with minimal cues (75-90%);independently (over 90% accuracy) abstract category differentiation  -SL  80%;independently (over 90% accuracy);with minimal cues (75-90%) verbal analogies  -SL  60%;70%;with minimal cues (75-90%);with moderate cues (50-74%) ordering items by weight  -SL    Progress/Outcomes (Reasoning Goal 1, SLP)  goal ongoing  -SL  goal ongoing  -SL  goal ongoing  -SL    Comment (Reasoning Goal 1, SLP)  --  50% for visual 'odd man out\" task  -SL  --       Functional Problem Solving Skills Goal 1 (SLP)    Progress (Problem Solving Goal 1, SLP)  70%;80%;independently (over 90% accuracy);with minimal cues (75-90%) ordering items by specific constraints  -SL  --  --    Progress/Outcomes (Problem Solving Goal 1, SLP)  goal ongoing  -SL  --  --    Comment (Problem Solving Goal 1, SLP)  cues needed to attend to column of picts on left  -SL  --  --       Functional Math Skills Goal 1 (SLP)    Progress (Functional Math Skills Goal 1, SLP)  100%;independently (over 90% accuracy) simple addition/subtrating for alternating attn task  -SL  --  --    Progress/Outcomes (Functional Math Skills Goal 1, SLP)  goal ongoing  " -SL  --  --       Right Hemisphere Function Goal 1 (SLP)    Progress (Right Hemisphere Function Goal 1, SLP)  with moderate cues (50-74%) reading of row of words and sentences  -SL  with moderate cues (50-74%)  -SL  with moderate cues (50-74%);with maximum cues (25-49%)  -SL    Progress/Outcomes (Right Hemisphere Function Goal 1, SLP)  goal ongoing  -SL  goal ongoing  -SL  goal ongoing  -SL    Comment (Right Hemisphere Function Goal 1, SLP)  tended to miss first word in row with only visual cues, with verbal cues pt able to return to the colored visual cue and locate first word  -SL  visual and mod verbal cues- 50% for letter placement in boxes to form words;    50% for returning to left side during oral reading of paragraph with visual cues  -SL  mod visual and verbal cues to scan up/down and left/right for seq/ordering task;  mod cues to attend to left for numerical trailmaking task- 60% accurate with min cues, 100% with mod cues  -      User Key  (r) = Recorded By, (t) = Taken By, (c) = Cosigned By    Initials Name Provider Type    Katelyn Fan MS CCC-SLP Speech and Language Pathologist                      Time Calculation:       Time Calculation- SLP     Row Name 02/11/21 1148 02/11/21 1147          Time Calculation- [facility]    SLP Start Time  1030  -SL  0830  -     SLP Stop Time  1100  -SL  0900  -     SLP Time Calculation (min)  30 min  -SL  30 min  -       User Key  (r) = Recorded By, (t) = Taken By, (c) = Cosigned By    Initials Name Provider Type    Katelyn Fan MS CCC-SLP Speech and Language Pathologist            Therapy Charges for Today     Code Description Service Date Service Provider Modifiers Qty    59387308293 HC ST DEV OF COGN SKILLS INITIAL 15 MIN 2/10/2021 Katelyn James MS CCC-SLP  3    98135232620 HC ST DEV OF COGN SKILLS EACH ADDT'L 15 MIN 2/10/2021 Katelyn James MS CCC-SLP  1    [id number] HC ST DEV OF COGN SKILLS INITIAL 15 MIN 2/11/2021 Katelyn James MS CCC-SLP  3    [id number] HC ST  DEV OF COGN SKILLS EACH ADDT'L 15 MIN 2/11/2021 Katelyn James, MS CCC-SLP  1                           Katelyn James, MS CCC-SLP  2/11/2021

## 2021-02-11 NOTE — THERAPY TREATMENT NOTE
Inpatient Rehabilitation - Physical Therapy Treatment Note       [facility]     Patient Name: Liana Carrero  : 1935  MRN: [id number]    Today's Date: 2021                    Admit Date: 2021      Visit Dx:     ICD-10-CM ICD-9-CM   1. Impaired functional mobility, balance, gait, and endurance  Z74.09 V49.89       Patient Active Problem List   Diagnosis   • Stroke (cerebrum) (CMS/[facility])   • Chest pain   • Hypertensive urgency   • Headache   • History of embolic stroke   • Hypothyroidism   • Hypokalemia   • CAD in native artery   • URTI (acute upper respiratory infection)   • Closed fracture of left hip (CMS/[facility])   • Chronic diastolic (congestive) heart failure (CMS/[facility])   • Coronary artery disease involving autologous vein coronary bypass graft without angina pectoris   • Impaired glucose tolerance   • Moderate malnutrition (CMS/[facility])   • Stroke (CMS/[facility])       Past Medical History:   Diagnosis Date   • Arthritis    • CHF (congestive heart failure) (CMS/[facility])    • Coronary artery disease    • Diabetes mellitus (CMS/[facility])     new DX related to stroke, started on insulin at hospital   • Disease of thyroid gland    • Elevated cholesterol    • GERD (gastroesophageal reflux disease)    • History of transfusion    • Hypertension    • PONV (postoperative nausea and vomiting)    • Sleep apnea    • Spinal headache    • Stroke (CMS/[facility])        Past Surgical History:   Procedure Laterality Date   • APPENDECTOMY     • CARDIAC CATHETERIZATION       x2   • CARDIAC CATHETERIZATION N/A 2019    Procedure: Left Heart Cath;  Surgeon: Bentley Chapin MD;  Location:  [facility];  Service: Cardiovascular   • CARDIAC CATHETERIZATION N/A 2019    Procedure: Coronary angiography;  Surgeon: Bentley Chapin MD;  Location:  [facility];  Service: Cardiovascular   • CARDIAC CATHETERIZATION N/A 2019    Procedure: Native mammary injection;  Surgeon: Bentley Chapin MD;   Location:  [facility];  Service: Cardiovascular   • CARDIAC CATHETERIZATION N/A 12/26/2019    Procedure: Saphenous Vein Graft;  Surgeon: Bentley Chapin MD;  Location: [facility] CATH INVASIVE LOCATION;  Service: Cardiovascular   • COLON SURGERY     • COLONOSCOPY     • EYE SURGERY      lens implants and cataract surgery   • FEMUR IM NAILING/RODDING Left 3/26/2020    Procedure: LT.HIP NAILING/RODDING;  Surgeon: Carlos Khan II, MD;  Location: [facility] MAIN OR;  Service: Orthopedics;  Laterality: Left;   • FRACTURE SURGERY Left 2020   • HYSTERECTOMY     • VASCULAR SURGERY      varicous veins          PT ASSESSMENT (last 12 hours)      IRF PT Evaluation and Treatment     Row Name 02/11/21 1117          PT Time and Intention    Document Type  daily treatment  -     Mode of Treatment  physical therapy  -     Patient/Family/Caregiver Comments/Observations  Arrived to PT from SLP.  -     Row Name 02/11/21 1117          Cognition/Psychosocial    Affect/Mental Status (Cognitive)  WFL  -     Orientation Status (Cognition)  oriented x 3  -     Follows Commands (Cognition)  follows two-step commands;increased processing time needed;verbal cues/prompting required;repetition of directions required  -     Personal Safety Interventions  fall prevention program maintained;gait belt;supervised activity;nonskid shoes/slippers when out of bed  -     Cognitive Function (Cognitive)  attention deficit  -     Attention Deficit (Cognitive)  distractible in noisy environment;divided attention  -     Executive Function Deficit (Cognition)  insight/awareness of deficits  -     Safety Deficit (Cognitive)  insight into deficits/self-awareness;safety precautions follow-through/compliance  -     Comment, Cognition  limited by pain  -     Row Name 02/11/21 1118          Pain Scale: Numbers Pre/Post-Treatment    Pretreatment Pain Rating  0/10 - no pain  -     Posttreatment Pain Rating  0/10 - no  pain  -     Pre/Posttreatment Pain Comment  at times during rx, pt reports 8/10 pain in LEs when they buckle with standing, attmpted amb.  Pt reports it resolves with rest or position change.  -     Row Name 02/11/21 1117          Bed Mobility    Rolling Left [place] (Bed Mobility)  minimum assist (75% patient effort);contact guard;set up  -     Rolling Right [place] (Bed Mobility)  moderate assist (50% patient effort);maximum assist (25% patient effort);verbal cues;set up  -     Supine-Sit [place] (Bed Mobility)  maximum assist (25% patient effort);verbal cues;set up  -KP     Bed Mobility, Safety Issues  decreased use of arms for pushing/pulling;decreased use of legs for bridging/pushing;impaired trunk control for bed mobility  -     Assistive Device (Bed Mobility)  bed rails;head of bed elevated  -     Comment (Bed Mobility)  Min/mod at EOB in hospital bed due to pt proximity to edge of bed.  -     Row Name 02/11/21 1117          Transfer Assessment/Treatment    Comment (Transfers)  Worked multiple transfers w/c to mat to L - Max VC for sequence.  -     Row Name 02/11/21 1117          Transfers    Bed-Chair [place] (Transfers)  maximum assist (25% patient effort);2 person assist;verbal cues;set up;minimum assist (75% patient effort) max + min from w/c to mat to L  -KP     Chair-Bed [place] (Transfers)  moderate assist (50% patient effort);minimum assist (75% patient effort);set up;verbal cues on treatment mat  -     Assistive Device (Bed-Chair Transfers)  wheelchair  -     Sit-Stand [place] (Transfers)  maximum assist (25% patient effort);verbal cues;set up;2 person assist  -     Stand-Sit [place] (Transfers)  maximum assist (25% patient effort);moderate assist (50% patient effort);2 person assist;verbal cues;set up  -     Row Name 02/11/21 1117          Sit-Stand Transfer    Assistive Device (Sit-Stand Transfers)  wheelchair jon bar  -     Row Name  02/11/21 1117          Stand-Sit Transfer    Assistive Device (Stand-Sit Transfers)  wheelchair  -     Row Name 02/11/21 1117          Squat Pivot Transfer    Squat Pivot [place] (Transfers)  maximum assist (25% patient effort);2 person assist;verbal cues;set up  -     Assistive Device (Squat Pivot Transfers)  wheelchair hospital bed   -     Row Name 02/11/21 1117          Gait/Stairs (Locomotion)    [place] Level (Gait)  maximum assist (25% patient effort);moderate assist (50% patient effort);2 person assist Max + Mod  -     Assistive Device (Gait)  -- jon bar  -     Distance in Feet (Gait)  10 step, 5 steps  -     Pattern (Gait)  step-to  -KP     Deviations/Abnormal Patterns (Gait)  gait speed decreased;stride length decreased  -     Bilateral Gait Deviations  leans right;forward flexed posture mild pushing with R UE at times  -     Left Sided Gait Deviations  foot drop/toe drag;knee buckling, left side;lateral trunk flexion;Trendelenburg sign;weight shift ability decreased Max MC to advance L LE and stabilize hip and knee in stance.  -     Right Sided Gait Deviations  weight shift ability decreased VC to WS R to allow stance, upper trunk rot L  -     Comment (Gait/Stairs)  Constant cueing for sequencing and WS and posture with gait.  -     Row Name 02/11/21 1117          Wheelchair Mobility/Management    Method of Wheelchair Locomotion (Mobility)  jon propulsion, right sided  -     Mobility Activities (Wheelchair)  forward propulsion  -     Forward Propulsion [place] (Wheelchair)  minimum assist (75% patient effort);moderate assist (50% patient effort)  -     Distance Propelled in Feet (Wheelchair)  40  -     Comment, Wheelchair Mobility  Min for steering  -     Row Name 02/11/21 1117          Safety Issues, Functional Mobility    Safety Issues Affecting Function (Mobility)  problem-solving;safety precautions follow-through/compliance;sequencing abilities  -      Impairments Affecting Function (Mobility)  balance;cognition;coordination;endurance/activity tolerance;postural/trunk control;strength  -     Cognitive Impairments, Mobility Safety/Performance  insight into deficits/self-awareness;safety precaution follow-through  -     Row Name 02/11/21 1117          Balance    Balance Interventions  standing;weight shifting activity  -     Row Name 02/11/21 1117          Positioning and Restraints    Pre-Treatment Position  in bed  -     In Wheelchair  sitting;encouraged to call for assist;exit alarm on;with OT  -KP       User Key  (r) = Recorded By, (t) = Taken By, (c) = Cosigned By    Initials Name Provider Type     Dixie Graham, PT Physical Therapist        Wound 01/07/21 Left upper chest Incision (Active)   Dressing Appearance dry;intact 02/11/21 0750   Closure Liquid skin adhesive;Open to air 02/11/21 0750   Base clean;dry 02/11/21 0750   Periwound intact 02/11/21 0750   Drainage Amount none 02/11/21 0750   Dressing Care open to air 02/11/21 0750   Periwound Care dry periwound area maintained 02/11/21 0750     Physical Therapy Education                 Title: PT OT SLP Therapies (In Progress)     Topic: Physical Therapy (In Progress)     Point: Mobility training (In Progress)     Learning Progress Summary           Patient Acceptance, E,D, NR by KP at 2/11/2021 1120    Comment: standing posture and gait sequence    Acceptance, E, VU by  at 2/10/2021 1504    Comment: family conference- discussed need for visual and verbal cues for visual perceptual deficits, and deficts in attn, memory    Acceptance, E,TB, VU,NR by MS at 2/10/2021 1401    Acceptance, E,TB, VU,NR by MS at 2/9/2021 1531    Eager, E,D, NR by KP at 2/9/2021 1119    Comment: standing posture, balance    Acceptance, E,TB, VU,NR by MS at 2/8/2021 1152    Acceptance, E,D, DU,NR by LB at 2/6/2021 1539    Comment: wt shift in standing    Acceptance, E,TB, VU,NR by MS at 2/5/2021 1208    Acceptance, E,TB,  VU,NR by MS at 2/4/2021 1518    Acceptance, E,TB, VU,NR by MS at 2/3/2021 1151    Acceptance, E,TB, VU,NR by MS at 2/2/2021 1210    Acceptance, E,TB, NR by MS at 2/1/2021 1145    Acceptance, E, NR by  at 1/30/2021 1336    Acceptance, E,TB, VU,NR by MS at 1/29/2021 1535    Acceptance, E, NR by LB1 at 1/28/2021 1032    Acceptance, E,TB, VU,NR by MS at 1/27/2021 1447    Acceptance, E,TB, VU,NR by MS at 1/26/2021 0959    Acceptance, E,TB, VU,NR by MS at 1/25/2021 1316    Acceptance, E,TB, VU,NR by MS at 1/22/2021 0918    Acceptance, E,TB, VU by MS at 1/20/2021 1544    Acceptance, E,TB, VU,NR by MS at 1/19/2021 1401    Acceptance, E,TB, NR,NL by MS at 1/18/2021 1432    Acceptance, E,TB, VU,NR by IVAN at 1/16/2021 1444    Acceptance, E,TB, NR,NL by MS at 1/15/2021 1418    Acceptance, E,TB, NR by MS at 1/14/2021 1223    Acceptance, E,TB, NR by LB2 at 1/13/2021 1030    Comment: sitting balance    Acceptance, E,TB, NR by MS at 1/12/2021 1312    Acceptance, E,TB, VU,NR by MS at 1/11/2021 1140   Family Acceptance, E, VU by SL at 2/10/2021 1504    Comment: family conference- discussed need for visual and verbal cues for visual perceptual deficits, and deficts in attn, memory                   Point: Home exercise program (Done)     Learning Progress Summary           Patient Acceptance, E, VU by SL at 2/10/2021 1504    Comment: family conference- discussed need for visual and verbal cues for visual perceptual deficits, and deficts in attn, memory    Acceptance, E,TB, VU,NR by MS at 2/10/2021 1401    Acceptance, E,TB, VU,NR by MS at 2/9/2021 1531    Acceptance, E,TB, VU,NR by MS at 2/8/2021 1152    Acceptance, E,TB, VU,NR by MS at 2/5/2021 1208    Acceptance, E,TB, VU,NR by MS at 2/4/2021 1518    Acceptance, E,TB, VU,NR by MS at 2/3/2021 1151    Acceptance, E,TB, VU,NR by MS at 2/2/2021 1210    Acceptance, E,TB, NR by MS at 2/1/2021 1145    Acceptance, E, NR by LH at 1/30/2021 1336    Acceptance, E,TB, VU,NR by MS at 1/29/2021  1535    Acceptance, E,TB, VU,NR by MS at 1/27/2021 1447    Acceptance, E,TB, VU,NR by MS at 1/26/2021 0959    Acceptance, E,TB, VU,NR by MS at 1/25/2021 1316    Acceptance, E,TB, VU,NR by MS at 1/22/2021 0918    Acceptance, E,TB, VU by MS at 1/20/2021 1544    Acceptance, E,TB, VU,NR by MS at 1/19/2021 1401    Acceptance, E,TB, NR,NL by MS at 1/18/2021 1432    Acceptance, E,TB, NR,NL by MS at 1/15/2021 1418    Acceptance, E,TB, NR by MS at 1/14/2021 1223   Family Acceptance, E, VU by SL at 2/10/2021 1504    Comment: family conference- discussed need for visual and verbal cues for visual perceptual deficits, and deficts in attn, memory                   Point: Body mechanics (Done)     Learning Progress Summary           Patient Acceptance, E, VU by SL at 2/10/2021 1504    Comment: family conference- discussed need for visual and verbal cues for visual perceptual deficits, and deficts in attn, memory    Acceptance, E,TB, VU,NR by MS at 2/10/2021 1401    Acceptance, E,TB, VU,NR by MS at 2/9/2021 1531    Acceptance, E,TB, VU,NR by MS at 2/8/2021 1152    Acceptance, E,TB, VU,NR by MS at 2/5/2021 1208    Acceptance, E,TB, VU,NR by MS at 2/4/2021 1518    Acceptance, E,TB, VU,NR by MS at 2/3/2021 1151    Acceptance, E,TB, VU,NR by MS at 2/2/2021 1210    Acceptance, E,TB, NR by MS at 2/1/2021 1145    Acceptance, E, NR by LH at 1/30/2021 1336    Acceptance, E,TB, VU,NR by MS at 1/29/2021 1535    Acceptance, E,TB, VU,NR by MS at 1/27/2021 1447    Acceptance, E,TB, VU,NR by MS at 1/26/2021 0959    Acceptance, E,TB, VU,NR by MS at 1/25/2021 1316    Acceptance, E,TB, VU,NR by MS at 1/22/2021 0918    Acceptance, E,TB, VU by MS at 1/20/2021 1544    Acceptance, E,TB, VU,NR by MS at 1/19/2021 1401    Acceptance, E,TB, NR,NL by MS at 1/15/2021 1418    Acceptance, E,TB, NR by MS at 1/14/2021 1223    Acceptance, E,TB, NR by MS at 1/12/2021 1312    Acceptance, E,TB, VU,NR by MS at 1/11/2021 1140   Family Acceptance, E, VU by SL at  2/10/2021 1504    Comment: family conference- discussed need for visual and verbal cues for visual perceptual deficits, and deficts in attn, memory                   Point: Precautions (Done)     Learning Progress Summary           Patient Acceptance, E, VU by SL at 2/10/2021 1504    Comment: family conference- discussed need for visual and verbal cues for visual perceptual deficits, and deficts in attn, memory    Acceptance, E,TB, VU,NR by MS at 2/10/2021 1401    Acceptance, E,TB, VU,NR by MS at 2/9/2021 1531    Acceptance, E,TB, VU,NR by MS at 2/8/2021 1152    Acceptance, E,TB, VU,NR by MS at 2/5/2021 1208    Acceptance, E,TB, VU,NR by MS at 2/4/2021 1518    Acceptance, E,TB, VU,NR by MS at 2/3/2021 1151    Acceptance, E,TB, VU,NR by MS at 2/2/2021 1210    Acceptance, E,TB, NR by MS at 2/1/2021 1145    Acceptance, E, NR by LH at 1/30/2021 1336    Acceptance, E,TB, VU,NR by MS at 1/29/2021 1535    Acceptance, E,TB, VU,NR by MS at 1/27/2021 1447    Acceptance, E,TB, VU,NR by MS at 1/26/2021 0959    Acceptance, E,TB, VU,NR by MS at 1/25/2021 1316    Acceptance, E,TB, VU,NR by MS at 1/22/2021 0918    Acceptance, E, VU by MD at 1/21/2021 0926    Acceptance, E,TB, VU by MS at 1/20/2021 1544    Acceptance, E,TB, VU,NR by MS at 1/19/2021 1401    Acceptance, E,TB, NR,NL by MS at 1/15/2021 1418    Acceptance, E,TB, NR by MS at 1/14/2021 1223    Acceptance, E,TB, NR by MS at 1/12/2021 1312    Acceptance, E,TB, VU,NR by MS at 1/11/2021 1140    Acceptance, E, VU by MD at 1/9/2021 0933   Family Acceptance, E, VU by RL at 2/10/2021 7292    Comment: family conference- discussed need for visual and verbal cues for visual perceptual deficits, and deficts in attn, memory                               User Key     Initials Effective Dates Name Provider Type Discipline    LB 06/08/18 -  Elba Zarate, PT Physical Therapist PT    RL 06/08/18 -  Katelyn James MS CCC-SLP Speech and Language Pathologist SLP    IVAN 06/08/18 -  Yarely  Ela ECHAVARRIA, PT Physical Therapist PT    LB2 04/03/18 -  Jazmin Schuster, PT Physical Therapist PT     04/03/18 -  Elba Myrick, PT Physical Therapist PT    LB1 03/07/18 -  Elba Grigsby, PTA Physical Therapy Assistant PT    MD 04/03/18 -  Lisandra Lowery, PT Physical Therapist PT     04/03/18 -  Dixie Graham, PT Physical Therapist PT    MS 03/04/19 -  Nati Kaba, PT Physical Therapist PT                PT Recommendation and Plan                          Time Calculation:     PT Charges     Row Name 02/11/21 1456 02/11/21 1121          Time Calculation    Start Time  1330  -KP  1100  -KP     Stop Time  1400  -KP  1130  -KP     Time Calculation (min)  30 min  -KP  30 min  -KP     PT Received On  02/11/21  -KP  02/11/21  -     PT - Next Appointment  02/12/21  -  02/12/21  -       User Key  (r) = Recorded By, (t) = Taken By, (c) = Cosigned By    Initials Name Provider Type    Dixie Galindo, PT Physical Therapist          Therapy Charges for Today     Code Description Service Date Service Provider Modifiers Qty    37559265173 HC PT NEUROMUSC RE EDUCATION EA 15 MIN 2/11/2021 Dixie Graham, PT GP 2    59159625150 HC PT THERAPEUTIC ACT EA 15 MIN 2/11/2021 Dixie Graham, PT GP 2    64595473689 HC PT THER SUPP EA 15 MIN 2/11/2021 Dixie Graham, PT GP 3                   Dixie Graham, PT  2/11/2021

## 2021-02-11 NOTE — PLAN OF CARE
Goal Outcome Evaluation:         Slept poorly, on call light frequently for bedpan & pain meds. Meds whole with water. Norco & Tramdol given for Lt. Hip, Lt. Knee, & Lt. Shoulder pain, with some relief. Turned patient as needed. Dulcolax supp. 10mg was held from day shift, but was given tonight, & had BM's afterwards. Lt. Side remains flaccid. Refused SCD's. Wears 02 @ 2L/NC@ HS, no distress, Sats WNL.

## 2021-02-11 NOTE — PROGRESS NOTES
Inpatient Rehabilitation Plan of Care Note    Plan of Care  Care Plan Reviewed - Updates as Follows    Body Systems    [RN] Respiratory(Active)  Current Status(02/11/2021): O2 2L QHS  Weekly Goal(02/16/2021): Same as discharge  Discharge Goal: Least restrictive amount of 02    Performed Intervention(s)  O2 2L/NC @ HS  assess lungs, sats every shift      Psychosocial    [RN] Coping/Adjustment(Active)  Current Status(02/11/2021): Patient has supportive family, but at risk of  ineffective coping regarding current situation.  Weekly Goal(02/16/2021): Allow patient the opportunity to discuss concerns  regarding new situation.  Discharge Goal: Patient will have healthy coping mechanisms at time of discharge    Performed Intervention(s)  Verbalize needs and concerns  Medications as needed/ ordered  Therapeutic environmental set up      Safety    [RN] Potential for Injury(Active)  Current Status(02/11/2021): Patient at risk of injury related to previous falls.  flaccid on left side. dependent with transfers of 2.  Weekly Goal(02/16/2021): Paitent will use call light appropriately while in  Rehab.  Discharge Goal: Patient will continue to use call light while in Rehab  environment.    Performed Intervention(s)  Items with in reach and environmental set up  Bed alarm/ Chair alarm  Safety rounds and falls protocols/ precautions      Sphincter Control    [RN] Bladder Management(Active)  Current Status(02/11/2021): Patient is continent and incontinent, using bedpan,  PVRs, requiring I/C at times  Weekly Goal(02/16/2021): Patient will be 50% continent  Discharge Goal: Patient will be 100% continent    [RN] Bowel Management(Active)  Current Status(02/11/2021): Patient continent and incontinent  Weekly Goal(02/16/2021): continent 50% of the time  Discharge Goal: Patient will continent 100% of the time    Performed Intervention(s)  Proper diet and fluid intake  Medication as ordered  Incontinent care if needed  Monitor intake and  output    Signed by: Sloane Arredondo RN

## 2021-02-11 NOTE — THERAPY TREATMENT NOTE
Inpatient Rehabilitation - Occupational Therapy Treatment Note    [facility]     Patient Name: Liana Carrero  : 1935  MRN: [id number]    Today's Date: 2021                 Admit Date: 2021         ICD-10-CM ICD-9-CM   1. Impaired functional mobility, balance, gait, and endurance  Z74.09 V49.89       Patient Active Problem List   Diagnosis   • Stroke (cerebrum) (CMS/[facility])   • Chest pain   • Hypertensive urgency   • Headache   • History of embolic stroke   • Hypothyroidism   • Hypokalemia   • CAD in native artery   • URTI (acute upper respiratory infection)   • Closed fracture of left hip (CMS/[facility])   • Chronic diastolic (congestive) heart failure (CMS/[facility])   • Coronary artery disease involving autologous vein coronary bypass graft without angina pectoris   • Impaired glucose tolerance   • Moderate malnutrition (CMS/[facility])   • Stroke (CMS/[facility])       Past Medical History:   Diagnosis Date   • Arthritis    • CHF (congestive heart failure) (CMS/[facility])    • Coronary artery disease    • Diabetes mellitus (CMS/[facility])     new DX related to stroke, started on insulin at hospital   • Disease of thyroid gland    • Elevated cholesterol    • GERD (gastroesophageal reflux disease)    • History of transfusion    • Hypertension    • PONV (postoperative nausea and vomiting)    • Sleep apnea    • Spinal headache    • Stroke (CMS/[facility])        Past Surgical History:   Procedure Laterality Date   • APPENDECTOMY     • CARDIAC CATHETERIZATION       x2   • CARDIAC CATHETERIZATION N/A 2019    Procedure: Left Heart Cath;  Surgeon: Bentley Chapin MD;  Location: [facility] CATH INVASIVE LOCATION;  Service: Cardiovascular   • CARDIAC CATHETERIZATION N/A 2019    Procedure: Coronary angiography;  Surgeon: Bentley Chapin MD;  Location: [facility] CATH INVASIVE LOCATION;  Service: Cardiovascular   • CARDIAC CATHETERIZATION N/A 2019    Procedure: Native mammary injection;  Surgeon: Bentley Chapni MD;  Location: [facility]  CATH INVASIVE LOCATION;  Service: Cardiovascular   • CARDIAC CATHETERIZATION N/A 12/26/2019    Procedure: Saphenous Vein Graft;  Surgeon: Bentley Chapin MD;  Location: [facility] CATH INVASIVE LOCATION;  Service: Cardiovascular   • COLON SURGERY     • COLONOSCOPY     • EYE SURGERY      lens implants and cataract surgery   • FEMUR IM NAILING/RODDING Left 3/26/2020    Procedure: LT.HIP NAILING/RODDING;  Surgeon: Carlos Khan II, MD;  Location: [facility] MAIN OR;  Service: Orthopedics;  Laterality: Left;   • FRACTURE SURGERY Left 2020   • HYSTERECTOMY     • VASCULAR SURGERY      varicous veins                IRF OT ASSESSMENT FLOWSHEET (last 12 hours)      IRF OT Evaluation and Treatment     Row Name 02/11/21 1103          OT Time and Intention    Document Type  daily treatment  -CC     Mode of Treatment  occupational therapy  -CC     Patient Effort  good  -CC     Row Name 02/11/21 1103          Vision Assessment/Intervention    Visual Motor Impairment  visual tracking, left  -     Visual Processing Deficit  jon-inattention/neglect, left  -CC     Row Name 02/11/21 1103          Pain Scale: Numbers Pre/Post-Treatment    Pretreatment Pain Rating  0/10 - no pain  -     Posttreatment Pain Rating  0/10 - no pain  -CC     Row Name 02/11/21 1103          Transfers    [place] Level (Toilet Transfer)  verbal cues;nonverbal cues (demo/gesture);maximum assist (25% patient effort);1 person to manage equipment  -     Assistive Device (Toilet Transfer)  commode, bedside with drop arms  -     Row Name 02/11/21 1103          Toilet Transfer    Type (Toilet Transfer)  squat pivot  -     Row Name 02/11/21 1103          Balance    Static Sitting Balance  mild impairment  -     Static Standing Balance  severe impairment  -     Row Name 02/11/21 1103          Bathing    [place] Level (Bathing)  bathing skills;upper body;lower body;moderate assist (50% patient effort);verbal cues;nonverbal cues  (demo/gesture)  -CC     Position (Bathing)  sink side;supported sitting;supported standing  -CC     Set-up Assistance (Bathing)  adjust water temperature;obtain supplies;open containers  -CC     Row Name 02/11/21 1103          Upper Body Dressing    [place] Level (Upper Body Dressing)  upper body dressing skills;doff;don;pull over garment;verbal cues;nonverbal cues (demo/gesture);moderate assist (50-74% patient effort)  -CC     Position (Upper Body Dressing)  supported sitting  -CC     Set-up Assistance (Upper Body Dressing)  obtain clothing  -CC     Comment (Upper Body Dressing)  jon dressing   -CC     Row Name 02/11/21 1103          Lower Body Dressing    [place] Level (Lower Body Dressing)  doff;don;pants/bottoms;socks;moderate assist (50% patient effort);maximum assist (25% patient effort)  -CC     Position (Lower Body Dressing)  supported sitting;supported standing  -CC     Comment (Lower Body Dressing)  x 2 for standing   -CC     Row Name 02/11/21 1103          Grooming    [place] Level (Grooming)  grooming skills;deodorant application;hair care, combing/brushing;oral care regimen;wash face, hands;standby assist;minimum assist (75% patient effort)  -CC     Position (Grooming)  sink side;supported sitting  -CC     Set-up Assistance (Grooming)  obtain supplies  -CC     Row Name 02/11/21 1103          Toileting    [place] Level (Toileting)  toileting skills;dependent (less than 25% patient effort)  -CC     Position (Toileting)  supported sitting;supported standing  -CC     Set-up Assistance (Toileting)  change pad/brief  -CC     Comment (Toileting)  x 2  -CC     Row Name 02/11/21 1103          Positioning and Restraints    Pre-Treatment Position  sitting in chair/recliner  -CC     Post Treatment Position  wheelchair  -CC     In Wheelchair  sitting;exit alarm on;with SLP  -CC       User Key  (r) = Recorded By, (t) = Taken By, (c) = Cosigned By    Initials Name Effective Dates    DORCAS Miranda  Keya OTR 06/08/18 -            Occupational Therapy Education                 Title: PT OT SLP Therapies (Done)     Topic: Occupational Therapy (Done)     Point: ADL training (Done)     Description:   Instruct learner(s) on proper safety adaptation and remediation techniques during self care or transfers.   Instruct in proper use of assistive devices.              Learning Progress Summary           Patient Acceptance, E, VU by CC at 2/10/2021 1546    Comment: Family conf w pt, dgt and son in law. Status, DME, follow up theray and home needs discussed. Pt will need a droparm commode and possibly shower seat. Will provide info on jon harness for LUE. Family scheduled for teaching Monday.    Acceptance, E, VU by SL at 2/10/2021 1504    Comment: family conference- discussed need for visual and verbal cues for visual perceptual deficits, and deficts in attn, memory    Acceptance, E,TB,D, VU by CC at 1/29/2021 1509    Comment: Pt repositioned in reg w/c with 1/2 arm tray on Left. Nursing notified of change from recliner w/c/. Commode tsf from w/c to droparm commode discussed and shown to NA.    Acceptance, E, VU by BL at 1/8/2021 1434   Family Acceptance, E, VU by CC at 2/10/2021 1546    Comment: Family conf w pt, dgt and son in law. Status, DME, follow up theray and home needs discussed. Pt will need a droparm commode and possibly shower seat. Will provide info on jon harness for LUE. Family scheduled for teaching Monday.    Acceptance, E, VU by SL at 2/10/2021 1504    Comment: family conference- discussed need for visual and verbal cues for visual perceptual deficits, and deficts in attn, memory   Caregiver Acceptance, E,TB,D, VU by CC at 1/29/2021 1509    Comment: Pt repositioned in reg w/c with 1/2 arm tray on Left. Nursing notified of change from recliner w/c/. Commode tsf from w/c to droparm commode discussed and shown to NA.                   Point: Home exercise program (Done)     Description:   Instruct  learner(s) on appropriate technique for monitoring, assisting and/or progressing therapeutic exercises/activities.              Learning Progress Summary           Patient Acceptance, E, VU by CC at 2/10/2021 1546    Comment: Family conf w pt, dgt and son in law. Status, DME, follow up theray and home needs discussed. Pt will need a droparm commode and possibly shower seat. Will provide info on jon harness for LUE. Family scheduled for teaching Monday.    Acceptance, E, VU by SL at 2/10/2021 1504    Comment: family conference- discussed need for visual and verbal cues for visual perceptual deficits, and deficts in attn, memory    Acceptance, E, VU by BL at 1/8/2021 1434   Family Acceptance, E, VU by CC at 2/10/2021 1546    Comment: Family conf w pt, dgt and son in law. Status, DME, follow up theray and home needs discussed. Pt will need a droparm commode and possibly shower seat. Will provide info on jon harness for LUE. Family scheduled for teaching Monday.    Acceptance, E, VU by SL at 2/10/2021 1504    Comment: family conference- discussed need for visual and verbal cues for visual perceptual deficits, and deficts in attn, memory                   Point: Precautions (Done)     Description:   Instruct learner(s) on prescribed precautions during self-care and functional transfers.              Learning Progress Summary           Patient Acceptance, E, VU by CC at 2/10/2021 1546    Comment: Family conf w pt, dgt and son in law. Status, DME, follow up theray and home needs discussed. Pt will need a droparm commode and possibly shower seat. Will provide info on jon harness for LUE. Family scheduled for teaching Monday.    Acceptance, E, VU by SL at 2/10/2021 1504    Comment: family conference- discussed need for visual and verbal cues for visual perceptual deficits, and deficts in attn, memory    Acceptance, E, VU by BL at 1/8/2021 1434   Family Acceptance, E, VU by CC at 2/10/2021 1546    Comment: Family conf w  pt, dgt and son in law. Status, DME, follow up theray and home needs discussed. Pt will need a droparm commode and possibly shower seat. Will provide info on jon harness for LUE. Family scheduled for teaching Monday.    Acceptance, E, VU by SL at 2/10/2021 1504    Comment: family conference- discussed need for visual and verbal cues for visual perceptual deficits, and deficts in attn, memory                   Point: Body mechanics (Done)     Description:   Instruct learner(s) on proper positioning and spine alignment during self-care, functional mobility activities and/or exercises.              Learning Progress Summary           Patient Acceptance, E, VU by CC at 2/10/2021 1546    Comment: Family conf w pt, dgt and son in law. Status, DME, follow up theray and home needs discussed. Pt will need a droparm commode and possibly shower seat. Will provide info on jon harness for LUE. Family scheduled for teaching Monday.    Acceptance, E, VU by SL at 2/10/2021 1504    Comment: family conference- discussed need for visual and verbal cues for visual perceptual deficits, and deficts in attn, memory    Acceptance, E,TB,D, VU by CC at 1/29/2021 1509    Comment: Pt repositioned in reg w/c with 1/2 arm tray on Left. Nursing notified of change from recliner w/c/. Commode tsf from w/c to droparm commode discussed and shown to NA.    Acceptance, E, VU by BL at 1/8/2021 1434   Family Acceptance, E, VU by CC at 2/10/2021 1546    Comment: Family conf w pt, dgt and son in law. Status, DME, follow up theray and home needs discussed. Pt will need a droparm commode and possibly shower seat. Will provide info on jon harness for LUE. Family scheduled for teaching Monday.    Acceptance, E, VU by SL at 2/10/2021 1504    Comment: family conference- discussed need for visual and verbal cues for visual perceptual deficits, and deficts in attn, memory   Caregiver Acceptance, E,TB,D, VU by CC at 1/29/2021 1509    Comment: Pt repositioned in  reg w/c with 1/2 arm tray on Left. Nursing notified of change from recliner w/c/. Commode tsf from w/c to droparm commode discussed and shown to NA.                               User Key     Initials Effective Dates Name Provider Type Discipline    CC 06/08/18 -  Keya Miranda OTR Occupational Therapist OT    SL 06/08/18 -  Katelyn James, MS CCC-SLP Speech and Language Pathologist SLP     01/05/21 -  Marshall Oleary OT Occupational Therapist OT                    OT Recommendation and Plan                         Time Calculation:     Time Calculation- OT     Row Name 02/11/21 1000             Time Calculation- OT    OT Start Time  1000  -CC      OT Stop Time  1030  -CC      OT Time Calculation (min)  30 min  -CC      OT Non-Billable Time (min)  30 min tech  -CC        User Key  (r) = Recorded By, (t) = Taken By, (c) = Cosigned By    Initials Name Provider Type    CC Keya Miranda OTR Occupational Therapist        Therapy Charges for Today     Code Description Service Date Service Provider Modifiers Qty    37475583227 HC OT SELF CARE/MGMT/TRAIN EA 15 MIN 2/10/2021 Keya Miranda OTR GO 2    97895012744 HC OT THER SUPP EA 15 MIN 2/10/2021 Keya Miranda OTR GO 1    73553953438 HC OT SELF CARE/MGMT/TRAIN EA 15 MIN 2/11/2021 Keya Miranda OTR GO 2    51994698275 HC OT THER SUPP EA 15 MIN 2/11/2021 Keya Miranda OTR GO 2                   GIAN Blackwell  2/11/2021

## 2021-02-11 NOTE — PLAN OF CARE
Goal Outcome Evaluation:        Outcome Summary: A&OX4. Forgetful. Mostly continent B&B. Has been using the bed pan, bedside commode, and restroom. Urgency, frequency, stress incontinence. Ate fair at meals. Wears dentures. Stroke. Diet: Reg., cardiac, low fat, low cholesterol, low salt. W/C all meals. Pain meds every 4 hours. Nausea better controlled now that the zofran is scheduled instead of PRN. L side flaccid. 2 person assist or lift. Some visual field cuts, more to the L. Air mattress and wedge for comfort. Leans to L. O2 NC 2L at night. Slept between therapy sessions. Loop recorder to chest. On bowel regimen of 2 docusate sodium x2 daily, suppository in evening and miralax powder PRN. Last BM 2/10. Complained of L. shoulder pain. No UTI. D/C 2/16.

## 2021-02-11 NOTE — PROGRESS NOTES
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  Baseline pain complaints unchanged.  Baseline hip pain.  Participating in therapies.  Continues with better strength on the right side.    History taken from: patient    Objective     Vital Signs  Temp:  [97.5 °F (36.4 °C)-97.7 °F (36.5 °C)] 97.7 °F (36.5 °C)  Heart Rate:  [78-81] 78  Resp:  [16-20] 18  BP: (108-126)/(61-77) 117/69    Physical Exam  General: calm, in NAD  Neck: trachea midline  Cardio: well perfused distal extremities, regular radial pulse  Resp: normal WOB on RA, nonlabored.    Abdomen: NT/ND  Extremities:    No edema  No crepitus with range of motion of the left hip.  Neuro: dense left hemiparesis, flaccid tone  Left homonymous hemianopsia    Left inattention.  A&Ox4. 0/5 strength LUE/LLE.   Takes resistance on the right side.    Results from last 7 days   Lab Units 02/08/21  0709 02/05/21  0645   WBC 10*3/mm3 4.81 5.20   HEMOGLOBIN g/dL 11.7* 10.4*   HEMATOCRIT % 36.5 32.6*   PLATELETS 10*3/mm3 162 165     Results from last 7 days   Lab Units 02/08/21  0709 02/05/21  0645   SODIUM mmol/L 137 139   POTASSIUM mmol/L 4.3 4.2   CHLORIDE mmol/L 102 102   CO2 mmol/L 25.7 31.1*   BUN mg/dL 17 19   CREATININE mg/dL 0.61 0.68   CALCIUM mg/dL 9.1 9.1   GLUCOSE mg/dL 109* 114*             Ref. Range 1/8/2021 06:01   TSH Baseline Latest Ref Range: 0.270 - 4.200 uIU/mL 2.830   Vitamin B-12 Latest Ref Range: 211 - 946 pg/mL 381   25 Hydroxy, Vitamin D Latest Ref Range: 30.0 - 100.0 ng/ml 20.6 (L)     CT of the abdomen-January 16  CT Abdomen Pelvis With Contrast   Final Result       A cystic focus at the pancreatic head,  likely corresponds to the   ultrasound finding of concern, could be  further evaluated with   endoscopic ultrasound as indicated, interval follow-up also recommended   to exclude any possibility of a cystic neoplasm. The pancreas is   thinned. The main pancreatic duct shows a mildly prominent caliber, 4   mm.          US GALLBLADDER-  1/14/2021   IMPRESSION:  Small to moderate amount of gallbladder sludge with a few  possible tiny nonshadowing gallstones.  2. No wall thickening or pericholecystic fluid is seen. No sonographic  Garcia's sign is seen.  3. Fatty infiltration of the liver.  4. 1.2 cm hypoechoic pancreatic head lesion. No pancreatic head lesions  are seen on the CT of the abdomen and pelvis from [facility]  dated 12/11/2019.  5. CT of the abdomen with contrast using pancreatic protocol is  recommended for further assessment.    EXAMINATION: LIMITED LEFT BREAST SONOGRAPHY-January 20, 2021     HISTORY: 85-year-old female status post recent placement of a loop  recorder on the left side of the chest with an area of palpable concern  in the left breast.     FINDINGS: Targeted sonographic evaluation of the area of palpable  concern in the left breast which corresponds to the upper inner quadrant  was performed. No sonographic abnormality is appreciated.     IMPRESSION:  Negative targeted left breast sonography. Clinical followup  is suggested.     BI-RADS Category 1: Negative  Results Review:     I reviewed the patient's new clinical results.    Medication Review: Complete  Scheduled Meds:Alirocumab, 75 mg, Subcutaneous, Q14 Days  aspirin, 81 mg, Oral, Daily  bisacodyl, 10 mg, Rectal, Q24H  [START ON 3/6/2021] cholecalciferol, 1,000 Units, Oral, Daily  dilTIAZem CD, 180 mg, Oral, BID  docusate sodium, 200 mg, Oral, BID  enoxaparin, 40 mg, Subcutaneous, Daily  ferrous sulfate, 325 mg, Oral, Daily With Breakfast  levothyroxine, 125 mcg, Oral, Q AM  lidocaine, 1 patch, Transdermal, Q24H  losartan, 50 mg, Oral, Daily  mirtazapine, 15 mg, Oral, Nightly  ondansetron ODT, 4  mg, Oral, TID AC  oxybutynin, 5 mg, Oral, Daily With Dinner  pantoprazole, 40 mg, Oral, QAM  vitamin D, 50,000 Units, Oral, Q7 Days      Continuous Infusions:   PRN Meds:.HYDROcodone-acetaminophen  •  nitroglycerin  •  polyethylene glycol  •  traMADol      Assessment/Plan       Stroke (CMS/[facility])    Debility and functional deficits  -PT/OT/SLP     Acute CVA  -Ferderick 3 MRI at [facility] revealed acute infarct in the left temporal-occipital region, no TPA or MT  -Jan 4 had worsening of her NIHSS, MRI revealed a new right superior frontal gyrus acute infarct. Again outside of the window of TPA, not a candidate for mechanical thrombectomy  -continue ASA and Praluent as she is intolerant of statins  -Echo was unremarkable  -Holter normal  -Loop recorder placed  -A1c 5.0  -TSH and B12 level unremarkable    Loss of appetite  Weight loss  Nausea  -symptoms for approximately 8 months, has lost ~40lbs over that time per patient  -started Remeron 7.5 qHS  -GI consulted, appreciate recs. Started scheduled Zofran with meals on 1/13, continue Remeron.   Jan 14 - GI to check RUQ US, increase bowel regimen, continue Remeron and Zofran as above  January 15 -1.2 cm hypoechoic pancreatic head lesion on ultrasound.  To get CT of the abdomen tomorrow.  January 16 - CT scan showed pancreatic cyst.  Not felt to be the etiology of her nausea.  Follow-up as an outpatient.  January 17- Remeron increased to 15 mg nightly     Left breast nodule  -2x1cm nodule left breast at 10 o'clock position, borders feel smooth, nodule is not freely mobile, no overlying skin changes. Away from the site of recent left loop recorder placement Jan 5, 2021  -Last mammogram one year ago. May not tolerate mammogram with recent loop recorder placement. Patient discussed with Dr. Pride, left breast ultrasound ordered  -Left breast ultrasound negative at the area of interest in the left upper inner quadrant    HLD  -continue ASA and Praluent as she is intolerant of  statins     HTN  -continue cardizem, losartan  -normotensive goal  -monitor    Vit D insufficiency  -level 20 on admission  -started vitamin D replacement     Hypothyroidism  -continue levothyroxine    CAD  -continue ASA and Praluent   January 27-She had some sharp chest pain that was brief last evening at about 1 AM, resolved on its own.  Had nitroglycerin ordered but did not receive it.  She reports at home she did occasionally have chest pain and would take him to glycerin at home, stating that sometimes she would take it as she just felt nervous and worried with the chest pain.  She did feel some palpitations after the chest pain last evening.  We discussed seeing if anything was uploaded on her loop recorder to her cardiologist office and we will check those results.    Obstructive sleep apnea-January 27-she has been on 2 L nasal cannula oxygen at nighttime with sats 100%.  She did not have her CPAP brought in from home     Prerenal OBED, resolved  -secondary to decreased PO intake  -resolved prior to discharge from [facility]  -monitor electrolytes     Iron deficiency anemia  -received 2 doses of IV iron as well as 1UpRBCs on 1/4 at [facility]  -Hgb stable ~8 prior to discharge from [facility], 9.2 on admission to [facility]  -continue oral iron, monitor     Left hip pain  -xrays at [facility] unremarkable  Jan 13 - Fatigue continues to be an issue. She takes Norco 7.5 mg about 4 times a day with history of left hip pain-history of left femur fracture open reduction internal fixation, x-ray at the outside hospital on January 6 showed hardware in place.  January 14-She does have fatigue during the day.  Will try a lower dose of Norco 5 mg rather than 7.5 mg.  Is on Remeron 7.5 g at nighttime but that was only recently added.  January 15-she took Norco 5 mg twice a day at home.  More alert with better trunk control on lower dose of Norco today.  January 21-continues with left hip pain. Taking Norco 5 mg about 4 times a day. Add Lidoderm  patch.  January 25-does not feel Lidoderm patch that helpful.  Pain was better with addition of tramadol over the weekend.  Hip range of motion unremarkable with no crepitus  January 26-left hip xray did not show displacement of surgical hardware, fracture, erosion or dislocation  January 26-she feels tramadol works better for her than hydrocodone.  Anticipate going home on tramadol and discontinue hydrocodone.  She is presently taking tramadol twice a day.  Appears to be tolerating without any side effects.     Fluids/Electrolytes/Nutrition   -cardiac diet  -admission labs unremarkable     DVT ppx  -subQ Lovenox    /UTI  -on Ditropan 5 mg twice a day.  January 11 - voids with acceptable bladder scan postvoid residuals. Urinary tract infection with E. coli-sensitive to Macrobid  January 18 - required intermittent straight catheter 450 cc.  January 19 - incontinent and IC, PVRs 99-391cc  January 22 - has urge to urinate at times but cannot void, will decrease oxybutynin from bid to with dinner only. She is voiding with incontinence with postvoid residual 230-290 cc.  Discussed trial of decreasing Ditropan to 5 mg q. supper first from twice a day before doing a trial of Flomax.  January 23-urinary tract infection-E. coli-on cefdinir starting January 23.  Sensitive to cephalosporins  January 27-voids with postvoid residual  cc.  We will continue Ditropan at lower dose of 5 mg with supper  February 9-recurrent dysuria-recheck urinalysis with culture if indicated  Feb 10 - UA negative and PVR 53 cc      TEAM CONF - JAN 12 - BED MAX-DEP. TRANSFERS MAX 2 TRANSFER BOARD. NON-AMBULATORY. WHEELCHAIR 50 FEET MIN ASSIST. TOILET TRANSFERS DEP X 2. IMPAIRED VISION. BATH MAX. LBD DEP. UBD MAX. GROOMING MOD ASSIST. COGNITION - MILD TO MODERATE DEFICITS. 2L O2 AT HS. ON MACROBID FOR UTI. FATIGUES. HISTORY OF POOR APPETITE FOR MONTHS, WEIGHT LOSS 30-40 # OVER SEVERAL MONTHS AT HOME, HISTORY OF REPORTED NAUSEA AT HOME. WILL  CONSULT GI.   ELOS - 4 WEEKS.     TEAM CONF - JAN 19 - BED MAX 2 .  TRANSFERS MAX 2 TRANSFER BOARD. WHEELCHAIR MIN A DUE TO VISIN DEFICITS. HAS SAT UNSUPPORTED FOR 30 SECS BEFORE STARTS TO LEAN. TOILET TRANSFERS DEP X 2. BATH UB MAX, LB DEP X 2. LBD DEP X 2. UBD MAX. GROOMING MOD. LEFT SHOULDER SUBLUXATION - ADD LEUKOTAPING. VOIDS WITH ELEVATED PVRS WITH OCCASIONAL ISC. CONTINENT INCONTINENT.  LEFT INATTENTION, LEFT VISUAL FIELD CUT. IMPAIRED ATTENTION, EXECUTIVE FUNCTION, REASONING. WORKING MEMORY IMPROVING.  BNE (Active)  Att'n. - WNL  Exec. Fx. - Mild Imp.  Rsng/Jgmnt - Mildly Imp. for abstract reaosning  Arith - Min Imp.  Visuospatial Skills - Mildly Imp.  Visual Mem. - Mildly Imp.  Verbal Mem. - Mildly Imp.  Emot - Pt endorsed dep related to current status  DEPRESSION - SUPPORTIVE THERAPY. ON REMERON.   ELOS -   3 WEEKS    TEAM CONF - JAN 26 -  BED MAX 2. TRANSFERS MAX 2. WHEELCHAIR 50 FEET MIN ASSIST, IMPACTED BY VISUAL DEFICITS. LEFT HIP PAIN INCREASED, WILL REPEAT X-RAY. AT OUTSIDE HOSPITAL X-RAY ON JAN 6 - [ORIF hardware in the proximal femur is stable from 5/6/2020. No hardware loosening or perihardware fracture is detected. The hip joint space is mildly narrowed as before. IMPRESSION: Stable ORIF hardware; no acute abnormality.]  TOILET TRANSFERS MAX 2 AS PUSHES. BATH UBB MIN, LBB MAX 2. UBD MAX ASSIST. LBD DEPENDENT. GROOMING MIN ASSIST. ATTENDS TO LEFT A LITTLE BETTER. TOILETING DEP X 2. LEFT VISUAL FIELD DEFICITS. EXECUTIVE FUNCTION - IMPAIRED ATTENTION. MEMORY MIN-MOD DEFICITS. MOD-MAX VISUAL CUES TO LEFT SIDE. VOIDS. ON DECREASED DITROPAN TO 5 MG ONCE A DAY DUE TO ELEVATED PVRS. CONTINENT AT TIMES WITH BLADDER. BOWEL CONTINENT. PAIN MANAGEMENT - TRAMADOL ADDED. WITH REC THERAPY, CUES TO ATTEND TO LEFT.  ELOS -  2-3 WEEKS    TEAM CONF- BED MAX 2. TRANSFERS MAX SQUAT PIVOT, USES LIFT WITH NURSING. SITTING BALANCE BETTER. LEFT INATTENTION. WHEELCHAIR MIN ASSIST WITH VISUAL CUES, DUE TO LEFT VISUAL  INATTENTION/LEFT VISUAL FIELD CUT. STANDING AT PARALLEL BARS WITH BLOCKING LEFT KNEE. TOILET TRANSFERS MAX 2 TO DROPARM. BATH MIN UB AND MAX X 2 LB. DRESSING MOD-MAX UB AND DEP X 2 LB. GROOMING MIN-SBA. TOILETING COMES UP TO STAND A LITTLE BETTER, DEP X 2.   IMPAIRED ATTENTION. DISTRACTIBLE. FATIGUES QUICKLY WITH COGNITIVE TASKS. MIN-MOD MEMORY DEFICITS. MOD-MAX VERBAL AND VISUAL CUES FOR READING AND WRITING TASKS. INCONTINENT BOWEL AND BLADDER. PVRS LOW. CHRONIC LEFT HIP PAIN.   ELOS -  ONE WEEK, WILL NEED SIGNIFICANT ASSISTANCE AT HOME.    TEAM CONF - FEB 9 - LEFT HEMIPLEGIA UNCHANGED. KINESIOTAPING LEFT SHOULDER.  BED MAX 2. TRANSFERS MAX ASSIST SQUAT PIVOT. NON-AMBULATORY. WHEELCHAIR 100 FEET MIN ASSIST DUE TO VISUAL DEFICITS. TOILET TRANSFERS MAX 1. SHOWER TRANSFERS MAX ASSIST TO BENCH. TOILETING DEP X 2. UBB MIN. LBB MOD OF 2. LBD MOD-MAX WITH MAX 2 FOR STANDNG. UBD MOD ASSIST FOR PULLOVER. GROOMING MIN-SBA. CONTINENT /INCONTINENT. DITROPAN FOR OVERACTIVE BLADDER. ABLE TO SIT FOR ABOUT 75 SECS. IMPAIRED ATTENTION. FATIGUES QUICKLY. DISTRACTIBLE. LEFT VISUAL FIELD CUT. MIN-MOD MEMORY DEFICITS. MOD-MAX VISUAL CUES FOR READING/WRITING TASKS.   ELOS -   FAMILY PLANS TO HIRE ADDITIONAL HELP FOR HOME. HOME ON FEB 16. FAMILY TEACHING NEEDED.     Azael Abdullahi MD  02/11/21  11:45 EST    During rounds, used appropriate personal protective equipment including mask and gloves.  Additional gown if indicated.  Mask used was standard procedure mask. Appropriate PPE was worn during the entire visit.  Hand hygiene was completed before and after.

## 2021-02-11 NOTE — THERAPY TREATMENT NOTE
Inpatient Rehabilitation - Occupational Therapy Treatment Note    [facility]     Patient Name: Liana Carrero  : 1935  MRN: [id number]    Today's Date: 2021                 Admit Date: 2021         ICD-10-CM ICD-9-CM   1. Impaired functional mobility, balance, gait, and endurance  Z74.09 V49.89       Patient Active Problem List   Diagnosis   • Stroke (cerebrum) (CMS/[facility])   • Chest pain   • Hypertensive urgency   • Headache   • History of embolic stroke   • Hypothyroidism   • Hypokalemia   • CAD in native artery   • URTI (acute upper respiratory infection)   • Closed fracture of left hip (CMS/[facility])   • Chronic diastolic (congestive) heart failure (CMS/[facility])   • Coronary artery disease involving autologous vein coronary bypass graft without angina pectoris   • Impaired glucose tolerance   • Moderate malnutrition (CMS/[facility])   • Stroke (CMS/[facility])       Past Medical History:   Diagnosis Date   • Arthritis    • CHF (congestive heart failure) (CMS/[facility])    • Coronary artery disease    • Diabetes mellitus (CMS/[facility])     new DX related to stroke, started on insulin at hospital   • Disease of thyroid gland    • Elevated cholesterol    • GERD (gastroesophageal reflux disease)    • History of transfusion    • Hypertension    • PONV (postoperative nausea and vomiting)    • Sleep apnea    • Spinal headache    • Stroke (CMS/[facility])        Past Surgical History:   Procedure Laterality Date   • APPENDECTOMY     • CARDIAC CATHETERIZATION       x2   • CARDIAC CATHETERIZATION N/A 2019    Procedure: Left Heart Cath;  Surgeon: Bentley Chapin MD;  Location: [facility] CATH INVASIVE LOCATION;  Service: Cardiovascular   • CARDIAC CATHETERIZATION N/A 2019    Procedure: Coronary angiography;  Surgeon: Bentley Chapin MD;  Location: [facility] CATH INVASIVE LOCATION;  Service: Cardiovascular   • CARDIAC CATHETERIZATION N/A 2019    Procedure: Native mammary injection;  Surgeon: Bentley Chapin MD;  Location: [facility]  CATH INVASIVE LOCATION;  Service: Cardiovascular   • CARDIAC CATHETERIZATION N/A 12/26/2019    Procedure: Saphenous Vein Graft;  Surgeon: Bentley Chapin MD;  Location: [facility] CATH INVASIVE LOCATION;  Service: Cardiovascular   • COLON SURGERY     • COLONOSCOPY     • EYE SURGERY      lens implants and cataract surgery   • FEMUR IM NAILING/RODDING Left 3/26/2020    Procedure: LT.HIP NAILING/RODDING;  Surgeon: Carlos Khan II, MD;  Location: [facility] MAIN OR;  Service: Orthopedics;  Laterality: Left;   • FRACTURE SURGERY Left 2020   • HYSTERECTOMY     • VASCULAR SURGERY      varicous veins                IRF OT ASSESSMENT FLOWSHEET (last 12 hours)      IRF OT Evaluation and Treatment     Row Name 02/11/21 1544 02/11/21 1103       OT Time and Intention    Document Type  daily treatment  -KP  daily treatment  -CC    Mode of Treatment  individual therapy;occupational therapy  -KP  occupational therapy  -CC    Patient Effort  good  -KP  good  -CC    Symptoms Noted During/After Treatment  none  -KP  --    Row Name 02/11/21 1544          General Information    Patient/Family/Caregiver Comments/Observations  pt in wc in gym  -KP     Existing Precautions/Restrictions  fall L jon  -KP     Limitations/Impairments  safety/cognitive  -KP     Row Name 02/11/21 1544 02/11/21 1103       Vision Assessment/Intervention    Visual Motor Impairment  visual tracking, left  -KP  visual tracking, left  -CC    Visual Processing Deficit  jon-inattention/neglect, left  -KP  jon-inattention/neglect, left  -CC    Visual Treatment Interventions  scanning activities/strategies to L to inserts cones onto michelle  -KP  --    Row Name 02/11/21 1544 02/11/21 1103       Pain Scale: Numbers Pre/Post-Treatment    Pretreatment Pain Rating  0/10 - no pain  -KP  0/10 - no pain  -CC    Posttreatment Pain Rating  0/10 - no pain  -KP  0/10 - no pain  -CC    Row Name 02/11/21 1544          Bed Mobility    Sit-Supine [place] (Bed Mobility)  set  up;moderate assist (50% patient effort);minimum assist (75% patient effort);2 person assist mod from person one and min from person 2  -KP     Row Name 02/11/21 1544 02/11/21 1103       Transfers    Chair-Bed [place] (Transfers)  moderate assist (50% patient effort);set up;verbal cues;2 person assist and wc to mat and back to   -  --    [place] Level (Toilet Transfer)  --  verbal cues;nonverbal cues (demo/gesture);maximum assist (25% patient effort);1 person to manage equipment  -    Assistive Device (Toilet Transfer)  --  commode, bedside with drop arms  -CC    Row Name 02/11/21 1544          Chair-Bed Transfer    Assistive Device (Chair-Bed Transfers)  wheelchair  -KP     Row Name 02/11/21 1103          Toilet Transfer    Type (Toilet Transfer)  squat pivot  -CC     Row Name 02/11/21 1103          Balance    Static Sitting Balance  mild impairment  -     Static Standing Balance  severe impairment  -CC     Row Name 02/11/21 1544          Motor Skills    Motor Control/Coordination Interventions  weight-bearing activities while reach for cones R UE, wt shift and wt bear L EOM  -KP     Row Name 02/11/21 1544          Shoulder (Therapeutic Exercise)    Shoulder (Therapeutic Exercise)  PROM (passive range of motion)  -     Shoulder PROM (Therapeutic Exercise)  left;extension;flexion;10 repetitions;sitting  -KP     Row Name 02/11/21 1544          Elbow/Forearm (Therapeutic Exercise)    Elbow/Forearm PROM (Therapeutic Exercise)  left;flexion;extension;supination;pronation;sitting;10 repetitions  -KP     Row Name 02/11/21 1544          Wrist (Therapeutic Exercise)    Wrist PROM (Therapeutic Exercise)  left;flexion;extension;10 repetitions  -KP     Row Name 02/11/21 1544          Hand (Therapeutic Exercise)    Hand PROM (Therapeutic Exercise)  left;finger flexion;finger extension;other (see comments) 10 reps  -KP     Row Name 02/11/21 1544          Neuromuscular Re-education    Interventions  (Neuromuscular Re-education)  weight shifting;weight bearing  -     Positioning (Neuromuscular Re-education)  sitting EOM  -     Comment (Neuromuscular Re-education)  seated EOM while completed reaching for cone w. R UE and inserts on L UE.   -     Row Name 02/11/21 1103          Bathing    Sac Level (Bathing)  bathing skills;upper body;lower body;moderate assist (50% patient effort);verbal cues;nonverbal cues (demo/gesture)  -CC     Position (Bathing)  sink side;supported sitting;supported standing  -CC     Set-up Assistance (Bathing)  adjust water temperature;obtain supplies;open containers  -     Row Name 02/11/21 1103          Upper Body Dressing    Sac Level (Upper Body Dressing)  upper body dressing skills;doff;don;pull over garment;verbal cues;nonverbal cues (demo/gesture);moderate assist (50-74% patient effort)  -CC     Position (Upper Body Dressing)  supported sitting  -CC     Set-up Assistance (Upper Body Dressing)  obtain clothing  -     Comment (Upper Body Dressing)  jon dressing   -     Row Name 02/11/21 1103          Lower Body Dressing    Sac Level (Lower Body Dressing)  doff;don;pants/bottoms;socks;moderate assist (50% patient effort);maximum assist (25% patient effort)  -CC     Position (Lower Body Dressing)  supported sitting;supported standing  -CC     Comment (Lower Body Dressing)  x 2 for standing   -     Row Name 02/11/21 1103          Grooming    Sac Level (Grooming)  grooming skills;deodorant application;hair care, combing/brushing;oral care regimen;wash face, hands;standby assist;minimum assist (75% patient effort)  -CC     Position (Grooming)  sink side;supported sitting  -CC     Set-up Assistance (Grooming)  obtain supplies  -     Row Name 02/11/21 1103          Toileting    Sac Level (Toileting)  toileting skills;dependent (less than 25% patient effort)  -CC     Position (Toileting)  supported sitting;supported standing  -CC      Set-up Assistance (Toileting)  change pad/brief  -CC     Comment (Toileting)  x 2  -CC     Row Name 02/11/21 1544 02/11/21 1103       Positioning and Restraints    Pre-Treatment Position  sitting in chair/recliner  -KP  sitting in chair/recliner  -CC    Post Treatment Position  bed  -KP  wheelchair  -CC    In Bed  supine;call light within reach;encouraged to call for assist;exit alarm on  -KP  --    In Wheelchair  --  sitting;exit alarm on;with SLP  -CC    Row Name 02/11/21 1544          Daily Progress Summary (OT)    Overall Progress Toward Functional Goals (OT)  progressing toward functional goals as expected  -       User Key  (r) = Recorded By, (t) = Taken By, (c) = Cosigned By    Initials Name Effective Dates    CC Keya Miranda, OTR 06/08/18 -     KP Elisabeth Mims, OTR 06/08/18 -            Occupational Therapy Education                 Title: PT OT SLP Therapies (In Progress)     Topic: Occupational Therapy (Done)     Point: ADL training (Done)     Description:   Instruct learner(s) on proper safety adaptation and remediation techniques during self care or transfers.   Instruct in proper use of assistive devices.              Learning Progress Summary           Patient Acceptance, E, VU by CC at 2/10/2021 1546    Comment: Family conf w pt, dgt and son in law. Status, DME, follow up theray and home needs discussed. Pt will need a droparm commode and possibly shower seat. Will provide info on jon harness for LUE. Family scheduled for teaching Monday.    Acceptance, E, VU by SL at 2/10/2021 1504    Comment: family conference- discussed need for visual and verbal cues for visual perceptual deficits, and deficts in attn, memory    Acceptance, E,TB,D, VU by CC at 1/29/2021 1509    Comment: Pt repositioned in reg w/c with 1/2 arm tray on Left. Nursing notified of change from recliner w/c/. Commode tsf from w/c to droparm commode discussed and shown to NA.    Acceptance, E, VU by BL at 1/8/2021 1434    Family Acceptance, E, VU by CC at 2/10/2021 1546    Comment: Family conf w pt, dgt and son in law. Status, DME, follow up theray and home needs discussed. Pt will need a droparm commode and possibly shower seat. Will provide info on jon harness for LUE. Family scheduled for teaching Monday.    Acceptance, E, VU by SL at 2/10/2021 1504    Comment: family conference- discussed need for visual and verbal cues for visual perceptual deficits, and deficts in attn, memory   Caregiver Acceptance, E,TB,D, VU by CC at 1/29/2021 1509    Comment: Pt repositioned in reg w/c with 1/2 arm tray on Left. Nursing notified of change from recliner w/c/. Commode tsf from w/c to droparm commode discussed and shown to NA.                   Point: Home exercise program (Done)     Description:   Instruct learner(s) on appropriate technique for monitoring, assisting and/or progressing therapeutic exercises/activities.              Learning Progress Summary           Patient Acceptance, E, VU by CC at 2/10/2021 1546    Comment: Family conf w pt, dgt and son in law. Status, DME, follow up theray and home needs discussed. Pt will need a droparm commode and possibly shower seat. Will provide info on jon harness for LUE. Family scheduled for teaching Monday.    Acceptance, E, VU by SL at 2/10/2021 1504    Comment: family conference- discussed need for visual and verbal cues for visual perceptual deficits, and deficts in attn, memory    Acceptance, E, VU by BL at 1/8/2021 1434   Family Acceptance, E, VU by CC at 2/10/2021 1546    Comment: Family conf w pt, dgt and son in law. Status, DME, follow up theray and home needs discussed. Pt will need a droparm commode and possibly shower seat. Will provide info on jon harness for LUE. Family scheduled for teaching Monday.    Acceptance, E, VU by SL at 2/10/2021 1504    Comment: family conference- discussed need for visual and verbal cues for visual perceptual deficits, and deficts in attn, memory                    Point: Precautions (Done)     Description:   Instruct learner(s) on prescribed precautions during self-care and functional transfers.              Learning Progress Summary           Patient Acceptance, E, VU by CC at 2/10/2021 1546    Comment: Family conf w pt, dgt and son in law. Status, DME, follow up theray and home needs discussed. Pt will need a droparm commode and possibly shower seat. Will provide info on jon harness for LUE. Family scheduled for teaching Monday.    Acceptance, E, VU by SL at 2/10/2021 1504    Comment: family conference- discussed need for visual and verbal cues for visual perceptual deficits, and deficts in attn, memory    Acceptance, E, VU by BL at 1/8/2021 1434   Family Acceptance, E, VU by CC at 2/10/2021 1546    Comment: Family conf w pt, dgt and son in law. Status, DME, follow up theray and home needs discussed. Pt will need a droparm commode and possibly shower seat. Will provide info on jon harness for LUE. Family scheduled for teaching Monday.    Acceptance, E, VU by SL at 2/10/2021 1504    Comment: family conference- discussed need for visual and verbal cues for visual perceptual deficits, and deficts in attn, memory                   Point: Body mechanics (Done)     Description:   Instruct learner(s) on proper positioning and spine alignment during self-care, functional mobility activities and/or exercises.              Learning Progress Summary           Patient Acceptance, E, VU by CC at 2/10/2021 1546    Comment: Family conf w pt, dgt and son in law. Status, DME, follow up theray and home needs discussed. Pt will need a droparm commode and possibly shower seat. Will provide info on jon harness for LUE. Family scheduled for teaching Monday.    Acceptance, E, VU by SL at 2/10/2021 1504    Comment: family conference- discussed need for visual and verbal cues for visual perceptual deficits, and deficts in attn, memory    Acceptance, E,TB,D, VU by CC at 1/29/2021  1509    Comment: Pt repositioned in reg w/c with 1/2 arm tray on Left. Nursing notified of change from recliner w/c/. Commode tsf from w/c to droparm commode discussed and shown to NA.    Acceptance, E, VU by BL at 1/8/2021 1434   Family Acceptance, E, VU by CC at 2/10/2021 1546    Comment: Family conf w pt, dgt and son in law. Status, DME, follow up theray and home needs discussed. Pt will need a droparm commode and possibly shower seat. Will provide info on jon harness for LUE. Family scheduled for teaching Monday.    Acceptance, E, VU by  at 2/10/2021 1504    Comment: family conference- discussed need for visual and verbal cues for visual perceptual deficits, and deficts in attn, memory   Caregiver Acceptance, E,TB,D, VU by CC at 1/29/2021 1509    Comment: Pt repositioned in reg w/c with 1/2 arm tray on Left. Nursing notified of change from recliner w/c/. Commode tsf from w/c to droparm commode discussed and shown to NA.                               User Key     Initials Effective Dates Name Provider Type Discipline     06/08/18 -  Keya Miranda OTR Occupational Therapist OT     06/08/18 -  Katelyn James MS CCC-SLP Speech and Language Pathologist SLP     01/05/21 -  Marshall Oleary, OT Occupational Therapist OT                    OT Recommendation and Plan            Daily Progress Summary (OT)  Overall Progress Toward Functional Goals (OT): progressing toward functional goals as expected            Time Calculation:     Time Calculation- OT     Row Name 02/11/21 1551 02/11/21 1000          Time Calculation- OT    OT Start Time  1400  -KP  1000  -CC     OT Stop Time  1430  -KP  1030  -CC     OT Time Calculation (min)  30 min  -KP  30 min  -CC     OT Non-Billable Time (min)  30 min TS for tsf and tx  -KP  30 min tech  -CC       User Key  (r) = Recorded By, (t) = Taken By, (c) = Cosigned By    Initials Name Provider Type    CC Keya Miranda OTR Occupational Therapist    Elisabeth Gould,  OTR Occupational Therapist        Therapy Charges for Today     Code Description Service Date Service Provider Modifiers Qty    98598613246 HC OT THER SUPP EA 15 MIN 2/11/2021 Elisabeth Mims OTR GO 2    [id number]  OT NEUROMUSC RE EDUCATION EA 15 MIN 2/11/2021 Elisabeth Mims OTR GO 2                   GIAN Johnson  2/11/2021

## 2021-02-12 LAB
ANION GAP SERPL CALCULATED.3IONS-SCNC: 6 MMOL/L (ref 5–15)
BASOPHILS # BLD AUTO: 0.04 10*3/MM3 (ref 0–0.2)
BASOPHILS NFR BLD AUTO: 1 % (ref 0–1.5)
BUN SERPL-MCNC: 15 MG/DL (ref 8–23)
BUN/CREAT SERPL: 23.8 (ref 7–25)
CALCIUM SPEC-SCNC: 9.7 MG/DL (ref 8.6–10.5)
CHLORIDE SERPL-SCNC: 99 MMOL/L (ref 98–107)
CO2 SERPL-SCNC: 31 MMOL/L (ref 22–29)
CREAT SERPL-MCNC: 0.63 MG/DL (ref 0.57–1)
DEPRECATED RDW RBC AUTO: 62 FL (ref 37–54)
EOSINOPHIL # BLD AUTO: 0.14 10*3/MM3 (ref 0–0.4)
EOSINOPHIL NFR BLD AUTO: 3.4 % (ref 0.3–6.2)
ERYTHROCYTE [DISTWIDTH] IN BLOOD BY AUTOMATED COUNT: 20.2 % (ref 12.3–15.4)
GFR SERPL CREATININE-BSD FRML MDRD: 90 ML/MIN/1.73
GLUCOSE SERPL-MCNC: 116 MG/DL (ref 65–99)
HCT VFR BLD AUTO: 36.3 % (ref 34–46.6)
HGB BLD-MCNC: 12 G/DL (ref 12–15.9)
IMM GRANULOCYTES # BLD AUTO: 0.01 10*3/MM3 (ref 0–0.05)
IMM GRANULOCYTES NFR BLD AUTO: 0.2 % (ref 0–0.5)
LYMPHOCYTES # BLD AUTO: 1.35 10*3/MM3 (ref 0.7–3.1)
LYMPHOCYTES NFR BLD AUTO: 33 % (ref 19.6–45.3)
MCH RBC QN AUTO: 29 PG (ref 26.6–33)
MCHC RBC AUTO-ENTMCNC: 33.1 G/DL (ref 31.5–35.7)
MCV RBC AUTO: 87.7 FL (ref 79–97)
MONOCYTES # BLD AUTO: 0.38 10*3/MM3 (ref 0.1–0.9)
MONOCYTES NFR BLD AUTO: 9.3 % (ref 5–12)
NEUTROPHILS NFR BLD AUTO: 2.17 10*3/MM3 (ref 1.7–7)
NEUTROPHILS NFR BLD AUTO: 53.1 % (ref 42.7–76)
NRBC BLD AUTO-RTO: 0 /100 WBC (ref 0–0.2)
PLATELET # BLD AUTO: 174 10*3/MM3 (ref 140–450)
PMV BLD AUTO: 10.3 FL (ref 6–12)
POTASSIUM SERPL-SCNC: 4.2 MMOL/L (ref 3.5–5.2)
RBC # BLD AUTO: 4.14 10*6/MM3 (ref 3.77–5.28)
SODIUM SERPL-SCNC: 136 MMOL/L (ref 136–145)
WBC # BLD AUTO: 4.09 10*3/MM3 (ref 3.4–10.8)

## 2021-02-12 PROCEDURE — 97129 THER IVNTJ 1ST 15 MIN: CPT

## 2021-02-12 PROCEDURE — 85025 COMPLETE CBC W/AUTO DIFF WBC: CPT | Performed by: PHYSICAL MEDICINE & REHABILITATION

## 2021-02-12 PROCEDURE — 97112 NEUROMUSCULAR REEDUCATION: CPT

## 2021-02-12 PROCEDURE — 25010000002 ENOXAPARIN PER 10 MG: Performed by: PHYSICAL MEDICINE & REHABILITATION

## 2021-02-12 PROCEDURE — 97110 THERAPEUTIC EXERCISES: CPT

## 2021-02-12 PROCEDURE — 97116 GAIT TRAINING THERAPY: CPT

## 2021-02-12 PROCEDURE — 80048 BASIC METABOLIC PNL TOTAL CA: CPT | Performed by: PHYSICAL MEDICINE & REHABILITATION

## 2021-02-12 PROCEDURE — 97535 SELF CARE MNGMENT TRAINING: CPT

## 2021-02-12 PROCEDURE — 63710000001 ONDANSETRON ODT 4 MG TABLET DISPERSIBLE: Performed by: INTERNAL MEDICINE

## 2021-02-12 PROCEDURE — 97130 THER IVNTJ EA ADDL 15 MIN: CPT

## 2021-02-12 RX ORDER — OXYBUTYNIN CHLORIDE 5 MG/1
5 TABLET ORAL 2 TIMES DAILY WITH MEALS
Status: DISCONTINUED | OUTPATIENT
Start: 2021-02-12 | End: 2021-02-17 | Stop reason: HOSPADM

## 2021-02-12 RX ADMIN — TRAMADOL HYDROCHLORIDE 50 MG: 50 TABLET ORAL at 20:37

## 2021-02-12 RX ADMIN — ONDANSETRON 4 MG: 4 TABLET, ORALLY DISINTEGRATING ORAL at 06:29

## 2021-02-12 RX ADMIN — OXYBUTYNIN CHLORIDE 5 MG: 5 TABLET ORAL at 17:50

## 2021-02-12 RX ADMIN — LOSARTAN POTASSIUM 50 MG: 50 TABLET, FILM COATED ORAL at 08:17

## 2021-02-12 RX ADMIN — PANTOPRAZOLE SODIUM 40 MG: 40 TABLET, DELAYED RELEASE ORAL at 06:28

## 2021-02-12 RX ADMIN — BISACODYL 10 MG: 10 SUPPOSITORY RECTAL at 11:54

## 2021-02-12 RX ADMIN — LEVOTHYROXINE SODIUM 125 MCG: 0.12 TABLET ORAL at 06:29

## 2021-02-12 RX ADMIN — DILTIAZEM HYDROCHLORIDE 180 MG: 180 CAPSULE, COATED, EXTENDED RELEASE ORAL at 08:17

## 2021-02-12 RX ADMIN — MIRTAZAPINE 15 MG: 15 TABLET, FILM COATED ORAL at 20:34

## 2021-02-12 RX ADMIN — DOCUSATE SODIUM 200 MG: 100 CAPSULE, LIQUID FILLED ORAL at 08:17

## 2021-02-12 RX ADMIN — DOCUSATE SODIUM 200 MG: 100 CAPSULE, LIQUID FILLED ORAL at 20:34

## 2021-02-12 RX ADMIN — HYDROCODONE BITARTRATE AND ACETAMINOPHEN 1 TABLET: 5; 325 TABLET ORAL at 08:17

## 2021-02-12 RX ADMIN — FERROUS SULFATE TAB 325 MG (65 MG ELEMENTAL FE) 325 MG: 325 (65 FE) TAB at 08:17

## 2021-02-12 RX ADMIN — ASPIRIN 81 MG: 81 TABLET, CHEWABLE ORAL at 08:17

## 2021-02-12 RX ADMIN — DILTIAZEM HYDROCHLORIDE 180 MG: 180 CAPSULE, COATED, EXTENDED RELEASE ORAL at 20:34

## 2021-02-12 RX ADMIN — TRAMADOL HYDROCHLORIDE 50 MG: 50 TABLET ORAL at 11:53

## 2021-02-12 RX ADMIN — LIDOCAINE 1 PATCH: 50 PATCH TOPICAL at 11:50

## 2021-02-12 RX ADMIN — TRAMADOL HYDROCHLORIDE 50 MG: 50 TABLET ORAL at 03:03

## 2021-02-12 RX ADMIN — ENOXAPARIN SODIUM 40 MG: 40 INJECTION SUBCUTANEOUS at 08:17

## 2021-02-12 NOTE — PLAN OF CARE
Goal Outcome Evaluation:          Problem: Rehabilitation (IRF) Plan of Care  Goal: Plan of Care Review  Outcome: Ongoing, Progressing  Flowsheets  Taken 2/12/2021 0327 by Gloria Baeza RN  Outcome Summary: Liana is alert and oriented x 4 with some episodes of forgetfulness. Pleasant with all staff. Flat affect. Takes all meds whole PO. Continent of bowel and bladder, with incontinent episodes. Wears brief at HS. Calls out for bedpan frequently. Last BM 2/10. To be up in w/.c for all meals. Assist x 2 during all transfers. C/o of left shoulder pain this evening. Norco administered at 2052 and tramadol at 0300. Continues to refuse SCD. 1L o2 nc at HS. No further complaints. Resting in bed with eyes closed. Call light within reach.

## 2021-02-12 NOTE — PROGRESS NOTES
Inpatient Rehabilitation Plan of Care Note    Plan of Care  Care Plan Reviewed - Updates as Follows    Body Systems    [RN] Respiratory(Active)  Current Status(02/12/2021): O2 2L QHS  Weekly Goal(02/16/2021): Same as discharge  Discharge Goal: Least restrictive amount of 02    Performed Intervention(s)  O2 2L/NC @ HS  assess lungs, sats every shift      Psychosocial    [RN] Coping/Adjustment(Active)  Current Status(02/12/2021): Patient has supportive family, but at risk of  ineffective coping regarding current situation.  Weekly Goal(02/16/2021): Allow patient the opportunity to discuss concerns  regarding new situation.  Discharge Goal: Patient will have healthy coping mechanisms at time of discharge    Performed Intervention(s)  Verbalize needs and concerns  Medications as needed/ ordered  Therapeutic environmental set up      Safety    [RN] Potential for Injury(Active)  Current Status(02/12/2021): Patient at risk of injury related to previous falls.  flaccid on left side. dependent with transfers of 2.  Weekly Goal(02/16/2021): Paitent will use call light appropriately while in  Rehab.  Discharge Goal: Patient will continue to use call light while in Rehab  environment.    Performed Intervention(s)  Items with in reach and environmental set up  Bed alarm/ Chair alarm  Safety rounds and falls protocols/ precautions      Sphincter Control    [RN] Bladder Management(Active)  Current Status(02/12/2021): Patient is continent and incontinent, using bedpan,  PVRs, requiring I/C at times  Weekly Goal(02/16/2021): Patient will be 50% continent  Discharge Goal: Patient will be 100% continent    [RN] Bowel Management(Active)  Current Status(02/12/2021): Patient continent and incontinent  Weekly Goal(02/16/2021): continent 50% of the time  Discharge Goal: Patient will continent 100% of the time    Performed Intervention(s)  Proper diet and fluid intake  Medication as ordered  Incontinent care if needed  Monitor intake and  output    Signed by: Gloria Baeza RN

## 2021-02-12 NOTE — THERAPY PROGRESS REPORT/RE-CERT
Inpatient Rehabilitation - Physical Therapy Progress Note       [facility]     Patient Name: Liana Carrero  : 1935  MRN: [id number]    Today's Date: 2021                    Admit Date: 2021      Visit Dx:     ICD-10-CM ICD-9-CM   1. Impaired functional mobility, balance, gait, and endurance  Z74.09 V49.89       Patient Active Problem List   Diagnosis   • Stroke (cerebrum) (CMS/[facility])   • Chest pain   • Hypertensive urgency   • Headache   • History of embolic stroke   • Hypothyroidism   • Hypokalemia   • CAD in native artery   • URTI (acute upper respiratory infection)   • Closed fracture of left hip (CMS/[facility])   • Chronic diastolic (congestive) heart failure (CMS/[facility])   • Coronary artery disease involving autologous vein coronary bypass graft without angina pectoris   • Impaired glucose tolerance   • Moderate malnutrition (CMS/[facility])   • Stroke (CMS/[facility])       Past Medical History:   Diagnosis Date   • Arthritis    • CHF (congestive heart failure) (CMS/[facility])    • Coronary artery disease    • Diabetes mellitus (CMS/[facility])     new DX related to stroke, started on insulin at hospital   • Disease of thyroid gland    • Elevated cholesterol    • GERD (gastroesophageal reflux disease)    • History of transfusion    • Hypertension    • PONV (postoperative nausea and vomiting)    • Sleep apnea    • Spinal headache    • Stroke (CMS/[facility])        Past Surgical History:   Procedure Laterality Date   • APPENDECTOMY     • CARDIAC CATHETERIZATION       x2   • CARDIAC CATHETERIZATION N/A 2019    Procedure: Left Heart Cath;  Surgeon: Bentley Chapin MD;  Location:  [facility] CATH INVASIVE LOCATION;  Service: Cardiovascular   • CARDIAC CATHETERIZATION N/A 2019    Procedure: Coronary angiography;  Surgeon: Bentley Chapin MD;  Location:  [facility] CATH INVASIVE [facility]ATION;  Service: Cardiovascular   • CARDIAC CATHETERIZATION N/A 2019    Procedure: Native mammary injection;  Surgeon: Bentley Chapin MD;   Location:  [facility] CATH INVASIVE LOCATION;  Service: Cardiovascular   • CARDIAC CATHETERIZATION N/A 12/26/2019    Procedure: Saphenous Vein Graft;  Surgeon: Bentley Chapin MD;  Location: [facility] CATH INVASIVE LOCATION;  Service: Cardiovascular   • COLON SURGERY     • COLONOSCOPY     • EYE SURGERY      lens implants and cataract surgery   • FEMUR IM NAILING/RODDING Left 3/26/2020    Procedure: LT.HIP NAILING/RODDING;  Surgeon: Carlos Khan II, MD;  Location: [facility] MAIN OR;  Service: Orthopedics;  Laterality: Left;   • FRACTURE SURGERY Left 2020   • HYSTERECTOMY     • VASCULAR SURGERY      varicous veins          PT ASSESSMENT (last 12 hours)      IRF PT Evaluation and Treatment     Row Name 02/12/21 1500          PT Time and Intention    Document Type  progress note  -MS     Mode of Treatment  physical therapy  -MS     Patient/Family/Caregiver Comments/Observations  AM: up in wc, fatigued; PM: in bed but reported got good rest  -MS     Total Minutes, Physical Therapy  60  -MS     Row Name 02/12/21 1500          General Information    Existing Precautions/Restrictions  fall L jon and sore L wrist  -MS     Row Name 02/12/21 1500          Vision Assessment/Intervention    Visual Impairment/Limitations  visual/perceptual impairments present  -MS     Visual Field Deficit  homonymous hemianopsia, left  -MS     Visual Motor Impairment  visual tracking, left  -MS     Visual Processing Deficit  jon-inattention/neglect, left  -MS     Row Name 02/12/21 1500          Cognition/Psychosocial    Affect/Mental Status (Cognitive)  WFL  -MS     Orientation Status (Cognition)  oriented x 3  -MS     Follows Commands (Cognition)  follows two-step commands;physical/tactile prompts required;verbal cues/prompting required;repetition of directions required  -MS     Personal Safety Interventions  fall prevention program maintained;gait belt;nonskid shoes/slippers when out of bed  -MS     Cognitive Function (Cognitive)   attention deficit  -MS     Attention Deficit (Cognitive)  distractible in noisy environment;divided attention  -MS     Row Name 02/12/21 1500          Pain Scale: Numbers Pre/Post-Treatment    Pretreatment Pain Rating  4/10  -MS     Pain Location - Side  Right  -MS     Pain Location  knee  -MS     Row Name 02/12/21 1500          Bed Mobility    Supine-Sit [place] (Bed Mobility)  maximum assist (25% patient effort);verbal cues  -MS     Bed Mobility, Safety Issues  decreased use of legs for bridging/pushing;impaired trunk control for bed mobility  -MS     Row Name 02/12/21 1500          Transfer Assessment/Treatment    Transfers  stand pivot/stand step transfer  -MS     Row Name 02/12/21 1500          Transfers    Sit-Stand [place] (Transfers)  maximum assist (25% patient effort);verbal cues;nonverbal cues (demo/gesture);2 person assist  -MS     Stand-Sit [place] (Transfers)  maximum assist (25% patient effort);2 person assist;verbal cues;nonverbal cues (demo/gesture)  -MS     Row Name 02/12/21 1500          Sit-Stand Transfer    Assistive Device (Sit-Stand Transfers)  wheelchair jon bar  -MS     Row Name 02/12/21 1500          Stand-Sit Transfer    Assistive Device (Stand-Sit Transfers)  wheelchair jon bar  -MS     Row Name 02/12/21 1500          Stand Pivot/Stand Step Transfer    Stand Pivot/Stand Step [place] (Transfers)  maximum assist (25% patient effort);verbal cues;nonverbal cues (demo/gesture)  -MS     Assistive Device (Stand Pivot Stand Step Transfer)  wheelchair  -MS     Row Name 02/12/21 1500          Gait/Stairs (Locomotion)    [place] Level (Gait)  maximum assist (25% patient effort);2 person assist;verbal cues;nonverbal cues (demo/gesture)  -MS     Assistive Device (Gait)  -- jon bar  -MS     Distance in Feet (Gait)  2 trials: 2 steps and 10 steps  -MS     Pattern (Gait)  step-to  -MS     Deviations/Abnormal Patterns (Gait)  gait speed decreased  -MS     Bilateral Gait  Deviations  leans right;forward flexed posture  -MS     Left Sided Gait Deviations  foot drop/toe drag;knee buckling, left side;lateral trunk flexion;Trendelenburg sign;weight shift ability decreased  -MS     Right Sided Gait Deviations  weight shift ability decreased  -MS     Row Name 02/12/21 1500          Wheelchair Mobility/Management    Forward Propulsion [place] (Wheelchair)  minimum assist (75% patient effort)  -MS     Distance Propelled in Feet (Wheelchair)  75  -MS     Row Name 02/12/21 1500          Balance    Static Sitting Balance  mild impairment;sitting, edge of bed  -MS     Dynamic Sitting Balance  mild impairment;sitting, edge of bed  -MS     Sit to Stand Dynamic Balance  moderate impairment;supported  -MS     Static Standing Balance  moderate impairment;supported  -MS     Row Name 02/12/21 1500          Knee (Therapeutic Exercise)    Knee PROM (Therapeutic Exercise)  sitting;left;flexion;extension;10 repetitions  -MS     Knee Strengthening (Therapeutic Exercise)  sitting;right;LAQ (long arc quad);hamstring curls;resistance band;red  -MS     Row Name 02/12/21 1500          Bed Mobility Goal 1 (PT-IRF)    Activity/Assistive Device (Bed Mobility Goal 1, PT-IRF)  bed mobility activities, all  -MS     Progress/Outcomes (Bed Mobility Goal 1, PT-IRF)  medical status inhibiting progress  -MS     Row Name 02/12/21 1500          Bed Mobility Goal 2 (PT-IRF)    Activity/Assistive Device (Bed Mobility Goal 2, PT-IRF)  bed mobility activities, all  -MS     [place] Level (Bed Mobility Goal 2, PT-IRF)  moderate assist (50-74% patient effort);maximum assist (25-49% patient effort)  -MS     Time Frame (Bed Mobility Goal 2, PT-IRF)  long-term goal (LTG)  -MS     Progress/Outcomes (Bed Mobility Goal 2, PT-IRF)  goal revised this date  -MS     Row Name 02/12/21 1500          Transfer Goal 1 (PT-IRF)    Activity/Assistive Device (Transfer Goal 1, PT-IRF)  bed-to-chair/chair-to-bed;wheelchair transfer  -MS      Progress/Outcomes (Transfer Goal 1, PT-IRF)  medical status inhibited participation  -MS     Row Name 02/12/21 1500          Transfer Goal 2 (PT-IRF)    Activity/Assistive Device (Transfer Goal 2, PT-IRF)  all transfers  -MS     [place] Level (Transfer Goal 2, PT-IRF)  maximum assist (25-49% patient effort)  -MS     Time Frame (Transfer Goal 2, PT-IRF)  long-term goal (LTG)  -MS     Progress/Outcomes (Transfer Goal 2, PT-IRF)  goal revised this date  -MS     Row Name 02/12/21 1500          Gait/Walking Locomotion Goal 1 (PT-IRF)    Activity/Assistive Device (Gait/Walking Locomotion Goal 1, PT-IRF)  gait (walking locomotion)  -MS     Gait/Walking Locomotion Distance Goal 1 (PT-IRF)  15 steps  -MS     [place] Level (Gait/Walking Locomotion Goal 1, PT-IRF)  maximum assist (25-49% patient effort);verbal cues required  -MS     Time Frame (Gait/Walking Locomotion Goal 1, PT-IRF)  long-term goal (LTG)  -MS     Progress/Outcomes (Gait/Walking Locomotion Goal 1, PT-IRF)  goal revised this date  -MS     Row Name 02/12/21 1500          Wheelchair Locomotion Goal 1 (PT-IRF)    Activity (Wheelchair Locomotion Goal 1, PT-IRF)  wheelchair mobility skills, all  -MS     [place] Level (Wheelchair Locomotion Goal 1, PT-IRF)  contact guard assist  -MS     Distance Goal 1 (Wheelchair Locomotion, PT-IRF)  100  -MS     Time Frame (Wheelchair Locomotion Goal 1, PT-IRF)  long-term goal (LTG)  -MS     Progress/Outcomes (Wheelchair Locomotion Goal 1, PT-IRF)  goal revised this date  -MS     Row Name 02/12/21 1500          Positioning and Restraints    Pre-Treatment Position  sitting in chair/recliner  -MS     Post Treatment Position  wheelchair  -MS     In Wheelchair  sitting;with OT  -MS       User Key  (r) = Recorded By, (t) = Taken By, (c) = Cosigned By    Initials Name Provider Type    Nati Quinteros, PT Physical Therapist        Wound 01/07/21 Left upper chest Incision (Active)   Dressing Appearance intact;dry  02/12/21 0822   Closure Liquid skin adhesive;Open to air 02/12/21 0822   Base clean;dry 02/12/21 0822   Periwound intact 02/12/21 0822   Drainage Amount none 02/12/21 0822   Dressing Care open to air 02/12/21 0822   Periwound Care dry periwound area maintained 02/12/21 0822     Physical Therapy Education                 Title: PT OT SLP Therapies (Done)     Topic: Physical Therapy (Done)     Point: Mobility training (Done)     Learning Progress Summary           Patient Acceptance, E,TB, VU,NR by MS at 2/12/2021 1507    Acceptance, E,D, NR by KP at 2/11/2021 1120    Comment: standing posture and gait sequence    Acceptance, E, VU by  at 2/10/2021 1504    Comment: family conference- discussed need for visual and verbal cues for visual perceptual deficits, and deficts in attn, memory    Acceptance, E,TB, VU,NR by MS at 2/10/2021 1401    Acceptance, E,TB, VU,NR by MS at 2/9/2021 1531    Eager, E,D, NR by KP at 2/9/2021 1119    Comment: standing posture, balance    Acceptance, E,TB, VU,NR by MS at 2/8/2021 1152    Acceptance, E,D, DU,NR by LB at 2/6/2021 1539    Comment: wt shift in standing    Acceptance, E,TB, VU,NR by MS at 2/5/2021 1208    Acceptance, E,TB, VU,NR by MS at 2/4/2021 1518    Acceptance, E,TB, VU,NR by MS at 2/3/2021 1151    Acceptance, E,TB, VU,NR by MS at 2/2/2021 1210    Acceptance, E,TB, NR by MS at 2/1/2021 1145    Acceptance, E, NR by  at 1/30/2021 1336    Acceptance, E,TB, VU,NR by MS at 1/29/2021 1535    Acceptance, E, NR by LB1 at 1/28/2021 1032    Acceptance, E,TB, VU,NR by MS at 1/27/2021 1447    Acceptance, E,TB, VU,NR by MS at 1/26/2021 0959    Acceptance, E,TB, VU,NR by MS at 1/25/2021 1316    Acceptance, E,TB, VU,NR by MS at 1/22/2021 0918    Acceptance, E,TB, VU by MS at 1/20/2021 1544    Acceptance, E,TB, VU,NR by MS at 1/19/2021 1401    Acceptance, E,TB, NR,NL by MS at 1/18/2021 1432    Acceptance, E,TB, VU,NR by IVAN at 1/16/2021 1444    Acceptance, E,TB, NR,NL by MS at 1/15/2021  1418    Acceptance, E,TB, NR by MS at 1/14/2021 1223    Acceptance, E,TB, NR by LB2 at 1/13/2021 1030    Comment: sitting balance    Acceptance, E,TB, NR by MS at 1/12/2021 1312    Acceptance, E,TB, VU,NR by MS at 1/11/2021 1140   Family Acceptance, E, VU by SL at 2/10/2021 1504    Comment: family conference- discussed need for visual and verbal cues for visual perceptual deficits, and deficts in attn, memory                   Point: Home exercise program (Done)     Learning Progress Summary           Patient Acceptance, E,TB, VU,NR by MS at 2/12/2021 1507    Acceptance, E, VU by SL at 2/10/2021 1504    Comment: family conference- discussed need for visual and verbal cues for visual perceptual deficits, and deficts in attn, memory    Acceptance, E,TB, VU,NR by MS at 2/10/2021 1401    Acceptance, E,TB, VU,NR by MS at 2/9/2021 1531    Acceptance, E,TB, VU,NR by MS at 2/8/2021 1152    Acceptance, E,TB, VU,NR by MS at 2/5/2021 1208    Acceptance, E,TB, VU,NR by MS at 2/4/2021 1518    Acceptance, E,TB, VU,NR by MS at 2/3/2021 1151    Acceptance, E,TB, VU,NR by MS at 2/2/2021 1210    Acceptance, E,TB, NR by MS at 2/1/2021 1145    Acceptance, E, NR by  at 1/30/2021 1336    Acceptance, E,TB, VU,NR by MS at 1/29/2021 1535    Acceptance, E,TB, VU,NR by MS at 1/27/2021 1447    Acceptance, E,TB, VU,NR by MS at 1/26/2021 0959    Acceptance, E,TB, VU,NR by MS at 1/25/2021 1316    Acceptance, E,TB, VU,NR by MS at 1/22/2021 0918    Acceptance, E,TB, VU by MS at 1/20/2021 1544    Acceptance, E,TB, VU,NR by MS at 1/19/2021 1401    Acceptance, E,TB, NR,NL by MS at 1/18/2021 1432    Acceptance, E,TB, NR,NL by MS at 1/15/2021 1418    Acceptance, E,TB, NR by MS at 1/14/2021 1223   Family Acceptance, E, VU by SL at 2/10/2021 1504    Comment: family conference- discussed need for visual and verbal cues for visual perceptual deficits, and deficts in attn, memory                   Point: Body mechanics (Done)     Learning Progress Summary            Patient Acceptance, E,TB, VU,NR by MS at 2/12/2021 1507    Acceptance, E, VU by SL at 2/10/2021 1504    Comment: family conference- discussed need for visual and verbal cues for visual perceptual deficits, and deficts in attn, memory    Acceptance, E,TB, VU,NR by MS at 2/10/2021 1401    Acceptance, E,TB, VU,NR by MS at 2/9/2021 1531    Acceptance, E,TB, VU,NR by MS at 2/8/2021 1152    Acceptance, E,TB, VU,NR by MS at 2/5/2021 1208    Acceptance, E,TB, VU,NR by MS at 2/4/2021 1518    Acceptance, E,TB, VU,NR by MS at 2/3/2021 1151    Acceptance, E,TB, VU,NR by MS at 2/2/2021 1210    Acceptance, E,TB, NR by MS at 2/1/2021 1145    Acceptance, E, NR by  at 1/30/2021 1336    Acceptance, E,TB, VU,NR by MS at 1/29/2021 1535    Acceptance, E,TB, VU,NR by MS at 1/27/2021 1447    Acceptance, E,TB, VU,NR by MS at 1/26/2021 0959    Acceptance, E,TB, VU,NR by MS at 1/25/2021 1316    Acceptance, E,TB, VU,NR by MS at 1/22/2021 0918    Acceptance, E,TB, VU by MS at 1/20/2021 1544    Acceptance, E,TB, VU,NR by MS at 1/19/2021 1401    Acceptance, E,TB, NR,NL by MS at 1/15/2021 1418    Acceptance, E,TB, NR by MS at 1/14/2021 1223    Acceptance, E,TB, NR by MS at 1/12/2021 1312    Acceptance, E,TB, VU,NR by MS at 1/11/2021 1140   Family Acceptance, E, VU by SL at 2/10/2021 1504    Comment: family conference- discussed need for visual and verbal cues for visual perceptual deficits, and deficts in attn, memory                   Point: Precautions (Done)     Learning Progress Summary           Patient Acceptance, E,TB, VU,NR by MS at 2/12/2021 1507    Acceptance, E, VU by SL at 2/10/2021 1504    Comment: family conference- discussed need for visual and verbal cues for visual perceptual deficits, and deficts in attn, memory    Acceptance, E,TB, VU,NR by MS at 2/10/2021 1401    Acceptance, E,TB, VU,NR by MS at 2/9/2021 1531    Acceptance, E,TB, VU,NR by MS at 2/8/2021 1152    Acceptance, E,TB, VU,NR by MS at 2/5/2021 1208     Acceptance, E,TB, VU,NR by MS at 2/4/2021 1518    Acceptance, E,TB, VU,NR by MS at 2/3/2021 1151    Acceptance, E,TB, VU,NR by MS at 2/2/2021 1210    Acceptance, E,TB, NR by MS at 2/1/2021 1145    Acceptance, E, NR by  at 1/30/2021 1336    Acceptance, E,TB, VU,NR by MS at 1/29/2021 1535    Acceptance, E,TB, VU,NR by MS at 1/27/2021 1447    Acceptance, E,TB, VU,NR by MS at 1/26/2021 0959    Acceptance, E,TB, VU,NR by MS at 1/25/2021 1316    Acceptance, E,TB, VU,NR by MS at 1/22/2021 0918    Acceptance, E, VU by MD at 1/21/2021 0926    Acceptance, E,TB, VU by MS at 1/20/2021 1544    Acceptance, E,TB, VU,NR by MS at 1/19/2021 1401    Acceptance, E,TB, NR,NL by MS at 1/15/2021 1418    Acceptance, E,TB, NR by MS at 1/14/2021 1223    Acceptance, E,TB, NR by MS at 1/12/2021 1312    Acceptance, E,TB, VU,NR by MS at 1/11/2021 1140    Acceptance, E, VU by MD at 1/9/2021 0933   Family Acceptance, E, VU by  at 2/10/2021 1504    Comment: family conference- discussed need for visual and verbal cues for visual perceptual deficits, and deficts in attn, memory                               User Key     Initials Effective Dates Name Provider Type Discipline    LB 06/08/18 -  Elba Zarate, PT Physical Therapist PT    SL 06/08/18 -  Katelyn James, MS CCC-SLP Speech and Language Pathologist SLP    IVAN 06/08/18 -  Ela Pritchett, PT Physical Therapist PT    LB2 04/03/18 -  Jazmin Schuster, PT Physical Therapist PT     04/03/18 -  Elba Myrick, PT Physical Therapist PT    LB1 03/07/18 -  Elba Grigsby, PTA Physical Therapy Assistant PT    MD 04/03/18 -  Lisandra Lowery, PT Physical Therapist PT    KP 04/03/18 -  Dixie Graham, PT Physical Therapist PT    MS 03/04/19 -  Nati Kaba, PT Physical Therapist PT                PT Recommendation and Plan    Planned Therapy Interventions (PT): balance training, bed mobility training, gait training, home exercise program, postural re-education, patient/family education,  neuromuscular re-education, strengthening, transfer training, wheelchair management/propulsion training, ROM (range of motion)  Frequency of Treatment (PT): 60 minutes per session  Anticipated Equipment Needs at Discharge (PT Eval): wheelchair(noted patient already has wc(?) will get details)      Goals updated and revised as appropriate. Patient continues to require maxA for all mobility primarily though sometimes able to perform with modA for bed mobility and transfers. Patient has performed multiple gait trials at jon bars this week and has ambulated up to 10 steps maxAx2 at the jon bars. Plan is for DC home Tuesday with  PT to follow and 24/7 SV from two persons. Will continue to advance as able and prepare for DC.    Patient was wearing a face mask during this therapy encounter. Therapist used appropriate personal protective equipment including eye protection, mask, and gloves.  Mask used was standard procedure mask. Appropriate PPE was worn during the entire therapy session. Hand hygiene was completed before and after therapy session. Patient is not in enhanced droplet precautions.     Patient unable to safely use a cane or walker. Due to immobility relayed to diagnosis, a lightweight wheelchair is needed inside the home to assist with daily living activities. Patient unable to propel standard wheelchair but patient has the mental capability and upper body strength to propel a lightweight wheelchair.     Time Calculation:     PT Charges     Row Name 02/12/21 1507 02/12/21 1500          Time Calculation    Start Time  1330  -MS  1100  -MS     Stop Time  1400  -MS  1130  -MS     Time Calculation (min)  30 min  -MS  30 min  -MS     PT Received On  --  02/12/21  -MS     PT - Next Appointment  --  02/13/21  -MS     PT Goal Re-Cert Due Date  --  02/19/21  -MS       User Key  (r) = Recorded By, (t) = Taken By, (c) = Cosigned By    Initials Name Provider Type    Nati Quinteros, PT Physical Therapist           Therapy Charges for Today     Code Description Service Date Service Provider Modifiers Qty    16744841862  PT THER PROC EA 15 MIN 2/12/2021 KabaNati, PT GP 2    [id number]  GAIT TRAINING EA 15 MIN 2/12/2021 KabaNati, PT GP 2    [id number]  PT THER SUPP EA 15 MIN 2/12/2021 KabaNati, PT GP 4                   Nati Casarezs, PT  2/12/2021

## 2021-02-12 NOTE — THERAPY TREATMENT NOTE
Inpatient Rehabilitation - Occupational Therapy Treatment Note    [facility]     Patient Name: Liana Carrero  : 1935  MRN: [id number]    Today's Date: 2021                 Admit Date: 2021         ICD-10-CM ICD-9-CM   1. Impaired functional mobility, balance, gait, and endurance  Z74.09 V49.89       Patient Active Problem List   Diagnosis   • Stroke (cerebrum) (CMS/[facility])   • Chest pain   • Hypertensive urgency   • Headache   • History of embolic stroke   • Hypothyroidism   • Hypokalemia   • CAD in native artery   • URTI (acute upper respiratory infection)   • Closed fracture of left hip (CMS/[facility])   • Chronic diastolic (congestive) heart failure (CMS/[facility])   • Coronary artery disease involving autologous vein coronary bypass graft without angina pectoris   • Impaired glucose tolerance   • Moderate malnutrition (CMS/[facility])   • Stroke (CMS/[facility])       Past Medical History:   Diagnosis Date   • Arthritis    • CHF (congestive heart failure) (CMS/[facility])    • Coronary artery disease    • Diabetes mellitus (CMS/[facility])     new DX related to stroke, started on insulin at hospital   • Disease of thyroid gland    • Elevated cholesterol    • GERD (gastroesophageal reflux disease)    • History of transfusion    • Hypertension    • PONV (postoperative nausea and vomiting)    • Sleep apnea    • Spinal headache    • Stroke (CMS/[facility])        Past Surgical History:   Procedure Laterality Date   • APPENDECTOMY     • CARDIAC CATHETERIZATION       x2   • CARDIAC CATHETERIZATION N/A 2019    Procedure: Left Heart Cath;  Surgeon: Bentley Chapin MD;  Location: [facility] CATH INVASIVE LOCATION;  Service: Cardiovascular   • CARDIAC CATHETERIZATION N/A 2019    Procedure: Coronary angiography;  Surgeon: Bentley Chapin MD;  Location: [facility] CATH INVASIVE LOCATION;  Service: Cardiovascular   • CARDIAC CATHETERIZATION N/A 2019    Procedure: Native mammary injection;  Surgeon: Bentley Chapin MD;  Location: [facility]  CATH INVASIVE LOCATION;  Service: Cardiovascular   • CARDIAC CATHETERIZATION N/A 12/26/2019    Procedure: Saphenous Vein Graft;  Surgeon: Bentley Chapin MD;  Location: [facility] CATH INVASIVE LOCATION;  Service: Cardiovascular   • COLON SURGERY     • COLONOSCOPY     • EYE SURGERY      lens implants and cataract surgery   • FEMUR IM NAILING/RODDING Left 3/26/2020    Procedure: LT.HIP NAILING/RODDING;  Surgeon: Carlso Khan II, MD;  Location: [facility] MAIN OR;  Service: Orthopedics;  Laterality: Left;   • FRACTURE SURGERY Left 2020   • HYSTERECTOMY     • VASCULAR SURGERY      varicous veins                IRF OT ASSESSMENT FLOWSHEET (last 12 hours)      IRF OT Evaluation and Treatment     Row Name 02/12/21 1200          OT Time and Intention    Document Type  daily treatment  -CC     Mode of Treatment  individual therapy;occupational therapy  -CC     Patient Effort  good  -CC     Row Name 02/12/21 1200          Pain Scale: Numbers Pre/Post-Treatment    Pretreatment Pain Rating  0/10 - no pain  -CC     Posttreatment Pain Rating  0/10 - no pain  -CC     Row Name 02/12/21 1200          Bathing    [place] Level (Bathing)  bathing skills;upper body;lower body;moderate assist (50% patient effort);verbal cues;nonverbal cues (demo/gesture)  -CC     Position (Bathing)  sink side;supported sitting;supported standing  -CC     Set-up Assistance (Bathing)  adjust water temperature;obtain supplies;open containers  -CC     Row Name 02/12/21 1200          Upper Body Dressing    [place] Level (Upper Body Dressing)  upper body dressing skills;doff;don;front opening garment;clothes fastener management;maximal assist (25-49% patient effort)  -CC     Position (Upper Body Dressing)  supported sitting  -CC     Set-up Assistance (Upper Body Dressing)  obtain clothing  -CC     Row Name 02/12/21 1200          Lower Body Dressing    [place] Level (Lower Body Dressing)  doff;don;pants/bottoms;socks;moderate assist (50%  patient effort);maximum assist (25% patient effort)  -CC     Position (Lower Body Dressing)  supported sitting;supported standing  -CC     Set-up Assistance (Lower Body Dressing)  obtain clothing  -CC     Row Name 02/12/21 1200          Grooming    [place] Level (Grooming)  grooming skills;deodorant application;hair care, combing/brushing;oral care regimen;wash face, hands;standby assist;minimum assist (75% patient effort)  -CC     Position (Grooming)  sink side;supported sitting  -CC     Set-up Assistance (Grooming)  obtain supplies  -CC     Row Name 02/12/21 1200          Toileting    [place] Level (Toileting)  toileting skills;dependent (less than 25% patient effort)  -CC     Position (Toileting)  supported sitting;supported standing  -CC     Comment (Toileting)  x2  -CC     Row Name 02/12/21 1200          Positioning and Restraints    Pre-Treatment Position  sitting in chair/recliner  -CC     Post Treatment Position  wheelchair  -CC     In Wheelchair  sitting;call light within reach;encouraged to call for assist  -CC       User Key  (r) = Recorded By, (t) = Taken By, (c) = Cosigned By    Initials Name Effective Dates    CC Keya Miranda, OTR 06/08/18 -            Occupational Therapy Education                 Title: PT OT SLP Therapies (In Progress)     Topic: Occupational Therapy (Done)     Point: ADL training (Done)     Description:   Instruct learner(s) on proper safety adaptation and remediation techniques during self care or transfers.   Instruct in proper use of assistive devices.              Learning Progress Summary           Patient Acceptance, E, VU by CC at 2/10/2021 5446    Comment: Family conf w pt, dgt and son in law. Status, DME, follow up theray and home needs discussed. Pt will need a droparm commode and possibly shower seat. Will provide info on jon harness for LUE. Family scheduled for teaching Monday.    Acceptance, E, VU by  at 2/10/2021 8397    Comment: family conference-  discussed need for visual and verbal cues for visual perceptual deficits, and deficts in attn, memory    Acceptance, E,TB,D, VU by CC at 1/29/2021 1509    Comment: Pt repositioned in reg w/c with 1/2 arm tray on Left. Nursing notified of change from recliner w/c/. Commode tsf from w/c to droparm commode discussed and shown to NA.    Acceptance, E, VU by BL at 1/8/2021 1434   Family Acceptance, E, VU by CC at 2/10/2021 1546    Comment: Family conf w pt, dgt and son in law. Status, DME, follow up theray and home needs discussed. Pt will need a droparm commode and possibly shower seat. Will provide info on jon harness for LUE. Family scheduled for teaching Monday.    Acceptance, E, VU by SL at 2/10/2021 1504    Comment: family conference- discussed need for visual and verbal cues for visual perceptual deficits, and deficts in attn, memory   Caregiver Acceptance, E,TB,D, VU by CC at 1/29/2021 1509    Comment: Pt repositioned in reg w/c with 1/2 arm tray on Left. Nursing notified of change from recliner w/c/. Commode tsf from w/c to droparm commode discussed and shown to NA.                   Point: Home exercise program (Done)     Description:   Instruct learner(s) on appropriate technique for monitoring, assisting and/or progressing therapeutic exercises/activities.              Learning Progress Summary           Patient Acceptance, E, VU by CC at 2/10/2021 1546    Comment: Family conf w pt, dgt and son in law. Status, DME, follow up theray and home needs discussed. Pt will need a droparm commode and possibly shower seat. Will provide info on jon harness for LUE. Family scheduled for teaching Monday.    Acceptance, E, VU by SL at 2/10/2021 1504    Comment: family conference- discussed need for visual and verbal cues for visual perceptual deficits, and deficts in attn, memory    Acceptance, E, VU by BL at 1/8/2021 1434   Family Acceptance, E, VU by CC at 2/10/2021 1546    Comment: Family conf w pt, dgt and son in  law. Status, DME, follow up theray and home needs discussed. Pt will need a droparm commode and possibly shower seat. Will provide info on jon harness for LUE. Family scheduled for teaching Monday.    Acceptance, E, VU by SL at 2/10/2021 1504    Comment: family conference- discussed need for visual and verbal cues for visual perceptual deficits, and deficts in attn, memory                   Point: Precautions (Done)     Description:   Instruct learner(s) on prescribed precautions during self-care and functional transfers.              Learning Progress Summary           Patient Acceptance, E, VU by CC at 2/10/2021 1546    Comment: Family conf w pt, dgt and son in law. Status, DME, follow up theray and home needs discussed. Pt will need a droparm commode and possibly shower seat. Will provide info on jon harness for LUE. Family scheduled for teaching Monday.    Acceptance, E, VU by SL at 2/10/2021 1504    Comment: family conference- discussed need for visual and verbal cues for visual perceptual deficits, and deficts in attn, memory    Acceptance, E, VU by BL at 1/8/2021 1434   Family Acceptance, E, VU by CC at 2/10/2021 1546    Comment: Family conf w pt, dgt and son in law. Status, DME, follow up theray and home needs discussed. Pt will need a droparm commode and possibly shower seat. Will provide info on jon harness for LUE. Family scheduled for teaching Monday.    Acceptance, E, VU by SL at 2/10/2021 1504    Comment: family conference- discussed need for visual and verbal cues for visual perceptual deficits, and deficts in attn, memory                   Point: Body mechanics (Done)     Description:   Instruct learner(s) on proper positioning and spine alignment during self-care, functional mobility activities and/or exercises.              Learning Progress Summary           Patient Acceptance, E, VU by CC at 2/10/2021 1546    Comment: Family conf w pt, dgt and son in law. Status, DME, follow up theray and home  needs discussed. Pt will need a droparm commode and possibly shower seat. Will provide info on jon harness for LUE. Family scheduled for teaching Monday.    Acceptance, E, VU by  at 2/10/2021 1504    Comment: family conference- discussed need for visual and verbal cues for visual perceptual deficits, and deficts in attn, memory    Acceptance, E,TB,D, VU by CC at 1/29/2021 1509    Comment: Pt repositioned in reg w/c with 1/2 arm tray on Left. Nursing notified of change from recliner w/c/. Commode tsf from w/c to droparm commode discussed and shown to NA.    Acceptance, E, VU by BL at 1/8/2021 1434   Family Acceptance, E, VU by CC at 2/10/2021 1546    Comment: Family conf w pt, dgt and son in law. Status, DME, follow up theray and home needs discussed. Pt will need a droparm commode and possibly shower seat. Will provide info on jon harness for LUE. Family scheduled for teaching Monday.    Acceptance, E, VU by  at 2/10/2021 1504    Comment: family conference- discussed need for visual and verbal cues for visual perceptual deficits, and deficts in attn, memory   Caregiver Acceptance, E,TB,D, VU by CC at 1/29/2021 1509    Comment: Pt repositioned in reg w/c with 1/2 arm tray on Left. Nursing notified of change from recliner w/c/. Commode tsf from w/c to droparm commode discussed and shown to NA.                               User Key     Initials Effective Dates Name Provider Type Discipline     06/08/18 -  Keya Miranda OTR Occupational Therapist OT     06/08/18 -  Katelny James MS CCC-SLP Speech and Language Pathologist SLP     01/05/21 -  Marshall Oleary OT Occupational Therapist OT                    OT Recommendation and Plan                         Time Calculation:     Time Calculation- OT     Row Name 02/12/21 1000             Time Calculation- OT    OT Start Time  1000  -CC      OT Stop Time  1030  -CC      OT Time Calculation (min)  30 min  -CC      OT Non-Billable Time (min)  30 min tech  -CC         User Key  (r) = Recorded By, (t) = Taken By, (c) = Cosigned By    Initials Name Provider Type    CC Keya Miranda OTR Occupational Therapist        Therapy Charges for Today     Code Description Service Date Service Provider Modifiers Qty    20824732056 HC OT SELF CARE/MGMT/TRAIN EA 15 MIN 2/11/2021 Keya Miranda OTR GO 2    37935070356 HC OT THER SUPP EA 15 MIN 2/11/2021 Keya Miranda OTR GO 2    70333651607 HC OT THER SUPP EA 15 MIN 2/12/2021 Keya Mrianda OTR GO 2    14026042614 HC OT SELF CARE/MGMT/TRAIN EA 15 MIN 2/12/2021 Keya Miranda OTR GO 2                   GIAN Blackwell  2/12/2021

## 2021-02-12 NOTE — THERAPY TREATMENT NOTE
Inpatient Rehabilitation - Speech Language Pathology Treatment Note    [facility]     Patient Name: Liana Carrero  : 1935  MRN: [id number]    Today's Date: 2021                   Admit Date: 2021       Visit Dx:      ICD-10-CM ICD-9-CM   1. Impaired functional mobility, balance, gait, and endurance  Z74.09 V49.89       Patient Active Problem List   Diagnosis   • Stroke (cerebrum) (CMS/[facility])   • Chest pain   • Hypertensive urgency   • Headache   • History of embolic stroke   • Hypothyroidism   • Hypokalemia   • CAD in native artery   • URTI (acute upper respiratory infection)   • Closed fracture of left hip (CMS/HCC)   • Chronic diastolic (congestive) heart failure (CMS/[facility])   • Coronary artery disease involving autologous vein coronary bypass graft without angina pectoris   • Impaired glucose tolerance   • Moderate malnutrition (CMS/HCC)   • Stroke (CMS/HCC)       Past Medical History:   Diagnosis Date   • Arthritis    • CHF (congestive heart failure) (CMS/[facility])    • Coronary artery disease    • Diabetes mellitus (CMS/[facility])     new DX related to stroke, started on insulin at hospital   • Disease of thyroid gland    • Elevated cholesterol    • GERD (gastroesophageal reflux disease)    • History of transfusion    • Hypertension    • PONV (postoperative nausea and vomiting)    • Sleep apnea    • Spinal headache    • Stroke (CMS/[facility])        Past Surgical History:   Procedure Laterality Date   • APPENDECTOMY     • CARDIAC CATHETERIZATION       x2   • CARDIAC CATHETERIZATION N/A 2019    Procedure: Left Heart Cath;  Surgeon: Bentley Chapin MD;  Location:  [facility] CATH INVASIVE LOCATION;  Service: Cardiovascular   • CARDIAC CATHETERIZATION N/A 2019    Procedure: Coronary angiography;  Surgeon: Bentley Chapin MD;  Location:  [facility] CATH INVASIVE LOCATION;  Service: Cardiovascular   • CARDIAC CATHETERIZATION N/A 2019    Procedure: Native mammary injection;  Surgeon: Bentley Chapin MD;   Location:  [facility] CATH INVASIVE LOCATION;  Service: Cardiovascular   • CARDIAC CATHETERIZATION N/A 12/26/2019    Procedure: Saphenous Vein Graft;  Surgeon: Bentley Chapin MD;  Location: [facility] CATH INVASIVE LOCATION;  Service: Cardiovascular   • COLON SURGERY     • COLONOSCOPY     • EYE SURGERY      lens implants and cataract surgery   • FEMUR IM NAILING/RODDING Left 3/26/2020    Procedure: LT.HIP NAILING/RODDING;  Surgeon: Carlos Khan II, MD;  Location: [facility] MAIN OR;  Service: Orthopedics;  Laterality: Left;   • FRACTURE SURGERY Left 2020   • HYSTERECTOMY     • VASCULAR SURGERY      varicous veins          SLP EVALUATION (last 72 hours)      SLP SLC Evaluation     Row Name 02/12/21 1000 02/11/21 1100 02/10/21 1100 02/09/21 1100          Communication Assessment/Intervention    Document Type  therapy note (daily note)  -SL  therapy note (daily note)  -SL  therapy note (daily note)  -SL  therapy note (daily note)  -SL     Subjective Information  complains of;pain;fatigue back  -SL  no complaints  -SL  no complaints  -SL  complains of shoulder pain  -SL     Patient Observations  cooperative;alert  -SL  cooperative;alert;agree to therapy  -SL  alert;cooperative  -SL  cooperative  -SL     Patient/Family/Caregiver Comments/Observations  more fatiqued for second session  -SL  --  --  --     Patient Effort  good  -SL  good  -SL  good  -SL  adequate  -SL     Symptoms Noted During/After Treatment  none  -SL  none  -SL  none  -SL  none  -SL       User Key  (r) = Recorded By, (t) = Taken By, (c) = Cosigned By    Initials Name Effective Dates    Katelyn Fan MS CCC-SLP 06/08/18 -              EDUCATION    The patient has been educated in the following areas:       Cognitive Impairment.      SLP Recommendation and Plan                                                  SLP GOALS     Row Name 02/12/21 1000 02/11/21 1100 02/10/21 1100       Memory Skills Goal 1 (SLP)    Progress (Memory Skills Goal 1, SLP)   "80%;independently (over 90% accuracy) picture to picture associations ( immed recall)  -SL  60%;independently (over 90% accuracy) recall of lengthy detailed paragraphs  -SL  80%;90%;independently (over 90% accuracy) 5 word stimulus- word placement recall task  -SL    Progress/Outcomes (Memory Skills Goal 1, SLP)  goal ongoing  -SL  goal ongoing  -SL  goal ongoing  -SL    Comment (Memory Skills Goal 1, SLP)  --  --  70% for paragraph recall  -SL       Organizational Skills Goal 1 (SLP)    Progress (Thought Organization Skills Goal 1, SLP)  90%;independently (over 90% accuracy) scrambled words given categories x2  -SL  90%;independently (over 90% accuracy) unscrambling words, given categories   -SL  --    Progress/Outcomes (Thought Organization Skills Goal 1, SLP)  goal ongoing  -SL  goal ongoing  -SL  --       Reasoning Goal 1 (SLP)    Progress (Reasoning Goal 1, SLP)  --  70%;with minimal cues (75-90%);independently (over 90% accuracy) abstract category differentiation  -SL  80%;independently (over 90% accuracy);with minimal cues (75-90%) verbal analogies  -SL    Progress/Outcomes (Reasoning Goal 1, SLP)  --  goal ongoing  -SL  goal ongoing  -SL    Comment (Reasoning Goal 1, SLP)  --  --  50% for visual 'odd man out\" task  -SL       Functional Problem Solving Skills Goal 1 (SLP)    Progress (Problem Solving Goal 1, SLP)  60%;with minimal cues (75-90%) 2 step directives  -SL  70%;80%;independently (over 90% accuracy);with minimal cues (75-90%) ordering items by specific constraints  -SL  --    Progress/Outcomes (Problem Solving Goal 1, SLP)  goal ongoing  -SL  goal ongoing  -SL  --    Comment (Problem Solving Goal 1, SLP)  decreased attn to specifics in directions or in stimulus boxes; mod cues for left side attn  -SL  cues needed to attend to column of picts on left  -SL  --       Functional Math Skills Goal 1 (SLP)    Progress (Functional Math Skills Goal 1, SLP)  --  100%;independently (over 90% accuracy) simple " addition/subtrating for alternating attn task  -SL  --    Progress/Outcomes (Functional Math Skills Goal 1, SLP)  --  goal ongoing  -SL  --       Right Hemisphere Function Goal 1 (SLP)    Progress (Right Hemisphere Function Goal 1, SLP)  with moderate cues (50-74%)  -SL  with moderate cues (50-74%) reading of row of words and sentences  -SL  with moderate cues (50-74%)  -SL    Progress/Outcomes (Right Hemisphere Function Goal 1, SLP)  goal ongoing  -SL  goal ongoing  -SL  goal ongoing  -SL    Comment (Right Hemisphere Function Goal 1, SLP)  MIN cues for trailmaking task with visaul cues on left ( A-Z)  ;  however second session- MOD cues for attn to left for reading of sentence directives and scanning to 3 boxes up above to ronal target stimulus  -SL  tended to miss first word in row with only visual cues, with verbal cues pt able to return to the colored visual cue and locate first word  -SL  visual and mod verbal cues- 50% for letter placement in boxes to form words;    50% for returning to left side during oral reading of paragraph with visual cues  -SL    Row Name 02/09/21 1100             Attention Goal 1 (SLP)    Progress (Attention Goal 1, SLP)  60%;with minimal cues (75-90%);with moderate cues (50-74%) locating difference between 2 picture scenes  -SL      Progress/Outcomes (Attention Goal 1, SLP)  goal ongoing  -SL         Memory Skills Goal 1 (SLP)    Progress (Memory Skills Goal 1, SLP)  70%;independently (over 90% accuracy) immed recall- shrot paragrpahs/stories  -SL      Progress/Outcomes (Memory Skills Goal 1, SLP)  goal ongoing  -SL         Reasoning Goal 1 (SLP)    Progress (Reasoning Goal 1, SLP)  60%;70%;with minimal cues (75-90%);with moderate cues (50-74%) ordering items by weight  -SL      Progress/Outcomes (Reasoning Goal 1, SLP)  goal ongoing  -SL         Right Hemisphere Function Goal 1 (SLP)    Progress (Right Hemisphere Function Goal 1, SLP)  with moderate cues (50-74%);with maximum cues  (25-49%)  -SL      Progress/Outcomes (Right Hemisphere Function Goal 1, SLP)  goal ongoing  -SL      Comment (Right Hemisphere Function Goal 1, SLP)  mod visual and verbal cues to scan up/down and left/right for seq/ordering task;  mod cues to attend to left for numerical trailmaking task- 60% accurate with min cues, 100% with mod cues  -SL        User Key  (r) = Recorded By, (t) = Taken By, (c) = Cosigned By    Initials Name Provider Type    Katelyn Fan MS CCC-SLP Speech and Language Pathologist                      Time Calculation:       Time Calculation- SLP     Row Name 02/12/21 1020 02/12/21 1019          Time Calculation- SLP    SLP Start Time  0930  -SL  0830  -     SLP Stop Time  1000  -SL  0900  -     SLP Time Calculation (min)  30 min  -SL  30 min  -       User Key  (r) = Recorded By, (t) = Taken By, (c) = Cosigned By    Initials Name Provider Type    Katelyn Fan MS CCC-SLP Speech and Language Pathologist            Therapy Charges for Today     Code Description Service Date Service Provider Modifiers Qty    19414807354 HC ST DEV OF COGN SKILLS INITIAL 15 MIN 2/11/2021 Katelyn James MS CCC-SLP  3    97721143302 HC ST DEV OF COGN SKILLS EACH ADDT'L 15 MIN 2/11/2021 Katelyn James MS CCC-SLP  1    73756254287 HC ST DEV OF COGN SKILLS INITIAL 15 MIN 2/12/2021 Katelyn James MS CCC-SLP  1    86640900702 HC ST DEV OF COGN SKILLS EACH ADDT'L 15 MIN 2/12/2021 Katelyn James MS CCC-SLP  3                           Katelyn James MS CCC-SLP  2/12/2021

## 2021-02-12 NOTE — PROGRESS NOTES
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  She has very pain complaints.  Does kinesiotaping to the left shoulder.  Left hip and thigh pain unchanged.  She is alternating hydrocodone and tramadol for pain relief.  She notes that she has increased bladder frequency at nighttime on the decreased dose of the Ditropan, having to go every 20 minutes which is fatiguing.  Recent urinalysis was negative for infection.  We discussed increasing Ditropan back to 5 mg twice a day.  Also reviewed that previously she had some elevated PVRs on the twice daily dosing .    History taken from: patient    Objective     Vital Signs  Temp:  [97.1 °F (36.2 °C)-98.5 °F (36.9 °C)] 98.5 °F (36.9 °C)  Heart Rate:  [80-91] 88  Resp:  [16-18] 16  BP: (111-129)/(56-76) 111/56    Physical Exam  General: calm, in NAD  Neck: trachea midline  Cardio: well perfused distal extremities, regular radial pulse  Resp: normal WOB on RA, nonlabored.    Abdomen: NT/ND  Extremities:    No edema  No crepitus with range of motion of the left hip.  Neuro: dense left hemiparesis, flaccid tone  Left homonymous hemianopsia    Left inattention.  A&Ox4. 0/5 strength LUE/LLE.   Takes resistance on the right side.    Results from last 7 days   Lab Units 02/12/21  0624 02/08/21  0709   WBC 10*3/mm3 4.09 4.81   HEMOGLOBIN g/dL 12.0 11.7*   HEMATOCRIT % 36.3 36.5   PLATELETS 10*3/mm3 174 162     Results from last 7 days   Lab Units 02/12/21  0624 02/08/21  0709   SODIUM mmol/L 136 137   POTASSIUM mmol/L 4.2 4.3   CHLORIDE mmol/L 99 102   CO2 mmol/L 31.0* 25.7   BUN mg/dL 15 17   CREATININE mg/dL 0.63 0.61   CALCIUM mg/dL 9.7 9.1   GLUCOSE mg/dL 116* 109*             Ref. Range  1/8/2021 06:01   TSH Baseline Latest Ref Range: 0.270 - 4.200 uIU/mL 2.830   Vitamin B-12 Latest Ref Range: 211 - 946 pg/mL 381   25 Hydroxy, Vitamin D Latest Ref Range: 30.0 - 100.0 ng/ml 20.6 (L)     CT of the abdomen-January 16  CT Abdomen Pelvis With Contrast   Final Result       A cystic focus at the pancreatic head,  likely corresponds to the   ultrasound finding of concern, could be further evaluated with   endoscopic ultrasound as indicated, interval follow-up also recommended   to exclude any possibility of a cystic neoplasm. The pancreas is   thinned. The main pancreatic duct shows a mildly prominent caliber, 4   mm.          US GALLBLADDER-  1/14/2021   IMPRESSION:  Small to moderate amount of gallbladder sludge with a few  possible tiny nonshadowing gallstones.  2. No wall thickening or pericholecystic fluid is seen. No sonographic  Garcia's sign is seen.  3. Fatty infiltration of the liver.  4. 1.2 cm hypoechoic pancreatic head lesion. No pancreatic head lesions  are seen on the CT of the abdomen and pelvis from [facility]  dated 12/11/2019.  5. CT of the abdomen with contrast using pancreatic protocol is  recommended for further assessment.    EXAMINATION: LIMITED LEFT BREAST SONOGRAPHY-January 20, 2021     HISTORY: 85-year-old female status post recent placement of a loop  recorder on the left side of the chest with an area of palpable concern  in the left breast.     FINDINGS: Targeted sonographic evaluation of the area of palpable  concern in the left breast which corresponds to the upper inner quadrant  was performed. No sonographic abnormality is appreciated.     IMPRESSION:  Negative targeted left breast sonography. Clinical followup  is suggested.     BI-RADS Category 1: Negative  Results Review:     I reviewed the patient's new clinical results.    Medication Review: Complete  Scheduled Meds:Alirocumab, 75 mg, Subcutaneous, Q14 Days  aspirin, 81 mg, Oral, Daily  bisacodyl, 10 mg, Rectal,  Q24H  [START ON 3/6/2021] cholecalciferol, 1,000 Units, Oral, Daily  dilTIAZem CD, 180 mg, Oral, BID  docusate sodium, 200 mg, Oral, BID  enoxaparin, 40 mg, Subcutaneous, Daily  ferrous sulfate, 325 mg, Oral, Daily With Breakfast  levothyroxine, 125 mcg, Oral, Q AM  lidocaine, 1 patch, Transdermal, Q24H  losartan, 50 mg, Oral, Daily  mirtazapine, 15 mg, Oral, Nightly  ondansetron ODT, 4 mg, Oral, TID AC  oxybutynin, 5 mg, Oral, BID With Meals  pantoprazole, 40 mg, Oral, QAM  vitamin D, 50,000 Units, Oral, Q7 Days      Continuous Infusions:   PRN Meds:.HYDROcodone-acetaminophen  •  nitroglycerin  •  polyethylene glycol  •  traMADol      Assessment/Plan       Stroke (CMS/[facility])    Debility and functional deficits  -PT/OT/SLP     Acute CVA  -[place] 3 MRI at [facility] revealed acute infarct in the left temporal-occipital region, no TPA or MT  -Jan 4 had worsening of her NIHSS, MRI revealed a new right superior frontal gyrus acute infarct. Again outside of the window of TPA, not a candidate for mechanical thrombectomy  -continue ASA and Praluent as she is intolerant of statins  -Echo was unremarkable  -Holter normal  -Loop recorder placed  -A1c 5.0  -TSH and B12 level unremarkable    Loss of appetite  Weight loss  Nausea  -symptoms for approximately 8 months, has lost ~40lbs over that time per patient  -started Remeron 7.5 qHS  -GI consulted, appreciate recs. Started scheduled Zofran with meals on 1/13, continue Remeron.   Jan 14 - GI to check RUQ US, increase bowel regimen, continue Remeron and Zofran as above  January 15 -1.2 cm hypoechoic pancreatic head lesion on ultrasound.  To get CT of the abdomen tomorrow.  January 16 - CT scan showed pancreatic cyst.  Not felt to be the etiology of her nausea.  Follow-up as an outpatient.  January 17- Remeron increased to 15 mg nightly     Left breast nodule  -2x1cm nodule left breast at 10 o'clock position, borders feel smooth, nodule is not freely mobile, no overlying skin changes. Away  from the site of recent left loop recorder placement Jan 5, 2021  -Last mammogram one year ago. May not tolerate mammogram with recent loop recorder placement. Patient discussed with Dr. Pride, left breast ultrasound ordered  -Left breast ultrasound negative at the area of interest in the left upper inner quadrant    HLD  -continue ASA and Praluent as she is intolerant of statins     HTN  -continue cardizem, losartan  -normotensive goal  -monitor    Vit D insufficiency  -level 20 on admission  -started vitamin D replacement     Hypothyroidism  -continue levothyroxine    CAD  -continue ASA and Praluent   January 27-She had some sharp chest pain that was brief last evening at about 1 AM, resolved on its own.  Had nitroglycerin ordered but did not receive it.  She reports at home she did occasionally have chest pain and would take him to glycerin at home, stating that sometimes she would take it as she just felt nervous and worried with the chest pain.  She did feel some palpitations after the chest pain last evening.  We discussed seeing if anything was uploaded on her loop recorder to her cardiologist office and we will check those results.    Obstructive sleep apnea-January 27-she has been on 2 L nasal cannula oxygen at nighttime with sats 100%.  She did not have her CPAP brought in from home     Prerenal OBED, resolved  -secondary to decreased PO intake  -resolved prior to discharge from [facility]  -monitor electrolytes     Iron deficiency anemia  -received 2 doses of IV iron as well as 1UpRBCs on 1/4 at [facility]  -Hgb stable ~8 prior to discharge from [facility], 9.2 on admission to [facility]  -continue oral iron, monitor     Left hip pain  -xrays at [facility] unremarkable  Jan 13 - Fatigue continues to be an issue. She takes Norco 7.5 mg about 4 times a day with history of left hip pain-history of left femur fracture open reduction internal fixation, x-ray at the outside hospital on January 6 showed hardware in place.  January 14-She does have  fatigue during the day.  Will try a lower dose of Norco 5 mg rather than 7.5 mg.  Is on Remeron 7.5 g at nighttime but that was only recently added.  January 15-she took Norco 5 mg twice a day at home.  More alert with better trunk control on lower dose of Norco today.  January 21-continues with left hip pain. Taking Norco 5 mg about 4 times a day. Add Lidoderm patch.  January 25-does not feel Lidoderm patch that helpful.  Pain was better with addition of tramadol over the weekend.  Hip range of motion unremarkable with no crepitus  January 26-left hip xray did not show displacement of surgical hardware, fracture, erosion or dislocation  January 26-she feels tramadol works better for her than hydrocodone.  Anticipate going home on tramadol and discontinue hydrocodone.  She is presently taking tramadol twice a day.  Appears to be tolerating without any side effects.  February 12-patient alternating hydrocodone and tramadol for pain relief     Fluids/Electrolytes/Nutrition   -cardiac diet  -admission labs unremarkable     DVT ppx  -subQ Lovenox    /UTI  -on Ditropan 5 mg twice a day.  January 11 - voids with acceptable bladder scan postvoid residuals. Urinary tract infection with E. coli-sensitive to Macrobid  January 18 - required intermittent straight catheter 450 cc.  January 19 - incontinent and IC, PVRs 99-391cc  January 22 - has urge to urinate at times but cannot void, will decrease oxybutynin from bid to with dinner only. She is voiding with incontinence with postvoid residual 230-290 cc.  Discussed trial of decreasing Ditropan to 5 mg q. supper first from twice a day before doing a trial of Flomax.  January 23-urinary tract infection-E. coli-on cefdinir starting January 23.  Sensitive to cephalosporins  January 27-voids with postvoid residual  cc.  We will continue Ditropan at lower dose of 5 mg with supper  February 9-recurrent dysuria-recheck urinalysis with culture if indicated  Feb 10 - UA  negative and PVR 53 cc  February 12-She notes that she has increased bladder frequency at nighttime on the decreased dose of the Ditropan, having to go every 20 minutes which is fatiguing.  Recent urinalysis was negative for infection.  We discussed increasing Ditropan back to 5 mg twice a day.  Also reviewed that previously she had some elevated PVRs on the twice daily dosing .      TEAM CONF - JAN 12 - BED MAX-DEP. TRANSFERS MAX 2 TRANSFER BOARD. NON-AMBULATORY. WHEELCHAIR 50 FEET MIN ASSIST. TOILET TRANSFERS DEP X 2. IMPAIRED VISION. BATH MAX. LBD DEP. UBD MAX. GROOMING MOD ASSIST. COGNITION - MILD TO MODERATE DEFICITS. 2L O2 AT HS. ON MACROBID FOR UTI. FATIGUES. HISTORY OF POOR APPETITE FOR MONTHS, WEIGHT LOSS 30-40 # OVER SEVERAL MONTHS AT HOME, HISTORY OF REPORTED NAUSEA AT HOME. WILL CONSULT GI.   ELOS - 4 WEEKS.     TEAM CONF - JAN 19 - BED MAX 2 .  TRANSFERS MAX 2 TRANSFER BOARD. WHEELCHAIR MIN A DUE TO VISIN DEFICITS. HAS SAT UNSUPPORTED FOR 30 SECS BEFORE STARTS TO LEAN. TOILET TRANSFERS DEP X 2. BATH UB MAX, LB DEP X 2. LBD DEP X 2. UBD MAX. GROOMING MOD. LEFT SHOULDER SUBLUXATION - ADD LEUKOTAPING. VOIDS WITH ELEVATED PVRS WITH OCCASIONAL ISC. CONTINENT INCONTINENT.  LEFT INATTENTION, LEFT VISUAL FIELD CUT. IMPAIRED ATTENTION, EXECUTIVE FUNCTION, REASONING. WORKING MEMORY IMPROVING.  BNE (Active)  Att'n. - WNL  Exec. Fx. - Mild Imp.  Rsng/Jgmnt - Mildly Imp. for abstract reaosning  Arith - Min Imp.  Visuospatial Skills - Mildly Imp.  Visual Mem. - Mildly Imp.  Verbal Mem. - Mildly Imp.  Emot - Pt endorsed dep related to current status  DEPRESSION - SUPPORTIVE THERAPY. ON REMERON.   ELOS -   3 WEEKS    TEAM CONF - JAN 26 -  BED MAX 2. TRANSFERS MAX 2. WHEELCHAIR 50 FEET MIN ASSIST, IMPACTED BY VISUAL DEFICITS. LEFT HIP PAIN INCREASED, WILL REPEAT X-RAY. AT OUTSIDE HOSPITAL X-RAY ON JAN 6 - [ORIF hardware in the proximal femur is stable from 5/6/2020. No hardware loosening or perihardware fracture is  detected. The hip joint space is mildly narrowed as before. IMPRESSION: Stable ORIF hardware; no acute abnormality.]  TOILET TRANSFERS MAX 2 AS PUSHES. BATH UBB MIN, LBB MAX 2. UBD MAX ASSIST. LBD DEPENDENT. GROOMING MIN ASSIST. ATTENDS TO LEFT A LITTLE BETTER. TOILETING DEP X 2. LEFT VISUAL FIELD DEFICITS. EXECUTIVE FUNCTION - IMPAIRED ATTENTION. MEMORY MIN-MOD DEFICITS. MOD-MAX VISUAL CUES TO LEFT SIDE. VOIDS. ON DECREASED DITROPAN TO 5 MG ONCE A DAY DUE TO ELEVATED PVRS. CONTINENT AT TIMES WITH BLADDER. BOWEL CONTINENT. PAIN MANAGEMENT - TRAMADOL ADDED. WITH REC THERAPY, CUES TO ATTEND TO LEFT.  ELOS -  2-3 WEEKS    TEAM CONF- BED MAX 2. TRANSFERS MAX SQUAT PIVOT, USES LIFT WITH NURSING. SITTING BALANCE BETTER. LEFT INATTENTION. WHEELCHAIR MIN ASSIST WITH VISUAL CUES, DUE TO LEFT VISUAL INATTENTION/LEFT VISUAL FIELD CUT. STANDING AT PARALLEL BARS WITH BLOCKING LEFT KNEE. TOILET TRANSFERS MAX 2 TO DROPARM. BATH MIN UB AND MAX X 2 LB. DRESSING MOD-MAX UB AND DEP X 2 LB. GROOMING MIN-SBA. TOILETING COMES UP TO STAND A LITTLE BETTER, DEP X 2.   IMPAIRED ATTENTION. DISTRACTIBLE. FATIGUES QUICKLY WITH COGNITIVE TASKS. MIN-MOD MEMORY DEFICITS. MOD-MAX VERBAL AND VISUAL CUES FOR READING AND WRITING TASKS. INCONTINENT BOWEL AND BLADDER. PVRS LOW. CHRONIC LEFT HIP PAIN.   ELOS -  ONE WEEK, WILL NEED SIGNIFICANT ASSISTANCE AT HOME.    TEAM CONF - FEB 9 - LEFT HEMIPLEGIA UNCHANGED. KINESIOTAPING LEFT SHOULDER.  BED MAX 2. TRANSFERS MAX ASSIST SQUAT PIVOT. NON-AMBULATORY. WHEELCHAIR 100 FEET MIN ASSIST DUE TO VISUAL DEFICITS. TOILET TRANSFERS MAX 1. SHOWER TRANSFERS MAX ASSIST TO BENCH. TOILETING DEP X 2. UBB MIN. LBB MOD OF 2. LBD MOD-MAX WITH MAX 2 FOR STANDNG. UBD MOD ASSIST FOR PULLOVER. GROOMING MIN-SBA. CONTINENT /INCONTINENT. DITROPAN FOR OVERACTIVE BLADDER. ABLE TO SIT FOR ABOUT 75 SECS. IMPAIRED ATTENTION. FATIGUES QUICKLY. DISTRACTIBLE. LEFT VISUAL FIELD CUT. MIN-MOD MEMORY DEFICITS. MOD-MAX VISUAL CUES FOR  READING/WRITING TASKS.   ELOS -   FAMILY PLANS TO HIRE ADDITIONAL HELP FOR HOME. HOME ON FEB 16. FAMILY TEACHING NEEDED.     Azael Abdullahi MD  02/12/21  16:05 EST    During rounds, used appropriate personal protective equipment including mask and gloves.  Additional gown if indicated.  Mask used was standard procedure mask. Appropriate PPE was worn during the entire visit.  Hand hygiene was completed before and after.

## 2021-02-12 NOTE — PLAN OF CARE
Goal Outcome Evaluation:  Plan of Care Reviewed With: patient     Outcome Summary: Ms Carrero is pleasant and cooperative, assist x2, and takes medication with water. She is continent  most of the time, daily suppository- small BM today. Left side flaccid, scattered bruising, and Tramadol given once. VS stable, no safety issues, and no other issues at this time.

## 2021-02-12 NOTE — THERAPY PROGRESS REPORT/RE-CERT
Inpatient Rehabilitation - Occupational Therapy Treatment Note    [facility]     Patient Name: Liana Carrero  : 1935  MRN: [id number]    Today's Date: 2021                 Admit Date: 2021         ICD-10-CM ICD-9-CM   1. Impaired functional mobility, balance, gait, and endurance  Z74.09 V49.89       Patient Active Problem List   Diagnosis   • Stroke (cerebrum) (CMS/[facility])   • Chest pain   • Hypertensive urgency   • Headache   • History of embolic stroke   • Hypothyroidism   • Hypokalemia   • CAD in native artery   • URTI (acute upper respiratory infection)   • Closed fracture of left hip (CMS/[facility])   • Chronic diastolic (congestive) heart failure (CMS/[facility])   • Coronary artery disease involving autologous vein coronary bypass graft without angina pectoris   • Impaired glucose tolerance   • Moderate malnutrition (CMS/[facility])   • Stroke (CMS/[facility])       Past Medical History:   Diagnosis Date   • Arthritis    • CHF (congestive heart failure) (CMS/[facility])    • Coronary artery disease    • Diabetes mellitus (CMS/[facility])     new DX related to stroke, started on insulin at hospital   • Disease of thyroid gland    • Elevated cholesterol    • GERD (gastroesophageal reflux disease)    • History of transfusion    • Hypertension    • PONV (postoperative nausea and vomiting)    • Sleep apnea    • Spinal headache    • Stroke (CMS/[facility])        Past Surgical History:   Procedure Laterality Date   • APPENDECTOMY     • CARDIAC CATHETERIZATION       x2   • CARDIAC CATHETERIZATION N/A 2019    Procedure: Left Heart Cath;  Surgeon: Bentley Chapin MD;  Location: [facility] CATH INVASIVE LOCATION;  Service: Cardiovascular   • CARDIAC CATHETERIZATION N/A 2019    Procedure: Coronary angiography;  Surgeon: Bentley Chapin MD;  Location: [facility] CATH INVASIVE LOCATION;  Service: Cardiovascular   • CARDIAC CATHETERIZATION N/A 2019    Procedure: Native mammary injection;  Surgeon: Bentley Chapin MD;  Location: [facility]  CATH INVASIVE LOCATION;  Service: Cardiovascular   • CARDIAC CATHETERIZATION N/A 12/26/2019    Procedure: Saphenous Vein Graft;  Surgeon: Bentley Chapin MD;  Location: [facility] CATH INVASIVE LOCATION;  Service: Cardiovascular   • COLON SURGERY     • COLONOSCOPY     • EYE SURGERY      lens implants and cataract surgery   • FEMUR IM NAILING/RODDING Left 3/26/2020    Procedure: LT.HIP NAILING/RODDING;  Surgeon: Carlos Khan II, MD;  Location: [facility] MAIN OR;  Service: Orthopedics;  Laterality: Left;   • FRACTURE SURGERY Left 2020   • HYSTERECTOMY     • VASCULAR SURGERY      varicous veins                IRF OT ASSESSMENT FLOWSHEET (last 12 hours)      IRF OT Evaluation and Treatment     Row Name 02/12/21 1527 02/12/21 1200       OT Time and Intention    Document Type  progress note  -CC  daily treatment  -CC    Mode of Treatment  occupational therapy  -CC  individual therapy;occupational therapy  -CC    Patient Effort  good  -CC  good  -CC    Row Name 02/12/21 1527          Vision Assessment/Intervention    Visual Motor Impairment  visual tracking, left  -     Row Name 02/12/21 1527          Cognition/Psychosocial    Affect/Mental Status (Cognitive)  WFL  -CC     Personal Safety Interventions  fall prevention program maintained;gait belt;nonskid shoes/slippers when out of bed  -CC     Row Name 02/12/21 1527 02/12/21 1200       Pain Scale: Numbers Pre/Post-Treatment    Pretreatment Pain Rating  3/10  -  0/10 - no pain  -    Posttreatment Pain Rating  3/10  -  0/10 - no pain  -CC    Pain Location - Side  Left  -CC  --    Pain Location  shoulder  -CC  --    Row Name 02/12/21 1527          Bed Mobility    Sit-Supine Venus (Bed Mobility)  verbal cues;minimum assist (75% patient effort);moderate assist (50% patient effort)  -     Row Name 02/12/21 1527          Transfers    Chair-Bed Venus (Transfers)  moderate assist (50% patient effort);maximum assist (25% patient effort)  -     Row  Name 02/12/21 1527          Chair-Bed Transfer    Assistive Device (Chair-Bed Transfers)  wheelchair  -     Row Name 02/12/21 1527          Shoulder (Therapeutic Exercise)    Shoulder (Therapeutic Exercise)  PROM (passive range of motion)  -     Shoulder PROM (Therapeutic Exercise)  left;extension;aBduction;aDduction;external rotation;internal rotation;horizontal aBduction/aDduction;scapular elevation;scapular protraction;scapular retraction;sitting;10 repetitions  -     Row Name 02/12/21 1527          Elbow/Forearm (Therapeutic Exercise)    Elbow/Forearm (Therapeutic Exercise)  PROM (passive range of motion)  -     Elbow/Forearm PROM (Therapeutic Exercise)  left;flexion;extension;supination;pronation;sitting;5 repetitions  -     Row Name 02/12/21 1527          Wrist (Therapeutic Exercise)    Wrist (Therapeutic Exercise)  PROM (passive range of motion)  -     Wrist PROM (Therapeutic Exercise)  left;flexion;extension;10 repetitions  -     Row Name 02/12/21 1527          Hand (Therapeutic Exercise)    Hand (Therapeutic Exercise)  PROM (passive range of motion)  -     Hand PROM (Therapeutic Exercise)  left;finger flexion;finger extension;finger aBduction;finger aDduction;10 repetitions  -     Row Name 02/12/21 1527          Glenohumeral Joint Subluxation    Glenohumeral Joint Subluxation  left;severe subluxation;1 finger width;1 1/2 finger width  -     Interventions, Glenohumeral Joint Subluxation  lapboard/arm trough;positioning techniques;therapeutic taping  -     Row Name 02/12/21 1527          Neuromuscular Re-education    Interventions (Neuromuscular Re-education)  weight bearing;weight shifting  -     Positioning (Neuromuscular Re-education)  sitting;unsupported  -     Comment (Neuromuscular Re-education)  seated EOM WB LUE. Reaching w RUE. Increased dynamic sitting balance. No LOB.   -     Row Name 02/12/21 1200          Bathing    [place] Level (Bathing)  bathing skills;upper  body;lower body;moderate assist (50% patient effort);verbal cues;nonverbal cues (demo/gesture)  -CC     Position (Bathing)  sink side;supported sitting;supported standing  -CC     Set-up Assistance (Bathing)  adjust water temperature;obtain supplies;open containers  -CC     Row Name 02/12/21 1200          Upper Body Dressing    [place]udley Level (Upper Body Dressing)  upper body dressing skills;doff;don;front opening garment;clothes fastener management;maximal assist (25-49% patient effort)  -CC     Position (Upper Body Dressing)  supported sitting  -CC     Set-up Assistance (Upper Body Dressing)  obtain clothing  -     Row Name 02/12/21 1200          Lower Body Dressing    [place]ley Level (Lower Body Dressing)  doff;don;pants/bottoms;socks;moderate assist (50% patient effort);maximum assist (25% patient effort)  -CC     Position (Lower Body Dressing)  supported sitting;supported standing  -CC     Set-up Assistance (Lower Body Dressing)  obtain clothing  -     Row Name 02/12/21 1200          Grooming    [place] Level (Grooming)  grooming skills;deodorant application;hair care, combing/brushing;oral care regimen;wash face, hands;standby assist;minimum assist (75% patient effort)  -CC     Position (Grooming)  sink side;supported sitting  -CC     Set-up Assistance (Grooming)  obtain supplies  -     Row Name 02/12/21 1200          Toileting    [place] Level (Toileting)  toileting skills;dependent (less than 25% patient effort)  -CC     Position (Toileting)  supported sitting;supported standing  -CC     Comment (Toileting)  x2  -     Row Name 02/12/21 1200          Positioning and Restraints    Pre-Treatment Position  sitting in chair/recliner  -CC     Post Treatment Position  wheelchair  -CC     In Wheelchair  sitting;call light within reach;encouraged to call for assist  -CC       User Key  (r) = Recorded By, (t) = Taken By, (c) = Cosigned By    Initials Name Effective Dates    DORCAS Miranda  Keya OTR 06/08/18 -            Occupational Therapy Education                 Title: PT OT SLP Therapies (Done)     Topic: Occupational Therapy (Done)     Point: ADL training (Done)     Description:   Instruct learner(s) on proper safety adaptation and remediation techniques during self care or transfers.   Instruct in proper use of assistive devices.              Learning Progress Summary           Patient Acceptance, E, VU by CC at 2/10/2021 1546    Comment: Family conf w pt, dgt and son in law. Status, DME, follow up theray and home needs discussed. Pt will need a droparm commode and possibly shower seat. Will provide info on jon harness for LUE. Family scheduled for teaching Monday.    Acceptance, E, VU by SL at 2/10/2021 1504    Comment: family conference- discussed need for visual and verbal cues for visual perceptual deficits, and deficts in attn, memory    Acceptance, E,TB,D, VU by CC at 1/29/2021 1509    Comment: Pt repositioned in reg w/c with 1/2 arm tray on Left. Nursing notified of change from recliner w/c/. Commode tsf from w/c to droparm commode discussed and shown to NA.    Acceptance, E, VU by BL at 1/8/2021 1434   Family Acceptance, E, VU by CC at 2/10/2021 1546    Comment: Family conf w pt, dgt and son in law. Status, DME, follow up theray and home needs discussed. Pt will need a droparm commode and possibly shower seat. Will provide info on jon harness for LUE. Family scheduled for teaching Monday.    Acceptance, E, VU by SL at 2/10/2021 1504    Comment: family conference- discussed need for visual and verbal cues for visual perceptual deficits, and deficts in attn, memory   Caregiver Acceptance, E,TB,D, VU by CC at 1/29/2021 1509    Comment: Pt repositioned in reg w/c with 1/2 arm tray on Left. Nursing notified of change from recliner w/c/. Commode tsf from w/c to droparm commode discussed and shown to NA.                   Point: Home exercise program (Done)     Description:   Instruct  learner(s) on appropriate technique for monitoring, assisting and/or progressing therapeutic exercises/activities.              Learning Progress Summary           Patient Acceptance, E, VU by CC at 2/10/2021 1546    Comment: Family conf w pt, dgt and son in law. Status, DME, follow up theray and home needs discussed. Pt will need a droparm commode and possibly shower seat. Will provide info on jon harness for LUE. Family scheduled for teaching Monday.    Acceptance, E, VU by SL at 2/10/2021 1504    Comment: family conference- discussed need for visual and verbal cues for visual perceptual deficits, and deficts in attn, memory    Acceptance, E, VU by BL at 1/8/2021 1434   Family Acceptance, E, VU by CC at 2/10/2021 1546    Comment: Family conf w pt, dgt and son in law. Status, DME, follow up theray and home needs discussed. Pt will need a droparm commode and possibly shower seat. Will provide info on jon harness for LUE. Family scheduled for teaching Monday.    Acceptance, E, VU by SL at 2/10/2021 1504    Comment: family conference- discussed need for visual and verbal cues for visual perceptual deficits, and deficts in attn, memory                   Point: Precautions (Done)     Description:   Instruct learner(s) on prescribed precautions during self-care and functional transfers.              Learning Progress Summary           Patient Acceptance, E, VU by CC at 2/10/2021 1546    Comment: Family conf w pt, dgt and son in law. Status, DME, follow up theray and home needs discussed. Pt will need a droparm commode and possibly shower seat. Will provide info on jon harness for LUE. Family scheduled for teaching Monday.    Acceptance, E, VU by SL at 2/10/2021 1504    Comment: family conference- discussed need for visual and verbal cues for visual perceptual deficits, and deficts in attn, memory    Acceptance, E, VU by BL at 1/8/2021 1434   Family Acceptance, E, VU by CC at 2/10/2021 1546    Comment: Family conf w  pt, dgt and son in law. Status, DME, follow up theray and home needs discussed. Pt will need a droparm commode and possibly shower seat. Will provide info on jon harness for LUE. Family scheduled for teaching Monday.    Acceptance, E, VU by SL at 2/10/2021 1504    Comment: family conference- discussed need for visual and verbal cues for visual perceptual deficits, and deficts in attn, memory                   Point: Body mechanics (Done)     Description:   Instruct learner(s) on proper positioning and spine alignment during self-care, functional mobility activities and/or exercises.              Learning Progress Summary           Patient Acceptance, E, VU by CC at 2/10/2021 1546    Comment: Family conf w pt, dgt and son in law. Status, DME, follow up theray and home needs discussed. Pt will need a droparm commode and possibly shower seat. Will provide info on jon harness for LUE. Family scheduled for teaching Monday.    Acceptance, E, VU by SL at 2/10/2021 1504    Comment: family conference- discussed need for visual and verbal cues for visual perceptual deficits, and deficts in attn, memory    Acceptance, E,TB,D, VU by CC at 1/29/2021 1509    Comment: Pt repositioned in reg w/c with 1/2 arm tray on Left. Nursing notified of change from recliner w/c/. Commode tsf from w/c to droparm commode discussed and shown to NA.    Acceptance, E, VU by BL at 1/8/2021 1434   Family Acceptance, E, VU by CC at 2/10/2021 1546    Comment: Family conf w pt, dgt and son in law. Status, DME, follow up theray and home needs discussed. Pt will need a droparm commode and possibly shower seat. Will provide info on jon harness for LUE. Family scheduled for teaching Monday.    Acceptance, E, VU by SL at 2/10/2021 1504    Comment: family conference- discussed need for visual and verbal cues for visual perceptual deficits, and deficts in attn, memory   Caregiver Acceptance, E,TB,D, VU by CC at 1/29/2021 1509    Comment: Pt repositioned in  reg w/c with 1/2 arm tray on Left. Nursing notified of change from recliner w/c/. Commode tsf from w/c to droparm commode discussed and shown to NA.                               User Key     Initials Effective Dates Name Provider Type Discipline    CC 06/08/18 -  Keya Miranda OTR Occupational Therapist OT    SL 06/08/18 -  Katelyn James, MS CCC-SLP Speech and Language Pathologist SLP     01/05/21 -  Marshall Oleary OT Occupational Therapist OT                    OT Recommendation and Plan                         Time Calculation:     Time Calculation- OT     Row Name 02/12/21 1400 02/12/21 1000          Time Calculation- OT    OT Start Time  1400  -CC  1000  -CC     OT Stop Time  1430  -CC  1030  -CC     OT Time Calculation (min)  30 min  -CC  30 min  -CC     OT Non-Billable Time (min)  30 min tech  -CC  30 min tech  -CC       User Key  (r) = Recorded By, (t) = Taken By, (c) = Cosigned By    Initials Name Provider Type    CC Keya Miranda OTR Occupational Therapist        Therapy Charges for Today     Code Description Service Date Service Provider Modifiers Qty    62751020373 HC OT SELF CARE/MGMT/TRAIN EA 15 MIN 2/11/2021 Keya Miranda OTR GO 2    73860613668 HC OT THER SUPP EA 15 MIN 2/11/2021 Keya Miranda OTR GO 2    92774178830 HC OT THER SUPP EA 15 MIN 2/12/2021 Keya Miranda OTR GO 2    69745519615 HC OT SELF CARE/MGMT/TRAIN EA 15 MIN 2/12/2021 Keya Miranda OTR GO 2    82083017288 HC OT NEUROMUSC RE EDUCATION EA 15 MIN 2/12/2021 Keya Miranda OTR GO 2    74525320385 HC OT THER SUPP EA 15 MIN 2/12/2021 Keya Miranda OTR GO 2                   GIAN Blackwell  2/12/2021

## 2021-02-13 PROCEDURE — 97112 NEUROMUSCULAR REEDUCATION: CPT

## 2021-02-13 PROCEDURE — 97530 THERAPEUTIC ACTIVITIES: CPT

## 2021-02-13 PROCEDURE — 63710000001 ONDANSETRON ODT 4 MG TABLET DISPERSIBLE: Performed by: INTERNAL MEDICINE

## 2021-02-13 PROCEDURE — 25010000002 ENOXAPARIN PER 10 MG: Performed by: PHYSICAL MEDICINE & REHABILITATION

## 2021-02-13 RX ORDER — TRAMADOL HYDROCHLORIDE 50 MG/1
50 TABLET ORAL EVERY 8 HOURS PRN
Status: DISCONTINUED | OUTPATIENT
Start: 2021-02-13 | End: 2021-02-17

## 2021-02-13 RX ADMIN — ENOXAPARIN SODIUM 40 MG: 40 INJECTION SUBCUTANEOUS at 07:51

## 2021-02-13 RX ADMIN — OXYBUTYNIN CHLORIDE 5 MG: 5 TABLET ORAL at 17:06

## 2021-02-13 RX ADMIN — LOSARTAN POTASSIUM 50 MG: 50 TABLET, FILM COATED ORAL at 07:48

## 2021-02-13 RX ADMIN — DILTIAZEM HYDROCHLORIDE 180 MG: 180 CAPSULE, COATED, EXTENDED RELEASE ORAL at 20:36

## 2021-02-13 RX ADMIN — PANTOPRAZOLE SODIUM 40 MG: 40 TABLET, DELAYED RELEASE ORAL at 05:27

## 2021-02-13 RX ADMIN — HYDROCODONE BITARTRATE AND ACETAMINOPHEN 1 TABLET: 5; 325 TABLET ORAL at 07:50

## 2021-02-13 RX ADMIN — DOCUSATE SODIUM 200 MG: 100 CAPSULE, LIQUID FILLED ORAL at 07:50

## 2021-02-13 RX ADMIN — ONDANSETRON 4 MG: 4 TABLET, ORALLY DISINTEGRATING ORAL at 16:23

## 2021-02-13 RX ADMIN — ONDANSETRON 4 MG: 4 TABLET, ORALLY DISINTEGRATING ORAL at 05:26

## 2021-02-13 RX ADMIN — LEVOTHYROXINE SODIUM 125 MCG: 0.12 TABLET ORAL at 05:26

## 2021-02-13 RX ADMIN — OXYBUTYNIN CHLORIDE 5 MG: 5 TABLET ORAL at 07:49

## 2021-02-13 RX ADMIN — ONDANSETRON 4 MG: 4 TABLET, ORALLY DISINTEGRATING ORAL at 11:45

## 2021-02-13 RX ADMIN — MIRTAZAPINE 15 MG: 15 TABLET, FILM COATED ORAL at 20:36

## 2021-02-13 RX ADMIN — HYDROCODONE BITARTRATE AND ACETAMINOPHEN 1 TABLET: 5; 325 TABLET ORAL at 20:37

## 2021-02-13 RX ADMIN — LIDOCAINE 1 PATCH: 50 PATCH TOPICAL at 07:51

## 2021-02-13 RX ADMIN — FERROUS SULFATE TAB 325 MG (65 MG ELEMENTAL FE) 325 MG: 325 (65 FE) TAB at 07:49

## 2021-02-13 RX ADMIN — TRAMADOL HYDROCHLORIDE 50 MG: 50 TABLET ORAL at 11:46

## 2021-02-13 RX ADMIN — BISACODYL 10 MG: 10 SUPPOSITORY RECTAL at 15:30

## 2021-02-13 RX ADMIN — DILTIAZEM HYDROCHLORIDE 180 MG: 180 CAPSULE, COATED, EXTENDED RELEASE ORAL at 07:49

## 2021-02-13 RX ADMIN — DOCUSATE SODIUM 200 MG: 100 CAPSULE, LIQUID FILLED ORAL at 20:36

## 2021-02-13 RX ADMIN — ASPIRIN 81 MG: 81 TABLET, CHEWABLE ORAL at 07:49

## 2021-02-13 RX ADMIN — ERGOCALCIFEROL 50000 UNITS: 1.25 CAPSULE ORAL at 07:49

## 2021-02-13 RX ADMIN — TRAMADOL HYDROCHLORIDE 50 MG: 50 TABLET, FILM COATED ORAL at 17:36

## 2021-02-13 NOTE — THERAPY TREATMENT NOTE
Inpatient Rehabilitation - Physical Therapy Treatment Note       [facility]     Patient Name: Liana Carrero  : 1935  MRN: [id number]    Today's Date: 2021                    Admit Date: 2021      Visit Dx:     ICD-10-CM ICD-9-CM   1. Impaired functional mobility, balance, gait, and endurance  Z74.09 V49.89       Patient Active Problem List   Diagnosis   • Stroke (cerebrum) (CMS/[facility])   • Chest pain   • Hypertensive urgency   • Headache   • History of embolic stroke   • Hypothyroidism   • Hypokalemia   • CAD in native artery   • URTI (acute upper respiratory infection)   • Closed fracture of left hip (CMS/[facility])   • Chronic diastolic (congestive) heart failure (CMS/[facility])   • Coronary artery disease involving autologous vein coronary bypass graft without angina pectoris   • Impaired glucose tolerance   • Moderate malnutrition (CMS/[facility])   • Stroke (CMS/[facility])       Past Medical History:   Diagnosis Date   • Arthritis    • CHF (congestive heart failure) (CMS/[facility])    • Coronary artery disease    • Diabetes mellitus (CMS/[facility])     new DX related to stroke, started on insulin at hospital   • Disease of thyroid gland    • Elevated cholesterol    • GERD (gastroesophageal reflux disease)    • History of transfusion    • Hypertension    • PONV (postoperative nausea and vomiting)    • Sleep apnea    • Spinal headache    • Stroke (CMS/[facility])        Past Surgical History:   Procedure Laterality Date   • APPENDECTOMY     • CARDIAC CATHETERIZATION       x2   • CARDIAC CATHETERIZATION N/A 2019    Procedure: Left Heart Cath;  Surgeon: Bentley Chapin MD;  Location:  [facility];  Service: Cardiovascular   • CARDIAC CATHETERIZATION N/A 2019    Procedure: Coronary angiography;  Surgeon: Bentley Chapin MD;  Location:  [facility];  Service: Cardiovascular   • CARDIAC CATHETERIZATION N/A 2019    Procedure: Native mammary injection;  Surgeon: Bentley Chapin MD;   Location:  [facility];  Service: Cardiovascular   • CARDIAC CATHETERIZATION N/A 12/26/2019    Procedure: Saphenous Vein Graft;  Surgeon: Bentley Chapin MD;  [facility];  Service: Cardiovascular   • COLON SURGERY     • COLONOSCOPY     • EYE SURGERY      lens implants and cataract surgery   • FEMUR IM NAILING/RODDING Left 3/26/2020    Procedure: LT.HIP NAILING/RODDING;  Surgeon: Carlos Khan II, MD;  Location: [facility] MAIN OR;  Service: Orthopedics;  Laterality: Left;   • FRACTURE SURGERY Left 2020   • HYSTERECTOMY     • VASCULAR SURGERY      varicous veins          PT ASSESSMENT (last 12 hours)      IRF PT Evaluation and Treatment     Row Name 02/13/21 0827          PT Time and Intention    Document Type  daily treatment  -     Mode of Treatment  physical therapy  -     Patient/Family/Caregiver Comments/Observations  un in [place] in room eating breakfast upon arrival.  -     Row Name 02/13/21 0827          General Information    Patient Profile Reviewed  yes  -     Existing Precautions/Restrictions  fall  -     Limitations/Impairments  safety/cognitive  -     Row Name 02/13/21 0827          Cognition/Psychosocial    Affect/Mental Status (Cognitive)  WFL  -     Orientation Status (Cognition)  oriented x 3  -     Follows Commands (Cognition)  follows two-step commands;physical/tactile prompts required;verbal cues/prompting required;repetition of directions required  -     Personal Safety Interventions  fall prevention program maintained;gait belt;nonskid shoes/slippers when out of bed;supervised activity  -     Cognitive Function (Cognitive)  attention deficit  -     Attention Deficit (Cognitive)  distractible in noisy environment;divided attention  -     Executive Function Deficit (Cognition)  insight/awareness of deficits  -     Safety Deficit (Cognitive)  insight into deficits/self-awareness;judgment;safety precautions  awareness;safety precautions follow-through/compliance  -     Row Name 02/13/21 0827          Pain Scale: Numbers Pre/Post-Treatment    Pretreatment Pain Rating  5/10  -KP     Posttreatment Pain Rating  5/10  -KP     Pain Location - Side  Left  -KP     Pain Location - Orientation  lower  -     Pain Location  shoulder  -KP     Row Name 02/13/21 0827          Transfers    Sit-Stand [place] (Transfers)  maximum assist (25% patient effort);2 person assist;verbal cues;set up  -     Stand-Sit [place] (Transfers)  maximum assist (25% patient effort);verbal cues  -     [place] Level (Toilet Transfer)  maximum assist (25% patient effort);verbal cues;set up;2 person assist 2nd person for clothing  -     Assistive Device (Toilet Transfer)  commode, bedside with drop arms  -     Row Name 02/13/21 0827          Sit-Stand Transfer    Assistive Device (Sit-Stand Transfers)  wheelchair // bars  -     Row Name 02/13/21 0827          Stand-Sit Transfer    Assistive Device (Stand-Sit Transfers)  wheelchair // bars  -     Row Name 02/13/21 0827          Toilet Transfer    Type (Toilet Transfer)  squat pivot to L  -[facility] Name 02/13/21 0827          Gait/Stairs (Locomotion)    [place] Level (Gait)  maximum assist (25% patient effort);moderate assist (50% patient effort);2 person assist;verbal cues;set up;nonverbal cues (demo/gesture)  -     Assistive Device (Gait)  -- // bars, ace wrap L  -KP     Distance in Feet (Gait)  8 x 2  -KP     Pattern (Gait)  step-to  -KP     Deviations/Abnormal Patterns (Gait)  gait speed decreased;festinating/shuffling;stride length decreased;weight shifting decreased  -     Bilateral Gait Deviations  forward flexed posture  -     Left Sided Gait Deviations  foot drop/toe drag;heel strike decreased;knee buckling, left side;knee hyperextension;weight shift ability decreased;Trendelenburg sign Max to advance L, ext hip and knee in stance  -     Right Sided Gait  Deviations  weight shift ability decreased Pushes L, trunk rot L  -     Comment (Gait/Stairs)  Constant VC for posture and sequence.  -     Row Name 02/13/21 0827          Wheelchair Mobility/Management    Method of Wheelchair Locomotion (Mobility)  jon propulsion, right sided  -     Mobility Activities (Wheelchair)  forward propulsion;steering  -     Forward Propulsion [place] (Wheelchair)  moderate assist (50% patient effort);minimum assist (75% patient effort);verbal cues;set up  -     Steering [place] (Wheelchair)  moderate assist (50% patient effort);verbal cues;set up;minimum assist (75% patient effort)  -     Distance Propelled in Feet (Wheelchair)  50  -     Comment, Wheelchair Mobility  Difficulty using R LE to assist despite lowere w/c and foot on floor.  -     Armrest Management [place] (Wheelchair)  dependent (less than 25% patient effort)  -     Front Rigging/Swing-away Footrest Management [place] (Wheelchair)  dependent (less than 25% patient effort)  -     Wheel Locks/Brakes Management [place] (Wheelchair)  dependent (less than 25% patient effort)  -     Row Name 02/13/21 0827          Safety Issues, Functional Mobility    Safety Issues Affecting Function (Mobility)  insight into deficits/self-awareness;problem-solving  -     Impairments Affecting Function (Mobility)  balance;cognition;coordination;endurance/activity tolerance;postural/trunk control;strength  -     Cognitive Impairments, Mobility Safety/Performance  insight into deficits/self-awareness;judgment;safety precaution awareness;safety precaution follow-through  -     Row Name 02/13/21 0827          Balance    Balance Interventions  static;standing;weight shifting activity  -     Comment, Balance  at // bars,Max A + min   -     Row Name 02/13/21 0827          Positioning and Restraints    Pre-Treatment Position  sitting in chair/recliner  -     Post Treatment Position  wheelchair   -KP     In Wheelchair  sitting;call light within reach;encouraged to call for assist;notified nsg;exit alarm on  -KP       User Key  (r) = Recorded By, (t) = Taken By, (c) = Cosigned By    Initials Name Provider Type    Dixie Galindo, PT Physical Therapist        Wound 01/07/21 Left upper chest Incision (Active)   Dressing Appearance dry;intact 02/13/21 0750   Closure Liquid skin adhesive;Open to air 02/13/21 0750   Base clean;dry 02/13/21 0750   Periwound intact 02/13/21 0750   Drainage Amount none 02/13/21 0750   Dressing Care open to air 02/13/21 0750   Periwound Care dry periwound area maintained 02/13/21 0750     Physical Therapy Education                 Title: PT OT SLP Therapies (In Progress)     Topic: Physical Therapy (In Progress)     Point: Mobility training (In Progress)     Learning Progress Summary           Patient Acceptance, D,E, NR by KP at 2/13/2021 0828    Comment: w/c propulsion    Acceptance, E,TB, VU,NR by MS at 2/12/2021 1507    Acceptance, E,D, NR by KP at 2/11/2021 1120    Comment: standing posture and gait sequence    Acceptance, E, VU by  at 2/10/2021 1504    Comment: family conference- discussed need for visual and verbal cues for visual perceptual deficits, and deficts in attn, memory    Acceptance, E,TB, VU,NR by MS at 2/10/2021 1401    Acceptance, E,TB, VU,NR by MS at 2/9/2021 1531    Eager, E,D, NR by KP at 2/9/2021 1119    Comment: standing posture, balance    Acceptance, E,TB, VU,NR by MS at 2/8/2021 1152    Acceptance, E,D, DU,NR by LB at 2/6/2021 1539    Comment: wt shift in standing    Acceptance, E,TB, VU,NR by MS at 2/5/2021 1208    Acceptance, E,TB, VU,NR by MS at 2/4/2021 1518    Acceptance, E,TB, VU,NR by MS at 2/3/2021 1151    Acceptance, E,TB, VU,NR by MS at 2/2/2021 1210    Acceptance, E,TB, NR by MS at 2/1/2021 1145    Acceptance, E, NR by  at 1/30/2021 1336    Acceptance, E,TB, VU,NR by MS at 1/29/2021 1535    Acceptance, E, NR by LB1 at 1/28/2021 1032     Acceptance, E,TB, VU,NR by MS at 1/27/2021 1447    Acceptance, E,TB, VU,NR by MS at 1/26/2021 0959    Acceptance, E,TB, VU,NR by MS at 1/25/2021 1316    Acceptance, E,TB, VU,NR by MS at 1/22/2021 0918    Acceptance, E,TB, VU by MS at 1/20/2021 1544    Acceptance, E,TB, VU,NR by MS at 1/19/2021 1401    Acceptance, E,TB, NR,NL by MS at 1/18/2021 1432    Acceptance, E,TB, VU,NR by IVAN at 1/16/2021 1444    Acceptance, E,TB, NR,NL by MS at 1/15/2021 1418    Acceptance, E,TB, NR by MS at 1/14/2021 1223    Acceptance, E,TB, NR by LB2 at 1/13/2021 1030    Comment: sitting balance    Acceptance, E,TB, NR by MS at 1/12/2021 1312    Acceptance, E,TB, VU,NR by MS at 1/11/2021 1140   Family Acceptance, E, VU by SL at 2/10/2021 1504    Comment: family conference- discussed need for visual and verbal cues for visual perceptual deficits, and deficts in attn, memory                   Point: Home exercise program (Done)     Learning Progress Summary           Patient Acceptance, E,TB, VU,NR by MS at 2/12/2021 1507    Acceptance, E, VU by SL at 2/10/2021 1504    Comment: family conference- discussed need for visual and verbal cues for visual perceptual deficits, and deficts in attn, memory    Acceptance, E,TB, VU,NR by MS at 2/10/2021 1401    Acceptance, E,TB, VU,NR by MS at 2/9/2021 1531    Acceptance, E,TB, VU,NR by MS at 2/8/2021 1152    Acceptance, E,TB, VU,NR by MS at 2/5/2021 1208    Acceptance, E,TB, VU,NR by MS at 2/4/2021 1518    Acceptance, E,TB, VU,NR by MS at 2/3/2021 1151    Acceptance, E,TB, VU,NR by MS at 2/2/2021 1210    Acceptance, E,TB, NR by MS at 2/1/2021 1145    Acceptance, E, NR by LH at 1/30/2021 1336    Acceptance, E,TB, VU,NR by MS at 1/29/2021 1535    Acceptance, E,TB, VU,NR by MS at 1/27/2021 1447    Acceptance, E,TB, VU,NR by MS at 1/26/2021 0959    Acceptance, E,TB, VU,NR by MS at 1/25/2021 1316    Acceptance, E,TB, VU,NR by MS at 1/22/2021 0918    Acceptance, E,TB, VU by MS at 1/20/2021 1544    Acceptance,  E,TB, VU,NR by MS at 1/19/2021 1401    Acceptance, E,TB, NR,NL by MS at 1/18/2021 1432    Acceptance, E,TB, NR,NL by MS at 1/15/2021 1418    Acceptance, E,TB, NR by MS at 1/14/2021 1223   Family Acceptance, E, VU by SL at 2/10/2021 1504    Comment: family conference- discussed need for visual and verbal cues for visual perceptual deficits, and deficts in attn, memory                   Point: Body mechanics (Done)     Learning Progress Summary           Patient Acceptance, E,TB, VU,NR by MS at 2/12/2021 1507    Acceptance, E, VU by SL at 2/10/2021 1504    Comment: family conference- discussed need for visual and verbal cues for visual perceptual deficits, and deficts in attn, memory    Acceptance, E,TB, VU,NR by MS at 2/10/2021 1401    Acceptance, E,TB, VU,NR by MS at 2/9/2021 1531    Acceptance, E,TB, VU,NR by MS at 2/8/2021 1152    Acceptance, E,TB, VU,NR by MS at 2/5/2021 1208    Acceptance, E,TB, VU,NR by MS at 2/4/2021 1518    Acceptance, E,TB, VU,NR by MS at 2/3/2021 1151    Acceptance, E,TB, VU,NR by MS at 2/2/2021 1210    Acceptance, E,TB, NR by MS at 2/1/2021 1145    Acceptance, E, NR by LH at 1/30/2021 1336    Acceptance, E,TB, VU,NR by MS at 1/29/2021 1535    Acceptance, E,TB, VU,NR by MS at 1/27/2021 1447    Acceptance, E,TB, VU,NR by MS at 1/26/2021 0959    Acceptance, E,TB, VU,NR by MS at 1/25/2021 1316    Acceptance, E,TB, VU,NR by MS at 1/22/2021 0918    Acceptance, E,TB, VU by MS at 1/20/2021 1544    Acceptance, E,TB, VU,NR by MS at 1/19/2021 1401    Acceptance, E,TB, NR,NL by MS at 1/15/2021 1418    Acceptance, E,TB, NR by MS at 1/14/2021 1223    Acceptance, E,TB, NR by MS at 1/12/2021 1312    Acceptance, E,TB, VU,NR by MS at 1/11/2021 1140   Family Acceptance, E, VU by SL at 2/10/2021 1504    Comment: family conference- discussed need for visual and verbal cues for visual perceptual deficits, and deficts in attn, memory                   Point: Precautions (Done)     Learning Progress Summary            Patient Acceptance, E,TB, VU,NR by MS at 2/12/2021 1507    Acceptance, E, VU by SL at 2/10/2021 1504    Comment: family conference- discussed need for visual and verbal cues for visual perceptual deficits, and deficts in attn, memory    Acceptance, E,TB, VU,NR by MS at 2/10/2021 1401    Acceptance, E,TB, VU,NR by MS at 2/9/2021 1531    Acceptance, E,TB, VU,NR by MS at 2/8/2021 1152    Acceptance, E,TB, VU,NR by MS at 2/5/2021 1208    Acceptance, E,TB, VU,NR by MS at 2/4/2021 1518    Acceptance, E,TB, VU,NR by MS at 2/3/2021 1151    Acceptance, E,TB, VU,NR by MS at 2/2/2021 1210    Acceptance, E,TB, NR by MS at 2/1/2021 1145    Acceptance, E, NR by  at 1/30/2021 1336    Acceptance, E,TB, VU,NR by MS at 1/29/2021 1535    Acceptance, E,TB, VU,NR by MS at 1/27/2021 1447    Acceptance, E,TB, VU,NR by MS at 1/26/2021 0959    Acceptance, E,TB, VU,NR by MS at 1/25/2021 1316    Acceptance, E,TB, VU,NR by MS at 1/22/2021 0918    Acceptance, E, VU by MD at 1/21/2021 0926    Acceptance, E,TB, VU by MS at 1/20/2021 1544    Acceptance, E,TB, VU,NR by MS at 1/19/2021 1401    Acceptance, E,TB, NR,NL by MS at 1/15/2021 1418    Acceptance, E,TB, NR by MS at 1/14/2021 1223    Acceptance, E,TB, NR by MS at 1/12/2021 1312    Acceptance, E,TB, VU,NR by MS at 1/11/2021 1140    Acceptance, E, VU by MD at 1/9/2021 0933   Family Acceptance, E, VU by SL at 2/10/2021 1504    Comment: family conference- discussed need for visual and verbal cues for visual perceptual deficits, and deficts in attn, memory                               User Key     Initials Effective Dates Name Provider Type Discipline    LB 06/08/18 -  Elba Zarate, PT Physical Therapist PT    SL 06/08/18 -  Katelyn James MS CCC-SLP Speech and Language Pathologist SLP    IVAN 06/08/18 -  Ela Pritchett, PT Physical Therapist PT    LB2 04/03/18 -  Jazmin Schuster, PT Physical Therapist PT     04/03/18 -  Elba Myrick, PT Physical Therapist PT    LB1 03/07/18 -  Calista  Elba KRISHNAMURTHY PTA Physical Therapy Assistant PT    MD 04/03/18 -  Lisandra Lowery PT Physical Therapist PT     04/03/18 -  Dixie Graham PT Physical Therapist PT    MS 03/04/19 -  Nati Kaba PT Physical Therapist PT                PT Recommendation and Plan                          Time Calculation:     PT Charges     Row Name 02/13/21 0828             Time Calculation    Start Time  0800  -KP      Stop Time  0830  -      Time Calculation (min)  30 min  -KP      PT Received On  02/13/21  -      PT - Next Appointment  02/15/21  -        User Key  (r) = Recorded By, (t) = Taken By, (c) = Cosigned By    Initials Name Provider Type     Dixie Graham PT Physical Therapist          Therapy Charges for Today     Code Description Service Date Service Provider Modifiers Qty    19538137225 HC PT NEUROMUSC RE EDUCATION EA 15 MIN 2/13/2021 Dixie Graham, PT GP 1    [id number] HC PT THERAPEUTIC ACT EA 15 MIN 2/13/2021 Dixie Graham, PT GP 1               Patient was intermittently wearing a face mask during this therapy encounter. Therapist used appropriate personal protective equipment including eye protection, mask, and gloves.  Mask used was standard procedure mask. Appropriate PPE was worn during the entire therapy session. Hand hygiene was completed before and after therapy session. Patient is not in enhanced droplet precautions. Shellie Blackwell, PT Tech was present and in approp PPE for rx.      Dixie Graham PT  2/13/2021

## 2021-02-13 NOTE — PLAN OF CARE
Goal Outcome Evaluation:          Problem: Rehabilitation (IRF) Plan of Care  Goal: Plan of Care Review  Outcome: Ongoing, Progressing  Flowsheets  Taken 2/13/2021 0214 by Gloria Baeza RN  Outcome Summary: Liana is alert and oriented x4. Pleasant and friendly with staff during all care. Makes all needs known with clear speech. Takes meds whole PO. Left side hemiplegia. Denies feeling any sensation to left side. Continent most of the time, can call out for bed pan at HS. Did not call out as much this evening like night before. Last BM 2/12. Assist x 2 during all transfers. Scattered bruising to all extremities. Wears 1-2L o2 at HS n/c. C/o of left hip pain this evening. PRN tramadol administered at 2037. Sleeping well. Family teaching monday 2/15 at 10am. D/C tuesday 2/16. Call light within reach.

## 2021-02-13 NOTE — PLAN OF CARE
Goal Outcome Evaluation:        Outcome Summary: A&OX4. Forgetful. Mostly continent B&B. Has been using the bed pan, bedside commode, and restroom. Urgency, frequency, stress incontinence. Ate fair at meals. Wears dentures. Stroke. Diet: Reg., cardiac, low fat, low cholesterol, low salt. W/C all meals. Pain meds every 4 hours. Nausea better controlled now that the zofran is scheduled instead of PRN. L side flaccid. 2 person assist or lift. Some visual field cuts, more to the L. Air mattress and wedge for comfort. Leans to L. O2 NC 2L at night. Slept after therapy sessions. Loop recorder to chest. On bowel regimen of 2 docusate sodium x2 daily, suppository in evening and miralax powder PRN. Last BM 2/13. Complained of L. shoulder pain. No UTI. Family teaching 2/15 at 10 AM. D/C 2/16.

## 2021-02-13 NOTE — PROGRESS NOTES
Inpatient Rehabilitation Plan of Care Note    Plan of Care  Care Plan Reviewed - No updates at this time.    Body Systems    [RN] Respiratory(Active)  Current Status(02/13/2021): O2 2L QHS  Weekly Goal(02/16/2021): Same as discharge  Discharge Goal: Least restrictive amount of 02    Performed Intervention(s)  O2 2L/NC @ HS  assess lungs, sats every shift      Psychosocial    [RN] Coping/Adjustment(Active)  Current Status(02/13/2021): Patient has supportive family, but at risk of  ineffective coping regarding current situation.  Weekly Goal(02/16/2021): Allow patient the opportunity to discuss concerns  regarding new situation.  Discharge Goal: Patient will have healthy coping mechanisms at time of discharge    Performed Intervention(s)  Verbalize needs and concerns  Medications as needed/ ordered  Therapeutic environmental set up      Safety    [RN] Potential for Injury(Active)  Current Status(02/13/2021): Patient at risk of injury related to previous falls.  flaccid on left side. dependent with transfers of 2.  Weekly Goal(02/16/2021): Paitent will use call light appropriately while in  Rehab.  Discharge Goal: Patient will continue to use call light while in Rehab  environment.    Performed Intervention(s)  Items with in reach and environmental set up  Bed alarm/ Chair alarm  Safety rounds and falls protocols/ precautions      Sphincter Control    [RN] Bladder Management(Active)  Current Status(02/13/2021): Patient is continent and incontinent, using bedpan,  PVRs, requiring I/C at times  Weekly Goal(02/16/2021): Patient will be 50% continent  Discharge Goal: Patient will be 100% continent    [RN] Bowel Management(Active)  Current Status(02/13/2021): Patient continent and incontinent  Weekly Goal(02/16/2021): continent 50% of the time  Discharge Goal: Patient will continent 100% of the time    Performed Intervention(s)  Proper diet and fluid intake  Medication as ordered  Incontinent care if needed  Monitor intake  and output    Signed by: Gloria Baeza RN

## 2021-02-13 NOTE — PROGRESS NOTES
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 Visiting with nurse. Notes improved/decreased voiding with increased Ditropan.     History taken from: patient    Objective     Vital Signs  Temp:  [97.8 °F (36.6 °C)-98.3 °F (36.8 °C)] 97.8 °F (36.6 °C)  Heart Rate:  [85-93] 85  Resp:  [18-20] 18  BP: (100-119)/(57-72) 100/57    Physical Exam  General: calm, in NAD  Neck: trachea midline  Cardio: well perfused distal extremities, regular radial pulse  Resp: normal WOB on RA, nonlabored.    Abdomen: NT/ND  Extremities:    No edema  No crepitus with range of motion of the left hip.  Neuro: dense left hemiparesis, flaccid tone  Left homonymous hemianopsia    Left inattention.  A&Ox4. 0/5 strength LUE/LLE.   Takes resistance on the right side.    Results from last 7 days   Lab Units 02/12/21  0624 02/08/21  0709   WBC 10*3/mm3 4.09 4.81   HEMOGLOBIN g/dL 12.0 11.7*   HEMATOCRIT % 36.3 36.5   PLATELETS 10*3/mm3 174 162     Results from last 7 days   Lab Units 02/12/21  0624 02/08/21  0709   SODIUM mmol/L 136 137   POTASSIUM mmol/L 4.2 4.3   CHLORIDE mmol/L 99 102   CO2 mmol/L 31.0* 25.7   BUN mg/dL 15 17   CREATININE mg/dL 0.63 0.61   CALCIUM mg/dL 9.7 9.1   GLUCOSE mg/dL 116* 109*             Ref. Range 1/8/2021 06:01   TSH Baseline Latest Ref Range: 0.270 - 4.200 uIU/mL 2.830   Vitamin B-12 Latest Ref Range: 211 - 946 pg/mL 381   25 Hydroxy, Vitamin D Latest Ref Range: 30.0 - 100.0 ng/ml 20.6 (L)     CT of the abdomen-January 16  CT Abdomen Pelvis With Contrast   Final Result       A cystic focus at the pancreatic head,  likely corresponds to the   ultrasound finding of concern, could be further evaluated with   endoscopic ultrasound as indicated, interval follow-up also recommended   to exclude any  possibility of a cystic neoplasm. The pancreas is   thinned. The main pancreatic duct shows a mildly prominent caliber, 4   mm.          US GALLBLADDER-  1/14/2021   IMPRESSION:  Small to moderate amount of gallbladder sludge with a few  possible tiny nonshadowing gallstones.  2. No wall thickening or pericholecystic fluid is seen. No sonographic  Garcia's sign is seen.  3. Fatty infiltration of the liver.  4. 1.2 cm hypoechoic pancreatic head lesion. No pancreatic head lesions  are seen on the CT of the abdomen and pelvis from [facility]  dated 12/11/2019.  5. CT of the abdomen with contrast using pancreatic protocol is  recommended for further assessment.    EXAMINATION: LIMITED LEFT BREAST SONOGRAPHY-January 20, 2021     HISTORY: 85-year-old female status post recent placement of a loop  recorder on the left side of the chest with an area of palpable concern  in the left breast.     FINDINGS: Targeted sonographic evaluation of the area of palpable  concern in the left breast which corresponds to the upper inner quadrant  was performed. No sonographic abnormality is appreciated.     IMPRESSION:  Negative targeted left breast sonography. Clinical followup  is suggested.     BI-RADS Category 1: Negative  Results Review:     I reviewed the patient's new clinical results.    Medication Review: Complete  Scheduled Meds:Alirocumab, 75 mg, Subcutaneous, Q14 Days  aspirin, 81 mg, Oral, Daily  bisacodyl, 10 mg, Rectal, Q24H  [START ON 3/6/2021] cholecalciferol, 1,000 Units, Oral, Daily  dilTIAZem CD, 180 mg, Oral, BID  docusate sodium, 200 mg, Oral, BID  enoxaparin, 40 mg, Subcutaneous, Daily  ferrous sulfate, 325 mg, Oral, Daily With Breakfast  levothyroxine, 125 mcg, Oral, Q AM  lidocaine, 1 patch, Transdermal, Q24H  losartan, 50 mg, Oral, Daily  mirtazapine, 15 mg, Oral, Nightly  ondansetron ODT, 4 mg, Oral, TID AC  oxybutynin, 5 mg, Oral, BID With Meals  pantoprazole, 40 mg, Oral, QAM  vitamin D, 50,000 Units,  Oral, Q7 Days      Continuous Infusions:   PRN Meds:.HYDROcodone-acetaminophen  •  nitroglycerin  •  polyethylene glycol      Assessment/Plan       Stroke (CMS/[facility])    Debility and functional deficits  -PT/OT/SLP     Acute CVA  -[place] 3 MRI at [facility] revealed acute infarct in the left temporal-occipital region, no TPA or MT  -Jan 4 had worsening of her NIHSS, MRI revealed a new right superior frontal gyrus acute infarct. Again outside of the window of TPA, not a candidate for mechanical thrombectomy  -continue ASA and Praluent as she is intolerant of statins  -Echo was unremarkable  -Holter normal  -Loop recorder placed  -A1c 5.0  -TSH and B12 level unremarkable    Loss of appetite  Weight loss  Nausea  -symptoms for approximately 8 months, has lost ~40lbs over that time per patient  -started Remeron 7.5 qHS  -GI consulted, appreciate recs. Started scheduled Zofran with meals on 1/13, continue Remeron.   Jan 14 - GI to check RUQ US, increase bowel regimen, continue Remeron and Zofran as above  January 15 -1.2 cm hypoechoic pancreatic head lesion on ultrasound.  To get CT of the abdomen tomorrow.  January 16 - CT scan showed pancreatic cyst.  Not felt to be the etiology of her nausea.  Follow-up as an outpatient.  January 17- Remeron increased to 15 mg nightly     Left breast nodule  -2x1cm nodule left breast at 10 o'clock position, borders feel smooth, nodule is not freely mobile, no overlying skin changes. Away from the site of recent left loop recorder placement Jan 5, 2021  -Last mammogram one year ago. May not tolerate mammogram with recent loop recorder placement. Patient discussed with Dr. Pride, left breast ultrasound ordered  -Left breast ultrasound negative at the area of interest in the left upper inner quadrant    HLD  -continue ASA and Praluent as she is intolerant of statins     HTN  -continue cardizem, losartan  -normotensive goal  -monitor    Vit D insufficiency  -level 20 on admission  -started vitamin D  replacement     Hypothyroidism  -continue levothyroxine    CAD  -continue ASA and Praluent   January 27-She had some sharp chest pain that was brief last evening at about 1 AM, resolved on its own.  Had nitroglycerin ordered but did not receive it.  She reports at home she did occasionally have chest pain and would take him to glycerin at home, stating that sometimes she would take it as she just felt nervous and worried with the chest pain.  She did feel some palpitations after the chest pain last evening.  We discussed seeing if anything was uploaded on her loop recorder to her cardiologist office and we will check those results.    Obstructive sleep apnea-January 27-she has been on 2 L nasal cannula oxygen at nighttime with sats 100%.  She did not have her CPAP brought in from home     Prerenal OBED, resolved  -secondary to decreased PO intake  -resolved prior to discharge from [facility]  -monitor electrolytes     Iron deficiency anemia  -received 2 doses of IV iron as well as 1UpRBCs on 1/4 at [facility]  -Hgb stable ~8 prior to discharge from [facility], 9.2 on admission to [facility]  -continue oral iron, monitor     Left hip pain  -xrays at [facility] unremarkable  Jan 13 - Fatigue continues to be an issue. She takes Norco 7.5 mg about 4 times a day with history of left hip pain-history of left femur fracture open reduction internal fixation, x-ray at the outside hospital on January 6 showed hardware in place.  January 14-She does have fatigue during the day.  Will try a lower dose of Norco 5 mg rather than 7.5 mg.  Is on Remeron 7.5 g at nighttime but that was only recently added.  January 15-she took Norco 5 mg twice a day at home.  More alert with better trunk control on lower dose of Norco today.  January 21-continues with left hip pain. Taking Norco 5 mg about 4 times a day. Add Lidoderm patch.  January 25-does not feel Lidoderm patch that helpful.  Pain was better with addition of tramadol over the weekend.  Hip range of motion  unremarkable with no crepitus  January 26-left hip xray did not show displacement of surgical hardware, fracture, erosion or dislocation  January 26-she feels tramadol works better for her than hydrocodone.  Anticipate going home on tramadol and discontinue hydrocodone.  She is presently taking tramadol twice a day.  Appears to be tolerating without any side effects.  February 12-patient alternating hydrocodone and tramadol for pain relief     Fluids/Electrolytes/Nutrition   -cardiac diet  -admission labs unremarkable     DVT ppx  -subQ Lovenox    /UTI  -on Ditropan 5 mg twice a day.  January 11 - voids with acceptable bladder scan postvoid residuals. Urinary tract infection with E. coli-sensitive to Macrobid  January 18 - required intermittent straight catheter 450 cc.  January 19 - incontinent and IC, PVRs 99-391cc  January 22 - has urge to urinate at times but cannot void, will decrease oxybutynin from bid to with dinner only. She is voiding with incontinence with postvoid residual 230-290 cc.  Discussed trial of decreasing Ditropan to 5 mg q. supper first from twice a day before doing a trial of Flomax.  January 23-urinary tract infection-E. coli-on cefdinir starting January 23.  Sensitive to cephalosporins  January 27-voids with postvoid residual  cc.  We will continue Ditropan at lower dose of 5 mg with supper  February 9-recurrent dysuria-recheck urinalysis with culture if indicated  Feb 10 - UA negative and PVR 53 cc  February 12-She notes that she has increased bladder frequency at nighttime on the decreased dose of the Ditropan, having to go every 20 minutes which is fatiguing.  Recent urinalysis was negative for infection.  We discussed increasing Ditropan back to 5 mg twice a day.  Also reviewed that previously she had some elevated PVRs on the twice daily dosing .  February 13-decreased urinary frequency with increased Ditropan.     TEAM CONF - JAN 12 - BED MAX-DEP. TRANSFERS MAX 2 TRANSFER  BOARD. NON-AMBULATORY. WHEELCHAIR 50 FEET MIN ASSIST. TOILET TRANSFERS DEP X 2. IMPAIRED VISION. BATH MAX. LBD DEP. UBD MAX. GROOMING MOD ASSIST. COGNITION - MILD TO MODERATE DEFICITS. 2L O2 AT HS. ON MACROBID FOR UTI. FATIGUES. HISTORY OF POOR APPETITE FOR MONTHS, WEIGHT LOSS 30-40 # OVER SEVERAL MONTHS AT HOME, HISTORY OF REPORTED NAUSEA AT HOME. WILL CONSULT GI.   ELOS - 4 WEEKS.     TEAM CONF - JAN 19 - BED MAX 2 .  TRANSFERS MAX 2 TRANSFER BOARD. WHEELCHAIR MIN A DUE TO VISIN DEFICITS. HAS SAT UNSUPPORTED FOR 30 SECS BEFORE STARTS TO LEAN. TOILET TRANSFERS DEP X 2. BATH UB MAX, LB DEP X 2. LBD DEP X 2. UBD MAX. GROOMING MOD. LEFT SHOULDER SUBLUXATION - ADD LEUKOTAPING. VOIDS WITH ELEVATED PVRS WITH OCCASIONAL ISC. CONTINENT INCONTINENT.  LEFT INATTENTION, LEFT VISUAL FIELD CUT. IMPAIRED ATTENTION, EXECUTIVE FUNCTION, REASONING. WORKING MEMORY IMPROVING.  BNE (Active)  Att'n. - WNL  Exec. Fx. - Mild Imp.  Rsng/Jgmnt - Mildly Imp. for abstract reaosning  Arith - Min Imp.  Visuospatial Skills - Mildly Imp.  Visual Mem. - Mildly Imp.  Verbal Mem. - Mildly Imp.  Emot - Pt endorsed dep related to current status  DEPRESSION - SUPPORTIVE THERAPY. ON REMERON.   ELOS -   3 WEEKS    TEAM CONF - JAN 26 -  BED MAX 2. TRANSFERS MAX 2. WHEELCHAIR 50 FEET MIN ASSIST, IMPACTED BY VISUAL DEFICITS. LEFT HIP PAIN INCREASED, WILL REPEAT X-RAY. AT OUTSIDE HOSPITAL X-RAY ON JAN 6 - [ORIF hardware in the proximal femur is stable from 5/6/2020. No hardware loosening or perihardware fracture is detected. The hip joint space is mildly narrowed as before. IMPRESSION: Stable ORIF hardware; no acute abnormality.]  TOILET TRANSFERS MAX 2 AS PUSHES. BATH UBB MIN, LBB MAX 2. UBD MAX ASSIST. LBD DEPENDENT. GROOMING MIN ASSIST. ATTENDS TO LEFT A LITTLE BETTER. TOILETING DEP X 2. LEFT VISUAL FIELD DEFICITS. EXECUTIVE FUNCTION - IMPAIRED ATTENTION. MEMORY MIN-MOD DEFICITS. MOD-MAX VISUAL CUES TO LEFT SIDE. VOIDS. ON DECREASED DITROPAN TO 5 MG ONCE  A DAY DUE TO ELEVATED PVRS. CONTINENT AT TIMES WITH BLADDER. BOWEL CONTINENT. PAIN MANAGEMENT - TRAMADOL ADDED. WITH REC THERAPY, CUES TO ATTEND TO LEFT.  ELOS -  2-3 WEEKS    TEAM CONF- BED MAX 2. TRANSFERS MAX SQUAT PIVOT, USES LIFT WITH NURSING. SITTING BALANCE BETTER. LEFT INATTENTION. WHEELCHAIR MIN ASSIST WITH VISUAL CUES, DUE TO LEFT VISUAL INATTENTION/LEFT VISUAL FIELD CUT. STANDING AT PARALLEL BARS WITH BLOCKING LEFT KNEE. TOILET TRANSFERS MAX 2 TO DROPARM. BATH MIN UB AND MAX X 2 LB. DRESSING MOD-MAX UB AND DEP X 2 LB. GROOMING MIN-SBA. TOILETING COMES UP TO STAND A LITTLE BETTER, DEP X 2.   IMPAIRED ATTENTION. DISTRACTIBLE. FATIGUES QUICKLY WITH COGNITIVE TASKS. MIN-MOD MEMORY DEFICITS. MOD-MAX VERBAL AND VISUAL CUES FOR READING AND WRITING TASKS. INCONTINENT BOWEL AND BLADDER. PVRS LOW. CHRONIC LEFT HIP PAIN.   ELOS -  ONE WEEK, WILL NEED SIGNIFICANT ASSISTANCE AT HOME.    TEAM CONF - FEB 9 - LEFT HEMIPLEGIA UNCHANGED. KINESIOTAPING LEFT SHOULDER.  BED MAX 2. TRANSFERS MAX ASSIST SQUAT PIVOT. NON-AMBULATORY. WHEELCHAIR 100 FEET MIN ASSIST DUE TO VISUAL DEFICITS. TOILET TRANSFERS MAX 1. SHOWER TRANSFERS MAX ASSIST TO BENCH. TOILETING DEP X 2. UBB MIN. LBB MOD OF 2. LBD MOD-MAX WITH MAX 2 FOR STANDNG. UBD MOD ASSIST FOR PULLOVER. GROOMING MIN-SBA. CONTINENT /INCONTINENT. DITROPAN FOR OVERACTIVE BLADDER. ABLE TO SIT FOR ABOUT 75 SECS. IMPAIRED ATTENTION. FATIGUES QUICKLY. DISTRACTIBLE. LEFT VISUAL FIELD CUT. MIN-MOD MEMORY DEFICITS. MOD-MAX VISUAL CUES FOR READING/WRITING TASKS.   ELOS -   FAMILY PLANS TO HIRE ADDITIONAL HELP FOR HOME. HOME ON FEB 16. FAMILY TEACHING NEEDED.     2/13/21:   Seen and examined. No acute events overnight.      Reviewed medications, vital signs, and recent lab work.      Progressing well.     Continue comprehensive inpatient rehab program.     Juvenal Comer MD  02/13/21  16:50 EST    During rounds, used appropriate personal protective equipment including mask and gloves.   Additional gown if indicated.  Mask used was standard procedure mask. Appropriate PPE was worn during the entire visit.  Hand hygiene was completed before and after.

## 2021-02-14 PROCEDURE — 63710000001 ONDANSETRON ODT 4 MG TABLET DISPERSIBLE: Performed by: INTERNAL MEDICINE

## 2021-02-14 PROCEDURE — 25010000002 ENOXAPARIN PER 10 MG: Performed by: PHYSICAL MEDICINE & REHABILITATION

## 2021-02-14 RX ADMIN — OXYBUTYNIN CHLORIDE 5 MG: 5 TABLET ORAL at 07:51

## 2021-02-14 RX ADMIN — HYDROCODONE BITARTRATE AND ACETAMINOPHEN 1 TABLET: 5; 325 TABLET ORAL at 06:05

## 2021-02-14 RX ADMIN — ASPIRIN 81 MG: 81 TABLET, CHEWABLE ORAL at 07:51

## 2021-02-14 RX ADMIN — OXYBUTYNIN CHLORIDE 5 MG: 5 TABLET ORAL at 16:19

## 2021-02-14 RX ADMIN — TRAMADOL HYDROCHLORIDE 50 MG: 50 TABLET, FILM COATED ORAL at 10:09

## 2021-02-14 RX ADMIN — DOCUSATE SODIUM 200 MG: 100 CAPSULE, LIQUID FILLED ORAL at 07:51

## 2021-02-14 RX ADMIN — PANTOPRAZOLE SODIUM 40 MG: 40 TABLET, DELAYED RELEASE ORAL at 06:03

## 2021-02-14 RX ADMIN — ENOXAPARIN SODIUM 40 MG: 40 INJECTION SUBCUTANEOUS at 07:50

## 2021-02-14 RX ADMIN — DOCUSATE SODIUM 200 MG: 100 CAPSULE, LIQUID FILLED ORAL at 19:41

## 2021-02-14 RX ADMIN — ONDANSETRON 4 MG: 4 TABLET, ORALLY DISINTEGRATING ORAL at 11:25

## 2021-02-14 RX ADMIN — LOSARTAN POTASSIUM 50 MG: 50 TABLET, FILM COATED ORAL at 07:51

## 2021-02-14 RX ADMIN — FERROUS SULFATE TAB 325 MG (65 MG ELEMENTAL FE) 325 MG: 325 (65 FE) TAB at 07:51

## 2021-02-14 RX ADMIN — ONDANSETRON 4 MG: 4 TABLET, ORALLY DISINTEGRATING ORAL at 06:03

## 2021-02-14 RX ADMIN — DILTIAZEM HYDROCHLORIDE 180 MG: 180 CAPSULE, COATED, EXTENDED RELEASE ORAL at 19:42

## 2021-02-14 RX ADMIN — LEVOTHYROXINE SODIUM 125 MCG: 0.12 TABLET ORAL at 06:03

## 2021-02-14 RX ADMIN — ONDANSETRON 4 MG: 4 TABLET, ORALLY DISINTEGRATING ORAL at 16:15

## 2021-02-14 RX ADMIN — DILTIAZEM HYDROCHLORIDE 180 MG: 180 CAPSULE, COATED, EXTENDED RELEASE ORAL at 07:51

## 2021-02-14 RX ADMIN — MIRTAZAPINE 15 MG: 15 TABLET, FILM COATED ORAL at 19:42

## 2021-02-14 RX ADMIN — TRAMADOL HYDROCHLORIDE 50 MG: 50 TABLET, FILM COATED ORAL at 21:57

## 2021-02-14 RX ADMIN — HYDROCODONE BITARTRATE AND ACETAMINOPHEN 1 TABLET: 5; 325 TABLET ORAL at 16:20

## 2021-02-14 NOTE — PROGRESS NOTES
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 Patient pleased with progress. Voices no complaints. . Denies CP or SOA.     History taken from: patient    Objective     Vital Signs  Temp:  [97.8 °F (36.6 °C)-98.4 °F (36.9 °C)] 98.4 °F (36.9 °C)  Heart Rate:  [75-92] 75  Resp:  [16-18] 18  BP: (100-121)/(57-80) 121/80    Physical Exam  General: calm, in NAD  Neck: trachea midline  Cardio: well perfused distal extremities, regular radial pulse  Resp: normal WOB on RA, nonlabored.    Abdomen: NT/ND  Extremities:    No edema  No crepitus with range of motion of the left hip.  Neuro: dense left hemiparesis, flaccid tone  Left homonymous hemianopsia    Left inattention.  A&Ox4. 0/5 strength LUE/LLE.   Takes resistance on the right side.    Results from last 7 days   Lab Units 02/12/21  0624 02/08/21  0709   WBC 10*3/mm3 4.09 4.81   HEMOGLOBIN g/dL 12.0 11.7*   HEMATOCRIT % 36.3 36.5   PLATELETS 10*3/mm3 174 162     Results from last 7 days   Lab Units 02/12/21  0624 02/08/21  0709   SODIUM mmol/L 136 137   POTASSIUM mmol/L 4.2 4.3   CHLORIDE mmol/L 99 102   CO2 mmol/L 31.0* 25.7   BUN mg/dL 15 17   CREATININE mg/dL 0.63 0.61   CALCIUM mg/dL 9.7 9.1   GLUCOSE mg/dL 116* 109*             Ref. Range 1/8/2021 06:01   TSH Baseline Latest Ref Range: 0.270 - 4.200 uIU/mL 2.830   Vitamin B-12 Latest Ref Range: 211 - 946 pg/mL 381   25 Hydroxy, Vitamin D Latest Ref Range: 30.0 - 100.0 ng/ml 20.6 (L)     CT of the abdomen-January 16  CT Abdomen Pelvis With Contrast   Final Result       A cystic focus at the pancreatic head,  likely corresponds to the   ultrasound finding of concern, could be further evaluated with   endoscopic ultrasound as indicated, interval follow-up also recommended   to exclude any possibility of a  cystic neoplasm. The pancreas is   thinned. The main pancreatic duct shows a mildly prominent caliber, 4   mm.          US GALLBLADDER-  1/14/2021   IMPRESSION:  Small to moderate amount of gallbladder sludge with a few  possible tiny nonshadowing gallstones.  2. No wall thickening or pericholecystic fluid is seen. No sonographic  Garcia's sign is seen.  3. Fatty infiltration of the liver.  4. 1.2 cm hypoechoic pancreatic head lesion. No pancreatic head lesions  are seen on the CT of the abdomen and pelvis from [facility]  dated 12/11/2019.  5. CT of the abdomen with contrast using pancreatic protocol is  recommended for further assessment.    EXAMINATION: LIMITED LEFT BREAST SONOGRAPHY-January 20, 2021     HISTORY: 85-year-old female status post recent placement of a loop  recorder on the left side of the chest with an area of palpable concern  in the left breast.     FINDINGS: Targeted sonographic evaluation of the area of palpable  concern in the left breast which corresponds to the upper inner quadrant  was performed. No sonographic abnormality is appreciated.     IMPRESSION:  Negative targeted left breast sonography. Clinical followup  is suggested.     BI-RADS Category 1: Negative  Results Review:     I reviewed the patient's new clinical results.    Medication Review: Complete  Scheduled Meds:Alirocumab, 75 mg, Subcutaneous, Q14 Days  aspirin, 81 mg, Oral, Daily  bisacodyl, 10 mg, Rectal, Q24H  [START ON 3/6/2021] cholecalciferol, 1,000 Units, Oral, Daily  dilTIAZem CD, 180 mg, Oral, BID  docusate sodium, 200 mg, Oral, BID  enoxaparin, 40 mg, Subcutaneous, Daily  ferrous sulfate, 325 mg, Oral, Daily With Breakfast  levothyroxine, 125 mcg, Oral, Q AM  lidocaine, 1 patch, Transdermal, Q24H  losartan, 50 mg, Oral, Daily  mirtazapine, 15 mg, Oral, Nightly  ondansetron ODT, 4 mg, Oral, TID AC  oxybutynin, 5 mg, Oral, BID With Meals  pantoprazole, 40 mg, Oral, QAM  vitamin D, 50,000 Units, Oral, Q7  Days      Continuous Infusions:   PRN Meds:.HYDROcodone-acetaminophen  •  nitroglycerin  •  polyethylene glycol  •  traMADol      Assessment/Plan       Stroke (CMS/[facility])    Debility and functional deficits  -PT/OT/SLP     Acute CVA  -[place] 3 MRI at [facility] revealed acute infarct in the left temporal-occipital region, no TPA or MT  -Jan 4 had worsening of her NIHSS, MRI revealed a new right superior frontal gyrus acute infarct. Again outside of the window of TPA, not a candidate for mechanical thrombectomy  -continue ASA and Praluent as she is intolerant of statins  -Echo was unremarkable  -Holter normal  -Loop recorder placed  -A1c 5.0  -TSH and B12 level unremarkable    Loss of appetite  Weight loss  Nausea  -symptoms for approximately 8 months, has lost ~40lbs over that time per patient  -started Remeron 7.5 qHS  -GI consulted, appreciate recs. Started scheduled Zofran with meals on 1/13, continue Remeron.   Jan 14 - GI to check RUQ US, increase bowel regimen, continue Remeron and Zofran as above  January 15 -1.2 cm hypoechoic pancreatic head lesion on ultrasound.  To get CT of the abdomen tomorrow.  January 16 - CT scan showed pancreatic cyst.  Not felt to be the etiology of her nausea.  Follow-up as an outpatient.  January 17- Remeron increased to 15 mg nightly     Left breast nodule  -2x1cm nodule left breast at 10 o'clock position, borders feel smooth, nodule is not freely mobile, no overlying skin changes. Away from the site of recent left loop recorder placement Jan 5, 2021  -Last mammogram one year ago. May not tolerate mammogram with recent loop recorder placement. Patient discussed with Dr. Pride, left breast ultrasound ordered  -Left breast ultrasound negative at the area of interest in the left upper inner quadrant    HLD  -continue ASA and Praluent as she is intolerant of statins     HTN  -continue cardizem, losartan  -normotensive goal  -monitor    Vit D insufficiency  -level 20 on admission  -started  vitamin D replacement     Hypothyroidism  -continue levothyroxine    CAD  -continue ASA and Praluent   January 27-She had some sharp chest pain that was brief last evening at about 1 AM, resolved on its own.  Had nitroglycerin ordered but did not receive it.  She reports at home she did occasionally have chest pain and would take him to glycerin at home, stating that sometimes she would take it as she just felt nervous and worried with the chest pain.  She did feel some palpitations after the chest pain last evening.  We discussed seeing if anything was uploaded on her loop recorder to her cardiologist office and we will check those results.    Obstructive sleep apnea-January 27-she has been on 2 L nasal cannula oxygen at nighttime with sats 100%.  She did not have her CPAP brought in from home     Prerenal OBED, resolved  -secondary to decreased PO intake  -resolved prior to discharge from [facility]  -monitor electrolytes     Iron deficiency anemia  -received 2 doses of IV iron as well as 1UpRBCs on 1/4 at [facility]  -Hgb stable ~8 prior to discharge from [facility], 9.2 on admission to [facility]  -continue oral iron, monitor     Left hip pain  -xrays at [facility] unremarkable  Jan 13 - Fatigue continues to be an issue. She takes Norco 7.5 mg about 4 times a day with history of left hip pain-history of left femur fracture open reduction internal fixation, x-ray at the outside hospital on January 6 showed hardware in place.  January 14-She does have fatigue during the day.  Will try a lower dose of Norco 5 mg rather than 7.5 mg.  Is on Remeron 7.5 g at nighttime but that was only recently added.  January 15-she took Norco 5 mg twice a day at home.  More alert with better trunk control on lower dose of Norco today.  January 21-continues with left hip pain. Taking Norco 5 mg about 4 times a day. Add Lidoderm patch.  January 25-does not feel Lidoderm patch that helpful.  Pain was better with addition of tramadol over the weekend.  Hip range of motion  unremarkable with no crepitus  January 26-left hip xray did not show displacement of surgical hardware, fracture, erosion or dislocation  January 26-she feels tramadol works better for her than hydrocodone.  Anticipate going home on tramadol and discontinue hydrocodone.  She is presently taking tramadol twice a day.  Appears to be tolerating without any side effects.  February 12-patient alternating hydrocodone and tramadol for pain relief     Fluids/Electrolytes/Nutrition   -cardiac diet  -admission labs unremarkable     DVT ppx  -subQ Lovenox    /UTI  -on Ditropan 5 mg twice a day.  January 11 - voids with acceptable bladder scan postvoid residuals. Urinary tract infection with E. coli-sensitive to Macrobid  January 18 - required intermittent straight catheter 450 cc.  January 19 - incontinent and IC, PVRs 99-391cc  January 22 - has urge to urinate at times but cannot void, will decrease oxybutynin from bid to with dinner only. She is voiding with incontinence with postvoid residual 230-290 cc.  Discussed trial of decreasing Ditropan to 5 mg q. supper first from twice a day before doing a trial of Flomax.  January 23-urinary tract infection-E. coli-on cefdinir starting January 23.  Sensitive to cephalosporins  January 27-voids with postvoid residual  cc.  We will continue Ditropan at lower dose of 5 mg with supper  February 9-recurrent dysuria-recheck urinalysis with culture if indicated  Feb 10 - UA negative and PVR 53 cc  February 12-She notes that she has increased bladder frequency at nighttime on the decreased dose of the Ditropan, having to go every 20 minutes which is fatiguing.  Recent urinalysis was negative for infection.  We discussed increasing Ditropan back to 5 mg twice a day.  Also reviewed that previously she had some elevated PVRs on the twice daily dosing .  February 13-decreased urinary frequency with increased Ditropan.     TEAM CONF - JAN 12 - BED MAX-DEP. TRANSFERS MAX 2 TRANSFER  BOARD. NON-AMBULATORY. WHEELCHAIR 50 FEET MIN ASSIST. TOILET TRANSFERS DEP X 2. IMPAIRED VISION. BATH MAX. LBD DEP. UBD MAX. GROOMING MOD ASSIST. COGNITION - MILD TO MODERATE DEFICITS. 2L O2 AT HS. ON MACROBID FOR UTI. FATIGUES. HISTORY OF POOR APPETITE FOR MONTHS, WEIGHT LOSS 30-40 # OVER SEVERAL MONTHS AT HOME, HISTORY OF REPORTED NAUSEA AT HOME. WILL CONSULT GI.   ELOS - 4 WEEKS.     TEAM CONF - JAN 19 - BED MAX 2 .  TRANSFERS MAX 2 TRANSFER BOARD. WHEELCHAIR MIN A DUE TO VISIN DEFICITS. HAS SAT UNSUPPORTED FOR 30 SECS BEFORE STARTS TO LEAN. TOILET TRANSFERS DEP X 2. BATH UB MAX, LB DEP X 2. LBD DEP X 2. UBD MAX. GROOMING MOD. LEFT SHOULDER SUBLUXATION - ADD LEUKOTAPING. VOIDS WITH ELEVATED PVRS WITH OCCASIONAL ISC. CONTINENT INCONTINENT.  LEFT INATTENTION, LEFT VISUAL FIELD CUT. IMPAIRED ATTENTION, EXECUTIVE FUNCTION, REASONING. WORKING MEMORY IMPROVING.  BNE (Active)  Att'n. - WNL  Exec. Fx. - Mild Imp.  Rsng/Jgmnt - Mildly Imp. for abstract reaosning  Arith - Min Imp.  Visuospatial Skills - Mildly Imp.  Visual Mem. - Mildly Imp.  Verbal Mem. - Mildly Imp.  Emot - Pt endorsed dep related to current status  DEPRESSION - SUPPORTIVE THERAPY. ON REMERON.   ELOS -   3 WEEKS    TEAM CONF - JAN 26 -  BED MAX 2. TRANSFERS MAX 2. WHEELCHAIR 50 FEET MIN ASSIST, IMPACTED BY VISUAL DEFICITS. LEFT HIP PAIN INCREASED, WILL REPEAT X-RAY. AT OUTSIDE HOSPITAL X-RAY ON JAN 6 - [ORIF hardware in the proximal femur is stable from 5/6/2020. No hardware loosening or perihardware fracture is detected. The hip joint space is mildly narrowed as before. IMPRESSION: Stable ORIF hardware; no acute abnormality.]  TOILET TRANSFERS MAX 2 AS PUSHES. BATH UBB MIN, LBB MAX 2. UBD MAX ASSIST. LBD DEPENDENT. GROOMING MIN ASSIST. ATTENDS TO LEFT A LITTLE BETTER. TOILETING DEP X 2. LEFT VISUAL FIELD DEFICITS. EXECUTIVE FUNCTION - IMPAIRED ATTENTION. MEMORY MIN-MOD DEFICITS. MOD-MAX VISUAL CUES TO LEFT SIDE. VOIDS. ON DECREASED DITROPAN TO 5 MG ONCE  A DAY DUE TO ELEVATED PVRS. CONTINENT AT TIMES WITH BLADDER. BOWEL CONTINENT. PAIN MANAGEMENT - TRAMADOL ADDED. WITH REC THERAPY, CUES TO ATTEND TO LEFT.  ELOS -  2-3 WEEKS    TEAM CONF- BED MAX 2. TRANSFERS MAX SQUAT PIVOT, USES LIFT WITH NURSING. SITTING BALANCE BETTER. LEFT INATTENTION. WHEELCHAIR MIN ASSIST WITH VISUAL CUES, DUE TO LEFT VISUAL INATTENTION/LEFT VISUAL FIELD CUT. STANDING AT PARALLEL BARS WITH BLOCKING LEFT KNEE. TOILET TRANSFERS MAX 2 TO DROPARM. BATH MIN UB AND MAX X 2 LB. DRESSING MOD-MAX UB AND DEP X 2 LB. GROOMING MIN-SBA. TOILETING COMES UP TO STAND A LITTLE BETTER, DEP X 2.   IMPAIRED ATTENTION. DISTRACTIBLE. FATIGUES QUICKLY WITH COGNITIVE TASKS. MIN-MOD MEMORY DEFICITS. MOD-MAX VERBAL AND VISUAL CUES FOR READING AND WRITING TASKS. INCONTINENT BOWEL AND BLADDER. PVRS LOW. CHRONIC LEFT HIP PAIN.   ELOS -  ONE WEEK, WILL NEED SIGNIFICANT ASSISTANCE AT HOME.    TEAM CONF - FEB 9 - LEFT HEMIPLEGIA UNCHANGED. KINESIOTAPING LEFT SHOULDER.  BED MAX 2. TRANSFERS MAX ASSIST SQUAT PIVOT. NON-AMBULATORY. WHEELCHAIR 100 FEET MIN ASSIST DUE TO VISUAL DEFICITS. TOILET TRANSFERS MAX 1. SHOWER TRANSFERS MAX ASSIST TO BENCH. TOILETING DEP X 2. UBB MIN. LBB MOD OF 2. LBD MOD-MAX WITH MAX 2 FOR STANDNG. UBD MOD ASSIST FOR PULLOVER. GROOMING MIN-SBA. CONTINENT /INCONTINENT. DITROPAN FOR OVERACTIVE BLADDER. ABLE TO SIT FOR ABOUT 75 SECS. IMPAIRED ATTENTION. FATIGUES QUICKLY. DISTRACTIBLE. LEFT VISUAL FIELD CUT. MIN-MOD MEMORY DEFICITS. MOD-MAX VISUAL CUES FOR READING/WRITING TASKS.   ELOS -   FAMILY PLANS TO HIRE ADDITIONAL HELP FOR HOME. HOME ON FEB 16. FAMILY TEACHING NEEDED.     2/14/21:   Seen and examined. NAEO.     Reviewed medications, vital signs, and recent lab work.      Continue comprehensive inpatient rehab program.     Juvenal Comer MD  02/14/21  13:31 EST    During rounds, used appropriate personal protective equipment including mask and gloves.  Additional gown if indicated.  Mask used was  standard procedure mask. Appropriate PPE was worn during the entire visit.  Hand hygiene was completed before and after.

## 2021-02-14 NOTE — PLAN OF CARE
Goal Outcome Evaluation:          Problem: Rehabilitation ([facility]) Plan of Care  Goal: Plan of Care Review  Outcome: Ongoing, Progressing  Flowsheets  Taken 2/14/2021 0250 by Gloria Baeza RN  Outcome Summary: Pt is alert and oriented x 4 with episodes of forgetfulness. Friendly and cooperative with staff during all care. Takes all meds whole PO. Up in wheelchair for all meals. Mostly continent of bowel and bladder. Will c/o for bed pain. Last BM 2/13. Left side heliplegia. Assist x2 with all tranfers. C/o of left hip pain this evening, PRN Norco administered at 2037. No further complaints. 1L via n/c. Loop recorder to left chest. Scattered brusing to abd, extremities, and left wrist. Family teaching mon 2/15 at 10am. D/C 2/16. Call light within reach.

## 2021-02-14 NOTE — PROGRESS NOTES
Inpatient Rehabilitation Plan of Care Note    Plan of Care  Care Plan Reviewed - No updates at this time.    Body Systems    [RN] Respiratory(Active)  Current Status(02/14/2021): O2 2L QHS  Weekly Goal(02/16/2021): Same as discharge  Discharge Goal: Least restrictive amount of 02    Performed Intervention(s)  O2 2L/NC @ HS  assess lungs, sats every shift      Psychosocial    [RN] Coping/Adjustment(Active)  Current Status(02/14/2021): Patient has supportive family, but at risk of  ineffective coping regarding current situation.  Weekly Goal(02/16/2021): Allow patient the opportunity to discuss concerns  regarding new situation.  Discharge Goal: Patient will have healthy coping mechanisms at time of discharge    Performed Intervention(s)  Verbalize needs and concerns  Medications as needed/ ordered  Therapeutic environmental set up      Safety    [RN] Potential for Injury(Active)  Current Status(02/14/2021): Patient at risk of injury related to previous falls.  flaccid on left side. dependent with transfers of 2.  Weekly Goal(02/16/2021): Paitent will use call light appropriately while in  Rehab.  Discharge Goal: Patient will continue to use call light while in Rehab  environment.    Performed Intervention(s)  Items with in reach and environmental set up  Bed alarm/ Chair alarm  Safety rounds and falls protocols/ precautions      Sphincter Control    [RN] Bladder Management(Active)  Current Status(02/14/2021): Patient is continent and incontinent, using bedpan,  PVRs, requiring I/C at times  Weekly Goal(02/16/2021): Patient will be 50% continent  Discharge Goal: Patient will be 100% continent    [RN] Bowel Management(Active)  Current Status(02/14/2021): Patient continent and incontinent  Weekly Goal(02/16/2021): continent 50% of the time  Discharge Goal: Patient will continent 100% of the time    Performed Intervention(s)  Proper diet and fluid intake  Medication as ordered  Incontinent care if needed  Monitor intake  and output    Signed by: Gloria Baeza RN

## 2021-02-14 NOTE — PLAN OF CARE
Goal Outcome Evaluation:        Outcome Summary: A&OX4. Forgetful. Mostly continent B&B. Has been using the bed pan, bedside commode, and restroom. Urgency, frequency, stress incontinence. Ate fair at meals. Wears dentures. Stroke. Diet: Reg., cardiac, low fat, low cholesterol, low salt. W/C all meals. Pain meds every 4 hours. Nausea better controlled now that the zofran is scheduled instead of PRN. L side flaccid. 2 person assist or lift. Some visual field cuts, more to the L. Air mattress and wedge for comfort. Leans to L. O2 NC 2L at night. Loop recorder to chest. On bowel regimen of 2 docusate sodium x2 daily, suppository in evening and miralax powder PRN. Last BM 2/13. Complained of L. shoulder pain. No UTI. Family teaching 2/15 at 10 AM. D/C 2/16. Family visited today. Less urine urgency and frequency.

## 2021-02-15 LAB
ALBUMIN SERPL-MCNC: 3.1 G/DL (ref 3.5–5.2)
ALP SERPL-CCNC: 92 U/L (ref 39–117)
ALT SERPL W P-5'-P-CCNC: 13 U/L (ref 1–33)
ANION GAP SERPL CALCULATED.3IONS-SCNC: 9.7 MMOL/L (ref 5–15)
AST SERPL-CCNC: 16 U/L (ref 1–32)
BASOPHILS # BLD AUTO: 0.04 10*3/MM3 (ref 0–0.2)
BASOPHILS NFR BLD AUTO: 0.9 % (ref 0–1.5)
BILIRUB CONJ SERPL-MCNC: <0.2 MG/DL (ref 0–0.3)
BILIRUB INDIRECT SERPL-MCNC: ABNORMAL MG/DL
BILIRUB SERPL-MCNC: <0.2 MG/DL (ref 0–1.2)
BUN SERPL-MCNC: 14 MG/DL (ref 8–23)
BUN/CREAT SERPL: 22.2 (ref 7–25)
CALCIUM SPEC-SCNC: 9.5 MG/DL (ref 8.6–10.5)
CHLORIDE SERPL-SCNC: 101 MMOL/L (ref 98–107)
CHOLEST SERPL-MCNC: 167 MG/DL (ref 0–200)
CO2 SERPL-SCNC: 29.3 MMOL/L (ref 22–29)
CREAT SERPL-MCNC: 0.63 MG/DL (ref 0.57–1)
DEPRECATED RDW RBC AUTO: 60.7 FL (ref 37–54)
EOSINOPHIL # BLD AUTO: 0.12 10*3/MM3 (ref 0–0.4)
EOSINOPHIL NFR BLD AUTO: 2.7 % (ref 0.3–6.2)
ERYTHROCYTE [DISTWIDTH] IN BLOOD BY AUTOMATED COUNT: 19.7 % (ref 12.3–15.4)
GFR SERPL CREATININE-BSD FRML MDRD: 90 ML/MIN/1.73
GLUCOSE SERPL-MCNC: 113 MG/DL (ref 65–99)
HCT VFR BLD AUTO: 35.9 % (ref 34–46.6)
HDLC SERPL-MCNC: 36 MG/DL (ref 40–60)
HGB BLD-MCNC: 11.4 G/DL (ref 12–15.9)
IMM GRANULOCYTES # BLD AUTO: 0.01 10*3/MM3 (ref 0–0.05)
IMM GRANULOCYTES NFR BLD AUTO: 0.2 % (ref 0–0.5)
LDLC SERPL CALC-MCNC: 101 MG/DL (ref 0–100)
LDLC/HDLC SERPL: 2.71 {RATIO}
LYMPHOCYTES # BLD AUTO: 1.36 10*3/MM3 (ref 0.7–3.1)
LYMPHOCYTES NFR BLD AUTO: 30.6 % (ref 19.6–45.3)
MCH RBC QN AUTO: 27.9 PG (ref 26.6–33)
MCHC RBC AUTO-ENTMCNC: 31.8 G/DL (ref 31.5–35.7)
MCV RBC AUTO: 88 FL (ref 79–97)
MONOCYTES # BLD AUTO: 0.35 10*3/MM3 (ref 0.1–0.9)
MONOCYTES NFR BLD AUTO: 7.9 % (ref 5–12)
NEUTROPHILS NFR BLD AUTO: 2.56 10*3/MM3 (ref 1.7–7)
NEUTROPHILS NFR BLD AUTO: 57.7 % (ref 42.7–76)
NRBC BLD AUTO-RTO: 0 /100 WBC (ref 0–0.2)
PLATELET # BLD AUTO: 175 10*3/MM3 (ref 140–450)
PMV BLD AUTO: 10 FL (ref 6–12)
POTASSIUM SERPL-SCNC: 4.4 MMOL/L (ref 3.5–5.2)
PROT SERPL-MCNC: 5.2 G/DL (ref 6–8.5)
RBC # BLD AUTO: 4.08 10*6/MM3 (ref 3.77–5.28)
SODIUM SERPL-SCNC: 140 MMOL/L (ref 136–145)
TRIGL SERPL-MCNC: 168 MG/DL (ref 0–150)
VLDLC SERPL-MCNC: 30 MG/DL (ref 5–40)
WBC # BLD AUTO: 4.44 10*3/MM3 (ref 3.4–10.8)

## 2021-02-15 PROCEDURE — 97535 SELF CARE MNGMENT TRAINING: CPT

## 2021-02-15 PROCEDURE — 97112 NEUROMUSCULAR REEDUCATION: CPT

## 2021-02-15 PROCEDURE — 80061 LIPID PANEL: CPT | Performed by: PHYSICAL MEDICINE & REHABILITATION

## 2021-02-15 PROCEDURE — 97530 THERAPEUTIC ACTIVITIES: CPT

## 2021-02-15 PROCEDURE — 85025 COMPLETE CBC W/AUTO DIFF WBC: CPT | Performed by: PHYSICAL MEDICINE & REHABILITATION

## 2021-02-15 PROCEDURE — 80048 BASIC METABOLIC PNL TOTAL CA: CPT | Performed by: PHYSICAL MEDICINE & REHABILITATION

## 2021-02-15 PROCEDURE — 97116 GAIT TRAINING THERAPY: CPT

## 2021-02-15 PROCEDURE — 80076 HEPATIC FUNCTION PANEL: CPT | Performed by: PHYSICAL MEDICINE & REHABILITATION

## 2021-02-15 PROCEDURE — 97130 THER IVNTJ EA ADDL 15 MIN: CPT

## 2021-02-15 PROCEDURE — 97110 THERAPEUTIC EXERCISES: CPT

## 2021-02-15 PROCEDURE — 63710000001 ONDANSETRON ODT 4 MG TABLET DISPERSIBLE: Performed by: INTERNAL MEDICINE

## 2021-02-15 PROCEDURE — 97129 THER IVNTJ 1ST 15 MIN: CPT

## 2021-02-15 PROCEDURE — 25010000002 ENOXAPARIN PER 10 MG: Performed by: PHYSICAL MEDICINE & REHABILITATION

## 2021-02-15 RX ADMIN — HYDROCODONE BITARTRATE AND ACETAMINOPHEN 1 TABLET: 5; 325 TABLET ORAL at 05:30

## 2021-02-15 RX ADMIN — OXYBUTYNIN CHLORIDE 5 MG: 5 TABLET ORAL at 08:10

## 2021-02-15 RX ADMIN — ONDANSETRON 4 MG: 4 TABLET, ORALLY DISINTEGRATING ORAL at 16:03

## 2021-02-15 RX ADMIN — ENOXAPARIN SODIUM 40 MG: 40 INJECTION SUBCUTANEOUS at 08:10

## 2021-02-15 RX ADMIN — TRAMADOL HYDROCHLORIDE 50 MG: 50 TABLET, FILM COATED ORAL at 20:06

## 2021-02-15 RX ADMIN — MIRTAZAPINE 15 MG: 15 TABLET, FILM COATED ORAL at 20:05

## 2021-02-15 RX ADMIN — LIDOCAINE 1 PATCH: 50 PATCH TOPICAL at 08:10

## 2021-02-15 RX ADMIN — OXYBUTYNIN CHLORIDE 5 MG: 5 TABLET ORAL at 16:03

## 2021-02-15 RX ADMIN — ONDANSETRON 4 MG: 4 TABLET, ORALLY DISINTEGRATING ORAL at 05:30

## 2021-02-15 RX ADMIN — LOSARTAN POTASSIUM 50 MG: 50 TABLET, FILM COATED ORAL at 08:10

## 2021-02-15 RX ADMIN — ONDANSETRON 4 MG: 4 TABLET, ORALLY DISINTEGRATING ORAL at 11:32

## 2021-02-15 RX ADMIN — DOCUSATE SODIUM 200 MG: 100 CAPSULE, LIQUID FILLED ORAL at 20:05

## 2021-02-15 RX ADMIN — PANTOPRAZOLE SODIUM 40 MG: 40 TABLET, DELAYED RELEASE ORAL at 05:30

## 2021-02-15 RX ADMIN — BISACODYL 10 MG: 10 SUPPOSITORY RECTAL at 14:48

## 2021-02-15 RX ADMIN — LEVOTHYROXINE SODIUM 125 MCG: 0.12 TABLET ORAL at 05:30

## 2021-02-15 RX ADMIN — DILTIAZEM HYDROCHLORIDE 180 MG: 180 CAPSULE, COATED, EXTENDED RELEASE ORAL at 20:05

## 2021-02-15 RX ADMIN — DILTIAZEM HYDROCHLORIDE 180 MG: 180 CAPSULE, COATED, EXTENDED RELEASE ORAL at 08:10

## 2021-02-15 RX ADMIN — TRAMADOL HYDROCHLORIDE 50 MG: 50 TABLET, FILM COATED ORAL at 08:10

## 2021-02-15 RX ADMIN — FERROUS SULFATE TAB 325 MG (65 MG ELEMENTAL FE) 325 MG: 325 (65 FE) TAB at 08:11

## 2021-02-15 RX ADMIN — ASPIRIN 81 MG: 81 TABLET, CHEWABLE ORAL at 08:10

## 2021-02-15 RX ADMIN — DOCUSATE SODIUM 200 MG: 100 CAPSULE, LIQUID FILLED ORAL at 08:10

## 2021-02-15 NOTE — THERAPY TREATMENT NOTE
Inpatient Rehabilitation - Occupational Therapy Treatment Note    [facility]     Patient Name: Liana Carrero  : 1935  MRN: [id number]    Today's Date: 2/15/2021                 Admit Date: 2021         ICD-10-CM ICD-9-CM   1. Impaired functional mobility, balance, gait, and endurance  Z74.09 V49.89       Patient Active Problem List   Diagnosis   • Stroke (cerebrum) (CMS/[facility])   • Chest pain   • Hypertensive urgency   • Headache   • History of embolic stroke   • Hypothyroidism   • Hypokalemia   • CAD in native artery   • URTI (acute upper respiratory infection)   • Closed fracture of left hip (CMS/[facility])   • Chronic diastolic (congestive) heart failure (CMS/[facility])   • Coronary artery disease involving autologous vein coronary bypass graft without angina pectoris   • Impaired glucose tolerance   • Moderate malnutrition (CMS/[facility])   • Stroke (CMS/[facility])       Past Medical History:   Diagnosis Date   • Arthritis    • CHF (congestive heart failure) (CMS/[facility])    • Coronary artery disease    • Diabetes mellitus (CMS/[facility])     new DX related to stroke, started on insulin at hospital   • Disease of thyroid gland    • Elevated cholesterol    • GERD (gastroesophageal reflux disease)    • History of transfusion    • Hypertension    • PONV (postoperative nausea and vomiting)    • Sleep apnea    • Spinal headache    • Stroke (CMS/[facility])        Past Surgical History:   Procedure Laterality Date   • APPENDECTOMY     • CARDIAC CATHETERIZATION       x2   • CARDIAC CATHETERIZATION N/A 2019    Procedure: Left Heart Cath;  Surgeon: Bentley Chapin MD;  Location: [facility] CATH INVASIVE LOCATION;  Service: Cardiovascular   • CARDIAC CATHETERIZATION N/A 2019    Procedure: Coronary angiography;  Surgeon: Bentley Chapin MD;  Location: [facility] CATH INVASIVE LOCATION;  Service: Cardiovascular   • CARDIAC CATHETERIZATION N/A 2019    Procedure: Native mammary injection;  Surgeon: Bentley Chapin MD;  Location: [facility]  CATH INVASIVE LOCATION;  Service: Cardiovascular   • CARDIAC CATHETERIZATION N/A 12/26/2019    Procedure: Saphenous Vein Graft;  Surgeon: Bentley Chapin MD;  Location: [facility] CATH INVASIVE LOCATION;  Service: Cardiovascular   • COLON SURGERY     • COLONOSCOPY     • EYE SURGERY      lens implants and cataract surgery   • FEMUR IM NAILING/RODDING Left 3/26/2020    Procedure: LT.HIP NAILING/RODDING;  Surgeon: Carlos Khan II, MD;  Location: [facility] MAIN OR;  Service: Orthopedics;  Laterality: Left;   • FRACTURE SURGERY Left 2020   • HYSTERECTOMY     • VASCULAR SURGERY      varicous veins                IRF OT ASSESSMENT FLOWSHEET (last 12 hours)      IRF OT Evaluation and Treatment     Row Name 02/15/21 1515          OT Time and Intention    Document Type  daily treatment  -CC     Mode of Treatment  occupational therapy  -CC     Patient Effort  good  -     Row Name 02/15/21 1515          Vision Assessment/Intervention    Visual Impairment/Limitations  visual/perceptual impairments present  -     Visual Motor Impairment  visual tracking, left  -     Visual Processing Deficit  jon-inattention/neglect, left  -CC     Row Name 02/15/21 1515          Pain Scale: Numbers Pre/Post-Treatment    Pretreatment Pain Rating  4/10  -CC     Posttreatment Pain Rating  4/10  -CC     Pain Location - Side  Left  -CC     Pain Location  shoulder  -     Pre/Posttreatment Pain Comment  taped and repositioned   -     Row Name 02/15/21 1515          Transfers    Chair-Bed [place] (Transfers)  maximum assist (25% patient effort);1 person to manage equipment  -     Sit-Stand [place] (Transfers)  moderate assist (50% patient effort);maximum assist (25% patient effort)  -     Stand-Sit [place] (Transfers)  maximum assist (25% patient effort)  -     Row Name 02/15/21 1515          Chair-Bed Transfer    Assistive Device (Chair-Bed Transfers)  wheelchair  -     Row Name 02/15/21 1515          Sit-Stand  Transfer    Assistive Device (Sit-Stand Transfers)  wheelchair  -     Row Name 02/15/21 1515          Stand-Sit Transfer    Assistive Device (Stand-Sit Transfers)  wheelchair  -     Row Name 02/15/21 1515          Stand Pivot/Stand Step Transfer    Stand Pivot/Stand Step [place] (Transfers)  maximum assist (25% patient effort);verbal cues;nonverbal cues (demo/gesture)  -     Assistive Device (Stand Pivot Stand Step Transfer)  wheelchair  -     Row Name 02/15/21 1515          Motor Skills    Muscle Tone  left;flaccidity  -     Motor Control/Coordination Interventions  weight-bearing activities tolerated WB on LUE decreased pain in L hand  -     Therapeutic Exercise  core strength  -     Row Name 02/15/21 1515          Bathing    [place] Level (Bathing)  bathing skills;lower body;upper body;verbal cues;nonverbal cues (demo/gesture);moderate assist (50% patient effort)  -     Position (Bathing)  sink side;supported sitting;supported standing  -     Set-up Assistance (Bathing)  adjust water temperature;obtain supplies;open containers  -     Row Name 02/15/21 1515          Upper Body Dressing    [place] Level (Upper Body Dressing)  upper body dressing skills;doff;don;pull over garment;verbal cues;nonverbal cues (demo/gesture);moderate assist (50-74% patient effort);maximal assist (25-49% patient effort)  -     Position (Upper Body Dressing)  supported sitting  -     Set-up Assistance (Upper Body Dressing)  obtain clothing  -     Comment (Upper Body Dressing)  jon dressing   -     Row Name 02/15/21 1515          Lower Body Dressing    [place] Level (Lower Body Dressing)  doff;don;pants/bottoms;socks;moderate assist (50% patient effort);maximum assist (25% patient effort)  -     Position (Lower Body Dressing)  supported sitting;supported standing  -     Set-up Assistance (Lower Body Dressing)  obtain clothing  -     Row Name 02/15/21 1515          Grooming     [place] Level (Grooming)  grooming skills;deodorant application;hair care, combing/brushing;oral care regimen;wash face, hands;standby assist;minimum assist (75% patient effort)  -CC     Position (Grooming)  sink side;supported sitting  -CC     Set-up Assistance (Grooming)  obtain supplies  -CC     Row Name 02/15/21 1515          Toileting    [place] Level (Toileting)  toileting skills;dependent (less than 25% patient effort)  -     Assistive Device Use (Toileting)  commode chair  -CC     Position (Toileting)  supported sitting;supported standing  -CC     Row Name 02/15/21 1515          Positioning and Restraints    Pre-Treatment Position  sitting in chair/recliner  -CC     Post Treatment Position  wheelchair  -CC     In Bed  notified nsg;call light within reach;encouraged to call for assist;exit alarm on  -CC       User Key  (r) = Recorded By, (t) = Taken By, (c) = Cosigned By    Initials Name Effective Dates    CC Keya Miranda OTR 06/08/18 -            Occupational Therapy Education                 Title: PT OT SLP Therapies (Done)     Topic: Occupational Therapy (Done)     Point: ADL training (Done)     Description:   Instruct learner(s) on proper safety adaptation and remediation techniques during self care or transfers.   Instruct in proper use of assistive devices.              Learning Progress Summary           Patient Acceptance, E, VU by CC at 2/10/2021 3110    Comment: Family conf w pt, dgt and son in law. Status, DME, follow up theray and home needs discussed. Pt will need a droparm commode and possibly shower seat. Will provide info on jon harness for LUE. Family scheduled for teaching Monday.    Acceptance, E, VU by  at 2/10/2021 1509    Comment: family conference- discussed need for visual and verbal cues for visual perceptual deficits, and deficts in attn, memory    Acceptance, E,TB,D, VU by CC at 1/29/2021 1501    Comment: Pt repositioned in reg w/c with 1/2 arm tray on Left.  Nursing notified of change from recliner w/c/. Commode tsf from w/c to droparm commode discussed and shown to NA.    Acceptance, E, VU by BL at 1/8/2021 1434   Family Acceptance, E, VU by CC at 2/10/2021 1546    Comment: Family conf w pt, dgt and son in law. Status, DME, follow up theray and home needs discussed. Pt will need a droparm commode and possibly shower seat. Will provide info on jon harness for LUE. Family scheduled for teaching Monday.    Acceptance, E, VU by SL at 2/10/2021 1504    Comment: family conference- discussed need for visual and verbal cues for visual perceptual deficits, and deficts in attn, memory   Caregiver Acceptance, E,TB,D, VU by CC at 1/29/2021 1509    Comment: Pt repositioned in reg w/c with 1/2 arm tray on Left. Nursing notified of change from recliner w/c/. Commode tsf from w/c to droparm commode discussed and shown to NA.                   Point: Home exercise program (Done)     Description:   Instruct learner(s) on appropriate technique for monitoring, assisting and/or progressing therapeutic exercises/activities.              Learning Progress Summary           Patient Acceptance, E, VU by CC at 2/10/2021 1546    Comment: Family conf w pt, dgt and son in law. Status, DME, follow up theray and home needs discussed. Pt will need a droparm commode and possibly shower seat. Will provide info on jon harness for LUE. Family scheduled for teaching Monday.    Acceptance, E, VU by SL at 2/10/2021 1504    Comment: family conference- discussed need for visual and verbal cues for visual perceptual deficits, and deficts in attn, memory    Acceptance, E, VU by BL at 1/8/2021 1434   Family Acceptance, E, VU by CC at 2/10/2021 1546    Comment: Family conf w pt, dgt and son in law. Status, DME, follow up theray and home needs discussed. Pt will need a droparm commode and possibly shower seat. Will provide info on jon harness for LUE. Family scheduled for teaching Monday.    Acceptance, E, VU  by SL at 2/10/2021 1504    Comment: family conference- discussed need for visual and verbal cues for visual perceptual deficits, and deficts in attn, memory                   Point: Precautions (Done)     Description:   Instruct learner(s) on prescribed precautions during self-care and functional transfers.              Learning Progress Summary           Patient Acceptance, E, VU by CC at 2/10/2021 1546    Comment: Family conf w pt, dgt and son in law. Status, DME, follow up theray and home needs discussed. Pt will need a droparm commode and possibly shower seat. Will provide info on jon harness for LUE. Family scheduled for teaching Monday.    Acceptance, E, VU by SL at 2/10/2021 1504    Comment: family conference- discussed need for visual and verbal cues for visual perceptual deficits, and deficts in attn, memory    Acceptance, E, VU by  at 1/8/2021 1434   Family Acceptance, E, VU by CC at 2/10/2021 1546    Comment: Family conf w pt, dgt and son in law. Status, DME, follow up theray and home needs discussed. Pt will need a droparm commode and possibly shower seat. Will provide info on jon harness for LUE. Family scheduled for teaching Monday.    Acceptance, E, VU by SL at 2/10/2021 1504    Comment: family conference- discussed need for visual and verbal cues for visual perceptual deficits, and deficts in attn, memory                   Point: Body mechanics (Done)     Description:   Instruct learner(s) on proper positioning and spine alignment during self-care, functional mobility activities and/or exercises.              Learning Progress Summary           Patient Acceptance, E, VU by CC at 2/10/2021 1546    Comment: Family conf w pt, dgt and son in law. Status, DME, follow up theray and home needs discussed. Pt will need a droparm commode and possibly shower seat. Will provide info on jon harness for LUE. Family scheduled for teaching Monday.    Acceptance, E, VU by SL at 2/10/2021 1504    Comment: family  conference- discussed need for visual and verbal cues for visual perceptual deficits, and deficts in attn, memory    Acceptance, E,TB,D, VU by CC at 1/29/2021 1509    Comment: Pt repositioned in reg w/c with 1/2 arm tray on Left. Nursing notified of change from recliner w/c/. Commode tsf from w/c to droparm commode discussed and shown to NA.    Acceptance, E, VU by BL at 1/8/2021 1434   Family Acceptance, E, VU by CC at 2/10/2021 1546    Comment: Family conf w pt, dgt and son in law. Status, DME, follow up theray and home needs discussed. Pt will need a droparm commode and possibly shower seat. Will provide info on jon harness for LUE. Family scheduled for teaching Monday.    Acceptance, E, VU by  at 2/10/2021 1504    Comment: family conference- discussed need for visual and verbal cues for visual perceptual deficits, and deficts in attn, memory   Caregiver Acceptance, E,TB,D, VU by CC at 1/29/2021 1509    Comment: Pt repositioned in reg w/c with 1/2 arm tray on Left. Nursing notified of change from recliner w/c/. Commode tsf from w/c to droparm commode discussed and shown to NA.                               User Key     Initials Effective Dates Name Provider Type Discipline     06/08/18 -  Keya Miranda OTR Occupational Therapist OT    SL 06/08/18 -  Katelyn James MS CCC-SLP Speech and Language Pathologist SLP    BL 01/05/21 -  Marshall Oleary OT Occupational Therapist OT                    OT Recommendation and Plan                         Time Calculation:     Time Calculation- OT     Row Name 02/15/21 1400 02/15/21 1000          Time Calculation- OT    OT Start Time  1000  -CC  1000  -CC     OT Stop Time  1030  -CC  1030  -CC     OT Time Calculation (min)  30 min  -CC  30 min  -CC     OT Non-Billable Time (min)  30 min tech  -CC  30 min tech  -CC       User Key  (r) = Recorded By, (t) = Taken By, (c) = Cosigned By    Initials Name Provider Type    CC Keya Miranda OTR Occupational Therapist         Therapy Charges for Today     Code Description Service Date Service Provider Modifiers Qty    [id number] HC OT THER SUPP EA 15 MIN 2/15/2021 Keya Miranda OTR GO 4    [id number]  OT NEUROMUSC RE EDUCATION EA 15 MIN 2/15/2021 Keya Miranda OTR GO 2    [id number]  OT SELF CARE/MGMT/TRAIN EA 15 MIN 2/15/2021 Keya Miranda OTR GO 2                   GAIN [place]  2/15/2021

## 2021-02-15 NOTE — PROGRESS NOTES
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 Family unable to come in today for family teaching due to weather.       History taken from: patient    Objective     Vital Signs  Temp:  [98.2 °F (36.8 °C)-98.6 °F (37 °C)] 98.4 °F (36.9 °C)  Heart Rate:  [] 103  Resp:  [18] 18  BP: (109-143)/(67-85) 143/85    Physical Exam  General: calm, in NAD  Neck: trachea midline  Cardio: well perfused distal extremities, regular radial pulse  Resp: normal WOB on RA, nonlabored.    Abdomen: NT/ND  Extremities:    No edema  No crepitus with range of motion of the left hip.  Neuro: dense left hemiparesis, flaccid tone  Left homonymous hemianopsia    Left inattention.  A&Ox4. 0/5 strength LUE/LLE.   Takes resistance on the right side.    Results from last 7 days   Lab Units 02/15/21  0645 02/12/21  0624   WBC 10*3/mm3 4.44 4.09   HEMOGLOBIN g/dL 11.4* 12.0   HEMATOCRIT % 35.9 36.3   PLATELETS 10*3/mm3 175 174     Results from last 7 days   Lab Units 02/15/21  0645 02/12/21  0624   SODIUM mmol/L 140 136   POTASSIUM mmol/L 4.4 4.2   CHLORIDE mmol/L 101 99   CO2 mmol/L 29.3* 31.0*   BUN mg/dL 14 15   CREATININE mg/dL 0.63 0.63   CALCIUM mg/dL 9.5 9.7   GLUCOSE mg/dL 113* 116*             Ref. Range 1/8/2021 06:01   TSH Baseline Latest Ref Range: 0.270 - 4.200 uIU/mL 2.830   Vitamin B-12 Latest Ref Range: 211 - 946 pg/mL 381   25 Hydroxy, Vitamin D Latest Ref Range: 30.0 - 100.0 ng/ml 20.6 (L)     CT of the abdomen-January 16  CT Abdomen Pelvis With Contrast   Final Result       A cystic focus at the pancreatic head,  likely corresponds to the   ultrasound finding of concern, could be further evaluated with   endoscopic ultrasound as indicated, interval follow-up also recommended   to exclude any  possibility of a cystic neoplasm. The pancreas is   thinned. The main pancreatic duct shows a mildly prominent caliber, 4   mm.          US GALLBLADDER-  1/14/2021   IMPRESSION:  Small to moderate amount of gallbladder sludge with a few  possible tiny nonshadowing gallstones.  2. No wall thickening or pericholecystic fluid is seen. No sonographic  Garcia's sign is seen.  3. Fatty infiltration of the liver.  4. 1.2 cm hypoechoic pancreatic head lesion. No pancreatic head lesions  are seen on the CT of the abdomen and pelvis from [facility]  dated 12/11/2019.  5. CT of the abdomen with contrast using pancreatic protocol is  recommended for further assessment.    EXAMINATION: LIMITED LEFT BREAST SONOGRAPHY-January 20, 2021     HISTORY: 85-year-old female status post recent placement of a loop  recorder on the left side of the chest with an area of palpable concern  in the left breast.     FINDINGS: Targeted sonographic evaluation of the area of palpable  concern in the left breast which corresponds to the upper inner quadrant  was performed. No sonographic abnormality is appreciated.     IMPRESSION:  Negative targeted left breast sonography. Clinical followup  is suggested.     BI-RADS Category 1: Negative  Results Review:     I reviewed the patient's new clinical results.    Medication Review: Complete  Scheduled Meds:Alirocumab, 75 mg, Subcutaneous, Q14 Days  aspirin, 81 mg, Oral, Daily  bisacodyl, 10 mg, Rectal, Q24H  [START ON 3/6/2021] cholecalciferol, 1,000 Units, Oral, Daily  dilTIAZem CD, 180 mg, Oral, BID  docusate sodium, 200 mg, Oral, BID  enoxaparin, 40 mg, Subcutaneous, Daily  ferrous sulfate, 325 mg, Oral, Daily With Breakfast  levothyroxine, 125 mcg, Oral, Q AM  lidocaine, 1 patch, Transdermal, Q24H  losartan, 50 mg, Oral, Daily  mirtazapine, 15 mg, Oral, Nightly  ondansetron ODT, 4 mg, Oral, TID AC  oxybutynin, 5 mg, Oral, BID With Meals  pantoprazole, 40 mg, Oral, QAM  vitamin D, 50,000 Units,  Oral, Q7 Days      Continuous Infusions:   PRN Meds:.HYDROcodone-acetaminophen  •  nitroglycerin  •  polyethylene glycol  •  traMADol      Assessment/Plan       Stroke (CMS/[facility])    Debility and functional deficits  -PT/OT/SLP     Acute CVA  -[place] 3 MRI at [facility] revealed acute infarct in the left temporal-occipital region, no TPA or MT  -Jan 4 had worsening of her NIHSS, MRI revealed a new right superior frontal gyrus acute infarct. Again outside of the window of TPA, not a candidate for mechanical thrombectomy  -continue ASA and Praluent as she is intolerant of statins  -Echo was unremarkable  -Holter normal  -Loop recorder placed  -A1c 5.0  -TSH and B12 level unremarkable  February 15-recheck lipid panel/transaminases    Loss of appetite  Weight loss  Nausea  -symptoms for approximately 8 months, has lost ~40lbs over that time per patient  -started Remeron 7.5 qHS  -GI consulted, appreciate recs. Started scheduled Zofran with meals on 1/13, continue Remeron.   Jan 14 - GI to check RUQ US, increase bowel regimen, continue Remeron and Zofran as above  January 15 -1.2 cm hypoechoic pancreatic head lesion on ultrasound.  To get CT of the abdomen tomorrow.  January 16 - CT scan showed pancreatic cyst.  Not felt to be the etiology of her nausea.  Follow-up as an outpatient.  January 17- Remeron increased to 15 mg nightly     Left breast nodule  -2x1cm nodule left breast at 10 o'clock position, borders feel smooth, nodule is not freely mobile, no overlying skin changes. Away from the site of recent left loop recorder placement Jan 5, 2021  -Last mammogram one year ago. May not tolerate mammogram with recent loop recorder placement. Patient discussed with Dr. Pride, left breast ultrasound ordered  -Left breast ultrasound negative at the area of interest in the left upper inner quadrant    HLD  -continue ASA and Praluent as she is intolerant of statins     HTN  -continue cardizem, losartan  -normotensive goal  -monitor    Vit  D insufficiency  -level 20 on admission  -started vitamin D replacement     Hypothyroidism  -continue levothyroxine    CAD  -continue ASA and Praluent   January 27-She had some sharp chest pain that was brief last evening at about 1 AM, resolved on its own.  Had nitroglycerin ordered but did not receive it.  She reports at home she did occasionally have chest pain and would take him to glycerin at home, stating that sometimes she would take it as she just felt nervous and worried with the chest pain.  She did feel some palpitations after the chest pain last evening.  We discussed seeing if anything was uploaded on her loop recorder to her cardiologist office and we will check those results.    Obstructive sleep apnea-January 27-she has been on 2 L nasal cannula oxygen at nighttime with sats 100%.  She did not have her CPAP brought in from home     Prerenal OBED, resolved  -secondary to decreased PO intake  -resolved prior to discharge from [facility]  -monitor electrolytes     Iron deficiency anemia  -received 2 doses of IV iron as well as 1UpRBCs on 1/4 at [facility]  -Hgb stable ~8 prior to discharge from [facility], 9.2 on admission to [facility]  -continue oral iron, monitor     Left hip pain  -xrays at [facility] unremarkable  Jan 13 - Fatigue continues to be an issue. She takes Norco 7.5 mg about 4 times a day with history of left hip pain-history of left femur fracture open reduction internal fixation, x-ray at the outside hospital on January 6 showed hardware in place.  January 14-She does have fatigue during the day.  Will try a lower dose of Norco 5 mg rather than 7.5 mg.  Is on Remeron 7.5 g at nighttime but that was only recently added.  January 15-she took Norco 5 mg twice a day at home.  More alert with better trunk control on lower dose of Norco today.  January 21-continues with left hip pain. Taking Norco 5 mg about 4 times a day. Add Lidoderm patch.  January 25-does not feel Lidoderm patch that helpful.  Pain was better with addition  of tramadol over the weekend.  Hip range of motion unremarkable with no crepitus  January 26-left hip xray did not show displacement of surgical hardware, fracture, erosion or dislocation  January 26-she feels tramadol works better for her than hydrocodone.  Anticipate going home on tramadol and discontinue hydrocodone.  She is presently taking tramadol twice a day.  Appears to be tolerating without any side effects.  February 12-patient alternating hydrocodone and tramadol for pain relief     Fluids/Electrolytes/Nutrition   -cardiac diet  -admission labs unremarkable     DVT ppx  -subQ Lovenox    /UTI  -on Ditropan 5 mg twice a day.  January 11 - voids with acceptable bladder scan postvoid residuals. Urinary tract infection with E. coli-sensitive to Macrobid  January 18 - required intermittent straight catheter 450 cc.  January 19 - incontinent and IC, PVRs 99-391cc  January 22 - has urge to urinate at times but cannot void, will decrease oxybutynin from bid to with dinner only. She is voiding with incontinence with postvoid residual 230-290 cc.  Discussed trial of decreasing Ditropan to 5 mg q. supper first from twice a day before doing a trial of Flomax.  January 23-urinary tract infection-E. coli-on cefdinir starting January 23.  Sensitive to cephalosporins  January 27-voids with postvoid residual  cc.  We will continue Ditropan at lower dose of 5 mg with supper  February 9-recurrent dysuria-recheck urinalysis with culture if indicated  Feb 10 - UA negative and PVR 53 cc  February 12-She notes that she has increased bladder frequency at nighttime on the decreased dose of the Ditropan, having to go every 20 minutes which is fatiguing.  Recent urinalysis was negative for infection.  We discussed increasing Ditropan back to 5 mg twice a day.  Also reviewed that previously she had some elevated PVRs on the twice daily dosing .      TEAM CONF - JAN 12 - BED MAX-DEP. TRANSFERS MAX 2 TRANSFER BOARD.  NON-AMBULATORY. WHEELCHAIR 50 FEET MIN ASSIST. TOILET TRANSFERS DEP X 2. IMPAIRED VISION. BATH MAX. LBD DEP. UBD MAX. GROOMING MOD ASSIST. COGNITION - MILD TO MODERATE DEFICITS. 2L O2 AT HS. ON MACROBID FOR UTI. FATIGUES. HISTORY OF POOR APPETITE FOR MONTHS, WEIGHT LOSS 30-40 # OVER SEVERAL MONTHS AT HOME, HISTORY OF REPORTED NAUSEA AT HOME. WILL CONSULT GI.   ELOS - 4 WEEKS.     TEAM CONF - JAN 19 - BED MAX 2 .  TRANSFERS MAX 2 TRANSFER BOARD. WHEELCHAIR MIN A DUE TO VISIN DEFICITS. HAS SAT UNSUPPORTED FOR 30 SECS BEFORE STARTS TO LEAN. TOILET TRANSFERS DEP X 2. BATH UB MAX, LB DEP X 2. LBD DEP X 2. UBD MAX. GROOMING MOD. LEFT SHOULDER SUBLUXATION - ADD LEUKOTAPING. VOIDS WITH ELEVATED PVRS WITH OCCASIONAL ISC. CONTINENT INCONTINENT.  LEFT INATTENTION, LEFT VISUAL FIELD CUT. IMPAIRED ATTENTION, EXECUTIVE FUNCTION, REASONING. WORKING MEMORY IMPROVING.  BNE (Active)  Att'n. - WNL  Exec. Fx. - Mild Imp.  Rsng/Jgmnt - Mildly Imp. for abstract reaosning  Arith - Min Imp.  Visuospatial Skills - Mildly Imp.  Visual Mem. - Mildly Imp.  Verbal Mem. - Mildly Imp.  Emot - Pt endorsed dep related to current status  DEPRESSION - SUPPORTIVE THERAPY. ON REMERON.   ELOS -   3 WEEKS    TEAM CONF - JAN 26 -  BED MAX 2. TRANSFERS MAX 2. WHEELCHAIR 50 FEET MIN ASSIST, IMPACTED BY VISUAL DEFICITS. LEFT HIP PAIN INCREASED, WILL REPEAT X-RAY. AT OUTSIDE HOSPITAL X-RAY ON JAN 6 - [ORIF hardware in the proximal femur is stable from 5/6/2020. No hardware loosening or perihardware fracture is detected. The hip joint space is mildly narrowed as before. IMPRESSION: Stable ORIF hardware; no acute abnormality.]  TOILET TRANSFERS MAX 2 AS PUSHES. BATH UBB MIN, LBB MAX 2. UBD MAX ASSIST. LBD DEPENDENT. GROOMING MIN ASSIST. ATTENDS TO LEFT A LITTLE BETTER. TOILETING DEP X 2. LEFT VISUAL FIELD DEFICITS. EXECUTIVE FUNCTION - IMPAIRED ATTENTION. MEMORY MIN-MOD DEFICITS. MOD-MAX VISUAL CUES TO LEFT SIDE. VOIDS. ON DECREASED DITROPAN TO 5 MG ONCE A DAY  DUE TO ELEVATED PVRS. CONTINENT AT TIMES WITH BLADDER. BOWEL CONTINENT. PAIN MANAGEMENT - TRAMADOL ADDED. WITH REC THERAPY, CUES TO ATTEND TO LEFT.  ELOS -  2-3 WEEKS    TEAM CONF- BED MAX 2. TRANSFERS MAX SQUAT PIVOT, USES LIFT WITH NURSING. SITTING BALANCE BETTER. LEFT INATTENTION. WHEELCHAIR MIN ASSIST WITH VISUAL CUES, DUE TO LEFT VISUAL INATTENTION/LEFT VISUAL FIELD CUT. STANDING AT PARALLEL BARS WITH BLOCKING LEFT KNEE. TOILET TRANSFERS MAX 2 TO DROPARM. BATH MIN UB AND MAX X 2 LB. DRESSING MOD-MAX UB AND DEP X 2 LB. GROOMING MIN-SBA. TOILETING COMES UP TO STAND A LITTLE BETTER, DEP X 2.   IMPAIRED ATTENTION. DISTRACTIBLE. FATIGUES QUICKLY WITH COGNITIVE TASKS. MIN-MOD MEMORY DEFICITS. MOD-MAX VERBAL AND VISUAL CUES FOR READING AND WRITING TASKS. INCONTINENT BOWEL AND BLADDER. PVRS LOW. CHRONIC LEFT HIP PAIN.   ELOS -  ONE WEEK, WILL NEED SIGNIFICANT ASSISTANCE AT HOME.    TEAM CONF - FEB 9 - LEFT HEMIPLEGIA UNCHANGED. KINESIOTAPING LEFT SHOULDER.  BED MAX 2. TRANSFERS MAX ASSIST SQUAT PIVOT. NON-AMBULATORY. WHEELCHAIR 100 FEET MIN ASSIST DUE TO VISUAL DEFICITS. TOILET TRANSFERS MAX 1. SHOWER TRANSFERS MAX ASSIST TO BENCH. TOILETING DEP X 2. UBB MIN. LBB MOD OF 2. LBD MOD-MAX WITH MAX 2 FOR STANDNG. UBD MOD ASSIST FOR PULLOVER. GROOMING MIN-SBA. CONTINENT /INCONTINENT. DITROPAN FOR OVERACTIVE BLADDER. ABLE TO SIT FOR ABOUT 75 SECS. IMPAIRED ATTENTION. FATIGUES QUICKLY. DISTRACTIBLE. LEFT VISUAL FIELD CUT. MIN-MOD MEMORY DEFICITS. MOD-MAX VISUAL CUES FOR READING/WRITING TASKS.   ELOS -   FAMILY PLANS TO HIRE ADDITIONAL HELP FOR HOME. HOME ON FEB 16. FAMILY TEACHING NEEDED.     Azael Abdullahi MD  02/15/21  11:24 EST    During rounds, used appropriate personal protective equipment including mask and gloves.  Additional gown if indicated.  Mask used was standard procedure mask. Appropriate PPE was worn during the entire visit.  Hand hygiene was completed before and after.

## 2021-02-15 NOTE — PLAN OF CARE
Goal Outcome Evaluation:         Slept poorly, on call light frequently for bedpan or change brief, or for pain meds. Meds whole with water. Continent & Incontinent of B&B. Tramadol given for pain x1, so far, pain in Lt. Shoulder & Lt. Hip. Remains flaccid Lt. Side. O2 1/NC @ HS. Refused SCD's.

## 2021-02-15 NOTE — PROGRESS NOTES
Inpatient Rehabilitation Plan of Care Note    Plan of Care  Care Plan Reviewed - Updates as Follows    Body Systems    [RN] Respiratory(Active)  Current Status(02/15/2021): O2 2L QHS  Weekly Goal(02/16/2021): Same as discharge  Discharge Goal: Least restrictive amount of 02    Performed Intervention(s)  O2 2L/NC @ HS  assess lungs, sats every shift      Psychosocial    [RN] Coping/Adjustment(Active)  Current Status(02/15/2021): Patient has supportive family, but at risk of  ineffective coping regarding current situation.  Weekly Goal(02/16/2021): Allow patient the opportunity to discuss concerns  regarding new situation.  Discharge Goal: Patient will have healthy coping mechanisms at time of discharge    Performed Intervention(s)  Verbalize needs and concerns  Medications as needed/ ordered  Therapeutic environmental set up      Safety    [RN] Potential for Injury(Active)  Current Status(02/15/2021): Patient at risk of injury related to previous falls.  flaccid on left side. dependent with transfers of 2.  Weekly Goal(02/16/2021): Paitent will use call light appropriately while in  Rehab.  Discharge Goal: Patient will continue to use call light while in Rehab  environment.    Performed Intervention(s)  Items with in reach and environmental set up  Bed alarm/ Chair alarm  Safety rounds and falls protocols/ precautions      Sphincter Control    [RN] Bladder Management(Active)  Current Status(02/15/2021): Patient is continent and incontinent, using bedpan,  PVRs, requiring I/C at times  Weekly Goal(02/16/2021): Patient will be 50% continent  Discharge Goal: Patient will be 100% continent    [RN] Bowel Management(Active)  Current Status(02/15/2021): Patient continent and incontinent  Weekly Goal(02/16/2021): continent 50% of the time  Discharge Goal: Patient will continent 100% of the time    Performed Intervention(s)  Proper diet and fluid intake  Medication as ordered  Incontinent care if needed  Monitor intake and  output    Signed by: Mackenzie Alberts RN

## 2021-02-15 NOTE — PROGRESS NOTES
Spoke with patient's daughter, by phone, this morning. Due to snow and bad road conditions in their county, they are not able to come today for family teaching. Since forecast is calling for increased snow this afternoon/night, will most likely need to postpone d/c vs trying to go home tomorrow. Family will need a day of family teaching prior to taking patient home since she still requires Max assist. Will see how weather is and help plan for d/c when family can safely get here for teaching and d/c. Notified [facility] of probable postponed d/c.

## 2021-02-15 NOTE — PROGRESS NOTES
Inpatient Rehabilitation Plan of Care Note    Plan of Care  Updated Problems/Interventions  Mobility    [PT] Wheelchair(Active)  Current Status(02/15/2021): 50' modA (visual deficits)  Weekly Goal(02/22/2021): 50' min-modA  Discharge Goal: 50' min-modA    [PT] Walk(Barrier)  Current Status(02/15/2021): DC goal  Weekly Goal(02/22/2021): DC goal  Discharge Goal: DC goal    [PT] Bed/Chair/Wheelchair(Active)  Current Status(02/15/2021): maxA, squat pivot  Weekly Goal(02/22/2021): mod-maxA, squat pivot  Discharge Goal: mod-maxA    [PT] Bed Mobility(Active)  Current Status(02/15/2021): maxAx2  Weekly Goal(02/22/2021): maxA  Discharge Goal: maxA    Signed by: Nati Kaba, PT

## 2021-02-15 NOTE — PROGRESS NOTES
Inpatient Rehabilitation Plan of Care Note    Plan of Care  Care Plan Reviewed - No updates at this time.    Psychosocial    Performed Intervention(s)  Verbalize needs and concerns  Medications as needed/ ordered  Therapeutic environmental set up      Safety    Performed Intervention(s)  Items with in reach and environmental set up  Bed alarm/ Chair alarm  Safety rounds and falls protocols/ precautions      Sphincter Control    Performed Intervention(s)  Proper diet and fluid intake  Medication as ordered  Incontinent care if needed  Monitor intake and output      Body Systems    Performed Intervention(s)  O2 2L/NC @ HS  assess lungs, sats every shift    Signed by: Sloane Arredondo RN

## 2021-02-15 NOTE — PROGRESS NOTES
Inpatient Rehabilitation Functional Measures Assessment and Plan of Care    Plan of Care  Updated Problems/Interventions  Mobility    [OT] Toilet Transfers(Active)  Current Status(02/15/2021): mod/max 1 second person for safety to droparm  Weekly Goal(02/17/2021): max/mod  Discharge Goal: mod/max  A    [OT] Tub/Shower Transfers(Active)  Current Status(02/15/2021): Max to bench second person for safety  Weekly Goal(02/17/2021): max  Discharge Goal: max A        Self Care    [OT] Bathing(Active)  Current Status(02/15/2021): Min UB mod  x 2 LB  Weekly Goal(02/18/2021): Max  Discharge Goal: Min A    [OT] Dressing (Lower)(Active)  Current Status(02/15/2021): max/mod with max 2 for standing  Weekly Goal(02/17/2021): max  Discharge Goal: Max A    [OT] Dressing (Upper)(Active)  Current Status(02/15/2021): mod pullover  Weekly Goal(02/18/2021): Mod A  Discharge Goal: mod    [OT] Grooming(Active)  Current Status(02/08/2021): min/SBA  Weekly Goal(02/17/2021): SBA/min  Discharge Goal: Set-up/min    [OT] Toileting(Active)  Current Status(02/15/2021): Dependent x 2  Weekly Goal(02/16/2021): dep x 2  Discharge Goal: dep x 2    Functional Measures  JALEEL Eating:  Branch  JALELE Grooming: Branch  JALEEL Bathing:  Branch  JALEEL Upper Body Dressing:  Branch  JALEEL Lower Body Dressing:  Branch  JALEEL Toileting:  Branch    JALEEL Bladder Management  Level of Assistance:  Branch  Frequency/Number of Accidents this Shift:  Branch    JALEEL Bowel Management  Level of Assistance: Branch  Frequency/Number of Accidents this Shift: Branch    JALEEL Bed/Chair/Wheelchair Transfer:  Branch  JALEEL Toilet Transfer:  Branch  JALEEL Tub/Shower Transfer:  Branch    Previously Documented Mode of Locomotion at Discharge: Field  JALEEL Expected Mode of Locomotion at Discharge: Branch  JALEEL Walk/Wheelchair:  Branch  JALEEL Stairs:  Branch    JALEEL Comprehension:  Branch  JALEEL Expression:  Branch  JALEEL Social Interaction:  Branch  JALEEL Problem Solving:  Branch  JALEEL Memory:  Branch    Therapy  Mode Minutes  Occupational Therapy: Branch  Physical Therapy: Branch  Speech Language Pathology:  Branch    Signed by: GIAN Blackwell/TALIB

## 2021-02-15 NOTE — PROGRESS NOTES
Case Management  Inpatient Rehabilitation Plan of Care and Discharge Plan Note    Rehabilitation Diagnosis:  Branch  Date of Onset:  Jeanne    Medical Summary:  Jeanne  Past Medical History: Jeanne    Plan of Care  Updated Problems/Interventions  Field    Expected Intensity:  Jeanne  Interdisciplinary Team:  Jeanne  Estimated Length of Stay/Anticipated Discharge Date: Branch  Anticipated Discharge Destination:  Anticipated discharge destination from inpatient rehabilitation is community  discharge with assistance. Patient lives with daughter and son-in-law in 2 story  home with ramp entrance. Bed/bath on first floor. Granddaughter also helps out  with care.  Family conference held on 2/10 with patient. Daughter, granddaughter, and son on  conference call.  Family teaching to be done with daughter and granddgt prior to d/c. Postponed  due to bad weather.   [facility] to provide home PT, OT, ST, NSG  D/C plan is home with daughter and son-in-law. Granddgt to help with care.  Family has also hired additional caregiver so will have 2 people to assist as  needed.      Based on the patient's medical and functional status, their prognosis and  expected level of functional improvement is:  Jeanne    Signed by: ALDO Tripp

## 2021-02-15 NOTE — THERAPY TREATMENT NOTE
Inpatient Rehabilitation - Physical Therapy Treatment Note       [facility]     Patient Name: Liana Carrero  : 1935  MRN: [id number]    Today's Date: 2/15/2021                    Admit Date: 2021      Visit Dx:     ICD-10-CM ICD-9-CM   1. Impaired functional mobility, balance, gait, and endurance  Z74.09 V49.89       Patient Active Problem List   Diagnosis   • Stroke (cerebrum) (CMS/[facility])   • Chest pain   • Hypertensive urgency   • Headache   • History of embolic stroke   • Hypothyroidism   • Hypokalemia   • CAD in native artery   • URTI (acute upper respiratory infection)   • Closed fracture of left hip (CMS/[facility])   • Chronic diastolic (congestive) heart failure (CMS/[facility])   • Coronary artery disease involving autologous vein coronary bypass graft without angina pectoris   • Impaired glucose tolerance   • Moderate malnutrition (CMS/[facility])   • Stroke (CMS/[facility])       Past Medical History:   Diagnosis Date   • Arthritis    • CHF (congestive heart failure) (CMS/[facility])    • Coronary artery disease    • Diabetes mellitus (CMS/[facility])     new DX related to stroke, started on insulin at hospital   • Disease of thyroid gland    • Elevated cholesterol    • GERD (gastroesophageal reflux disease)    • History of transfusion    • Hypertension    • PONV (postoperative nausea and vomiting)    • Sleep apnea    • Spinal headache    • Stroke (CMS/[facility])        Past Surgical History:   Procedure Laterality Date   • APPENDECTOMY     • CARDIAC CATHETERIZATION       x2   • CARDIAC CATHETERIZATION N/A 2019    Procedure: Left Heart Cath;  Surgeon: Bentley Chapin MD;  Location:  [facility] CATH [facility]VASIVE [facility];  Service: Cardiovascular   • CARDIAC CATHETERIZATION N/A 2019    Procedure: Coronary angiography;  Surgeon: Bentley Chapin MD;  Location:  [facility];  Service: Cardiovascular   • CARDIAC CATHETERIZATION N/A 2019    Procedure: Native mammary injection;  Surgeon: Bentley Chapin MD;   Location:  [facility]ATH [facility]VAS[facility];  Service: Cardiovascular   • CARDIAC CATHETERIZATION N/A 12/26/2019    Procedure: Saphenous Vein Graft;  Surgeon: Bentley Chapin MD;  Location: [facility] CATH INVASIVE LOCATION;  Service: Cardiovascular   • COLON SURGERY     • COLONOSCOPY     • EYE SURGERY      lens implants and cataract surgery   • FEMUR IM NAILING/RODDING Left 3/26/2020    Procedure: LT.HIP NAILING/RODDING;  Surgeon: Carlos Khan II, MD;  Location: [facility] MAIN OR;  Service: Orthopedics;  Laterality: Left;   • FRACTURE SURGERY Left 2020   • HYSTERECTOMY     • VASCULAR SURGERY      varicous veins          PT ASSESSMENT (last 12 hours)      IRF PT Evaluation and Treatment     Row Name 02/15/21 1250          PT Time and Intention    Document Type  daily treatment  -MS     Mode of Treatment  physical therapy  -MS     Patient/Family/Caregiver Comments/Observations  AM and PM: sitting up in wc, no acute distress, exit alarm on  -MS     Total Minutes, Physical Therapy  60  -MS     Row Name 02/15/21 1250          General Information    Existing Precautions/Restrictions  fall  -MS     Limitations/Impairments  safety/cognitive  -MS     Row Name 02/15/21 1250          Vision Assessment/Intervention    Visual Impairment/Limitations  visual/perceptual impairments present  -MS     Visual Field Deficit  homonymous hemianopsia, left  -MS     Visual Motor Impairment  visual tracking, left  -MS     Visual Processing Deficit  jon-inattention/neglect, left  -MS     Row Name 02/15/21 1250          Cognition/Psychosocial    Affect/Mental Status (Cognitive)  WFL  -MS     Orientation Status (Cognition)  oriented x 3  -MS     Follows Commands (Cognition)  follows two-step commands;repetition of directions required;verbal cues/prompting required  -MS     Personal Safety Interventions  fall prevention program maintained;gait belt;nonskid shoes/slippers when out of bed  -MS     Cognitive Function (Cognitive)  attention  deficit  -MS     Attention Deficit (Cognitive)  distractible in noisy environment;divided attention  -MS     Executive Function Deficit (Cognition)  insight/awareness of deficits  -MS     Row Name 02/15/21 1250          Pain Scale: Numbers Pre/Post-Treatment    Pretreatment Pain Rating  5/10  -MS     Posttreatment Pain Rating  5/10  -MS     Pain Location - Side  Left  -MS     Pain Location  knee  -MS     Row Name 02/15/21 1250          Transfer Assessment/Treatment    Comment (Transfers)  Multiple STS and car transfers performed this AM and PM- difficulty with pivotings on R despite positioning  -MS     Row Name 02/15/21 1250          Transfers    Sit-Stand [place] (Transfers)  maximum assist (25% patient effort);verbal cues;nonverbal cues (demo/gesture);2 person assist  -MS     Stand-Sit [place] (Transfers)  maximum assist (25% patient effort);2 person assist;verbal cues;nonverbal cues (demo/gesture)  -MS     Row Name 02/15/21 1250          Sit-Stand Transfer    Assistive Device (Sit-Stand Transfers)  wheelchair jon bars  -MS     Row Name 02/15/21 1250          Stand-Sit Transfer    Assistive Device (Stand-Sit Transfers)  wheelchair jon bars  -MS     Row Name 02/15/21 1250          Stand Pivot/Stand Step Transfer    Stand Pivot/Stand Step [place] (Transfers)  maximum assist (25% patient effort);verbal cues;nonverbal cues (demo/gesture)  -MS     Assistive Device (Stand Pivot Stand Step Transfer)  wheelchair jon bars  -MS     Row Name 02/15/21 1250          Car Transfer    Type (Car Transfer)  stand pivot/stand step  -MS     [place] Level (Car Transfer)  maximum assist (25% patient effort);2 person assist;nonverbal cues (demo/gesture);verbal cues  -MS     Assistive Device (Car Transfer)  wheelchair  -MS     Row Name 02/15/21 1250          Gait/Stairs (Locomotion)    [place] Level (Gait)  maximum assist (25% patient effort);2 person assist;verbal cues;nonverbal cues (demo/gesture)  -MS      Assistive Device (Gait)  -- jon bars  -MS     Distance in Feet (Gait)  two trials: 3 steps  -MS     Pattern (Gait)  step-to  -MS     Deviations/Abnormal Patterns (Gait)  gait speed decreased;festinating/shuffling;stride length decreased;weight shifting decreased  -MS     Bilateral Gait Deviations  forward flexed posture  -MS     Left Sided Gait Deviations  foot drop/toe drag;heel strike decreased;knee buckling, left side;knee hyperextension;weight shift ability decreased;Trendelenburg sign  -MS     Comment (Gait/Stairs)  ACE wrap on L ankle for DF assist, LBP and L knee pain limiting  -MS     Row Name 02/15/21 1250          Wheelchair Mobility/Management    Forward Propulsion [place] (Wheelchair)  moderate assist (50% patient effort);verbal cues  -MS     Steering [place] (Wheelchair)  moderate assist (50% patient effort);verbal cues  -MS     Distance Propelled in Feet (Wheelchair)  50  -MS     Armrest Management [place] (Wheelchair)  dependent (less than 25% patient effort)  -MS     Front Rigging/Swing-away Footrest Management [place] (Wheelchair)  dependent (less than 25% patient effort)  -MS     Wheel Locks/Brakes Management [place] (Wheelchair)  dependent (less than 25% patient effort)  -MS     Comment, Parts Management (Wheelchair)  Poor concentration this afternoon and difficulty demo'd with dual task.   -MS     Row Name 02/15/21 1250          Positioning and Restraints    Pre-Treatment Position  sitting in chair/recliner  -MS     Post Treatment Position  wheelchair  -MS     In Wheelchair  sitting;call light within reach;encouraged to call for assist;exit alarm on  -MS       User Key  (r) = Recorded By, (t) = Taken By, (c) = Cosigned By    Initials Name Provider Type    MS CasarezsNati, PT Physical Therapist        Wound 01/07/21 Left upper chest Incision (Active)   Closure Liquid skin adhesive;Approximated;Open to air 02/14/21 1942   Base clean;dry 02/14/21 1942   Drainage  Amount none 02/15/21 0809   Dressing Care open to air 02/15/21 0809     Physical Therapy Education                 Title: PT OT SLP Therapies (Done)     Topic: Physical Therapy (Done)     Point: Mobility training (Done)     Learning Progress Summary           Patient Acceptance, E,TB, VU,NR by MS at 2/15/2021 1253    Acceptance, D,E, NR by KP at 2/13/2021 0828    Comment: w/c propulsion    Acceptance, E,TB, VU,NR by MS at 2/12/2021 1507    Acceptance, E,D, NR by KP at 2/11/2021 1120    Comment: standing posture and gait sequence    Acceptance, E, VU by  at 2/10/2021 1504    Comment: family conference- discussed need for visual and verbal cues for visual perceptual deficits, and deficts in attn, memory    Acceptance, E,TB, VU,NR by MS at 2/10/2021 1401    Acceptance, E,TB, VU,NR by MS at 2/9/2021 1531    Eager, E,D, NR by KP at 2/9/2021 1119    Comment: standing posture, balance    Acceptance, E,TB, VU,NR by MS at 2/8/2021 1152    Acceptance, E,D, DU,NR by LB at 2/6/2021 1539    Comment: wt shift in standing    Acceptance, E,TB, VU,NR by MS at 2/5/2021 1208    Acceptance, E,TB, VU,NR by MS at 2/4/2021 1518    Acceptance, E,TB, VU,NR by MS at 2/3/2021 1151    Acceptance, E,TB, VU,NR by MS at 2/2/2021 1210    Acceptance, E,TB, NR by MS at 2/1/2021 1145    Acceptance, E, NR by  at 1/30/2021 1336    Acceptance, E,TB, VU,NR by MS at 1/29/2021 1535    Acceptance, E, NR by LB1 at 1/28/2021 1032    Acceptance, E,TB, VU,NR by MS at 1/27/2021 1447    Acceptance, E,TB, VU,NR by MS at 1/26/2021 0959    Acceptance, E,TB, VU,NR by MS at 1/25/2021 1316    Acceptance, E,TB, VU,NR by MS at 1/22/2021 0918    Acceptance, E,TB, VU by MS at 1/20/2021 1544    Acceptance, E,TB, VU,NR by MS at 1/19/2021 1401    Acceptance, E,TB, NR,NL by MS at 1/18/2021 1432    Acceptance, E,TB, VU,NR by IVAN at 1/16/2021 1444    Acceptance, E,TB, NR,NL by MS at 1/15/2021 1418    Acceptance, E,TB, NR by MS at 1/14/2021 1223    Acceptance, E,TB, NR by LB2  at 1/13/2021 1030    Comment: sitting balance    Acceptance, E,TB, NR by MS at 1/12/2021 1312    Acceptance, E,TB, VU,NR by MS at 1/11/2021 1140   Family Acceptance, E, VU by SL at 2/10/2021 1504    Comment: family conference- discussed need for visual and verbal cues for visual perceptual deficits, and deficts in attn, memory                   Point: Home exercise program (Done)     Learning Progress Summary           Patient Acceptance, E,TB, VU,NR by MS at 2/12/2021 1507    Acceptance, E, VU by SL at 2/10/2021 1504    Comment: family conference- discussed need for visual and verbal cues for visual perceptual deficits, and deficts in attn, memory    Acceptance, E,TB, VU,NR by MS at 2/10/2021 1401    Acceptance, E,TB, VU,NR by MS at 2/9/2021 1531    Acceptance, E,TB, VU,NR by MS at 2/8/2021 1152    Acceptance, E,TB, VU,NR by MS at 2/5/2021 1208    Acceptance, E,TB, VU,NR by MS at 2/4/2021 1518    Acceptance, E,TB, VU,NR by MS at 2/3/2021 1151    Acceptance, E,TB, VU,NR by MS at 2/2/2021 1210    Acceptance, E,TB, NR by MS at 2/1/2021 1145    Acceptance, E, NR by LH at 1/30/2021 1336    Acceptance, E,TB, VU,NR by MS at 1/29/2021 1535    Acceptance, E,TB, VU,NR by MS at 1/27/2021 1447    Acceptance, E,TB, VU,NR by MS at 1/26/2021 0959    Acceptance, E,TB, VU,NR by MS at 1/25/2021 1316    Acceptance, E,TB, VU,NR by MS at 1/22/2021 0918    Acceptance, E,TB, VU by MS at 1/20/2021 1544    Acceptance, E,TB, VU,NR by MS at 1/19/2021 1401    Acceptance, E,TB, NR,NL by MS at 1/18/2021 1432    Acceptance, E,TB, NR,NL by MS at 1/15/2021 1418    Acceptance, E,TB, NR by MS at 1/14/2021 1223   Family Acceptance, E, VU by SL at 2/10/2021 1504    Comment: family conference- discussed need for visual and verbal cues for visual perceptual deficits, and deficts in attn, memory                   Point: Body mechanics (Done)     Learning Progress Summary           Patient Acceptance, E,TB, VU,NR by MS at 2/15/2021 1253    Acceptance,  E,TB, VU,NR by MS at 2/12/2021 1507    Acceptance, E, VU by SL at 2/10/2021 1504    Comment: family conference- discussed need for visual and verbal cues for visual perceptual deficits, and deficts in attn, memory    Acceptance, E,TB, VU,NR by MS at 2/10/2021 1401    Acceptance, E,TB, VU,NR by MS at 2/9/2021 1531    Acceptance, E,TB, VU,NR by MS at 2/8/2021 1152    Acceptance, E,TB, VU,NR by MS at 2/5/2021 1208    Acceptance, E,TB, VU,NR by MS at 2/4/2021 1518    Acceptance, E,TB, VU,NR by MS at 2/3/2021 1151    Acceptance, E,TB, VU,NR by MS at 2/2/2021 1210    Acceptance, E,TB, NR by MS at 2/1/2021 1145    Acceptance, E, NR by LH at 1/30/2021 1336    Acceptance, E,TB, VU,NR by MS at 1/29/2021 1535    Acceptance, E,TB, VU,NR by MS at 1/27/2021 1447    Acceptance, E,TB, VU,NR by MS at 1/26/2021 0959    Acceptance, E,TB, VU,NR by MS at 1/25/2021 1316    Acceptance, E,TB, VU,NR by MS at 1/22/2021 0918    Acceptance, E,TB, VU by MS at 1/20/2021 1544    Acceptance, E,TB, VU,NR by MS at 1/19/2021 1401    Acceptance, E,TB, NR,NL by MS at 1/15/2021 1418    Acceptance, E,TB, NR by MS at 1/14/2021 1223    Acceptance, E,TB, NR by MS at 1/12/2021 1312    Acceptance, E,TB, VU,NR by MS at 1/11/2021 1140   Family Acceptance, E, VU by SL at 2/10/2021 1504    Comment: family conference- discussed need for visual and verbal cues for visual perceptual deficits, and deficts in attn, memory                   Point: Precautions (Done)     Learning Progress Summary           Patient Acceptance, E,TB, VU,NR by MS at 2/15/2021 1253    Acceptance, E,TB, VU,NR by MS at 2/12/2021 1507    Acceptance, E, VU by SL at 2/10/2021 1504    Comment: family conference- discussed need for visual and verbal cues for visual perceptual deficits, and deficts in attn, memory    Acceptance, E,TB, VU,NR by MS at 2/10/2021 1401    Acceptance, E,TB, VU,NR by MS at 2/9/2021 1531    Acceptance, E,TB, VU,NR by MS at 2/8/2021 1152    Acceptance, E,TB, VU,NR by MS at  2/5/2021 1208    Acceptance, E,TB, VU,NR by MS at 2/4/2021 1518    Acceptance, E,TB, VU,NR by MS at 2/3/2021 1151    Acceptance, E,TB, VU,NR by MS at 2/2/2021 1210    Acceptance, E,TB, NR by MS at 2/1/2021 1145    Acceptance, E, NR by  at 1/30/2021 1336    Acceptance, E,TB, VU,NR by MS at 1/29/2021 1535    Acceptance, E,TB, VU,NR by MS at 1/27/2021 1447    Acceptance, E,TB, VU,NR by MS at 1/26/2021 0959    Acceptance, E,TB, VU,NR by MS at 1/25/2021 1316    Acceptance, E,TB, VU,NR by MS at 1/22/2021 0918    Acceptance, E, VU by MD at 1/21/2021 0926    Acceptance, E,TB, VU by MS at 1/20/2021 1544    Acceptance, E,TB, VU,NR by MS at 1/19/2021 1401    Acceptance, E,TB, NR,NL by MS at 1/15/2021 1418    Acceptance, E,TB, NR by MS at 1/14/2021 1223    Acceptance, E,TB, NR by MS at 1/12/2021 1312    Acceptance, E,TB, VU,NR by MS at 1/11/2021 1140    Acceptance, E, VU by MD at 1/9/2021 0933   Family Acceptance, E, VU by  at 2/10/2021 1504    Comment: family conference- discussed need for visual and verbal cues for visual perceptual deficits, and deficts in attn, memory                               User Key     Initials Effective Dates Name Provider Type Discipline    LB 06/08/18 -  Elba Zarate, PT Physical Therapist PT    SL 06/08/18 -  Katelyn James MS CCC-SLP Speech and Language Pathologist SLP    IVAN 06/08/18 -  Ela Pritchett, PT Physical Therapist PT    LB2 04/03/18 -  Jazmin Schuster, PT Physical Therapist PT     04/03/18 -  Elba Myrick, PT Physical Therapist PT    LB1 03/07/18 -  Elba Grigsby, PTA Physical Therapy Assistant PT    MD 04/03/18 -  Lisandra Lowery, PT Physical Therapist PT    KP 04/03/18 -  Dixie Graham, PT Physical Therapist PT    MS 03/04/19 -  Nati Kaba, PT Physical Therapist PT                PT Recommendation and Plan    Planned Therapy Interventions (PT): balance training, bed mobility training, gait training, home exercise program, postural re-education, patient/family  education, neuromuscular re-education, strengthening, transfer training, wheelchair management/propulsion training, ROM (range of motion)  Frequency of Treatment (PT): 60 minutes per session  Anticipated Equipment Needs at Discharge (PT Eval): wheelchair(noted patient already has wc(?) will get details)     Patient was wearing a face mask during this therapy encounter. Therapist used appropriate personal protective equipment including eye protection, mask, and gloves.  Mask used was standard procedure mask. Appropriate PPE was worn during the entire therapy session. Hand hygiene was completed before and after therapy session. Patient is not in enhanced droplet precautions.      Time Calculation:     PT Charges     Row Name 02/15/21 1405 02/15/21 1249          Time Calculation    Start Time  1330  -MS  1100  -MS     Stop Time  1400  -MS  1130  -MS     Time Calculation (min)  30 min  -MS  30 min  -MS     PT Received On  --  02/15/21  -MS     PT - Next Appointment  --  02/16/21  -MS       User Key  (r) = Recorded By, (t) = Taken By, (c) = Cosigned By    Initials Name Provider Type    MS Nati Kaba, PT Physical Therapist          Therapy Charges for Today     Code Description Service Date Service Provider Modifiers Qty    01763181179 HC GAIT TRAINING EA 15 MIN 2/15/2021 Nati Kaba, PT GP 2    [id number]  PT THER PROC EA 15 MIN 2/15/2021 Nati Kaba, PT GP 1    [id number]  PT THER SUPP EA 15 MIN 2/15/2021 Nati Kaba, PT GP 4    [id number]  PT THERAPEUTIC ACT EA 15 MIN 2/15/2021 Nati Kaba, PT GP 1                   Nati Kaba, PT  2/15/2021

## 2021-02-15 NOTE — PLAN OF CARE
Goal Outcome Evaluation:  Plan of Care Reviewed With: patient     Outcome Summary: Ms Carrero has been pleasant and cooperative, continent/ incontinent and frequency of bladder and continent of bowel- daily suppository. Assist x 2, uses O2 at night, and pain medication with water. Pain medication given once, VS stable and left side flaccid. Patient up in the chair for meals, and discharge may be postponed related to weather. .

## 2021-02-15 NOTE — THERAPY TREATMENT NOTE
Inpatient Rehabilitation - Speech Language Pathology Treatment Note    [facility]     Patient Name: Liana Carrero  : 1935  MRN: [id number]    Today's Date: 2/15/2021                   Admit Date: 2021       Visit Dx:      ICD-10-CM ICD-9-CM   1. Impaired functional mobility, balance, gait, and endurance  Z74.09 V49.89       Patient Active Problem List   Diagnosis   • Stroke (cerebrum) (CMS/[facility])   • Chest pain   • Hypertensive urgency   • Headache   • History of embolic stroke   • Hypothyroidism   • Hypokalemia   • CAD in native artery   • URTI (acute upper respiratory infection)   • Closed fracture of left hip (CMS/HCC)   • Chronic diastolic (congestive) heart failure (CMS/[facility])   • Coronary artery disease involving autologous vein coronary bypass graft without angina pectoris   • Impaired glucose tolerance   • Moderate malnutrition (CMS/[facility])   • Stroke (CMS/HCC)       Past Medical History:   Diagnosis Date   • Arthritis    • CHF (congestive heart failure) (CMS/[facility])    • Coronary artery disease    • Diabetes mellitus (CMS/[facility])     new DX related to stroke, started on insulin at hospital   • Disease of thyroid gland    • Elevated cholesterol    • GERD (gastroesophageal reflux disease)    • History of transfusion    • Hypertension    • PONV (postoperative nausea and vomiting)    • Sleep apnea    • Spinal headache    • Stroke (CMS/[facility])        Past Surgical History:   Procedure Laterality Date   • APPENDECTOMY     • CARDIAC CATHETERIZATION       x2   • CARDIAC CATHETERIZATION N/A 2019    Procedure: Left Heart Cath;  Surgeon: Bentley Chapin MD;  Location:  [facility] CATH INVASIVE LOCATION;  Service: Cardiovascular   • CARDIAC CATHETERIZATION N/A 2019    Procedure: Coronary angiography;  Surgeon: Bentley Chapin MD;  Location:  [facility] CATH INVASIVE [facility];  Service: Cardiovascular   • CARDIAC CATHETERIZATION N/A 2019    Procedure: Native mammary injection;  Surgeon: Bentley Chapin MD;   Location:  [facility] CATH INVASIVE LOC[facility];  Service: Cardiovascular   • CARDIAC CATHETERIZATION N/A 12/26/2019    Procedure: Saphenous Vein Graft;  Surgeon: Bentley Chapin MD;  Location: [facility] CATH INVASIVE LOCATION;  Service: Cardiovascular   • COLON SURGERY     • COLONOSCOPY     • EYE SURGERY      lens implants and cataract surgery   • FEMUR IM NAILING/RODDING Left 3/26/2020    Procedure: LT.HIP NAILING/RODDING;  Surgeon: Carlos Khan II, MD;  Location: [facility] MAIN OR;  Service: Orthopedics;  Laterality: Left;   • FRACTURE SURGERY Left 2020   • HYSTERECTOMY     • VASCULAR SURGERY      varicous veins     Patient wore a face mask during this therapy encounter. Therapist used appropriate personal protective equipment including mask, eye protection, and gloves.  Mask used was standard procedure mask. Appropriate PPE was worn during the entire therapy session. Hand hygiene was completed before and after therapy session. Patient is not in enhanced droplet precautions.       SLP EVALUATION (last 72 hours)      SLP SLC Evaluation     Row Name 02/15/21 1301                   Communication Assessment/Intervention    Document Type  therapy note (daily note)  -LN        Subjective Information  no complaints  -LN        Patient Observations  alert;cooperative;agree to therapy  -LN        Patient Effort  good  -LN        Symptoms Noted During/After Treatment  none  -LN          User Key  (r) = Recorded By, (t) = Taken By, (c) = Cosigned By    Initials Name Effective Dates    LN Nuzhat Beebe 12/17/19 -              EDUCATION    The patient has been educated in the following areas:       Cognitive Impairment.      SLP Recommendation and Plan                                                  SLP GOALS     Row Name 02/15/21 1231             Memory Skills Goal 1 (SLP)    Improve Memory Skills Through Goal 1 (SLP)  recalling related word lists immediately;recalling related word lists with an imposed delay;recall  "details from a word list;repeat sentence;select a word from a list by exclusion;listen to a paragraph and answer questions;use memory strategies;80%;with minimal cues (75-90%)  -LN      Time Frame (Memory Skills Goal 1, SLP)  1 week  -LN      Progress (Memory Skills Goal 1, SLP)  70%;independently (over 90% accuracy)  -LN      Progress/Outcomes (Memory Skills Goal 1, SLP)  goal ongoing  -LN      Comment (Memory Skills Goal 1, SLP)  Short-pargraph recall  -LN         Organizational Skills Goal 1 (SLP)    Improve Thought Organization Through Goal 1 (SLP)  completing a divergent naming task;completing a convergent naming task;generating a list of items in a category;naming by category exclusion;naming similarities and differences;drawing conclusions;80%;with minimal cues (75-90%)  -LN      Time Frame (Thought Organization Skills Goal 1, SLP)  1 week  -LN      Barriers (Thought Organization Skills Goal 1, SLP)  MOD cues to scan L/R  -LN      Progress (Thought Organization Skills Goal 1, SLP)  40%;independently (over 90% accuracy);60%;with minimal cues (75-90%);100%;with moderate cues (50-74%)  -LN      Progress/Outcomes (Thought Organization Skills Goal 1, SLP)  goal ongoing  -LN      Comment (Thought Organization Skills Goal 1, SLP)  Category exclusion (five-word)  -LN         Reasoning Goal 1 (SLP)    Improve Reasoning Through Goal 1 (SLP)  complete basic reasoning task;complete analogies;complete deductive reasoning task;80%;with minimal cues (75-90%)  -LN      Time Frame (Reasoning Goal 1, SLP)  1 week  -LN      Progress (Reasoning Goal 1, SLP)  50%;independently (over 90% accuracy);100%;with minimal cues (75-90%)  -LN      Progress/Outcomes (Reasoning Goal 1, SLP)  goal ongoing  -LN      Comment (Reasoning Goal 1, SLP)  \"odd man out task\"   -LN         Functional Math Skills Goal 1 (SLP)    Improve Functional Math Skills Through Goal 1 (SLP)  complete simple math problems;complete functional math task;complete word " problems involving time;complete word problems involving money;80%;with minimal cues (75-90%)  -LN      Time Frame (Functional Math Skills Goal 1, SLP)  1 week  -LN      Progress (Functional Math Skills Goal 1, SLP)  90%;independently (over 90% accuracy)  -LN      Progress/Outcomes (Functional Math Skills Goal 1, SLP)  goal ongoing  -LN      Comment (Functional Math Skills Goal 1, SLP)  Simple addition/subtraction  -LN         Right Hemisphere Function Goal 1 (SLP)    Improve Right Hemisphere Function Through Goal 1 (SLP)  demonstrate awareness of communication partner in left visual field;complete visuo-spatial activities (visual closure, trail making, mazes;complete visuo-perceptual activities (L/R discrimination, spatial concepts);use compensatory strategies for left neglect;70%;with moderate cues (50-74%)  -LN      Time Frame (Right Hemisphere Function Goal 1, SLP)  1 week  -LN      Progress (Right Hemisphere Function Goal 1, SLP)  50%;with minimal cues (75-90%);100%;with moderate cues (50-74%)  -LN      Progress/Outcomes (Right Hemisphere Function Goal 1, SLP)  goal ongoing  -LN      Comment (Right Hemisphere Function Goal 1, SLP)  Discriminating spatial concepts  -LN        User Key  (r) = Recorded By, (t) = Taken By, (c) = Cosigned By    Initials Name Provider Type    Nuzhat Worley Speech and Language Pathologist                      Time Calculation:       Time Calculation- SLP     Row Name 02/15/21 1517             Time Calculation- SLP    SLP Start Time  1230  -LN      SLP Stop Time  1330  -LN      SLP Time Calculation (min)  60 min  -LN      SLP Received On  02/15/21  -LN        User Key  (r) = Recorded By, (t) = Taken By, (c) = Cosigned By    Initials Name Provider Type    Nuzhat Worley Speech and Language Pathologist            Therapy Charges for Today     Code Description Service Date Service Provider Modifiers Qty    01318721209  ST DEV OF COGN SKILLS INITIAL 15 MIN 2/15/2021 Abiel  Nuzhat  1    [id number] [facility] DEV OF COGN SKILLS EACH ADDT'L 15 MIN 2/15/2021 Nuzhat Beebe  3                           Nuzhat Beebe  2/15/2021

## 2021-02-15 NOTE — PROGRESS NOTES
Adult Nutrition  Assessment/PES    Patient Name:  Liana Carrero  YOB: 1935  MRN: [id number]  Admit Date:  1/7/2021    Assessment Date:  2/15/2021    Reason for Assessment     Row Name 02/15/21 1143          Reason for Assessment    Reason For Assessment  follow-up protocol             Labs/Tests/Procedures/Meds     Row Name 02/15/21 1143          Labs/Procedures/Meds    Lab Results Reviewed  reviewed     Lab Results Comments  glu        Diagnostic Tests/Procedures    Diagnostic Test/Procedure Reviewed  reviewed         Physical Findings     Row Name 02/15/21 1143          Physical Findings    Overall Physical Appearance  hemiplegia;other (see comments)     Oral/Mouth Cavity  poor dentition     Skin  -- b 15           Nutrition Prescription Ordered     Row Name 02/15/21 1143          Nutrition Prescription PO    Current PO Diet  Regular     Supplement  Boost Plus (Ensure Enlive, Ensure Plus)     Common Modifiers  Cardiac;Low Fat;Low Sodium         Evaluation of Received Nutrient/Fluid Intake     Row Name 02/15/21 1143          PO Evaluation    Number of Days PO Intake Evaluated  3 days     % PO Intake  25-50               Problem/Interventions:          Intervention Goal     Row Name 02/15/21 1144          Intervention Goal    General  Maintain nutrition;Disease management/therapy     PO  PO intake (%)     PO Intake %  75 %     Weight  Maintain weight         Nutrition Intervention     Row Name 02/15/21 1144          Nutrition Intervention    RD/Tech Action  Follow Tx progress;Menu provided;Supplement provided           Education/Evaluation     Row Name 02/15/21 1144          Monitor/Evaluation    Monitor  Per protocol           Electronically signed by:  Angela Gonzalez RD  02/15/21 11:44 EST

## 2021-02-16 PROCEDURE — 97112 NEUROMUSCULAR REEDUCATION: CPT

## 2021-02-16 PROCEDURE — 97130 THER IVNTJ EA ADDL 15 MIN: CPT

## 2021-02-16 PROCEDURE — 63710000001 ONDANSETRON ODT 4 MG TABLET DISPERSIBLE: Performed by: INTERNAL MEDICINE

## 2021-02-16 PROCEDURE — 97110 THERAPEUTIC EXERCISES: CPT

## 2021-02-16 PROCEDURE — 97530 THERAPEUTIC ACTIVITIES: CPT

## 2021-02-16 PROCEDURE — 97129 THER IVNTJ 1ST 15 MIN: CPT

## 2021-02-16 PROCEDURE — 97535 SELF CARE MNGMENT TRAINING: CPT

## 2021-02-16 PROCEDURE — 25010000002 ENOXAPARIN PER 10 MG: Performed by: PHYSICAL MEDICINE & REHABILITATION

## 2021-02-16 RX ADMIN — MIRTAZAPINE 15 MG: 15 TABLET, FILM COATED ORAL at 20:29

## 2021-02-16 RX ADMIN — ASPIRIN 81 MG: 81 TABLET, CHEWABLE ORAL at 08:30

## 2021-02-16 RX ADMIN — ENOXAPARIN SODIUM 40 MG: 40 INJECTION SUBCUTANEOUS at 08:29

## 2021-02-16 RX ADMIN — LOSARTAN POTASSIUM 50 MG: 50 TABLET, FILM COATED ORAL at 08:32

## 2021-02-16 RX ADMIN — DOCUSATE SODIUM 200 MG: 100 CAPSULE, LIQUID FILLED ORAL at 08:30

## 2021-02-16 RX ADMIN — HYDROCODONE BITARTRATE AND ACETAMINOPHEN 1 TABLET: 5; 325 TABLET ORAL at 08:29

## 2021-02-16 RX ADMIN — LEVOTHYROXINE SODIUM 125 MCG: 0.12 TABLET ORAL at 06:27

## 2021-02-16 RX ADMIN — ONDANSETRON 4 MG: 4 TABLET, ORALLY DISINTEGRATING ORAL at 04:42

## 2021-02-16 RX ADMIN — DOCUSATE SODIUM 200 MG: 100 CAPSULE, LIQUID FILLED ORAL at 20:29

## 2021-02-16 RX ADMIN — LIDOCAINE 1 PATCH: 50 PATCH TOPICAL at 08:30

## 2021-02-16 RX ADMIN — OXYBUTYNIN CHLORIDE 5 MG: 5 TABLET ORAL at 08:33

## 2021-02-16 RX ADMIN — PANTOPRAZOLE SODIUM 40 MG: 40 TABLET, DELAYED RELEASE ORAL at 06:27

## 2021-02-16 RX ADMIN — ONDANSETRON 4 MG: 4 TABLET, ORALLY DISINTEGRATING ORAL at 17:58

## 2021-02-16 RX ADMIN — OXYBUTYNIN CHLORIDE 5 MG: 5 TABLET ORAL at 17:58

## 2021-02-16 RX ADMIN — FERROUS SULFATE TAB 325 MG (65 MG ELEMENTAL FE) 325 MG: 325 (65 FE) TAB at 08:29

## 2021-02-16 RX ADMIN — TRAMADOL HYDROCHLORIDE 50 MG: 50 TABLET, FILM COATED ORAL at 20:29

## 2021-02-16 RX ADMIN — HYDROCODONE BITARTRATE AND ACETAMINOPHEN 1 TABLET: 5; 325 TABLET ORAL at 17:58

## 2021-02-16 RX ADMIN — DILTIAZEM HYDROCHLORIDE 180 MG: 180 CAPSULE, COATED, EXTENDED RELEASE ORAL at 08:33

## 2021-02-16 RX ADMIN — DILTIAZEM HYDROCHLORIDE 180 MG: 180 CAPSULE, COATED, EXTENDED RELEASE ORAL at 20:29

## 2021-02-16 NOTE — THERAPY TREATMENT NOTE
Inpatient Rehabilitation - Occupational Therapy Treatment Note    [facility]     Patient Name: Liana Carrero  : 1935  MRN: [id number]    Today's Date: 2021                 Admit Date: 2021         ICD-10-CM ICD-9-CM   1. Impaired functional mobility, balance, gait, and endurance  Z74.09 V49.89       Patient Active Problem List   Diagnosis   • Stroke (cerebrum) (CMS/[facility])   • Chest pain   • Hypertensive urgency   • Headache   • History of embolic stroke   • Hypothyroidism   • Hypokalemia   • CAD in native artery   • URTI (acute upper respiratory infection)   • Closed fracture of left hip (CMS/[facility])   • Chronic diastolic (congestive) heart failure (CMS/[facility])   • Coronary artery disease involving autologous vein coronary bypass graft without angina pectoris   • Impaired glucose tolerance   • Moderate malnutrition (CMS/[facility])   • Stroke (CMS/[facility])       Past Medical History:   Diagnosis Date   • Arthritis    • CHF (congestive heart failure) (CMS/[facility])    • Coronary artery disease    • Diabetes mellitus (CMS/[facility])     new DX related to stroke, started on insulin at hospital   • Disease of thyroid gland    • Elevated cholesterol    • GERD (gastroesophageal reflux disease)    • History of transfusion    • Hypertension    • PONV (postoperative nausea and vomiting)    • Sleep apnea    • Spinal headache    • Stroke (CMS/[facility])        Past Surgical History:   Procedure Laterality Date   • APPENDECTOMY     • CARDIAC CATHETERIZATION       x2   • CARDIAC CATHETERIZATION N/A 2019    Procedure: Left Heart Cath;  Surgeon: Bentley Chapin MD;  Location: [facility] CATH INVASIVE LOCATION;  Service: Cardiovascular   • CARDIAC CATHETERIZATION N/A 2019    Procedure: Coronary angiography;  Surgeon: Bentley Chapin MD;  Location: [facility] CATH INVASIVE LOCATION;  Service: Cardiovascular   • CARDIAC CATHETERIZATION N/A 2019    Procedure: Native mammary injection;  Surgeon: Bentley Chapin MD;  Location: [facility]  CATH INVASIVE LOCATION;  Service: Cardiovascular   • CARDIAC CATHETERIZATION N/A 12/26/2019    Procedure: Saphenous Vein Graft;  Surgeon: Bentley Chapin MD;  Location: [facility] CATH INVASIVE LOCATION;  Service: Cardiovascular   • COLON SURGERY     • COLONOSCOPY     • EYE SURGERY      lens implants and cataract surgery   • FEMUR IM NAILING/RODDING Left 3/26/2020    Procedure: LT.HIP NAILING/RODDING;  Surgeon: Carlos Khan II, MD;  Location: [facility] MAIN OR;  Service: Orthopedics;  Laterality: Left;   • FRACTURE SURGERY Left 2020   • HYSTERECTOMY     • VASCULAR SURGERY      varicous veins                IRF OT ASSESSMENT FLOWSHEET (last 12 hours)      IRF OT Evaluation and Treatment     Row Name 02/16/21 1536 02/16/21 1245       OT Time and Intention    Document Type  daily treatment  -CC  daily treatment  -CC    Mode of Treatment  occupational therapy  -CC  occupational therapy  -CC    Patient Effort  adequate  -CC  poor  -CC    Symptoms Noted During/After Treatment  none  -CC  --    Comment, Evaluation/Treatment Not Performed  --  pt npt feeling well. Agreeable to groming bedside  -    Row Name 02/16/21 1536          Vision Assessment/Intervention    Visual Processing Deficit  jon-inattention/neglect, left  -CC     Row Name 02/16/21 1245          Pain Scale: Numbers Pre/Post-Treatment    Pretreatment Pain Rating  0/10 - no pain  -     Posttreatment Pain Rating  0/10 - no pain  -     Row Name 02/16/21 1536          Shoulder (Therapeutic Exercise)    Shoulder (Therapeutic Exercise)  PROM (passive range of motion)  -     Shoulder PROM (Therapeutic Exercise)  left;flexion;extension;aBduction;aDduction;external rotation;internal rotation;horizontal aBduction/aDduction;scapular elevation;scapular protraction;sitting;5 repetitions;10 second hold  -     Row Name 02/16/21 1536          Elbow/Forearm (Therapeutic Exercise)    Elbow/Forearm (Therapeutic Exercise)  PROM (passive range of motion)  -      Elbow/Forearm PROM (Therapeutic Exercise)  left;flexion;extension;supination;pronation;sitting;10 repetitions  -     Row Name 02/16/21 1536          Wrist (Therapeutic Exercise)    Wrist (Therapeutic Exercise)  PROM (passive range of motion)  -     Wrist PROM (Therapeutic Exercise)  left;flexion;extension;5 repetitions  -     Row Name 02/16/21 1536          Hand (Therapeutic Exercise)    Hand (Therapeutic Exercise)  PROM (passive range of motion)  -     Hand PROM (Therapeutic Exercise)  left;finger flexion;finger extension;finger aBduction;finger aDduction;thumb opposition;thumb flexion;thumb extension;5 repetitions  -     Row Name 02/16/21 1536          Glenohumeral Joint Subluxation    Interventions, Glenohumeral Joint Subluxation  -- kinesotape in place. BRIAN positoined on arm support new w/c  -     Row Name 02/16/21 1536          Neuromuscular Re-education    Interventions (Neuromuscular Re-education)  facilitation/inhibition;weight bearing  -     Positioning (Neuromuscular Re-education)  sitting;supported  -     Row Name 02/16/21 1245          Grooming    [place] Level (Grooming)  grooming skills;deodorant application;hair care, combing/brushing;oral care regimen;wash face, hands;standby assist;minimum assist (75% patient effort)  -     Position (Grooming)  sitting up in bed  -     Set-up Assistance (Grooming)  obtain supplies  -     Comment (Grooming)  assist w dentures  -     Row Name 02/16/21 1536          Positioning and Restraints    Pre-Treatment Position  sitting in chair/recliner  -     Post Treatment Position  wheelchair  -     In Wheelchair  sitting;call light within reach;encouraged to call for assist;exit alarm on  -       User Key  (r) = Recorded By, (t) = Taken By, (c) = Cosigned By    Initials Name Effective Dates    CC Keya Miranda OTR 06/08/18 -            Occupational Therapy Education                 Title: PT OT SLP Therapies (Done)     Topic:  Occupational Therapy (Done)     Point: ADL training (Done)     Description:   Instruct learner(s) on proper safety adaptation and remediation techniques during self care or transfers.   Instruct in proper use of assistive devices.              Learning Progress Summary           Patient Acceptance, E, VU by CC at 2/10/2021 1546    Comment: Family conf w pt, dgt and son in law. Status, DME, follow up theray and home needs discussed. Pt will need a droparm commode and possibly shower seat. Will provide info on jon harness for LUE. Family scheduled for teaching Monday.    Acceptance, E, VU by SL at 2/10/2021 1504    Comment: family conference- discussed need for visual and verbal cues for visual perceptual deficits, and deficts in attn, memory    Acceptance, E,TB,D, VU by CC at 1/29/2021 1509    Comment: Pt repositioned in reg w/c with 1/2 arm tray on Left. Nursing notified of change from recliner w/c/. Commode tsf from w/c to droparm commode discussed and shown to NA.    Acceptance, E, VU by  at 1/8/2021 1434   Family Acceptance, E, VU by CC at 2/10/2021 1546    Comment: Family conf w pt, dgt and son in law. Status, DME, follow up theray and home needs discussed. Pt will need a droparm commode and possibly shower seat. Will provide info on jon harness for LUE. Family scheduled for teaching Monday.    Acceptance, E, VU by  at 2/10/2021 1504    Comment: family conference- discussed need for visual and verbal cues for visual perceptual deficits, and deficts in attn, memory   Caregiver Acceptance, E,TB,D, VU by CC at 1/29/2021 1509    Comment: Pt repositioned in reg w/c with 1/2 arm tray on Left. Nursing notified of change from recliner w/c/. Commode tsf from w/c to droparm commode discussed and shown to NA.                   Point: Home exercise program (Done)     Description:   Instruct learner(s) on appropriate technique for monitoring, assisting and/or progressing therapeutic exercises/activities.               Learning Progress Summary           Patient Acceptance, E, VU by CC at 2/10/2021 1546    Comment: Family conf w pt, dgt and son in law. Status, DME, follow up theray and home needs discussed. Pt will need a droparm commode and possibly shower seat. Will provide info on jon harness for LUE. Family scheduled for teaching Monday.    Acceptance, E, VU by SL at 2/10/2021 1504    Comment: family conference- discussed need for visual and verbal cues for visual perceptual deficits, and deficts in attn, memory    Acceptance, E, VU by BL at 1/8/2021 1434   Family Acceptance, E, VU by CC at 2/10/2021 1546    Comment: Family conf w pt, dgt and son in law. Status, DME, follow up theray and home needs discussed. Pt will need a droparm commode and possibly shower seat. Will provide info on jon harness for LUE. Family scheduled for teaching Monday.    Acceptance, E, VU by  at 2/10/2021 1504    Comment: family conference- discussed need for visual and verbal cues for visual perceptual deficits, and deficts in attn, memory                   Point: Precautions (Done)     Description:   Instruct learner(s) on prescribed precautions during self-care and functional transfers.              Learning Progress Summary           Patient Acceptance, E, VU by CC at 2/10/2021 1546    Comment: Family conf w pt, dgt and son in law. Status, DME, follow up theray and home needs discussed. Pt will need a droparm commode and possibly shower seat. Will provide info on jon harness for LUE. Family scheduled for teaching Monday.    Acceptance, E, VU by SL at 2/10/2021 1504    Comment: family conference- discussed need for visual and verbal cues for visual perceptual deficits, and deficts in attn, memory    Acceptance, E, VU by BL at 1/8/2021 1434   Family Acceptance, E, VU by CC at 2/10/2021 1546    Comment: Family conf w pt, dgt and son in law. Status, DME, follow up theray and home needs discussed. Pt will need a droparm commode and possibly shower  seat. Will provide info on jon harness for LUE. Family scheduled for teaching Monday.    Acceptance, E, VU by SL at 2/10/2021 1504    Comment: family conference- discussed need for visual and verbal cues for visual perceptual deficits, and deficts in attn, memory                   Point: Body mechanics (Done)     Description:   Instruct learner(s) on proper positioning and spine alignment during self-care, functional mobility activities and/or exercises.              Learning Progress Summary           Patient Acceptance, E, VU by CC at 2/10/2021 1546    Comment: Family conf w pt, dgt and son in law. Status, DME, follow up theray and home needs discussed. Pt will need a droparm commode and possibly shower seat. Will provide info on jon harness for LUE. Family scheduled for teaching Monday.    Acceptance, E, VU by SL at 2/10/2021 1504    Comment: family conference- discussed need for visual and verbal cues for visual perceptual deficits, and deficts in attn, memory    Acceptance, E,TB,D, VU by CC at 1/29/2021 1509    Comment: Pt repositioned in reg w/c with 1/2 arm tray on Left. Nursing notified of change from recliner w/c/. Commode tsf from w/c to droparm commode discussed and shown to NA.    Acceptance, E, VU by BL at 1/8/2021 1434   Family Acceptance, E, VU by CC at 2/10/2021 1546    Comment: Family conf w pt, dgt and son in law. Status, DME, follow up theray and home needs discussed. Pt will need a droparm commode and possibly shower seat. Will provide info on jon harness for LUE. Family scheduled for teaching Monday.    Acceptance, E, VU by SL at 2/10/2021 1504    Comment: family conference- discussed need for visual and verbal cues for visual perceptual deficits, and deficts in attn, memory   Caregiver Acceptance, E,TB,D, VU by CC at 1/29/2021 1509    Comment: Pt repositioned in reg w/c with 1/2 arm tray on Left. Nursing notified of change from recliner w/c/. Commode tsf from w/c to droparm commode discussed  and shown to NA.                               User Key     Initials Effective Dates Name Provider Type Discipline    CC 06/08/18 -  Keya Miranda OTR Occupational Therapist OT    SL 06/08/18 -  Katelyn James MS CCC-SLP Speech and Language Pathologist SLP    BL 01/05/21 -  Marshall Oleary, VICKI Occupational Therapist OT                    OT Recommendation and Plan                         Time Calculation:     Time Calculation- OT     Row Name 02/16/21 1400 02/16/21 1000          Time Calculation- OT    OT Start Time  1400  -CC  1000  -CC     OT Stop Time  1430  -CC  1015  -CC     OT Time Calculation (min)  30 min  -CC  15 min  -CC       User Key  (r) = Recorded By, (t) = Taken By, (c) = Cosigned By    Initials Name Provider Type    CC Keya Miranda OTR Occupational Therapist        Therapy Charges for Today     Code Description Service Date Service Provider Modifiers Qty    93063338362 HC OT THER SUPP EA 15 MIN 2/15/2021 Keya Miranda OTR GO 4    [id number] HC OT NEUROMUSC RE EDUCATION EA 15 MIN 2/15/2021 Keya Miranda OTR GO 2    [id number] HC OT SELF CARE/MGMT/TRAIN EA 15 MIN 2/15/2021 Keya Miranda OTR GO 2    [id number] HC OT SELF CARE/MGMT/TRAIN EA 15 MIN 2/16/2021 Keya Miranda OTR GO 1    [id number] HC OT NEUROMUSC RE EDUCATION EA 15 MIN 2/16/2021 Keya Miranda OTR GO 2                   GIAN Blackwell  2/16/2021

## 2021-02-16 NOTE — THERAPY TREATMENT NOTE
Inpatient Rehabilitation - Physical Therapy Treatment Note       [facility]     Patient Name: Liana Carrero  : 1935  MRN: [id number]    Today's Date: 2021                    Admit Date: 2021      Visit Dx:     ICD-10-CM ICD-9-CM   1. Impaired functional mobility, balance, gait, and endurance  Z74.09 V49.89       Patient Active Problem List   Diagnosis   • Stroke (cerebrum) (CMS/[facility])   • Chest pain   • Hypertensive urgency   • Headache   • History of embolic stroke   • Hypothyroidism   • Hypokalemia   • CAD in native artery   • URTI (acute upper respiratory infection)   • Closed fracture of left hip (CMS/[facility])   • Chronic diastolic (congestive) heart failure (CMS/[facility])   • Coronary artery disease involving autologous vein coronary bypass graft without angina pectoris   • Impaired glucose tolerance   • Moderate malnutrition (CMS/[facility])   • Stroke (CMS/[facility])       Past Medical History:   Diagnosis Date   • Arthritis    • CHF (congestive heart failure) (CMS/[facility])    • Coronary artery disease    • Diabetes mellitus (CMS/[facility])     new DX related to stroke, started on insulin at hospital   • Disease of thyroid gland    • Elevated cholesterol    • GERD (gastroesophageal reflux disease)    • History of transfusion    • Hypertension    • PONV (postoperative nausea and vomiting)    • Sleep apnea    • Spinal headache    • Stroke (CMS/[facility])        Past Surgical History:   Procedure Laterality Date   • APPENDECTOMY     • CARDIAC CATHETERIZATION       x2   • CARDIAC CATHETERIZATION N/A 2019    Procedure: Left Heart Cath;  Surgeon: Bentley Chaipn MD;  Location:  [facility] CATH [facility]VASIVE [facility];  Service: Cardiovascular   • CARDIAC CATHETERIZATION N/A 2019    Procedure: Coronary angiography;  Surgeon: Bentley Chapin MD;  Location:  [facility];  Service: Cardiovascular   • CARDIAC CATHETERIZATION N/A 2019    Procedure: Native mammary injection;  Surgeon: Bentley Chapin MD;   Location:  [facility]ATH [facility]VAS[facility];  Service: Cardiovascular   • CARDIAC CATHETERIZATION N/A 12/26/2019    Procedure: Saphenous Vein Graft;  Surgeon: Bentley Chapin MD;  Location: [facility] CATH INVASIVE LOCATION;  Service: Cardiovascular   • COLON SURGERY     • COLONOSCOPY     • EYE SURGERY      lens implants and cataract surgery   • FEMUR IM NAILING/RODDING Left 3/26/2020    Procedure: LT.HIP NAILING/RODDING;  Surgeon: Carlos Khan II, MD;  Location: [facility] MAIN OR;  Service: Orthopedics;  Laterality: Left;   • FRACTURE SURGERY Left 2020   • HYSTERECTOMY     • VASCULAR SURGERY      varicous veins          PT ASSESSMENT (last 12 hours)      IRF PT Evaluation and Treatment     Row Name 02/16/21 1418          PT Time and Intention    Document Type  daily treatment  -     Mode of Treatment  physical therapy  -     Patient/Family/Caregiver Comments/Observations  pt supine in bed no acute distress, pt reports feeling better  -     Total Minutes, Physical Therapy  30  -     Row Name 02/16/21 1418          General Information    Limitations/Impairments  safety/cognitive  -     Row Name 02/16/21 1418          Home Use of Assistive/Adaptive Equipment    Equipment Currently Used at Home  -- WC and cushion delivered from [employer] this AM  -     Row Name 02/16/21 1418          Vision Assessment/Intervention    Visual Impairment/Limitations  visual/perceptual impairments present  -     Visual Processing Deficit  jon-inattention/neglect, left  -     Row Name 02/16/21 1418          Cognition/Psychosocial    Orientation Status (Cognition)  oriented x 3  -     Follows Commands (Cognition)  follows two-step commands;repetition of directions required;verbal cues/prompting required  -     Personal Safety Interventions  fall prevention program maintained;gait belt;supervised activity  -     Cognitive Function (Cognitive)  attention deficit  -     Attention Deficit (Cognitive)  distractible in  noisy environment;divided attention  -     Executive Function Deficit (Cognition)  insight/awareness of deficits  -     Safety Deficit (Cognitive)  insight into deficits/self-awareness;judgment  -     Row Name 02/16/21 1418          Pain Scale: Numbers Pre/Post-Treatment    Pretreatment Pain Rating  0/10 - no pain  -     Posttreatment Pain Rating  0/10 - no pain  -     Row Name 02/16/21 1418          Bed Mobility    Supine-Sit Addison (Bed Mobility)  maximum assist (25% patient effort);verbal cues;nonverbal cues (demo/gesture)  -     Sit-Supine Addison (Bed Mobility)  not tested  -     Bed Mobility, Safety Issues  decreased use of arms for pushing/pulling;decreased use of legs for bridging/pushing  -     Assistive Device (Bed Mobility)  bed rails;head of bed elevated  -[facility] Name 02/16/21 1418          Transfers    Bed-Chair Addison (Transfers)  maximum assist (25% patient effort);2 person assist;verbal cues;nonverbal cues (demo/gesture)  -     Assistive Device (Bed-Chair Transfers)  wheelchair  -[facility] Name 02/16/21 1418          Wheelchair Mobility/Management    Mobility Activities (Wheelchair)  forward propulsion  -     Forward Propulsion Addison (Wheelchair)  moderate assist (50% patient effort);verbal cues  -     Steering Addison (Wheelchair)  moderate assist (50% patient effort);verbal cues  -     Distance Propelled in Feet (Wheelchair)  30  -     Comment, Wheelchair Mobility  WC and cushion delivered from [place]. pt fitted for manual WC, good fitting, LUE arm rest adjusted to pt's size/improved positiong  -     Armrest Management Addison (Wheelchair)  dependent (less than 25% patient effort)  -     Front Rigging/Swing-away Footrest Management Addison (Wheelchair)  dependent (less than 25% patient effort)  -     Wheel Locks/Brakes Management Addison (Wheelchair)  dependent (less than 25% patient effort)  -     Comment, Parts  Management (Wheelchair)  poor attention, neglect to L  -[facility] Name 02/16/21 1418          Safety Issues, Functional Mobility    Safety Issues Affecting Function (Mobility)  insight into deficits/self-awareness  -     Impairments Affecting Function (Mobility)  balance;cognition;coordination;endurance/activity tolerance;postural/trunk control;strength  -     Cognitive Impairments, Mobility Safety/Performance  insight into deficits/self-awareness  -     Row Name 02/16/21 1418          Balance    Static Sitting Balance  mild impairment;supported;sitting, edge of bed  -     Row Name 02/16/21 1418          Knee (Therapeutic Exercise)    Knee (Therapeutic Exercise)  PROM (passive range of motion)  -     Knee PROM (Therapeutic Exercise)  left;sitting;10 repetitions  -[facility] Name 02/16/21 1418          Ankle (Therapeutic Exercise)    Ankle (Therapeutic Exercise)  PROM (passive range of motion)  -     Ankle PROM (Therapeutic Exercise)  left;dorsiflexion;plantarflexion;sitting;10 repetitions  -[facility] Name 02/16/21 1418          Positioning and Restraints    Pre-Treatment Position  in bed  -     Post Treatment Position  wheelchair  -     In Wheelchair  sitting;call light within reach;encouraged to call for assist;exit alarm on;notified [facility]       User Key  (r) = Recorded By, (t) = Taken By, (c) = Cosigned By    Initials Name Provider Type     Elba Myrick, PT Physical Therapist        Wound 01/07/21 Left upper chest Incision (Active)   Dressing Appearance dry;intact 02/15/21 2006   Drainage Amount none 02/16/21 0829   Dressing Care open to air 02/16/21 0829     Physical Therapy Education                 Title: PT OT SLP Therapies (Done)     Topic: Physical Therapy (Done)     Point: Mobility training (Done)     Learning Progress Summary           Patient Acceptance, D,TB,E, VU,NR by  at 2/16/2021 1424    Comment: WC propulsion and management    Acceptance, E,TB, VU,NR by MS at 2/15/2021 1253     Acceptance, D,E, NR by KP at 2/13/2021 0828    Comment: w/c propulsion    Acceptance, E,TB, VU,NR by MS at 2/12/2021 1507    Acceptance, E,D, NR by KP at 2/11/2021 1120    Comment: standing posture and gait sequence    Acceptance, E, VU by SL at 2/10/2021 1504    Comment: family conference- discussed need for visual and verbal cues for visual perceptual deficits, and deficts in attn, memory    Acceptance, E,TB, VU,NR by MS at 2/10/2021 1401    Acceptance, E,TB, VU,NR by MS at 2/9/2021 1531    Eager, E,D, NR by KP at 2/9/2021 1119    Comment: standing posture, balance    Acceptance, E,TB, VU,NR by MS at 2/8/2021 1152    Acceptance, E,D, DU,NR by LB at 2/6/2021 1539    Comment: wt shift in standing    Acceptance, E,TB, VU,NR by MS at 2/5/2021 1208    Acceptance, E,TB, VU,NR by MS at 2/4/2021 1518    Acceptance, E,TB, VU,NR by MS at 2/3/2021 1151    Acceptance, E,TB, VU,NR by MS at 2/2/2021 1210    Acceptance, E,TB, NR by MS at 2/1/2021 1145    Acceptance, E, NR by  at 1/30/2021 1336    Acceptance, E,TB, VU,NR by MS at 1/29/2021 1535    Acceptance, E, NR by LB1 at 1/28/2021 1032    Acceptance, E,TB, VU,NR by MS at 1/27/2021 1447    Acceptance, E,TB, VU,NR by MS at 1/26/2021 0959    Acceptance, E,TB, VU,NR by MS at 1/25/2021 1316    Acceptance, E,TB, VU,NR by MS at 1/22/2021 0918    Acceptance, E,TB, VU by MS at 1/20/2021 1544    Acceptance, E,TB, VU,NR by MS at 1/19/2021 1401    Acceptance, E,TB, NR,NL by MS at 1/18/2021 1432    Acceptance, E,TB, VU,NR by IVAN at 1/16/2021 1444    Acceptance, E,TB, NR,NL by MS at 1/15/2021 1418    Acceptance, E,TB, NR by MS at 1/14/2021 1223    Acceptance, E,TB, NR by LB2 at 1/13/2021 1030    Comment: sitting balance    Acceptance, E,TB, NR by MS at 1/12/2021 1312    Acceptance, E,TB, VU,NR by MS at 1/11/2021 1140   Family Acceptance, E, VU by SL at 2/10/2021 1504    Comment: family conference- discussed need for visual and verbal cues for visual perceptual deficits, and deficts in  attn, memory                   Point: Home exercise program (Done)     Learning Progress Summary           Patient Acceptance, E,TB, VU,NR by MS at 2/12/2021 1507    Acceptance, E, VU by SL at 2/10/2021 1504    Comment: family conference- discussed need for visual and verbal cues for visual perceptual deficits, and deficts in attn, memory    Acceptance, E,TB, VU,NR by MS at 2/10/2021 1401    Acceptance, E,TB, VU,NR by MS at 2/9/2021 1531    Acceptance, E,TB, VU,NR by MS at 2/8/2021 1152    Acceptance, E,TB, VU,NR by MS at 2/5/2021 1208    Acceptance, E,TB, VU,NR by MS at 2/4/2021 1518    Acceptance, E,TB, VU,NR by MS at 2/3/2021 1151    Acceptance, E,TB, VU,NR by MS at 2/2/2021 1210    Acceptance, E,TB, NR by MS at 2/1/2021 1145    Acceptance, E, NR by LH at 1/30/2021 1336    Acceptance, E,TB, VU,NR by MS at 1/29/2021 1535    Acceptance, E,TB, VU,NR by MS at 1/27/2021 1447    Acceptance, E,TB, VU,NR by MS at 1/26/2021 0959    Acceptance, E,TB, VU,NR by MS at 1/25/2021 1316    Acceptance, E,TB, VU,NR by MS at 1/22/2021 0918    Acceptance, E,TB, VU by MS at 1/20/2021 1544    Acceptance, E,TB, VU,NR by MS at 1/19/2021 1401    Acceptance, E,TB, NR,NL by MS at 1/18/2021 1432    Acceptance, E,TB, NR,NL by MS at 1/15/2021 1418    Acceptance, E,TB, NR by MS at 1/14/2021 1223   Family Acceptance, E, VU by SL at 2/10/2021 1504    Comment: family conference- discussed need for visual and verbal cues for visual perceptual deficits, and deficts in attn, memory                   Point: Body mechanics (Done)     Learning Progress Summary           Patient Acceptance, E,TB, VU,NR by MS at 2/15/2021 1253    Acceptance, E,TB, VU,NR by MS at 2/12/2021 1507    Acceptance, E, VU by SL at 2/10/2021 1504    Comment: family conference- discussed need for visual and verbal cues for visual perceptual deficits, and deficts in attn, memory    Acceptance, E,TB, VU,NR by MS at 2/10/2021 1401    Acceptance, E,TB, VU,NR by MS at 2/9/2021 1531     Acceptance, E,TB, VU,NR by MS at 2/8/2021 1152    Acceptance, E,TB, VU,NR by MS at 2/5/2021 1208    Acceptance, E,TB, VU,NR by MS at 2/4/2021 1518    Acceptance, E,TB, VU,NR by MS at 2/3/2021 1151    Acceptance, E,TB, VU,NR by MS at 2/2/2021 1210    Acceptance, E,TB, NR by MS at 2/1/2021 1145    Acceptance, E, NR by LH at 1/30/2021 1336    Acceptance, E,TB, VU,NR by MS at 1/29/2021 1535    Acceptance, E,TB, VU,NR by MS at 1/27/2021 1447    Acceptance, E,TB, VU,NR by MS at 1/26/2021 0959    Acceptance, E,TB, VU,NR by MS at 1/25/2021 1316    Acceptance, E,TB, VU,NR by MS at 1/22/2021 0918    Acceptance, E,TB, VU by MS at 1/20/2021 1544    Acceptance, E,TB, VU,NR by MS at 1/19/2021 1401    Acceptance, E,TB, NR,NL by MS at 1/15/2021 1418    Acceptance, E,TB, NR by MS at 1/14/2021 1223    Acceptance, E,TB, NR by MS at 1/12/2021 1312    Acceptance, E,TB, VU,NR by MS at 1/11/2021 1140   Family Acceptance, E, VU by SL at 2/10/2021 1504    Comment: family conference- discussed need for visual and verbal cues for visual perceptual deficits, and deficts in attn, memory                   Point: Precautions (Done)     Learning Progress Summary           Patient Acceptance, E,TB, VU,NR by MS at 2/15/2021 1253    Acceptance, E,TB, VU,NR by MS at 2/12/2021 1507    Acceptance, E, VU by SL at 2/10/2021 1504    Comment: family conference- discussed need for visual and verbal cues for visual perceptual deficits, and deficts in attn, memory    Acceptance, E,TB, VU,NR by MS at 2/10/2021 1401    Acceptance, E,TB, VU,NR by MS at 2/9/2021 1531    Acceptance, E,TB, VU,NR by MS at 2/8/2021 1152    Acceptance, E,TB, VU,NR by MS at 2/5/2021 1208    Acceptance, E,TB, VU,NR by MS at 2/4/2021 1518    Acceptance, E,TB, VU,NR by MS at 2/3/2021 1151    Acceptance, E,TB, VU,NR by MS at 2/2/2021 1210    Acceptance, E,TB, NR by MS at 2/1/2021 1145    Acceptance, E, NR by LH at 1/30/2021 1336    Acceptance, E,TB, VU,NR by MS at 1/29/2021 1535     Acceptance, E,TB, VU,NR by MS at 1/27/2021 1447    Acceptance, E,TB, VU,NR by MS at 1/26/2021 0959    Acceptance, E,TB, VU,NR by MS at 1/25/2021 1316    Acceptance, E,TB, VU,NR by MS at 1/22/2021 0918    Acceptance, E, VU by MD at 1/21/2021 0926    Acceptance, E,TB, VU by MS at 1/20/2021 1544    Acceptance, E,TB, VU,NR by MS at 1/19/2021 1401    Acceptance, E,TB, NR,NL by MS at 1/15/2021 1418    Acceptance, E,TB, NR by MS at 1/14/2021 1223    Acceptance, E,TB, NR by MS at 1/12/2021 1312    Acceptance, E,TB, VU,NR by MS at 1/11/2021 1140    Acceptance, E, VU by MD at 1/9/2021 0933   Family Acceptance, E, VU by  at 2/10/2021 1504    Comment: family conference- discussed need for visual and verbal cues for visual perceptual deficits, and deficts in attn, memory                               User Key     Initials Effective Dates Name Provider Type Discipline    LB 06/08/18 -  Elba Zarate, PT Physical Therapist PT    SL 06/08/18 -  Katelyn James, MS CCC-SLP Speech and Language Pathologist SLP    IVAN 06/08/18 -  Ela Pritchett, PT Physical Therapist PT    LB2 04/03/18 -  Jazmin Schuster, PT Physical Therapist PT     04/03/18 -  Elba Myrick, PT Physical Therapist PT    LB1 03/07/18 -  Elba Grigsby, PTA Physical Therapy Assistant PT    MD 04/03/18 -  Lisandra Lowery, PT Physical Therapist PT     04/03/18 -  Dixie Graham, PT Physical Therapist PT    MS 03/04/19 -  Nati Kaba, PT Physical Therapist PT                PT Recommendation and Plan                          Time Calculation:     PT Charges     Row Name 02/16/21 1424 02/16/21 1037          Time Calculation    Start Time  1300  -  --     Stop Time  1330  -  --     Time Calculation (min)  30 min  -  --     PT Received On  02/16/21  -  --     PT - Next Appointment  02/17/21  -  02/16/21  -       User Key  (r) = Recorded By, (t) = Taken By, (c) = Cosigned By    Initials Name Provider Type     Elba Myrick, PT Physical Therapist           Therapy Charges for Today     Code Description Service Date Service Provider Modifiers Qty    87628026750  PT THER PROC EA 15 MIN 2/16/2021 Elba Myrick, PT GP 1    [id number]  PT THERAPEUTIC ACT EA 15 MIN 2/16/2021 Elba Myrick, PT GP 1             .Patient was wearing a face mask during this therapy encounter. Therapist used appropriate personal protective equipment including eye protection, mask, and gloves.  Mask used was standard procedure mask. Appropriate PPE was worn during the entire therapy session. Hand hygiene was completed before and after therapy session. Patient is not in enhanced droplet precautions.           Elba Myrick, PT  2/16/2021

## 2021-02-16 NOTE — PROGRESS NOTES
Inpatient Rehabilitation Plan of Care Note    Plan of Care  Care Plan Reviewed - Updates as Follows    Body Systems    [RN] Respiratory(Active)  Current Status(02/16/2021): O2 2L QHS  Weekly Goal(02/16/2021): Same as discharge  Discharge Goal: Least restrictive amount of 02    Performed Intervention(s)  O2 2L/NC @ HS  assess lungs, sats every shift      Psychosocial    [RN] Coping/Adjustment(Active)  Current Status(02/16/2021): Patient has supportive family, but at risk of  ineffective coping regarding current situation.  Weekly Goal(02/16/2021): Allow patient the opportunity to discuss concerns  regarding new situation.  Discharge Goal: Patient will have healthy coping mechanisms at time of discharge    Performed Intervention(s)  Verbalize needs and concerns  Medications as needed/ ordered  Therapeutic environmental set up      Safety    [RN] Potential for Injury(Active)  Current Status(02/16/2021): Patient at risk of injury related to previous falls.  flaccid on left side. dependent with transfers of 2.  Weekly Goal(02/16/2021): Paitent will use call light appropriately while in  Rehab.  Discharge Goal: Patient will continue to use call light while in Rehab  environment.    Performed Intervention(s)  Items with in reach and environmental set up  Bed alarm/ Chair alarm  Safety rounds and falls protocols/ precautions      Sphincter Control    [RN] Bladder Management(Active)  Current Status(02/16/2021): Patient is continent and incontinent, using bedpan,  PVRs, requiring I/C at times  Weekly Goal(02/16/2021): Patient will be 50% continent  Discharge Goal: Patient will be 100% continent    [RN] Bowel Management(Active)  Current Status(02/16/2021): Patient continent and incontinent  Weekly Goal(02/16/2021): continent 50% of the time  Discharge Goal: Patient will continent 100% of the time    Performed Intervention(s)  Proper diet and fluid intake  Medication as ordered  Incontinent care if needed  Monitor intake and  output    Signed by: Mackenzie Alberts RN

## 2021-02-16 NOTE — SIGNIFICANT NOTE
02/16/21 1318   OTHER   Discipline speech language pathologist   Rehab Time/Intention   Session Not Performed patient/family declined, not feeling well;other (see comments)  (Pt refused am session due to not feeling well. Will try in pm.)

## 2021-02-16 NOTE — THERAPY TREATMENT NOTE
Inpatient Rehabilitation - Occupational Therapy Treatment Note    [facility]     Patient Name: Liana Carrero  : 1935  MRN: [id number]    Today's Date: 2021                 Admit Date: 2021         ICD-10-CM ICD-9-CM   1. Impaired functional mobility, balance, gait, and endurance  Z74.09 V49.89       Patient Active Problem List   Diagnosis   • Stroke (cerebrum) (CMS/[facility])   • Chest pain   • Hypertensive urgency   • Headache   • History of embolic stroke   • Hypothyroidism   • Hypokalemia   • CAD in native artery   • URTI (acute upper respiratory infection)   • Closed fracture of left hip (CMS/[facility])   • Chronic diastolic (congestive) heart failure (CMS/[facility])   • Coronary artery disease involving autologous vein coronary bypass graft without angina pectoris   • Impaired glucose tolerance   • Moderate malnutrition (CMS/[facility])   • Stroke (CMS/[facility])       Past Medical History:   Diagnosis Date   • Arthritis    • CHF (congestive heart failure) (CMS/[facility])    • Coronary artery disease    • Diabetes mellitus (CMS/[facility])     new DX related to stroke, started on insulin at hospital   • Disease of thyroid gland    • Elevated cholesterol    • GERD (gastroesophageal reflux disease)    • History of transfusion    • Hypertension    • PONV (postoperative nausea and vomiting)    • Sleep apnea    • Spinal headache    • Stroke (CMS/[facility])        Past Surgical History:   Procedure Laterality Date   • APPENDECTOMY     • CARDIAC CATHETERIZATION       x2   • CARDIAC CATHETERIZATION N/A 2019    Procedure: Left Heart Cath;  Surgeon: Bentley Chapin MD;  Location: [facility] CATH INVASIVE LOCATION;  Service: Cardiovascular   • CARDIAC CATHETERIZATION N/A 2019    Procedure: Coronary angiography;  Surgeon: Bentley Chapin MD;  Location: [facility] CATH INVASIVE LOCATION;  Service: Cardiovascular   • CARDIAC CATHETERIZATION N/A 2019    Procedure: Native mammary injection;  Surgeon: Bentley Chapin MD;  Location: [facility]  CATH INVASIVE LOCATION;  Service: Cardiovascular   • CARDIAC CATHETERIZATION N/A 12/26/2019    Procedure: Saphenous Vein Graft;  Surgeon: Bentley Chapin MD;  Location: [facility] CATH INVASIVE LOCATION;  Service: Cardiovascular   • COLON SURGERY     • COLONOSCOPY     • EYE SURGERY      lens implants and cataract surgery   • FEMUR IM NAILING/RODDING Left 3/26/2020    Procedure: LT.HIP NAILING/RODDING;  Surgeon: Carlos Khan II, MD;  Location: [facility] MAIN OR;  Service: Orthopedics;  Laterality: Left;   • FRACTURE SURGERY Left 2020   • HYSTERECTOMY     • VASCULAR SURGERY      varicous veins                IRF OT ASSESSMENT FLOWSHEET (last 12 hours)      IRF OT Evaluation and Treatment     Row Name 02/16/21 1245          OT Time and Intention    Document Type  daily treatment  -CC     Mode of Treatment  occupational therapy  -CC     Patient Effort  poor  -CC     Comment, Evaluation/Treatment Not Performed  pt npt feeling well. Agreeable to groming bedside  -CC     Row Name 02/16/21 1245          Pain Scale: Numbers Pre/Post-Treatment    Pretreatment Pain Rating  0/10 - no pain  -CC     Posttreatment Pain Rating  0/10 - no pain  -CC     Row Name 02/16/21 1245          Grooming    [place] Level (Grooming)  grooming skills;deodorant application;hair care, combing/brushing;oral care regimen;wash face, hands;standby assist;minimum assist (75% patient effort)  -CC     Position (Grooming)  sitting up in bed  -CC     Set-up Assistance (Grooming)  obtain supplies  -CC     Comment (Grooming)  assist w dentures  -CC       User Key  (r) = Recorded By, (t) = Taken By, (c) = Cosigned By    Initials Name Effective Dates    CC Keya Miranda, VICKIR 06/08/18 -            Occupational Therapy Education                 Title: PT OT SLP Therapies (Done)     Topic: Occupational Therapy (Done)     Point: ADL training (Done)     Description:   Instruct learner(s) on proper safety adaptation and remediation techniques during  self care or transfers.   Instruct in proper use of assistive devices.              Learning Progress Summary           Patient Acceptance, E, VU by CC at 2/10/2021 1546    Comment: Family conf w pt, dgt and son in law. Status, DME, follow up theray and home needs discussed. Pt will need a droparm commode and possibly shower seat. Will provide info on jon harness for LUE. Family scheduled for teaching Monday.    Acceptance, E, VU by SL at 2/10/2021 1504    Comment: family conference- discussed need for visual and verbal cues for visual perceptual deficits, and deficts in attn, memory    Acceptance, E,TB,D, VU by CC at 1/29/2021 1509    Comment: Pt repositioned in reg w/c with 1/2 arm tray on Left. Nursing notified of change from recliner w/c/. Commode tsf from w/c to droparm commode discussed and shown to NA.    Acceptance, E, VU by  at 1/8/2021 1434   Family Acceptance, E, VU by CC at 2/10/2021 1546    Comment: Family conf w pt, dgt and son in law. Status, DME, follow up theray and home needs discussed. Pt will need a droparm commode and possibly shower seat. Will provide info on jon harness for LUE. Family scheduled for teaching Monday.    Acceptance, E, VU by  at 2/10/2021 1504    Comment: family conference- discussed need for visual and verbal cues for visual perceptual deficits, and deficts in attn, memory   Caregiver Acceptance, E,TB,D, VU by CC at 1/29/2021 1509    Comment: Pt repositioned in reg w/c with 1/2 arm tray on Left. Nursing notified of change from recliner w/c/. Commode tsf from w/c to droparm commode discussed and shown to NA.                   Point: Home exercise program (Done)     Description:   Instruct learner(s) on appropriate technique for monitoring, assisting and/or progressing therapeutic exercises/activities.              Learning Progress Summary           Patient Acceptance, E, VU by CC at 2/10/2021 1546    Comment: Family conf w pt, dgt and son in law. Status, DME, follow up  theray and home needs discussed. Pt will need a droparm commode and possibly shower seat. Will provide info on jon harness for LUE. Family scheduled for teaching Monday.    Acceptance, E, VU by SL at 2/10/2021 1504    Comment: family conference- discussed need for visual and verbal cues for visual perceptual deficits, and deficts in attn, memory    Acceptance, E, VU by BL at 1/8/2021 1434   Family Acceptance, E, VU by CC at 2/10/2021 1546    Comment: Family conf w pt, dgt and son in law. Status, DME, follow up theray and home needs discussed. Pt will need a droparm commode and possibly shower seat. Will provide info on jon harness for LUE. Family scheduled for teaching Monday.    Acceptance, E, VU by SL at 2/10/2021 1504    Comment: family conference- discussed need for visual and verbal cues for visual perceptual deficits, and deficts in attn, memory                   Point: Precautions (Done)     Description:   Instruct learner(s) on prescribed precautions during self-care and functional transfers.              Learning Progress Summary           Patient Acceptance, E, VU by CC at 2/10/2021 1546    Comment: Family conf w pt, dgt and son in law. Status, DME, follow up theray and home needs discussed. Pt will need a droparm commode and possibly shower seat. Will provide info on jon harness for LUE. Family scheduled for teaching Monday.    Acceptance, E, VU by SL at 2/10/2021 1504    Comment: family conference- discussed need for visual and verbal cues for visual perceptual deficits, and deficts in attn, memory    Acceptance, E, VU by BL at 1/8/2021 1434   Family Acceptance, E, VU by CC at 2/10/2021 1546    Comment: Family conf w pt, dgt and son in law. Status, DME, follow up theray and home needs discussed. Pt will need a droparm commode and possibly shower seat. Will provide info on jon harness for LUE. Family scheduled for teaching Monday.    Acceptance, E, VU by SL at 2/10/2021 1504    Comment: family  conference- discussed need for visual and verbal cues for visual perceptual deficits, and deficts in attn, memory                   Point: Body mechanics (Done)     Description:   Instruct learner(s) on proper positioning and spine alignment during self-care, functional mobility activities and/or exercises.              Learning Progress Summary           Patient Acceptance, E, VU by CC at 2/10/2021 1546    Comment: Family conf w pt, dgt and son in law. Status, DME, follow up theray and home needs discussed. Pt will need a droparm commode and possibly shower seat. Will provide info on jon harness for LUE. Family scheduled for teaching Monday.    Acceptance, E, VU by SL at 2/10/2021 1504    Comment: family conference- discussed need for visual and verbal cues for visual perceptual deficits, and deficts in attn, memory    Acceptance, E,TB,D, VU by CC at 1/29/2021 1509    Comment: Pt repositioned in reg w/c with 1/2 arm tray on Left. Nursing notified of change from recliner w/c/. Commode tsf from w/c to droparm commode discussed and shown to NA.    Acceptance, E, VU by BL at 1/8/2021 1434   Family Acceptance, E, VU by CC at 2/10/2021 1546    Comment: Family conf w pt, dgt and son in law. Status, DME, follow up theray and home needs discussed. Pt will need a droparm commode and possibly shower seat. Will provide info on jon harness for LUE. Family scheduled for teaching Monday.    Acceptance, E, VU by  at 2/10/2021 1504    Comment: family conference- discussed need for visual and verbal cues for visual perceptual deficits, and deficts in attn, memory   Caregiver Acceptance, E,TB,D, VU by CC at 1/29/2021 1509    Comment: Pt repositioned in reg w/c with 1/2 arm tray on Left. Nursing notified of change from recliner w/c/. Commode tsf from w/c to droparm commode discussed and shown to NA.                               User Key     Initials Effective Dates Name Provider Type Discipline    CC 06/08/18 -  Keya Miranda  OTR Occupational Therapist OT    SL 06/08/18 -  Katelyn James MS CCC-SLP Speech and Language Pathologist SLP     01/05/21 -  Marshall Oleary OT Occupational Therapist OT                    OT Recommendation and Plan                         Time Calculation:     Time Calculation- OT     Row Name 02/16/21 1000             Time Calculation- OT    OT Start Time  1000  -CC      OT Stop Time  1015  -CC      OT Time Calculation (min)  15 min  -CC        User Key  (r) = Recorded By, (t) = Taken By, (c) = Cosigned By    Initials Name Provider Type    CC Keya Miranda OTR Occupational Therapist        Therapy Charges for Today     Code Description Service Date Service Provider Modifiers Qty    26905456263 HC OT THER SUPP EA 15 MIN 2/15/2021 Keya Miranda OTR GO 4    25427206915 HC OT NEUROMUSC RE EDUCATION EA 15 MIN 2/15/2021 Keya Miranda OTR GO 2    89486903505 HC OT SELF CARE/MGMT/TRAIN EA 15 MIN 2/15/2021 Keya Miranda OTR GO 2    20283191891 HC OT SELF CARE/MGMT/TRAIN EA 15 MIN 2/16/2021 Keya Miranda OTR GO 1                   GIAN Blackwell  2/16/2021

## 2021-02-16 NOTE — PROGRESS NOTES
Inpatient Rehabilitation Functional Measures Assessment and Plan of Care    Plan of Care  Updated Problems/Interventions  Cognition    [ST] Memory(Active)  Current Status(02/16/2021): min/mod memory deficits  Weekly Goal(02/17/2021): Patient will recall functional information after 5  minutes with MIN cues.  Discharge Goal: Patient will return to her baseline memory abilities.    [ST] Right Hemisphere Function(Active)  Current Status(02/16/2021):  min to mod visual  cues for reading/writing tasks  Weekly Goal(02/17/2021): The patient will complete L/R discrimination and  spatial concept tasks with Min cues.  Discharge Goal: The patient will attend to the left for functional tasks with  only initial min cues.    [ST] Executive Functions(Active)  Current Status(02/16/2021): min cues for attention; fatiques quickly;  distractible  Weekly Goal(02/17/2021): follow schedule with min cues  Discharge Goal: fxnl cognitive skills for home with assist    Functional Measures  JALEEL Eating:  [facility] Grooming: [facility] Bathing:  [facility] Upper Body Dressing:  [facility] Lower Body Dressing:  [facility] Toileting:  [facility] Bladder Management  Level of Assistance:  [place]  Frequency/Number of Accidents this Shift:  [facility] Bowel Management  Level of Assistance: [place]  Frequency/Number of Accidents this Shift: [facility] Bed/Chair/Wheelchair Transfer:  [facility] Toilet Transfer:  [facility] Tub/Shower Transfer:  [place]    Previously Documented Mode of Locomotion at Discharge: Field  JALEEL Expected Mode of Locomotion at Discharge: [facility] Walk/Wheelchair:  [facility] Stairs:  [facility] Comprehension:  [facility] Expression:  [facility] Social Interaction:  [facility] Problem Solving:  [facility] Memory:  [place]    Therapy Mode Minutes  Occupational Therapy: [place]  Physical Therapy: [place]  Speech Language Pathology:  [place]    Signed by: Brynn Griffith, SLP

## 2021-02-16 NOTE — PROGRESS NOTES
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 Her appendix is out. RUQ ultrasound one month ago showed Small to moderate amount of gallbladder sludge with a few possible tiny nonshadowing gallstones.  She does have th pancreatic cyst noted on US and CT. Discussed observe for now as no tenderness on exam and exam unremarkable.  Left HP unchanged.        History taken from: patient    Objective     Vital Signs  Temp:  [97.7 °F (36.5 °C)-97.9 °F (36.6 °C)] 97.8 °F (36.6 °C)  Heart Rate:  [82-95] 89  Resp:  [18] 18  BP: (132-140)/(71-81) 132/75    Physical Exam  General: calm, in NAD  Neck: trachea midline  Cardio: well perfused distal extremities, regular radial pulse. RRR  Resp: normal WOB on RA, nonlabored.  CTA  Abdomen: NT/ND. NABS, soft,   Extremities:    No edema  No crepitus with range of motion of the left hip.  Neuro: dense left hemiparesis, flaccid tone  Left homonymous hemianopsia    Left inattention.  A&Ox4. 0/5 strength LUE/LLE.   Takes resistance on the right side.    Results from last 7 days   Lab Units 02/15/21  0645 02/12/21  0624   WBC 10*3/mm3 4.44 4.09   HEMOGLOBIN g/dL 11.4* 12.0   HEMATOCRIT % 35.9 36.3   PLATELETS 10*3/mm3 175 174     Results from last 7 days   Lab Units 02/15/21  0645 02/12/21  0624   SODIUM mmol/L 140 136   POTASSIUM mmol/L 4.4 4.2   CHLORIDE mmol/L 101 99   CO2 mmol/L 29.3* 31.0*   BUN mg/dL 14 15   CREATININE mg/dL 0.63 0.63   CALCIUM mg/dL 9.5 9.7   BILIRUBIN mg/dL <0.2  --    ALK PHOS U/L 92  --    ALT (SGPT) U/L 13  --    AST (SGOT) U/L 16  --    GLUCOSE mg/dL 113* 116*             Ref. Range 1/8/2021 06:01   TSH  Baseline Latest Ref Range: 0.270 - 4.200 uIU/mL 2.830   Vitamin B-12 Latest Ref Range: 211 - 946 pg/mL 381   25 Hydroxy, Vitamin D Latest Ref Range: 30.0 - 100.0 ng/ml 20.6 (L)     CT of the abdomen-January 16  CT Abdomen Pelvis With Contrast   Final Result       A cystic focus at the pancreatic head,  likely corresponds to the   ultrasound finding of concern, could be further evaluated with   endoscopic ultrasound as indicated, interval follow-up also recommended   to exclude any possibility of a cystic neoplasm. The pancreas is   thinned. The main pancreatic duct shows a mildly prominent caliber, 4   mm.          US GALLBLADDER-  1/14/2021   IMPRESSION:  Small to moderate amount of gallbladder sludge with a few  possible tiny nonshadowing gallstones.  2. No wall thickening or pericholecystic fluid is seen. No sonographic  Garcia's sign is seen.  3. Fatty infiltration of the liver.  4. 1.2 cm hypoechoic pancreatic head lesion. No pancreatic head lesions  are seen on the CT of the abdomen and pelvis from [facility]  dated 12/11/2019.  5. CT of the abdomen with contrast using pancreatic protocol is  recommended for further assessment.    EXAMINATION: LIMITED LEFT BREAST SONOGRAPHY-January 20, 2021     HISTORY: 85-year-old female status post recent placement of a loop  recorder on the left side of the chest with an area of palpable concern  in the left breast.     FINDINGS: Targeted sonographic evaluation of the area of palpable  concern in the left breast which corresponds to the upper inner quadrant  was performed. No sonographic abnormality is appreciated.     IMPRESSION:  Negative targeted left breast sonography. Clinical followup  is suggested.     BI-RADS Category 1: Negative  Results Review:     I reviewed the patient's new clinical results.    Medication Review: Complete  Scheduled Meds:Alirocumab, 75 mg, Subcutaneous, Q14 Days  aspirin, 81 mg, Oral, Daily  bisacodyl, 10 mg, Rectal, Q24H  [START ON  3/6/2021] cholecalciferol, 1,000 Units, Oral, Daily  dilTIAZem CD, 180 mg, Oral, BID  docusate sodium, 200 mg, Oral, BID  enoxaparin, 40 mg, Subcutaneous, Daily  ferrous sulfate, 325 mg, Oral, Daily With Breakfast  levothyroxine, 125 mcg, Oral, Q AM  lidocaine, 1 patch, Transdermal, Q24H  losartan, 50 mg, Oral, Daily  mirtazapine, 15 mg, Oral, Nightly  ondansetron ODT, 4 mg, Oral, TID AC  oxybutynin, 5 mg, Oral, BID With Meals  pantoprazole, 40 mg, Oral, QAM  vitamin D, 50,000 Units, Oral, Q7 Days      Continuous Infusions:   PRN Meds:.HYDROcodone-acetaminophen  •  nitroglycerin  •  polyethylene glycol  •  traMADol      Assessment/Plan       Stroke (CMS/HCC)    Debility and functional deficits  -PT/OT/SLP     Acute CVA  -[place] 3 MRI at [facility] revealed acute infarct in the left temporal-occipital region, no TPA or MT  -Jan 4 had worsening of her NIHSS, MRI revealed a new right superior frontal gyrus acute infarct. Again outside of the window of TPA, not a candidate for mechanical thrombectomy  -continue ASA and Praluent as she is intolerant of statins  -Echo was unremarkable  -Holter normal  -Loop recorder placed  -A1c 5.0  -TSH and B12 level unremarkable  February 15-recheck lipid panel/transaminases    Loss of appetite  Weight loss  Nausea  -symptoms for approximately 8 months, has lost ~40lbs over that time per patient  -started Remeron 7.5 qHS  -GI consulted, appreciate recs. Started scheduled Zofran with meals on 1/13, continue Remeron.   Jan 14 - GI to check RUQ US, increase bowel regimen, continue Remeron and Zofran as above  January 15 -1.2 cm hypoechoic pancreatic head lesion on ultrasound.  To get CT of the abdomen tomorrow.  January 16 - CT scan showed pancreatic cyst.  Not felt to be the etiology of her nausea.  Follow-up as an outpatient.  January 17- Remeron increased to 15 mg nightly     Left breast nodule  -2x1cm nodule left breast at 10 o'clock position, borders feel smooth, nodule is not freely mobile,  no overlying skin changes. Away from the site of recent left loop recorder placement Jan 5, 2021  -Last mammogram one year ago. May not tolerate mammogram with recent loop recorder placement. Patient discussed with Dr. Pride, left breast ultrasound ordered  -Left breast ultrasound negative at the area of interest in the left upper inner quadrant    HLD  -continue ASA and Praluent as she is intolerant of statins     HTN  -continue cardizem, losartan  -normotensive goal  -monitor    Vit D insufficiency  -level 20 on admission  -started vitamin D replacement     Hypothyroidism  -continue levothyroxine    CAD  -continue ASA and Praluent   January 27-She had some sharp chest pain that was brief last evening at about 1 AM, resolved on its own.  Had nitroglycerin ordered but did not receive it.  She reports at home she did occasionally have chest pain and would take him to glycerin at home, stating that sometimes she would take it as she just felt nervous and worried with the chest pain.  She did feel some palpitations after the chest pain last evening.  We discussed seeing if anything was uploaded on her loop recorder to her cardiologist office and we will check those results.    Obstructive sleep apnea-January 27-she has been on 2 L nasal cannula oxygen at nighttime with sats 100%.  She did not have her CPAP brought in from home     Prerenal OBED, resolved  -secondary to decreased PO intake  -resolved prior to discharge from [facility]  -monitor electrolytes     Iron deficiency anemia  -received 2 doses of IV iron as well as 1UpRBCs on 1/4 at [facility]  -Hgb stable ~8 prior to discharge from [facility], 9.2 on admission to [facility]  -continue oral iron, monitor     Left hip pain  -xrays at [facility] unremarkable  Jan 13 - Fatigue continues to be an issue. She takes Norco 7.5 mg about 4 times a day with history of left hip pain-history of left femur fracture open reduction internal fixation, x-ray at the outside hospital on January 6 showed hardware in  place.  January 14-She does have fatigue during the day.  Will try a lower dose of Norco 5 mg rather than 7.5 mg.  Is on Remeron 7.5 g at nighttime but that was only recently added.  January 15-she took Norco 5 mg twice a day at home.  More alert with better trunk control on lower dose of Norco today.  January 21-continues with left hip pain. Taking Norco 5 mg about 4 times a day. Add Lidoderm patch.  January 25-does not feel Lidoderm patch that helpful.  Pain was better with addition of tramadol over the weekend.  Hip range of motion unremarkable with no crepitus  January 26-left hip xray did not show displacement of surgical hardware, fracture, erosion or dislocation  January 26-she feels tramadol works better for her than hydrocodone.  Anticipate going home on tramadol and discontinue hydrocodone.  She is presently taking tramadol twice a day.  Appears to be tolerating without any side effects.  February 12-patient alternating hydrocodone and tramadol for pain relief     Fluids/Electrolytes/Nutrition   -cardiac diet  -admission labs unremarkable     DVT ppx  -subQ Lovenox    /UTI  -on Ditropan 5 mg twice a day.  January 11 - voids with acceptable bladder scan postvoid residuals. Urinary tract infection with E. coli-sensitive to Macrobid  January 18 - required intermittent straight catheter 450 cc.  January 19 - incontinent and IC, PVRs 99-391cc  January 22 - has urge to urinate at times but cannot void, will decrease oxybutynin from bid to with dinner only. She is voiding with incontinence with postvoid residual 230-290 cc.  Discussed trial of decreasing Ditropan to 5 mg q. supper first from twice a day before doing a trial of Flomax.  January 23-urinary tract infection-E. coli-on cefdinir starting January 23.  Sensitive to cephalosporins  January 27-voids with postvoid residual  cc.  We will continue Ditropan at lower dose of 5 mg with supper  February 9-recurrent dysuria-recheck urinalysis with culture  if indicated  Feb 10 - UA negative and PVR 53 cc  February 12-She notes that she has increased bladder frequency at nighttime on the decreased dose of the Ditropan, having to go every 20 minutes which is fatiguing.  Recent urinalysis was negative for infection.  We discussed increasing Ditropan back to 5 mg twice a day.  Also reviewed that previously she had some elevated PVRs on the twice daily dosing .      TEAM CONF - JAN 12 - BED MAX-DEP. TRANSFERS MAX 2 TRANSFER BOARD. NON-AMBULATORY. WHEELCHAIR 50 FEET MIN ASSIST. TOILET TRANSFERS DEP X 2. IMPAIRED VISION. BATH MAX. LBD DEP. UBD MAX. GROOMING MOD ASSIST. COGNITION - MILD TO MODERATE DEFICITS. 2L O2 AT HS. ON MACROBID FOR UTI. FATIGUES. HISTORY OF POOR APPETITE FOR MONTHS, WEIGHT LOSS 30-40 # OVER SEVERAL MONTHS AT HOME, HISTORY OF REPORTED NAUSEA AT HOME. WILL CONSULT GI.   ELOS - 4 WEEKS.     TEAM CONF - JAN 19 - BED MAX 2 .  TRANSFERS MAX 2 TRANSFER BOARD. WHEELCHAIR MIN A DUE TO VISIN DEFICITS. HAS SAT UNSUPPORTED FOR 30 SECS BEFORE STARTS TO LEAN. TOILET TRANSFERS DEP X 2. BATH UB MAX, LB DEP X 2. LBD DEP X 2. UBD MAX. GROOMING MOD. LEFT SHOULDER SUBLUXATION - ADD LEUKOTAPING. VOIDS WITH ELEVATED PVRS WITH OCCASIONAL ISC. CONTINENT INCONTINENT.  LEFT INATTENTION, LEFT VISUAL FIELD CUT. IMPAIRED ATTENTION, EXECUTIVE FUNCTION, REASONING. WORKING MEMORY IMPROVING.  BNE (Active)  Att'n. - WNL  Exec. Fx. - Mild Imp.  Rsng/Jgmnt - Mildly Imp. for abstract reaosning  Arith - Min Imp.  Visuospatial Skills - Mildly Imp.  Visual Mem. - Mildly Imp.  Verbal Mem. - Mildly Imp.  Emot - Pt endorsed dep related to current status  DEPRESSION - SUPPORTIVE THERAPY. ON REMERON.   ELOS -   3 WEEKS    TEAM CONF - JAN 26 -  BED MAX 2. TRANSFERS MAX 2. WHEELCHAIR 50 FEET MIN ASSIST, IMPACTED BY VISUAL DEFICITS. LEFT HIP PAIN INCREASED, WILL REPEAT X-RAY. AT OUTSIDE HOSPITAL X-RAY ON JAN 6 - [ORIF hardware in the proximal femur is stable from 5/6/2020. No hardware loosening or  perihardware fracture is detected. The hip joint space is mildly narrowed as before. IMPRESSION: Stable ORIF hardware; no acute abnormality.]  TOILET TRANSFERS MAX 2 AS PUSHES. BATH UBB MIN, LBB MAX 2. UBD MAX ASSIST. LBD DEPENDENT. GROOMING MIN ASSIST. ATTENDS TO LEFT A LITTLE BETTER. TOILETING DEP X 2. LEFT VISUAL FIELD DEFICITS. EXECUTIVE FUNCTION - IMPAIRED ATTENTION. MEMORY MIN-MOD DEFICITS. MOD-MAX VISUAL CUES TO LEFT SIDE. VOIDS. ON DECREASED DITROPAN TO 5 MG ONCE A DAY DUE TO ELEVATED PVRS. CONTINENT AT TIMES WITH BLADDER. BOWEL CONTINENT. PAIN MANAGEMENT - TRAMADOL ADDED. WITH REC THERAPY, CUES TO ATTEND TO LEFT.  ELOS -  2-3 WEEKS    TEAM CONF- BED MAX 2. TRANSFERS MAX SQUAT PIVOT, USES LIFT WITH NURSING. SITTING BALANCE BETTER. LEFT INATTENTION. WHEELCHAIR MIN ASSIST WITH VISUAL CUES, DUE TO LEFT VISUAL INATTENTION/LEFT VISUAL FIELD CUT. STANDING AT PARALLEL BARS WITH BLOCKING LEFT KNEE. TOILET TRANSFERS MAX 2 TO DROPARM. BATH MIN UB AND MAX X 2 LB. DRESSING MOD-MAX UB AND DEP X 2 LB. GROOMING MIN-SBA. TOILETING COMES UP TO STAND A LITTLE BETTER, DEP X 2.   IMPAIRED ATTENTION. DISTRACTIBLE. FATIGUES QUICKLY WITH COGNITIVE TASKS. MIN-MOD MEMORY DEFICITS. MOD-MAX VERBAL AND VISUAL CUES FOR READING AND WRITING TASKS. INCONTINENT BOWEL AND BLADDER. PVRS LOW. CHRONIC LEFT HIP PAIN.   ELOS -  ONE WEEK, WILL NEED SIGNIFICANT ASSISTANCE AT HOME.    TEAM CONF - FEB 9 - LEFT HEMIPLEGIA UNCHANGED. KINESIOTAPING LEFT SHOULDER.  BED MAX 2. TRANSFERS MAX ASSIST SQUAT PIVOT. NON-AMBULATORY. WHEELCHAIR 100 FEET MIN ASSIST DUE TO VISUAL DEFICITS. TOILET TRANSFERS MAX 1. SHOWER TRANSFERS MAX ASSIST TO BENCH. TOILETING DEP X 2. UBB MIN. LBB MOD OF 2. LBD MOD-MAX WITH MAX 2 FOR STANDNG. UBD MOD ASSIST FOR PULLOVER. GROOMING MIN-SBA. CONTINENT /INCONTINENT. DITROPAN FOR OVERACTIVE BLADDER. ABLE TO SIT FOR ABOUT 75 SECS. IMPAIRED ATTENTION. FATIGUES QUICKLY. DISTRACTIBLE. LEFT VISUAL FIELD CUT. MIN-MOD MEMORY DEFICITS. MOD-MAX  VISUAL CUES FOR READING/WRITING TASKS.   ELOS -   FAMILY PLANS TO HIRE ADDITIONAL HELP FOR HOME. HOME ON FEB 16. FAMILY TEACHING NEEDED.     Feb 16 - discharge postponed due to inclement weather, possibly home tomorrow after family teaching depending on overall medical status.     Azael Abdullahi MD  02/16/21  16:22 EST    During rounds, used appropriate personal protective equipment including mask and gloves.  Additional gown if indicated.  Mask used was standard procedure mask. Appropriate PPE was worn during the entire visit.  Hand hygiene was completed before and after.

## 2021-02-16 NOTE — PROGRESS NOTES
Spoke with patient's daughter this morning and again this afternoon, by phone. They were not able to get here today due to snowy roads. Daughter stated their roads have been cleared more this afternoon so she feels confident they can be here tomorrow morning for family teaching and then will plan to take patient home. Discussed with patient. She didn't feel well last night and this morning so didn't do morning therapies. She did participate this afternoon and stated she feels better. PT fitted patient in new wheelchair. Patient also has drop arm BSC to take home. Left message for liaison for [facility] regarding probable d/c tomorrow, 2/17. Will assist with plans.

## 2021-02-16 NOTE — PLAN OF CARE
Goal Outcome Evaluation:  Plan of Care Reviewed With: patient  Progress: improving  Outcome Summary: No unsafe behavior noted. O2 2L n/c at night. Pain med at HS Meds with water. Has used call light for bedpan.

## 2021-02-16 NOTE — PLAN OF CARE
Goal Outcome Evaluation:  Plan of Care Reviewed With: patient     Outcome Summary: Ms Carrero has complained of abdominal pain, pain medication given once, and takes medication with water. She is assist x2, left side flaccid, and continent and incontinent of bladder and bowel- BM today. She frequently calls for bedpan, scheduled for dischage tomorrow, and family to come for teaching.

## 2021-02-16 NOTE — SIGNIFICANT NOTE
02/16/21 1037   OTHER   Discipline physical therapist   Rehab Time/Intention   Session Not Performed patient/family declined, not feeling well  (pt declined AM PT tx 2' not feeling well- abd pain, nausea and multiple BMs. nsg and MD aware. will attempt PM to fit pt for WC. Pt reports family not coming in today for teaching 2' weather.)   Recommendation   PT - Next Appointment 02/16/21   ..Patient was not wearing a face mask during this therapy encounter. Therapist used appropriate personal protective equipment including eye protection, mask, and gloves.  Mask used was standard procedure mask. Appropriate PPE was worn during the entire therapy session. Hand hygiene was completed before and after therapy session. Patient is not in enhanced droplet precautions.

## 2021-02-16 NOTE — THERAPY TREATMENT NOTE
Inpatient Rehabilitation - Speech Language Pathology Treatment Note    [facility]     Patient Name: Liana Carrero  : 1935  MRN: [id number]    Today's Date: 2021                   Admit Date: 2021       Visit Dx:      ICD-10-CM ICD-9-CM   1. Impaired functional mobility, balance, gait, and endurance  Z74.09 V49.89       Patient Active Problem List   Diagnosis   • Stroke (cerebrum) (CMS/[facility])   • Chest pain   • Hypertensive urgency   • Headache   • History of embolic stroke   • Hypothyroidism   • Hypokalemia   • CAD in native artery   • URTI (acute upper respiratory infection)   • Closed fracture of left hip (CMS/HCC)   • Chronic diastolic (congestive) heart failure (CMS/[facility])   • Coronary artery disease involving autologous vein coronary bypass graft without angina pectoris   • Impaired glucose tolerance   • Moderate malnutrition (CMS/[facility])   • Stroke (CMS/HCC)       Past Medical History:   Diagnosis Date   • Arthritis    • CHF (congestive heart failure) (CMS/[facility])    • Coronary artery disease    • Diabetes mellitus (CMS/[facility])     new DX related to stroke, started on insulin at hospital   • Disease of thyroid gland    • Elevated cholesterol    • GERD (gastroesophageal reflux disease)    • History of transfusion    • Hypertension    • PONV (postoperative nausea and vomiting)    • Sleep apnea    • Spinal headache    • Stroke (CMS/[facility])        Past Surgical History:   Procedure Laterality Date   • APPENDECTOMY     • CARDIAC CATHETERIZATION       x2   • CARDIAC CATHETERIZATION N/A 2019    Procedure: Left Heart Cath;  Surgeon: Bentley Chapin MD;  Location:  [facility];  Service: Cardiovascular   • CARDIAC CATHETERIZATION N/A 2019    Procedure: Coronary angiography;  Surgeon: Bentley Chapin MD;  Location:  [facility];  Service: Cardiovascular   • CARDIAC CATHETERIZATION N/A 2019    Procedure: Native mammary injection;  Surgeon: Bentley Chapin MD;   Location:  [facility];  Service: Cardiovascular   • CARDIAC CATHETERIZATION N/A 12/26/2019    Procedure: Saphenous Vein Graft;  Surgeon: Bentley Chapin MD;  Location: [facility] CATH INVASIVE LOCATION;  Service: Cardiovascular   • COLON SURGERY     • COLONOSCOPY     • EYE SURGERY      lens implants and cataract surgery   • FEMUR IM NAILING/RODDING Left 3/26/2020    Procedure: LT.HIP NAILING/RODDING;  Surgeon: Carlos Khan II, MD;  Location: [facility] MAIN OR;  Service: Orthopedics;  Laterality: Left;   • FRACTURE SURGERY Left 2020   • HYSTERECTOMY     • VASCULAR SURGERY      varicous veins          SLP EVALUATION (last 72 hours)      SLP SLC Evaluation     Row Name 02/16/21 1500 02/15/21 1301                Communication Assessment/Intervention    Document Type  therapy note (daily note)  -AW  therapy note (daily note)  -LN       Subjective Information  no complaints  -AW  no complaints  -LN       Patient Observations  alert;cooperative;agree to therapy  -AW  alert;cooperative;agree to therapy  -LN       Patient/Family/Caregiver Comments/Observations  Pt reported feeling better from this am. Worked with SLP bedside.  -AW  --       Patient Effort  --  good  -LN       Symptoms Noted During/After Treatment  --  none  -LN         User Key  (r) = Recorded By, (t) = Taken By, (c) = Cosigned By    Initials Name Effective Dates    Brynn Reynaga MS CCC-SLP 06/08/18 -     Nuzhat Worley 12/17/19 -              EDUCATION    The patient has been educated in the following areas:       Cognitive Impairment Communication Impairment.      SLP Recommendation and Plan        Patient was wearing a face mask during this therapy encounter. Therapist used appropriate personal protective equipment including mask, eye protection and gloves.  Mask used was standard procedure mask. Appropriate PPE was worn during the entire therapy session. Hand hygiene was completed before and after therapy session. Patient  is not in enhanced droplet precautions.     SLP GOALS     Row Name 02/16/21 1500 02/15/21 1231          Memory Skills Goal 1 (SLP)    Improve Memory Skills Through Goal 1 (SLP)  recall details from a word list  -AW  recalling related word lists immediately;recalling related word lists with an imposed delay;recall details from a word list;repeat sentence;select a word from a list by exclusion;listen to a paragraph and answer questions;use memory strategies;80%;with minimal cues (75-90%)  -LN     Time Frame (Memory Skills Goal 1, SLP)  --  1 week  -LN     Progress (Memory Skills Goal 1, SLP)  80%;independently (over 90% accuracy);100%;with minimal cues (75-90%)  -AW  70%;independently (over 90% accuracy)  -LN     Progress/Outcomes (Memory Skills Goal 1, SLP)  goal ongoing  -AW  goal ongoing  -LN     Comment (Memory Skills Goal 1, SLP)  listening to 4 words x2 and recalling word by order  -AW  Short-pargraph recall  -LN        Organizational Skills Goal 1 (SLP)    Improve Thought Organization Through Goal 1 (SLP)  completing a verbal sequencing task  -AW  completing a divergent naming task;completing a convergent naming task;generating a list of items in a category;naming by category exclusion;naming similarities and differences;drawing conclusions;80%;with minimal cues (75-90%)  -LN     Time Frame (Thought Organization Skills Goal 1, SLP)  --  1 week  -LN     Barriers (Thought Organization Skills Goal 1, SLP)  --  MOD cues to scan L/R  -LN     Progress (Thought Organization Skills Goal 1, SLP)  90%;independently (over 90% accuracy)  -AW  40%;independently (over 90% accuracy);60%;with minimal cues (75-90%);100%;with moderate cues (50-74%)  -LN     Progress/Outcomes (Thought Organization Skills Goal 1, SLP)  goal ongoing  -AW  goal ongoing  -LN     Comment (Thought Organization Skills Goal 1, SLP)  sequencing 6 step tasks  -AW  Category exclusion (five-word)  -LN        Reasoning Goal 1 (SLP)    Improve Reasoning  "Through Goal 1 (SLP)  --  complete basic reasoning task;complete analogies;complete deductive reasoning task;80%;with minimal cues (75-90%)  -LN     Time Frame (Reasoning Goal 1, SLP)  --  1 week  -LN     Progress (Reasoning Goal 1, SLP)  --  50%;independently (over 90% accuracy);100%;with minimal cues (75-90%)  -LN     Progress/Outcomes (Reasoning Goal 1, SLP)  --  goal ongoing  -LN     Comment (Reasoning Goal 1, SLP)  --  \"odd man out task\"   -LN        Functional Math Skills Goal 1 (SLP)    Improve Functional Math Skills Through Goal 1 (SLP)  --  complete simple math problems;complete functional math task;complete word problems involving time;complete word problems involving money;80%;with minimal cues (75-90%)  -LN     Time Frame (Functional Math Skills Goal 1, SLP)  --  1 week  -LN     Progress (Functional Math Skills Goal 1, SLP)  --  90%;independently (over 90% accuracy)  -LN     Progress/Outcomes (Functional Math Skills Goal 1, SLP)  --  goal ongoing  -LN     Comment (Functional Math Skills Goal 1, SLP)  --  Simple addition/subtraction  -LN        Right Hemisphere Function Goal 1 (SLP)    Improve Right Hemisphere Function Through Goal 1 (SLP)  --  demonstrate awareness of communication partner in left visual field;complete visuo-spatial activities (visual closure, trail making, mazes;complete visuo-perceptual activities (L/R discrimination, spatial concepts);use compensatory strategies for left neglect;70%;with moderate cues (50-74%)  -LN     Time Frame (Right Hemisphere Function Goal 1, SLP)  --  1 week  -LN     Progress (Right Hemisphere Function Goal 1, SLP)  --  50%;with minimal cues (75-90%);100%;with moderate cues (50-74%)  -LN     Progress/Outcomes (Right Hemisphere Function Goal 1, SLP)  --  goal ongoing  -LN     Comment (Right Hemisphere Function Goal 1, SLP)  --  Discriminating spatial concepts  -LN       User Key  (r) = Recorded By, (t) = Taken By, (c) = Cosigned By    Initials Name Provider Type "    Brynn Reynaga MS CCC-SLP Speech and Language Pathologist    Nuzhat Worley Speech and Language Pathologist                      Time Calculation:       Time Calculation- SLP     Row Name 02/16/21 1526             Time Calculation- SLP    SLP Start Time  1230  -AW      SLP Stop Time  1330  -AW      SLP Time Calculation (min)  60 min  -        User Key  (r) = Recorded By, (t) = Taken By, (c) = Cosigned By    Initials Name Provider Type    Brynn Reynaga MS CCC-SLP Speech and Language Pathologist            Therapy Charges for Today     Code Description Service Date Service Provider Modifiers Qty    91558022402 HC ST DEV OF COGN SKILLS EACH ADDT'L 15 MIN 2/16/2021 Brynn Griffith, MS CCC-SLP  1    68735991556 HC ST DEV OF COGN SKILLS INITIAL 15 MIN 2/16/2021 Brynn Griffith MS CCC-SLP  1                           Brynn Griffith MS CCC-SLP  2/16/2021

## 2021-02-17 VITALS
TEMPERATURE: 98.7 F | HEIGHT: 62 IN | DIASTOLIC BLOOD PRESSURE: 74 MMHG | RESPIRATION RATE: 20 BRPM | BODY MASS INDEX: 25.68 KG/M2 | WEIGHT: 139.55 LBS | OXYGEN SATURATION: 99 % | SYSTOLIC BLOOD PRESSURE: 119 MMHG | HEART RATE: 90 BPM

## 2021-02-17 PROCEDURE — 97112 NEUROMUSCULAR REEDUCATION: CPT

## 2021-02-17 PROCEDURE — 63710000001 ONDANSETRON ODT 4 MG TABLET DISPERSIBLE: Performed by: INTERNAL MEDICINE

## 2021-02-17 PROCEDURE — 25010000002 ENOXAPARIN PER 10 MG: Performed by: PHYSICAL MEDICINE & REHABILITATION

## 2021-02-17 PROCEDURE — 97535 SELF CARE MNGMENT TRAINING: CPT

## 2021-02-17 PROCEDURE — 97530 THERAPEUTIC ACTIVITIES: CPT

## 2021-02-17 RX ORDER — PSEUDOEPHEDRINE HCL 30 MG
200 TABLET ORAL 2 TIMES DAILY
Qty: 120 CAPSULE | Refills: 1 | Status: SHIPPED | OUTPATIENT
Start: 2021-02-17

## 2021-02-17 RX ORDER — ASPIRIN 81 MG/1
81 TABLET, CHEWABLE ORAL DAILY
Qty: 90 TABLET | Refills: 1 | Status: SHIPPED | OUTPATIENT
Start: 2021-02-18

## 2021-02-17 RX ORDER — ERGOCALCIFEROL 1.25 MG/1
50000 CAPSULE ORAL
Qty: 2 CAPSULE | Refills: 0 | Status: SHIPPED | OUTPATIENT
Start: 2021-02-17 | End: 2021-03-03

## 2021-02-17 RX ORDER — HYDROCODONE BITARTRATE AND ACETAMINOPHEN 5; 325 MG/1; MG/1
1 TABLET ORAL EVERY 12 HOURS PRN
Qty: 60 TABLET | Refills: 0 | Status: SHIPPED | OUTPATIENT
Start: 2021-02-17

## 2021-02-17 RX ORDER — PANTOPRAZOLE SODIUM 40 MG/1
40 TABLET, DELAYED RELEASE ORAL EVERY MORNING
Qty: 90 TABLET | Refills: 0 | Status: SHIPPED | OUTPATIENT
Start: 2021-02-17

## 2021-02-17 RX ORDER — MELATONIN
1000 DAILY
Qty: 180 TABLET | Refills: 1 | Status: SHIPPED | OUTPATIENT
Start: 2021-03-06

## 2021-02-17 RX ORDER — LOSARTAN POTASSIUM 50 MG/1
50 TABLET ORAL DAILY
Qty: 90 TABLET | Refills: 0 | Status: SHIPPED | OUTPATIENT
Start: 2021-02-18

## 2021-02-17 RX ORDER — LANOLIN ALCOHOL/MO/W.PET/CERES
325 CREAM (GRAM) TOPICAL
Qty: 90 TABLET | Refills: 1 | Status: SHIPPED | OUTPATIENT
Start: 2021-02-18

## 2021-02-17 RX ORDER — ONDANSETRON 4 MG/1
4 TABLET, ORALLY DISINTEGRATING ORAL
Qty: 90 TABLET | Refills: 1 | Status: SHIPPED | OUTPATIENT
Start: 2021-02-17

## 2021-02-17 RX ORDER — BISACODYL 10 MG
10 SUPPOSITORY, RECTAL RECTAL
Status: DISCONTINUED | OUTPATIENT
Start: 2021-02-17 | End: 2021-02-17 | Stop reason: HOSPADM

## 2021-02-17 RX ORDER — LIDOCAINE 50 MG/G
1 PATCH TOPICAL
Start: 2021-02-18

## 2021-02-17 RX ORDER — BISACODYL 10 MG
10 SUPPOSITORY, RECTAL RECTAL
Qty: 30 SUPPOSITORY | Refills: 0 | Status: SHIPPED | OUTPATIENT
Start: 2021-02-17

## 2021-02-17 RX ORDER — MIRTAZAPINE 15 MG/1
15 TABLET, FILM COATED ORAL NIGHTLY
Qty: 90 TABLET | Refills: 1 | Status: SHIPPED | OUTPATIENT
Start: 2021-02-17

## 2021-02-17 RX ADMIN — DOCUSATE SODIUM 200 MG: 100 CAPSULE, LIQUID FILLED ORAL at 08:43

## 2021-02-17 RX ADMIN — ONDANSETRON 4 MG: 4 TABLET, ORALLY DISINTEGRATING ORAL at 05:56

## 2021-02-17 RX ADMIN — DILTIAZEM HYDROCHLORIDE 180 MG: 180 CAPSULE, COATED, EXTENDED RELEASE ORAL at 08:43

## 2021-02-17 RX ADMIN — OXYBUTYNIN CHLORIDE 5 MG: 5 TABLET ORAL at 08:43

## 2021-02-17 RX ADMIN — HYDROCODONE BITARTRATE AND ACETAMINOPHEN 1 TABLET: 5; 325 TABLET ORAL at 04:04

## 2021-02-17 RX ADMIN — ONDANSETRON 4 MG: 4 TABLET, ORALLY DISINTEGRATING ORAL at 11:38

## 2021-02-17 RX ADMIN — LEVOTHYROXINE SODIUM 125 MCG: 0.12 TABLET ORAL at 05:56

## 2021-02-17 RX ADMIN — LIDOCAINE 1 PATCH: 50 PATCH TOPICAL at 08:43

## 2021-02-17 RX ADMIN — LOSARTAN POTASSIUM 50 MG: 50 TABLET, FILM COATED ORAL at 08:43

## 2021-02-17 RX ADMIN — ASPIRIN 81 MG: 81 TABLET, CHEWABLE ORAL at 08:43

## 2021-02-17 RX ADMIN — ENOXAPARIN SODIUM 40 MG: 40 INJECTION SUBCUTANEOUS at 08:43

## 2021-02-17 RX ADMIN — PANTOPRAZOLE SODIUM 40 MG: 40 TABLET, DELAYED RELEASE ORAL at 05:56

## 2021-02-17 RX ADMIN — FERROUS SULFATE TAB 325 MG (65 MG ELEMENTAL FE) 325 MG: 325 (65 FE) TAB at 08:43

## 2021-02-17 NOTE — PROGRESS NOTES
Inpatient Rehabilitation Plan of Care Note    Plan of Care  Care Plan Reviewed - Updates as Follows    Body Systems    [RN] Respiratory(Active)  Current Status(02/17/2021): O2 1L QHS  Weekly Goal(02/23/2021): Same as discharge  Discharge Goal: Least restrictive amount of 02    Performed Intervention(s)  O2 1L/NC @ HS  assess lungs, sats every shift      Psychosocial    [RN] Coping/Adjustment(Active)  Current Status(02/17/2021): Patient has supportive family, but at risk of  ineffective coping regarding current situation.  Weekly Goal(02/23/2021): Allow patient the opportunity to discuss concerns  regarding new situation.  Discharge Goal: Patient will have healthy coping mechanisms at time of discharge    Performed Intervention(s)  Verbalize needs and concerns  Medications as needed/ ordered  Therapeutic environmental set up      Safety    [RN] Potential for Injury(Active)  Current Status(02/17/2021): Patient at risk of injury related to previous falls.  flaccid on left side. dependent with transfers of 2.  Weekly Goal(02/23/2021): Paitent will use call light appropriately while in  Rehab.  Discharge Goal: Patient will continue to use call light while in Rehab  environment.    Performed Intervention(s)  Items with in reach and environmental set up  Bed alarm/ Chair alarm  Safety rounds and falls protocols/ precautions      Sphincter Control    [RN] Bladder Management(Active)  Current Status(02/17/2021): Patient is continent and incontinent, using bedpan,  PVRs, requiring I/C at times  Weekly Goal(02/23/2021): Patient will be 50% continent  Discharge Goal: Patient will be 100% continent    [RN] Bowel Management(Active)  Current Status(02/17/2021): Patient continent and incontinent  Weekly Goal(02/23/2021): continent 50% of the time  Discharge Goal: Patient will continent 100% of the time    Performed Intervention(s)  Proper diet and fluid intake  Medication as ordered  Incontinent care if needed  Monitor intake and  output    Signed by: Gloria Baeza RN

## 2021-02-17 NOTE — DISCHARGE INSTRUCTIONS
Ergocalciferol 50,000 units po weekly on Saturdays x 8 weeks from Jan 9, 2021, then change to cholecalciferol 1,000 units po bid.    Medication refills per Primary Care

## 2021-02-17 NOTE — PROGRESS NOTES
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  No new complaints today.       History taken from: patient    Objective     Vital Signs  Temp:  [96.9 °F (36.1 °C)-98.7 °F (37.1 °C)] 98.7 °F (37.1 °C)  Heart Rate:  [] 90  Resp:  [16-20] 20  BP: (107-140)/(69-76) 119/74    Physical Exam  General: calm, in NAD  Neck: trachea midline  Cardio: well perfused distal extremities, regular radial pulse. RRR  Resp: normal WOB on RA, nonlabored.  CTA  Abdomen: NT/ND. NABS, soft,   Extremities:    No edema  No crepitus with range of motion of the left hip.  Neuro: dense left hemiparesis, flaccid tone  Left homonymous hemianopsia    Left inattention.  A&Ox4. 0/5 strength LUE/LLE.   Takes resistance on the right side.    Results from last 7 days   Lab Units 02/15/21  0645 02/12/21  0624   WBC 10*3/mm3 4.44 4.09   HEMOGLOBIN g/dL 11.4* 12.0   HEMATOCRIT % 35.9 36.3   PLATELETS 10*3/mm3 175 174     Results from last 7 days   Lab Units 02/15/21  0645 02/12/21  0624   SODIUM mmol/L 140 136   POTASSIUM mmol/L 4.4 4.2   CHLORIDE mmol/L 101 99   CO2 mmol/L 29.3* 31.0*   BUN mg/dL 14 15   CREATININE mg/dL 0.63 0.63   CALCIUM mg/dL 9.5 9.7   BILIRUBIN mg/dL <0.2  --    ALK PHOS U/L 92  --    ALT (SGPT) U/L 13  --    AST (SGOT) U/L 16  --    GLUCOSE mg/dL 113* 116*             Ref. Range 1/8/2021 06:01   TSH Baseline Latest Ref Range: 0.270 - 4.200 uIU/mL 2.830   Vitamin B-12 Latest Ref Range: 211 - 946 pg/mL 381   25 Hydroxy, Vitamin D Latest Ref Range: 30.0 - 100.0 ng/ml 20.6 (L)     CT of the abdomen-January 16  CT Abdomen Pelvis With Contrast   Final Result       A cystic focus at the pancreatic head,  likely corresponds to the   ultrasound finding of concern, could be further evaluated with   endoscopic ultrasound as  indicated, interval follow-up also recommended   to exclude any possibility of a cystic neoplasm. The pancreas is   thinned. The main pancreatic duct shows a mildly prominent caliber, 4   mm.          US GALLBLADDER-  1/14/2021   IMPRESSION:  Small to moderate amount of gallbladder sludge with a few  possible tiny nonshadowing gallstones.  2. No wall thickening or pericholecystic fluid is seen. No sonographic  Garcia's sign is seen.  3. Fatty infiltration of the liver.  4. 1.2 cm hypoechoic pancreatic head lesion. No pancreatic head lesions  are seen on the CT of the abdomen and pelvis from [facility]  dated 12/11/2019.  5. CT of the abdomen with contrast using pancreatic protocol is  recommended for further assessment.    EXAMINATION: LIMITED LEFT BREAST SONOGRAPHY-January 20, 2021     HISTORY: 85-year-old female status post recent placement of a loop  recorder on the left side of the chest with an area of palpable concern  in the left breast.     FINDINGS: Targeted sonographic evaluation of the area of palpable  concern in the left breast which corresponds to the upper inner quadrant  was performed. No sonographic abnormality is appreciated.     IMPRESSION:  Negative targeted left breast sonography. Clinical followup  is suggested.     BI-RADS Category 1: Negative  Results Review:     I reviewed the patient's new clinical results.    Medication Review: Complete  Scheduled Meds:Alirocumab, 75 mg, Subcutaneous, Q14 Days  aspirin, 81 mg, Oral, Daily  bisacodyl, 10 mg, Rectal, Q48H  [START ON 3/6/2021] cholecalciferol, 1,000 Units, Oral, Daily  dilTIAZem CD, 180 mg, Oral, BID  docusate sodium, 200 mg, Oral, BID  ferrous sulfate, 325 mg, Oral, Daily With Breakfast  levothyroxine, 125 mcg, Oral, Q AM  lidocaine, 1 patch, Transdermal, Q24H  losartan, 50 mg, Oral, Daily  mirtazapine, 15 mg, Oral, Nightly  ondansetron ODT, 4 mg, Oral, TID AC  oxybutynin, 5 mg, Oral, BID With Meals  pantoprazole, 40 mg, Oral,  QAM  vitamin D, 50,000 Units, Oral, Q7 Days      Continuous Infusions:   PRN Meds:.HYDROcodone-acetaminophen      Assessment/Plan       Stroke (CMS/[facility])    Debility and functional deficits  -PT/OT/SLP     Acute CVA  -[place] 3 MRI at [facility] revealed acute infarct in the left temporal-occipital region, no TPA or MT  -Jan 4 had worsening of her NIHSS, MRI revealed a new right superior frontal gyrus acute infarct. Again outside of the window of TPA, not a candidate for mechanical thrombectomy  -continue ASA and Praluent as she is intolerant of statins  -Echo was unremarkable  -Holter normal  -Loop recorder placed  -A1c 5.0  -TSH and B12 level unremarkable  February 15-recheck lipid panel/transaminases    Loss of appetite  Weight loss  Nausea  -symptoms for approximately 8 months, has lost ~40lbs over that time per patient  -started Remeron 7.5 qHS  -GI consulted, appreciate recs. Started scheduled Zofran with meals on 1/13, continue Remeron.   Jan 14 - GI to check RUQ US, increase bowel regimen, continue Remeron and Zofran as above  January 15 -1.2 cm hypoechoic pancreatic head lesion on ultrasound.  To get CT of the abdomen tomorrow.  January 16 - CT scan showed pancreatic cyst.  Not felt to be the etiology of her nausea.  Follow-up as an outpatient.  January 17- Remeron increased to 15 mg nightly     Left breast nodule  -2x1cm nodule left breast at 10 o'clock position, borders feel smooth, nodule is not freely mobile, no overlying skin changes. Away from the site of recent left loop recorder placement Jan 5, 2021  -Last mammogram one year ago. May not tolerate mammogram with recent loop recorder placement. Patient discussed with Dr. Pride, left breast ultrasound ordered  -Left breast ultrasound negative at the area of interest in the left upper inner quadrant    HLD  -continue ASA and Praluent as she is intolerant of statins     HTN  -continue cardizem, losartan  -normotensive goal  -monitor    Vit D insufficiency  -level  20 on admission  -started vitamin D replacement     Hypothyroidism  -continue levothyroxine    CAD  -continue ASA and Praluent   January 27-She had some sharp chest pain that was brief last evening at about 1 AM, resolved on its own.  Had nitroglycerin ordered but did not receive it.  She reports at home she did occasionally have chest pain and would take him to glycerin at home, stating that sometimes she would take it as she just felt nervous and worried with the chest pain.  She did feel some palpitations after the chest pain last evening.  We discussed seeing if anything was uploaded on her loop recorder to her cardiologist office and we will check those results.    Obstructive sleep apnea-January 27-she has been on 2 L nasal cannula oxygen at nighttime with sats 100%.  She did not have her CPAP brought in from home     Prerenal OBED, resolved  -secondary to decreased PO intake  -resolved prior to discharge from [facility]  -monitor electrolytes     Iron deficiency anemia  -received 2 doses of IV iron as well as 1UpRBCs on 1/4 at [facility]  -Hgb stable ~8 prior to discharge from [facility], 9.2 on admission to [facility]  -continue oral iron, monitor     Left hip pain  -xrays at [facility] unremarkable  Jan 13 - Fatigue continues to be an issue. She takes Norco 7.5 mg about 4 times a day with history of left hip pain-history of left femur fracture open reduction internal fixation, x-ray at the outside hospital on January 6 showed hardware in place.  January 14-She does have fatigue during the day.  Will try a lower dose of Norco 5 mg rather than 7.5 mg.  Is on Remeron 7.5 g at nighttime but that was only recently added.  January 15-she took Norco 5 mg twice a day at home.  More alert with better trunk control on lower dose of Norco today.  January 21-continues with left hip pain. Taking Norco 5 mg about 4 times a day. Add Lidoderm patch.  January 25-does not feel Lidoderm patch that helpful.  Pain was better with addition of tramadol over the  weekend.  Hip range of motion unremarkable with no crepitus  January 26-left hip xray did not show displacement of surgical hardware, fracture, erosion or dislocation  January 26-she feels tramadol works better for her than hydrocodone.  Anticipate going home on tramadol and discontinue hydrocodone.  She is presently taking tramadol twice a day.  Appears to be tolerating without any side effects.  February 12-patient alternating hydrocodone and tramadol for pain relief     Fluids/Electrolytes/Nutrition   -cardiac diet  -admission labs unremarkable     DVT ppx  -subQ Lovenox    /UTI  -on Ditropan 5 mg twice a day.  January 11 - voids with acceptable bladder scan postvoid residuals. Urinary tract infection with E. coli-sensitive to Macrobid  January 18 - required intermittent straight catheter 450 cc.  January 19 - incontinent and IC, PVRs 99-391cc  January 22 - has urge to urinate at times but cannot void, will decrease oxybutynin from bid to with dinner only. She is voiding with incontinence with postvoid residual 230-290 cc.  Discussed trial of decreasing Ditropan to 5 mg q. supper first from twice a day before doing a trial of Flomax.  January 23-urinary tract infection-E. coli-on cefdinir starting January 23.  Sensitive to cephalosporins  January 27-voids with postvoid residual  cc.  We will continue Ditropan at lower dose of 5 mg with supper  February 9-recurrent dysuria-recheck urinalysis with culture if indicated  Feb 10 - UA negative and PVR 53 cc  February 12-She notes that she has increased bladder frequency at nighttime on the decreased dose of the Ditropan, having to go every 20 minutes which is fatiguing.  Recent urinalysis was negative for infection.  We discussed increasing Ditropan back to 5 mg twice a day.  Also reviewed that previously she had some elevated PVRs on the twice daily dosing .      TEAM CONF - JAN 12 - BED MAX-DEP. TRANSFERS MAX 2 TRANSFER BOARD. NON-AMBULATORY. WHEELCHAIR 50  FEET MIN ASSIST. TOILET TRANSFERS DEP X 2. IMPAIRED VISION. BATH MAX. LBD DEP. UBD MAX. GROOMING MOD ASSIST. COGNITION - MILD TO MODERATE DEFICITS. 2L O2 AT HS. ON MACROBID FOR UTI. FATIGUES. HISTORY OF POOR APPETITE FOR MONTHS, WEIGHT LOSS 30-40 # OVER SEVERAL MONTHS AT HOME, HISTORY OF REPORTED NAUSEA AT HOME. WILL CONSULT GI.   ELOS - 4 WEEKS.     TEAM CONF - JAN 19 - BED MAX 2 .  TRANSFERS MAX 2 TRANSFER BOARD. WHEELCHAIR MIN A DUE TO VISIN DEFICITS. HAS SAT UNSUPPORTED FOR 30 SECS BEFORE STARTS TO LEAN. TOILET TRANSFERS DEP X 2. BATH UB MAX, LB DEP X 2. LBD DEP X 2. UBD MAX. GROOMING MOD. LEFT SHOULDER SUBLUXATION - ADD LEUKOTAPING. VOIDS WITH ELEVATED PVRS WITH OCCASIONAL ISC. CONTINENT INCONTINENT.  LEFT INATTENTION, LEFT VISUAL FIELD CUT. IMPAIRED ATTENTION, EXECUTIVE FUNCTION, REASONING. WORKING MEMORY IMPROVING.  BNE (Active)  Att'n. - WNL  Exec. Fx. - Mild Imp.  Rsng/Jgmnt - Mildly Imp. for abstract reaosning  Arith - Min Imp.  Visuospatial Skills - Mildly Imp.  Visual Mem. - Mildly Imp.  Verbal Mem. - Mildly Imp.  Emot - Pt endorsed dep related to current status  DEPRESSION - SUPPORTIVE THERAPY. ON REMERON.   ELOS -   3 WEEKS    TEAM CONF - JAN 26 -  BED MAX 2. TRANSFERS MAX 2. WHEELCHAIR 50 FEET MIN ASSIST, IMPACTED BY VISUAL DEFICITS. LEFT HIP PAIN INCREASED, WILL REPEAT X-RAY. AT OUTSIDE HOSPITAL X-RAY ON JAN 6 - [ORIF hardware in the proximal femur is stable from 5/6/2020. No hardware loosening or perihardware fracture is detected. The hip joint space is mildly narrowed as before. IMPRESSION: Stable ORIF hardware; no acute abnormality.]  TOILET TRANSFERS MAX 2 AS PUSHES. BATH UBB MIN, LBB MAX 2. UBD MAX ASSIST. LBD DEPENDENT. GROOMING MIN ASSIST. ATTENDS TO LEFT A LITTLE BETTER. TOILETING DEP X 2. LEFT VISUAL FIELD DEFICITS. EXECUTIVE FUNCTION - IMPAIRED ATTENTION. MEMORY MIN-MOD DEFICITS. MOD-MAX VISUAL CUES TO LEFT SIDE. VOIDS. ON DECREASED DITROPAN TO 5 MG ONCE A DAY DUE TO ELEVATED PVRS.  CONTINENT AT TIMES WITH BLADDER. BOWEL CONTINENT. PAIN MANAGEMENT - TRAMADOL ADDED. WITH REC THERAPY, CUES TO ATTEND TO LEFT.  ELOS -  2-3 WEEKS    TEAM CONF- BED MAX 2. TRANSFERS MAX SQUAT PIVOT, USES LIFT WITH NURSING. SITTING BALANCE BETTER. LEFT INATTENTION. WHEELCHAIR MIN ASSIST WITH VISUAL CUES, DUE TO LEFT VISUAL INATTENTION/LEFT VISUAL FIELD CUT. STANDING AT PARALLEL BARS WITH BLOCKING LEFT KNEE. TOILET TRANSFERS MAX 2 TO DROPARM. BATH MIN UB AND MAX X 2 LB. DRESSING MOD-MAX UB AND DEP X 2 LB. GROOMING MIN-SBA. TOILETING COMES UP TO STAND A LITTLE BETTER, DEP X 2.   IMPAIRED ATTENTION. DISTRACTIBLE. FATIGUES QUICKLY WITH COGNITIVE TASKS. MIN-MOD MEMORY DEFICITS. MOD-MAX VERBAL AND VISUAL CUES FOR READING AND WRITING TASKS. INCONTINENT BOWEL AND BLADDER. PVRS LOW. CHRONIC LEFT HIP PAIN.   ELOS -  ONE WEEK, WILL NEED SIGNIFICANT ASSISTANCE AT HOME.    TEAM CONF - FEB 9 - LEFT HEMIPLEGIA UNCHANGED. KINESIOTAPING LEFT SHOULDER.  BED MAX 2. TRANSFERS MAX ASSIST SQUAT PIVOT. NON-AMBULATORY. WHEELCHAIR 100 FEET MIN ASSIST DUE TO VISUAL DEFICITS. TOILET TRANSFERS MAX 1. SHOWER TRANSFERS MAX ASSIST TO BENCH. TOILETING DEP X 2. UBB MIN. LBB MOD OF 2. LBD MOD-MAX WITH MAX 2 FOR STANDNG. UBD MOD ASSIST FOR PULLOVER. GROOMING MIN-SBA. CONTINENT /INCONTINENT. DITROPAN FOR OVERACTIVE BLADDER. ABLE TO SIT FOR ABOUT 75 SECS. IMPAIRED ATTENTION. FATIGUES QUICKLY. DISTRACTIBLE. LEFT VISUAL FIELD CUT. MIN-MOD MEMORY DEFICITS. MOD-MAX VISUAL CUES FOR READING/WRITING TASKS.   ELOS -   FAMILY PLANS TO HIRE ADDITIONAL HELP FOR HOME. HOME ON FEB 16. FAMILY TEACHING NEEDED.     Feb 16 - discharge postponed due to inclement weather, possibly home tomorrow after family teaching depending on overall medical status.     TEAM CONF - FEB 17 -   LEFT HEMIPLEGIA, LEFT HOMONYMOUS HEMIANOPSIA, LEFT INATTENTION. BED MAX ASSIST. TRANSFER MAX. NON AMBULATORY. TOILET TRASNFERS MOD-MAX ASSIST. BATH UB MIN AND LB MOD. DRESSING UB MOD, LBD MOD MAX  WITH MAX 2 FOR STANDING. ATTENDING MORE TO LEFT SIDE. KINESIOTAPING LEFT SHOULDER, TO OBTAIN [place] FOR SUBLUXATION AT HOME. COGNITION - MIN CUES FOR ATTENTION, MIN-MOD FOR MEMORY DEFICITS. CONTINENT/INCONTINENT BOWEL AND BLADDER. FAMILY TEACHING TODAY.   ELOS-    TODAY AFTER FAMILY TEACHING. DOES NOT USE HER CPAP/BIPAP. ON NASAL CANNULA 1L O2 AT NIGHT.     Azael Abdullahi MD  02/17/21  15:54 EST    During rounds, used appropriate personal protective equipment including mask and gloves.  Additional gown if indicated.  Mask used was standard procedure mask. Appropriate PPE was worn during the entire visit.  Hand hygiene was completed before and after.

## 2021-02-17 NOTE — PROGRESS NOTES
SECTION GG      Mobility Performance Discharge:     Roll Left and Right: [place] does more than half the effort. [place] lifts or  holds trunk or limbs and provides more than half the effort.   Sit to Lying: [place] does more than half the effort. [place] lifts or holds  trunk or limbs and provides more than half the effort.   Lying to Sitting on Side of Bed: [place] does more than half the effort. [place]  lifts or holds trunk or limbs and provides more than half the effort.   Sit to Stand: [place] does more than half the effort. [place] lifts or holds  trunk or limbs and provides more than half the effort.   Chair/Bed to Chair Transfer: [place] does more than half the effort. [place]  lifts or holds trunk or limbs and provides more than half the effort.   Car Transfer: [place] does more than half the effort. [place] lifts or holds  trunk or limbs and provides more than half the effort.   Walk 10 Feet:   [place] does more than half the effort. [place] lifts or holds  trunk or limbs and provides more than half the effort.  Walk 50 Feet with 2 Turns:   Not attempted due to medical or safety concerns.  Walk 150 Feet:   Not attempted due to medical or safety concerns.  Walking 10 Feet on Uneven Surfaces:   Not attempted due to medical or safety  concerns.  1 Step Over Curb or Up/Down Stair:   Not attempted due to medical or safety  concerns.  Picking up an Object:   Not attempted due to medical or safety concerns. Uses  Wheelchair and/or Scooter: Yes  Wheel 50 Feet with Two Turns: [place] does less than half the effort. [place]  lifts, holds or supports trunk or limbs but provides less than half the effort.  Type of Wheelchair or Scooter Used: Manual  Wheel 150 Feet: [place] does all of the effort. Patient does none of the effort  to complete the activity. Or, the assistance of 2 or more helpers is required  for the patient to complete the activity.  Type of Wheelchair or Scooter Used: Manual    Signed by: Nati Kaba, PT

## 2021-02-17 NOTE — THERAPY DISCHARGE NOTE
Inpatient Rehabilitation - Occupational Therapy Discharge Summary  [facility]     Patient Name: Liana Carrero  : 1935  MRN: [id number]    Today's Date: 2021                 Admit Date: 2021        OT Recommendation and Plan    Visit Dx:    ICD-10-CM ICD-9-CM   1. Impaired functional mobility, balance, gait, and endurance  Z74.09 V49.89   2. Sleep apnea, unspecified type  G47.30 780.57   3. Closed fracture of left hip with routine healing, subsequent encounter  S72.002D V54.13         Time Calculation- OT     Row Name 21 1400 21 1300 21 1000       Time Calculation- OT    OT Start Time  1400  -CC  1300  -CC  1000  -CC    OT Stop Time  1415  -CC  1330  -CC  1100  -CC    OT Time Calculation (min)  15 min  -CC  30 min  -CC  60 min  -CC    OT Non-Billable Time (min)  --  --  60 min tech  -CC      User Key  (r) = Recorded By, (t) = Taken By, (c) = Cosigned By    Initials Name Provider Type    Keya Zuñiga, OTR Occupational Therapist            OT IRF GOALS     Row Name 21 1500 02/10/21 1203 21 1543       Transfer Goal 1 (OT-IRF)    Progress/Outcomes (Transfer Goal 1, OT-IRF)  goal met  -CC  goal ongoing  -CC  goal ongoing  -CC       Transfer Goal 2 (OT-IRF)    [place] Level (Transfer Goal 2, OT-IRF)  --  moderate assist (50-74% patient effort)  -CC  --    Progress/Outcomes (Transfer Goal 2, OT-IRF)  goal met  -CC  goal revised this date;goal ongoing  -CC  goal ongoing  -CC       Bathing Goal 1 (OT-IRF)    Progress/Outcomes (Bathing Goal 1, OT-IRF)  goal met  -CC  goal ongoing  -CC  goal ongoing  -CC       Bathing Goal 2 (OT-IRF)    Progress/Outcomes (Bathing Goal 2, OT-IRF)  goal not met  -CC  goal ongoing  -CC  goal ongoing  -CC       UB Dressing Goal 1 (OT-IRF)    Progress/Outcomes (UB Dressing Goal 1, OT-IRF)  goal not met  -CC  goal partially met  -CC  goal ongoing  -CC       UB Dressing Goal 2 (OT-IRF)    Progress/Outcomes (UB Dressing Goal 2, OT-IRF)  goal  not met  -CC  unable to make needed progress  -CC  goal ongoing  -CC       LB Dressing Goal 1 (OT-IRF)    Progress/Outcomes (LB Dressing Goal 1, OT-IRF)  goal not met  -CC  goal ongoing  -CC  goal ongoing  -CC       LB Dressing Goal 2 (OT-IRF)    Progress/Outcomes (LB Dressing Goal 2, OT-IRF)  goal met  -CC  goal no longer appropriate  -CC  goal ongoing  -CC       Grooming Goal 1 (OT-IRF)    Progress/Outcomes (Grooming Goal 1, OT-IRF)  --  goal met  -CC  goal met  -CC       Grooming Goal 2 (OT-IRF)    Progress/Outcomes (Grooming Goal 2, OT-IRF)  goal met  -CC  goal ongoing  -CC  goal ongoing  -CC       Toileting Goal 1 (OT-IRF)    Progress/Outcomes (Toileting Goal 1, OT-IRF)  --  --  goal met  -CC       Toileting Goal 2 (OT-IRF)    Progress/Outcomes (Toileting Goal 2, OT-IRF)  goal not met  -CC  goal ongoing  -CC  goal ongoing  -CC       Strength Goal 1 (OT-IRF)    Progress/Outcomes (Strength Goal 1, OT-IRF)  goal not met  -CC  goal ongoing  -CC  goal ongoing  -CC       Strength Goal 2 (OT-IRF)    Progress/Outcomes (Strength Goal 2, OT-IRF)  goal not met  -CC  goal ongoing  -CC  goal ongoing  -CC       Balance Goal 1 (OT)    Progress/Outcomes (Balance Goal 1, OT)  --  goal met  -CC  --       Balance Goal 2 (OT)    Progress/Outcome (Balance Goal 2, OT)  --  goal met  -CC  goal met  -CC       Caregiver Training Goal 1 (OT-IRF)    Progress/Outcomes (Caregiver Training Goal 1, OT-IRF)  --  --  goal met  -CC      User Key  (r) = Recorded By, (t) = Taken By, (c) = Cosigned By    Initials Name Provider Type    CC Keya Miranda OTR Occupational Therapist              Therapy Charges for Today     Code Description Service Date Service Provider Modifiers Qty    59449893447 HC OT SELF CARE/MGMT/TRAIN EA 15 MIN 2/16/2021 Keya Miranda OTR GO 1    01263256213 HC OT NEUROMUSC RE EDUCATION EA 15 MIN 2/16/2021 Keya Miranda OTR GO 2    73741163106 HC OT SELF CARE/MGMT/TRAIN EA 15 MIN 2/17/2021 Keya Miranda OTR GO 4     74159911957 HC OT NEUROMUSC RE EDUCATION EA 15 MIN 2/17/2021 Keya Miranda OTR GO 3    99723353872 HC OT THER SUPP EA 15 MIN 2/17/2021 Keya Miranda OTR GO 4                 GIAN Blackwell  2/17/2021

## 2021-02-17 NOTE — PROGRESS NOTES
SECTION GG    Eating Performance Discharge: [place] sets up or cleans up; patient completes  activity. [place] assists only prior to or following the activity.    Signed by: Liliya Michaels RN

## 2021-02-17 NOTE — PLAN OF CARE
Goal Outcome Evaluation:          Problem: Rehabilitation (IRF) Plan of Care  Goal: Plan of Care Review  Outcome: Ongoing, Progressing  Flowsheets  Taken 2/17/2021 0347 by Gloria Baeza RN  Outcome Summary: Liana is alert and oriented x 4. Friendly and cooperative with staff. Left hemiplegia, LUE and LLE flaccid. Takes all meds whole PO with thin liquids. Continent with incontinent episodes. Wears brief at night, can call out for bedpan. Last BM 2/16. Wears 1L o2 nc at HS. Scattered bruising to extremities. C/o of left hip pain this evening. PRN Tramadol administered at 2029. [facility] tomorrow, planned d/c tomorrow 2/17. Resting well this evening. Call light within reach.

## 2021-02-17 NOTE — PROGRESS NOTES
Discussed progress since admission with patient, daughter, and granddaughter. Daughter and granddaughter here today for family teaching prior to d/c. Granddaughter practiced transfers as daughter has lifting restrictions. Granddaughter stated she felt comfortable with care. Daughter stated they will have several people at home to assist. Patient's niece will start next week being an additional caregiver. Discussed home health services. Patient was current with [facility] and they are aware of d/c today and services needed--PT, OT, ST, NSG. Daughter hopeful patient can come back here for outpatient therapy in near future. Patient has been using 1 L of oxygen at night.  put in order for this for home. Due to patient's diagnosis of sleep apnea, discussed with patient and daughter that home oxygen would not be covered. Discussed rental cost from [employer] ($100/month). Daughter would like home oxygen to be set up for patient to use at night since will not wear CPAP. Referral made to [facility]. Patient to d/c home with daughter and granddaughter today, 2/17. Encouraged them to call if any further questions or needs.

## 2021-02-17 NOTE — DISCHARGE SUMMARY
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 Patient stateds that she normally is able to get from the bathroom to her bed but had to get help twice prior to arrival in the ER due to profound weakness. The daughter stated that the patient was found to have a hypokalemia on routine lab work and has been taking potassium supplementation for a couple of months. She was taking a diuretic but no longer. The daughter stated that she contacted the PCP recently to confirm that she should continue the potassium supplementation and was told that yes, she needed to be taking it twice a day. In the ER potassium was 7.8. BUN and creatinine were also bumped. The daughter stated that she has had very little to eat or drink in the last several days. White count also elevated at 15.52. Hemoglobin 8.2. Urine, chest x-ray and CT abdomen pelvis negative for any acute abnormalities. She was given medication for hyperkalemia in the ER.      During hospital stay, care team was informed by patient's family that she had been having worsening right sided weakness. Of note, she had CVA in 2014 with no residual deficits and another CVA in 2018 that left her weak on the left side and with a partial left visual field deficit. MRI in acute care revealed an acute infarct in the left temporal-occipital region. She was outside of the window for TPA at at time. The next day, on 1/4, she had a worsening of her NIHSS and MRI revealed a new  right superior frontal gyrus acute infarct. She was again outside of the window of TPA and was not a candidate for mechanical thrombectomy. During her stay, she worsened from mostly min to mod assist, to mod to max assist for all activities.      She was continued on ASA and Praluent as she is intolerant of statins. Echo was unremarkable. Holter normal. Loop recorder placed. Her prerenal OBED secondary to decreased PO intake resolved prior to discharge. She does have a history of iron deficiency anemia and received 2 doses of IV iron as well as 1UpRBCs on 1/4. She complained of left hip pain, xrays unremarkable.      On admission to [facility], patient is in no acute distress. She states that her ride over here was not pleasant.     HOSPITAL COURSE:    Patient participated in a comprehensive acute inpatient rehabilitation program.     During the hospital stay the following areas were addressed:      Debility and functional deficits  -PT/OT/SLP     Acute CVA  -[place] 3 MRI at [facility] revealed acute infarct in the left temporal-occipital region, no TPA or MT  -Jan 4 had worsening of her NIHSS, MRI revealed a new right superior frontal gyrus acute infarct. Again outside of the window of TPA, not a candidate for mechanical thrombectomy  -continue ASA and Praluent as she is intolerant of statins  -Echo was unremarkable  -Holter normal  -Loop recorder placed  -A1c 5.0  -TSH and B12 level unremarkable  February 15-recheck lipid panel/transaminases-unremarkable     Loss of appetite  Weight loss  Nausea  Findings of pancreatic [id number] with gastroenterology as an outpatient  -symptoms for approximately 8 months, has lost ~40lbs over that time per patient  -started Remeron 7.5 qHS  -GI consulted, appreciate recs. Started scheduled Zofran with meals on 1/13, continue Remeron.   Jan 14 - GI to check RUQ US, increase bowel regimen, continue Remeron and Zofran as above  January 15 -1.2 cm hypoechoic pancreatic head lesion on  ultrasound.  To get CT of the abdomen tomorrow.  January 16 - CT scan showed pancreatic cyst.  Not felt to be the etiology of her nausea.  Follow-up as an outpatient.  January 17- Remeron increased to 15 mg nightly     Left breast nodule  -2x1cm nodule left breast at 10 o'clock position, borders feel smooth, nodule is not freely mobile, no overlying skin changes. Away from the site of recent left loop recorder placement Jan 5, 2021  -Last mammogram one year ago. May not tolerate mammogram with recent loop recorder placement. Patient discussed with Dr. Pride, left breast ultrasound ordered  -Left breast ultrasound negative at the area of interest in the left upper inner quadrant     HLD  -continue ASA and Praluent as she is intolerant of statins     HTN  -continue cardizem, losartan  -normotensive goal  -monitor     Vit D insufficiency  -level 20 on admission  -started vitamin D replacement     Hypothyroidism  -continue levothyroxine    CAD  -continue ASA and Praluent   January 27-She had some sharp chest pain that was brief last evening at about 1 AM, resolved on its own.  Had nitroglycerin ordered but did not receive it.  She reports at home she did occasionally have chest pain and would take him to glycerin at home, stating that sometimes she would take it as she just felt nervous and worried with the chest pain.  She did feel some palpitations after the chest pain last evening.  We discussed seeing if anything was uploaded on her loop recorder to her cardiologist office and we will check those results.     Obstructive sleep apnea-January 27-she has been on 2 L nasal cannula oxygen at nighttime with sats 100%.  She did not have her CPAP brought in from home     Prerenal OBED, resolved  -secondary to decreased PO intake  -resolved prior to discharge from [facility]  -monitor electrolytes     Iron deficiency anemia  -received 2 doses of IV iron as well as 1UpRBCs on 1/4 at [facility]  -Hgb stable ~8 prior to discharge from [facility],  9.2 on admission to [facility]  -continue oral iron, monitor     Left hip pain  -xrays at [facility] unremarkable  Jan 13 - Fatigue continues to be an issue. She takes Norco 7.5 mg about 4 times a day with history of left hip pain-history of left femur fracture open reduction internal fixation, x-ray at the outside hospital on January 6 showed hardware in place.  January 14-She does have fatigue during the day.  Will try a lower dose of Norco 5 mg rather than 7.5 mg.  Is on Remeron 7.5 g at nighttime but that was only recently added.  January 15-she took Norco 5 mg twice a day at home.  More alert with better trunk control on lower dose of Norco today.  January 21-continues with left hip pain. Taking Norco 5 mg about 4 times a day. Add Lidoderm patch.  January 25-does not feel Lidoderm patch that helpful.  Pain was better with addition of tramadol over the weekend.  Hip range of motion unremarkable with no crepitus  January 26-left hip xray did not show displacement of surgical hardware, fracture, erosion or dislocation  January 26-she feels tramadol works better for her than hydrocodone.  Anticipate going home on tramadol and discontinue hydrocodone.  She is presently taking tramadol twice a day.  Appears to be tolerating without any side effects.  February 12-patient alternating hydrocodone and tramadol for pain relief  February 17-home on hydrocodone twice a day dispense #60 with refills per Dr. Francisco Gallagher who prescribed previously.  Nnamdi report reviewed.           DVT ppx  -subQ Lovenox     /UTI  -on Ditropan 5 mg twice a day.  January 11 - voids with acceptable bladder scan postvoid residuals. Urinary tract infection with E. coli-sensitive to Macrobid  January 18 - required intermittent straight catheter 450 cc.  January 19 - incontinent and IC, PVRs 99-391cc  January 22 - has urge to urinate at times but cannot void, will decrease oxybutynin from bid to with dinner only. She is voiding with incontinence with postvoid  residual 230-290 cc.  Discussed trial of decreasing Ditropan to 5 mg q. supper first from twice a day before doing a trial of Flomax.  January 23-urinary tract infection-E. coli-on cefdinir starting January 23.  Sensitive to cephalosporins  January 27-voids with postvoid residual  cc.  We will continue Ditropan at lower dose of 5 mg with supper  February 9-recurrent dysuria-recheck urinalysis with culture if indicated  Feb 10 - UA negative and PVR 53 cc  February 12-She notes that she has increased bladder frequency at nighttime on the decreased dose of the Ditropan, having to go every 20 minutes which is fatiguing.  Recent urinalysis was negative for infection.  We discussed increasing Ditropan back to 5 mg twice a day.  Also reviewed that previously she had some elevated PVRs on the twice daily dosing .        TEAM CONF - JAN 12 - BED MAX-DEP. TRANSFERS MAX 2 TRANSFER BOARD. NON-AMBULATORY. WHEELCHAIR 50 FEET MIN ASSIST. TOILET TRANSFERS DEP X 2. IMPAIRED VISION. BATH MAX. LBD DEP. UBD MAX. GROOMING MOD ASSIST. COGNITION - MILD TO MODERATE DEFICITS. 2L O2 AT HS. ON MACROBID FOR UTI. FATIGUES. HISTORY OF POOR APPETITE FOR MONTHS, WEIGHT LOSS 30-40 # OVER SEVERAL MONTHS AT HOME, HISTORY OF REPORTED NAUSEA AT HOME. WILL CONSULT GI.   TINY - 4 WEEKS.      TEAM CONF - JAN 19 - BED MAX 2 .  TRANSFERS MAX 2 TRANSFER BOARD. WHEELCHAIR MIN A DUE TO VISIN DEFICITS. HAS SAT UNSUPPORTED FOR 30 SECS BEFORE STARTS TO LEAN. TOILET TRANSFERS DEP X 2. BATH UB MAX, LB DEP X 2. LBD DEP X 2. UBD MAX. GROOMING MOD. LEFT SHOULDER SUBLUXATION - ADD LEUKOTAPING. VOIDS WITH ELEVATED PVRS WITH OCCASIONAL ISC. CONTINENT INCONTINENT.  LEFT INATTENTION, LEFT VISUAL FIELD CUT. IMPAIRED ATTENTION, EXECUTIVE FUNCTION, REASONING. WORKING MEMORY IMPROVING.  BNE (Active)  Att'n. - WNL  Exec. Fx. - Mild Imp.  Rsng/Jgmnt - Mildly Imp. for abstract reaosning  Arith - Min Imp.  Visuospatial Skills - Mildly Imp.  Visual Mem. - Mildly Imp.  Verbal  Mem. - Mildly Imp.  Emot - Pt endorsed dep related to current status  DEPRESSION - SUPPORTIVE THERAPY. ON REMERON.   ELOS -   3 WEEKS     TEAM CONF - JAN 26 -  BED MAX 2. TRANSFERS MAX 2. WHEELCHAIR 50 FEET MIN ASSIST, IMPACTED BY VISUAL DEFICITS. LEFT HIP PAIN INCREASED, WILL REPEAT X-RAY. AT OUTSIDE HOSPITAL X-RAY ON JAN 6 - [ORIF hardware in the proximal femur is stable from 5/6/2020. No hardware loosening or perihardware fracture is detected. The hip joint space is mildly narrowed as before. IMPRESSION: Stable ORIF hardware; no acute abnormality.]  TOILET TRANSFERS MAX 2 AS PUSHES. BATH UBB MIN, LBB MAX 2. UBD MAX ASSIST. LBD DEPENDENT. GROOMING MIN ASSIST. ATTENDS TO LEFT A LITTLE BETTER. TOILETING DEP X 2. LEFT VISUAL FIELD DEFICITS. EXECUTIVE FUNCTION - IMPAIRED ATTENTION. MEMORY MIN-MOD DEFICITS. MOD-MAX VISUAL CUES TO LEFT SIDE. VOIDS. ON DECREASED DITROPAN TO 5 MG ONCE A DAY DUE TO ELEVATED PVRS. CONTINENT AT TIMES WITH BLADDER. BOWEL CONTINENT. PAIN MANAGEMENT - TRAMADOL ADDED. WITH REC THERAPY, CUES TO ATTEND TO LEFT.  ELOS -  2-3 WEEKS     TEAM CONF- BED MAX 2. TRANSFERS MAX SQUAT PIVOT, USES LIFT WITH NURSING. SITTING BALANCE BETTER. LEFT INATTENTION. WHEELCHAIR MIN ASSIST WITH VISUAL CUES, DUE TO LEFT VISUAL INATTENTION/LEFT VISUAL FIELD CUT. STANDING AT PARALLEL BARS WITH BLOCKING LEFT KNEE. TOILET TRANSFERS MAX 2 TO DROPARM. BATH MIN UB AND MAX X 2 LB. DRESSING MOD-MAX UB AND DEP X 2 LB. GROOMING MIN-SBA. TOILETING COMES UP TO STAND A LITTLE BETTER, DEP X 2.   IMPAIRED ATTENTION. DISTRACTIBLE. FATIGUES QUICKLY WITH COGNITIVE TASKS. MIN-MOD MEMORY DEFICITS. MOD-MAX VERBAL AND VISUAL CUES FOR READING AND WRITING TASKS. INCONTINENT BOWEL AND BLADDER. PVRS LOW. CHRONIC LEFT HIP PAIN.   ELOS -  ONE WEEK, WILL NEED SIGNIFICANT ASSISTANCE AT HOME.     TEAM CONF - FEB 9 - LEFT HEMIPLEGIA UNCHANGED. KINESIOTAPING LEFT SHOULDER.  BED MAX 2. TRANSFERS MAX ASSIST SQUAT PIVOT. NON-AMBULATORY. WHEELCHAIR 100 FEET  MIN ASSIST DUE TO VISUAL DEFICITS. TOILET TRANSFERS MAX 1. SHOWER TRANSFERS MAX ASSIST TO BENCH. TOILETING DEP X 2. UBB MIN. LBB MOD OF 2. LBD MOD-MAX WITH MAX 2 FOR STANDNG. UBD MOD ASSIST FOR PULLOVER. GROOMING MIN-SBA. CONTINENT /INCONTINENT. DITROPAN FOR OVERACTIVE BLADDER. ABLE TO SIT FOR ABOUT 75 SECS. IMPAIRED ATTENTION. FATIGUES QUICKLY. DISTRACTIBLE. LEFT VISUAL FIELD CUT. MIN-MOD MEMORY DEFICITS. MOD-MAX VISUAL CUES FOR READING/WRITING TASKS.   ELOS -   FAMILY PLANS TO HIRE ADDITIONAL HELP FOR HOME. HOME ON FEB 16. FAMILY TEACHING NEEDED.      Feb 16 - discharge postponed due to inclement weather, possibly home tomorrow after family teaching depending on overall medical status.      TEAM CONF - FEB 17 -   LEFT HEMIPLEGIA, LEFT HOMONYMOUS HEMIANOPSIA, LEFT INATTENTION. BED MAX ASSIST. TRANSFER MAX. NON AMBULATORY. TOILET TRASNFERS MOD-MAX ASSIST. BATH UB MIN AND LB MOD. DRESSING UB MOD, LBD MOD MAX WITH MAX 2 FOR STANDING. ATTENDING MORE TO LEFT SIDE. KINESIOTAPING LEFT SHOULDER, TO OBTAIN KALE-HARNESS FOR SUBLUXATION AT HOME. COGNITION - MIN CUES FOR ATTENTION, MIN-MOD FOR MEMORY DEFICITS. CONTINENT/INCONTINENT BOWEL AND BLADDER. FAMILY TEACHING TODAY.   ELOS-    TODAY AFTER FAMILY TEACHING. DOES NOT USE HER CPAP/BIPAP. ON NASAL CANNULA 1L O2 AT NIGHT.         Goal is for home with home health   therapies.  Barrier to discharge: To be paresis with impaired mobility and self-care- work on control transfers, strengthening, functional mobility, ADL training with hemitechniques, attention to the left to overcome.       Physical Exam near the time of discharge:    Physical Exam  General: calm, in NAD  Neck: trachea midline  Cardio: well perfused distal extremities, regular radial pulse. RRR  Resp: normal WOB on RA, nonlabored.  CTA  Abdomen: NT/ND. NABS, soft,   Extremities:    No edema  No crepitus with range of motion of the left hip.  Neuro: dense left hemiparesis, flaccid tone  Left homonymous hemianopsia     Left inattention.  A&Ox4. 0/5 strength LUE/LLE.   Takes resistance on the right side.    RESULTS:  No results found for: POCGLU  Results from last 7 days   Lab Units 02/15/21  0645 02/12/21  0624   WBC 10*3/mm3 4.44 4.09   HEMOGLOBIN g/dL 11.4* 12.0   HEMATOCRIT % 35.9 36.3   PLATELETS 10*3/mm3 175 174     Results from last 7 days   Lab Units 02/15/21  0645 02/12/21  0624   SODIUM mmol/L 140 136   POTASSIUM mmol/L 4.4 4.2   CHLORIDE mmol/L 101 99   CO2 mmol/L 29.3* 31.0*   BUN mg/dL 14 15   CREATININE mg/dL 0.63 0.63   CALCIUM mg/dL 9.5 9.7   BILIRUBIN mg/dL <0.2  --    ALK PHOS U/L 92  --    ALT (SGPT) U/L 13  --    AST (SGOT) U/L 16  --    GLUCOSE mg/dL 113* 116*              Ref. Range 1/8/2021 06:01   TSH Baseline Latest Ref Range: 0.270 - 4.200 uIU/mL 2.830   Vitamin B-12 Latest Ref Range: 211 - 946 pg/mL 381   25 Hydroxy, Vitamin D Latest Ref Range: 30.0 - 100.0 ng/ml 20.6 (L)      CT of the abdomen-January 16  CT Abdomen Pelvis With Contrast   Final Result       A cystic focus at the pancreatic head,  likely corresponds to the   ultrasound finding of concern, could be further evaluated with   endoscopic ultrasound as indicated, interval follow-up also recommended   to exclude any possibility of a cystic neoplasm. The pancreas is   thinned. The main pancreatic duct shows a mildly prominent caliber, 4   mm.            US GALLBLADDER-  1/14/2021   IMPRESSION:  Small to moderate amount of gallbladder sludge with a few  possible tiny nonshadowing gallstones.  2. No wall thickening or pericholecystic fluid is seen. No sonographic  Garcia's sign is seen.  3. Fatty infiltration of the liver.  4. 1.2 cm hypoechoic pancreatic head lesion. No pancreatic head lesions  are seen on the CT of the abdomen and pelvis from [facility]  dated 12/11/2019.  5. CT of the abdomen with contrast using pancreatic protocol is  recommended for further assessment.     EXAMINATION: LIMITED LEFT BREAST SONOGRAPHY-January 20,  "2021     HISTORY: 85-year-old female status post recent placement of a loop  recorder on the left side of the chest with an area of palpable concern  in the left breast.     FINDINGS: Targeted sonographic evaluation of the area of palpable  concern in the left breast which corresponds to the upper inner quadrant  was performed. No sonographic abnormality is appreciated.     IMPRESSION:  Negative targeted left breast sonography. Clinical followup  is suggested.     BI-RADS Category 1: Negative    Name Relationship Specialty Phone Fax Address Order              Ricky Morgan MD   Neurology [phone] [place] [address]  [facility] [address]  [facility] [address]     Next Steps: Call      Instructions: 4-6 Week Follow up in stroke clinic. Office will arrange. Call if you have not received a call from them regarding your appointment date and time.    ----  F/U on March 4 at 9:45 A.M. with MARIANNE Ruvalcaba.      Azael Abdullahi MD   Physical Medicine and Rehabilitation [phone] [address] [facility] [address]  [facility] [address]     Next Steps: Follow up on 3/15/2021      Instructions: 2:40 pm  New office location - will see at [address] [facility], [address]      Francisco Gallagher MD  PCP - General Family Medicine [phone] [address] [place] [id number]     Next Steps: Schedule an appointment as soon as possible for a visit      Instructions: 1-2 Weeks after discharge from rehab. Please make sure this is the correct \"Francisco Gallagher\" MD.  ----------  F/U on March 3 at 10:00A.M.      Blanca Bruno MD   Gastroenterology [phone] [address] [facility] [address]  [facility] [address]     Next Steps: Follow up on 3/25/2021      Instructions: 10:30 AM             Discharge Medications      New Medications      Instructions Start Date   bisacodyl 10 MG suppository  Commonly known as: DULCOLAX   10 mg, Rectal, Every 48 Hours      cholecalciferol 25 MCG " (1000 UT) tablet  Commonly known as: VITAMIN D3   1,000 Units, Oral, Daily, Starting Saturday March 6 after completes 8 week course of ergocalciferol   Start Date: March 6, 2021     docusate sodium 100 MG capsule   200 mg, Oral, 2 Times Daily      ferrous sulfate 325 (65 FE) MG tablet   325 mg, Oral, Daily With Breakfast   Start Date: February 18, 2021     lidocaine 5 %  Commonly known as: LIDODERM   1 patch, Transdermal, Every 24 Hours Scheduled, 4% over the counter patches  Remove & Discard patch within 12 hours or as directed by MD   Start Date: February 18, 2021     mirtazapine 15 MG tablet  Commonly known as: REMERON   15 mg, Oral, Nightly      ondansetron ODT 4 MG disintegrating tablet  Commonly known as: ZOFRAN-ODT   4 mg, Translingual, 3 Times Daily Before Meals, Refills per Dr Bruno      pantoprazole 40 MG EC tablet  Commonly known as: PROTONIX   40 mg, Oral, Every Morning      vitamin D 1.25 MG (09321 UT) capsule capsule  Commonly known as: ERGOCALCIFEROL   50,000 Units, Oral, Every 7 Days, On Saturday March 20 and March 27.         Changes to Medications      Instructions Start Date   HYDROcodone-acetaminophen 5-325 MG per tablet  Commonly known as: NORCO  What changed: additional instructions   1 tablet, Oral, Every 12 Hours PRN, Refills per Dr Francisco Gallagher      losartan 50 MG tablet  Commonly known as: COZAAR  What changed:   · medication strength  · how much to take   50 mg, Oral, Daily   Start Date: February 18, 2021        Continue These Medications      Instructions Start Date   aspirin 81 MG chewable tablet   81 mg, Oral, Daily   Start Date: February 18, 2021     dilTIAZem  MG 24 hr capsule  Commonly known as: CARDIZEM CD   180 mg, Oral, 2 Times Daily      levothyroxine 125 MCG tablet  Commonly known as: SYNTHROID, LEVOTHROID   125 mcg, Oral, Daily      oxybutynin 5 MG tablet  Commonly known as: DITROPAN   5 mg, Oral, 2 Times Daily      Praluent 75 MG/ML solution prefilled syringe  Generic  drug: Alirocumab   75 mg, Subcutaneous, Every 14 Days         Stop These Medications    omeprazole 20 MG capsule  Commonly known as: priLOSEC     propranolol 40 MG tablet  Commonly known as: INDERAL     sucralfate 1 g tablet  Commonly known as: CARAFATE     VOLTAREN PO          Ergocalciferol 50,000 units po weekly on Saturdays x 8 weeks from Jan 9, 2021, then change to cholecalciferol 1,000 units po bid.    Medication refills per Primary Care      >30 on discharge    Azael Abdullahi MD

## 2021-02-17 NOTE — PROGRESS NOTES
SECTION GG      Self Care Performance Discharge:   Oral Hygiene: [place] does less than half the effort. [place] lifts, holds or  supports trunk or limbs but provides less than half the effort.   Toileting Hygiene: : Patient completed the activities by him/herself with no  assistance from a helper.   Toileting Hygiene: : [place] does all of the effort. Patient does none of the  effort to complete the activity. Or, the assistance of 2 or more helpers is  required for the patient to complete the activity.   Shower/Bathe Self: [place] does less than half the effort. [place] lifts, holds  or supports trunk or limbs but provides less than half the effort.   Upper Body Dressing: [place] does less than half the effort. [place] lifts, holds  or supports trunk or limbs but provides less than half the effort.   Lower Body Dressing: [place] does more than half the effort. [place] lifts or  holds trunk or limbs and provides more than half the effort.   Putting On/Taking Off Footwear: [place] does more than half the effort. [place]  lifts or holds trunk or limbs and provides more than half the effort.    Mobility Toilet Transfer Discharge: [place] does more than half the effort.  [place] lifts or holds trunk or limbs and provides more than half the effort.    Signed by: Keya Miranda OTR/L

## 2021-02-17 NOTE — PLAN OF CARE
Goal Outcome Evaluation:  Plan of Care Reviewed With: patient, caregiver, daughter  Progress: improving  Outcome Summary: Pt is ready for DC. Teaching provided to daughter and granddaughter for home care.

## 2021-02-17 NOTE — PROGRESS NOTES
Case Management  Inpatient Rehabilitation Team Conference    Conference Date/Time: 2/17/2021 8:32:13 AM    Team Conference Attendees:  Nevaeh Zavala, Pharmacist  Katelyn Blevins, ALDO Kaba, PT  Keya Miranda, OT  Katelyn James, SLP  Katelyn Narvaez, CTRS  Angela Gonzalez RD, LD  Loco Cortes, RN  Liliya Michaels, MEHRDAD    Demographics            Age: 86Y            Gender: Female    Admission Date: 1/7/2021 2:21:00 PM  Rehabilitation Diagnosis:  CVA right jon  Past Medical History: Past Medical History:  DiagnosisDate  ?Arthritis?  ?CHF (congestive heart failure) (CMS/HCC)?  ?Diabetes mellitus (CMS/HCC)?  ?new DX related to stroke, started on insulin at hospital  ?Disease of thyroid gland?  ?Elevated cholesterol?  ?GERD (gastroesophageal reflux disease)?  ?Hypertension?  ?Sleep apnea?  ?Stroke (CMS/HCC)?    Coronary disease.  Myocardial infarction.  CABG 2000.  Stent 2015.  Stroke 2014  without residual deficit.  Thyroidectomy.  Parathyroid gland surgery.  Hypothyroidism.  Renal insufficiency.  Surgical HistoryExpand by Default  Past Surgical History:  ProcedureLateralityDate  ?APPENDECTOMY??  ?CARDIAC CATHETERIZATION??  ? x2  ?COLON SURGERY??  ?COLONOSCOPY??  ?EYE SURGERY??  ?lens implants and cataract surgery  ?HYSTERECTOMY??  ?VASCULAR SURGERY??  ?varicous veins      Plan of Care  Anticipated Discharge Date/Estimated Length of Stay: ELOS: DC 2/16  Anticipated Discharge Destination: Community discharge with assistance  Discharge Plan : Patient lives with daughter and son-in-law in 2 story home with  ramp entrance. Bed/bath on first floor. Granddaughter also helps out with care.  Family conference held on 2/10 with patient. Daughter, granddaughter, and son on  conference call.  Family teaching to be done with daughter and granddgt prior to d/c. Postponed  due to bad weather.   [facility] to provide home PT, OT, ST, NSG  D/C plan is home with daughter and  son-in-law. Granddgt to help with care.  Family has also hired additional caregiver so will have 2 people to assist as  needed.  Medical Necessity Expected Level Rationale: Goals are to achieve a level of  MIN/MOD with  mobility and self-care and improved visual-spatial.  Rehabilitation prognosis fair.  Medical prognosis indeterminate.  Intensity and Duration: an average of 3 hours/5 days per week  Medical Supervision and 24 Hour Rehab Nursing: x  Physical Therapy: x  PT Intensity/Duration: 1 hour/day, 5 days/week for approximately 15 - 20 days  Occupational Therapy: x  OT Intensity/Duration: 1 hour/day, 5 days/week for approximately 15 - 20 days  Speech and Language Therapy: x  SLP Intensity/Duration: 1 hour/day, 5 days/week for approximately 15 - 20 days  Social Work: x  Therapeutic Recreation: x  Psychology: x  Updated (if changes indicated)    Anticipated Discharge Date/Estimated Length of Stay:   ELOS: DC 2/17    Based on the patient's medical and functional status, their prognosis and  expected level of functional improvement is: Goals are to achieve a level of  MAX/MOD with  mobility and self-care and improved visual-spatial.  Rehabilitation prognosis fair.  Medical prognosis indeterminate.      Interdisciplinary Problem/Goals/Status    All Rehab Problems:  Body Systems    [RN] Respiratory(Active)  Current Status(02/17/2021): O2 1L QHS  Weekly Goal(02/23/2021): Same as discharge  Discharge Goal: Least restrictive amount of 02        Cognition    [ST] Memory(Active)  Current Status(02/16/2021): min/mod memory deficits  Weekly Goal(02/17/2021): Patient will recall functional information after 5  minutes with MIN cues.  Discharge Goal: Patient will return to her baseline memory abilities.    [ST] Right Hemisphere Function(Active)  Current Status(02/16/2021):  min to mod visual  cues for reading/writing tasks  Weekly Goal(02/17/2021): The patient will complete L/R discrimination and  spatial concept tasks with  Min cues.  Discharge Goal: The patient will attend to the left for functional tasks with  only initial min cues.    [ST] Executive Functions(Active)  Current Status(02/16/2021): min cues for attention; fatiques quickly;  distractible  Weekly Goal(02/17/2021): follow schedule with min cues  Discharge Goal: fxnl cognitive skills for home with assist        Mobility    [PT] Wheelchair(Active)  Current Status(02/15/2021): 50' modA (visual deficits)  Weekly Goal(02/22/2021): 50' min-modA  Discharge Goal: 50' min-modA    [PT] Walk(Barrier)  Current Status(02/15/2021): DC goal  Weekly Goal(02/22/2021): DC goal  Discharge Goal: DC goal    [PT] Bed/Chair/Wheelchair(Active)  Current Status(02/15/2021): maxA, squat pivot  Weekly Goal(02/22/2021): mod-maxA, squat pivot  Discharge Goal: mod-maxA    [PT] Bed Mobility(Active)  Current Status(02/15/2021): maxAx2  Weekly Goal(02/22/2021): maxA  Discharge Goal: maxA    [OT] Toilet Transfers(Active)  Current Status(02/15/2021): mod/max 1 second person for safety to droparm  Weekly Goal(02/17/2021): max/mod  Discharge Goal: mod/max  A    [OT] Tub/Shower Transfers(Active)  Current Status(02/15/2021): Max to bench second person for safety  Weekly Goal(02/17/2021): max  Discharge Goal: max A        Psychosocial    [RN] Coping/Adjustment(Active)  Current Status(02/17/2021): Patient has supportive family, but at risk of  ineffective coping regarding current situation.  Weekly Goal(02/23/2021): Allow patient the opportunity to discuss concerns  regarding new situation.  Discharge Goal: Patient will have healthy coping mechanisms at time of discharge        Safety    [RN] Potential for Injury(Active)  Current Status(02/17/2021): Patient at risk of injury related to previous falls.  flaccid on left side. dependent with transfers of 2.  Weekly Goal(02/23/2021): Paitent will use call light appropriately while in  Rehab.  Discharge Goal: Patient will continue to use call light while in  Rehab  environment.        Self Care    [OT] Bathing(Active)  Current Status(02/15/2021): Min UB mod  x 2 LB  Weekly Goal(02/18/2021): Max  Discharge Goal: Min A    [OT] Dressing (Lower)(Active)  Current Status(02/15/2021): max/mod with max 2 for standing  Weekly Goal(02/17/2021): max  Discharge Goal: Max A    [OT] Dressing (Upper)(Active)  Current Status(02/15/2021): mod pullover  Weekly Goal(02/18/2021): Mod A  Discharge Goal: mod    [OT] Grooming(Active)  Current Status(02/08/2021): min/SBA  Weekly Goal(02/17/2021): SBA/min  Discharge Goal: Set-up/min    [OT] Toileting(Active)  Current Status(02/15/2021): Dependent x 2  Weekly Goal(02/16/2021): dep x 2  Discharge Goal: dep x 2        Sphincter Control    [RN] Bladder Management(Active)  Current Status(02/17/2021): Patient is continent and incontinent, using bedpan,  PVRs, requiring I/C at times  Weekly Goal(02/23/2021): Patient will be 50% continent  Discharge Goal: Patient will be 100% continent    [RN] Bowel Management(Active)  Current Status(02/17/2021): Patient continent and incontinent  Weekly Goal(02/23/2021): continent 50% of the time  Discharge Goal: Patient will continent 100% of the time        Comments: 1/12: visual deficits significant at times; very weak; left neglect;  poor PO intake;    1/19: depression noted;    1/26: left hip pain worse recently, tearful at times - MD to order x-ray to  check status;    2/2: visual deficits remain a significant barrier;    2/17: team conference postponed one day due to weather yesterday; MD to order O2  for home but unlikely to be covered by [facility] - may need to pay for it out of  pocket;    Signed by: Loco Cortes, RN    Physician CoSigned By: Azael Abdullahi 02/17/2021 09:20:10

## 2021-02-17 NOTE — THERAPY DISCHARGE NOTE
Inpatient Rehabilitation - Speech Language Pathology Discharge Summary  [facility]       Patient Name: Liana Carrero  : 1935  MRN: [id number]    Today's Date: 2021                   Admit Date: 2021      SLP Recommendation and Plan  The patient made slow progress in therapy. She continues to display moderate visual perceptual deficits and left neglect, which require moderate visual and verbal cues for assist with reading and writing tasks. She also displays mild to moderate deficits in short term memory, and complex reasoning skills.  Supervision is recommended at home. Continued tx is warranted.    Visit Dx:    ICD-10-CM ICD-9-CM   1. Impaired functional mobility, balance, gait, and endurance  Z74.09 V49.89   2. Sleep apnea, unspecified type  G47.30 780.57   3. Closed fracture of left hip with routine healing, subsequent encounter  S72.002D V54.13               SLP GOALS     Row Name 21 1500 02/15/21 1231          Memory Skills Goal 1 (SLP)    Improve Memory Skills Through Goal 1 (SLP)  recall details from a word list  -AW  recalling related word lists immediately;recalling related word lists with an imposed delay;recall details from a word list;repeat sentence;select a word from a list by exclusion;listen to a paragraph and answer questions;use memory strategies;80%;with minimal cues (75-90%)  -LN     Time Frame (Memory Skills Goal 1, SLP)  --  1 week  -LN     Progress (Memory Skills Goal 1, SLP)  80%;independently (over 90% accuracy);100%;with minimal cues (75-90%)  -AW  70%;independently (over 90% accuracy)  -LN     Progress/Outcomes (Memory Skills Goal 1, SLP)  goal ongoing  -AW  goal ongoing  -LN     Comment (Memory Skills Goal 1, SLP)  listening to 4 words x2 and recalling word by order  -AW  Short-pargraph recall  -LN        Organizational Skills Goal 1 (SLP)    Improve Thought Organization Through Goal 1 (SLP)  completing a verbal sequencing task  -AW  completing a divergent naming  "task;completing a convergent naming task;generating a list of items in a category;naming by category exclusion;naming similarities and differences;drawing conclusions;80%;with minimal cues (75-90%)  -LN     Time Frame (Thought Organization Skills Goal 1, SLP)  --  1 week  -LN     Barriers (Thought Organization Skills Goal 1, SLP)  --  MOD cues to scan L/R  -LN     Progress (Thought Organization Skills Goal 1, SLP)  90%;independently (over 90% accuracy)  -AW  40%;independently (over 90% accuracy);60%;with minimal cues (75-90%);100%;with moderate cues (50-74%)  -LN     Progress/Outcomes (Thought Organization Skills Goal 1, SLP)  goal ongoing  -AW  goal ongoing  -LN     Comment (Thought Organization Skills Goal 1, SLP)  sequencing 6 step tasks  -AW  Category exclusion (five-word)  -LN        Reasoning Goal 1 (SLP)    Improve Reasoning Through Goal 1 (SLP)  --  complete basic reasoning task;complete analogies;complete deductive reasoning task;80%;with minimal cues (75-90%)  -LN     Time Frame (Reasoning Goal 1, SLP)  --  1 week  -LN     Progress (Reasoning Goal 1, SLP)  --  50%;independently (over 90% accuracy);100%;with minimal cues (75-90%)  -LN     Progress/Outcomes (Reasoning Goal 1, SLP)  --  goal ongoing  -LN     Comment (Reasoning Goal 1, SLP)  --  \"odd man out task\"   -LN        Functional Math Skills Goal 1 (SLP)    Improve Functional Math Skills Through Goal 1 (SLP)  --  complete simple math problems;complete functional math task;complete word problems involving time;complete word problems involving money;80%;with minimal cues (75-90%)  -LN     Time Frame (Functional Math Skills Goal 1, SLP)  --  1 week  -LN     Progress (Functional Math Skills Goal 1, SLP)  --  90%;independently (over 90% accuracy)  -LN     Progress/Outcomes (Functional Math Skills Goal 1, SLP)  --  goal ongoing  -LN     Comment (Functional Math Skills Goal 1, SLP)  --  Simple addition/subtraction  -LN        Right Hemisphere Function Goal 1 " (SLP)    Improve Right Hemisphere Function Through Goal 1 (SLP)  --  demonstrate awareness of communication partner in left visual field;complete visuo-spatial activities (visual closure, trail making, mazes;complete visuo-perceptual activities (L/R discrimination, spatial concepts);use compensatory strategies for left neglect;70%;with moderate cues (50-74%)  -LN     Time Frame (Right Hemisphere Function Goal 1, SLP)  --  1 week  -LN     Progress (Right Hemisphere Function Goal 1, SLP)  --  50%;with minimal cues (75-90%);100%;with moderate cues (50-74%)  -LN     Progress/Outcomes (Right Hemisphere Function Goal 1, SLP)  --  goal ongoing  -LN     Comment (Right Hemisphere Function Goal 1, SLP)  --  Discriminating spatial concepts  -LN       User Key  (r) = Recorded By, (t) = Taken By, (c) = Cosigned By    Initials Name Provider Type    Brynn Reynaga MS CCC-SLP Speech and Language Pathologist    LN Nuzhat Beebe Speech and Language Pathologist                  SLP Discharge Summary  Anticipated Discharge Disposition (SLP): home with 24/7 care  Reason for Discharge: discharge from this facility  Progress Toward Achieving Short/long Term Goals: goals partially met within established timelines  Discharge Destination: home w/ 24/7 care, home w/ home health      Katelyn James MS CCC-SLP  2/17/2021

## 2021-02-17 NOTE — THERAPY DISCHARGE NOTE
Inpatient Rehabilitation - Physical Therapy Treatment Note/Discharge  [facility]     Patient Name: Liana Carrero  : 1935  MRN: [id number]  Today's Date: 2021                Admit Date: 2021    Visit Dx:    ICD-10-CM ICD-9-CM   1. Impaired functional mobility, balance, gait, and endurance  Z74.09 V49.89   2. Sleep apnea, unspecified type  G47.30 780.57   3. Closed fracture of left hip with routine healing, subsequent encounter  S72.002D V54.13     Patient Active Problem List   Diagnosis   • Stroke (cerebrum) (CMS/Formerly [facility])   • Chest pain   • Hypertensive urgency   • Headache   • History of embolic stroke   • Hypothyroidism   • Hypokalemia   • CAD in native artery   • URTI (acute upper respiratory infection)   • Closed fracture of left hip (CMS/Formerly [facility])   • Chronic diastolic (congestive) heart failure (CMS/Formerly [facility])   • Coronary artery disease involving autologous vein coronary bypass graft without angina pectoris   • Impaired glucose tolerance   • Moderate malnutrition (CMS/Formerly [facility])   • Stroke (CMS/Formerly [facility])     Past Medical History:   Diagnosis Date   • Arthritis    • CHF (congestive heart failure) (CMS/Formerly [facility])    • Coronary artery disease    • Diabetes mellitus (CMS/Formerly [facility])     new DX related to stroke, started on insulin at hospital   • Disease of thyroid gland    • Elevated cholesterol    • GERD (gastroesophageal reflux disease)    • History of transfusion    • Hypertension    • PONV (postoperative nausea and vomiting)    • Sleep apnea    • Spinal headache    • Stroke (CMS/Formerly [facility])      Past Surgical History:   Procedure Laterality Date   • APPENDECTOMY     • CARDIAC CATHETERIZATION       x2   • CARDIAC CATHETERIZATION N/A 2019    Procedure: Left Heart Cath;  Surgeon: Bentley Chapin MD;  Location: [facility];  Service: Cardiovascular   • CARDIAC CATHETERIZATION N/A 2019    Procedure: Coronary angiography;  Surgeon: Bentley Chapin MD;  Location:  [facility] CATH INVASIVE LOCATION;  Service:  Cardiovascular   • CARDIAC CATHETERIZATION N/A 12/26/2019    Procedure: Native mammary injection;  Surgeon: Bentley Chapin MD;  Location:  [facility] CATH INVASIVE LOCATION;  Service: Cardiovascular   • CARDIAC CATHETERIZATION N/A 12/26/2019    Procedure: Saphenous Vein Graft;  Surgeon: Bentley Chapin MD;  Location:  [facility] CATH INVASIVE LOCATION;  Service: Cardiovascular   • COLON SURGERY     • COLONOSCOPY     • EYE SURGERY      lens implants and cataract surgery   • FEMUR IM NAILING/RODDING Left 3/26/2020    Procedure: LT.HIP NAILING/RODDING;  Surgeon: Carlos Khan II, MD;  Location:  [facility] MAIN OR;  Service: Orthopedics;  Laterality: Left;   • FRACTURE SURGERY Left 2020   • HYSTERECTOMY     • VASCULAR SURGERY      varicous veins          PT ASSESSMENT (last 12 hours)      IRF PT Evaluation and Treatment     Row Name 02/17/21 1453          PT Time and Intention    Document Type  discharge evaluation  -MS     Mode of Treatment  physical therapy  -MS     Patient/Family/Caregiver Comments/Observations  Sitting up in wheelchair upon arrival for both sessions- daughter and granddaughter present for family teaching.  -MS     Total Minutes, Physical Therapy  45  -MS     Row Name 02/17/21 1453          General Information    Existing Precautions/Restrictions  fall  -MS     Limitations/Impairments  safety/cognitive  -MS     Row Name 02/17/21 1453          Vision Assessment/Intervention    Visual Impairment/Limitations  visual/perceptual impairments present  -MS     Visual Field Deficit  homonymous hemianopsia, left  -MS     Visual Motor Impairment  visual tracking, left  -MS     Visual Processing Deficit  jon-inattention/neglect, left  -MS     Row Name 02/17/21 1453          Cognition/Psychosocial    Affect/Mental Status (Cognitive)  WFL  -MS     Orientation Status (Cognition)  oriented x 3  -MS     Follows Commands (Cognition)  follows two-step commands;repetition of directions required;verbal  cues/prompting required  -MS     Personal Safety Interventions  fall prevention program maintained;gait belt;nonskid shoes/slippers when out of bed  -MS     Cognitive Function (Cognitive)  attention deficit  -MS     Attention Deficit (Cognitive)  distractible in noisy environment;divided attention  -MS     Executive Function Deficit (Cognition)  insight/awareness of deficits  -MS     Row Name 02/17/21 1453          Pain Scale: Numbers Pre/Post-Treatment    Pretreatment Pain Rating  0/10 - no pain  -MS     Posttreatment Pain Rating  0/10 - no pain  -MS     Row Name 02/17/21 1453          Bed Mobility    Comment (Bed Mobility)  NT- up in wheelchair upon PT arrival. Performed with OT.  -MS     Row Name 02/17/21 1453          Transfer Assessment/Treatment    Comment (Transfers)  Reviewed set up for transfers with patient and family as well as positioning and to enhance safety. Car transfers performed but patient's family more comfortable with transferring towards strong side and using back seat instead and then assisting with scooting towards other side of seat.  -MS     Row Name 02/17/21 1453          Transfers    Bed-Chair Stanton (Transfers)  maximum assist (25% patient effort);verbal cues;nonverbal cues (demo/gesture) stand pivot  -MS     Chair-Bed Stanton (Transfers)  maximum assist (25% patient effort);verbal cues;nonverbal cues (demo/gesture) stand pivot  -MS     Assistive Device (Bed-Chair Transfers)  wheelchair  -MS     Sit-Stand Stanton (Transfers)  maximum assist (25% patient effort);verbal cues;nonverbal cues (demo/gesture) stand pivot  -MS     Stand-Sit Stanton (Transfers)  maximum assist (25% patient effort);verbal cues;nonverbal cues (demo/gesture) stand pivot  -MS     Row Name 02/17/21 1453          Chair-Bed Transfer    Assistive Device (Chair-Bed Transfers)  wheelchair  -MS     Row Name 02/17/21 1453          Sit-Stand Transfer    Assistive Device (Sit-Stand Transfers)  wheelchair   -MS     Row Name 02/17/21 1453          Stand-Sit Transfer    Assistive Device (Stand-Sit Transfers)  wheelchair  -MS     Row Name 02/17/21 1453          Stand Pivot/Stand Step Transfer    Stand Pivot/Stand Step Shevlin (Transfers)  maximum assist (25% patient effort);verbal cues;nonverbal cues (demo/gesture) stand pivot  -MS     Assistive Device (Stand Pivot Stand Step Transfer)  wheelchair  -MS     Row Name 02/17/21 1453          Car Transfer    Type (Car Transfer)  stand pivot/stand step  -MS     Shevlin Level (Car Transfer)  maximum assist (25% patient effort);2 person assist;verbal cues;nonverbal cues (demo/gesture)  -MS     Assistive Device (Car Transfer)  wheelchair  -MS     Row Name 02/17/21 1453          Wheelchair Mobility/Management    Method of Wheelchair Locomotion (Mobility)  jon propulsion, right sided  -MS     Mobility Activities (Wheelchair)  forward propulsion  -MS     Forward Propulsion Shevlin (Wheelchair)  moderate assist (50% patient effort);verbal cues  -MS     Steering Shevlin (Wheelchair)  moderate assist (50% patient effort);verbal cues  -MS     Armrest Management Shevlin (Wheelchair)  dependent (less than 25% patient effort)  -MS     Front Rigging/Swing-away Footrest Management Shevlin (Wheelchair)  dependent (less than 25% patient effort)  -MS     Wheel Locks/Brakes Management Shevlin (Wheelchair)  dependent (less than 25% patient effort)  -MS     Row Name 02/17/21 1453          Balance    Static Sitting Balance  mild impairment  -MS     Dynamic Sitting Balance  mild impairment  -MS     Sit to Stand Dynamic Balance  moderate impairment  -MS     Static Standing Balance  moderate impairment  -MS     Row Name 02/17/21 1453          Bed Mobility Goal 1 (PT-IRF)    Progress/Outcomes (Bed Mobility Goal 1, PT-IRF)  medical status inhibited participation  -MS     Row Name 02/17/21 1453          Bed Mobility Goal 2 (PT-IRF)    Progress/Outcomes (Bed Mobility  Goal 2, PT-IRF)  goal met  -MS     Row Name 02/17/21 1453          Transfer Goal 1 (PT-IRF)    Progress/Outcomes (Transfer Goal 1, PT-IRF)  medical status inhibited participation  -MS     Row Name 02/17/21 1453          Transfer Goal 2 (PT-IRF)    Progress/Outcomes (Transfer Goal 2, PT-IRF)  goal met  -MS     Row Name 02/17/21 1453          Gait/Walking Locomotion Goal 1 (PT-IRF)    Progress/Outcomes (Gait/Walking Locomotion Goal 1, PT-IRF)  goal met  -MS     Row Name 02/17/21 1453          Wheelchair Locomotion Goal 1 (PT-IRF)    Progress/Outcomes (Wheelchair Locomotion Goal 1, PT-IRF)  medical status inhibited participation  -MS     Row Name 02/17/21 1453          Balance Goal 1 (PT)    Progress/Outcomes (Balance Goal 1, PT)  goal met  -MS     Row Name 02/17/21 1453          Balance Goal 2 (PT)    Progress/Outcome (Balance Goal 2, PT)  goal met  -MS     Row Name 02/17/21 1453          Safety Awareness Goal 1 (PT-IRF)    Progress/Outcomes (Safety Awareness Goal 1, PT-IRF)  goal met  -MS     Row Name 02/17/21 1453          Positioning and Restraints    Pre-Treatment Position  sitting in chair/recliner  -MS     In Wheelchair  sitting;call light within reach;encouraged to call for assist;exit alarm on AM and PM session  -MS       User Key  (r) = Recorded By, (t) = Taken By, (c) = Cosigned By    Initials Name Provider Type    MS KabaNati, PT Physical Therapist          Physical Therapy Education                 Title: PT OT SLP Therapies (Resolved)     Topic: Physical Therapy (Resolved)     Point: Mobility training (Resolved)     Learning Progress Summary           Patient Acceptance, D,TB,E, VU,NR by  at 2/16/2021 1424    Comment: WC propulsion and management    Acceptance, E,TB, VU,NR by MS at 2/15/2021 1253    Acceptance, D,E, NR by  at 2/13/2021 0828    Comment: w/c propulsion    Acceptance, E,TB, VU,NR by MS at 2/12/2021 1507    Acceptance, E,D, NR by  at 2/11/2021 1120    Comment: standing posture  and gait sequence    Acceptance, E, VU by SL at 2/10/2021 1504    Comment: family conference- discussed need for visual and verbal cues for visual perceptual deficits, and deficts in attn, memory    Acceptance, E,TB, VU,NR by MS at 2/10/2021 1401    Acceptance, E,TB, VU,NR by MS at 2/9/2021 1531    Eager, E,D, NR by KP at 2/9/2021 1119    Comment: standing posture, balance    Acceptance, E,TB, VU,NR by MS at 2/8/2021 1152    Acceptance, E,D, DU,NR by LB at 2/6/2021 1539    Comment: wt shift in standing    Acceptance, E,TB, VU,NR by MS at 2/5/2021 1208    Acceptance, E,TB, VU,NR by MS at 2/4/2021 1518    Acceptance, E,TB, VU,NR by MS at 2/3/2021 1151    Acceptance, E,TB, VU,NR by MS at 2/2/2021 1210    Acceptance, E,TB, NR by MS at 2/1/2021 1145    Acceptance, E, NR by LH at 1/30/2021 1336    Acceptance, E,TB, VU,NR by MS at 1/29/2021 1535    Acceptance, E, NR by LB1 at 1/28/2021 1032    Acceptance, E,TB, VU,NR by MS at 1/27/2021 1447    Acceptance, E,TB, VU,NR by MS at 1/26/2021 0959    Acceptance, E,TB, VU,NR by MS at 1/25/2021 1316    Acceptance, E,TB, VU,NR by MS at 1/22/2021 0918    Acceptance, E,TB, VU by MS at 1/20/2021 1544    Acceptance, E,TB, VU,NR by MS at 1/19/2021 1401    Acceptance, E,TB, NR,NL by MS at 1/18/2021 1432    Acceptance, E,TB, VU,NR by IVAN at 1/16/2021 1444    Acceptance, E,TB, NR,NL by MS at 1/15/2021 1418    Acceptance, E,TB, NR by MS at 1/14/2021 1223    Acceptance, E,TB, NR by LB2 at 1/13/2021 1030    Comment: sitting balance    Acceptance, E,TB, NR by MS at 1/12/2021 1312    Acceptance, E,TB, VU,NR by MS at 1/11/2021 1140   Family Acceptance, E, VU by SL at 2/10/2021 1504    Comment: family conference- discussed need for visual and verbal cues for visual perceptual deficits, and deficts in attn, memory                   Point: Home exercise program (Resolved)     Learning Progress Summary           Patient Acceptance, E,TB, VU,NR by MS at 2/12/2021 1507    Acceptance, E, VU by SL at  2/10/2021 1504    Comment: family conference- discussed need for visual and verbal cues for visual perceptual deficits, and deficts in attn, memory    Acceptance, E,TB, VU,NR by MS at 2/10/2021 1401    Acceptance, E,TB, VU,NR by MS at 2/9/2021 1531    Acceptance, E,TB, VU,NR by MS at 2/8/2021 1152    Acceptance, E,TB, VU,NR by MS at 2/5/2021 1208    Acceptance, E,TB, VU,NR by MS at 2/4/2021 1518    Acceptance, E,TB, VU,NR by MS at 2/3/2021 1151    Acceptance, E,TB, VU,NR by MS at 2/2/2021 1210    Acceptance, E,TB, NR by MS at 2/1/2021 1145    Acceptance, E, NR by LH at 1/30/2021 1336    Acceptance, E,TB, VU,NR by MS at 1/29/2021 1535    Acceptance, E,TB, VU,NR by MS at 1/27/2021 1447    Acceptance, E,TB, VU,NR by MS at 1/26/2021 0959    Acceptance, E,TB, VU,NR by MS at 1/25/2021 1316    Acceptance, E,TB, VU,NR by MS at 1/22/2021 0918    Acceptance, E,TB, VU by MS at 1/20/2021 1544    Acceptance, E,TB, VU,NR by MS at 1/19/2021 1401    Acceptance, E,TB, NR,NL by MS at 1/18/2021 1432    Acceptance, E,TB, NR,NL by MS at 1/15/2021 1418    Acceptance, E,TB, NR by MS at 1/14/2021 1223   Family Acceptance, E, VU by SL at 2/10/2021 1504    Comment: family conference- discussed need for visual and verbal cues for visual perceptual deficits, and deficts in attn, memory                   Point: Body mechanics (Resolved)     Learning Progress Summary           Patient Acceptance, E,TB, VU,NR by MS at 2/15/2021 1253    Acceptance, E,TB, VU,NR by MS at 2/12/2021 1507    Acceptance, E, VU by SL at 2/10/2021 1504    Comment: family conference- discussed need for visual and verbal cues for visual perceptual deficits, and deficts in attn, memory    Acceptance, E,TB, VU,NR by MS at 2/10/2021 1401    Acceptance, E,TB, VU,NR by MS at 2/9/2021 1531    Acceptance, E,TB, VU,NR by MS at 2/8/2021 1152    Acceptance, E,TB, VU,NR by MS at 2/5/2021 1208    Acceptance, E,TB, VU,NR by MS at 2/4/2021 1518    Acceptance, E,TB, VU,NR by MS at  2/3/2021 1151    Acceptance, E,TB, VU,NR by MS at 2/2/2021 1210    Acceptance, E,TB, NR by MS at 2/1/2021 1145    Acceptance, E, NR by LH at 1/30/2021 1336    Acceptance, E,TB, VU,NR by MS at 1/29/2021 1535    Acceptance, E,TB, VU,NR by MS at 1/27/2021 1447    Acceptance, E,TB, VU,NR by MS at 1/26/2021 0959    Acceptance, E,TB, VU,NR by MS at 1/25/2021 1316    Acceptance, E,TB, VU,NR by MS at 1/22/2021 0918    Acceptance, E,TB, VU by MS at 1/20/2021 1544    Acceptance, E,TB, VU,NR by MS at 1/19/2021 1401    Acceptance, E,TB, NR,NL by MS at 1/15/2021 1418    Acceptance, E,TB, NR by MS at 1/14/2021 1223    Acceptance, E,TB, NR by MS at 1/12/2021 1312    Acceptance, E,TB, VU,NR by MS at 1/11/2021 1140   Family Acceptance, E, VU by SL at 2/10/2021 1504    Comment: family conference- discussed need for visual and verbal cues for visual perceptual deficits, and deficts in attn, memory                   Point: Precautions (Resolved)     Learning Progress Summary           Patient Acceptance, E,TB, VU,NR by MS at 2/15/2021 1253    Acceptance, E,TB, VU,NR by MS at 2/12/2021 1507    Acceptance, E, VU by SL at 2/10/2021 1504    Comment: family conference- discussed need for visual and verbal cues for visual perceptual deficits, and deficts in attn, memory    Acceptance, E,TB, VU,NR by MS at 2/10/2021 1401    Acceptance, E,TB, VU,NR by MS at 2/9/2021 1531    Acceptance, E,TB, VU,NR by MS at 2/8/2021 1152    Acceptance, E,TB, VU,NR by MS at 2/5/2021 1208    Acceptance, E,TB, VU,NR by MS at 2/4/2021 1518    Acceptance, E,TB, VU,NR by MS at 2/3/2021 1151    Acceptance, E,TB, VU,NR by MS at 2/2/2021 1210    Acceptance, E,TB, NR by MS at 2/1/2021 1145    Acceptance, E, NR by LH at 1/30/2021 1336    Acceptance, E,TB, VU,NR by MS at 1/29/2021 1535    Acceptance, E,TB, VU,NR by MS at 1/27/2021 1447    Acceptance, E,TB, VU,NR by MS at 1/26/2021 0959    Acceptance, E,TB, VU,NR by MS at 1/25/2021 1316    Acceptance, E,TB, VU,NR by MS at  1/22/2021 0918    Acceptance, E, VU by MD at 1/21/2021 0926    Acceptance, E,TB, VU by MS at 1/20/2021 1544    Acceptance, E,TB, VU,NR by MS at 1/19/2021 1401    Acceptance, E,TB, NR,NL by MS at 1/15/2021 1418    Acceptance, E,TB, NR by MS at 1/14/2021 1223    Acceptance, E,TB, NR by MS at 1/12/2021 1312    Acceptance, E,TB, VU,NR by MS at 1/11/2021 1140    Acceptance, E, VU by MD at 1/9/2021 0933   Family Acceptance, E, VU by SL at 2/10/2021 1504    Comment: family conference- discussed need for visual and verbal cues for visual perceptual deficits, and deficts in attn, memory                               User Key     Initials Effective Dates Name Provider Type Discipline    LB 06/08/18 -  Elba Zarate, PT Physical Therapist PT    SL 06/08/18 -  Katelyn James MS CCC-SLP Speech and Language Pathologist SLP    IVAN 06/08/18 -  Ela Pritchett, PT Physical Therapist PT    LB2 04/03/18 -  Jazmin Schuster, PT Physical Therapist PT     04/03/18 -  Elba Myrick, PT Physical Therapist PT    LB1 03/07/18 -  Elba Grigsby, PTA Physical Therapy Assistant PT    MD 04/03/18 -  Lisandra Lowery, PT Physical Therapist PT     04/03/18 -  Dixie Graham, PT Physical Therapist PT    MS 03/04/19 -  Nati Kaba, PT Physical Therapist PT                PT Recommendation and Plan  Planned Therapy Interventions (PT): balance training, bed mobility training, gait training, home exercise program, postural re-education, patient/family education, neuromuscular re-education, strengthening, transfer training, wheelchair management/propulsion training, ROM (range of motion)               Time Calculation:   PT Charges     Row Name 02/17/21 1454 02/17/21 1453          Time Calculation    Start Time  1330  -MS  1100  -MS     Stop Time  1345  -MS  1135  -MS     Time Calculation (min)  15 min  -MS  35 min  -MS     PT Received On  --  02/17/21  -MS       User Key  (r) = Recorded By, (t) = Taken By, (c) = Cosigned By    Initials Name  Provider Type    Nati Quinteros, PT Physical Therapist          Therapy Charges for Today     Code Description Service Date Service Provider Modifiers Qty    26466077629 HC PT THERAPEUTIC ACT EA 15 MIN 2/17/2021 Nati Kaba, PT GP 3    [id number]  PT THER SUPP EA 15 MIN 2/17/2021 Nati Kaba, PT GP 3               PT Discharge Summary  Discharge Destination: Home with home health, Home with assist    Nati Kaba, PT  2/17/2021

## 2021-02-17 NOTE — PROGRESS NOTES
TEAM CONF - FEB 17 -   LEFT HEMIPLEGIA, LEFT HOMONYMOUS HEMIANOPSIA, LEFT INATTENTION. BED MAX ASSIST. TRANSFER MAX. NON AMBULATORY. TOILET TRASNFERS MOD-MAX ASSIST. BATH UB MIN AND LB MOD. DRESSING UB MOD, LBD MOD MAX WITH MAX 2 FOR STANDING. ATTENDING MORE TO LEFT SIDE. KINESIOTAPING LEFT SHOULDER, TO OBTAIN KALE-HARNESS FOR SUBLUXATION AT HOME. COGNITION - MIN CUES FOR ATTENTION, MIN-MOD FOR MEMORY DEFICITS. CONTINENT/INCONTINENT BOWEL AND BLADDER. FAMILY TEACHING TODAY.   ELOS-    TODAY AFTER FAMILY TEACHING. DOES NOT USE HER CPAP/BIPAP. ON NASAL CANNULA 1L O2 AT NIGHT.

## 2021-02-18 NOTE — PROGRESS NOTES
PPS CMG Coordinator  Inpatient Rehabilitation Discharge    Mode of Locomotion: Wheelchair.    Discharge Against Medical Advice:  No.  Discharge Information  Patient Discharged Alive:  Yes  Discharge Destination/Living Setting: Home with Home Health Services  Diagnosis for Interruption/Death: ICD    Impairment Group: Stroke: 01.2 Right Body Involvement (Left Brain)    Comorbidities: ICD    Complications: ICD    QUALITY INDICATORS  Section J Health Conditions: Fall(s) Since Admission:  No    Section M. Skin Conditions Discharge:  Unhealed Pressure Ulcer(s) at Stage 1 or  Higher:  No    . Current Number of Unhealed Pressure Ulcers  Branch    Section N. Medication:  Medication Intervention: N/A - There were no potential clinically significant  medication issues identified since admission or patient is not taking any  medications.    Signed by: Loco Cortes RN

## 2021-02-23 ENCOUNTER — LAB REQUISITION (OUTPATIENT)
Dept: LAB | Facility: HOSPITAL | Age: 86
End: 2021-02-23

## 2021-02-23 DIAGNOSIS — I10 ESSENTIAL (PRIMARY) HYPERTENSION: ICD-10-CM

## 2021-02-23 LAB
ALBUMIN SERPL-MCNC: 3.3 G/DL (ref 3.5–5.2)
ALBUMIN/GLOB SERPL: 1 G/DL
ALP SERPL-CCNC: 98 U/L (ref 39–117)
ALT SERPL W P-5'-P-CCNC: 13 U/L (ref 1–33)
ANION GAP SERPL CALCULATED.3IONS-SCNC: 10.5 MMOL/L (ref 5–15)
AST SERPL-CCNC: 11 U/L (ref 1–32)
BASOPHILS # BLD AUTO: 0.03 10*3/MM3 (ref 0–0.2)
BASOPHILS NFR BLD AUTO: 0.7 % (ref 0–1.5)
BILIRUB SERPL-MCNC: 0.3 MG/DL (ref 0–1.2)
BILIRUB UR QL STRIP: NEGATIVE
BUN SERPL-MCNC: 13 MG/DL (ref 8–23)
BUN/CREAT SERPL: 19.1 (ref 7–25)
CALCIUM SPEC-SCNC: 9.1 MG/DL (ref 8.6–10.5)
CHLORIDE SERPL-SCNC: 107 MMOL/L (ref 98–107)
CLARITY UR: CLEAR
CO2 SERPL-SCNC: 30.5 MMOL/L (ref 22–29)
COLOR UR: ABNORMAL
CREAT SERPL-MCNC: 0.68 MG/DL (ref 0.57–1)
DEPRECATED RDW RBC AUTO: 58.8 FL (ref 37–54)
EOSINOPHIL # BLD AUTO: 0.12 10*3/MM3 (ref 0–0.4)
EOSINOPHIL NFR BLD AUTO: 2.7 % (ref 0.3–6.2)
ERYTHROCYTE [DISTWIDTH] IN BLOOD BY AUTOMATED COUNT: 17.4 % (ref 12.3–15.4)
GFR SERPL CREATININE-BSD FRML MDRD: 82 ML/MIN/1.73
GLOBULIN UR ELPH-MCNC: 3.2 GM/DL
GLUCOSE SERPL-MCNC: 142 MG/DL (ref 65–99)
GLUCOSE UR STRIP-MCNC: NEGATIVE MG/DL
HCT VFR BLD AUTO: 41.9 % (ref 34–46.6)
HGB BLD-MCNC: 13.1 G/DL (ref 12–15.9)
HGB UR QL STRIP.AUTO: NEGATIVE
IMM GRANULOCYTES # BLD AUTO: 0.01 10*3/MM3 (ref 0–0.05)
IMM GRANULOCYTES NFR BLD AUTO: 0.2 % (ref 0–0.5)
KETONES UR QL STRIP: NEGATIVE
LEUKOCYTE ESTERASE UR QL STRIP.AUTO: NEGATIVE
LYMPHOCYTES # BLD AUTO: 1.23 10*3/MM3 (ref 0.7–3.1)
LYMPHOCYTES NFR BLD AUTO: 28.1 % (ref 19.6–45.3)
MCH RBC QN AUTO: 28.9 PG (ref 26.6–33)
MCHC RBC AUTO-ENTMCNC: 31.3 G/DL (ref 31.5–35.7)
MCV RBC AUTO: 92.5 FL (ref 79–97)
MONOCYTES # BLD AUTO: 0.33 10*3/MM3 (ref 0.1–0.9)
MONOCYTES NFR BLD AUTO: 7.6 % (ref 5–12)
NEUTROPHILS NFR BLD AUTO: 2.65 10*3/MM3 (ref 1.7–7)
NEUTROPHILS NFR BLD AUTO: 60.7 % (ref 42.7–76)
NITRITE UR QL STRIP: NEGATIVE
NRBC BLD AUTO-RTO: 0 /100 WBC (ref 0–0.2)
PH UR STRIP.AUTO: 5.5 [PH] (ref 4.5–8)
PLATELET # BLD AUTO: 209 10*3/MM3 (ref 140–450)
PMV BLD AUTO: 10 FL (ref 6–12)
POTASSIUM SERPL-SCNC: 3.4 MMOL/L (ref 3.5–5.2)
PROT SERPL-MCNC: 6.5 G/DL (ref 6–8.5)
PROT UR QL STRIP: NEGATIVE
RBC # BLD AUTO: 4.53 10*6/MM3 (ref 3.77–5.28)
SODIUM SERPL-SCNC: 148 MMOL/L (ref 136–145)
SP GR UR STRIP: 1.02 (ref 1–1.03)
UROBILINOGEN UR QL STRIP: ABNORMAL
WBC # BLD AUTO: 4.37 10*3/MM3 (ref 3.4–10.8)

## 2021-02-23 PROCEDURE — 80053 COMPREHEN METABOLIC PANEL: CPT | Performed by: FAMILY MEDICINE

## 2021-02-23 PROCEDURE — 81003 URINALYSIS AUTO W/O SCOPE: CPT | Performed by: FAMILY MEDICINE

## 2021-02-23 PROCEDURE — 85025 COMPLETE CBC W/AUTO DIFF WBC: CPT | Performed by: FAMILY MEDICINE

## 2021-03-05 DIAGNOSIS — I63.9 CEREBROVASCULAR ACCIDENT (CVA), UNSPECIFIED MECHANISM (HCC): Primary | ICD-10-CM

## 2021-04-09 ENCOUNTER — TELEPHONE (OUTPATIENT)
Dept: GASTROENTEROLOGY | Facility: CLINIC | Age: 86
End: 2021-04-09

## 2021-04-09 NOTE — TELEPHONE ENCOUNTER
----- Message from Blanca Bruno MD sent at 4/9/2021 11:01 AM EDT -----  Needs to reschedule follow up, thx, no need to overbook

## 2021-04-13 NOTE — TELEPHONE ENCOUNTER
3RD CALL TO RESCHEDULE OFFICE FOLLOW UP NO ANSWER NO OPTION TO LEAVE MSG MAILED PT SCHEDULING REMINDER LETTER.

## 2021-10-29 ENCOUNTER — TRANSCRIBE ORDERS (OUTPATIENT)
Dept: HOME HEALTH SERVICES | Facility: HOME HEALTHCARE | Age: 86
End: 2021-10-29

## 2021-10-29 ENCOUNTER — HOME HEALTH ADMISSION (OUTPATIENT)
Dept: HOME HEALTH SERVICES | Facility: HOME HEALTHCARE | Age: 86
End: 2021-10-29

## 2021-10-29 DIAGNOSIS — I25.728 CORONARY ARTERY DISEASE INVOLVING AUTOLOGOUS ARTERY CORONARY BYPASS GRAFT WITH OTHER FORMS OF ANGINA PECTORIS (HCC): ICD-10-CM

## 2021-10-29 DIAGNOSIS — I50.33 ACUTE ON CHRONIC DIASTOLIC CONGESTIVE HEART FAILURE (HCC): ICD-10-CM

## 2021-10-29 DIAGNOSIS — I44.2 THIRD DEGREE HEART BLOCK (HCC): Primary | ICD-10-CM

## 2021-11-02 ENCOUNTER — HOME CARE VISIT (OUTPATIENT)
Dept: HOME HEALTH SERVICES | Facility: HOME HEALTHCARE | Age: 86
End: 2021-11-02

## 2021-11-02 VITALS
SYSTOLIC BLOOD PRESSURE: 104 MMHG | RESPIRATION RATE: 18 BRPM | DIASTOLIC BLOOD PRESSURE: 72 MMHG | HEART RATE: 63 BPM | OXYGEN SATURATION: 98 % | TEMPERATURE: 97.8 F

## 2021-11-02 PROCEDURE — G0151 HHCP-SERV OF PT,EA 15 MIN: HCPCS

## 2021-11-02 NOTE — CASE COMMUNICATION
PT SOC 11/2/21. Anticipate 3mo1 for evaluation, transfer training, home safety instruction, caregiver training.

## 2021-11-02 NOTE — HOME HEALTH
Reason for referral:85 yo F referred to home health after recent hospitalization for pacemaker placement with decreased ability to transfer.    Subjective: It's really hard for us to stand with her to pull her briefs up or down. per family    Past Medical History: CVA with L sided hemiplegia, CHF, CAD    Previous level of function: patient dependent for transfer via stand pivot to/from bed/WC/commode. Dependent for ADL's.     Home environment/support: lives with daughter. Family provides total care. Patient has hospital bed, manual WC, BSC, cristine lift, power WC/scooter ordered, ramp to enter/exit home

## 2021-11-03 ENCOUNTER — HOME CARE VISIT (OUTPATIENT)
Dept: HOME HEALTH SERVICES | Facility: HOME HEALTHCARE | Age: 86
End: 2021-11-03

## 2021-11-03 VITALS
RESPIRATION RATE: 18 BRPM | SYSTOLIC BLOOD PRESSURE: 122 MMHG | OXYGEN SATURATION: 98 % | TEMPERATURE: 97.6 F | HEART RATE: 64 BPM | DIASTOLIC BLOOD PRESSURE: 76 MMHG

## 2021-11-03 PROCEDURE — G0299 HHS/HOSPICE OF RN EA 15 MIN: HCPCS

## 2021-11-03 PROCEDURE — G0152 HHCP-SERV OF OT,EA 15 MIN: HCPCS

## 2021-11-05 NOTE — HOME HEALTH
patient well known to this OT from previous episode; she lives with dtr and son in law who both owrk full time so grandhosseinr and kishan provide care. Bedroom with hospital bed, BSC and lift chait, bathroom with walk in tub, kitchen and living room are all on the main level.  Plan for next visit: CG education on bed mobility and safety

## 2021-11-08 ENCOUNTER — HOME CARE VISIT (OUTPATIENT)
Dept: HOME HEALTH SERVICES | Facility: HOME HEALTHCARE | Age: 86
End: 2021-11-08

## 2021-11-08 VITALS
DIASTOLIC BLOOD PRESSURE: 74 MMHG | HEART RATE: 68 BPM | TEMPERATURE: 97.4 F | SYSTOLIC BLOOD PRESSURE: 118 MMHG | RESPIRATION RATE: 22 BRPM

## 2021-11-08 VITALS
TEMPERATURE: 98 F | HEART RATE: 78 BPM | RESPIRATION RATE: 16 BRPM | DIASTOLIC BLOOD PRESSURE: 68 MMHG | OXYGEN SATURATION: 99 % | SYSTOLIC BLOOD PRESSURE: 112 MMHG

## 2021-11-08 PROCEDURE — G0156 HHCP-SVS OF AIDE,EA 15 MIN: HCPCS

## 2021-11-08 PROCEDURE — G0152 HHCP-SERV OF OT,EA 15 MIN: HCPCS

## 2021-11-09 NOTE — HOME HEALTH
Patient said the HHA came this morning and was wonderful and helper her CG give her a shower. SHe said it feels so good to sit in her recliner for the first time.    Plan for next visit: UE ROM, function and strenght

## 2021-11-10 ENCOUNTER — HOME CARE VISIT (OUTPATIENT)
Dept: HOME HEALTH SERVICES | Facility: HOME HEALTHCARE | Age: 86
End: 2021-11-10

## 2021-11-10 PROCEDURE — G0299 HHS/HOSPICE OF RN EA 15 MIN: HCPCS

## 2021-11-10 NOTE — CASE COMMUNICATION
The Physical Therapy visit  on 11/10/21 was missed due to unexpected scheduling conflict , therefore, the prescribed frequency of visits was not met.    If you have questions or would like further information about this patient, please contact us at [phone].    Best regards,     Eliseo De León PTA

## 2021-11-11 VITALS
RESPIRATION RATE: 16 BRPM | DIASTOLIC BLOOD PRESSURE: 78 MMHG | SYSTOLIC BLOOD PRESSURE: 118 MMHG | TEMPERATURE: 98.1 F | HEART RATE: 76 BPM | OXYGEN SATURATION: 98 %

## 2021-11-12 ENCOUNTER — HOME CARE VISIT (OUTPATIENT)
Dept: HOME HEALTH SERVICES | Facility: HOME HEALTHCARE | Age: 86
End: 2021-11-12

## 2021-11-12 VITALS
TEMPERATURE: 97.3 F | RESPIRATION RATE: 18 BRPM | SYSTOLIC BLOOD PRESSURE: 106 MMHG | HEART RATE: 88 BPM | DIASTOLIC BLOOD PRESSURE: 64 MMHG

## 2021-11-12 VITALS
SYSTOLIC BLOOD PRESSURE: 104 MMHG | OXYGEN SATURATION: 98 % | RESPIRATION RATE: 18 BRPM | HEART RATE: 90 BPM | TEMPERATURE: 97.5 F | DIASTOLIC BLOOD PRESSURE: 64 MMHG

## 2021-11-12 PROCEDURE — G0152 HHCP-SERV OF OT,EA 15 MIN: HCPCS

## 2021-11-12 PROCEDURE — G0156 HHCP-SVS OF AIDE,EA 15 MIN: HCPCS

## 2021-11-12 NOTE — HOME HEALTH
Patient said she got a shower today and it felt so good. SHe said she has been able to get up un into the recliner about every day, and that helps her not ot have pain.    Plan for next visit: continue with HEP, progress LUE ROM and RUE strength

## 2021-11-15 ENCOUNTER — HOME CARE VISIT (OUTPATIENT)
Dept: HOME HEALTH SERVICES | Facility: HOME HEALTHCARE | Age: 86
End: 2021-11-15

## 2021-11-15 VITALS
HEART RATE: 83 BPM | RESPIRATION RATE: 18 BRPM | OXYGEN SATURATION: 97 % | DIASTOLIC BLOOD PRESSURE: 66 MMHG | TEMPERATURE: 97.7 F | SYSTOLIC BLOOD PRESSURE: 132 MMHG

## 2021-11-15 PROCEDURE — G0152 HHCP-SERV OF OT,EA 15 MIN: HCPCS

## 2021-11-16 NOTE — HOME HEALTH
Patient said she got her new power wheelchair today, but has not been riding much, just enjoying the recliner position so far.    Plan for next visit: upgrade HEP as needed

## 2021-11-17 ENCOUNTER — HOME CARE VISIT (OUTPATIENT)
Dept: HOME HEALTH SERVICES | Facility: HOME HEALTHCARE | Age: 86
End: 2021-11-17

## 2021-11-17 VITALS
TEMPERATURE: 97.6 F | RESPIRATION RATE: 18 BRPM | OXYGEN SATURATION: 92 % | DIASTOLIC BLOOD PRESSURE: 78 MMHG | SYSTOLIC BLOOD PRESSURE: 128 MMHG

## 2021-11-17 PROCEDURE — G0155 HHCP-SVS OF CSW,EA 15 MIN: HCPCS

## 2021-11-17 PROCEDURE — G0151 HHCP-SERV OF PT,EA 15 MIN: HCPCS

## 2021-11-17 NOTE — HOME HEALTH
RANDALL foreman on this date with patient and [facility] Kayla. Patient has 3 regular caregivers. Family is struggling with transporting patient to MD appts. Contacted [employer] and made referral and also presented option of utilizing [facility] which is $35 out of pocket up to 40 miles with .87 each additionial mile. Spoke with Milton at [facility]. Provided info on VA aid and attendance pension if patient is in need of additional support in home at some point. Communication with Clinical Supervisor Bronson Nuno regarding continuum of care.

## 2021-11-17 NOTE — HOME HEALTH
Subjective: My family lifts me to get me in the wheelchair still. per patient    New power wheelchair arrived.    Assessment: patient able to stand with max assist for 10-15 seconds today with use of R UE support.Family reports only using sliding board for car transfers and not interested in using inside the house.    Plan for next visit: transfer training, stand pivot transfers, caregiver training Statement Selected

## 2021-11-19 ENCOUNTER — HOME CARE VISIT (OUTPATIENT)
Dept: HOME HEALTH SERVICES | Facility: HOME HEALTHCARE | Age: 86
End: 2021-11-19

## 2021-11-19 VITALS
OXYGEN SATURATION: 96 % | SYSTOLIC BLOOD PRESSURE: 127 MMHG | DIASTOLIC BLOOD PRESSURE: 74 MMHG | TEMPERATURE: 98 F | RESPIRATION RATE: 18 BRPM | HEART RATE: 69 BPM

## 2021-11-19 VITALS
HEART RATE: 87 BPM | DIASTOLIC BLOOD PRESSURE: 70 MMHG | SYSTOLIC BLOOD PRESSURE: 118 MMHG | RESPIRATION RATE: 16 BRPM | OXYGEN SATURATION: 100 % | TEMPERATURE: 98.1 F

## 2021-11-19 VITALS
TEMPERATURE: 97.6 F | HEART RATE: 88 BPM | DIASTOLIC BLOOD PRESSURE: 66 MMHG | SYSTOLIC BLOOD PRESSURE: 124 MMHG | OXYGEN SATURATION: 98 % | RESPIRATION RATE: 18 BRPM

## 2021-11-19 VITALS
TEMPERATURE: 97.2 F | HEART RATE: 88 BPM | SYSTOLIC BLOOD PRESSURE: 122 MMHG | RESPIRATION RATE: 18 BRPM | DIASTOLIC BLOOD PRESSURE: 66 MMHG

## 2021-11-19 PROCEDURE — G0152 HHCP-SERV OF OT,EA 15 MIN: HCPCS

## 2021-11-19 PROCEDURE — G0299 HHS/HOSPICE OF RN EA 15 MIN: HCPCS

## 2021-11-19 PROCEDURE — G0151 HHCP-SERV OF PT,EA 15 MIN: HCPCS

## 2021-11-19 PROCEDURE — G0156 HHCP-SVS OF AIDE,EA 15 MIN: HCPCS

## 2021-11-19 NOTE — HOME HEALTH
patient said her CG had to go home early yesterday, so they had to put her to bed early, and she was in bed longer than usual, but back up in hr reclienr today.  Plan for next visit: continue to upgrade/progress with Sheela

## 2021-11-20 NOTE — HOME HEALTH
Subjective: We don't really want her to use the new power WC in the house. per granddaughter. Family reports transfers are going well, but they need patient to be able to stand long enough to pull her briefs up/down.    Assessment: patient standing tolerance limited by c/o R knee pain limiting her time upright to 10-15 seconds    Plan for next visit: transfer training, standing with assist as able, caregiver training

## 2021-11-22 ENCOUNTER — HOME CARE VISIT (OUTPATIENT)
Dept: HOME HEALTH SERVICES | Facility: HOME HEALTHCARE | Age: 86
End: 2021-11-22

## 2021-11-22 PROCEDURE — G0156 HHCP-SVS OF AIDE,EA 15 MIN: HCPCS

## 2021-11-22 PROCEDURE — G0151 HHCP-SERV OF PT,EA 15 MIN: HCPCS

## 2021-11-23 ENCOUNTER — HOME CARE VISIT (OUTPATIENT)
Dept: HOME HEALTH SERVICES | Facility: HOME HEALTHCARE | Age: 86
End: 2021-11-23

## 2021-11-23 VITALS
DIASTOLIC BLOOD PRESSURE: 76 MMHG | HEART RATE: 58 BPM | TEMPERATURE: 98 F | SYSTOLIC BLOOD PRESSURE: 116 MMHG | OXYGEN SATURATION: 98 % | RESPIRATION RATE: 16 BRPM

## 2021-11-23 NOTE — HOME HEALTH
Subjective: She had a seizure on Friday and fell out of her chair. They took her to the hospital and she just got back yesterday. per family    Patient sitting up in lift chair. A and O x 3. Flat affect. Has new Keppra entered into med list.     Assessment: patient with new seizure activity, reports she feels weaker. Flat affect. Continues to requires MAX assist for all transfers/mobility. Family does not want to bring over mechanical lift at this time.    Plan for next visit: transfer trainign, caregiver training, ther ex as able

## 2021-11-24 ENCOUNTER — HOME CARE VISIT (OUTPATIENT)
Dept: HOME HEALTH SERVICES | Facility: HOME HEALTHCARE | Age: 86
End: 2021-11-24

## 2021-11-24 PROCEDURE — G0299 HHS/HOSPICE OF RN EA 15 MIN: HCPCS

## 2021-11-24 PROCEDURE — G0156 HHCP-SVS OF AIDE,EA 15 MIN: HCPCS

## 2021-11-25 NOTE — CASE COMMUNICATION
Dear Dr. Francisco Gallagher,    Re: Liana Carrero  : 1935    The physical therapy visit  on 2021 for the above patient was missed due to family declined due to patient fatigue, therefore, the prescribed frequency of visits was not met.    If you have questions or would like further information about this patient, please contact us at [phone].    Regards,   Naomi Ramirez MPT

## 2021-11-28 VITALS
RESPIRATION RATE: 20 BRPM | DIASTOLIC BLOOD PRESSURE: 72 MMHG | HEART RATE: 78 BPM | TEMPERATURE: 98.2 F | SYSTOLIC BLOOD PRESSURE: 138 MMHG

## 2021-11-28 VITALS
SYSTOLIC BLOOD PRESSURE: 138 MMHG | HEART RATE: 76 BPM | RESPIRATION RATE: 20 BRPM | TEMPERATURE: 98.2 F | DIASTOLIC BLOOD PRESSURE: 72 MMHG

## 2021-11-28 VITALS
RESPIRATION RATE: 16 BRPM | DIASTOLIC BLOOD PRESSURE: 68 MMHG | SYSTOLIC BLOOD PRESSURE: 118 MMHG | TEMPERATURE: 98 F | OXYGEN SATURATION: 98 % | HEART RATE: 76 BPM

## 2021-11-29 ENCOUNTER — HOME CARE VISIT (OUTPATIENT)
Dept: HOME HEALTH SERVICES | Facility: HOME HEALTHCARE | Age: 86
End: 2021-11-29

## 2021-11-29 VITALS
HEART RATE: 83 BPM | TEMPERATURE: 97.5 F | DIASTOLIC BLOOD PRESSURE: 68 MMHG | RESPIRATION RATE: 18 BRPM | OXYGEN SATURATION: 97 % | SYSTOLIC BLOOD PRESSURE: 105 MMHG

## 2021-11-29 PROCEDURE — G0152 HHCP-SERV OF OT,EA 15 MIN: HCPCS

## 2021-11-29 NOTE — HOME HEALTH
patient did have a fall post siezure no residual injury noted and no further siezure activity noted new on Kepra- inst on dose regimen side effects ss adverse reaction reaction

## 2021-11-30 ENCOUNTER — HOME CARE VISIT (OUTPATIENT)
Dept: HOME HEALTH SERVICES | Facility: HOME HEALTHCARE | Age: 86
End: 2021-11-30

## 2021-11-30 PROCEDURE — G0151 HHCP-SERV OF PT,EA 15 MIN: HCPCS

## 2021-11-30 NOTE — HOME HEALTH
Subjective: I am more awake now but my head hurts when I cough. per patient    Assessment: patient much more alert and able to participate fully with therapy today. Able to stand with assist of 1 person    Plan for next visit: caregiver instruction, transfer training, HEP instruction, probable discharge to family/caregiver care

## 2021-11-30 NOTE — HOME HEALTH
Patient said she still hurts all over from when she had a seizure and fell out of her chair a week and an half ago.  She said her right hand is sore and her left hand is tighter.  Plan for next visit: HEP upgrade as needed, ADLs

## 2021-12-01 ENCOUNTER — HOME CARE VISIT (OUTPATIENT)
Dept: HOME HEALTH SERVICES | Facility: HOME HEALTHCARE | Age: 86
End: 2021-12-01

## 2021-12-01 PROCEDURE — G0299 HHS/HOSPICE OF RN EA 15 MIN: HCPCS

## 2021-12-02 ENCOUNTER — HOME CARE VISIT (OUTPATIENT)
Dept: HOME HEALTH SERVICES | Facility: HOME HEALTHCARE | Age: 86
End: 2021-12-02

## 2021-12-02 VITALS
DIASTOLIC BLOOD PRESSURE: 70 MMHG | OXYGEN SATURATION: 97 % | HEART RATE: 74 BPM | SYSTOLIC BLOOD PRESSURE: 116 MMHG | TEMPERATURE: 97.9 F | RESPIRATION RATE: 16 BRPM

## 2021-12-02 VITALS
RESPIRATION RATE: 18 BRPM | TEMPERATURE: 97.5 F | DIASTOLIC BLOOD PRESSURE: 62 MMHG | HEART RATE: 90 BPM | OXYGEN SATURATION: 96 % | SYSTOLIC BLOOD PRESSURE: 108 MMHG

## 2021-12-02 PROCEDURE — G0152 HHCP-SERV OF OT,EA 15 MIN: HCPCS

## 2021-12-02 NOTE — HOME HEALTH
patient said she is getting better, but still hurts all over, and had a bad headache last night. SHe said she took tylenol and that took the edge off so she coudl go to sleep.  Plan for next visit: ADL, UE HEP/use function

## 2021-12-02 NOTE — HOME HEALTH
discussed discipline dc for next week is tolerating keppra well all siezure precautions in place and no further siezure activity noted

## 2021-12-03 ENCOUNTER — HOME CARE VISIT (OUTPATIENT)
Dept: HOME HEALTH SERVICES | Facility: HOME HEALTHCARE | Age: 86
End: 2021-12-03

## 2021-12-03 VITALS
HEART RATE: 85 BPM | RESPIRATION RATE: 18 BRPM | DIASTOLIC BLOOD PRESSURE: 75 MMHG | OXYGEN SATURATION: 96 % | TEMPERATURE: 97.7 F | SYSTOLIC BLOOD PRESSURE: 120 MMHG

## 2021-12-03 PROCEDURE — G0156 HHCP-SVS OF AIDE,EA 15 MIN: HCPCS

## 2021-12-03 PROCEDURE — G0151 HHCP-SERV OF PT,EA 15 MIN: HCPCS

## 2021-12-03 NOTE — CASE COMMUNICATION
Patient was seen for discharge from home health physical therapy today due to goals met/max benefit from home health PT services achieved. Patient was seen for a total of 6 PT visits for caregiver training, transfer training, ther ex, and home safety instruction. Currently patient is able to transfer safely via stand pivot transfer with family assist, family is IND with safe transfer technique and HEP. Patient/family agree with discharg e from home health PT.

## 2021-12-03 NOTE — HOME HEALTH
Patient was seen for discharge from home health physical therapy today due to goals met/max benefit from home health PT services achieved. Patient was seen for a total of 6 PT visits for caregiver training, transfer training, ther ex, and home safety instruction. Currently patient is able to transfer safely via stand pivot transfer with family assist, family is IND with safe transfer technique and HEP. Patient/family agree with discharge from home health PT.

## 2021-12-06 ENCOUNTER — HOME CARE VISIT (OUTPATIENT)
Dept: HOME HEALTH SERVICES | Facility: HOME HEALTHCARE | Age: 86
End: 2021-12-06

## 2021-12-06 VITALS
OXYGEN SATURATION: 98 % | DIASTOLIC BLOOD PRESSURE: 60 MMHG | SYSTOLIC BLOOD PRESSURE: 134 MMHG | RESPIRATION RATE: 18 BRPM | TEMPERATURE: 97.6 F | HEART RATE: 86 BPM

## 2021-12-06 PROCEDURE — G0152 HHCP-SERV OF OT,EA 15 MIN: HCPCS

## 2021-12-06 NOTE — HOME HEALTH
Patient said she had a good weekend and her great grandson had a bday party here at the house, so she got to see him and other family.  Plan to DC OT at next visit, patient and dtr agreeable

## 2021-12-07 ENCOUNTER — HOME CARE VISIT (OUTPATIENT)
Dept: HOME HEALTH SERVICES | Facility: HOME HEALTHCARE | Age: 86
End: 2021-12-07

## 2021-12-08 ENCOUNTER — HOME CARE VISIT (OUTPATIENT)
Dept: HOME HEALTH SERVICES | Facility: HOME HEALTHCARE | Age: 86
End: 2021-12-08

## 2021-12-08 VITALS
DIASTOLIC BLOOD PRESSURE: 74 MMHG | RESPIRATION RATE: 18 BRPM | HEART RATE: 78 BPM | TEMPERATURE: 97.5 F | SYSTOLIC BLOOD PRESSURE: 142 MMHG

## 2021-12-08 PROCEDURE — G0156 HHCP-SVS OF AIDE,EA 15 MIN: HCPCS

## 2021-12-08 NOTE — HOME HEALTH
spoke with patients daughter Melanie, states that the patient has had a cough for over 24 hours that it is dry and non productive, also reports that the patient seems more confused and is sleeping more, in addition the patient reports losing her eyesight, after much conversation I becae concerned about a possible bleed on the brain possibly related to a fall two weeks prior, we agreed that the best course of action would be to seek emergent care, patient transported via ambulance to [facility]

## 2021-12-13 ENCOUNTER — TRANSCRIBE ORDERS (OUTPATIENT)
Dept: HOME HEALTH SERVICES | Facility: HOME HEALTHCARE | Age: 86
End: 2021-12-13

## 2021-12-13 DIAGNOSIS — I44.2 ATRIOVENTRICULAR BLOCK, COMPLETE (HCC): Primary | ICD-10-CM

## 2021-12-14 ENCOUNTER — HOME CARE VISIT (OUTPATIENT)
Dept: HOME HEALTH SERVICES | Facility: HOME HEALTHCARE | Age: 86
End: 2021-12-14

## 2021-12-14 PROCEDURE — G0299 HHS/HOSPICE OF RN EA 15 MIN: HCPCS

## 2021-12-15 ENCOUNTER — HOME CARE VISIT (OUTPATIENT)
Dept: HOME HEALTH SERVICES | Facility: HOME HEALTHCARE | Age: 86
End: 2021-12-15

## 2021-12-15 VITALS
TEMPERATURE: 97.5 F | SYSTOLIC BLOOD PRESSURE: 114 MMHG | OXYGEN SATURATION: 96 % | RESPIRATION RATE: 18 BRPM | HEART RATE: 84 BPM | DIASTOLIC BLOOD PRESSURE: 68 MMHG

## 2021-12-15 VITALS — HEART RATE: 88 BPM | TEMPERATURE: 97.5 F | OXYGEN SATURATION: 93 % | RESPIRATION RATE: 18 BRPM

## 2021-12-15 PROCEDURE — G0151 HHCP-SERV OF PT,EA 15 MIN: HCPCS

## 2021-12-15 PROCEDURE — G0152 HHCP-SERV OF OT,EA 15 MIN: HCPCS

## 2021-12-15 NOTE — CASE COMMUNICATION
Patient missed a HHA visit from [facility] on 11/15/2021    Reason: pt family help with p/c       For your records only.   As per home health protocol, MD must be notified of missed/cancelled visits; therefore the prescribed frequency was not met.

## 2021-12-15 NOTE — HOME HEALTH
Reason for referral:86 F referred to home health PT for EULALIO after recent rehospitalization related to coughing and decreased cognitive status related to subdural bleeding from fall 3 weeks ago    Subjective: I just don't think I should be doing too much this week. per patient    Past Medical History: CVA with L sided hemiplegia, subdural hematoma, L hip fracture, CAD    Previous level of function: patient dependent for all transfers and mobility, family transfers her via stand pivot from lift chair to BSC to hospital bed.    Home environment/support: patient has 24 hour caregivers. Has hospital bed, manual WC, power wheelchair, lift chair, BSC, cristine lift, sliding board.

## 2021-12-15 NOTE — CASE COMMUNICATION
PT EULALIO 12/15/21. Anticipate 1w2 for caregiver training in home safety, fall prevention and pain edema management. Patient currently remains dependent for all mobility and transfers.

## 2021-12-16 VITALS
HEART RATE: 88 BPM | SYSTOLIC BLOOD PRESSURE: 110 MMHG | TEMPERATURE: 98.7 F | RESPIRATION RATE: 16 BRPM | DIASTOLIC BLOOD PRESSURE: 80 MMHG | OXYGEN SATURATION: 97 %

## 2021-12-16 NOTE — HOME HEALTH
patient is home following a hosp stay for a new dx of brain bleed, the patient had a siezure several weeks ago and this resulted in a fall, the bleeding is actibe and at this time is to be monitored a repeat scan will be done in one month and POC determined at that time

## 2021-12-17 ENCOUNTER — HOME CARE VISIT (OUTPATIENT)
Dept: HOME HEALTH SERVICES | Facility: HOME HEALTHCARE | Age: 86
End: 2021-12-17

## 2021-12-17 NOTE — HOME HEALTH
Patient said she is not supposed to do anything but rest, as she is still bleeding out. She said she has to coucgh, but everytime she coughs her head pounds.  Plan to DC OT this visit, patient sad but agreeable

## 2021-12-17 NOTE — CASE COMMUNICATION
Patient missed a HHA visit from [facility] on 12/17/2021  Reason: pt family help with p/c      For your records only.   As per home health protocol, MD must be notified of missed/cancelled visits; therefore the prescribed frequency was not met.

## 2021-12-20 ENCOUNTER — HOME CARE VISIT (OUTPATIENT)
Dept: HOME HEALTH SERVICES | Facility: HOME HEALTHCARE | Age: 86
End: 2021-12-20

## 2021-12-20 VITALS
RESPIRATION RATE: 18 BRPM | TEMPERATURE: 97.6 F | OXYGEN SATURATION: 94 % | SYSTOLIC BLOOD PRESSURE: 140 MMHG | DIASTOLIC BLOOD PRESSURE: 77 MMHG | HEART RATE: 91 BPM

## 2021-12-20 PROCEDURE — G0151 HHCP-SERV OF PT,EA 15 MIN: HCPCS

## 2021-12-20 NOTE — CASE COMMUNICATION
Patient was seen for discharge from home health physical therapy today due to goals met/max benefit from home health physical therapy achieved at this time. Patient remains dependent for all transfers and mobility provided by family/caregivers. Caregivers IND with safe transfer techniques, IND with ROM and pressure ulcer prevention. Home health physical therapy services discharge at this time. Patient and family agree with discharge.

## 2021-12-22 ENCOUNTER — HOME CARE VISIT (OUTPATIENT)
Dept: HOME HEALTH SERVICES | Facility: HOME HEALTHCARE | Age: 86
End: 2021-12-22

## 2021-12-24 NOTE — HOME HEALTH
patient was seen by pcp today due to cough, covid 19 positive, refuses visit for now, inst caregiver on infection prevention the need for masks, social distancing and hand washing will need recert next week for now all visits canceled

## 2021-12-27 ENCOUNTER — HOME CARE VISIT (OUTPATIENT)
Dept: HOME HEALTH SERVICES | Facility: HOME HEALTHCARE | Age: 86
End: 2021-12-27

## 2021-12-28 ENCOUNTER — HOME CARE VISIT (OUTPATIENT)
Dept: HOME HEALTH SERVICES | Facility: HOME HEALTHCARE | Age: 86
End: 2021-12-28

## 2021-12-28 VITALS
DIASTOLIC BLOOD PRESSURE: 58 MMHG | SYSTOLIC BLOOD PRESSURE: 118 MMHG | OXYGEN SATURATION: 96 % | RESPIRATION RATE: 20 BRPM | HEART RATE: 84 BPM

## 2021-12-28 PROCEDURE — G0493 RN CARE EA 15 MIN HH/HOSPICE: HCPCS

## 2021-12-29 ENCOUNTER — HOME CARE VISIT (OUTPATIENT)
Dept: HOME HEALTH SERVICES | Facility: HOME HEALTHCARE | Age: 86
End: 2021-12-29

## 2021-12-30 NOTE — CASE COMMUNICATION
Patient missed a HHA visit from [facility] on 12/15/2021.     Reason: pt was at  the hospital       For your records only.   As per home health protocol, MD must be notified of missed/cancelled visits; therefore the prescribed frequency was not met.

## 2021-12-30 NOTE — CASE COMMUNICATION
Patient missed a HHA visit from [facility] on 12/29/2021    Reason: pt's daughter said pt was to sick for a visit      For your records only.   As per home health protocol, MD must be notified of missed/cancelled visits; therefore the prescribed frequency was not met.

## 2021-12-30 NOTE — CASE COMMUNICATION
Patient missed a HHA visit from [facility] on 12/22/2021    Reason: pt was feeling to sick.       For your records only.   As per home health protocol, MD must be notified of missed/cancelled visits; therefore the prescribed frequency was not met.

## 2021-12-30 NOTE — CASE COMMUNICATION
Patient missed a HHA visit from [facility] on 12/27/2021     Reason: pt daughter sais that pt was to sick to get help with p/c.       For your records only.   As per home health protocol, MD must be notified of missed/cancelled visits; therefore the prescribed frequency was not met.

## 2022-01-05 ENCOUNTER — HOME HEALTH ADMISSION (OUTPATIENT)
Dept: HOME HEALTH SERVICES | Facility: HOME HEALTHCARE | Age: 87
End: 2022-01-05

## 2022-01-05 ENCOUNTER — TRANSCRIBE ORDERS (OUTPATIENT)
Dept: HOME HEALTH SERVICES | Facility: HOME HEALTHCARE | Age: 87
End: 2022-01-05

## 2022-01-05 DIAGNOSIS — U07.1 COVID: Primary | ICD-10-CM

## 2022-01-06 ENCOUNTER — HOME CARE VISIT (OUTPATIENT)
Dept: HOME HEALTH SERVICES | Facility: HOME HEALTHCARE | Age: 87
End: 2022-01-06

## 2022-01-06 PROCEDURE — G0299 HHS/HOSPICE OF RN EA 15 MIN: HCPCS

## 2022-01-11 VITALS
SYSTOLIC BLOOD PRESSURE: 118 MMHG | DIASTOLIC BLOOD PRESSURE: 78 MMHG | RESPIRATION RATE: 16 BRPM | HEART RATE: 68 BPM | TEMPERATURE: 97.1 F | OXYGEN SATURATION: 97 %

## 2022-01-12 ENCOUNTER — HOME CARE VISIT (OUTPATIENT)
Dept: HOME HEALTH SERVICES | Facility: HOME HEALTHCARE | Age: 87
End: 2022-01-12

## 2022-01-12 PROCEDURE — G0299 HHS/HOSPICE OF RN EA 15 MIN: HCPCS

## 2022-01-17 VITALS
DIASTOLIC BLOOD PRESSURE: 78 MMHG | OXYGEN SATURATION: 96 % | TEMPERATURE: 98 F | SYSTOLIC BLOOD PRESSURE: 126 MMHG | RESPIRATION RATE: 16 BRPM | HEART RATE: 78 BPM

## 2022-01-18 ENCOUNTER — HOME CARE VISIT (OUTPATIENT)
Dept: HOME HEALTH SERVICES | Facility: HOME HEALTHCARE | Age: 87
End: 2022-01-18

## 2022-01-18 VITALS
TEMPERATURE: 97.5 F | RESPIRATION RATE: 16 BRPM | DIASTOLIC BLOOD PRESSURE: 66 MMHG | HEART RATE: 70 BPM | OXYGEN SATURATION: 97 % | SYSTOLIC BLOOD PRESSURE: 122 MMHG

## 2022-01-18 PROCEDURE — G0299 HHS/HOSPICE OF RN EA 15 MIN: HCPCS

## 2022-01-19 ENCOUNTER — HOME CARE VISIT (OUTPATIENT)
Dept: HOME HEALTH SERVICES | Facility: HOME HEALTHCARE | Age: 87
End: 2022-01-19

## 2022-01-20 ENCOUNTER — HOME CARE VISIT (OUTPATIENT)
Dept: HOME HEALTH SERVICES | Facility: HOME HEALTHCARE | Age: 87
End: 2022-01-20

## 2022-01-20 VITALS
DIASTOLIC BLOOD PRESSURE: 64 MMHG | HEART RATE: 93 BPM | SYSTOLIC BLOOD PRESSURE: 118 MMHG | OXYGEN SATURATION: 97 % | RESPIRATION RATE: 18 BRPM | TEMPERATURE: 97 F

## 2022-01-20 PROCEDURE — G0152 HHCP-SERV OF OT,EA 15 MIN: HCPCS

## 2022-01-21 ENCOUNTER — HOME CARE VISIT (OUTPATIENT)
Dept: HOME HEALTH SERVICES | Facility: HOME HEALTHCARE | Age: 87
End: 2022-01-21

## 2022-01-21 PROCEDURE — G0299 HHS/HOSPICE OF RN EA 15 MIN: HCPCS

## 2022-01-21 PROCEDURE — G0151 HHCP-SERV OF PT,EA 15 MIN: HCPCS

## 2022-01-23 VITALS
TEMPERATURE: 97.6 F | DIASTOLIC BLOOD PRESSURE: 72 MMHG | OXYGEN SATURATION: 97 % | RESPIRATION RATE: 18 BRPM | HEART RATE: 90 BPM | SYSTOLIC BLOOD PRESSURE: 124 MMHG

## 2022-01-23 NOTE — HOME HEALTH
Arrived at patient's home. Patient up in recliner napping. Arouses easily, alert and oriented x4. Can make needs known.

## 2022-01-24 ENCOUNTER — HOME CARE VISIT (OUTPATIENT)
Dept: HOME HEALTH SERVICES | Facility: HOME HEALTHCARE | Age: 87
End: 2022-01-24

## 2022-01-24 VITALS
RESPIRATION RATE: 18 BRPM | SYSTOLIC BLOOD PRESSURE: 120 MMHG | HEART RATE: 90 BPM | TEMPERATURE: 97.6 F | OXYGEN SATURATION: 97 % | DIASTOLIC BLOOD PRESSURE: 70 MMHG

## 2022-01-24 VITALS
HEART RATE: 77 BPM | RESPIRATION RATE: 18 BRPM | TEMPERATURE: 97.9 F | OXYGEN SATURATION: 93 % | SYSTOLIC BLOOD PRESSURE: 106 MMHG | DIASTOLIC BLOOD PRESSURE: 62 MMHG

## 2022-01-24 PROCEDURE — G0152 HHCP-SERV OF OT,EA 15 MIN: HCPCS

## 2022-01-24 NOTE — HOME HEALTH
Patient said she is so weak and wants to work on gettgin stronger again.  She said she would like to work on standing for toielting, as she knows it would help her caregivers.  Plan for next visit: static standing for ALDs, SINGH bedoya, BRIAN ROM, activity tolerance

## 2022-01-24 NOTE — HOME HEALTH
Patient is previous HHpatient that was discharged most recent from physical therapy on 12-20-21. Patient admitted to [facility] with diagnosis of Covid 12-29-21. She was discharged back home on 1-2-22. She returned home with orders for skilled nursing. Patient having more difficulty with transfers and unable to weightbear on right LE or assist with R UE due to increased weakness.      Subjective: Patient reports she is doing better. She states she does still have a cough but it is improving. Patient reports her goal is to be able to assist her caregivers with her transfers. She also states that she would like to be able to go outside in the wheelchair when the weather gets nicer.     Patient evaluation completed. Patient has limited range of motion right LE due to weakness, L U and LE hemiplegia from previous strokes. Increased edema noted L LE. Patients grandaughter present when therapist arrived and stated patient has been primarily a dependent transfer from bed<> bedside commode<> lift chair. She is not placing weight onto right LE to assist with pivot transfer and is no longer able to hold to caregiver with her right UE.  Patient noted to have a powerchair that she reports she doesnt use. Daughter stated that she was not sure why patient got that type of chair as she has decreased vision on left and is unable to safely propel the chair per daughters report. Powerchair is also not working at present. Caregiver currently reports using bedside commode to transfer onto in the am then slides across floor to transfer patient from commode to recliner.     Assessment: Patient is extremely weak, she was unable to transfer this afternoon due to patient being virtually dependent. Son in law will lift patient to place her back in bed. Will continue to assess equipment needs. Discussed reaching out to vendor that supplied chair to service it since it is not functioning properly. Options regarding transfer  assist with possible cristine to decrease caregiver burden, patient would benefit from light weight w/c with arm trough for left UE. and removable arm and foot rests.     Plan: Continue therapy 2w3 for therapeutic exercises for LE, transfer training with appropriate devices to ease caregiver burden, assess equipment needs.

## 2022-01-25 ENCOUNTER — HOME CARE VISIT (OUTPATIENT)
Dept: HOME HEALTH SERVICES | Facility: HOME HEALTHCARE | Age: 87
End: 2022-01-25

## 2022-01-25 VITALS
OXYGEN SATURATION: 97 % | TEMPERATURE: 97.2 F | HEART RATE: 69 BPM | DIASTOLIC BLOOD PRESSURE: 82 MMHG | SYSTOLIC BLOOD PRESSURE: 131 MMHG | RESPIRATION RATE: 18 BRPM

## 2022-01-25 VITALS
TEMPERATURE: 97.6 F | OXYGEN SATURATION: 96 % | DIASTOLIC BLOOD PRESSURE: 62 MMHG | SYSTOLIC BLOOD PRESSURE: 106 MMHG | HEART RATE: 70 BPM | RESPIRATION RATE: 18 BRPM

## 2022-01-25 PROCEDURE — G0299 HHS/HOSPICE OF RN EA 15 MIN: HCPCS

## 2022-01-25 PROCEDURE — G0157 HHC PT ASSISTANT EA 15: HCPCS

## 2022-01-25 NOTE — HOME HEALTH
SUBJECTIVE: Both caregivers present for today's session. Patient in good spirits. Extensive discussion of transfer options moving forward. All parties agreeable to Jose transfer training. Patient has a jose that can be dropped off bu Thurs for training, x 1 sling that is good for toileting.    Fall since last visit: no    Medication changes: no

## 2022-01-25 NOTE — HOME HEALTH
Patient said she is having a nice birthday, and has heard from friends she has not heard from in a long time.  Plan fir next visit: UE strengthening and coordination

## 2022-01-26 ENCOUNTER — HOME CARE VISIT (OUTPATIENT)
Dept: HOME HEALTH SERVICES | Facility: HOME HEALTHCARE | Age: 87
End: 2022-01-26

## 2022-01-26 VITALS
HEART RATE: 74 BPM | OXYGEN SATURATION: 96 % | TEMPERATURE: 97.3 F | DIASTOLIC BLOOD PRESSURE: 76 MMHG | RESPIRATION RATE: 18 BRPM | SYSTOLIC BLOOD PRESSURE: 122 MMHG

## 2022-01-26 PROCEDURE — G0152 HHCP-SERV OF OT,EA 15 MIN: HCPCS

## 2022-01-26 NOTE — HOME HEALTH
Arrived at patient home, welcomed in by patients family. Patient sitting in recliner, alert and verbal. States she has a sore throat, cough and headache that just started this morning. SN assessment to follow.

## 2022-01-27 ENCOUNTER — HOME CARE VISIT (OUTPATIENT)
Dept: HOME HEALTH SERVICES | Facility: HOME HEALTHCARE | Age: 87
End: 2022-01-27

## 2022-01-27 PROCEDURE — G0157 HHC PT ASSISTANT EA 15: HCPCS

## 2022-01-27 NOTE — HOME HEALTH
SUBJECTIVE: Jose lift has been obtained, both caregivers present for training. patient in recliner, recommended that patient start in bed for next session. Caregiver has obtained single toileting sling for training today.     Fall since last visit: no    Medication changes: no

## 2022-01-28 VITALS
HEART RATE: 80 BPM | TEMPERATURE: 97.3 F | OXYGEN SATURATION: 97 % | RESPIRATION RATE: 18 BRPM | DIASTOLIC BLOOD PRESSURE: 82 MMHG | SYSTOLIC BLOOD PRESSURE: 151 MMHG

## 2022-01-29 NOTE — HOME HEALTH
Patient said she is glad they are going to try the cristine lift again, as maybe it will take the strain off of her caregivers' backs.  Kayla, the granddtr asked how to manage whe pull ups style briefs while using the cristine  Plan for next visit: assist family with learning to manage clothing for toileting while using cristine

## 2022-01-31 ENCOUNTER — HOME CARE VISIT (OUTPATIENT)
Dept: HOME HEALTH SERVICES | Facility: HOME HEALTHCARE | Age: 87
End: 2022-01-31

## 2022-01-31 VITALS
TEMPERATURE: 97.6 F | OXYGEN SATURATION: 95 % | DIASTOLIC BLOOD PRESSURE: 66 MMHG | RESPIRATION RATE: 18 BRPM | HEART RATE: 103 BPM | SYSTOLIC BLOOD PRESSURE: 114 MMHG

## 2022-01-31 PROCEDURE — G0152 HHCP-SERV OF OT,EA 15 MIN: HCPCS

## 2022-02-01 ENCOUNTER — HOME CARE VISIT (OUTPATIENT)
Dept: HOME HEALTH SERVICES | Facility: HOME HEALTHCARE | Age: 87
End: 2022-02-01

## 2022-02-01 VITALS
DIASTOLIC BLOOD PRESSURE: 83 MMHG | OXYGEN SATURATION: 97 % | SYSTOLIC BLOOD PRESSURE: 139 MMHG | HEART RATE: 69 BPM | RESPIRATION RATE: 18 BRPM | TEMPERATURE: 97.2 F

## 2022-02-01 PROCEDURE — G0157 HHC PT ASSISTANT EA 15: HCPCS

## 2022-02-01 NOTE — HOME HEALTH
Patient said she is worn out today, as she has been constipated so they gave her a stool softener this morning and she has had 2 BMs so far.  Plan for next visit: ADLs, cristine for toileting with CG, HEP, activity tolerance

## 2022-02-01 NOTE — HOME HEALTH
SUBJECTIVE: Caregiver reports that multiple family members have tested positive for Covid. Proper PPE is donned before entering. Patient reports doing well but is not ready for cristine transfer training as caregiver forgot our appointment was today.     Fall since last visit: no    Medication changes: no

## 2022-02-02 ENCOUNTER — HOME CARE VISIT (OUTPATIENT)
Dept: HOME HEALTH SERVICES | Facility: HOME HEALTHCARE | Age: 87
End: 2022-02-02

## 2022-02-03 ENCOUNTER — HOME CARE VISIT (OUTPATIENT)
Dept: HOME HEALTH SERVICES | Facility: HOME HEALTHCARE | Age: 87
End: 2022-02-03

## 2022-02-03 NOTE — CASE COMMUNICATION
Dear Dr. Francisco Gallagher,    Re: Patient Liana Carrero  : 1935    The occupational therapy visit on 2022 for the above patient was missed due to inclement weather at the scheduled time for occupational therapy.  Therefore, the prescribed frequency of visits was not met.    If you have questions or would like further information about this patient, please contact us at [phone].    Regards,   Katelyn Mckenna MS, OTR/L

## 2022-02-04 ENCOUNTER — HOME CARE VISIT (OUTPATIENT)
Dept: HOME HEALTH SERVICES | Facility: HOME HEALTHCARE | Age: 87
End: 2022-02-04

## 2022-02-07 ENCOUNTER — HOME CARE VISIT (OUTPATIENT)
Dept: HOME HEALTH SERVICES | Facility: HOME HEALTHCARE | Age: 87
End: 2022-02-07

## 2022-02-07 VITALS
RESPIRATION RATE: 18 BRPM | DIASTOLIC BLOOD PRESSURE: 76 MMHG | HEART RATE: 85 BPM | SYSTOLIC BLOOD PRESSURE: 122 MMHG | OXYGEN SATURATION: 96 % | TEMPERATURE: 97.7 F

## 2022-02-07 PROCEDURE — G0152 HHCP-SERV OF OT,EA 15 MIN: HCPCS

## 2022-02-08 ENCOUNTER — HOME CARE VISIT (OUTPATIENT)
Dept: HOME HEALTH SERVICES | Facility: HOME HEALTHCARE | Age: 87
End: 2022-02-08

## 2022-02-08 VITALS
OXYGEN SATURATION: 98 % | TEMPERATURE: 98.3 F | SYSTOLIC BLOOD PRESSURE: 124 MMHG | HEART RATE: 85 BPM | DIASTOLIC BLOOD PRESSURE: 70 MMHG | RESPIRATION RATE: 18 BRPM

## 2022-02-08 PROCEDURE — G0300 HHS/HOSPICE OF LPN EA 15 MIN: HCPCS

## 2022-02-08 NOTE — HOME HEALTH
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

## 2022-02-09 ENCOUNTER — HOME CARE VISIT (OUTPATIENT)
Dept: HOME HEALTH SERVICES | Facility: HOME HEALTHCARE | Age: 87
End: 2022-02-09

## 2022-02-09 PROCEDURE — G0157 HHC PT ASSISTANT EA 15: HCPCS

## 2022-02-09 NOTE — HOME HEALTH
Patient sitting in recliner at time of visit, no signs of distress. Patient has edema to left leg, instructed patient and daughter on the importance of elevation of lower extremities as well as compression stockings applied in the morning and removed at night and limiting sodium intake to prevent retaining fluid.

## 2022-02-10 ENCOUNTER — HOME CARE VISIT (OUTPATIENT)
Dept: HOME HEALTH SERVICES | Facility: HOME HEALTHCARE | Age: 87
End: 2022-02-10

## 2022-02-10 VITALS
HEART RATE: 82 BPM | SYSTOLIC BLOOD PRESSURE: 122 MMHG | DIASTOLIC BLOOD PRESSURE: 68 MMHG | TEMPERATURE: 97.7 F | OXYGEN SATURATION: 97 % | RESPIRATION RATE: 18 BRPM

## 2022-02-10 VITALS
DIASTOLIC BLOOD PRESSURE: 78 MMHG | RESPIRATION RATE: 18 BRPM | HEART RATE: 78 BPM | SYSTOLIC BLOOD PRESSURE: 141 MMHG | OXYGEN SATURATION: 98 % | TEMPERATURE: 97.5 F

## 2022-02-10 VITALS
TEMPERATURE: 97.3 F | HEART RATE: 74 BPM | SYSTOLIC BLOOD PRESSURE: 127 MMHG | DIASTOLIC BLOOD PRESSURE: 87 MMHG | RESPIRATION RATE: 18 BRPM | OXYGEN SATURATION: 98 %

## 2022-02-10 PROCEDURE — G0157 HHC PT ASSISTANT EA 15: HCPCS

## 2022-02-10 PROCEDURE — G0152 HHCP-SERV OF OT,EA 15 MIN: HCPCS

## 2022-02-10 NOTE — HOME HEALTH
SUBJECTIVE: Patient up and ready for therapy upon arrival. No new changes mentioned. All caregivers present for cristine training.     Fall since last visit: no    Medication changes: no

## 2022-02-11 ENCOUNTER — HOME CARE VISIT (OUTPATIENT)
Dept: HOME HEALTH SERVICES | Facility: HOME HEALTHCARE | Age: 87
End: 2022-02-11

## 2022-02-13 NOTE — HOME HEALTH
Maryann said she will continue to work on her left hand acrtive ROM and RUE strength on her own, she is pleased with the progress she has made so far, and was excited to grasp and release an object today. SHe agreed to plan to DC OT

## 2022-02-13 NOTE — CASE COMMUNICATION
OT DC on 2/10/2022; all goals met to maximum rehab potential.  SHe actually made good progress with left hand, can grasp and release objects, and is I with HEP.  SN and PT still active, thank you.

## 2022-02-16 ENCOUNTER — HOME CARE VISIT (OUTPATIENT)
Dept: HOME HEALTH SERVICES | Facility: HOME HEALTHCARE | Age: 87
End: 2022-02-16

## 2022-02-16 VITALS
OXYGEN SATURATION: 94 % | RESPIRATION RATE: 19 BRPM | DIASTOLIC BLOOD PRESSURE: 70 MMHG | HEART RATE: 76 BPM | TEMPERATURE: 99.9 F | SYSTOLIC BLOOD PRESSURE: 128 MMHG

## 2022-02-16 PROCEDURE — G0300 HHS/HOSPICE OF LPN EA 15 MIN: HCPCS

## 2022-02-17 ENCOUNTER — HOME CARE VISIT (OUTPATIENT)
Dept: HOME HEALTH SERVICES | Facility: HOME HEALTHCARE | Age: 87
End: 2022-02-17

## 2022-02-17 PROCEDURE — G0151 HHCP-SERV OF PT,EA 15 MIN: HCPCS

## 2022-02-17 NOTE — CASE COMMUNICATION
Patient missed a HHA visit from [facility] on 2/16/2022    Reason: ot family refused the service      For your records only.   As per home health protocol, MD must be notified of missed/cancelled visits; therefore the prescribed frequency was not met.

## 2022-02-17 NOTE — HOME HEALTH
Patient sitting in recliner at time of visit. No signs of distress, vitals stable. Edema to left lower leg has reduced since last visit. Educated patient on the importance of elevating legs to help manage edema, patient verbalized understanding. Patient stated that she has not been using her incentive spirometer, taught and instructed patient on the importance of using incentive spirometer, patient verbalized understanding. Refer to interventions for more detail.

## 2022-02-18 VITALS
OXYGEN SATURATION: 93 % | DIASTOLIC BLOOD PRESSURE: 64 MMHG | HEART RATE: 74 BPM | SYSTOLIC BLOOD PRESSURE: 130 MMHG | RESPIRATION RATE: 18 BRPM | TEMPERATURE: 97.7 F

## 2022-02-18 NOTE — HOME HEALTH
Subjective: Patient states that she has not been using the cristine lift for her transfers. All caregivers have been trained on how to use the cristine in case patient unable to assist or if caregiver does not have assist of another person.  She is transferring with assist of her grandaughter and daughter. Patient states that she had a stomach virus at the beginning of the week but is feeling much better.    Objective: Patient states that Birdie her friend is staying with her to assist with anything while her daugher and grandaughter are at the restaurant working to get it ready to open. Patient cristine lift was in her room which has been cleared out to allow for more room to use the cristine. Patient asking about her powerchair as she states it still not working even though they tried to charge the battery. Advised patient to have her daughter contact vendor to see about ordering new batteries. Encouraged patient to use chair to sit in, family shown how to disengage the motor so chair can be pushed by caregiver, which would allow patient to be able to go into a different room of the home including sitting at kitchen table. She does have a transport chair but it does not provide the support to her back, or arms.     Assessment: Patient goals for physical therapy have been met. All caregiver training completed with cristine lift.     Plan: Discharge patient from physical therapy, skilled nursing continues.

## 2022-02-23 ENCOUNTER — HOME CARE VISIT (OUTPATIENT)
Dept: HOME HEALTH SERVICES | Facility: HOME HEALTHCARE | Age: 87
End: 2022-02-23

## 2022-02-23 VITALS
TEMPERATURE: 97.1 F | SYSTOLIC BLOOD PRESSURE: 116 MMHG | DIASTOLIC BLOOD PRESSURE: 61 MMHG | HEART RATE: 77 BPM | RESPIRATION RATE: 18 BRPM | OXYGEN SATURATION: 97 %

## 2022-02-23 PROCEDURE — G0299 HHS/HOSPICE OF RN EA 15 MIN: HCPCS

## 2022-02-23 NOTE — CASE COMMUNICATION
Patient missed a HHA visit from [facility] on  2/23/2022    Reason: HHA visited missed because pt daughter don't want the service      For your records only.   As per home health protocol, MD must be notified of missed/cancelled visits; therefore the prescribed frequency was not met.

## 2022-02-23 NOTE — HOME HEALTH
Arrived at patients home, welcomed in by patient. Patient sitting in recliner, states she is doing well.

## 2022-03-02 ENCOUNTER — HOME CARE VISIT (OUTPATIENT)
Dept: HOME HEALTH SERVICES | Facility: HOME HEALTHCARE | Age: 87
End: 2022-03-02

## 2022-03-02 VITALS
HEART RATE: 84 BPM | DIASTOLIC BLOOD PRESSURE: 64 MMHG | RESPIRATION RATE: 18 BRPM | OXYGEN SATURATION: 97 % | TEMPERATURE: 96.9 F | SYSTOLIC BLOOD PRESSURE: 127 MMHG

## 2022-03-02 PROCEDURE — G0299 HHS/HOSPICE OF RN EA 15 MIN: HCPCS

## 2022-08-03 ENCOUNTER — LAB REQUISITION (OUTPATIENT)
Dept: LAB | Facility: HOSPITAL | Age: 87
End: 2022-08-03

## 2022-08-03 DIAGNOSIS — I25.10 ATHEROSCLEROTIC HEART DISEASE OF NATIVE CORONARY ARTERY WITHOUT ANGINA PECTORIS: ICD-10-CM

## 2022-08-03 DIAGNOSIS — I11.0 HYPERTENSIVE HEART DISEASE WITH HEART FAILURE: ICD-10-CM

## 2022-08-03 DIAGNOSIS — E11.9 TYPE 2 DIABETES MELLITUS WITHOUT COMPLICATIONS: ICD-10-CM

## 2022-08-03 DIAGNOSIS — I50.33 ACUTE ON CHRONIC DIASTOLIC (CONGESTIVE) HEART FAILURE: ICD-10-CM

## 2022-08-05 ENCOUNTER — LAB REQUISITION (OUTPATIENT)
Dept: LAB | Facility: HOSPITAL | Age: 87
End: 2022-08-05

## 2022-08-05 DIAGNOSIS — I50.33 ACUTE ON CHRONIC DIASTOLIC (CONGESTIVE) HEART FAILURE: ICD-10-CM

## 2022-08-05 DIAGNOSIS — I69.354 HEMIPLEGIA AND HEMIPARESIS FOLLOWING CEREBRAL INFARCTION AFFECTING LEFT NON-DOMINANT SIDE: ICD-10-CM

## 2022-08-05 DIAGNOSIS — L11.0 ACQUIRED KERATOSIS FOLLICULARIS: ICD-10-CM

## 2022-08-05 LAB
ANION GAP SERPL CALCULATED.3IONS-SCNC: 13 MMOL/L (ref 5–15)
BUN SERPL-MCNC: 29 MG/DL (ref 8–23)
BUN/CREAT SERPL: 24.2 (ref 7–25)
CALCIUM SPEC-SCNC: 9.6 MG/DL (ref 8.6–10.5)
CHLORIDE SERPL-SCNC: 97 MMOL/L (ref 98–107)
CO2 SERPL-SCNC: 28 MMOL/L (ref 22–29)
CREAT SERPL-MCNC: 1.2 MG/DL (ref 0.57–1)
EGFRCR SERPLBLD CKD-EPI 2021: 44.5 ML/MIN/1.73
GLUCOSE SERPL-MCNC: 242 MG/DL (ref 65–99)
NT-PROBNP SERPL-MCNC: 650.3 PG/ML (ref 0–1800)
POTASSIUM SERPL-SCNC: 4.8 MMOL/L (ref 3.5–5.2)
SODIUM SERPL-SCNC: 138 MMOL/L (ref 136–145)
TSH SERPL DL<=0.05 MIU/L-ACNC: 3.13 UIU/ML (ref 0.27–4.2)

## 2022-08-05 PROCEDURE — 80048 BASIC METABOLIC PNL TOTAL CA: CPT | Performed by: FAMILY MEDICINE

## 2022-08-05 PROCEDURE — 83880 ASSAY OF NATRIURETIC PEPTIDE: CPT | Performed by: FAMILY MEDICINE

## 2022-08-05 PROCEDURE — 84443 ASSAY THYROID STIM HORMONE: CPT | Performed by: FAMILY MEDICINE

## 2022-08-19 ENCOUNTER — LAB REQUISITION (OUTPATIENT)
Dept: LAB | Facility: HOSPITAL | Age: 87
End: 2022-08-19

## 2022-08-19 DIAGNOSIS — I25.10 ATHEROSCLEROTIC HEART DISEASE OF NATIVE CORONARY ARTERY WITHOUT ANGINA PECTORIS: ICD-10-CM

## 2022-08-19 DIAGNOSIS — I50.33 ACUTE ON CHRONIC DIASTOLIC (CONGESTIVE) HEART FAILURE: ICD-10-CM

## 2022-08-19 DIAGNOSIS — I11.0 HYPERTENSIVE HEART DISEASE WITH HEART FAILURE: ICD-10-CM

## 2022-08-19 LAB
ANION GAP SERPL CALCULATED.3IONS-SCNC: 9.3 MMOL/L (ref 5–15)
BASOPHILS # BLD AUTO: 0.05 10*3/MM3 (ref 0–0.2)
BASOPHILS NFR BLD AUTO: 0.7 % (ref 0–1.5)
BUN SERPL-MCNC: 26 MG/DL (ref 8–23)
BUN/CREAT SERPL: 26.3 (ref 7–25)
CALCIUM SPEC-SCNC: 9.9 MG/DL (ref 8.6–10.5)
CHLORIDE SERPL-SCNC: 97 MMOL/L (ref 98–107)
CO2 SERPL-SCNC: 35.7 MMOL/L (ref 22–29)
CREAT SERPL-MCNC: 0.99 MG/DL (ref 0.57–1)
DEPRECATED RDW RBC AUTO: 44.9 FL (ref 37–54)
EGFRCR SERPLBLD CKD-EPI 2021: 56 ML/MIN/1.73
EOSINOPHIL # BLD AUTO: 0.24 10*3/MM3 (ref 0–0.4)
EOSINOPHIL NFR BLD AUTO: 3.4 % (ref 0.3–6.2)
ERYTHROCYTE [DISTWIDTH] IN BLOOD BY AUTOMATED COUNT: 13.1 % (ref 12.3–15.4)
GLUCOSE SERPL-MCNC: 233 MG/DL (ref 65–99)
HCT VFR BLD AUTO: 41.6 % (ref 34–46.6)
HGB BLD-MCNC: 14.2 G/DL (ref 12–15.9)
IMM GRANULOCYTES # BLD AUTO: 0.03 10*3/MM3 (ref 0–0.05)
IMM GRANULOCYTES NFR BLD AUTO: 0.4 % (ref 0–0.5)
LYMPHOCYTES # BLD AUTO: 2.66 10*3/MM3 (ref 0.7–3.1)
LYMPHOCYTES NFR BLD AUTO: 38.2 % (ref 19.6–45.3)
MCH RBC QN AUTO: 32.3 PG (ref 26.6–33)
MCHC RBC AUTO-ENTMCNC: 34.1 G/DL (ref 31.5–35.7)
MCV RBC AUTO: 94.8 FL (ref 79–97)
MONOCYTES # BLD AUTO: 0.39 10*3/MM3 (ref 0.1–0.9)
MONOCYTES NFR BLD AUTO: 5.6 % (ref 5–12)
NEUTROPHILS NFR BLD AUTO: 3.6 10*3/MM3 (ref 1.7–7)
NEUTROPHILS NFR BLD AUTO: 51.7 % (ref 42.7–76)
NRBC BLD AUTO-RTO: 0 /100 WBC (ref 0–0.2)
PLATELET # BLD AUTO: 149 10*3/MM3 (ref 140–450)
PMV BLD AUTO: 11.6 FL (ref 6–12)
POTASSIUM SERPL-SCNC: 4.3 MMOL/L (ref 3.5–5.2)
RBC # BLD AUTO: 4.39 10*6/MM3 (ref 3.77–5.28)
SODIUM SERPL-SCNC: 142 MMOL/L (ref 136–145)
TSH SERPL DL<=0.05 MIU/L-ACNC: 11.21 UIU/ML (ref 0.27–4.2)
WBC NRBC COR # BLD: 6.97 10*3/MM3 (ref 3.4–10.8)

## 2022-08-19 PROCEDURE — 84443 ASSAY THYROID STIM HORMONE: CPT | Performed by: FAMILY MEDICINE

## 2022-08-19 PROCEDURE — 85025 COMPLETE CBC W/AUTO DIFF WBC: CPT | Performed by: FAMILY MEDICINE

## 2022-08-19 PROCEDURE — 80048 BASIC METABOLIC PNL TOTAL CA: CPT | Performed by: FAMILY MEDICINE

## 2022-08-26 ENCOUNTER — LAB REQUISITION (OUTPATIENT)
Dept: LAB | Facility: HOSPITAL | Age: 87
End: 2022-08-26

## 2022-08-26 DIAGNOSIS — E11.9 TYPE 2 DIABETES MELLITUS WITHOUT COMPLICATIONS: ICD-10-CM

## 2022-08-26 LAB — HBA1C MFR BLD: 6.7 % (ref 4.8–5.6)

## 2022-08-26 PROCEDURE — 83036 HEMOGLOBIN GLYCOSYLATED A1C: CPT | Performed by: FAMILY MEDICINE

## 2023-08-21 NOTE — PROGRESS NOTES
Home health labs today show acute renal failure -   Na 139, K 3.1, CL 97, CO2 27, BUN 62, Cr 4.58. Glucose 171  Spoke with patient's daughter this afternoon.  Patient presently already in [facility].    [[email or web address]_OB_FT:[id number]],[NS_[email or web address]_ALL_OB_FT:[id number]],[NS_DeliveryRN_OBGYN_ALL_OB_FT:[id number]]

## 2024-12-19 NOTE — HOME HEALTH
patient is progressing well post covid denies any further cough and is receptive to therapy staring next week
To get better and follow your care plan as instructed.

## (undated) DEVICE — PK HIP PINNING 40

## (undated) DEVICE — KT MANIFLD CARDIAC

## (undated) DEVICE — SOL ISO/ALC RUB 70PCT 4OZ

## (undated) DEVICE — DRSNG SURESITE WNDW 4X4.5

## (undated) DEVICE — WEBRIL* CAST PADDING: Brand: DEROYAL

## (undated) DEVICE — BNDG ELAS CO-FLEX SLF ADHR 6IN 5YD LF STRL

## (undated) DEVICE — CATH DIAG IMPULSE AL1 5F 100CM

## (undated) DEVICE — Device

## (undated) DEVICE — GW EMR FIX EXCHG J STD .035 3MM 260CM

## (undated) DEVICE — GLV SURG SENSICARE PI PF LF 8.5 GRN STRL

## (undated) DEVICE — VIOLET BRAIDED (POLYGLACTIN 910), SYNTHETIC ABSORBABLE SUTURE: Brand: COATED VICRYL

## (undated) DEVICE — DRSNG GZ PETROLTM XEROFORM CURAD 1X8IN STRL

## (undated) DEVICE — SPNG GZ WOVN 4X4IN 12PLY 10/BX STRL

## (undated) DEVICE — CATH DIAG IMPULSE MPA2 5F 125CM

## (undated) DEVICE — PK CATH CARD 40

## (undated) DEVICE — GLV SURG PREMIERPRO ORTHO LTX PF SZ8.5 BRN

## (undated) DEVICE — INTRO SHEATH ART/FEM ENGAGE .035 5F12CM

## (undated) DEVICE — DRP C/ARMOR

## (undated) DEVICE — APPL CHLORAPREP HI/LITE 26ML ORNG

## (undated) DEVICE — GUIDE PIN 3.2MM X 343MM: Brand: TRIGEN

## (undated) DEVICE — MAT FLR ABSORBENT LG 4FT 10 2.5FT

## (undated) DEVICE — STPLR SKIN VISISTAT WD 35CT

## (undated) DEVICE — NEEDLE, QUINCKE, 20GX3.5": Brand: MEDLINE

## (undated) DEVICE — CATH DIAG IMPULSE IMT 5F 100CM

## (undated) DEVICE — SYR LUERLOK 20CC BX/50

## (undated) DEVICE — FR5 INFINITI MULTIPAC: Brand: INFINITI

## (undated) DEVICE — 4.0MM LONG AO PILOT DRILL: Brand: TRIGEN